# Patient Record
Sex: MALE | Race: WHITE | Employment: OTHER | URBAN - METROPOLITAN AREA
[De-identification: names, ages, dates, MRNs, and addresses within clinical notes are randomized per-mention and may not be internally consistent; named-entity substitution may affect disease eponyms.]

---

## 2017-01-10 ENCOUNTER — TELEPHONE (OUTPATIENT)
Dept: ENT CLINIC | Age: 82
End: 2017-01-10

## 2017-01-11 ENCOUNTER — TELEPHONE (OUTPATIENT)
Dept: ENT CLINIC | Age: 82
End: 2017-01-11

## 2017-01-19 ENCOUNTER — OFFICE VISIT (OUTPATIENT)
Dept: INTERNAL MEDICINE CLINIC | Age: 82
End: 2017-01-19

## 2017-01-19 VITALS
BODY MASS INDEX: 28.98 KG/M2 | HEIGHT: 70 IN | WEIGHT: 202.4 LBS | DIASTOLIC BLOOD PRESSURE: 72 MMHG | SYSTOLIC BLOOD PRESSURE: 138 MMHG | HEART RATE: 64 BPM

## 2017-01-19 DIAGNOSIS — Z01.818 PREOP EXAMINATION: Primary | ICD-10-CM

## 2017-01-19 DIAGNOSIS — N18.30 CRF (CHRONIC RENAL FAILURE), STAGE 3 (MODERATE) (HCC): ICD-10-CM

## 2017-01-19 DIAGNOSIS — K11.8 PAROTID MASS: ICD-10-CM

## 2017-01-19 PROCEDURE — 4040F PNEUMOC VAC/ADMIN/RCVD: CPT | Performed by: NURSE PRACTITIONER

## 2017-01-19 PROCEDURE — G8420 CALC BMI NORM PARAMETERS: HCPCS | Performed by: NURSE PRACTITIONER

## 2017-01-19 PROCEDURE — 99214 OFFICE O/P EST MOD 30 MIN: CPT | Performed by: NURSE PRACTITIONER

## 2017-01-19 PROCEDURE — 1036F TOBACCO NON-USER: CPT | Performed by: NURSE PRACTITIONER

## 2017-01-19 PROCEDURE — 93000 ELECTROCARDIOGRAM COMPLETE: CPT | Performed by: NURSE PRACTITIONER

## 2017-01-19 PROCEDURE — G8484 FLU IMMUNIZE NO ADMIN: HCPCS | Performed by: NURSE PRACTITIONER

## 2017-01-19 PROCEDURE — 1123F ACP DISCUSS/DSCN MKR DOCD: CPT | Performed by: NURSE PRACTITIONER

## 2017-01-19 PROCEDURE — G8427 DOCREV CUR MEDS BY ELIG CLIN: HCPCS | Performed by: NURSE PRACTITIONER

## 2017-01-20 LAB
ALBUMIN SERPL-MCNC: 4.1 G/DL (ref 3.4–5)
ANION GAP SERPL CALCULATED.3IONS-SCNC: 14 MMOL/L (ref 3–16)
BUN BLDV-MCNC: 30 MG/DL (ref 7–20)
CALCIUM SERPL-MCNC: 9.3 MG/DL (ref 8.3–10.6)
CHLORIDE BLD-SCNC: 102 MMOL/L (ref 99–110)
CO2: 26 MMOL/L (ref 21–32)
CREAT SERPL-MCNC: 1.6 MG/DL (ref 0.8–1.3)
GFR AFRICAN AMERICAN: 51
GFR NON-AFRICAN AMERICAN: 42
GLUCOSE BLD-MCNC: 263 MG/DL (ref 70–99)
PHOSPHORUS: 3.2 MG/DL (ref 2.5–4.9)
POTASSIUM SERPL-SCNC: 4.1 MMOL/L (ref 3.5–5.1)
SODIUM BLD-SCNC: 142 MMOL/L (ref 136–145)

## 2017-01-26 ENCOUNTER — HOSPITAL ENCOUNTER (OUTPATIENT)
Dept: SURGERY | Age: 82
Discharge: HOME | End: 2017-01-26
Attending: OTOLARYNGOLOGY | Admitting: OTOLARYNGOLOGY

## 2017-01-26 VITALS
DIASTOLIC BLOOD PRESSURE: 77 MMHG | SYSTOLIC BLOOD PRESSURE: 132 MMHG | WEIGHT: 199.3 LBS | TEMPERATURE: 97.1 F | BODY MASS INDEX: 28.6 KG/M2 | HEART RATE: 69 BPM | RESPIRATION RATE: 14 BRPM | OXYGEN SATURATION: 92 %

## 2017-01-26 LAB
ANION GAP SERPL CALCULATED.3IONS-SCNC: 13 MMOL/L (ref 3–16)
BUN BLDV-MCNC: 23 MG/DL (ref 7–20)
CALCIUM SERPL-MCNC: 8.9 MG/DL (ref 8.3–10.6)
CHLORIDE BLD-SCNC: 98 MMOL/L (ref 99–110)
CO2: 27 MMOL/L (ref 21–32)
CREAT SERPL-MCNC: 1.5 MG/DL (ref 0.8–1.3)
GFR AFRICAN AMERICAN: 55
GFR NON-AFRICAN AMERICAN: 45
GLUCOSE BLD-MCNC: 168 MG/DL (ref 70–99)
GLUCOSE BLD-MCNC: 176 MG/DL (ref 70–99)
GLUCOSE BLD-MCNC: 184 MG/DL (ref 70–99)
HCT VFR BLD CALC: 41.2 % (ref 40.5–52.5)
HEMOGLOBIN: 14.3 G/DL (ref 13.5–17.5)
MCH RBC QN AUTO: 30.1 PG (ref 26–34)
MCHC RBC AUTO-ENTMCNC: 34.6 G/DL (ref 31–36)
MCV RBC AUTO: 87.1 FL (ref 80–100)
PDW BLD-RTO: 15 % (ref 12.4–15.4)
PERFORMED ON: ABNORMAL
PERFORMED ON: ABNORMAL
PLATELET # BLD: 125 K/UL (ref 135–450)
PMV BLD AUTO: 9.7 FL (ref 5–10.5)
POTASSIUM SERPL-SCNC: 3.9 MMOL/L (ref 3.5–5.1)
RBC # BLD: 4.73 M/UL (ref 4.2–5.9)
SODIUM BLD-SCNC: 138 MMOL/L (ref 136–145)
WBC # BLD: 6.6 K/UL (ref 4–11)

## 2017-01-26 PROCEDURE — 42415 EXCISE PAROTID GLAND/LESION: CPT | Performed by: OTOLARYNGOLOGY

## 2017-01-26 RX ORDER — CEFAZOLIN SODIUM 2 G/100ML
INJECTION, SOLUTION INTRAVENOUS
Status: COMPLETED
Start: 2017-01-26 | End: 2017-01-26

## 2017-01-26 RX ORDER — ONDANSETRON 4 MG/1
TABLET, ORALLY DISINTEGRATING ORAL
Qty: 30 TABLET | Refills: 0 | Status: SHIPPED | OUTPATIENT
Start: 2017-01-26 | End: 2018-04-20 | Stop reason: ALTCHOICE

## 2017-01-26 RX ORDER — SODIUM CHLORIDE, SODIUM LACTATE, POTASSIUM CHLORIDE, CALCIUM CHLORIDE 600; 310; 30; 20 MG/100ML; MG/100ML; MG/100ML; MG/100ML
INJECTION, SOLUTION INTRAVENOUS CONTINUOUS
Status: DISCONTINUED | OUTPATIENT
Start: 2017-01-26 | End: 2017-01-27 | Stop reason: HOSPADM

## 2017-01-26 RX ORDER — LIDOCAINE HYDROCHLORIDE 10 MG/ML
0.5 INJECTION, SOLUTION EPIDURAL; INFILTRATION; INTRACAUDAL; PERINEURAL ONCE
Status: DISCONTINUED | OUTPATIENT
Start: 2017-01-26 | End: 2017-01-27 | Stop reason: HOSPADM

## 2017-01-26 RX ORDER — FENTANYL CITRATE 50 UG/ML
50 INJECTION, SOLUTION INTRAMUSCULAR; INTRAVENOUS EVERY 5 MIN PRN
Status: DISCONTINUED | OUTPATIENT
Start: 2017-01-26 | End: 2017-01-27 | Stop reason: HOSPADM

## 2017-01-26 RX ORDER — MEPERIDINE HYDROCHLORIDE 25 MG/ML
12.5 INJECTION INTRAMUSCULAR; INTRAVENOUS; SUBCUTANEOUS EVERY 5 MIN PRN
Status: DISCONTINUED | OUTPATIENT
Start: 2017-01-26 | End: 2017-01-27 | Stop reason: HOSPADM

## 2017-01-26 RX ORDER — FENTANYL CITRATE 50 UG/ML
25 INJECTION, SOLUTION INTRAMUSCULAR; INTRAVENOUS EVERY 5 MIN PRN
Status: DISCONTINUED | OUTPATIENT
Start: 2017-01-26 | End: 2017-01-27 | Stop reason: HOSPADM

## 2017-01-26 RX ORDER — LIDOCAINE HYDROCHLORIDE 10 MG/ML
1 INJECTION, SOLUTION EPIDURAL; INFILTRATION; INTRACAUDAL; PERINEURAL
Status: ACTIVE | OUTPATIENT
Start: 2017-01-26 | End: 2017-01-26

## 2017-01-26 RX ORDER — CEPHALEXIN 250 MG/1
250 CAPSULE ORAL 4 TIMES DAILY
Qty: 40 CAPSULE | Refills: 0 | Status: SHIPPED | OUTPATIENT
Start: 2017-01-26 | End: 2017-02-05

## 2017-01-26 RX ORDER — HYDROMORPHONE HCL 110MG/55ML
0.5 PATIENT CONTROLLED ANALGESIA SYRINGE INTRAVENOUS EVERY 5 MIN PRN
Status: DISCONTINUED | OUTPATIENT
Start: 2017-01-26 | End: 2017-01-27 | Stop reason: HOSPADM

## 2017-01-26 RX ORDER — ONDANSETRON 2 MG/ML
4 INJECTION INTRAMUSCULAR; INTRAVENOUS
Status: ACTIVE | OUTPATIENT
Start: 2017-01-26 | End: 2017-01-26

## 2017-01-26 RX ORDER — LABETALOL HYDROCHLORIDE 5 MG/ML
5 INJECTION, SOLUTION INTRAVENOUS EVERY 10 MIN PRN
Status: DISCONTINUED | OUTPATIENT
Start: 2017-01-26 | End: 2017-01-27 | Stop reason: HOSPADM

## 2017-01-26 RX ORDER — SODIUM CHLORIDE 0.9 % (FLUSH) 0.9 %
10 SYRINGE (ML) INJECTION PRN
Status: DISCONTINUED | OUTPATIENT
Start: 2017-01-26 | End: 2017-01-27 | Stop reason: HOSPADM

## 2017-01-26 RX ORDER — SODIUM CHLORIDE 0.9 % (FLUSH) 0.9 %
10 SYRINGE (ML) INJECTION EVERY 12 HOURS SCHEDULED
Status: DISCONTINUED | OUTPATIENT
Start: 2017-01-26 | End: 2017-01-27 | Stop reason: HOSPADM

## 2017-01-26 RX ORDER — SODIUM CHLORIDE 9 MG/ML
INJECTION, SOLUTION INTRAVENOUS
Status: COMPLETED
Start: 2017-01-26 | End: 2017-01-26

## 2017-01-26 RX ORDER — HYDROCODONE BITARTRATE AND ACETAMINOPHEN 5; 325 MG/1; MG/1
TABLET ORAL
Qty: 80 TABLET | Refills: 0 | Status: SHIPPED | OUTPATIENT
Start: 2017-01-26 | End: 2018-01-29 | Stop reason: ALTCHOICE

## 2017-01-26 RX ORDER — HYDROMORPHONE HCL 110MG/55ML
0.25 PATIENT CONTROLLED ANALGESIA SYRINGE INTRAVENOUS EVERY 5 MIN PRN
Status: DISCONTINUED | OUTPATIENT
Start: 2017-01-26 | End: 2017-01-27 | Stop reason: HOSPADM

## 2017-01-26 RX ORDER — SODIUM CHLORIDE 9 MG/ML
INJECTION, SOLUTION INTRAVENOUS CONTINUOUS
Status: DISCONTINUED | OUTPATIENT
Start: 2017-01-26 | End: 2017-01-27 | Stop reason: HOSPADM

## 2017-01-26 RX ORDER — CEFAZOLIN SODIUM 2 G/100ML
2 INJECTION, SOLUTION INTRAVENOUS
Status: COMPLETED | OUTPATIENT
Start: 2017-01-26 | End: 2017-01-26

## 2017-01-26 RX ADMIN — CEFAZOLIN SODIUM 2 G: 2 INJECTION, SOLUTION INTRAVENOUS at 07:27

## 2017-01-26 RX ADMIN — SODIUM CHLORIDE: 9 INJECTION, SOLUTION INTRAVENOUS at 06:45

## 2017-01-26 ASSESSMENT — PAIN SCALES - GENERAL: PAINLEVEL_OUTOF10: 0

## 2017-01-26 ASSESSMENT — PAIN - FUNCTIONAL ASSESSMENT: PAIN_FUNCTIONAL_ASSESSMENT: 0-10

## 2017-01-30 ENCOUNTER — TELEPHONE (OUTPATIENT)
Dept: ENT CLINIC | Age: 82
End: 2017-01-30

## 2017-02-03 ENCOUNTER — OFFICE VISIT (OUTPATIENT)
Dept: ENT CLINIC | Age: 82
End: 2017-02-03

## 2017-02-03 VITALS
HEIGHT: 70 IN | DIASTOLIC BLOOD PRESSURE: 72 MMHG | WEIGHT: 199.6 LBS | HEART RATE: 77 BPM | BODY MASS INDEX: 28.58 KG/M2 | SYSTOLIC BLOOD PRESSURE: 123 MMHG

## 2017-02-03 DIAGNOSIS — Z09 POSTOP CHECK: Primary | ICD-10-CM

## 2017-02-03 PROCEDURE — 99024 POSTOP FOLLOW-UP VISIT: CPT | Performed by: OTOLARYNGOLOGY

## 2017-02-23 ENCOUNTER — OFFICE VISIT (OUTPATIENT)
Dept: INTERNAL MEDICINE CLINIC | Age: 82
End: 2017-02-23

## 2017-02-23 VITALS
DIASTOLIC BLOOD PRESSURE: 86 MMHG | WEIGHT: 199 LBS | HEART RATE: 72 BPM | HEIGHT: 70 IN | BODY MASS INDEX: 28.49 KG/M2 | SYSTOLIC BLOOD PRESSURE: 132 MMHG

## 2017-02-23 DIAGNOSIS — E11.21 CONTROLLED TYPE 2 DIABETES MELLITUS WITH DIABETIC NEPHROPATHY, WITHOUT LONG-TERM CURRENT USE OF INSULIN (HCC): Primary | Chronic | ICD-10-CM

## 2017-02-23 DIAGNOSIS — R10.9 RIGHT SIDED ABDOMINAL PAIN: ICD-10-CM

## 2017-02-23 PROCEDURE — G8427 DOCREV CUR MEDS BY ELIG CLIN: HCPCS | Performed by: NURSE PRACTITIONER

## 2017-02-23 PROCEDURE — G8484 FLU IMMUNIZE NO ADMIN: HCPCS | Performed by: NURSE PRACTITIONER

## 2017-02-23 PROCEDURE — 4040F PNEUMOC VAC/ADMIN/RCVD: CPT | Performed by: NURSE PRACTITIONER

## 2017-02-23 PROCEDURE — 99214 OFFICE O/P EST MOD 30 MIN: CPT | Performed by: NURSE PRACTITIONER

## 2017-02-23 PROCEDURE — G8420 CALC BMI NORM PARAMETERS: HCPCS | Performed by: NURSE PRACTITIONER

## 2017-02-23 PROCEDURE — 1123F ACP DISCUSS/DSCN MKR DOCD: CPT | Performed by: NURSE PRACTITIONER

## 2017-02-23 PROCEDURE — 1036F TOBACCO NON-USER: CPT | Performed by: NURSE PRACTITIONER

## 2017-02-23 RX ORDER — VENLAFAXINE HYDROCHLORIDE 37.5 MG/1
37.5 CAPSULE, EXTENDED RELEASE ORAL DAILY
Qty: 30 CAPSULE | Refills: 3 | Status: SHIPPED | OUTPATIENT
Start: 2017-02-23 | End: 2017-03-21 | Stop reason: SDUPTHER

## 2017-02-23 ASSESSMENT — ENCOUNTER SYMPTOMS
NAUSEA: 0
BLOOD IN STOOL: 0
ABDOMINAL DISTENTION: 0
SHORTNESS OF BREATH: 0
VOMITING: 0
ABDOMINAL PAIN: 1
DIARRHEA: 0
CONSTIPATION: 0

## 2017-02-27 ENCOUNTER — HOSPITAL ENCOUNTER (OUTPATIENT)
Dept: ULTRASOUND IMAGING | Age: 82
Discharge: HOME | End: 2017-02-27
Attending: NURSE PRACTITIONER | Admitting: NURSE PRACTITIONER

## 2017-02-27 ENCOUNTER — CARE COORDINATION (OUTPATIENT)
Dept: INTERNAL MEDICINE CLINIC | Age: 82
End: 2017-02-27

## 2017-02-27 DIAGNOSIS — R10.9 ABDOMINAL PAIN: ICD-10-CM

## 2017-02-27 DIAGNOSIS — R10.11 RUQ PAIN: ICD-10-CM

## 2017-03-10 ENCOUNTER — OFFICE VISIT (OUTPATIENT)
Dept: ENT CLINIC | Age: 82
End: 2017-03-10

## 2017-03-10 VITALS
DIASTOLIC BLOOD PRESSURE: 64 MMHG | SYSTOLIC BLOOD PRESSURE: 146 MMHG | WEIGHT: 192 LBS | BODY MASS INDEX: 27.49 KG/M2 | HEART RATE: 68 BPM | HEIGHT: 70 IN

## 2017-03-10 DIAGNOSIS — Z09 POSTOP CHECK: Primary | ICD-10-CM

## 2017-03-10 PROCEDURE — 99024 POSTOP FOLLOW-UP VISIT: CPT | Performed by: OTOLARYNGOLOGY

## 2017-03-21 ENCOUNTER — OFFICE VISIT (OUTPATIENT)
Dept: INTERNAL MEDICINE CLINIC | Age: 82
End: 2017-03-21

## 2017-03-21 VITALS
SYSTOLIC BLOOD PRESSURE: 120 MMHG | DIASTOLIC BLOOD PRESSURE: 74 MMHG | BODY MASS INDEX: 28.2 KG/M2 | HEIGHT: 70 IN | HEART RATE: 80 BPM | WEIGHT: 197 LBS

## 2017-03-21 DIAGNOSIS — E11.21 CONTROLLED TYPE 2 DIABETES MELLITUS WITH DIABETIC NEPHROPATHY, WITHOUT LONG-TERM CURRENT USE OF INSULIN (HCC): Chronic | ICD-10-CM

## 2017-03-21 PROCEDURE — 1123F ACP DISCUSS/DSCN MKR DOCD: CPT | Performed by: NURSE PRACTITIONER

## 2017-03-21 PROCEDURE — 1036F TOBACCO NON-USER: CPT | Performed by: NURSE PRACTITIONER

## 2017-03-21 PROCEDURE — G8427 DOCREV CUR MEDS BY ELIG CLIN: HCPCS | Performed by: NURSE PRACTITIONER

## 2017-03-21 PROCEDURE — 99213 OFFICE O/P EST LOW 20 MIN: CPT | Performed by: NURSE PRACTITIONER

## 2017-03-21 PROCEDURE — G8420 CALC BMI NORM PARAMETERS: HCPCS | Performed by: NURSE PRACTITIONER

## 2017-03-21 PROCEDURE — G8484 FLU IMMUNIZE NO ADMIN: HCPCS | Performed by: NURSE PRACTITIONER

## 2017-03-21 PROCEDURE — 4040F PNEUMOC VAC/ADMIN/RCVD: CPT | Performed by: NURSE PRACTITIONER

## 2017-03-21 RX ORDER — VENLAFAXINE HYDROCHLORIDE 75 MG/1
75 CAPSULE, EXTENDED RELEASE ORAL DAILY
Qty: 30 CAPSULE | Refills: 5 | Status: SHIPPED | OUTPATIENT
Start: 2017-03-21 | End: 2017-06-22

## 2017-03-21 ASSESSMENT — PATIENT HEALTH QUESTIONNAIRE - PHQ9
SUM OF ALL RESPONSES TO PHQ QUESTIONS 1-9: 0
1. LITTLE INTEREST OR PLEASURE IN DOING THINGS: 0
2. FEELING DOWN, DEPRESSED OR HOPELESS: 0
SUM OF ALL RESPONSES TO PHQ9 QUESTIONS 1 & 2: 0

## 2017-03-22 DIAGNOSIS — E11.21 CONTROLLED TYPE 2 DIABETES MELLITUS WITH DIABETIC NEPHROPATHY, WITHOUT LONG-TERM CURRENT USE OF INSULIN (HCC): Chronic | ICD-10-CM

## 2017-03-22 LAB
ESTIMATED AVERAGE GLUCOSE: 185.8 MG/DL
HBA1C MFR BLD: 8.1 %

## 2017-05-16 ENCOUNTER — HOSPITAL ENCOUNTER (OUTPATIENT)
Dept: OTHER | Age: 82
Discharge: HOME | End: 2017-05-16

## 2017-05-16 LAB
ALBUMIN SERPL-MCNC: 4.2 G/DL (ref 3.4–5)
ANION GAP SERPL CALCULATED.3IONS-SCNC: 15 MMOL/L (ref 3–16)
BUN BLDV-MCNC: 25 MG/DL (ref 7–20)
CALCIUM SERPL-MCNC: 8.8 MG/DL (ref 8.3–10.6)
CHLORIDE BLD-SCNC: 97 MMOL/L (ref 99–110)
CO2: 28 MMOL/L (ref 21–32)
CREAT SERPL-MCNC: 1.5 MG/DL (ref 0.8–1.3)
GFR AFRICAN AMERICAN: 55
GFR NON-AFRICAN AMERICAN: 45
GLUCOSE BLD-MCNC: 303 MG/DL (ref 70–99)
HCT VFR BLD CALC: 42.3 % (ref 40.5–52.5)
HEMOGLOBIN: 14 G/DL (ref 13.5–17.5)
MAGNESIUM: 1.5 MG/DL (ref 1.8–2.4)
MCH RBC QN AUTO: 29.9 PG (ref 26–34)
MCHC RBC AUTO-ENTMCNC: 33 G/DL (ref 31–36)
MCV RBC AUTO: 90.4 FL (ref 80–100)
PDW BLD-RTO: 16.2 % (ref 12.4–15.4)
PHOSPHORUS: 2.4 MG/DL (ref 2.5–4.9)
PLATELET # BLD: 156 K/UL (ref 135–450)
PMV BLD AUTO: 9.8 FL (ref 5–10.5)
POTASSIUM SERPL-SCNC: 3.9 MMOL/L (ref 3.5–5.1)
RBC # BLD: 4.68 M/UL (ref 4.2–5.9)
SODIUM BLD-SCNC: 140 MMOL/L (ref 136–145)
WBC # BLD: 6.7 K/UL (ref 4–11)

## 2017-06-21 ENCOUNTER — TELEPHONE (OUTPATIENT)
Dept: INTERNAL MEDICINE CLINIC | Age: 82
End: 2017-06-21

## 2017-06-22 ENCOUNTER — OFFICE VISIT (OUTPATIENT)
Dept: INTERNAL MEDICINE CLINIC | Age: 82
End: 2017-06-22

## 2017-06-22 VITALS
HEART RATE: 52 BPM | BODY MASS INDEX: 28.49 KG/M2 | DIASTOLIC BLOOD PRESSURE: 72 MMHG | HEIGHT: 70 IN | WEIGHT: 199 LBS | SYSTOLIC BLOOD PRESSURE: 122 MMHG

## 2017-06-22 DIAGNOSIS — E11.21 CONTROLLED TYPE 2 DIABETES MELLITUS WITH DIABETIC NEPHROPATHY, WITHOUT LONG-TERM CURRENT USE OF INSULIN (HCC): Primary | Chronic | ICD-10-CM

## 2017-06-22 PROCEDURE — 99213 OFFICE O/P EST LOW 20 MIN: CPT | Performed by: NURSE PRACTITIONER

## 2017-06-22 PROCEDURE — G8427 DOCREV CUR MEDS BY ELIG CLIN: HCPCS | Performed by: NURSE PRACTITIONER

## 2017-06-22 PROCEDURE — G8419 CALC BMI OUT NRM PARAM NOF/U: HCPCS | Performed by: NURSE PRACTITIONER

## 2017-06-22 PROCEDURE — 1036F TOBACCO NON-USER: CPT | Performed by: NURSE PRACTITIONER

## 2017-06-22 PROCEDURE — 1123F ACP DISCUSS/DSCN MKR DOCD: CPT | Performed by: NURSE PRACTITIONER

## 2017-06-22 PROCEDURE — 4040F PNEUMOC VAC/ADMIN/RCVD: CPT | Performed by: NURSE PRACTITIONER

## 2017-06-23 ENCOUNTER — TELEPHONE (OUTPATIENT)
Dept: INTERNAL MEDICINE CLINIC | Age: 82
End: 2017-06-23

## 2017-06-23 DIAGNOSIS — E11.42 TYPE 2 DIABETES MELLITUS WITH DIABETIC POLYNEUROPATHY, WITHOUT LONG-TERM CURRENT USE OF INSULIN (HCC): Primary | ICD-10-CM

## 2017-06-23 RX ORDER — GLYBURIDE 2.5 MG/1
1.25 TABLET ORAL
Qty: 45 TABLET | Refills: 3 | Status: ON HOLD | OUTPATIENT
Start: 2017-06-23 | End: 2018-04-07 | Stop reason: HOSPADM

## 2017-06-26 ENCOUNTER — TELEPHONE (OUTPATIENT)
Dept: INTERNAL MEDICINE CLINIC | Age: 82
End: 2017-06-26

## 2017-07-28 ENCOUNTER — OFFICE VISIT (OUTPATIENT)
Dept: ENT CLINIC | Age: 82
End: 2017-07-28

## 2017-07-28 VITALS
TEMPERATURE: 98.2 F | SYSTOLIC BLOOD PRESSURE: 131 MMHG | BODY MASS INDEX: 27.92 KG/M2 | HEIGHT: 70 IN | WEIGHT: 195 LBS | DIASTOLIC BLOOD PRESSURE: 65 MMHG

## 2017-07-28 DIAGNOSIS — D11.0 BENIGN NEOPLASM OF PAROTID GLAND: ICD-10-CM

## 2017-07-28 DIAGNOSIS — H93.11 SUBJECTIVE TINNITUS, RIGHT: Primary | ICD-10-CM

## 2017-07-28 PROCEDURE — G8419 CALC BMI OUT NRM PARAM NOF/U: HCPCS | Performed by: OTOLARYNGOLOGY

## 2017-07-28 PROCEDURE — G8427 DOCREV CUR MEDS BY ELIG CLIN: HCPCS | Performed by: OTOLARYNGOLOGY

## 2017-07-28 PROCEDURE — 1123F ACP DISCUSS/DSCN MKR DOCD: CPT | Performed by: OTOLARYNGOLOGY

## 2017-07-28 PROCEDURE — 99213 OFFICE O/P EST LOW 20 MIN: CPT | Performed by: OTOLARYNGOLOGY

## 2017-07-28 PROCEDURE — 1036F TOBACCO NON-USER: CPT | Performed by: OTOLARYNGOLOGY

## 2017-07-28 PROCEDURE — 4040F PNEUMOC VAC/ADMIN/RCVD: CPT | Performed by: OTOLARYNGOLOGY

## 2017-08-03 ENCOUNTER — OFFICE VISIT (OUTPATIENT)
Dept: INTERNAL MEDICINE CLINIC | Age: 82
End: 2017-08-03

## 2017-08-03 VITALS
HEIGHT: 70 IN | WEIGHT: 195 LBS | DIASTOLIC BLOOD PRESSURE: 74 MMHG | SYSTOLIC BLOOD PRESSURE: 122 MMHG | HEART RATE: 64 BPM | BODY MASS INDEX: 27.92 KG/M2

## 2017-08-03 DIAGNOSIS — N40.0 BENIGN PROSTATIC HYPERTROPHY WITHOUT URINARY OBSTRUCTION: ICD-10-CM

## 2017-08-03 DIAGNOSIS — E11.40 CONTROLLED TYPE 2 DIABETES MELLITUS WITH NEUROPATHY (HCC): Primary | ICD-10-CM

## 2017-08-03 DIAGNOSIS — K30 INDIGESTION: ICD-10-CM

## 2017-08-03 DIAGNOSIS — M48.061 LUMBAR STENOSIS: ICD-10-CM

## 2017-08-03 DIAGNOSIS — I10 ESSENTIAL HYPERTENSION: ICD-10-CM

## 2017-08-03 PROCEDURE — 1123F ACP DISCUSS/DSCN MKR DOCD: CPT | Performed by: NURSE PRACTITIONER

## 2017-08-03 PROCEDURE — G8419 CALC BMI OUT NRM PARAM NOF/U: HCPCS | Performed by: NURSE PRACTITIONER

## 2017-08-03 PROCEDURE — 1036F TOBACCO NON-USER: CPT | Performed by: NURSE PRACTITIONER

## 2017-08-03 PROCEDURE — 99214 OFFICE O/P EST MOD 30 MIN: CPT | Performed by: NURSE PRACTITIONER

## 2017-08-03 PROCEDURE — 4040F PNEUMOC VAC/ADMIN/RCVD: CPT | Performed by: NURSE PRACTITIONER

## 2017-08-03 PROCEDURE — G8427 DOCREV CUR MEDS BY ELIG CLIN: HCPCS | Performed by: NURSE PRACTITIONER

## 2017-08-03 RX ORDER — TAMSULOSIN HYDROCHLORIDE 0.4 MG/1
0.4 CAPSULE ORAL DAILY
Qty: 90 CAPSULE | Refills: 1 | Status: SHIPPED | OUTPATIENT
Start: 2017-08-03

## 2017-08-03 RX ORDER — DULOXETIN HYDROCHLORIDE 30 MG/1
30 CAPSULE, DELAYED RELEASE ORAL DAILY
Qty: 30 CAPSULE | Refills: 3 | Status: SHIPPED | OUTPATIENT
Start: 2017-08-03 | End: 2018-03-12 | Stop reason: SDUPTHER

## 2017-08-03 RX ORDER — RANITIDINE 300 MG/1
CAPSULE ORAL
Qty: 90 CAPSULE | Refills: 1 | Status: ON HOLD | OUTPATIENT
Start: 2017-08-03 | End: 2018-03-19 | Stop reason: HOSPADM

## 2017-08-04 LAB
ESTIMATED AVERAGE GLUCOSE: 154.2 MG/DL
HBA1C MFR BLD: 7 %

## 2017-08-24 DIAGNOSIS — E11.21 CONTROLLED TYPE 2 DIABETES MELLITUS WITH DIABETIC NEPHROPATHY, WITHOUT LONG-TERM CURRENT USE OF INSULIN (HCC): Chronic | ICD-10-CM

## 2017-10-02 ENCOUNTER — TELEPHONE (OUTPATIENT)
Dept: INTERNAL MEDICINE CLINIC | Age: 82
End: 2017-10-02

## 2017-10-02 DIAGNOSIS — E11.40 CONTROLLED TYPE 2 DIABETES MELLITUS WITH NEUROPATHY (HCC): ICD-10-CM

## 2017-10-02 RX ORDER — GABAPENTIN 300 MG/1
600 CAPSULE ORAL DAILY
Qty: 180 CAPSULE | Refills: 3 | Status: ON HOLD | OUTPATIENT
Start: 2017-10-02 | End: 2018-04-07 | Stop reason: HOSPADM

## 2017-11-03 ENCOUNTER — OFFICE VISIT (OUTPATIENT)
Dept: INTERNAL MEDICINE CLINIC | Age: 82
End: 2017-11-03

## 2017-11-03 VITALS
SYSTOLIC BLOOD PRESSURE: 122 MMHG | BODY MASS INDEX: 28.35 KG/M2 | WEIGHT: 198 LBS | DIASTOLIC BLOOD PRESSURE: 74 MMHG | HEIGHT: 70 IN | HEART RATE: 76 BPM

## 2017-11-03 DIAGNOSIS — M15.9 PRIMARY OSTEOARTHRITIS INVOLVING MULTIPLE JOINTS: ICD-10-CM

## 2017-11-03 DIAGNOSIS — E11.21 CONTROLLED TYPE 2 DIABETES MELLITUS WITH DIABETIC NEPHROPATHY, WITHOUT LONG-TERM CURRENT USE OF INSULIN (HCC): Primary | Chronic | ICD-10-CM

## 2017-11-03 DIAGNOSIS — I10 ESSENTIAL HYPERTENSION: ICD-10-CM

## 2017-11-03 DIAGNOSIS — Z23 NEED FOR INFLUENZA VACCINATION: ICD-10-CM

## 2017-11-03 DIAGNOSIS — E11.22 TYPE 2 DIABETES MELLITUS WITH STAGE 3 CHRONIC KIDNEY DISEASE, WITHOUT LONG-TERM CURRENT USE OF INSULIN (HCC): ICD-10-CM

## 2017-11-03 DIAGNOSIS — N18.30 TYPE 2 DIABETES MELLITUS WITH STAGE 3 CHRONIC KIDNEY DISEASE, WITHOUT LONG-TERM CURRENT USE OF INSULIN (HCC): ICD-10-CM

## 2017-11-03 LAB
BASOPHILS ABSOLUTE: 0 K/UL (ref 0–0.2)
BASOPHILS RELATIVE PERCENT: 0.3 %
CREATININE URINE: 85.9 MG/DL (ref 39–259)
EOSINOPHILS ABSOLUTE: 1.6 K/UL (ref 0–0.6)
EOSINOPHILS RELATIVE PERCENT: 16.9 %
HCT VFR BLD CALC: 41.7 % (ref 40.5–52.5)
HEMOGLOBIN: 14.2 G/DL (ref 13.5–17.5)
LYMPHOCYTES ABSOLUTE: 2 K/UL (ref 1–5.1)
LYMPHOCYTES RELATIVE PERCENT: 20.5 %
MCH RBC QN AUTO: 31.8 PG (ref 26–34)
MCHC RBC AUTO-ENTMCNC: 34 G/DL (ref 31–36)
MCV RBC AUTO: 93.6 FL (ref 80–100)
MICROALBUMIN UR-MCNC: 3.3 MG/DL
MICROALBUMIN/CREAT UR-RTO: 38.4 MG/G (ref 0–30)
MONOCYTES ABSOLUTE: 0.9 K/UL (ref 0–1.3)
MONOCYTES RELATIVE PERCENT: 9.5 %
NEUTROPHILS ABSOLUTE: 5.1 K/UL (ref 1.7–7.7)
NEUTROPHILS RELATIVE PERCENT: 52.8 %
PDW BLD-RTO: 15.1 % (ref 12.4–15.4)
PLATELET # BLD: 183 K/UL (ref 135–450)
PMV BLD AUTO: 9.5 FL (ref 5–10.5)
RBC # BLD: 4.46 M/UL (ref 4.2–5.9)
WBC # BLD: 9.6 K/UL (ref 4–11)

## 2017-11-03 PROCEDURE — 1123F ACP DISCUSS/DSCN MKR DOCD: CPT | Performed by: NURSE PRACTITIONER

## 2017-11-03 PROCEDURE — G8427 DOCREV CUR MEDS BY ELIG CLIN: HCPCS | Performed by: NURSE PRACTITIONER

## 2017-11-03 PROCEDURE — 4040F PNEUMOC VAC/ADMIN/RCVD: CPT | Performed by: NURSE PRACTITIONER

## 2017-11-03 PROCEDURE — 1036F TOBACCO NON-USER: CPT | Performed by: NURSE PRACTITIONER

## 2017-11-03 PROCEDURE — G0008 ADMIN INFLUENZA VIRUS VAC: HCPCS | Performed by: NURSE PRACTITIONER

## 2017-11-03 PROCEDURE — 90662 IIV NO PRSV INCREASED AG IM: CPT | Performed by: NURSE PRACTITIONER

## 2017-11-03 PROCEDURE — 99214 OFFICE O/P EST MOD 30 MIN: CPT | Performed by: NURSE PRACTITIONER

## 2017-11-03 PROCEDURE — G8484 FLU IMMUNIZE NO ADMIN: HCPCS | Performed by: NURSE PRACTITIONER

## 2017-11-03 PROCEDURE — G8417 CALC BMI ABV UP PARAM F/U: HCPCS | Performed by: NURSE PRACTITIONER

## 2017-11-04 LAB
ALBUMIN SERPL-MCNC: 3.9 G/DL (ref 3.4–5)
ANION GAP SERPL CALCULATED.3IONS-SCNC: 16 MMOL/L (ref 3–16)
BUN BLDV-MCNC: 49 MG/DL (ref 7–20)
CALCIUM SERPL-MCNC: 8.6 MG/DL (ref 8.3–10.6)
CHLORIDE BLD-SCNC: 101 MMOL/L (ref 99–110)
CO2: 27 MMOL/L (ref 21–32)
CREAT SERPL-MCNC: 1.9 MG/DL (ref 0.8–1.3)
ESTIMATED AVERAGE GLUCOSE: 134.1 MG/DL
GFR AFRICAN AMERICAN: 41
GFR NON-AFRICAN AMERICAN: 34
GLUCOSE BLD-MCNC: 136 MG/DL (ref 70–99)
HBA1C MFR BLD: 6.3 %
MAGNESIUM: 1.6 MG/DL (ref 1.8–2.4)
PHOSPHORUS: 4.1 MG/DL (ref 2.5–4.9)
POTASSIUM SERPL-SCNC: 3.9 MMOL/L (ref 3.5–5.1)
SODIUM BLD-SCNC: 144 MMOL/L (ref 136–145)

## 2017-11-06 NOTE — PROGRESS NOTES
Subjective:      Patient ID: Errol Hashimoto is a [de-identified] y.o. male. CC: Diabetes, foot pain    HPI: The patient presents to the office today for follow-up of diabetes and continued bilateral foot pain. Diabetes: The patient's diabetes control has been improving. He reports compliance with his medication. Foot pain: The patient has known diabetic peripheral neuropathy. This has been worsening for the past few months. He has been taking gabapentin but was unable to tolerate higher doses due to drowsiness. We tried Effexor to see if this would provide some additional relief for his diabetic peripheral neuropathy but he stopped this after it didn't seem to help. Last appointment we started Cymbalta which he admits he has not taken but a couple of doses. Current Outpatient Prescriptions   Medication Sig Dispense Refill    gabapentin (NEURONTIN) 300 MG capsule Take 2 capsules by mouth daily 180 capsule 3    metFORMIN (GLUCOPHAGE) 1000 MG tablet Take 1 tablet by mouth 2 times daily (with meals) 180 tablet 1    ranitidine (ZANTAC) 300 MG capsule TAKE ONE CAPSULE BY MOUTH EVERY EVENING 90 capsule 1    tamsulosin (FLOMAX) 0.4 MG capsule Take 1 capsule by mouth daily 90 capsule 1    DULoxetine (CYMBALTA) 30 MG extended release capsule Take 1 capsule by mouth daily 30 capsule 3    glyBURIDE (DIABETA) 2.5 MG tablet Take 0.5 tablets by mouth daily (with breakfast) 45 tablet 3    HYDROcodone-acetaminophen (NORCO) 5-325 MG per tablet Take 1 or 2 tablets by mouth every 4 hours as needed for post op pain. . 80 tablet 0    ondansetron (ZOFRAN ODT) 4 MG disintegrating tablet Dissolve one tablet on tongue and swallow every 8 hours as needed for nausea.  30 tablet 0    Diphenhydramine-APAP, sleep, (TYLENOL PM EXTRA STRENGTH PO) Take 2 tablets by mouth nightly as needed      lisinopril (PRINIVIL;ZESTRIL) 10 MG tablet Take 1 tablet by mouth daily 90 tablet 3    hydrochlorothiazide (HYDRODIURIL) 25 MG tablet TAKE 1 TABLET DAILY 90 tablet 3    vardenafil (LEVITRA) 20 MG tablet Take 1 tablet by mouth as needed for Erectile Dysfunction 30 tablet 3    glucose blood VI test strips (EXACTECH TEST) strip 1 each by Does not apply route daily Test once daily and as needed. 100 each 3    glucose monitoring kit (FREESTYLE) monitoring kit 1 kit by Does not apply route daily as needed. 1 kit 0    latanoprost (XALATAN) 0.005 % ophthalmic solution Place 1 drop into both eyes nightly. No current facility-administered medications for this visit. Review of Systems   Constitutional: Negative for chills and fever. Respiratory: Negative for shortness of breath. Cardiovascular: Negative for chest pain. Gastrointestinal: Negative for abdominal distention, blood in stool, constipation, diarrhea, nausea and vomiting. Neurological: Positive for numbness. Severe diabetic neuropathy bilateral feet   All other systems reviewed and are negative. Objective:   Physical Exam   Constitutional: He is oriented to person, place, and time. He appears well-developed and well-nourished. No distress. HENT:   Head: Normocephalic and atraumatic. Eyes: Conjunctivae are normal. No scleral icterus. Neck: Neck supple. No JVD present. Cardiovascular: Normal rate and regular rhythm. Pulses:       Dorsalis pedis pulses are 1+ on the right side, and 1+ on the left side. Posterior tibial pulses are 1+ on the right side, and 1+ on the left side. Pulmonary/Chest: Effort normal and breath sounds normal.   Abdominal: Soft. There is no rigidity, no rebound and no guarding. Musculoskeletal: Normal range of motion. He exhibits edema (1+ BLE). Neurological: He is alert and oriented to person, place, and time. Skin: Skin is warm and dry. He is not diaphoretic. Psychiatric: He has a normal mood and affect.  His behavior is normal. Thought content normal.     /74   Pulse 76   Ht 5' 10\" (1.778 m)   Wt 198 lb (89.8 kg)

## 2017-12-08 ENCOUNTER — OFFICE VISIT (OUTPATIENT)
Dept: ORTHOPEDIC SURGERY | Age: 82
End: 2017-12-08

## 2017-12-08 ENCOUNTER — TELEPHONE (OUTPATIENT)
Dept: ORTHOPEDIC SURGERY | Age: 82
End: 2017-12-08

## 2017-12-08 VITALS
BODY MASS INDEX: 27.3 KG/M2 | SYSTOLIC BLOOD PRESSURE: 128 MMHG | WEIGHT: 195 LBS | HEIGHT: 71 IN | DIASTOLIC BLOOD PRESSURE: 79 MMHG

## 2017-12-08 DIAGNOSIS — M54.50 PAIN OF LUMBAR SPINE: ICD-10-CM

## 2017-12-08 DIAGNOSIS — M48.062 LUMBAR STENOSIS WITH NEUROGENIC CLAUDICATION: Primary | ICD-10-CM

## 2017-12-08 DIAGNOSIS — M16.12 PRIMARY OSTEOARTHRITIS OF LEFT HIP: ICD-10-CM

## 2017-12-08 DIAGNOSIS — M51.36 DDD (DEGENERATIVE DISC DISEASE), LUMBAR: ICD-10-CM

## 2017-12-08 DIAGNOSIS — M47.816 SPONDYLOSIS OF LUMBAR REGION WITHOUT MYELOPATHY OR RADICULOPATHY: ICD-10-CM

## 2017-12-08 PROCEDURE — G8417 CALC BMI ABV UP PARAM F/U: HCPCS | Performed by: PHYSICAL MEDICINE & REHABILITATION

## 2017-12-08 PROCEDURE — 4040F PNEUMOC VAC/ADMIN/RCVD: CPT | Performed by: PHYSICAL MEDICINE & REHABILITATION

## 2017-12-08 PROCEDURE — G8484 FLU IMMUNIZE NO ADMIN: HCPCS | Performed by: PHYSICAL MEDICINE & REHABILITATION

## 2017-12-08 PROCEDURE — 1036F TOBACCO NON-USER: CPT | Performed by: PHYSICAL MEDICINE & REHABILITATION

## 2017-12-08 PROCEDURE — 99204 OFFICE O/P NEW MOD 45 MIN: CPT | Performed by: PHYSICAL MEDICINE & REHABILITATION

## 2017-12-08 PROCEDURE — 72100 X-RAY EXAM L-S SPINE 2/3 VWS: CPT | Performed by: PHYSICAL MEDICINE & REHABILITATION

## 2017-12-08 PROCEDURE — 1123F ACP DISCUSS/DSCN MKR DOCD: CPT | Performed by: PHYSICAL MEDICINE & REHABILITATION

## 2017-12-08 PROCEDURE — G8427 DOCREV CUR MEDS BY ELIG CLIN: HCPCS | Performed by: PHYSICAL MEDICINE & REHABILITATION

## 2017-12-08 RX ORDER — ACETAMINOPHEN AND CODEINE PHOSPHATE 300; 30 MG/1; MG/1
1 TABLET ORAL 3 TIMES DAILY PRN
Qty: 21 TABLET | Refills: 0 | Status: SHIPPED | OUTPATIENT
Start: 2017-12-08 | End: 2017-12-15

## 2017-12-08 NOTE — PROGRESS NOTES
New Patient: SPINE    Referring Provider:  No ref. provider found    CHIEF COMPLAINT:    Chief Complaint   Patient presents with    Lower Back Pain     pain x3-4 weeks, states it has significantly worsened over the last week, increases with walking and almost any activity    Leg Pain     left, down to the knee       HISTORY OF PRESENT ILLNESS:      · The patient is being sent at the request of No ref. provider found in consultation as a new spine patient for low back pain and left leg pain. The patient is a [de-identified] y.o. male whom reports He has a history of low back and left leg pain which began in 2013. This improved with a lumbar epidural steroid injection in the left hip injection performed by Dr. Linden Lynch. He has done well up until about a month ago. He cannot identify any inciting event or any injury. He developed low back pain rating into his left leg. He did this is worse with walking and increased activity. He uses the cane at baseline but has been using this more often. He has not had any recent advanced imaging of the lumbar spine.     Pain Assessment  Location of Pain: Back  Location Modifiers: Posterior  Severity of Pain: 10  Quality of Pain: Sharp  Duration of Pain: Persistent  Frequency of Pain: Intermittent  Aggravating Factors: Walking (Activity)  Limiting Behavior: Yes  Relieving Factors: Rest, Heat  Result of Injury: No  Work-Related Injury: No  Are there other pain locations you wish to document?: No      Associated signs and symptoms:   Neurogenic bowel or bladder symptoms:  no   Perceived weakness:  yes   Difficulty walking:  yes    Recent Imaging (within past one year)   Xrays: no   MRI or CT of spine: no    Current/Past Treatment:   · Physical Therapy:  none  · Chiropractic:  none  · Injection:  none  · Medications:   NSAIDS:  none   Muscle relaxer:  none   Steriods:  none   Neuropathic medications:  none   Opioids:  yes  · Previous surgery:  no  · Previous surgical consult: no  · Other:  · Infection control  · Tested positive for MRSA in past 12 months:  no  · Tested positive for MSSA \"staph infection\" in past 12 months: no  · Tested positive for VRE (Vancomycin Resistant Enterococci) in past 12 months:   no  · Currently on any antibiotics for an infection: no  · Anticoagulants:  · On a blood thinner:  no   · Any history of bleeding disorder: no   · MRI Contraindication: no   · Previous Pain Management: no                   Past Medical History:   Past Medical History:   Diagnosis Date    Abdominal pain, epigastric     Arthritis     Benign prostatic hypertrophy without urinary obstruction     Cellulitis and abscess     Chronic rhinitis     Dysphagia     Erectile dysfunction     Hyperglycemia     Hypertension     Neuropathy (HCC)     Nocturia     Osteoarthritis     Pain, joint, knee     Palpitations     SOB (shortness of breath)     Tennis elbow     Tinea pedis     foot      Past Surgical History:     Past Surgical History:   Procedure Laterality Date    CATARACT REMOVAL Bilateral 09/2014    CHOLECYSTECTOMY      COLONOSCOPY      ENDOSCOPY, COLON, DIAGNOSTIC      JOINT REPLACEMENT      KNEE PROSTHESIS REMOVAL      PAROTIDECTOMY Right 01/26/2017    RIGHT SUPERFICIAL PAROTIDECTOMY WITH EXCISION OF PAROTID TAIL     Current Medications:     Current Outpatient Prescriptions:     acetaminophen-codeine (TYLENOL/CODEINE #3) 300-30 MG per tablet, Take 1 tablet by mouth 3 times daily as needed for Pain ., Disp: 21 tablet, Rfl: 0    gabapentin (NEURONTIN) 300 MG capsule, Take 2 capsules by mouth daily, Disp: 180 capsule, Rfl: 3    metFORMIN (GLUCOPHAGE) 1000 MG tablet, Take 1 tablet by mouth 2 times daily (with meals), Disp: 180 tablet, Rfl: 1    ranitidine (ZANTAC) 300 MG capsule, TAKE ONE CAPSULE BY MOUTH EVERY EVENING, Disp: 90 capsule, Rfl: 1    tamsulosin (FLOMAX) 0.4 MG capsule, Take 1 capsule by mouth daily, Disp: 90 capsule, Rfl: 1    DULoxetine (CYMBALTA) 30 or lesions. · Reflexes: Reflexes are symmetrically 1+ at the patellar and ankle tendons. Clonus absent bilaterally at the feet. · Gait & station: uses a single point cane to ambulate  · Additional Examinations:  · RIGHT LOWER EXTREMITY: Inspection/examination of the right lower extremity does not show any tenderness, deformity or injury. Range of motion is unremarkable. There is no gross instability. There are no rashes, ulcerations or lesions. Strength and tone are normal. No atrophy or abnormal movements are noted. · LEFT LOWER EXTREMITY:  Inspection/examination of the left lower extremity does not show any tenderness, deformity or injury. Range of motion is unremarkable. There is no gross instability. There are no rashes, ulcerations or lesions. Strength and tone are normal. No atrophy or abnormal movements are noted.       Diagnostic Testing:    Xrays:   AP and lateral lumbar spine taken today in the office show 5 type lumbar vertebrae with anterior spondylosis at L1 and L2 as well as all 3 L4, L4 5 5 S1 degenerative disc disease  MRI or CT:  None  EMG:  None  Results for orders placed or performed in visit on 11/03/17   RENAL FUNCTION PANEL   Result Value Ref Range    Sodium 144 136 - 145 mmol/L    Potassium 3.9 3.5 - 5.1 mmol/L    Chloride 101 99 - 110 mmol/L    CO2 27 21 - 32 mmol/L    Anion Gap 16 3 - 16    Glucose 136 (H) 70 - 99 mg/dL    BUN 49 (H) 7 - 20 mg/dL    CREATININE 1.9 (H) 0.8 - 1.3 mg/dL    GFR Non- 34 (A) >60    GFR  41 (A) >60    Calcium 8.6 8.3 - 10.6 mg/dL    Phosphorus 4.1 2.5 - 4.9 mg/dL    Alb 3.9 3.4 - 5.0 g/dL   MICROALBUMIN / CREATININE URINE RATIO   Result Value Ref Range    MICROALBUMIN, RANDOM URINE 3.30 (H) <2.0 mg/dL    Creatinine, Ur 85.9 39.0 - 259.0 mg/dL    Microalb Creat Ratio 38.4 (H) 0.0 - 30.0 mg/g   CBC WITH AUTO DIFFERENTIAL   Result Value Ref Range    WBC 9.6 4.0 - 11.0 K/uL    RBC 4.46 4.20 - 5.90 M/uL    Hemoglobin 14.2 13.5 - 17.5 g/dL    Hematocrit 41.7 40.5 - 52.5 %    MCV 93.6 80.0 - 100.0 fL    MCH 31.8 26.0 - 34.0 pg    MCHC 34.0 31.0 - 36.0 g/dL    RDW 15.1 12.4 - 15.4 %    Platelets 703 663 - 800 K/uL    MPV 9.5 5.0 - 10.5 fL    Neutrophils % 52.8 %    Lymphocytes % 20.5 %    Monocytes % 9.5 %    Eosinophils % 16.9 %    Basophils % 0.3 %    Neutrophils # 5.1 1.7 - 7.7 K/uL    Lymphocytes # 2.0 1.0 - 5.1 K/uL    Monocytes # 0.9 0.0 - 1.3 K/uL    Eosinophils # 1.6 (H) 0.0 - 0.6 K/uL    Basophils # 0.0 0.0 - 0.2 K/uL   MAGNESIUM   Result Value Ref Range    Magnesium 1.60 (L) 1.80 - 2.40 mg/dL   Hemoglobin A1C   Result Value Ref Range    Hemoglobin A1C 6.3 See comment %    eAG 134.1 mg/dL       Impression (Medical Decision Making):       1. Lumbar stenosis with neurogenic claudication    2. DDD (degenerative disc disease), lumbar    3. Spondylosis of lumbar region without myelopathy or radiculopathy    4. Pain of lumbar spine    5. Primary osteoarthritis of left hip        Plan (Medical Decision Making):    1. Medications:  Low risk on ORT. OARRS reviewed and appropriate. Tylenol with codeine 1 po TID #21  2. PT:  Encouraged to continue with HEP. 3. Further studies:  MR Lumbar spine  4. Interventional:  We discussed pursuing a Left L3 and L4 TF epidural steroid injection to address the pain. Radiologic imaging and symptoms confirm the pain etiology. Risks, benefits and alternatives of interventional options were discussed. These include and are not limited to bleeding, infection, spinal headache, nerve injury, increased pain and lack of pain relief. The patient verbalized understanding and would like to proceed. The patient will be scheduled accordingly. 5. Healthy Lifestyle Measures:  Patient education material - Anatomic drawings, healthy lifestyle education, osteoporosis prevention, back and neck pain educational information, and advanced imaging preparedness were distributed to the patient.  Posture education, proper lifting and

## 2017-12-08 NOTE — LETTER
Please schedule the following with:     Date:       Account: [de-identified]  Patient: Mica uGrrola    : 1935  Address:  [address] [place] [address] Road [facility]    Phone (H):  [phone] (home)      ----------------------------------------------------------------------------------------------  Diagnosis:     ICD-10-CM ICD-9-CM    1. Lumbar stenosis with neurogenic claudication M48.062 724.03 MRI lumbar spine without contrast      acetaminophen-codeine (TYLENOL/CODEINE #3) 300-30 MG per tablet   2. DDD (degenerative disc disease), lumbar M51.36 722. 52 MRI lumbar spine without contrast      acetaminophen-codeine (TYLENOL/CODEINE #3) 300-30 MG per tablet   3. Spondylosis of lumbar region without myelopathy or radiculopathy M47.816 721.3 MRI lumbar spine without contrast      acetaminophen-codeine (TYLENOL/CODEINE #3) 300-30 MG per tablet   4. Pain of lumbar spine M54.5 724.2 XR LUMBAR SPINE (2-3 VIEWS)      MRI lumbar spine without contrast      acetaminophen-codeine (TYLENOL/CODEINE #3) 300-30 MG per tablet   5. Primary osteoarthritis of left hip M16.12 715.15 MRI lumbar spine without contrast      acetaminophen-codeine (TYLENOL/CODEINE #3) 300-30 MG per tablet         Levels: L3, L4  on the left  Transforaminal ELEN    ----------------------------------------------------------------------------------------------  Injection #   [facility]    Attending Physician       Katharine Goddard.  Jane Szymanski MD.      ----------------------------------------------------------------------------------------------  Injection Scheduled For:    At:    1st Insurance:     Pre-Cert#    2nd Insurance:    Pre-Cert#    Comments:    · Infection control  · Tested positive for MRSA in past 12 months:  no  · Tested positive for MSSA \"staph infection\" in past 12 months: no  · Tested positive for VRE (Vancomycin Resistant Enterococci) in past 12 months:   no  · Currently on any antibiotics for an infection: no  · Anticoagulants:  · On a blood thinner:  no

## 2017-12-09 ENCOUNTER — HOSPITAL ENCOUNTER (OUTPATIENT)
Dept: MRI IMAGING | Age: 82
Discharge: HOME | End: 2017-12-09
Attending: PHYSICAL MEDICINE & REHABILITATION | Admitting: PHYSICAL MEDICINE & REHABILITATION

## 2017-12-09 DIAGNOSIS — M51.36 DDD (DEGENERATIVE DISC DISEASE), LUMBAR: ICD-10-CM

## 2017-12-09 DIAGNOSIS — M48.061 SPINAL STENOSIS OF LUMBAR REGION: ICD-10-CM

## 2017-12-09 DIAGNOSIS — M48.062 LUMBAR STENOSIS WITH NEUROGENIC CLAUDICATION: ICD-10-CM

## 2017-12-09 DIAGNOSIS — M47.816 SPONDYLOSIS OF LUMBAR REGION WITHOUT MYELOPATHY OR RADICULOPATHY: ICD-10-CM

## 2017-12-09 DIAGNOSIS — M16.12 PRIMARY OSTEOARTHRITIS OF LEFT HIP: ICD-10-CM

## 2017-12-09 DIAGNOSIS — M54.50 PAIN OF LUMBAR SPINE: ICD-10-CM

## 2017-12-20 ENCOUNTER — HOSPITAL ENCOUNTER (OUTPATIENT)
Dept: PAIN MANAGEMENT | Age: 82
Discharge: HOME | End: 2017-12-20
Attending: PHYSICAL MEDICINE & REHABILITATION | Admitting: PHYSICAL MEDICINE & REHABILITATION

## 2017-12-20 VITALS
OXYGEN SATURATION: 98 % | DIASTOLIC BLOOD PRESSURE: 74 MMHG | HEART RATE: 84 BPM | SYSTOLIC BLOOD PRESSURE: 121 MMHG | TEMPERATURE: 97.5 F | RESPIRATION RATE: 16 BRPM

## 2017-12-20 LAB
GLUCOSE BLD-MCNC: 175 MG/DL (ref 70–99)
PERFORMED ON: ABNORMAL

## 2017-12-20 ASSESSMENT — PAIN DESCRIPTION - DESCRIPTORS: DESCRIPTORS: SHARP

## 2017-12-20 ASSESSMENT — PAIN - FUNCTIONAL ASSESSMENT
PAIN_FUNCTIONAL_ASSESSMENT: 0-10
PAIN_FUNCTIONAL_ASSESSMENT: 0-10

## 2017-12-20 NOTE — PROGRESS NOTES
TRANSFORAMINAL INJECTION  Dr Estrada Postin Injection Note    Sight marked/ confirmed with x-ray: yes  Position: Prone  Prepped with: Chloraprep    Local 1 % Lidocaine    Depomedrol (mg):  Marcaine: .5%(ml)     Monitor by: Estela Steven RN  C - Arm operated by:CHULA SHEN RT  Circulator: Bradley Crowell RN  Prepped by: Tank Osullivan MD

## 2017-12-20 NOTE — OP NOTE
Patient:  Magdalena Villa  YOB: 1935  Medical Record #:  [id number]   Place: [address] [place] [address]  Date:  12/20/2017   Physician:  Mindy Batres MD    Procedure: 1. Transforaminal Lumbar Epidural Steroid Injection -  left L3           2. Transforaminal Lumbar Epidural Steroid Injection -  left L4     Pre-Procedure Diagnosis: Lumbar stenosis with neurogenic claudication, Lumbar DDD    Post-Procedure Diagnosis: Same    Sedation: Local with 1% Lidocaine 3 ml and 1 mg of IV Versed    EBL: None    Complications: None    Procedure Summary:    The patient was seen in the office for complaints of low back and left leg pain. Review of the imaging and physical exam of the patient confirmed the pre-procedure diagnosis. After a thorough discussion of risks, benefits and alternatives informed consent was obtained. The patient was brought to the procedure suite and placed in the prone position. The skin overlying the lumbar spine was prepped and draped in the usual sterile fashion. Using fluoroscopic guidance, the left L3 foramen was identified. Through anesthetized skin, a 22 gauge 3.5 inch curved tip spinal needle was advanced into the foramen. Isovue M 300 was instilled showing an epidurogram/nerve root outline pattern without evidence of vascular or intrathecal spread. Following which,5  mg of PF dexamethasone mixed with 1 ml of 0.5% Marcaine was instilled. The needle was removed. Using fluoroscopic guidance, the left L4 foramen was identified. Through anesthetized skin, a 22 gauge 3.5 inch curved tip spinal needle was advanced into the foramen. Isovue M 300 was instilled showing an epidurogram/nerve lisa,5 mg of PF dexamethasone mixed with 1 ml of 0.5% Marcaine was instilled. The needle was removed and band-aids were applied. The patient was transferred to the post-operative area in stable condition.

## 2018-01-03 ENCOUNTER — TELEPHONE (OUTPATIENT)
Dept: ENT CLINIC | Age: 83
End: 2018-01-03

## 2018-01-19 ENCOUNTER — TELEPHONE (OUTPATIENT)
Dept: ORTHOPEDIC SURGERY | Age: 83
End: 2018-01-19

## 2018-01-19 ENCOUNTER — OFFICE VISIT (OUTPATIENT)
Dept: ORTHOPEDIC SURGERY | Age: 83
End: 2018-01-19

## 2018-01-19 VITALS
HEIGHT: 71 IN | BODY MASS INDEX: 28 KG/M2 | DIASTOLIC BLOOD PRESSURE: 71 MMHG | SYSTOLIC BLOOD PRESSURE: 132 MMHG | WEIGHT: 200 LBS

## 2018-01-19 DIAGNOSIS — M48.062 SPINAL STENOSIS OF LUMBAR REGION WITH NEUROGENIC CLAUDICATION: ICD-10-CM

## 2018-01-19 DIAGNOSIS — M51.26 HNP (HERNIATED NUCLEUS PULPOSUS), LUMBAR: Primary | ICD-10-CM

## 2018-01-19 DIAGNOSIS — M47.816 SPONDYLOSIS OF LUMBAR REGION WITHOUT MYELOPATHY OR RADICULOPATHY: ICD-10-CM

## 2018-01-19 DIAGNOSIS — M51.26 LUMBAR DISC HERNIATION: Primary | ICD-10-CM

## 2018-01-19 DIAGNOSIS — M48.062 LUMBAR STENOSIS WITH NEUROGENIC CLAUDICATION: ICD-10-CM

## 2018-01-19 PROCEDURE — G8427 DOCREV CUR MEDS BY ELIG CLIN: HCPCS | Performed by: PHYSICAL MEDICINE & REHABILITATION

## 2018-01-19 PROCEDURE — 99213 OFFICE O/P EST LOW 20 MIN: CPT | Performed by: PHYSICAL MEDICINE & REHABILITATION

## 2018-01-19 PROCEDURE — 1123F ACP DISCUSS/DSCN MKR DOCD: CPT | Performed by: PHYSICAL MEDICINE & REHABILITATION

## 2018-01-19 PROCEDURE — G8417 CALC BMI ABV UP PARAM F/U: HCPCS | Performed by: PHYSICAL MEDICINE & REHABILITATION

## 2018-01-19 PROCEDURE — 4040F PNEUMOC VAC/ADMIN/RCVD: CPT | Performed by: PHYSICAL MEDICINE & REHABILITATION

## 2018-01-19 PROCEDURE — 1036F TOBACCO NON-USER: CPT | Performed by: PHYSICAL MEDICINE & REHABILITATION

## 2018-01-19 PROCEDURE — G8484 FLU IMMUNIZE NO ADMIN: HCPCS | Performed by: PHYSICAL MEDICINE & REHABILITATION

## 2018-01-19 NOTE — TELEPHONE ENCOUNTER
Patient needs scheduled for an injection. Pre-Procedure sheet was given to the patient.         x1 (Left L4 TF)

## 2018-01-19 NOTE — LETTER
Please schedule the following with:     Date:       Account: [de-identified]  Patient: Sheila Mujica    : 1935  Address:  [address]    Phone (Z):  [phone] (home)      ----------------------------------------------------------------------------------------------  Diagnosis:     ICD-10-CM ICD-9-CM    1. Lumbar disc herniation M51.26 722.10    2. Spondylosis of lumbar region without myelopathy or radiculopathy M47.816 721.3    3. Lumbar stenosis with neurogenic claudication M48.062 724.03          Levels: Left L4 TF  on the left  Transforaminal ELEN    ----------------------------------------------------------------------------------------------  Injection #   880 [facility]    Attending Physician       Janeth Garrett.  Bry Song MD.      ----------------------------------------------------------------------------------------------  Injection Scheduled For:    At:    1st Insurance:     Pre-Cert#    2nd Insurance:    Pre-Cert#    Comments:    · Infection control  · Tested positive for MRSA in past 12 months:  no  · Tested positive for MSSA \"staph infection\" in past 12 months: no  · Tested positive for VRE (Vancomycin Resistant Enterococci) in past 12 months:   no  · Currently on any antibiotics for an infection: no  · Anticoagulants:  · On a blood thinner:  no   · Any history of bleeding disorder: no   · Advanced Liver disease: no   · Advanced Renal disease: no   · Glaucoma: yes DR DURHAM WITH OZZIE JOHNSON  · Diabetes: yes     Sedation:  No  -----------------------------------------------------------------------------------------------  Allergies   Allergen Reactions    Celebrex [Celecoxib]     Elavil [Amitriptyline]      Severe fatigue      Naproxen      Kidney damage    Percocet [Oxycodone-Acetaminophen] Other (See Comments)     confusion    Lyrica [Pregabalin] Palpitations     Fatigue

## 2018-01-19 NOTE — PROGRESS NOTES
[amitriptyline]; Naproxen; Percocet [oxycodone-acetaminophen]; and Lyrica [pregabalin]  Social History:    reports that he quit smoking about 47 years ago. He quit after 40.00 years of use. He has never used smokeless tobacco. He reports that he does not drink alcohol or use drugs. Family History:   Family History   Problem Relation Age of Onset    Arthritis Father     Diabetes Father     Diabetes Brother        REVIEW OF SYSTEMS:   CONSTITUTIONAL: Denies unexplained weight loss, fevers, chills or fatigue  NEUROLOGICAL: Denies unsteady gait or progressive weakness  MUSCULOSKELETAL: Denies joint swelling or redness  GI: Denies nausea, vomiting, diarrhea   : Denies bowel or bladder issues       PHYSICAL EXAM:    Vitals: Blood pressure 132/71, height 5' 10.5\" (1.791 m), weight 200 lb (90.7 kg). GENERAL EXAM:  · General Apparence: Patient is adequately groomed with no evidence of malnutrition. · Psychiatric: Orientation: The patient is oriented to time, place and person. The patient's mood and affect are appropriate   · Vascular: Examination reveals no swelling and palpation reveals no tenderness in upper or lower extremities. Good capillary refill. · The lymphatic examination of the neck, axillae and groin reveals all areas to be without enlargement or induration  · Sensation is intact without deficit in the upper and lower extremities to light touch and pinprick except limited below the knees  · Coordination of the upper and lower extremities are normal.    LUMBAR/SACRAL EXAMINATION:  · Inspection: Local inspection shows no step-off or bruising. Lumbar alignment is normal. No instability is noted. · Palpation:   No evidence of tenderness at the midline. No tenderness bilaterally at the paraspinal or trochanters. There is no paraspinal spasm. · Range of Motion: limited by 50% in all planes due to pain  · Strength:   Strength testing is 5/5 in all muscle groups tested.   · Special Tests:   Straight leg discussed spinal cord stim ablation may be of benefit for the diabetic polyneuropathy as well as the chronic low back and leg pain. He was given a packet of information. He feels like to proceed and we will send him for a psychology referral  4. Interventional:  We discussed pursuing a Left L4 TF epidural steroid injection to address the pain. Radiologic imaging and symptoms confirm the pain etiology. Risks, benefits and alternatives of interventional options were discussed. These include and are not limited to bleeding, infection, spinal headache, nerve injury and lack of pain relief. The patient verbalized understanding and would like to proceed. The patient will be scheduled accordingly. 5. Follow up:  4-6 weeks          Aarti. Jacobo Boxer, MD, TANESHA, [facility]  Board Certified in [address] [place] [address] Certified and Fellowship Trained in [facility] ([facility])             This dictation was performed with a verbal recognition program [facility]) and it was checked for errors. It is possible that there are still dictated errors within this office note. If so, please bring any errors to my attention for an addendum. All efforts were made to ensure that this office note is accurate.

## 2018-01-19 NOTE — TELEPHONE ENCOUNTER
Patient states he asked for a refill of tylenol 3 at today's visit 1/19/2018. Please advise for refill. It has not been loaded to chart. Patient is aware it may not be filled until Monday. Plan:  Clinical Course: Worsening left hip pain  1. Medications:  Continue anti-inflammatories with appropriate GI Precautions including to stop if develop dark tarry stools or GI upset and to take with food. 2. PT:  Encouraged to continue with HEP. 3. Further studies: We discussed spinal cord stim ablation may be of benefit for the diabetic polyneuropathy as well as the chronic low back and leg pain. He was given a packet of information. He feels like to proceed and we will send him for a psychology referral  4. Interventional:  We discussed pursuing a Left L4 TF epidural steroid injection to address the pain. Radiologic imaging and symptoms confirm the pain etiology. Risks, benefits and alternatives of interventional options were discussed. These include and are not limited to bleeding, infection, spinal headache, nerve injury and lack of pain relief. The patient verbalized understanding and would like to proceed. The patient will be scheduled accordingly.   5. Follow up:  4-6 weeks

## 2018-01-22 RX ORDER — ACETAMINOPHEN AND CODEINE PHOSPHATE 300; 30 MG/1; MG/1
1 TABLET ORAL 3 TIMES DAILY PRN
Qty: 21 TABLET | Refills: 0 | OUTPATIENT
Start: 2018-01-22 | End: 2018-01-29

## 2018-01-24 ENCOUNTER — TELEPHONE (OUTPATIENT)
Dept: ORTHOPEDIC SURGERY | Age: 83
End: 2018-01-24

## 2018-01-29 ENCOUNTER — HOSPITAL ENCOUNTER (OUTPATIENT)
Dept: PAIN MANAGEMENT | Age: 83
Discharge: HOME | End: 2018-01-29
Attending: PHYSICAL MEDICINE & REHABILITATION | Admitting: PHYSICAL MEDICINE & REHABILITATION

## 2018-01-29 VITALS
SYSTOLIC BLOOD PRESSURE: 132 MMHG | TEMPERATURE: 97.1 F | DIASTOLIC BLOOD PRESSURE: 88 MMHG | BODY MASS INDEX: 27.49 KG/M2 | HEIGHT: 70 IN | WEIGHT: 192 LBS | HEART RATE: 70 BPM | RESPIRATION RATE: 25 BRPM | OXYGEN SATURATION: 96 %

## 2018-01-29 LAB
GLUCOSE BLD-MCNC: 186 MG/DL (ref 70–99)
PERFORMED ON: ABNORMAL

## 2018-01-29 ASSESSMENT — PAIN - FUNCTIONAL ASSESSMENT: PAIN_FUNCTIONAL_ASSESSMENT: 0-10

## 2018-01-29 ASSESSMENT — PAIN DESCRIPTION - DESCRIPTORS: DESCRIPTORS: SHARP

## 2018-01-29 NOTE — PROGRESS NOTES
TRANSFORAMINAL INJECTION  Dr Mendes Render Injection Note    Sight marked/ confirmed with x-ray: yes  Position: Prone  Prepped with: Chloraprep    Local 1 % Lidocaine    Depomedrol (mg):  Marcaine: .5%(ml)     Monitor by: Severo Paul RN  C - Arm operated by:YASMIN MARTINEZ  Circulator: Ralph Hendrickson RN  Prepped by: Casandra Fisher MD

## 2018-02-02 ENCOUNTER — OFFICE VISIT (OUTPATIENT)
Dept: INTERNAL MEDICINE CLINIC | Age: 83
End: 2018-02-02

## 2018-02-02 VITALS
DIASTOLIC BLOOD PRESSURE: 80 MMHG | HEIGHT: 70 IN | SYSTOLIC BLOOD PRESSURE: 138 MMHG | HEART RATE: 78 BPM | WEIGHT: 205 LBS | BODY MASS INDEX: 29.35 KG/M2

## 2018-02-02 DIAGNOSIS — E11.22 STAGE 3 CHRONIC RENAL IMPAIRMENT ASSOCIATED WITH TYPE 2 DIABETES MELLITUS (HCC): ICD-10-CM

## 2018-02-02 DIAGNOSIS — E78.5 DYSLIPIDEMIA: ICD-10-CM

## 2018-02-02 DIAGNOSIS — I10 ESSENTIAL HYPERTENSION: ICD-10-CM

## 2018-02-02 DIAGNOSIS — M48.062 LUMBAR STENOSIS WITH NEUROGENIC CLAUDICATION: ICD-10-CM

## 2018-02-02 DIAGNOSIS — E11.21 CONTROLLED TYPE 2 DIABETES MELLITUS WITH DIABETIC NEPHROPATHY, WITHOUT LONG-TERM CURRENT USE OF INSULIN (HCC): Primary | Chronic | ICD-10-CM

## 2018-02-02 DIAGNOSIS — N18.30 STAGE 3 CHRONIC RENAL IMPAIRMENT ASSOCIATED WITH TYPE 2 DIABETES MELLITUS (HCC): ICD-10-CM

## 2018-02-02 LAB
ALBUMIN SERPL-MCNC: 4.8 G/DL (ref 3.4–5)
ANION GAP SERPL CALCULATED.3IONS-SCNC: 16 MMOL/L (ref 3–16)
BUN BLDV-MCNC: 30 MG/DL (ref 7–20)
CALCIUM SERPL-MCNC: 9.4 MG/DL (ref 8.3–10.6)
CHLORIDE BLD-SCNC: 100 MMOL/L (ref 99–110)
CHOLESTEROL, TOTAL: 156 MG/DL (ref 0–199)
CO2: 29 MMOL/L (ref 21–32)
CREAT SERPL-MCNC: 1.5 MG/DL (ref 0.8–1.3)
GFR AFRICAN AMERICAN: 54
GFR NON-AFRICAN AMERICAN: 45
GLUCOSE BLD-MCNC: 149 MG/DL (ref 70–99)
HDLC SERPL-MCNC: 35 MG/DL (ref 40–60)
LDL CHOLESTEROL CALCULATED: 65 MG/DL
PHOSPHORUS: 3.5 MG/DL (ref 2.5–4.9)
POTASSIUM SERPL-SCNC: 4.2 MMOL/L (ref 3.5–5.1)
SODIUM BLD-SCNC: 145 MMOL/L (ref 136–145)
TRIGL SERPL-MCNC: 282 MG/DL (ref 0–150)
VLDLC SERPL CALC-MCNC: 56 MG/DL

## 2018-02-02 PROCEDURE — G8427 DOCREV CUR MEDS BY ELIG CLIN: HCPCS | Performed by: NURSE PRACTITIONER

## 2018-02-02 PROCEDURE — G8417 CALC BMI ABV UP PARAM F/U: HCPCS | Performed by: NURSE PRACTITIONER

## 2018-02-02 PROCEDURE — 1036F TOBACCO NON-USER: CPT | Performed by: NURSE PRACTITIONER

## 2018-02-02 PROCEDURE — G8484 FLU IMMUNIZE NO ADMIN: HCPCS | Performed by: NURSE PRACTITIONER

## 2018-02-02 PROCEDURE — 1123F ACP DISCUSS/DSCN MKR DOCD: CPT | Performed by: NURSE PRACTITIONER

## 2018-02-02 PROCEDURE — 99214 OFFICE O/P EST MOD 30 MIN: CPT | Performed by: NURSE PRACTITIONER

## 2018-02-02 PROCEDURE — 4040F PNEUMOC VAC/ADMIN/RCVD: CPT | Performed by: NURSE PRACTITIONER

## 2018-02-03 LAB
ESTIMATED AVERAGE GLUCOSE: 145.6 MG/DL
HBA1C MFR BLD: 6.7 %

## 2018-02-06 DIAGNOSIS — E11.40 CONTROLLED TYPE 2 DIABETES MELLITUS WITH NEUROPATHY (HCC): ICD-10-CM

## 2018-02-06 RX ORDER — HYDROCHLOROTHIAZIDE 25 MG/1
25 TABLET ORAL DAILY
Qty: 90 TABLET | Refills: 3 | Status: ON HOLD | OUTPATIENT
Start: 2018-02-06 | End: 2018-04-07 | Stop reason: HOSPADM

## 2018-02-09 ENCOUNTER — OFFICE VISIT (OUTPATIENT)
Dept: ORTHOPEDIC SURGERY | Age: 83
End: 2018-02-09

## 2018-02-09 VITALS
BODY MASS INDEX: 28.63 KG/M2 | HEIGHT: 70 IN | SYSTOLIC BLOOD PRESSURE: 136 MMHG | DIASTOLIC BLOOD PRESSURE: 75 MMHG | WEIGHT: 200 LBS

## 2018-02-09 DIAGNOSIS — M47.816 SPONDYLOSIS OF LUMBAR REGION WITHOUT MYELOPATHY OR RADICULOPATHY: ICD-10-CM

## 2018-02-09 DIAGNOSIS — M48.061 SPINAL STENOSIS OF LUMBAR REGION WITHOUT NEUROGENIC CLAUDICATION: Primary | ICD-10-CM

## 2018-02-09 DIAGNOSIS — E11.42 DIABETIC POLYNEUROPATHY ASSOCIATED WITH TYPE 2 DIABETES MELLITUS (HCC): ICD-10-CM

## 2018-02-09 PROCEDURE — G8427 DOCREV CUR MEDS BY ELIG CLIN: HCPCS | Performed by: PHYSICAL MEDICINE & REHABILITATION

## 2018-02-09 PROCEDURE — 99213 OFFICE O/P EST LOW 20 MIN: CPT | Performed by: PHYSICAL MEDICINE & REHABILITATION

## 2018-02-09 PROCEDURE — G8417 CALC BMI ABV UP PARAM F/U: HCPCS | Performed by: PHYSICAL MEDICINE & REHABILITATION

## 2018-02-09 PROCEDURE — 1123F ACP DISCUSS/DSCN MKR DOCD: CPT | Performed by: PHYSICAL MEDICINE & REHABILITATION

## 2018-02-09 PROCEDURE — 1036F TOBACCO NON-USER: CPT | Performed by: PHYSICAL MEDICINE & REHABILITATION

## 2018-02-09 PROCEDURE — 4040F PNEUMOC VAC/ADMIN/RCVD: CPT | Performed by: PHYSICAL MEDICINE & REHABILITATION

## 2018-02-09 PROCEDURE — G8484 FLU IMMUNIZE NO ADMIN: HCPCS | Performed by: PHYSICAL MEDICINE & REHABILITATION

## 2018-02-09 NOTE — PROGRESS NOTES
Follow up: SPINE      CHIEF COMPLAINT:    Chief Complaint   Patient presents with    Lower Back Pain     f/u Left L4 TF 01/29/18, states he has 100% relief following the injection, no longer experiencing any low back pain       HISTORY OF PRESENT ILLNESS:        The patient is a [de-identified] y.o. male whom reports overall is a significant improvement following a left L4 transforaminal epidural steroid injection. He no longer has any low back pain. He is struggling with neuropathy in his legs. He recently traveled to [facility] for his granddaughters wetting. He states it is  He finds it difficult for him to sit for long periods. He complains of pain mainly in the right leg below the knee and also on the left side. He reports that this keeps him up at night.       Pain Assessment  Location of Pain: Back  Location Modifiers: Posterior  Severity of Pain: 1  Quality of Pain: Dull  Duration of Pain: A few minutes  Frequency of Pain: Rarely  Aggravating Factors:  (N/A)  Limiting Behavior: No  Relieving Factors: Rest  Result of Injury: No  Work-Related Injury: No  Are there other pain locations you wish to document?: No    Associated signs and symptoms:   Neurogenic bowel or bladder symptoms:  no   Perceived weakness:  no   Difficulty walking:  no              Past Medical History:   Past Medical History:   Diagnosis Date    Abdominal pain, epigastric     Arthritis     Benign prostatic hypertrophy without urinary obstruction     Cellulitis and abscess     Chronic rhinitis     Diabetes mellitus (HCC)     Dysphagia     Erectile dysfunction     Hyperglycemia     Hypertension     Neuropathy (HCC)     Nocturia     Osteoarthritis     Pain, joint, knee     Palpitations     SOB (shortness of breath)     Tennis elbow     Tinea pedis     foot      Past Surgical History:     Past Surgical History:   Procedure Laterality Date    CATARACT REMOVAL Bilateral 09/2014    CHOLECYSTECTOMY      COLONOSCOPY      ENDOSCOPY,

## 2018-02-27 DIAGNOSIS — E11.40 CONTROLLED TYPE 2 DIABETES MELLITUS WITH NEUROPATHY (HCC): ICD-10-CM

## 2018-02-27 RX ORDER — LISINOPRIL 10 MG/1
10 TABLET ORAL DAILY
Qty: 90 TABLET | Refills: 1 | Status: ON HOLD | OUTPATIENT
Start: 2018-02-27 | End: 2018-04-07 | Stop reason: HOSPADM

## 2018-03-12 DIAGNOSIS — E11.40 CONTROLLED TYPE 2 DIABETES MELLITUS WITH NEUROPATHY (HCC): ICD-10-CM

## 2018-03-12 RX ORDER — DULOXETIN HYDROCHLORIDE 30 MG/1
CAPSULE, DELAYED RELEASE ORAL
Qty: 30 CAPSULE | Refills: 2 | Status: SHIPPED | OUTPATIENT
Start: 2018-03-12 | End: 2018-06-26 | Stop reason: SDUPTHER

## 2018-03-16 PROBLEM — K29.80 DUODENITIS: Status: ACTIVE | Noted: 2018-03-16

## 2018-03-16 PROBLEM — N18.30 CKD (CHRONIC KIDNEY DISEASE) STAGE 3, GFR 30-59 ML/MIN (HCC): Status: ACTIVE | Noted: 2018-03-16

## 2018-03-16 PROBLEM — R91.8 MASS OF LOWER LOBE OF LEFT LUNG: Status: ACTIVE | Noted: 2018-03-16

## 2018-03-16 PROBLEM — I26.99 BILATERAL PULMONARY EMBOLISM (HCC): Status: ACTIVE | Noted: 2018-03-16

## 2018-03-16 PROBLEM — E66.3 OVERWEIGHT (BMI 25.0-29.9): Status: ACTIVE | Noted: 2018-03-16

## 2018-03-16 PROBLEM — R09.02 HYPOXIA: Status: ACTIVE | Noted: 2018-03-16

## 2018-03-16 PROBLEM — R09.1 PLEURISY: Status: ACTIVE | Noted: 2018-03-16

## 2018-03-19 ENCOUNTER — TELEPHONE (OUTPATIENT)
Dept: RHEUMATOLOGY | Age: 83
End: 2018-03-19

## 2018-03-21 ENCOUNTER — TELEPHONE (OUTPATIENT)
Dept: PULMONOLOGY | Age: 83
End: 2018-03-21

## 2018-03-22 ENCOUNTER — OFFICE VISIT (OUTPATIENT)
Dept: INTERNAL MEDICINE CLINIC | Age: 83
End: 2018-03-22

## 2018-03-22 VITALS
HEART RATE: 76 BPM | BODY MASS INDEX: 28.77 KG/M2 | HEIGHT: 70 IN | DIASTOLIC BLOOD PRESSURE: 74 MMHG | SYSTOLIC BLOOD PRESSURE: 116 MMHG | WEIGHT: 201 LBS

## 2018-03-22 DIAGNOSIS — R91.8 OPACITY OF LUNG ON IMAGING STUDY: ICD-10-CM

## 2018-03-22 DIAGNOSIS — I26.99 BILATERAL PULMONARY EMBOLISM (HCC): ICD-10-CM

## 2018-03-22 DIAGNOSIS — Z09 HOSPITAL DISCHARGE FOLLOW-UP: Primary | ICD-10-CM

## 2018-03-22 DIAGNOSIS — Z79.01 ANTICOAGULATED ON COUMADIN: ICD-10-CM

## 2018-03-22 DIAGNOSIS — I10 ESSENTIAL HYPERTENSION: ICD-10-CM

## 2018-03-22 DIAGNOSIS — E11.21 CONTROLLED TYPE 2 DIABETES MELLITUS WITH DIABETIC NEPHROPATHY, WITHOUT LONG-TERM CURRENT USE OF INSULIN (HCC): Chronic | ICD-10-CM

## 2018-03-22 LAB
INTERNATIONAL NORMALIZATION RATIO, POC: 2.4
PROTHROMBIN TIME, POC: NORMAL

## 2018-03-22 PROCEDURE — 85610 PROTHROMBIN TIME: CPT | Performed by: NURSE PRACTITIONER

## 2018-03-22 PROCEDURE — 99496 TRANSJ CARE MGMT HIGH F2F 7D: CPT | Performed by: NURSE PRACTITIONER

## 2018-03-25 PROBLEM — R09.02 HYPOXIA: Status: RESOLVED | Noted: 2018-03-16 | Resolved: 2018-03-25

## 2018-03-25 NOTE — PROGRESS NOTES
0    latanoprost (XALATAN) 0.005 % ophthalmic solution Place 1 drop into both eyes nightly. Vitals:    03/22/18 1626   BP: 116/74   Pulse: 76   Weight: 201 lb (91.2 kg)   Height: 5' 10\" (1.778 m)     Body mass index is 28.84 kg/m². Wt Readings from Last 3 Encounters:   03/22/18 201 lb (91.2 kg)   03/19/18 199 lb (90.3 kg)   02/09/18 200 lb (90.7 kg)     BP Readings from Last 3 Encounters:   03/22/18 116/74   03/19/18 (!) 149/77   02/09/18 136/75        Patient was admitted to [facility] from 3/16/18 to 3/19/18 for bilateral pulmonary embolism,. Inpatient course: Discharge summary reviewed- see chart. Current status: Overall, the patient reports he is doing better. He denies any fever. Long discussion regarding pulmonary embolism and concerns about cause. We discussed the opacity on the lungs test and will plan for follow-up imaging. Review of Systems:  A comprehensive review of systems was negative except for what was noted in the HPI. Physical Exam:  Gen: WN/WD, no acute distress  Head: NC/AT  Eyes: no icterus, no conjunctival erythema  ENT: Moist mucous membranes  Neck: supple, no cervical lymphadenopathy  CV: RRR, no murmurs, rubs, gallops  Resp: Nl effort, clear to auscultation bilaterally  Abd: +BS, soft, non tender to palpation  MSK: distal extremities warm, no peripheral edema  Skin: warm, dry  Neuro:Alert, oriented, CN intact  Psych: normal mood, affect    Initial post-discharge communication occurred between nurse care coordinator and patient on 3/20/18- see documentation in chart: telephone encounter. Assessment/Plan:  Fuentes LUGO was seen today for follow-up from hospital.  Diagnoses and all orders for this visit:    Hospital discharge follow-up  - Records reviewed and discussed with patient and wife    Bilateral pulmonary embolism ([address]  - Bilateral PE on CTA  - Seen by hem/onc  - Discharged on Lovenox and warfarin.   INR is therapeutic today    Opacity of

## 2018-03-29 ENCOUNTER — OFFICE VISIT (OUTPATIENT)
Dept: INTERNAL MEDICINE CLINIC | Age: 83
End: 2018-03-29

## 2018-03-29 VITALS
HEART RATE: 76 BPM | SYSTOLIC BLOOD PRESSURE: 122 MMHG | HEIGHT: 70 IN | DIASTOLIC BLOOD PRESSURE: 82 MMHG | BODY MASS INDEX: 29.2 KG/M2 | WEIGHT: 204 LBS

## 2018-03-29 DIAGNOSIS — E11.21 CONTROLLED TYPE 2 DIABETES MELLITUS WITH DIABETIC NEPHROPATHY, WITHOUT LONG-TERM CURRENT USE OF INSULIN (HCC): Chronic | ICD-10-CM

## 2018-03-29 DIAGNOSIS — K30 INDIGESTION: ICD-10-CM

## 2018-03-29 DIAGNOSIS — I26.99 BILATERAL PULMONARY EMBOLISM (HCC): Primary | ICD-10-CM

## 2018-03-29 DIAGNOSIS — Z79.01 ANTICOAGULATED ON COUMADIN: ICD-10-CM

## 2018-03-29 LAB
INTERNATIONAL NORMALIZATION RATIO, POC: 6.7
PROTHROMBIN TIME, POC: NORMAL

## 2018-03-29 PROCEDURE — 85610 PROTHROMBIN TIME: CPT | Performed by: NURSE PRACTITIONER

## 2018-03-29 PROCEDURE — 1111F DSCHRG MED/CURRENT MED MERGE: CPT | Performed by: NURSE PRACTITIONER

## 2018-03-29 PROCEDURE — 1123F ACP DISCUSS/DSCN MKR DOCD: CPT | Performed by: NURSE PRACTITIONER

## 2018-03-29 PROCEDURE — G8417 CALC BMI ABV UP PARAM F/U: HCPCS | Performed by: NURSE PRACTITIONER

## 2018-03-29 PROCEDURE — G8482 FLU IMMUNIZE ORDER/ADMIN: HCPCS | Performed by: NURSE PRACTITIONER

## 2018-03-29 PROCEDURE — 4040F PNEUMOC VAC/ADMIN/RCVD: CPT | Performed by: NURSE PRACTITIONER

## 2018-03-29 PROCEDURE — G8427 DOCREV CUR MEDS BY ELIG CLIN: HCPCS | Performed by: NURSE PRACTITIONER

## 2018-03-29 PROCEDURE — 99214 OFFICE O/P EST MOD 30 MIN: CPT | Performed by: NURSE PRACTITIONER

## 2018-03-29 PROCEDURE — 1036F TOBACCO NON-USER: CPT | Performed by: NURSE PRACTITIONER

## 2018-03-29 RX ORDER — RANITIDINE 300 MG/1
300 CAPSULE ORAL EVERY EVENING
Qty: 90 CAPSULE | Refills: 1 | Status: SHIPPED | OUTPATIENT
Start: 2018-03-29 | End: 2018-10-01 | Stop reason: SDUPTHER

## 2018-03-29 ASSESSMENT — ENCOUNTER SYMPTOMS
SHORTNESS OF BREATH: 0
GASTROINTESTINAL NEGATIVE: 1
BLOOD IN STOOL: 0
RESPIRATORY NEGATIVE: 1

## 2018-03-29 NOTE — PROGRESS NOTES
(with breakfast)      DULoxetine (CYMBALTA) 30 MG extended release capsule TAKE ONE CAPSULE BY MOUTH DAILY 30 capsule 2    lisinopril (PRINIVIL;ZESTRIL) 10 MG tablet Take 1 tablet by mouth daily 90 tablet 1    hydrochlorothiazide (HYDRODIURIL) 25 MG tablet Take 1 tablet by mouth daily 90 tablet 3    gabapentin (NEURONTIN) 300 MG capsule Take 2 capsules by mouth daily 180 capsule 3    tamsulosin (FLOMAX) 0.4 MG capsule Take 1 capsule by mouth daily 90 capsule 1    glyBURIDE (DIABETA) 2.5 MG tablet Take 0.5 tablets by mouth daily (with breakfast) 45 tablet 3    ondansetron (ZOFRAN ODT) 4 MG disintegrating tablet Dissolve one tablet on tongue and swallow every 8 hours as needed for nausea. 30 tablet 0    Diphenhydramine-APAP, sleep, (TYLENOL PM EXTRA STRENGTH PO) Take 2 tablets by mouth nightly as needed      glucose blood VI test strips (EXACTECH TEST) strip 1 each by Does not apply route daily Test once daily and as needed. 100 each 3    glucose monitoring kit (FREESTYLE) monitoring kit 1 kit by Does not apply route daily as needed. 1 kit 0    latanoprost (XALATAN) 0.005 % ophthalmic solution Place 1 drop into both eyes nightly. No current facility-administered medications for this visit. Review of Systems   Constitutional: Negative for chills and fever. Respiratory: Negative. Negative for shortness of breath. Cardiovascular: Negative. Negative for chest pain. Gastrointestinal: Negative. Negative for blood in stool. Genitourinary: Negative for hematuria. Musculoskeletal: Positive for arthralgias. Neurological:        Peripheral neuropathy   All other systems reviewed and are negative. OBJECTIVE:  Physical Exam   Constitutional: He is oriented to person, place, and time. He appears well-developed and well-nourished. No distress. HENT:   Head: Normocephalic and atraumatic. Eyes: Conjunctivae are normal. No scleral icterus. Neck: Neck supple. No JVD present.

## 2018-04-03 ENCOUNTER — OFFICE VISIT (OUTPATIENT)
Dept: INTERNAL MEDICINE CLINIC | Age: 83
End: 2018-04-03

## 2018-04-03 VITALS — HEART RATE: 104 BPM | SYSTOLIC BLOOD PRESSURE: 172 MMHG | DIASTOLIC BLOOD PRESSURE: 72 MMHG | TEMPERATURE: 100.2 F

## 2018-04-03 DIAGNOSIS — R50.9 FEVER, UNSPECIFIED FEVER CAUSE: ICD-10-CM

## 2018-04-03 DIAGNOSIS — Z79.01 ANTICOAGULATED ON COUMADIN: ICD-10-CM

## 2018-04-03 DIAGNOSIS — R91.8 MASS OF LOWER LOBE OF LEFT LUNG: ICD-10-CM

## 2018-04-03 DIAGNOSIS — I26.99 BILATERAL PULMONARY EMBOLISM (HCC): ICD-10-CM

## 2018-04-03 DIAGNOSIS — R41.82 ALTERED MENTAL STATUS, UNSPECIFIED ALTERED MENTAL STATUS TYPE: Primary | ICD-10-CM

## 2018-04-03 DIAGNOSIS — R79.1 ELEVATED INR: ICD-10-CM

## 2018-04-03 DIAGNOSIS — R53.1 GENERALIZED WEAKNESS: ICD-10-CM

## 2018-04-03 PROBLEM — N12 PYELONEPHRITIS: Status: ACTIVE | Noted: 2018-04-03

## 2018-04-03 LAB
INTERNATIONAL NORMALIZATION RATIO, POC: 5.4
PROTHROMBIN TIME, POC: NORMAL

## 2018-04-03 PROCEDURE — 4040F PNEUMOC VAC/ADMIN/RCVD: CPT | Performed by: NURSE PRACTITIONER

## 2018-04-03 PROCEDURE — G8427 DOCREV CUR MEDS BY ELIG CLIN: HCPCS | Performed by: NURSE PRACTITIONER

## 2018-04-03 PROCEDURE — 85610 PROTHROMBIN TIME: CPT | Performed by: NURSE PRACTITIONER

## 2018-04-03 PROCEDURE — G8417 CALC BMI ABV UP PARAM F/U: HCPCS | Performed by: NURSE PRACTITIONER

## 2018-04-03 PROCEDURE — 1123F ACP DISCUSS/DSCN MKR DOCD: CPT | Performed by: NURSE PRACTITIONER

## 2018-04-03 PROCEDURE — 1111F DSCHRG MED/CURRENT MED MERGE: CPT | Performed by: NURSE PRACTITIONER

## 2018-04-03 PROCEDURE — 99214 OFFICE O/P EST MOD 30 MIN: CPT | Performed by: NURSE PRACTITIONER

## 2018-04-03 PROCEDURE — 1036F TOBACCO NON-USER: CPT | Performed by: NURSE PRACTITIONER

## 2018-04-03 ASSESSMENT — ENCOUNTER SYMPTOMS
SHORTNESS OF BREATH: 0
GASTROINTESTINAL NEGATIVE: 1

## 2018-04-08 ENCOUNTER — CARE COORDINATION (OUTPATIENT)
Dept: CASE MANAGEMENT | Age: 83
End: 2018-04-08

## 2018-04-09 ENCOUNTER — NURSE ONLY (OUTPATIENT)
Dept: INTERNAL MEDICINE CLINIC | Age: 83
End: 2018-04-09

## 2018-04-09 ENCOUNTER — TELEPHONE (OUTPATIENT)
Dept: RHEUMATOLOGY | Age: 83
End: 2018-04-09

## 2018-04-09 DIAGNOSIS — Z79.01 ANTICOAGULATED ON COUMADIN: Primary | ICD-10-CM

## 2018-04-09 LAB
INTERNATIONAL NORMALIZATION RATIO, POC: 4.4
PROTHROMBIN TIME, POC: NORMAL

## 2018-04-09 PROCEDURE — 85610 PROTHROMBIN TIME: CPT | Performed by: NURSE PRACTITIONER

## 2018-04-11 ENCOUNTER — TELEPHONE (OUTPATIENT)
Dept: INTERNAL MEDICINE CLINIC | Age: 83
End: 2018-04-11

## 2018-04-13 ENCOUNTER — OFFICE VISIT (OUTPATIENT)
Dept: INTERNAL MEDICINE CLINIC | Age: 83
End: 2018-04-13

## 2018-04-13 VITALS
HEART RATE: 72 BPM | BODY MASS INDEX: 29.7 KG/M2 | DIASTOLIC BLOOD PRESSURE: 88 MMHG | WEIGHT: 207 LBS | SYSTOLIC BLOOD PRESSURE: 158 MMHG

## 2018-04-13 DIAGNOSIS — N40.1 BENIGN PROSTATIC HYPERPLASIA WITH LOWER URINARY TRACT SYMPTOMS, SYMPTOM DETAILS UNSPECIFIED: ICD-10-CM

## 2018-04-13 DIAGNOSIS — N39.0 URINARY TRACT INFECTION WITH HEMATURIA, SITE UNSPECIFIED: ICD-10-CM

## 2018-04-13 DIAGNOSIS — I10 ESSENTIAL HYPERTENSION: ICD-10-CM

## 2018-04-13 DIAGNOSIS — R31.9 URINARY TRACT INFECTION WITH HEMATURIA, SITE UNSPECIFIED: ICD-10-CM

## 2018-04-13 DIAGNOSIS — Z09 HOSPITAL DISCHARGE FOLLOW-UP: Primary | ICD-10-CM

## 2018-04-13 PROCEDURE — 99495 TRANSJ CARE MGMT MOD F2F 14D: CPT | Performed by: INTERNAL MEDICINE

## 2018-04-13 PROCEDURE — 1111F DSCHRG MED/CURRENT MED MERGE: CPT | Performed by: INTERNAL MEDICINE

## 2018-04-13 RX ORDER — GABAPENTIN 300 MG/1
300 CAPSULE ORAL 2 TIMES DAILY
COMMUNITY
End: 2018-10-09 | Stop reason: SDUPTHER

## 2018-04-13 RX ORDER — WARFARIN SODIUM 2.5 MG/1
2.5 TABLET ORAL DAILY
Qty: 30 TABLET | Refills: 0 | Status: SHIPPED | OUTPATIENT
Start: 2018-04-13 | End: 2018-05-17 | Stop reason: SDUPTHER

## 2018-04-20 ENCOUNTER — OFFICE VISIT (OUTPATIENT)
Dept: INTERNAL MEDICINE CLINIC | Age: 83
End: 2018-04-20

## 2018-04-20 VITALS
BODY MASS INDEX: 29.99 KG/M2 | WEIGHT: 209 LBS | DIASTOLIC BLOOD PRESSURE: 70 MMHG | SYSTOLIC BLOOD PRESSURE: 130 MMHG | HEART RATE: 76 BPM

## 2018-04-20 DIAGNOSIS — I26.99 BILATERAL PULMONARY EMBOLISM (HCC): Primary | ICD-10-CM

## 2018-04-20 DIAGNOSIS — Z79.01 ANTICOAGULATED ON COUMADIN: ICD-10-CM

## 2018-04-20 DIAGNOSIS — J44.9 CHRONIC OBSTRUCTIVE PULMONARY DISEASE, UNSPECIFIED COPD TYPE (HCC): ICD-10-CM

## 2018-04-20 DIAGNOSIS — N39.0 SEPSIS DUE TO URINARY TRACT INFECTION (HCC): ICD-10-CM

## 2018-04-20 DIAGNOSIS — A41.9 SEPSIS DUE TO URINARY TRACT INFECTION (HCC): ICD-10-CM

## 2018-04-20 LAB
INTERNATIONAL NORMALIZATION RATIO, POC: 3.6
PROTHROMBIN TIME, POC: ABNORMAL

## 2018-04-20 PROCEDURE — 85610 PROTHROMBIN TIME: CPT | Performed by: NURSE PRACTITIONER

## 2018-04-20 PROCEDURE — 4040F PNEUMOC VAC/ADMIN/RCVD: CPT | Performed by: NURSE PRACTITIONER

## 2018-04-20 PROCEDURE — 99213 OFFICE O/P EST LOW 20 MIN: CPT | Performed by: NURSE PRACTITIONER

## 2018-04-20 PROCEDURE — G8926 SPIRO NO PERF OR DOC: HCPCS | Performed by: NURSE PRACTITIONER

## 2018-04-20 PROCEDURE — 1111F DSCHRG MED/CURRENT MED MERGE: CPT | Performed by: NURSE PRACTITIONER

## 2018-04-20 PROCEDURE — 1036F TOBACCO NON-USER: CPT | Performed by: NURSE PRACTITIONER

## 2018-04-20 PROCEDURE — 1123F ACP DISCUSS/DSCN MKR DOCD: CPT | Performed by: NURSE PRACTITIONER

## 2018-04-20 PROCEDURE — G8417 CALC BMI ABV UP PARAM F/U: HCPCS | Performed by: NURSE PRACTITIONER

## 2018-04-20 PROCEDURE — G8427 DOCREV CUR MEDS BY ELIG CLIN: HCPCS | Performed by: NURSE PRACTITIONER

## 2018-04-20 PROCEDURE — 3023F SPIROM DOC REV: CPT | Performed by: NURSE PRACTITIONER

## 2018-04-22 PROBLEM — R09.1 PLEURISY: Status: RESOLVED | Noted: 2018-03-16 | Resolved: 2018-04-22

## 2018-04-22 PROBLEM — N12 PYELONEPHRITIS: Status: RESOLVED | Noted: 2018-04-03 | Resolved: 2018-04-22

## 2018-04-22 PROBLEM — K29.80 DUODENITIS: Status: RESOLVED | Noted: 2018-03-16 | Resolved: 2018-04-22

## 2018-04-22 ASSESSMENT — ENCOUNTER SYMPTOMS: BLOOD IN STOOL: 0

## 2018-04-24 ENCOUNTER — OFFICE VISIT (OUTPATIENT)
Dept: PULMONOLOGY | Age: 83
End: 2018-04-24

## 2018-04-24 DIAGNOSIS — I26.99 BILATERAL PULMONARY EMBOLISM (HCC): Primary | ICD-10-CM

## 2018-04-24 DIAGNOSIS — J44.9 COPD, SEVERITY TO BE DETERMINED (HCC): ICD-10-CM

## 2018-04-24 DIAGNOSIS — R91.1 PULMONARY NODULE: ICD-10-CM

## 2018-04-24 PROCEDURE — 1036F TOBACCO NON-USER: CPT | Performed by: INTERNAL MEDICINE

## 2018-04-24 PROCEDURE — 3023F SPIROM DOC REV: CPT | Performed by: INTERNAL MEDICINE

## 2018-04-24 PROCEDURE — 1123F ACP DISCUSS/DSCN MKR DOCD: CPT | Performed by: INTERNAL MEDICINE

## 2018-04-24 PROCEDURE — 99214 OFFICE O/P EST MOD 30 MIN: CPT | Performed by: INTERNAL MEDICINE

## 2018-04-24 PROCEDURE — 4040F PNEUMOC VAC/ADMIN/RCVD: CPT | Performed by: INTERNAL MEDICINE

## 2018-04-24 PROCEDURE — 1111F DSCHRG MED/CURRENT MED MERGE: CPT | Performed by: INTERNAL MEDICINE

## 2018-04-24 PROCEDURE — G8926 SPIRO NO PERF OR DOC: HCPCS | Performed by: INTERNAL MEDICINE

## 2018-04-24 PROCEDURE — G8417 CALC BMI ABV UP PARAM F/U: HCPCS | Performed by: INTERNAL MEDICINE

## 2018-04-24 PROCEDURE — G8427 DOCREV CUR MEDS BY ELIG CLIN: HCPCS | Performed by: INTERNAL MEDICINE

## 2018-04-25 VITALS
WEIGHT: 209 LBS | SYSTOLIC BLOOD PRESSURE: 138 MMHG | BODY MASS INDEX: 29.99 KG/M2 | HEART RATE: 90 BPM | DIASTOLIC BLOOD PRESSURE: 78 MMHG | OXYGEN SATURATION: 95 %

## 2018-04-26 ENCOUNTER — TELEPHONE (OUTPATIENT)
Dept: CASE MANAGEMENT | Age: 83
End: 2018-04-26

## 2018-04-27 ENCOUNTER — OFFICE VISIT (OUTPATIENT)
Dept: INTERNAL MEDICINE CLINIC | Age: 83
End: 2018-04-27

## 2018-04-27 ENCOUNTER — TELEPHONE (OUTPATIENT)
Dept: PULMONOLOGY | Age: 83
End: 2018-04-27

## 2018-04-27 ENCOUNTER — TELEPHONE (OUTPATIENT)
Dept: INTERNAL MEDICINE CLINIC | Age: 83
End: 2018-04-27

## 2018-04-27 VITALS
DIASTOLIC BLOOD PRESSURE: 76 MMHG | SYSTOLIC BLOOD PRESSURE: 124 MMHG | HEIGHT: 70 IN | BODY MASS INDEX: 29.49 KG/M2 | HEART RATE: 64 BPM | WEIGHT: 206 LBS

## 2018-04-27 DIAGNOSIS — N39.0 SEPSIS DUE TO URINARY TRACT INFECTION (HCC): ICD-10-CM

## 2018-04-27 DIAGNOSIS — Z79.01 ANTICOAGULATED ON COUMADIN: ICD-10-CM

## 2018-04-27 DIAGNOSIS — I26.99 BILATERAL PULMONARY EMBOLISM (HCC): Primary | ICD-10-CM

## 2018-04-27 DIAGNOSIS — A41.9 SEPSIS DUE TO URINARY TRACT INFECTION (HCC): ICD-10-CM

## 2018-04-27 LAB
INTERNATIONAL NORMALIZATION RATIO, POC: 1.9
PROTHROMBIN TIME, POC: NORMAL

## 2018-04-27 PROCEDURE — 1111F DSCHRG MED/CURRENT MED MERGE: CPT | Performed by: NURSE PRACTITIONER

## 2018-04-27 PROCEDURE — 1036F TOBACCO NON-USER: CPT | Performed by: NURSE PRACTITIONER

## 2018-04-27 PROCEDURE — G8417 CALC BMI ABV UP PARAM F/U: HCPCS | Performed by: NURSE PRACTITIONER

## 2018-04-27 PROCEDURE — 1123F ACP DISCUSS/DSCN MKR DOCD: CPT | Performed by: NURSE PRACTITIONER

## 2018-04-27 PROCEDURE — 99213 OFFICE O/P EST LOW 20 MIN: CPT | Performed by: NURSE PRACTITIONER

## 2018-04-27 PROCEDURE — 4040F PNEUMOC VAC/ADMIN/RCVD: CPT | Performed by: NURSE PRACTITIONER

## 2018-04-27 PROCEDURE — 85610 PROTHROMBIN TIME: CPT | Performed by: NURSE PRACTITIONER

## 2018-04-27 PROCEDURE — G8427 DOCREV CUR MEDS BY ELIG CLIN: HCPCS | Performed by: NURSE PRACTITIONER

## 2018-04-29 ASSESSMENT — ENCOUNTER SYMPTOMS: BLOOD IN STOOL: 0

## 2018-04-30 ENCOUNTER — TELEPHONE (OUTPATIENT)
Dept: PULMONOLOGY | Age: 83
End: 2018-04-30

## 2018-05-01 ENCOUNTER — TELEPHONE (OUTPATIENT)
Dept: PULMONOLOGY | Age: 83
End: 2018-05-01

## 2018-05-09 ENCOUNTER — HOSPITAL ENCOUNTER (OUTPATIENT)
Dept: CT IMAGING | Age: 83
Discharge: HOME | End: 2018-05-09
Attending: INTERNAL MEDICINE | Admitting: INTERNAL MEDICINE

## 2018-05-09 ENCOUNTER — TELEPHONE (OUTPATIENT)
Dept: INTERNAL MEDICINE CLINIC | Age: 83
End: 2018-05-09

## 2018-05-09 VITALS
HEIGHT: 70 IN | DIASTOLIC BLOOD PRESSURE: 83 MMHG | RESPIRATION RATE: 16 BRPM | TEMPERATURE: 98.1 F | OXYGEN SATURATION: 99 % | BODY MASS INDEX: 28.63 KG/M2 | SYSTOLIC BLOOD PRESSURE: 158 MMHG | HEART RATE: 82 BPM | WEIGHT: 200 LBS

## 2018-05-09 DIAGNOSIS — R91.1 PULMONARY NODULE: ICD-10-CM

## 2018-05-09 LAB
ANION GAP SERPL CALCULATED.3IONS-SCNC: 13 MMOL/L (ref 3–16)
APTT: 29.1 SEC (ref 24.1–34.9)
BASOPHILS ABSOLUTE: 0.1 K/UL (ref 0–0.2)
BASOPHILS RELATIVE PERCENT: 0.8 %
BUN BLDV-MCNC: 29 MG/DL (ref 7–20)
CALCIUM SERPL-MCNC: 8.8 MG/DL (ref 8.3–10.6)
CHLORIDE BLD-SCNC: 101 MMOL/L (ref 99–110)
CO2: 26 MMOL/L (ref 21–32)
CREAT SERPL-MCNC: 1.6 MG/DL (ref 0.8–1.3)
EOSINOPHILS ABSOLUTE: 0.2 K/UL (ref 0–0.6)
EOSINOPHILS RELATIVE PERCENT: 2 %
GFR AFRICAN AMERICAN: 50
GFR NON-AFRICAN AMERICAN: 42
GLUCOSE BLD-MCNC: 182 MG/DL (ref 70–99)
HCT VFR BLD CALC: 35 % (ref 40.5–52.5)
HEMOGLOBIN: 12.1 G/DL (ref 13.5–17.5)
INR BLD: 1.2 (ref 0.85–1.15)
LYMPHOCYTES ABSOLUTE: 1.7 K/UL (ref 1–5.1)
LYMPHOCYTES RELATIVE PERCENT: 18.6 %
MCH RBC QN AUTO: 29.4 PG (ref 26–34)
MCHC RBC AUTO-ENTMCNC: 34.6 G/DL (ref 31–36)
MCV RBC AUTO: 84.7 FL (ref 80–100)
MONOCYTES ABSOLUTE: 0.9 K/UL (ref 0–1.3)
MONOCYTES RELATIVE PERCENT: 10.2 %
NEUTROPHILS ABSOLUTE: 6.1 K/UL (ref 1.7–7.7)
NEUTROPHILS RELATIVE PERCENT: 68.4 %
PDW BLD-RTO: 15.3 % (ref 12.4–15.4)
PLATELET # BLD: 149 K/UL (ref 135–450)
PMV BLD AUTO: 7.8 FL (ref 5–10.5)
POTASSIUM SERPL-SCNC: 3.7 MMOL/L (ref 3.5–5.1)
PROTHROMBIN TIME: 13.6 SEC (ref 9.6–13)
RBC # BLD: 4.13 M/UL (ref 4.2–5.9)
SODIUM BLD-SCNC: 140 MMOL/L (ref 136–145)
WBC # BLD: 8.9 K/UL (ref 4–11)

## 2018-05-09 RX ORDER — FENTANYL CITRATE 50 UG/ML
INJECTION, SOLUTION INTRAMUSCULAR; INTRAVENOUS
Status: COMPLETED | OUTPATIENT
Start: 2018-05-09 | End: 2018-05-09

## 2018-05-09 RX ORDER — ONDANSETRON 2 MG/ML
4 INJECTION INTRAMUSCULAR; INTRAVENOUS EVERY 8 HOURS PRN
Status: DISCONTINUED | OUTPATIENT
Start: 2018-05-09 | End: 2018-05-10 | Stop reason: HOSPADM

## 2018-05-09 RX ORDER — MIDAZOLAM HYDROCHLORIDE 1 MG/ML
INJECTION INTRAMUSCULAR; INTRAVENOUS
Status: COMPLETED | OUTPATIENT
Start: 2018-05-09 | End: 2018-05-09

## 2018-05-09 RX ADMIN — FENTANYL CITRATE 50 MCG: 50 INJECTION, SOLUTION INTRAMUSCULAR; INTRAVENOUS at 10:28

## 2018-05-09 RX ADMIN — MIDAZOLAM HYDROCHLORIDE 1 MG: 1 INJECTION INTRAMUSCULAR; INTRAVENOUS at 10:28

## 2018-05-09 ASSESSMENT — PAIN - FUNCTIONAL ASSESSMENT: PAIN_FUNCTIONAL_ASSESSMENT: 0-10

## 2018-05-10 ENCOUNTER — POST-OP TELEPHONE (OUTPATIENT)
Dept: INTERVENTIONAL RADIOLOGY/VASCULAR | Age: 83
End: 2018-05-10

## 2018-05-10 ENCOUNTER — TELEPHONE (OUTPATIENT)
Dept: PULMONOLOGY | Age: 83
End: 2018-05-10

## 2018-05-14 ENCOUNTER — OFFICE VISIT (OUTPATIENT)
Dept: PULMONOLOGY | Age: 83
End: 2018-05-14

## 2018-05-14 VITALS — SYSTOLIC BLOOD PRESSURE: 154 MMHG | HEART RATE: 93 BPM | OXYGEN SATURATION: 96 % | DIASTOLIC BLOOD PRESSURE: 81 MMHG

## 2018-05-14 DIAGNOSIS — K21.9 GASTROESOPHAGEAL REFLUX DISEASE WITHOUT ESOPHAGITIS: ICD-10-CM

## 2018-05-14 DIAGNOSIS — I26.99 BILATERAL PULMONARY EMBOLISM (HCC): ICD-10-CM

## 2018-05-14 DIAGNOSIS — J44.9 COPD, SEVERITY TO BE DETERMINED (HCC): Primary | ICD-10-CM

## 2018-05-14 DIAGNOSIS — C34.92 NON-SMALL CELL CANCER OF LEFT LUNG (HCC): ICD-10-CM

## 2018-05-14 PROCEDURE — 3023F SPIROM DOC REV: CPT | Performed by: INTERNAL MEDICINE

## 2018-05-14 PROCEDURE — 1036F TOBACCO NON-USER: CPT | Performed by: INTERNAL MEDICINE

## 2018-05-14 PROCEDURE — 99214 OFFICE O/P EST MOD 30 MIN: CPT | Performed by: INTERNAL MEDICINE

## 2018-05-14 PROCEDURE — G8427 DOCREV CUR MEDS BY ELIG CLIN: HCPCS | Performed by: INTERNAL MEDICINE

## 2018-05-14 PROCEDURE — 1123F ACP DISCUSS/DSCN MKR DOCD: CPT | Performed by: INTERNAL MEDICINE

## 2018-05-14 PROCEDURE — G8417 CALC BMI ABV UP PARAM F/U: HCPCS | Performed by: INTERNAL MEDICINE

## 2018-05-14 PROCEDURE — 4040F PNEUMOC VAC/ADMIN/RCVD: CPT | Performed by: INTERNAL MEDICINE

## 2018-05-14 PROCEDURE — G8926 SPIRO NO PERF OR DOC: HCPCS | Performed by: INTERNAL MEDICINE

## 2018-05-17 ENCOUNTER — OFFICE VISIT (OUTPATIENT)
Dept: INTERNAL MEDICINE CLINIC | Age: 83
End: 2018-05-17

## 2018-05-17 VITALS
HEART RATE: 80 BPM | DIASTOLIC BLOOD PRESSURE: 84 MMHG | SYSTOLIC BLOOD PRESSURE: 124 MMHG | WEIGHT: 207 LBS | BODY MASS INDEX: 29.7 KG/M2

## 2018-05-17 DIAGNOSIS — C34.92 NON-SMALL CELL CANCER OF LEFT LUNG (HCC): Primary | ICD-10-CM

## 2018-05-17 DIAGNOSIS — J44.9 COPD, SEVERITY TO BE DETERMINED (HCC): ICD-10-CM

## 2018-05-17 DIAGNOSIS — N18.30 TYPE 2 DIABETES MELLITUS WITH STAGE 3 CHRONIC KIDNEY DISEASE, WITHOUT LONG-TERM CURRENT USE OF INSULIN (HCC): ICD-10-CM

## 2018-05-17 DIAGNOSIS — I26.99 BILATERAL PULMONARY EMBOLISM (HCC): ICD-10-CM

## 2018-05-17 DIAGNOSIS — Z79.01 ANTICOAGULATED ON COUMADIN: ICD-10-CM

## 2018-05-17 DIAGNOSIS — E11.22 TYPE 2 DIABETES MELLITUS WITH STAGE 3 CHRONIC KIDNEY DISEASE, WITHOUT LONG-TERM CURRENT USE OF INSULIN (HCC): ICD-10-CM

## 2018-05-17 LAB
INTERNATIONAL NORMALIZATION RATIO, POC: 1.3
PROTHROMBIN TIME, POC: NORMAL

## 2018-05-17 PROCEDURE — 3023F SPIROM DOC REV: CPT | Performed by: NURSE PRACTITIONER

## 2018-05-17 PROCEDURE — 1036F TOBACCO NON-USER: CPT | Performed by: NURSE PRACTITIONER

## 2018-05-17 PROCEDURE — 85610 PROTHROMBIN TIME: CPT | Performed by: NURSE PRACTITIONER

## 2018-05-17 PROCEDURE — G8926 SPIRO NO PERF OR DOC: HCPCS | Performed by: NURSE PRACTITIONER

## 2018-05-17 PROCEDURE — 1123F ACP DISCUSS/DSCN MKR DOCD: CPT | Performed by: NURSE PRACTITIONER

## 2018-05-17 PROCEDURE — 99214 OFFICE O/P EST MOD 30 MIN: CPT | Performed by: NURSE PRACTITIONER

## 2018-05-17 PROCEDURE — 4040F PNEUMOC VAC/ADMIN/RCVD: CPT | Performed by: NURSE PRACTITIONER

## 2018-05-17 PROCEDURE — G8427 DOCREV CUR MEDS BY ELIG CLIN: HCPCS | Performed by: NURSE PRACTITIONER

## 2018-05-17 PROCEDURE — G8417 CALC BMI ABV UP PARAM F/U: HCPCS | Performed by: NURSE PRACTITIONER

## 2018-05-17 RX ORDER — WARFARIN SODIUM 2.5 MG/1
2.5 TABLET ORAL DAILY
Qty: 30 TABLET | Refills: 5 | Status: SHIPPED | OUTPATIENT
Start: 2018-05-17 | End: 2018-06-18 | Stop reason: ALTCHOICE

## 2018-05-18 PROBLEM — N18.30 TYPE 2 DIABETES MELLITUS WITH STAGE 3 CHRONIC KIDNEY DISEASE, WITHOUT LONG-TERM CURRENT USE OF INSULIN (HCC): Status: ACTIVE | Noted: 2018-05-18

## 2018-05-18 PROBLEM — E11.22 TYPE 2 DIABETES MELLITUS WITH STAGE 3 CHRONIC KIDNEY DISEASE, WITHOUT LONG-TERM CURRENT USE OF INSULIN (HCC): Status: ACTIVE | Noted: 2018-05-18

## 2018-05-18 ASSESSMENT — ENCOUNTER SYMPTOMS
GASTROINTESTINAL NEGATIVE: 1
SHORTNESS OF BREATH: 0

## 2018-05-23 ENCOUNTER — HOSPITAL ENCOUNTER (OUTPATIENT)
Dept: OTHER | Age: 83
Discharge: HOME | End: 2018-05-23
Attending: INTERNAL MEDICINE | Admitting: INTERNAL MEDICINE

## 2018-05-23 VITALS — WEIGHT: 207 LBS | HEIGHT: 70 IN | BODY MASS INDEX: 29.63 KG/M2

## 2018-05-23 DIAGNOSIS — C34.92 SQUAMOUS CARCINOMA OF LUNG, LEFT (HCC): ICD-10-CM

## 2018-05-23 PROBLEM — R91.1 PULMONARY NODULE: Status: ACTIVE | Noted: 2018-05-23

## 2018-05-23 RX ORDER — FLUDEOXYGLUCOSE F 18 200 MCI/ML
15.65 INJECTION, SOLUTION INTRAVENOUS
Status: COMPLETED | OUTPATIENT
Start: 2018-05-23 | End: 2018-05-23

## 2018-05-23 RX ADMIN — FLUDEOXYGLUCOSE F 18 15.65 MILLICURIE: 200 INJECTION, SOLUTION INTRAVENOUS at 08:53

## 2018-05-24 ENCOUNTER — TELEPHONE (OUTPATIENT)
Dept: PULMONOLOGY | Age: 83
End: 2018-05-24

## 2018-05-24 DIAGNOSIS — J42 CHRONIC BRONCHITIS, UNSPECIFIED CHRONIC BRONCHITIS TYPE (HCC): Primary | ICD-10-CM

## 2018-05-25 ENCOUNTER — OFFICE VISIT (OUTPATIENT)
Dept: INTERNAL MEDICINE CLINIC | Age: 83
End: 2018-05-25

## 2018-05-25 VITALS
HEIGHT: 70 IN | SYSTOLIC BLOOD PRESSURE: 134 MMHG | BODY MASS INDEX: 29.92 KG/M2 | HEART RATE: 84 BPM | WEIGHT: 209 LBS | DIASTOLIC BLOOD PRESSURE: 86 MMHG

## 2018-05-25 DIAGNOSIS — I26.99 BILATERAL PULMONARY EMBOLISM (HCC): Primary | ICD-10-CM

## 2018-05-25 DIAGNOSIS — Z79.01 ANTICOAGULATED ON COUMADIN: ICD-10-CM

## 2018-05-25 DIAGNOSIS — E11.22 TYPE 2 DIABETES MELLITUS WITH STAGE 3 CHRONIC KIDNEY DISEASE, WITHOUT LONG-TERM CURRENT USE OF INSULIN (HCC): ICD-10-CM

## 2018-05-25 DIAGNOSIS — N18.30 TYPE 2 DIABETES MELLITUS WITH STAGE 3 CHRONIC KIDNEY DISEASE, WITHOUT LONG-TERM CURRENT USE OF INSULIN (HCC): ICD-10-CM

## 2018-05-25 LAB
INTERNATIONAL NORMALIZATION RATIO, POC: 2.5
PROTHROMBIN TIME, POC: NORMAL

## 2018-05-25 PROCEDURE — 1123F ACP DISCUSS/DSCN MKR DOCD: CPT | Performed by: NURSE PRACTITIONER

## 2018-05-25 PROCEDURE — 85610 PROTHROMBIN TIME: CPT | Performed by: NURSE PRACTITIONER

## 2018-05-25 PROCEDURE — G8417 CALC BMI ABV UP PARAM F/U: HCPCS | Performed by: NURSE PRACTITIONER

## 2018-05-25 PROCEDURE — 1036F TOBACCO NON-USER: CPT | Performed by: NURSE PRACTITIONER

## 2018-05-25 PROCEDURE — 99213 OFFICE O/P EST LOW 20 MIN: CPT | Performed by: NURSE PRACTITIONER

## 2018-05-25 PROCEDURE — G8428 CUR MEDS NOT DOCUMENT: HCPCS | Performed by: NURSE PRACTITIONER

## 2018-05-25 PROCEDURE — 4040F PNEUMOC VAC/ADMIN/RCVD: CPT | Performed by: NURSE PRACTITIONER

## 2018-05-28 ASSESSMENT — ENCOUNTER SYMPTOMS: BLOOD IN STOOL: 0

## 2018-05-30 ENCOUNTER — TELEPHONE (OUTPATIENT)
Dept: PULMONOLOGY | Age: 83
End: 2018-05-30

## 2018-06-11 ENCOUNTER — TELEPHONE (OUTPATIENT)
Dept: INTERNAL MEDICINE CLINIC | Age: 83
End: 2018-06-11

## 2018-06-11 ENCOUNTER — OFFICE VISIT (OUTPATIENT)
Dept: CARDIOTHORACIC SURGERY | Age: 83
End: 2018-06-11

## 2018-06-11 VITALS
HEIGHT: 70 IN | TEMPERATURE: 97.7 F | OXYGEN SATURATION: 95 % | DIASTOLIC BLOOD PRESSURE: 80 MMHG | BODY MASS INDEX: 28.89 KG/M2 | HEART RATE: 76 BPM | WEIGHT: 201.8 LBS | SYSTOLIC BLOOD PRESSURE: 160 MMHG

## 2018-06-11 DIAGNOSIS — M15.9 PRIMARY OSTEOARTHRITIS INVOLVING MULTIPLE JOINTS: ICD-10-CM

## 2018-06-11 DIAGNOSIS — M51.36 DDD (DEGENERATIVE DISC DISEASE), LUMBAR: ICD-10-CM

## 2018-06-11 DIAGNOSIS — C34.92 NON-SMALL CELL CANCER OF LEFT LUNG (HCC): Primary | ICD-10-CM

## 2018-06-11 DIAGNOSIS — I26.99 BILATERAL PULMONARY EMBOLISM (HCC): ICD-10-CM

## 2018-06-11 DIAGNOSIS — N18.30 TYPE 2 DIABETES MELLITUS WITH STAGE 3 CHRONIC KIDNEY DISEASE, WITHOUT LONG-TERM CURRENT USE OF INSULIN (HCC): Primary | ICD-10-CM

## 2018-06-11 DIAGNOSIS — E66.3 OVERWEIGHT (BMI 25.0-29.9): ICD-10-CM

## 2018-06-11 DIAGNOSIS — N18.30 CKD (CHRONIC KIDNEY DISEASE) STAGE 3, GFR 30-59 ML/MIN (HCC): ICD-10-CM

## 2018-06-11 DIAGNOSIS — E11.22 TYPE 2 DIABETES MELLITUS WITH STAGE 3 CHRONIC KIDNEY DISEASE, WITHOUT LONG-TERM CURRENT USE OF INSULIN (HCC): Primary | ICD-10-CM

## 2018-06-11 PROCEDURE — 99202 OFFICE O/P NEW SF 15 MIN: CPT | Performed by: THORACIC SURGERY (CARDIOTHORACIC VASCULAR SURGERY)

## 2018-06-11 RX ORDER — GLIPIZIDE 5 MG/1
2.5 TABLET ORAL
Qty: 90 TABLET | Refills: 1 | Status: SHIPPED | OUTPATIENT
Start: 2018-06-11 | End: 2019-06-15 | Stop reason: SDUPTHER

## 2018-06-13 ENCOUNTER — HOSPITAL ENCOUNTER (OUTPATIENT)
Dept: PULMONOLOGY | Age: 83
Discharge: HOME | End: 2018-06-13
Attending: INTERNAL MEDICINE | Admitting: INTERNAL MEDICINE

## 2018-06-13 VITALS — OXYGEN SATURATION: 98 % | HEART RATE: 73 BPM | RESPIRATION RATE: 18 BRPM

## 2018-06-13 DIAGNOSIS — J42 CHRONIC BRONCHITIS (HCC): ICD-10-CM

## 2018-06-13 RX ORDER — ALBUTEROL SULFATE 90 UG/1
4 AEROSOL, METERED RESPIRATORY (INHALATION) ONCE
Status: COMPLETED | OUTPATIENT
Start: 2018-06-13 | End: 2018-06-13

## 2018-06-13 RX ADMIN — ALBUTEROL SULFATE 4 PUFF: 90 AEROSOL, METERED RESPIRATORY (INHALATION) at 14:31

## 2018-06-18 ENCOUNTER — OFFICE VISIT (OUTPATIENT)
Dept: CARDIOTHORACIC SURGERY | Age: 83
End: 2018-06-18

## 2018-06-18 ENCOUNTER — TELEPHONE (OUTPATIENT)
Dept: INTERNAL MEDICINE CLINIC | Age: 83
End: 2018-06-18

## 2018-06-18 VITALS
TEMPERATURE: 97.4 F | WEIGHT: 213 LBS | BODY MASS INDEX: 30.49 KG/M2 | OXYGEN SATURATION: 97 % | HEIGHT: 70 IN | HEART RATE: 74 BPM | SYSTOLIC BLOOD PRESSURE: 170 MMHG | DIASTOLIC BLOOD PRESSURE: 80 MMHG

## 2018-06-18 DIAGNOSIS — N18.30 TYPE 2 DIABETES MELLITUS WITH STAGE 3 CHRONIC KIDNEY DISEASE, WITHOUT LONG-TERM CURRENT USE OF INSULIN (HCC): ICD-10-CM

## 2018-06-18 DIAGNOSIS — N18.30 CKD (CHRONIC KIDNEY DISEASE) STAGE 3, GFR 30-59 ML/MIN (HCC): ICD-10-CM

## 2018-06-18 DIAGNOSIS — E11.22 TYPE 2 DIABETES MELLITUS WITH STAGE 3 CHRONIC KIDNEY DISEASE, WITHOUT LONG-TERM CURRENT USE OF INSULIN (HCC): ICD-10-CM

## 2018-06-18 DIAGNOSIS — J44.9 COPD, SEVERITY TO BE DETERMINED (HCC): ICD-10-CM

## 2018-06-18 DIAGNOSIS — R63.5 WEIGHT GAIN: ICD-10-CM

## 2018-06-18 DIAGNOSIS — I26.99 BILATERAL PULMONARY EMBOLISM (HCC): ICD-10-CM

## 2018-06-18 DIAGNOSIS — C34.92 NON-SMALL CELL CANCER OF LEFT LUNG (HCC): Primary | ICD-10-CM

## 2018-06-18 DIAGNOSIS — R60.0 LEG EDEMA: Primary | ICD-10-CM

## 2018-06-18 PROCEDURE — 99213 OFFICE O/P EST LOW 20 MIN: CPT | Performed by: THORACIC SURGERY (CARDIOTHORACIC VASCULAR SURGERY)

## 2018-06-18 RX ORDER — WARFARIN SODIUM 2.5 MG/1
2.5 TABLET ORAL
COMMUNITY
End: 2018-09-11 | Stop reason: SDUPTHER

## 2018-06-18 RX ORDER — FUROSEMIDE 40 MG/1
40 TABLET ORAL DAILY
Qty: 5 TABLET | Refills: 0 | Status: SHIPPED | OUTPATIENT
Start: 2018-06-18 | End: 2018-07-10 | Stop reason: SDUPTHER

## 2018-06-18 NOTE — PROGRESS NOTES
Progress Note    Mr. Preeti Lozano is being seen to discuss treatment options for lung cancer. Vital Signs:                                                 BP (!) 170/80 (Site: Left Arm, Position: Sitting, Cuff Size: Medium Adult)   Pulse 74   Temp 97.4 °F (36.3 °C) (Oral)   Ht 5' 10\" (1.778 m)   Wt 213 lb (96.6 kg)   SpO2 97%   BMI 30.56 kg/m²          CV: reg, wound c/d/i, sternum stable  Pulm: clear, decreased at bases  Abd: soft  Ext: warm. No edema. saph incision c/d/i. Medications:  Prior to Admission medications    Medication Sig Start Date End Date Taking? Authorizing Provider   warfarin (COUMADIN) 2.5 MG tablet Take 2.5 mg by mouth   Yes Historical Provider, MD   glipiZIDE (GLUCOTROL) 5 MG tablet Take 0.5 tablets by mouth daily (with breakfast) 6/11/18  Yes SHA Biggs - CNP   linagliptin (TRADJENTA) 5 MG tablet Take 1 tablet by mouth daily 5/25/18  Yes SHA Biggs - CNP   gabapentin (NEURONTIN) 300 MG capsule Take 300 mg by mouth 2 times daily. .   Yes Historical Provider, MD   amLODIPine (NORVASC) 5 MG tablet Take 1 tablet by mouth daily 4/8/18  Yes Aryan Coyle MD   naphazoline-glycerin (CLEAR EYES REDNESS RELIEF) 0.012-0.2 % SOLN ophthalmic solution Place 1 drop into both eyes 2 times daily 4/7/18  Yes Aryan Coyle MD   ranitidine (ZANTAC) 300 MG capsule Take 1 capsule by mouth every evening 3/29/18  Yes SHA Biggs - CNP   DULoxetine (CYMBALTA) 30 MG extended release capsule TAKE ONE CAPSULE BY MOUTH DAILY 3/12/18  Yes SHA Biggs CNP   tamsulosin (FLOMAX) 0.4 MG capsule Take 1 capsule by mouth daily 8/3/17  Yes SHA Biggs - CNP   Diphenhydramine-APAP, sleep, (TYLENOL PM EXTRA STRENGTH PO) Take 2 tablets by mouth nightly as needed   Yes Historical Provider, MD   glucose blood VI test strips (EXACTECH TEST) strip 1 each by Does not apply route daily Test once daily and as needed.  8/10/16  Yes Annita Stewart MD

## 2018-06-19 ENCOUNTER — TELEPHONE (OUTPATIENT)
Dept: CARDIOTHORACIC SURGERY | Age: 83
End: 2018-06-19

## 2018-06-19 ENCOUNTER — TELEPHONE (OUTPATIENT)
Dept: INTERNAL MEDICINE CLINIC | Age: 83
End: 2018-06-19

## 2018-06-19 DIAGNOSIS — C34.92 NON-SMALL CELL CANCER OF LEFT LUNG (HCC): Primary | ICD-10-CM

## 2018-06-26 ENCOUNTER — TELEPHONE (OUTPATIENT)
Dept: INTERNAL MEDICINE CLINIC | Age: 83
End: 2018-06-26

## 2018-06-26 DIAGNOSIS — E11.40 CONTROLLED TYPE 2 DIABETES MELLITUS WITH NEUROPATHY (HCC): ICD-10-CM

## 2018-06-26 RX ORDER — DULOXETIN HYDROCHLORIDE 30 MG/1
CAPSULE, DELAYED RELEASE ORAL
Qty: 30 CAPSULE | Refills: 1 | Status: SHIPPED | OUTPATIENT
Start: 2018-06-26 | End: 2018-09-03 | Stop reason: SDUPTHER

## 2018-06-29 ENCOUNTER — NURSE ONLY (OUTPATIENT)
Dept: INTERNAL MEDICINE CLINIC | Age: 83
End: 2018-06-29

## 2018-06-29 DIAGNOSIS — Z79.01 ANTICOAGULATED ON COUMADIN: Primary | ICD-10-CM

## 2018-06-29 LAB
INTERNATIONAL NORMALIZATION RATIO, POC: 1.6
PROTHROMBIN TIME, POC: NORMAL

## 2018-06-29 PROCEDURE — 85610 PROTHROMBIN TIME: CPT | Performed by: NURSE PRACTITIONER

## 2018-07-02 ENCOUNTER — HOSPITAL ENCOUNTER (OUTPATIENT)
Dept: MRI IMAGING | Age: 83
Discharge: HOME | End: 2018-07-02
Attending: INTERNAL MEDICINE | Admitting: INTERNAL MEDICINE

## 2018-07-02 DIAGNOSIS — C34.92 MALIGNANT NEOPLASM OF UNSPECIFIED PART OF LEFT BRONCHUS OR LUNG (HCC): ICD-10-CM

## 2018-07-02 DIAGNOSIS — C34.92: ICD-10-CM

## 2018-07-05 ENCOUNTER — CLINICAL DOCUMENTATION (OUTPATIENT)
Dept: RADIATION ONCOLOGY | Age: 83
End: 2018-07-05

## 2018-07-09 ENCOUNTER — TELEPHONE (OUTPATIENT)
Dept: INTERNAL MEDICINE CLINIC | Age: 83
End: 2018-07-09

## 2018-07-10 ENCOUNTER — OFFICE VISIT (OUTPATIENT)
Dept: INTERNAL MEDICINE CLINIC | Age: 83
End: 2018-07-10

## 2018-07-10 VITALS
BODY MASS INDEX: 30.78 KG/M2 | WEIGHT: 215 LBS | SYSTOLIC BLOOD PRESSURE: 124 MMHG | DIASTOLIC BLOOD PRESSURE: 84 MMHG | HEIGHT: 70 IN | HEART RATE: 76 BPM

## 2018-07-10 DIAGNOSIS — R06.09 DYSPNEA ON EXERTION: ICD-10-CM

## 2018-07-10 DIAGNOSIS — N18.30 CKD (CHRONIC KIDNEY DISEASE) STAGE 3, GFR 30-59 ML/MIN (HCC): ICD-10-CM

## 2018-07-10 DIAGNOSIS — R60.0 LEG EDEMA: Primary | ICD-10-CM

## 2018-07-10 DIAGNOSIS — Z79.01 ANTICOAGULATED ON COUMADIN: ICD-10-CM

## 2018-07-10 DIAGNOSIS — R63.5 WEIGHT GAIN: ICD-10-CM

## 2018-07-10 LAB
ALBUMIN SERPL-MCNC: 3.8 G/DL (ref 3.4–5)
ANION GAP SERPL CALCULATED.3IONS-SCNC: 12 MMOL/L (ref 3–16)
BUN BLDV-MCNC: 26 MG/DL (ref 7–20)
CALCIUM SERPL-MCNC: 9.1 MG/DL (ref 8.3–10.6)
CHLORIDE BLD-SCNC: 102 MMOL/L (ref 99–110)
CO2: 29 MMOL/L (ref 21–32)
CREAT SERPL-MCNC: 1.7 MG/DL (ref 0.8–1.3)
GFR AFRICAN AMERICAN: 47
GFR NON-AFRICAN AMERICAN: 39
GLUCOSE BLD-MCNC: 224 MG/DL (ref 70–99)
INTERNATIONAL NORMALIZATION RATIO, POC: 1.7
PHOSPHORUS: 3.5 MG/DL (ref 2.5–4.9)
POTASSIUM SERPL-SCNC: 4.1 MMOL/L (ref 3.5–5.1)
PRO-BNP: 898 PG/ML (ref 0–449)
PROTHROMBIN TIME, POC: NORMAL
SODIUM BLD-SCNC: 143 MMOL/L (ref 136–145)

## 2018-07-10 PROCEDURE — 1123F ACP DISCUSS/DSCN MKR DOCD: CPT | Performed by: NURSE PRACTITIONER

## 2018-07-10 PROCEDURE — 4040F PNEUMOC VAC/ADMIN/RCVD: CPT | Performed by: NURSE PRACTITIONER

## 2018-07-10 PROCEDURE — 1036F TOBACCO NON-USER: CPT | Performed by: NURSE PRACTITIONER

## 2018-07-10 PROCEDURE — 85610 PROTHROMBIN TIME: CPT | Performed by: NURSE PRACTITIONER

## 2018-07-10 PROCEDURE — 1101F PT FALLS ASSESS-DOCD LE1/YR: CPT | Performed by: NURSE PRACTITIONER

## 2018-07-10 PROCEDURE — 99213 OFFICE O/P EST LOW 20 MIN: CPT | Performed by: NURSE PRACTITIONER

## 2018-07-10 PROCEDURE — G8427 DOCREV CUR MEDS BY ELIG CLIN: HCPCS | Performed by: NURSE PRACTITIONER

## 2018-07-10 PROCEDURE — 1111F DSCHRG MED/CURRENT MED MERGE: CPT | Performed by: NURSE PRACTITIONER

## 2018-07-10 PROCEDURE — G8417 CALC BMI ABV UP PARAM F/U: HCPCS | Performed by: NURSE PRACTITIONER

## 2018-07-10 RX ORDER — FUROSEMIDE 20 MG/1
20 TABLET ORAL DAILY
Qty: 10 TABLET | Refills: 0 | Status: SHIPPED | OUTPATIENT
Start: 2018-07-10 | End: 2018-12-14

## 2018-07-10 ASSESSMENT — PATIENT HEALTH QUESTIONNAIRE - PHQ9
1. LITTLE INTEREST OR PLEASURE IN DOING THINGS: 0
SUM OF ALL RESPONSES TO PHQ QUESTIONS 1-9: 1
SUM OF ALL RESPONSES TO PHQ9 QUESTIONS 1 & 2: 1
2. FEELING DOWN, DEPRESSED OR HOPELESS: 1

## 2018-07-12 ASSESSMENT — ENCOUNTER SYMPTOMS
GASTROINTESTINAL NEGATIVE: 1
SHORTNESS OF BREATH: 1

## 2018-07-12 NOTE — PROGRESS NOTES
SUBJECTIVE:    Patient ID: Maddy Samuels is a [de-identified] y.o. male. CC: Leg swelling, INR management, chronic renal insufficiency    HPI: The patient presents to the office today for follow-up of chronic medical conditions and for INR management. Main complaint today is leg swelling. This is not a new problem. It is bilateral in nature. They have tried compression hose with mixed results. The patient was given a short course of Lasix which helped. He was not continued on this chronically due to concern about his chronic renal insufficiency. His weight is up. Chronic renal insufficiency: Seems to be running about his baseline. We discussed the risks of adding diuretics to his regimen. INR management: INR is slightly low today at 1.7. Hypertension: He denies any chest pain. Does acknowledge some increased shortness of breath with exertion. There is no chest pain component to this.       Past Medical History:   Diagnosis Date    Abdominal pain, epigastric     Arthritis     Benign prostatic hypertrophy without urinary obstruction     BPH     Cellulitis and abscess     Chronic rhinitis     COPD (chronic obstructive pulmonary disease) (HCC)     Diabetes mellitus (HCC)     Dysphagia     Erectile dysfunction     GERD (gastroesophageal reflux disease)     Hyperglycemia     Hypertension     lumbar radiculopathy     Neuropathy (HCC)     Nocturia     Osteoarthritis     Other disorders of kidney and ureter in diseases classified elsewhere     Pain, joint, knee     Palpitations     skin cancer     Rt ear, nose, neck, hands    SOB (shortness of breath)     Tennis elbow     Tinea pedis     foot        Current Outpatient Prescriptions   Medication Sig Dispense Refill    furosemide (LASIX) 20 MG tablet Take 1 tablet by mouth daily for 10 days 10 tablet 0    DULoxetine (CYMBALTA) 30 MG extended release capsule TAKE ONE CAPSULE BY MOUTH DAILY 30 capsule 1    warfarin (COUMADIN) 2.5 MG tablet Take 2.5 mg by mouth      glipiZIDE (GLUCOTROL) 5 MG tablet Take 0.5 tablets by mouth daily (with breakfast) 90 tablet 1    linagliptin (TRADJENTA) 5 MG tablet Take 1 tablet by mouth daily 30 tablet 5    gabapentin (NEURONTIN) 300 MG capsule Take 300 mg by mouth 2 times daily. Favio Carlos amLODIPine (NORVASC) 5 MG tablet Take 1 tablet by mouth daily 30 tablet 3    naphazoline-glycerin (CLEAR EYES REDNESS RELIEF) 0.012-0.2 % SOLN ophthalmic solution Place 1 drop into both eyes 2 times daily 1 Bottle 0    ranitidine (ZANTAC) 300 MG capsule Take 1 capsule by mouth every evening 90 capsule 1    tamsulosin (FLOMAX) 0.4 MG capsule Take 1 capsule by mouth daily 90 capsule 1    Diphenhydramine-APAP, sleep, (TYLENOL PM EXTRA STRENGTH PO) Take 2 tablets by mouth nightly as needed      glucose blood VI test strips (EXACTECH TEST) strip 1 each by Does not apply route daily Test once daily and as needed. 100 each 3    glucose monitoring kit (FREESTYLE) monitoring kit 1 kit by Does not apply route daily as needed. 1 kit 0    latanoprost (XALATAN) 0.005 % ophthalmic solution Place 1 drop into both eyes nightly. No current facility-administered medications for this visit. Review of Systems   Constitutional: Positive for unexpected weight change. Negative for fever. Respiratory: Positive for shortness of breath. Cardiovascular: Positive for leg swelling. Gastrointestinal: Negative. Genitourinary: Negative. Skin: Negative. All other systems reviewed and are negative. OBJECTIVE:  Physical Exam   Constitutional: He is oriented to person, place, and time. He appears well-developed and well-nourished. No distress. HENT:   Head: Normocephalic and atraumatic. Eyes: Conjunctivae are normal. No scleral icterus. Neck: Neck supple. No JVD present. Cardiovascular: Normal rate and regular rhythm. Pulmonary/Chest: Effort normal and breath sounds normal. No respiratory distress. Abdominal: Soft.

## 2018-07-17 ENCOUNTER — OFFICE VISIT (OUTPATIENT)
Dept: INTERNAL MEDICINE CLINIC | Age: 83
End: 2018-07-17

## 2018-07-17 VITALS
HEIGHT: 70 IN | HEART RATE: 60 BPM | WEIGHT: 216 LBS | BODY MASS INDEX: 30.92 KG/M2 | SYSTOLIC BLOOD PRESSURE: 152 MMHG | DIASTOLIC BLOOD PRESSURE: 88 MMHG

## 2018-07-17 DIAGNOSIS — N18.30 CKD (CHRONIC KIDNEY DISEASE) STAGE 3, GFR 30-59 ML/MIN (HCC): ICD-10-CM

## 2018-07-17 DIAGNOSIS — R63.5 WEIGHT GAIN: ICD-10-CM

## 2018-07-17 DIAGNOSIS — R06.09 DOE (DYSPNEA ON EXERTION): ICD-10-CM

## 2018-07-17 DIAGNOSIS — R60.0 LOWER EXTREMITY EDEMA: Primary | ICD-10-CM

## 2018-07-17 DIAGNOSIS — Z79.01 ANTICOAGULATED ON COUMADIN: ICD-10-CM

## 2018-07-17 DIAGNOSIS — I10 ESSENTIAL HYPERTENSION: ICD-10-CM

## 2018-07-17 LAB
ANION GAP SERPL CALCULATED.3IONS-SCNC: 12 MMOL/L (ref 3–16)
BUN BLDV-MCNC: 29 MG/DL (ref 7–20)
CALCIUM SERPL-MCNC: 8.7 MG/DL (ref 8.3–10.6)
CHLORIDE BLD-SCNC: 103 MMOL/L (ref 99–110)
CO2: 28 MMOL/L (ref 21–32)
CREAT SERPL-MCNC: 2.2 MG/DL (ref 0.8–1.3)
GFR AFRICAN AMERICAN: 35
GFR NON-AFRICAN AMERICAN: 29
GLUCOSE BLD-MCNC: 285 MG/DL (ref 70–99)
INTERNATIONAL NORMALIZATION RATIO, POC: 2.2
POTASSIUM SERPL-SCNC: 3.9 MMOL/L (ref 3.5–5.1)
PRO-BNP: 712 PG/ML (ref 0–449)
PROTHROMBIN TIME, POC: NORMAL
SODIUM BLD-SCNC: 143 MMOL/L (ref 136–145)

## 2018-07-17 PROCEDURE — 85610 PROTHROMBIN TIME: CPT | Performed by: NURSE PRACTITIONER

## 2018-07-17 PROCEDURE — 99213 OFFICE O/P EST LOW 20 MIN: CPT | Performed by: NURSE PRACTITIONER

## 2018-07-17 PROCEDURE — 1036F TOBACCO NON-USER: CPT | Performed by: NURSE PRACTITIONER

## 2018-07-17 PROCEDURE — G8417 CALC BMI ABV UP PARAM F/U: HCPCS | Performed by: NURSE PRACTITIONER

## 2018-07-17 PROCEDURE — 4040F PNEUMOC VAC/ADMIN/RCVD: CPT | Performed by: NURSE PRACTITIONER

## 2018-07-17 PROCEDURE — 1123F ACP DISCUSS/DSCN MKR DOCD: CPT | Performed by: NURSE PRACTITIONER

## 2018-07-17 PROCEDURE — G8427 DOCREV CUR MEDS BY ELIG CLIN: HCPCS | Performed by: NURSE PRACTITIONER

## 2018-07-17 PROCEDURE — 1111F DSCHRG MED/CURRENT MED MERGE: CPT | Performed by: NURSE PRACTITIONER

## 2018-07-17 PROCEDURE — 1101F PT FALLS ASSESS-DOCD LE1/YR: CPT | Performed by: NURSE PRACTITIONER

## 2018-07-17 NOTE — PATIENT INSTRUCTIONS
Patient Education        Low Sodium Diet (2,000 Milligram): Care Instructions  Your Care Instructions    Too much sodium causes your body to hold on to extra water. This can raise your blood pressure and force your heart and kidneys to work harder. In very serious cases, this could cause you to be put in the hospital. It might even be life-threatening. By limiting sodium, you will feel better and lower your risk of serious problems. The most common source of sodium is salt. People get most of the salt in their diet from canned, prepared, and packaged foods. Fast food and restaurant meals also are very high in sodium. Your doctor will probably limit your sodium to less than 2,000 milligrams (mg) a day. This limit counts all the sodium in prepared and packaged foods and any salt you add to your food. Follow-up care is a key part of your treatment and safety. Be sure to make and go to all appointments, and call your doctor if you are having problems. It's also a good idea to know your test results and keep a list of the medicines you take. How can you care for yourself at home? Read food labels  · Read labels on cans and food packages. The labels tell you how much sodium is in each serving. Make sure that you look at the serving size. If you eat more than the serving size, you have eaten more sodium. · Food labels also tell you the Percent Daily Value for sodium. Choose products with low Percent Daily Values for sodium. · Be aware that sodium can come in forms other than salt, including monosodium glutamate (MSG), sodium citrate, and sodium bicarbonate (baking soda). MSG is often added to Asian food. When you eat out, you can sometimes ask for food without MSG or added salt. Buy low-sodium foods  · Buy foods that are labeled \"unsalted\" (no salt added), \"sodium-free\" (less than 5 mg of sodium per serving), or \"low-sodium\" (less than 140 mg of sodium per serving).  Foods labeled \"reduced-sodium\" and \"light fast foods. ¨ Pickles, olives, ketchup, and other condiments, especially soy sauce, unless labeled sodium-free or low-sodium. Where can you learn more? Go to https://[employer]. org and sign in to your [employer] account. Enter G581 in the [facility] box to learn more about \"Low Sodium Diet (2,000 Milligram): Care Instructions. \"     If you do not have an account, please click on the \"Sign Up Now\" link. Current as of: May 12, 2017  Content Version: 11.6  © 7796-6263 [employer]. Care instructions adapted under license by [facility] ([facility]). If you have questions about a medical condition or this instruction, always ask your healthcare professional. [address] any warranty or liability for your use of this information.

## 2018-08-10 ENCOUNTER — TELEPHONE (OUTPATIENT)
Dept: INTERNAL MEDICINE CLINIC | Age: 83
End: 2018-08-10

## 2018-08-10 DIAGNOSIS — I10 ESSENTIAL HYPERTENSION: Primary | ICD-10-CM

## 2018-08-10 RX ORDER — AMLODIPINE BESYLATE 5 MG/1
5 TABLET ORAL DAILY
Qty: 30 TABLET | Refills: 11 | Status: SHIPPED | OUTPATIENT
Start: 2018-08-10 | End: 2018-09-11 | Stop reason: SDUPTHER

## 2018-08-10 NOTE — TELEPHONE ENCOUNTER
Patient is calling for a refill on Amlodipine.  Please send to [facility] on [address] [employer] [employer].

## 2018-08-13 ENCOUNTER — OFFICE VISIT (OUTPATIENT)
Dept: INTERNAL MEDICINE CLINIC | Age: 83
End: 2018-08-13

## 2018-08-13 VITALS
DIASTOLIC BLOOD PRESSURE: 82 MMHG | HEIGHT: 70 IN | HEART RATE: 72 BPM | BODY MASS INDEX: 30.49 KG/M2 | SYSTOLIC BLOOD PRESSURE: 134 MMHG | WEIGHT: 213 LBS

## 2018-08-13 DIAGNOSIS — Z79.01 ANTICOAGULATED ON COUMADIN: ICD-10-CM

## 2018-08-13 DIAGNOSIS — I26.99 BILATERAL PULMONARY EMBOLISM (HCC): Primary | ICD-10-CM

## 2018-08-13 DIAGNOSIS — N18.30 TYPE 2 DIABETES MELLITUS WITH STAGE 3 CHRONIC KIDNEY DISEASE, WITHOUT LONG-TERM CURRENT USE OF INSULIN (HCC): ICD-10-CM

## 2018-08-13 DIAGNOSIS — E11.22 TYPE 2 DIABETES MELLITUS WITH STAGE 3 CHRONIC KIDNEY DISEASE, WITHOUT LONG-TERM CURRENT USE OF INSULIN (HCC): ICD-10-CM

## 2018-08-13 LAB
INTERNATIONAL NORMALIZATION RATIO, POC: 2.2
PROTHROMBIN TIME, POC: NORMAL

## 2018-08-13 PROCEDURE — G8417 CALC BMI ABV UP PARAM F/U: HCPCS | Performed by: NURSE PRACTITIONER

## 2018-08-13 PROCEDURE — 85610 PROTHROMBIN TIME: CPT | Performed by: NURSE PRACTITIONER

## 2018-08-13 PROCEDURE — 4040F PNEUMOC VAC/ADMIN/RCVD: CPT | Performed by: NURSE PRACTITIONER

## 2018-08-13 PROCEDURE — 99212 OFFICE O/P EST SF 10 MIN: CPT | Performed by: NURSE PRACTITIONER

## 2018-08-13 PROCEDURE — 1101F PT FALLS ASSESS-DOCD LE1/YR: CPT | Performed by: NURSE PRACTITIONER

## 2018-08-13 PROCEDURE — G8427 DOCREV CUR MEDS BY ELIG CLIN: HCPCS | Performed by: NURSE PRACTITIONER

## 2018-08-13 PROCEDURE — 1036F TOBACCO NON-USER: CPT | Performed by: NURSE PRACTITIONER

## 2018-08-13 PROCEDURE — 1123F ACP DISCUSS/DSCN MKR DOCD: CPT | Performed by: NURSE PRACTITIONER

## 2018-08-13 RX ORDER — GLUCOSAMINE HCL/CHONDROITIN SU 500-400 MG
1 CAPSULE ORAL DAILY
Qty: 100 STRIP | Refills: 3 | Status: ON HOLD | OUTPATIENT
Start: 2018-08-13 | End: 2019-03-23 | Stop reason: HOSPADM

## 2018-08-15 ASSESSMENT — ENCOUNTER SYMPTOMS: BLOOD IN STOOL: 0

## 2018-08-15 NOTE — PROGRESS NOTES
tablet Take 1 tablet by mouth daily for 10 days 10 tablet 0     No current facility-administered medications for this visit. Review of Systems   Constitutional: Negative for fever. Gastrointestinal: Negative for blood in stool. Genitourinary: Negative. OBJECTIVE:  Physical Exam   Constitutional: He is oriented to person, place, and time. He appears well-developed and well-nourished. No distress. HENT:   Head: Normocephalic and atraumatic. Eyes: Conjunctivae are normal. No scleral icterus. Neurological: He is alert and oriented to person, place, and time. Skin: Skin is warm and dry. He is not diaphoretic. Psychiatric: He has a normal mood and affect. His behavior is normal. Thought content normal.      /82   Pulse 72   Ht 5' 10\" (1.778 m)   Wt 213 lb (96.6 kg)   BMI 30.56 kg/m²          Assessment/Plan:  [address] [place] [address] was seen today for office visit for anticoagulation management.   Diagnoses and all orders for this visit:    Bilateral pulmonary embolism (HCC)  - No evidence of recurrence  - Continue anticoagulation    Anticoagulated on Coumadin  - INR therapeutic  - Continue same dose warfarin, see flowsheets  -     POCT INR    Type 2 diabetes mellitus with stage 3 chronic kidney disease, without long-term current use of insulin (HCC)  -     blood glucose monitor strips; 1 strip by Other route daily      SHA Borrero - CNP

## 2018-09-03 DIAGNOSIS — E11.40 CONTROLLED TYPE 2 DIABETES MELLITUS WITH NEUROPATHY (HCC): ICD-10-CM

## 2018-09-04 RX ORDER — GABAPENTIN 300 MG/1
CAPSULE ORAL
Qty: 180 CAPSULE | Refills: 1 | Status: SHIPPED | OUTPATIENT
Start: 2018-09-04 | End: 2018-10-09 | Stop reason: SDUPTHER

## 2018-09-04 RX ORDER — DULOXETIN HYDROCHLORIDE 30 MG/1
CAPSULE, DELAYED RELEASE ORAL
Qty: 90 CAPSULE | Refills: 1 | Status: SHIPPED | OUTPATIENT
Start: 2018-09-04 | End: 2018-10-09 | Stop reason: SDUPTHER

## 2018-09-11 ENCOUNTER — OFFICE VISIT (OUTPATIENT)
Dept: INTERNAL MEDICINE CLINIC | Age: 83
End: 2018-09-11

## 2018-09-11 VITALS
SYSTOLIC BLOOD PRESSURE: 136 MMHG | DIASTOLIC BLOOD PRESSURE: 84 MMHG | WEIGHT: 215 LBS | BODY MASS INDEX: 30.78 KG/M2 | HEART RATE: 76 BPM | HEIGHT: 70 IN

## 2018-09-11 DIAGNOSIS — I26.99 BILATERAL PULMONARY EMBOLISM (HCC): Primary | ICD-10-CM

## 2018-09-11 DIAGNOSIS — Z79.01 ANTICOAGULATED ON COUMADIN: ICD-10-CM

## 2018-09-11 DIAGNOSIS — C34.92 NON-SMALL CELL CANCER OF LEFT LUNG (HCC): ICD-10-CM

## 2018-09-11 DIAGNOSIS — I10 ESSENTIAL HYPERTENSION: ICD-10-CM

## 2018-09-11 LAB
INTERNATIONAL NORMALIZATION RATIO, POC: 1.6
PROTHROMBIN TIME, POC: NORMAL

## 2018-09-11 PROCEDURE — 1036F TOBACCO NON-USER: CPT | Performed by: NURSE PRACTITIONER

## 2018-09-11 PROCEDURE — G8427 DOCREV CUR MEDS BY ELIG CLIN: HCPCS | Performed by: NURSE PRACTITIONER

## 2018-09-11 PROCEDURE — 1123F ACP DISCUSS/DSCN MKR DOCD: CPT | Performed by: NURSE PRACTITIONER

## 2018-09-11 PROCEDURE — 1101F PT FALLS ASSESS-DOCD LE1/YR: CPT | Performed by: NURSE PRACTITIONER

## 2018-09-11 PROCEDURE — 99213 OFFICE O/P EST LOW 20 MIN: CPT | Performed by: NURSE PRACTITIONER

## 2018-09-11 PROCEDURE — 4040F PNEUMOC VAC/ADMIN/RCVD: CPT | Performed by: NURSE PRACTITIONER

## 2018-09-11 PROCEDURE — 85610 PROTHROMBIN TIME: CPT | Performed by: NURSE PRACTITIONER

## 2018-09-11 PROCEDURE — G8417 CALC BMI ABV UP PARAM F/U: HCPCS | Performed by: NURSE PRACTITIONER

## 2018-09-11 RX ORDER — AMLODIPINE BESYLATE 5 MG/1
5 TABLET ORAL DAILY
Qty: 90 TABLET | Refills: 3 | Status: ON HOLD | OUTPATIENT
Start: 2018-09-11 | End: 2019-03-23 | Stop reason: HOSPADM

## 2018-09-11 RX ORDER — WARFARIN SODIUM 2.5 MG/1
TABLET ORAL
Qty: 90 TABLET | Refills: 3 | Status: SHIPPED | OUTPATIENT
Start: 2018-09-11 | End: 2019-10-30 | Stop reason: SDUPTHER

## 2018-09-11 ASSESSMENT — ENCOUNTER SYMPTOMS: BLOOD IN STOOL: 0

## 2018-09-11 NOTE — PROGRESS NOTES
SUBJECTIVE:    Patient ID: Catalino Cook is a 82 y.o. male. CC: Pulmonary embolism, INR mgmt     HPI: The patient presents to the office today for INR management. He has history of bilateral pulmonary embolism. He reports no new chest pain or shortness of breath. No evidence of recurrent pulmonary embolism. He is on warfarin for above. His INR is low today at 1.6. He admits that he forgot his dose last night which is atypical.    Hypertension: He denies any chest pain or shortness of breath. He is compliant with medication regimen    He was recently diagnosed with non-small cell lung cancer. He was seen by cardiothoracic surgeon who did not recommend surgery. He is recommending the patient treat with chemo and radiation. Current Outpatient Prescriptions   Medication Sig Dispense Refill    DULoxetine (CYMBALTA) 30 MG extended release capsule TAKE ONE CAPSULE BY MOUTH DAILY 90 capsule 1    gabapentin (NEURONTIN) 300 MG capsule TAKE TWO CAPSULES BY MOUTH DAILY 180 capsule 1    blood glucose monitor strips 1 strip by Other route daily 100 strip 3    amLODIPine (NORVASC) 5 MG tablet Take 1 tablet by mouth daily 30 tablet 11    warfarin (COUMADIN) 2.5 MG tablet Take 2.5 mg by mouth      glipiZIDE (GLUCOTROL) 5 MG tablet Take 0.5 tablets by mouth daily (with breakfast) 90 tablet 1    linagliptin (TRADJENTA) 5 MG tablet Take 1 tablet by mouth daily 30 tablet 5    gabapentin (NEURONTIN) 300 MG capsule Take 300 mg by mouth 2 times daily. Mariano Dean naphazoline-glycerin (CLEAR EYES REDNESS RELIEF) 0.012-0.2 % SOLN ophthalmic solution Place 1 drop into both eyes 2 times daily 1 Bottle 0    ranitidine (ZANTAC) 300 MG capsule Take 1 capsule by mouth every evening 90 capsule 1    tamsulosin (FLOMAX) 0.4 MG capsule Take 1 capsule by mouth daily 90 capsule 1    Diphenhydramine-APAP, sleep, (TYLENOL PM EXTRA STRENGTH PO) Take 2 tablets by mouth nightly as needed      glucose blood VI test strips (EXACTECH TEST) strip 1 each by Does not apply route daily Test once daily and as needed. 100 each 3    glucose monitoring kit (FREESTYLE) monitoring kit 1 kit by Does not apply route daily as needed. 1 kit 0    latanoprost (XALATAN) 0.005 % ophthalmic solution Place 1 drop into both eyes nightly.  furosemide (LASIX) 20 MG tablet Take 1 tablet by mouth daily for 10 days 10 tablet 0     No current facility-administered medications for this visit. Review of Systems   Constitutional: Negative for fever. Gastrointestinal: Negative for blood in stool. Genitourinary: Negative. OBJECTIVE:  Physical Exam   Constitutional: He is oriented to person, place, and time. He appears well-developed and well-nourished. No distress. HENT:   Head: Normocephalic and atraumatic. Eyes: Conjunctivae are normal. No scleral icterus. Neurological: He is alert and oriented to person, place, and time. Skin: Skin is warm and dry. He is not diaphoretic. Psychiatric: He has a normal mood and affect. His behavior is normal. Thought content normal.      /84   Pulse 76   Ht 5' 10\" (1.778 m)   Wt 215 lb (97.5 kg)   BMI 30.85 kg/m²          Assessment/Plan:  Earnestine Lobo was seen today for office visit for anticoagulation management. Diagnoses and all orders for this visit:    Bilateral pulmonary embolism (HCC)  - No evidence of recurrence  - Continue anticoagulation    Anticoagulated on Coumadin  - INR low today but he forgot last night dose  - Continue same dose warfarin, see flowsheets  -     POCT INR  -     warfarin (COUMADIN) 2.5 MG tablet; 1.25 mg on Sun, Thu; 2.5 mg all other days    Essential hypertension  - Normotensive  - he has met JNC standards.  - Continue current regimen. -     amLODIPine (NORVASC) 5 MG tablet; Take 1 tablet by mouth daily    Non-small cell cancer of left lung ([address]  - Seen by surgeon and surgery was not recommended.   Recommended treatment is chemo and radiation      Mavis Morgan Adrianne Gaston - MARTIN

## 2018-10-01 DIAGNOSIS — K30 INDIGESTION: ICD-10-CM

## 2018-10-01 RX ORDER — RANITIDINE 300 MG/1
CAPSULE ORAL
Qty: 90 CAPSULE | Refills: 1 | Status: SHIPPED | OUTPATIENT
Start: 2018-10-01 | End: 2019-04-01 | Stop reason: SDUPTHER

## 2018-10-03 ENCOUNTER — HOSPITAL ENCOUNTER (OUTPATIENT)
Dept: MRI IMAGING | Age: 83
Discharge: HOME | End: 2018-10-03
Payer: MEDICARE

## 2018-10-03 ENCOUNTER — HOSPITAL ENCOUNTER (OUTPATIENT)
Dept: CT IMAGING | Age: 83
Discharge: HOME | End: 2018-10-03
Payer: MEDICARE

## 2018-10-03 DIAGNOSIS — C34.90 NON-SMALL CELL LUNG CANCER, UNSPECIFIED LATERALITY (HCC): ICD-10-CM

## 2018-10-03 PROCEDURE — 70551 MRI BRAIN STEM W/O DYE: CPT

## 2018-10-03 PROCEDURE — 74176 CT ABD & PELVIS W/O CONTRAST: CPT

## 2018-10-09 ENCOUNTER — OFFICE VISIT (OUTPATIENT)
Dept: INTERNAL MEDICINE CLINIC | Age: 83
End: 2018-10-09
Payer: MEDICARE

## 2018-10-09 VITALS
HEART RATE: 80 BPM | DIASTOLIC BLOOD PRESSURE: 84 MMHG | HEIGHT: 70 IN | SYSTOLIC BLOOD PRESSURE: 146 MMHG | WEIGHT: 217 LBS | BODY MASS INDEX: 31.07 KG/M2

## 2018-10-09 DIAGNOSIS — E11.40 CONTROLLED TYPE 2 DIABETES MELLITUS WITH NEUROPATHY (HCC): ICD-10-CM

## 2018-10-09 DIAGNOSIS — E11.22 TYPE 2 DIABETES MELLITUS WITH STAGE 3 CHRONIC KIDNEY DISEASE, WITHOUT LONG-TERM CURRENT USE OF INSULIN (HCC): Primary | ICD-10-CM

## 2018-10-09 DIAGNOSIS — Z79.01 ANTICOAGULATED ON COUMADIN: ICD-10-CM

## 2018-10-09 DIAGNOSIS — I26.99 BILATERAL PULMONARY EMBOLISM (HCC): ICD-10-CM

## 2018-10-09 DIAGNOSIS — N18.30 TYPE 2 DIABETES MELLITUS WITH STAGE 3 CHRONIC KIDNEY DISEASE, WITHOUT LONG-TERM CURRENT USE OF INSULIN (HCC): Primary | ICD-10-CM

## 2018-10-09 DIAGNOSIS — M48.062 LUMBAR STENOSIS WITH NEUROGENIC CLAUDICATION: ICD-10-CM

## 2018-10-09 DIAGNOSIS — Z23 NEED FOR INFLUENZA VACCINATION: ICD-10-CM

## 2018-10-09 DIAGNOSIS — M51.36 DDD (DEGENERATIVE DISC DISEASE), LUMBAR: ICD-10-CM

## 2018-10-09 LAB
HBA1C MFR BLD: 7.2 %
INTERNATIONAL NORMALIZATION RATIO, POC: 2.7
PROTHROMBIN TIME, POC: NORMAL

## 2018-10-09 PROCEDURE — 4040F PNEUMOC VAC/ADMIN/RCVD: CPT | Performed by: NURSE PRACTITIONER

## 2018-10-09 PROCEDURE — 83036 HEMOGLOBIN GLYCOSYLATED A1C: CPT | Performed by: NURSE PRACTITIONER

## 2018-10-09 PROCEDURE — G8427 DOCREV CUR MEDS BY ELIG CLIN: HCPCS | Performed by: NURSE PRACTITIONER

## 2018-10-09 PROCEDURE — G8417 CALC BMI ABV UP PARAM F/U: HCPCS | Performed by: NURSE PRACTITIONER

## 2018-10-09 PROCEDURE — 1101F PT FALLS ASSESS-DOCD LE1/YR: CPT | Performed by: NURSE PRACTITIONER

## 2018-10-09 PROCEDURE — 1123F ACP DISCUSS/DSCN MKR DOCD: CPT | Performed by: NURSE PRACTITIONER

## 2018-10-09 PROCEDURE — G0008 ADMIN INFLUENZA VIRUS VAC: HCPCS | Performed by: NURSE PRACTITIONER

## 2018-10-09 PROCEDURE — 85610 PROTHROMBIN TIME: CPT | Performed by: NURSE PRACTITIONER

## 2018-10-09 PROCEDURE — 1036F TOBACCO NON-USER: CPT | Performed by: NURSE PRACTITIONER

## 2018-10-09 PROCEDURE — G8482 FLU IMMUNIZE ORDER/ADMIN: HCPCS | Performed by: NURSE PRACTITIONER

## 2018-10-09 PROCEDURE — 99214 OFFICE O/P EST MOD 30 MIN: CPT | Performed by: NURSE PRACTITIONER

## 2018-10-09 PROCEDURE — 90662 IIV NO PRSV INCREASED AG IM: CPT | Performed by: NURSE PRACTITIONER

## 2018-10-09 RX ORDER — GABAPENTIN 300 MG/1
300 CAPSULE ORAL 3 TIMES DAILY
Qty: 270 CAPSULE | Refills: 1 | Status: ON HOLD | OUTPATIENT
Start: 2018-10-09 | End: 2019-03-23 | Stop reason: HOSPADM

## 2018-10-09 RX ORDER — DULOXETIN HYDROCHLORIDE 30 MG/1
30 CAPSULE, DELAYED RELEASE ORAL 2 TIMES DAILY
Qty: 180 CAPSULE | Refills: 1 | Status: SHIPPED | OUTPATIENT
Start: 2018-10-09 | End: 2019-04-09 | Stop reason: DRUGHIGH

## 2018-10-11 ASSESSMENT — ENCOUNTER SYMPTOMS
SHORTNESS OF BREATH: 0
RESPIRATORY NEGATIVE: 1
GASTROINTESTINAL NEGATIVE: 1
BACK PAIN: 1

## 2018-10-11 NOTE — PROGRESS NOTES
SUBJECTIVE:    Patient ID: Monroe Martinez is a 82 y.o. male. CC: Diabetes, neuropathy, INR management    HPI: The patient presents to the office today for follow-up of chronic medical conditions and anticoagulation management. He has history of bilateral pulmonary embolism. He reports no new chest pain or shortness of breath. No evidence of recurrent pulmonary embolism.     He is on warfarin for above. His INR is therapeutic today at 2.7. Diabetes: His A1c today is 7.2. Neuropathy: Diabetic neuropathy remains problematic. He would like to increase his Cymbalta. He is also taking gabapentin. Lumbar stenosis with neurogenic claudication: We discussed this as another possible explanation for his lower extremity symptoms. He would like to increase Cymbalta. Past Medical History:   Diagnosis Date    Abdominal pain, epigastric     Arthritis     Benign prostatic hypertrophy without urinary obstruction     BPH     Cellulitis and abscess     Chronic rhinitis     COPD (chronic obstructive pulmonary disease) (HCC)     Diabetes mellitus (HCC)     Dysphagia     Erectile dysfunction     GERD (gastroesophageal reflux disease)     Hyperglycemia     Hypertension     lumbar radiculopathy     Neuropathy     Nocturia     Osteoarthritis     Other disorders of kidney and ureter in diseases classified elsewhere     Pain, joint, knee     Palpitations     skin cancer     Rt ear, nose, neck, hands    SOB (shortness of breath)     Tennis elbow     Tinea pedis     foot        Current Outpatient Prescriptions   Medication Sig Dispense Refill    MAGNESIUM PO Take by mouth 2 times daily      DULoxetine (CYMBALTA) 30 MG extended release capsule Take 1 capsule by mouth 2 times daily 180 capsule 1    gabapentin (NEURONTIN) 300 MG capsule Take 1 capsule by mouth 3 times daily for 90 days. . 270 capsule 1    ranitidine (ZANTAC) 300 MG capsule TAKE ONE CAPSULE BY MOUTH EVERY EVENING 90 capsule 1   

## 2018-10-17 ENCOUNTER — HOSPITAL ENCOUNTER (OUTPATIENT)
Dept: VASCULAR LAB | Age: 83
Discharge: HOME | End: 2018-10-17
Payer: MEDICARE

## 2018-10-17 DIAGNOSIS — I10 ESSENTIAL HYPERTENSION: Primary | ICD-10-CM

## 2018-10-17 PROCEDURE — 93880 EXTRACRANIAL BILAT STUDY: CPT

## 2018-11-09 ENCOUNTER — OFFICE VISIT (OUTPATIENT)
Dept: INTERNAL MEDICINE CLINIC | Age: 83
End: 2018-11-09
Payer: MEDICARE

## 2018-11-09 VITALS
WEIGHT: 220 LBS | SYSTOLIC BLOOD PRESSURE: 136 MMHG | BODY MASS INDEX: 31.5 KG/M2 | HEART RATE: 72 BPM | DIASTOLIC BLOOD PRESSURE: 82 MMHG | HEIGHT: 70 IN

## 2018-11-09 DIAGNOSIS — Z79.01 ANTICOAGULATED ON COUMADIN: ICD-10-CM

## 2018-11-09 DIAGNOSIS — I26.99 BILATERAL PULMONARY EMBOLISM (HCC): Primary | ICD-10-CM

## 2018-11-09 LAB
INTERNATIONAL NORMALIZATION RATIO, POC: 2.5
PROTHROMBIN TIME, POC: NORMAL

## 2018-11-09 PROCEDURE — G8427 DOCREV CUR MEDS BY ELIG CLIN: HCPCS | Performed by: NURSE PRACTITIONER

## 2018-11-09 PROCEDURE — 85610 PROTHROMBIN TIME: CPT | Performed by: NURSE PRACTITIONER

## 2018-11-09 PROCEDURE — 4040F PNEUMOC VAC/ADMIN/RCVD: CPT | Performed by: NURSE PRACTITIONER

## 2018-11-09 PROCEDURE — 1036F TOBACCO NON-USER: CPT | Performed by: NURSE PRACTITIONER

## 2018-11-09 PROCEDURE — 99212 OFFICE O/P EST SF 10 MIN: CPT | Performed by: NURSE PRACTITIONER

## 2018-11-09 PROCEDURE — 1101F PT FALLS ASSESS-DOCD LE1/YR: CPT | Performed by: NURSE PRACTITIONER

## 2018-11-09 PROCEDURE — 1123F ACP DISCUSS/DSCN MKR DOCD: CPT | Performed by: NURSE PRACTITIONER

## 2018-11-09 PROCEDURE — G8417 CALC BMI ABV UP PARAM F/U: HCPCS | Performed by: NURSE PRACTITIONER

## 2018-11-09 PROCEDURE — G8482 FLU IMMUNIZE ORDER/ADMIN: HCPCS | Performed by: NURSE PRACTITIONER

## 2018-11-11 ASSESSMENT — ENCOUNTER SYMPTOMS: BLOOD IN STOOL: 0

## 2018-11-15 ENCOUNTER — TELEPHONE (OUTPATIENT)
Dept: INTERNAL MEDICINE CLINIC | Age: 83
End: 2018-11-15

## 2018-11-15 ENCOUNTER — TELEPHONE (OUTPATIENT)
Dept: ORTHOPEDIC SURGERY | Age: 83
End: 2018-11-15

## 2018-11-15 ENCOUNTER — OFFICE VISIT (OUTPATIENT)
Dept: ORTHOPEDIC SURGERY | Age: 83
End: 2018-11-15
Payer: MEDICARE

## 2018-11-15 VITALS
DIASTOLIC BLOOD PRESSURE: 81 MMHG | WEIGHT: 215 LBS | HEIGHT: 70 IN | BODY MASS INDEX: 30.78 KG/M2 | SYSTOLIC BLOOD PRESSURE: 135 MMHG

## 2018-11-15 DIAGNOSIS — M51.26 HNP (HERNIATED NUCLEUS PULPOSUS), LUMBAR: ICD-10-CM

## 2018-11-15 DIAGNOSIS — M54.16 LUMBAR RADICULOPATHY: ICD-10-CM

## 2018-11-15 DIAGNOSIS — M48.062 LUMBAR STENOSIS WITH NEUROGENIC CLAUDICATION: Primary | ICD-10-CM

## 2018-11-15 DIAGNOSIS — C34.92 NON-SMALL CELL CANCER OF LEFT LUNG (HCC): ICD-10-CM

## 2018-11-15 PROCEDURE — G8417 CALC BMI ABV UP PARAM F/U: HCPCS | Performed by: PHYSICAL MEDICINE & REHABILITATION

## 2018-11-15 PROCEDURE — 4040F PNEUMOC VAC/ADMIN/RCVD: CPT | Performed by: PHYSICAL MEDICINE & REHABILITATION

## 2018-11-15 PROCEDURE — G8482 FLU IMMUNIZE ORDER/ADMIN: HCPCS | Performed by: PHYSICAL MEDICINE & REHABILITATION

## 2018-11-15 PROCEDURE — 1123F ACP DISCUSS/DSCN MKR DOCD: CPT | Performed by: PHYSICAL MEDICINE & REHABILITATION

## 2018-11-15 PROCEDURE — G8427 DOCREV CUR MEDS BY ELIG CLIN: HCPCS | Performed by: PHYSICAL MEDICINE & REHABILITATION

## 2018-11-15 PROCEDURE — 1036F TOBACCO NON-USER: CPT | Performed by: PHYSICAL MEDICINE & REHABILITATION

## 2018-11-15 PROCEDURE — 1101F PT FALLS ASSESS-DOCD LE1/YR: CPT | Performed by: PHYSICAL MEDICINE & REHABILITATION

## 2018-11-15 PROCEDURE — 99214 OFFICE O/P EST MOD 30 MIN: CPT | Performed by: PHYSICAL MEDICINE & REHABILITATION

## 2018-11-15 RX ORDER — HYDROCODONE BITARTRATE AND ACETAMINOPHEN 5; 325 MG/1; MG/1
1 TABLET ORAL 3 TIMES DAILY PRN
Qty: 21 TABLET | Refills: 0 | Status: SHIPPED | OUTPATIENT
Start: 2018-11-15 | End: 2018-11-22

## 2018-11-15 NOTE — TELEPHONE ENCOUNTER
Ruthie called from Dr Stacia Kraus office, who sees patient for back pain. She would like to a epidural steroid injection. Patient is insistent on doing this next Wednesday 11/21 . Needs to be off coumadin for 5 days prior to the procedure. Needs a verbal ok for this. Ruthie said patients wife said she has already stopped giving him his coumadin.

## 2018-11-15 NOTE — PROGRESS NOTES
no   Difficulty walking:  no              Past Medical History:   Past Medical History:   Diagnosis Date    Abdominal pain, epigastric     Arthritis     Benign prostatic hypertrophy without urinary obstruction     BPH     Cellulitis and abscess     Chronic rhinitis     COPD (chronic obstructive pulmonary disease) (HCC)     Diabetes mellitus (HCC)     Dysphagia     Erectile dysfunction     GERD (gastroesophageal reflux disease)     Hyperglycemia     Hypertension     lumbar radiculopathy     Neuropathy     Nocturia     Osteoarthritis     Other disorders of kidney and ureter in diseases classified elsewhere     Pain, joint, knee     Palpitations     skin cancer     Rt ear, nose, neck, hands    SOB (shortness of breath)     Tennis elbow     Tinea pedis     foot      Past Surgical History:     Past Surgical History:   Procedure Laterality Date    CATARACT REMOVAL Bilateral 09/2014    CHOLECYSTECTOMY      COLONOSCOPY  11/28/2011    Dr. Kenyon Avila - mild sigmoid diverticulosis    HIP SURGERY Right 09/09/2013    Dr. Germán Diaz - block for pain relief    KNEE PROSTHESIS REMOVAL      LUNG BIOPSY Left 05/09/2018    Dr. Vyas - CT guided    OTHER SURGICAL HISTORY      multiple lumbar ESIs    PAROTIDECTOMY Right 01/26/2017    Dr. Miley Duque - superficial, w/excision of parotid tail & dissection/preservation of facial nerve    SKIN BIOPSY      TOTAL KNEE ARTHROPLASTY      right x1 and left x2    TUNNELED VENOUS PORT PLACEMENT  06/22/2018    Left SCV infusaport placement    UPPER GASTROINTESTINAL ENDOSCOPY  03/19/2018    Dr. Amy Michelle - mild non-obstructive ring distal esophagus     Current Medications:     Current Outpatient Prescriptions:     HYDROcodone-acetaminophen (NORCO) 5-325 MG per tablet, Take 1 tablet by mouth 3 times daily as needed for Pain for up to 7 days. Margie Said Date: 11/15/18, Disp: 21 tablet, Rfl: 0    linagliptin (TRADJENTA) 5 MG tablet, Take 1 tablet by mouth daily, Disp: 90 tablet, Rfl: 1    MAGNESIUM PO, Take by mouth 2 times daily, Disp: , Rfl:     DULoxetine (CYMBALTA) 30 MG extended release capsule, Take 1 capsule by mouth 2 times daily, Disp: 180 capsule, Rfl: 1    gabapentin (NEURONTIN) 300 MG capsule, Take 1 capsule by mouth 3 times daily for 90 days. ., Disp: 270 capsule, Rfl: 1    ranitidine (ZANTAC) 300 MG capsule, TAKE ONE CAPSULE BY MOUTH EVERY EVENING, Disp: 90 capsule, Rfl: 1    amLODIPine (NORVASC) 5 MG tablet, Take 1 tablet by mouth daily, Disp: 90 tablet, Rfl: 3    warfarin (COUMADIN) 2.5 MG tablet, 1.25 mg on Sun, Thu; 2.5 mg all other days, Disp: 90 tablet, Rfl: 3    blood glucose monitor strips, 1 strip by Other route daily, Disp: 100 strip, Rfl: 3    furosemide (LASIX) 20 MG tablet, Take 1 tablet by mouth daily for 10 days, Disp: 10 tablet, Rfl: 0    glipiZIDE (GLUCOTROL) 5 MG tablet, Take 0.5 tablets by mouth daily (with breakfast), Disp: 90 tablet, Rfl: 1    naphazoline-glycerin (CLEAR EYES REDNESS RELIEF) 0.012-0.2 % SOLN ophthalmic solution, Place 1 drop into both eyes 2 times daily, Disp: 1 Bottle, Rfl: 0    tamsulosin (FLOMAX) 0.4 MG capsule, Take 1 capsule by mouth daily, Disp: 90 capsule, Rfl: 1    Diphenhydramine-APAP, sleep, (TYLENOL PM EXTRA STRENGTH PO), Take 2 tablets by mouth nightly as needed, Disp: , Rfl:     glucose blood VI test strips (EXACTECH TEST) strip, 1 each by Does not apply route daily Test once daily and as needed. , Disp: 100 each, Rfl: 3    glucose monitoring kit (FREESTYLE) monitoring kit, 1 kit by Does not apply route daily as needed. , Disp: 1 kit, Rfl: 0    latanoprost (XALATAN) 0.005 % ophthalmic solution, Place 1 drop into both eyes nightly., Disp: , Rfl:   Allergies:  Celebrex [celecoxib]; Elavil [amitriptyline]; Naproxen; Percocet [oxycodone-acetaminophen]; and Lyrica [pregabalin]  Social History:    reports that he quit smoking about 48 years ago. He has a 40.00 pack-year smoking history.  He has never used smokeless tobacco. He reports that he does not drink alcohol or use drugs. Family History:   Family History   Problem Relation Age of Onset    Arthritis Father     Diabetes Father     Diabetes Brother        REVIEW OF SYSTEMS:   CONSTITUTIONAL: Denies unexplained weight loss, fevers, chills or fatigue  NEUROLOGICAL: Denies unsteady gait or progressive weakness  MUSCULOSKELETAL: Denies joint swelling or redness  GI: Denies nausea, vomiting, diarrhea   : Denies bowel or bladder issues       PHYSICAL EXAM:    Vitals: Blood pressure 135/81, height 5' 10\" (1.778 m), weight 215 lb (97.5 kg). GENERAL EXAM:  · General Apparence: Patient is adequately groomed with no evidence of malnutrition. · Psychiatric: Orientation: The patient is oriented to time, place and person. The patient's mood and affect are appropriate   · Vascular: Examination reveals no swelling and palpation reveals no tenderness in upper or lower extremities. Good capillary refill. · The lymphatic examination of the neck, axillae and groin reveals all areas to be without enlargement or induration  · Sensation is intact without deficit in the upper and lower extremities to light touch and pinprick  · Coordination of the upper and lower extremities are normal.    LUMBAR/SACRAL EXAMINATION:  · Inspection: Local inspection shows no step-off or bruising. Lumbar alignment is normal. No instability is noted. · Palpation:   No evidence of tenderness at the midline. Lumbar paraspinal tenderness: Mild L4/5 and L5/S1 tenderness  Bursal tenderness No tenderness bilaterally  There is no paraspinal spasm. · Range of Motion: limited by 50% in all planes due to pain  · Strength:   Strength testing is 4/5 in all muscle groups tested. · Special Tests:   Straight leg raise and crossed SLR negative. Samuel's testing is negative bilaterally. FADIR's testing is negative bilaterally. · Skin: There are no rashes, ulcerations or lesions.   · Reflexes: Reflexes are

## 2018-11-15 NOTE — TELEPHONE ENCOUNTER
DOS   11/21/2018   CPT   26480  88228  56111.26  [id number]  DX   M48.02   M51.26   M54.16     OP SX [facility]    RIGHT   LEVELS   L4 - L5   PROCEDURE   TRANSFORAMINAL EPIDURAL INJECT ION    [address] [place] [address],[address]:   MEDICARE

## 2018-11-20 ENCOUNTER — TELEPHONE (OUTPATIENT)
Dept: INTERNAL MEDICINE CLINIC | Age: 83
End: 2018-11-20

## 2018-11-20 NOTE — TELEPHONE ENCOUNTER
Breana Sarabia spoke with the pharmacist they stated there was nothing comparable to the [address] and were unable to advise her of a different medication that would be more affordable for them. Also, pt's BS before he ate was 180. She states they will not do the procedure if it is 200 or above. Wanted to know if there is anything to bring it lower.

## 2018-11-20 NOTE — TELEPHONE ENCOUNTER
Pt is currently in the donut hole the Vessie Begin will cost them $157. She is wondering if there would be another medication that they can try in place of this.

## 2018-11-21 ENCOUNTER — HOSPITAL ENCOUNTER (OUTPATIENT)
Age: 83
Setting detail: OUTPATIENT SURGERY
Discharge: HOME | End: 2018-11-21
Attending: PHYSICAL MEDICINE & REHABILITATION | Admitting: PHYSICAL MEDICINE & REHABILITATION
Payer: MEDICARE

## 2018-11-21 ENCOUNTER — APPOINTMENT (OUTPATIENT)
Dept: GENERAL RADIOLOGY | Age: 83
End: 2018-11-21
Attending: PHYSICAL MEDICINE & REHABILITATION
Payer: MEDICARE

## 2018-11-21 VITALS
SYSTOLIC BLOOD PRESSURE: 148 MMHG | WEIGHT: 215 LBS | HEART RATE: 79 BPM | OXYGEN SATURATION: 95 % | RESPIRATION RATE: 16 BRPM | BODY MASS INDEX: 30.1 KG/M2 | DIASTOLIC BLOOD PRESSURE: 62 MMHG | HEIGHT: 71 IN

## 2018-11-21 LAB
GLUCOSE BLD-MCNC: 168 MG/DL (ref 70–99)
INR BLD: 1.21 (ref 0.86–1.14)
PERFORMED ON: ABNORMAL
PROTHROMBIN TIME: 13.8 SEC (ref 9.8–13)

## 2018-11-21 PROCEDURE — 2500000003 HC RX 250 WO HCPCS: Performed by: PHYSICAL MEDICINE & REHABILITATION

## 2018-11-21 PROCEDURE — 85610 PROTHROMBIN TIME: CPT

## 2018-11-21 PROCEDURE — 3610000056 HC PAIN LEVEL 4 BASE (NON-OR): Performed by: PHYSICAL MEDICINE & REHABILITATION

## 2018-11-21 PROCEDURE — 36415 COLL VENOUS BLD VENIPUNCTURE: CPT

## 2018-11-21 PROCEDURE — 3209999900 FLUORO FOR SURGICAL PROCEDURES

## 2018-11-21 PROCEDURE — 6360000002 HC RX W HCPCS: Performed by: PHYSICAL MEDICINE & REHABILITATION

## 2018-11-21 PROCEDURE — 99152 MOD SED SAME PHYS/QHP 5/>YRS: CPT | Performed by: PHYSICAL MEDICINE & REHABILITATION

## 2018-11-21 PROCEDURE — 2709999900 HC NON-CHARGEABLE SUPPLY: Performed by: PHYSICAL MEDICINE & REHABILITATION

## 2018-11-21 RX ORDER — MIDAZOLAM HYDROCHLORIDE 1 MG/ML
INJECTION INTRAMUSCULAR; INTRAVENOUS
Status: COMPLETED | OUTPATIENT
Start: 2018-11-21 | End: 2018-11-21

## 2018-11-21 RX ORDER — METHYLPREDNISOLONE ACETATE 80 MG/ML
INJECTION, SUSPENSION INTRA-ARTICULAR; INTRALESIONAL; INTRAMUSCULAR; SOFT TISSUE
Status: COMPLETED | OUTPATIENT
Start: 2018-11-21 | End: 2018-11-21

## 2018-11-21 RX ORDER — BUPIVACAINE HYDROCHLORIDE 5 MG/ML
INJECTION, SOLUTION PERINEURAL
Status: COMPLETED | OUTPATIENT
Start: 2018-11-21 | End: 2018-11-21

## 2018-11-21 ASSESSMENT — PAIN - FUNCTIONAL ASSESSMENT: PAIN_FUNCTIONAL_ASSESSMENT: 0-10

## 2018-11-21 ASSESSMENT — PAIN SCALES - GENERAL
PAINLEVEL_OUTOF10: 0
PAINLEVEL_OUTOF10: 0

## 2018-11-21 ASSESSMENT — PAIN DESCRIPTION - DESCRIPTORS: DESCRIPTORS: DULL

## 2018-11-21 ASSESSMENT — PAIN DESCRIPTION - LOCATION: LOCATION: HIP

## 2018-11-21 ASSESSMENT — PAIN DESCRIPTION - PAIN TYPE: TYPE: CHRONIC PAIN

## 2018-11-21 ASSESSMENT — PAIN DESCRIPTION - ORIENTATION: ORIENTATION: RIGHT

## 2018-11-21 NOTE — OP NOTE
Patient:  Jenna Rubio  YOB: 1935  Medical Record #:  [id number]   Place: [address] [place] [address]  Date:  11/21/2018   Physician:  Malu Vail MD, TANESHA    Procedure: 1. Transforaminal Lumbar Epidural Steroid Injection -  right L4           2. Transforaminal Lumbar Epidural Steroid Injection -  right L5     Pre-Procedure Diagnosis: Lumbar stenosis with neurogenic claudication, Lumbar radiculopathy      Post-Procedure Diagnosis: Same    Sedation: Local with 1% Lidocaine 3 ml and 2 mg of IV Versed    EBL: None    Complications: None    Procedure Summary:    The patient was seen in the office for complaints of low back and right leg pain. Review of the imaging and physical exam of the patient confirmed the pre-procedure diagnosis. After a thorough discussion of risks, benefits and alternatives informed consent was obtained. The patient was brought to the procedure suite and placed in the prone position. The skin overlying the lumbar spine was prepped and draped in the usual sterile fashion. Using fluoroscopic guidance, the right L4 foramen was identified. Through anesthetized skin, a 22 gauge 3.5 inch curved tip spinal needle was advanced into the foramen. Isovue M 300 was instilled showing an epidurogram/nerve root outline pattern without evidence of vascular or intrathecal spread. Following which, 40 mg of depomedrol mixed with 1 ml of 0.5% Marcaine was instilled. The needle was removed. Using fluoroscopic guidance, the right L5 foramen was identified. Through anesthetized skin, a 22 gauge 3.5 inch curved tip spinal needle was advanced into the foramen. Isovue M 300 was instilled showing an epidurogram/nerve root outline pattern without evidence of vascular or intrathecal spread. Following which, 40 mg of depomedrol mixed with 1 ml of 0.5% Marcaine was instilled. The needle was removed and band-aids were applied.     The patient was transferred to the post-operative area in stable condition.

## 2018-11-21 NOTE — PROGRESS NOTES
IV discontinued, catheter intact, and dressing applied. Procedural dressing dry and intact. Bilateral  extremities equal in strength. Discharge instructions reviewed with patient or responsible adult, signed and copy given. All home medications have been reviewed. All questions answered and patient or responsible adult verbalized understanding. Wife understands dr Dominick Zavala wants coumadin resumed today.   PAIN LEVEL AT DISCHARGE __0___

## 2018-11-21 NOTE — H&P
(EXACTECH TEST) strip 1 each by Does not apply route daily Test once daily and as needed. 8/10/16  Yes Shruti Charlton MD   glucose monitoring kit (FREESTYLE) monitoring kit 1 kit by Does not apply route daily as needed. 4/7/14  Yes Shruti Charlton MD   latanoprost (XALATAN) 0.005 % ophthalmic solution Place 1 drop into both eyes nightly. 10/27/13  Yes Historical Provider, MD   warfarin (COUMADIN) 2.5 MG tablet 1.25 mg on Sun, Thu; 2.5 mg all other days 9/11/18 Deward July, APRN - CNP   furosemide (LASIX) 20 MG tablet Take 1 tablet by mouth daily for 10 days 7/10/18 7/20/18  Deward July, APRN - CNP   naphazoline-glycerin (CLEAR EYES REDNESS RELIEF) 0.012-0.2 % SOLN ophthalmic solution Place 1 drop into both eyes 2 times daily 4/7/18   Joanna Caballero MD       Vitals: Stable       PHYSICAL EXAM:  HENT: Airway patent and reviewed  Cardiovascular: Normal rate, regular rhythm, normal heart sounds. Pulmonary/Chest: No wheezes. No rhonchi. No rales. Abdominal: Soft. Bowel sounds are normal. No distension. ASA CLASS:         []   I. Normal, healthy adult           []   II.  Mild systemic disease            [x]   III. Severe systemic disease      Mallampati: Mallampati Class II - (soft palate, fauces & uvula are visible)      Sedation plan:   [x]  Local              [x]  Minimal                  []  General anesthesia    Patient's condition acceptable for planned procedure/sedation. Post Procedure Plan   Return to same level of care   ______________________     The risks and benefits as well as alternatives to the procedure have been discussed with the patient and or family. The patient and or next of kin understands and agrees to proceed.     Ericka Manley M.D.

## 2018-12-03 ENCOUNTER — TELEPHONE (OUTPATIENT)
Dept: RHEUMATOLOGY | Age: 83
End: 2018-12-03

## 2018-12-04 NOTE — TELEPHONE ENCOUNTER
Unclear what they are asking. He is taking gabapentin safely. I would consider both of those medications to be risky in their own ways as well.

## 2018-12-07 ENCOUNTER — OFFICE VISIT (OUTPATIENT)
Dept: INTERNAL MEDICINE CLINIC | Age: 83
End: 2018-12-07
Payer: MEDICARE

## 2018-12-07 VITALS
HEART RATE: 76 BPM | WEIGHT: 209 LBS | SYSTOLIC BLOOD PRESSURE: 136 MMHG | HEIGHT: 71 IN | BODY MASS INDEX: 29.26 KG/M2 | DIASTOLIC BLOOD PRESSURE: 82 MMHG

## 2018-12-07 DIAGNOSIS — N18.30 CKD (CHRONIC KIDNEY DISEASE) STAGE 3, GFR 30-59 ML/MIN (HCC): ICD-10-CM

## 2018-12-07 DIAGNOSIS — M48.062 LUMBAR STENOSIS WITH NEUROGENIC CLAUDICATION: ICD-10-CM

## 2018-12-07 DIAGNOSIS — E11.22 TYPE 2 DIABETES MELLITUS WITH STAGE 3 CHRONIC KIDNEY DISEASE, WITHOUT LONG-TERM CURRENT USE OF INSULIN (HCC): ICD-10-CM

## 2018-12-07 DIAGNOSIS — I26.99 BILATERAL PULMONARY EMBOLISM (HCC): Primary | ICD-10-CM

## 2018-12-07 DIAGNOSIS — R60.0 LOWER EXTREMITY EDEMA: ICD-10-CM

## 2018-12-07 DIAGNOSIS — Z79.01 ANTICOAGULATED ON COUMADIN: ICD-10-CM

## 2018-12-07 DIAGNOSIS — G62.9 PERIPHERAL POLYNEUROPATHY: ICD-10-CM

## 2018-12-07 DIAGNOSIS — N18.30 TYPE 2 DIABETES MELLITUS WITH STAGE 3 CHRONIC KIDNEY DISEASE, WITHOUT LONG-TERM CURRENT USE OF INSULIN (HCC): ICD-10-CM

## 2018-12-07 LAB
A/G RATIO: 1.7 (ref 1.1–2.2)
ALBUMIN SERPL-MCNC: 3.8 G/DL (ref 3.4–5)
ALP BLD-CCNC: 66 U/L (ref 40–129)
ALT SERPL-CCNC: 11 U/L (ref 10–40)
ANION GAP SERPL CALCULATED.3IONS-SCNC: 12 MMOL/L (ref 3–16)
AST SERPL-CCNC: 12 U/L (ref 15–37)
BILIRUB SERPL-MCNC: 0.4 MG/DL (ref 0–1)
BUN BLDV-MCNC: 25 MG/DL (ref 7–20)
CALCIUM SERPL-MCNC: 8.7 MG/DL (ref 8.3–10.6)
CHLORIDE BLD-SCNC: 105 MMOL/L (ref 99–110)
CO2: 29 MMOL/L (ref 21–32)
CREAT SERPL-MCNC: 1.8 MG/DL (ref 0.8–1.3)
GFR AFRICAN AMERICAN: 44
GFR NON-AFRICAN AMERICAN: 36
GLOBULIN: 2.3 G/DL
GLUCOSE BLD-MCNC: 128 MG/DL (ref 70–99)
INTERNATIONAL NORMALIZATION RATIO, POC: 2.7
POTASSIUM SERPL-SCNC: 3.7 MMOL/L (ref 3.5–5.1)
PROTHROMBIN TIME, POC: NORMAL
SODIUM BLD-SCNC: 146 MMOL/L (ref 136–145)
TOTAL PROTEIN: 6.1 G/DL (ref 6.4–8.2)

## 2018-12-07 PROCEDURE — G8482 FLU IMMUNIZE ORDER/ADMIN: HCPCS | Performed by: NURSE PRACTITIONER

## 2018-12-07 PROCEDURE — 1101F PT FALLS ASSESS-DOCD LE1/YR: CPT | Performed by: NURSE PRACTITIONER

## 2018-12-07 PROCEDURE — G8427 DOCREV CUR MEDS BY ELIG CLIN: HCPCS | Performed by: NURSE PRACTITIONER

## 2018-12-07 PROCEDURE — 99214 OFFICE O/P EST MOD 30 MIN: CPT | Performed by: NURSE PRACTITIONER

## 2018-12-07 PROCEDURE — 4040F PNEUMOC VAC/ADMIN/RCVD: CPT | Performed by: NURSE PRACTITIONER

## 2018-12-07 PROCEDURE — 1123F ACP DISCUSS/DSCN MKR DOCD: CPT | Performed by: NURSE PRACTITIONER

## 2018-12-07 PROCEDURE — 85610 PROTHROMBIN TIME: CPT | Performed by: NURSE PRACTITIONER

## 2018-12-07 PROCEDURE — 1036F TOBACCO NON-USER: CPT | Performed by: NURSE PRACTITIONER

## 2018-12-07 PROCEDURE — G8417 CALC BMI ABV UP PARAM F/U: HCPCS | Performed by: NURSE PRACTITIONER

## 2018-12-07 NOTE — PROGRESS NOTES
exhibits no distension. There is no tenderness. There is no rebound and no guarding. Musculoskeletal: Normal range of motion. He exhibits edema. Neurological: He is alert and oriented to person, place, and time. Skin: Skin is warm and dry. He is not diaphoretic. Psychiatric: He has a normal mood and affect. His behavior is normal. Thought content normal.      /82   Pulse 76 Comment: irreg  Ht 5' 10.5\" (1.791 m)   Wt 209 lb (94.8 kg)   BMI 29.56 kg/m²      PHQ Scores 7/10/2018 3/21/2017   PHQ2 Score 1 0   PHQ9 Score 1 0     Interpretation of Total Score Depression Severity: 1-4 = Minimal depression, 5-9 = Mild depression, 10-14 = Moderate depression, 15-19 = Moderately severe depression, 20-27 =Severe depression        ASSESSMENT/PLAN:  Juanjo was seen today for office visit for anticoagulation management.   Diagnoses and all orders for this visit:    Bilateral pulmonary embolism (HCC)  - Continue anticoagulation    Anticoagulated on Coumadin  - INR 2.7  - Continue current regimen  -     POCT INR    Lower extremity edema  - Elevation, compression  - Discussed risks of additional diuretics in relationship to renal function  -     Comprehensive Metabolic Panel    Peripheral polyneuropathy  - Remains problematic  - Continue gabapentin & Cymbalta    Lumbar stenosis with neurogenic claudication  - Epidural with mixed results  - Continue f/u pain mgmt    Type 2 diabetes mellitus with stage 3 chronic kidney disease, without long-term current use of insulin ([facility])  - A1c 7.2  -     Comprehensive Metabolic Panel    CKD (chronic kidney disease) stage 3, GFR 30-59 ml/min ([facility])  -     Comprehensive Metabolic Panel      SHA Najera - CNP

## 2018-12-10 ASSESSMENT — ENCOUNTER SYMPTOMS
GASTROINTESTINAL NEGATIVE: 1
RESPIRATORY NEGATIVE: 1
BACK PAIN: 1
SHORTNESS OF BREATH: 0

## 2018-12-13 ENCOUNTER — HOSPITAL ENCOUNTER (OUTPATIENT)
Age: 83
Discharge: HOME | End: 2018-12-13
Payer: MEDICARE

## 2018-12-13 ENCOUNTER — HOSPITAL ENCOUNTER (OUTPATIENT)
Dept: CT IMAGING | Age: 83
Discharge: HOME | End: 2018-12-13
Payer: MEDICARE

## 2018-12-13 DIAGNOSIS — C34.32 PRIMARY MALIGNANT NEOPLASM OF BRONCHUS OF LEFT LOWER LOBE (HCC): ICD-10-CM

## 2018-12-13 PROCEDURE — 71250 CT THORAX DX C-: CPT

## 2018-12-14 ENCOUNTER — OFFICE VISIT (OUTPATIENT)
Dept: ORTHOPEDIC SURGERY | Age: 83
End: 2018-12-14
Payer: MEDICARE

## 2018-12-14 VITALS
HEIGHT: 71 IN | SYSTOLIC BLOOD PRESSURE: 135 MMHG | WEIGHT: 209 LBS | DIASTOLIC BLOOD PRESSURE: 81 MMHG | BODY MASS INDEX: 29.26 KG/M2

## 2018-12-14 DIAGNOSIS — M48.061 LUMBAR FORAMINAL STENOSIS: ICD-10-CM

## 2018-12-14 DIAGNOSIS — M48.062 LUMBAR STENOSIS WITH NEUROGENIC CLAUDICATION: Primary | ICD-10-CM

## 2018-12-14 DIAGNOSIS — M54.16 LUMBAR RADICULOPATHY: ICD-10-CM

## 2018-12-14 PROCEDURE — 1101F PT FALLS ASSESS-DOCD LE1/YR: CPT | Performed by: PHYSICAL MEDICINE & REHABILITATION

## 2018-12-14 PROCEDURE — G8417 CALC BMI ABV UP PARAM F/U: HCPCS | Performed by: PHYSICAL MEDICINE & REHABILITATION

## 2018-12-14 PROCEDURE — G8427 DOCREV CUR MEDS BY ELIG CLIN: HCPCS | Performed by: PHYSICAL MEDICINE & REHABILITATION

## 2018-12-14 PROCEDURE — 1036F TOBACCO NON-USER: CPT | Performed by: PHYSICAL MEDICINE & REHABILITATION

## 2018-12-14 PROCEDURE — G8482 FLU IMMUNIZE ORDER/ADMIN: HCPCS | Performed by: PHYSICAL MEDICINE & REHABILITATION

## 2018-12-14 PROCEDURE — 1123F ACP DISCUSS/DSCN MKR DOCD: CPT | Performed by: PHYSICAL MEDICINE & REHABILITATION

## 2018-12-14 PROCEDURE — 4040F PNEUMOC VAC/ADMIN/RCVD: CPT | Performed by: PHYSICAL MEDICINE & REHABILITATION

## 2018-12-14 PROCEDURE — 99213 OFFICE O/P EST LOW 20 MIN: CPT | Performed by: PHYSICAL MEDICINE & REHABILITATION

## 2018-12-14 NOTE — PROGRESS NOTES
 Erectile dysfunction     GERD (gastroesophageal reflux disease)     Hyperglycemia     Hypertension     lumbar radiculopathy     Neuropathy     Nocturia     Osteoarthritis     Other disorders of kidney and ureter in diseases classified elsewhere     Pain, joint, knee     Palpitations     skin cancer     Rt ear, nose, neck, hands/ 2018 lung    SOB (shortness of breath)     Tennis elbow     Tinea pedis     foot      Past Surgical History:     Past Surgical History:   Procedure Laterality Date    CATARACT REMOVAL Bilateral 09/2014    CHOLECYSTECTOMY      COLONOSCOPY  11/28/2011    Dr. Joni Reynaga - mild sigmoid diverticulosis    HIP SURGERY Right 09/09/2013    Dr. Regine Limon - block for pain relief    KNEE PROSTHESIS REMOVAL      LUNG BIOPSY Left 05/09/2018    Dr. Vyas - CT guided    OTHER SURGICAL HISTORY      multiple lumbar ESIs    PAROTIDECTOMY Right 01/26/2017    Dr. Yousif Has - superficial, w/excision of parotid tail & dissection/preservation of facial nerve    MN INJECT ANES/STEROID FORAMEN LUMBAR/SACRAL W IMG GUIDE ,1 LEVEL Right 11/21/2018    RIGHT L4, L5 LUMBAR TRANSFORAMINAL EPIDURAL STEROID INJECTION WITH FLUOROSCOPY performed by Casey Watkins MD at [address] [place] [address]      right x1 and left x2    TUNNELED VENOUS PORT PLACEMENT  06/22/2018    Left SCV infusaport placement    UPPER GASTROINTESTINAL ENDOSCOPY  03/19/2018    Dr. Callum Hernandez - mild non-obstructive ring distal esophagus     Current Medications:     Current Outpatient Prescriptions:     linagliptin (TRADJENTA) 5 MG tablet, Take 1 tablet by mouth daily, Disp: 90 tablet, Rfl: 1    MAGNESIUM PO, Take by mouth 2 times daily, Disp: , Rfl:     DULoxetine (CYMBALTA) 30 MG extended release capsule, Take 1 capsule by mouth 2 times daily, Disp: 180 capsule, Rfl: 1    gabapentin (NEURONTIN) 300 MG capsule, Take 1 capsule by mouth 3 times daily for 90 days. ., Disp: 270 capsule, Rfl: 1   ranitidine (ZANTAC) 300 MG capsule, TAKE ONE CAPSULE BY MOUTH EVERY EVENING, Disp: 90 capsule, Rfl: 1    amLODIPine (NORVASC) 5 MG tablet, Take 1 tablet by mouth daily, Disp: 90 tablet, Rfl: 3    warfarin (COUMADIN) 2.5 MG tablet, 1.25 mg on Sun, Thu; 2.5 mg all other days, Disp: 90 tablet, Rfl: 3    blood glucose monitor strips, 1 strip by Other route daily, Disp: 100 strip, Rfl: 3    glipiZIDE (GLUCOTROL) 5 MG tablet, Take 0.5 tablets by mouth daily (with breakfast), Disp: 90 tablet, Rfl: 1    naphazoline-glycerin (CLEAR EYES REDNESS RELIEF) 0.012-0.2 % SOLN ophthalmic solution, Place 1 drop into both eyes 2 times daily, Disp: 1 Bottle, Rfl: 0    tamsulosin (FLOMAX) 0.4 MG capsule, Take 1 capsule by mouth daily, Disp: 90 capsule, Rfl: 1    Diphenhydramine-APAP, sleep, (TYLENOL PM EXTRA STRENGTH PO), Take 2 tablets by mouth nightly as needed, Disp: , Rfl:     glucose blood VI test strips (EXACTECH TEST) strip, 1 each by Does not apply route daily Test once daily and as needed. , Disp: 100 each, Rfl: 3    glucose monitoring kit (FREESTYLE) monitoring kit, 1 kit by Does not apply route daily as needed. , Disp: 1 kit, Rfl: 0    latanoprost (XALATAN) 0.005 % ophthalmic solution, Place 1 drop into both eyes nightly., Disp: , Rfl:   Allergies:  Celebrex [celecoxib]; Elavil [amitriptyline]; Naproxen; Percocet [oxycodone-acetaminophen]; and Lyrica [pregabalin]  Social History:    reports that he quit smoking about 48 years ago. He has a 40.00 pack-year smoking history. He has never used smokeless tobacco. He reports that he does not drink alcohol or use drugs.   Family History:   Family History   Problem Relation Age of Onset    Arthritis Father     Diabetes Father     Diabetes Brother        REVIEW OF SYSTEMS:   CONSTITUTIONAL: Denies unexplained weight loss, fevers, chills or fatigue  NEUROLOGICAL: Denies unsteady gait or progressive weakness  MUSCULOSKELETAL: Denies joint swelling or redness  GI: Denies

## 2019-01-02 ENCOUNTER — HOSPITAL ENCOUNTER (OUTPATIENT)
Dept: MRI IMAGING | Age: 84
Discharge: HOME | End: 2019-01-02
Payer: MEDICARE

## 2019-01-02 DIAGNOSIS — C34.32 CANCER OF BRONCHUS OF LEFT LOWER LOBE (HCC): ICD-10-CM

## 2019-01-02 PROCEDURE — 70551 MRI BRAIN STEM W/O DYE: CPT

## 2019-01-07 ENCOUNTER — OFFICE VISIT (OUTPATIENT)
Dept: INTERNAL MEDICINE CLINIC | Age: 84
End: 2019-01-07
Payer: MEDICARE

## 2019-01-07 VITALS
HEIGHT: 71 IN | SYSTOLIC BLOOD PRESSURE: 138 MMHG | HEART RATE: 80 BPM | DIASTOLIC BLOOD PRESSURE: 70 MMHG | WEIGHT: 215 LBS | BODY MASS INDEX: 30.1 KG/M2

## 2019-01-07 DIAGNOSIS — I26.99 BILATERAL PULMONARY EMBOLISM (HCC): Primary | ICD-10-CM

## 2019-01-07 DIAGNOSIS — Z79.01 ANTICOAGULATED ON COUMADIN: ICD-10-CM

## 2019-01-07 LAB
INTERNATIONAL NORMALIZATION RATIO, POC: 2.2
PROTHROMBIN TIME, POC: NORMAL

## 2019-01-07 PROCEDURE — 85610 PROTHROMBIN TIME: CPT | Performed by: NURSE PRACTITIONER

## 2019-01-07 PROCEDURE — 1101F PT FALLS ASSESS-DOCD LE1/YR: CPT | Performed by: NURSE PRACTITIONER

## 2019-01-07 PROCEDURE — 1036F TOBACCO NON-USER: CPT | Performed by: NURSE PRACTITIONER

## 2019-01-07 PROCEDURE — G8417 CALC BMI ABV UP PARAM F/U: HCPCS | Performed by: NURSE PRACTITIONER

## 2019-01-07 PROCEDURE — G8482 FLU IMMUNIZE ORDER/ADMIN: HCPCS | Performed by: NURSE PRACTITIONER

## 2019-01-07 PROCEDURE — 1123F ACP DISCUSS/DSCN MKR DOCD: CPT | Performed by: NURSE PRACTITIONER

## 2019-01-07 PROCEDURE — G8427 DOCREV CUR MEDS BY ELIG CLIN: HCPCS | Performed by: NURSE PRACTITIONER

## 2019-01-07 PROCEDURE — 99212 OFFICE O/P EST SF 10 MIN: CPT | Performed by: NURSE PRACTITIONER

## 2019-01-07 PROCEDURE — 4040F PNEUMOC VAC/ADMIN/RCVD: CPT | Performed by: NURSE PRACTITIONER

## 2019-01-07 ASSESSMENT — ENCOUNTER SYMPTOMS: BLOOD IN STOOL: 0

## 2019-01-14 LAB
CATARACTS: NEGATIVE
DIABETIC RETINOPATHY: NORMAL
GLAUCOMA: NEGATIVE
INTRAOCULAR PRESSURE EYE: NORMAL
VISUAL ACUITY DISTANCE LEFT EYE: NORMAL
VISUAL ACUITY DISTANCE RIGHT EYE: NORMAL

## 2019-01-31 ENCOUNTER — TELEPHONE (OUTPATIENT)
Dept: INTERNAL MEDICINE CLINIC | Age: 84
End: 2019-01-31

## 2019-02-05 ENCOUNTER — OFFICE VISIT (OUTPATIENT)
Dept: INTERNAL MEDICINE CLINIC | Age: 84
End: 2019-02-05
Payer: MEDICARE

## 2019-02-05 VITALS
BODY MASS INDEX: 29.82 KG/M2 | HEART RATE: 72 BPM | SYSTOLIC BLOOD PRESSURE: 162 MMHG | WEIGHT: 213 LBS | DIASTOLIC BLOOD PRESSURE: 78 MMHG | HEIGHT: 71 IN

## 2019-02-05 DIAGNOSIS — T38.0X5A STEROID-INDUCED HYPERGLYCEMIA: ICD-10-CM

## 2019-02-05 DIAGNOSIS — E85.4 CEREBRAL AMYLOID ANGIOPATHY (HCC): Primary | ICD-10-CM

## 2019-02-05 DIAGNOSIS — I26.99 BILATERAL PULMONARY EMBOLISM (HCC): ICD-10-CM

## 2019-02-05 DIAGNOSIS — N18.30 TYPE 2 DIABETES MELLITUS WITH STAGE 3 CHRONIC KIDNEY DISEASE, WITHOUT LONG-TERM CURRENT USE OF INSULIN (HCC): ICD-10-CM

## 2019-02-05 DIAGNOSIS — I68.0 CEREBRAL AMYLOID ANGIOPATHY (HCC): Primary | ICD-10-CM

## 2019-02-05 DIAGNOSIS — E11.22 TYPE 2 DIABETES MELLITUS WITH STAGE 3 CHRONIC KIDNEY DISEASE, WITHOUT LONG-TERM CURRENT USE OF INSULIN (HCC): ICD-10-CM

## 2019-02-05 DIAGNOSIS — R73.9 STEROID-INDUCED HYPERGLYCEMIA: ICD-10-CM

## 2019-02-05 DIAGNOSIS — N18.30 CKD (CHRONIC KIDNEY DISEASE) STAGE 3, GFR 30-59 ML/MIN (HCC): ICD-10-CM

## 2019-02-05 DIAGNOSIS — J44.9 COPD, SEVERITY TO BE DETERMINED (HCC): ICD-10-CM

## 2019-02-05 DIAGNOSIS — Z79.01 ANTICOAGULATED ON COUMADIN: ICD-10-CM

## 2019-02-05 LAB
INTERNATIONAL NORMALIZATION RATIO, POC: 2.1
PROTHROMBIN TIME, POC: NORMAL

## 2019-02-05 PROCEDURE — 99214 OFFICE O/P EST MOD 30 MIN: CPT | Performed by: NURSE PRACTITIONER

## 2019-02-05 PROCEDURE — 4040F PNEUMOC VAC/ADMIN/RCVD: CPT | Performed by: NURSE PRACTITIONER

## 2019-02-05 PROCEDURE — G8417 CALC BMI ABV UP PARAM F/U: HCPCS | Performed by: NURSE PRACTITIONER

## 2019-02-05 PROCEDURE — 1123F ACP DISCUSS/DSCN MKR DOCD: CPT | Performed by: NURSE PRACTITIONER

## 2019-02-05 PROCEDURE — 85610 PROTHROMBIN TIME: CPT | Performed by: NURSE PRACTITIONER

## 2019-02-05 PROCEDURE — 1101F PT FALLS ASSESS-DOCD LE1/YR: CPT | Performed by: NURSE PRACTITIONER

## 2019-02-05 PROCEDURE — 1036F TOBACCO NON-USER: CPT | Performed by: NURSE PRACTITIONER

## 2019-02-05 PROCEDURE — 3023F SPIROM DOC REV: CPT | Performed by: NURSE PRACTITIONER

## 2019-02-05 PROCEDURE — G8926 SPIRO NO PERF OR DOC: HCPCS | Performed by: NURSE PRACTITIONER

## 2019-02-05 PROCEDURE — G8427 DOCREV CUR MEDS BY ELIG CLIN: HCPCS | Performed by: NURSE PRACTITIONER

## 2019-02-05 PROCEDURE — G8482 FLU IMMUNIZE ORDER/ADMIN: HCPCS | Performed by: NURSE PRACTITIONER

## 2019-02-05 RX ORDER — DEXAMETHASONE 4 MG/1
4 TABLET ORAL
COMMUNITY
Start: 2019-02-01 | End: 2019-04-09 | Stop reason: ALTCHOICE

## 2019-02-06 ASSESSMENT — ENCOUNTER SYMPTOMS
GASTROINTESTINAL NEGATIVE: 1
BACK PAIN: 1
SHORTNESS OF BREATH: 0
RESPIRATORY NEGATIVE: 1

## 2019-02-11 ENCOUNTER — OFFICE VISIT (OUTPATIENT)
Dept: INTERNAL MEDICINE CLINIC | Age: 84
End: 2019-02-11
Payer: MEDICARE

## 2019-02-11 VITALS
WEIGHT: 214 LBS | SYSTOLIC BLOOD PRESSURE: 136 MMHG | DIASTOLIC BLOOD PRESSURE: 82 MMHG | HEART RATE: 60 BPM | HEIGHT: 71 IN | BODY MASS INDEX: 29.96 KG/M2

## 2019-02-11 DIAGNOSIS — E11.22 TYPE 2 DIABETES MELLITUS WITH STAGE 3 CHRONIC KIDNEY DISEASE, WITHOUT LONG-TERM CURRENT USE OF INSULIN (HCC): ICD-10-CM

## 2019-02-11 DIAGNOSIS — R73.9 STEROID-INDUCED HYPERGLYCEMIA: ICD-10-CM

## 2019-02-11 DIAGNOSIS — N18.30 TYPE 2 DIABETES MELLITUS WITH STAGE 3 CHRONIC KIDNEY DISEASE, WITHOUT LONG-TERM CURRENT USE OF INSULIN (HCC): ICD-10-CM

## 2019-02-11 DIAGNOSIS — T38.0X5A STEROID-INDUCED HYPERGLYCEMIA: ICD-10-CM

## 2019-02-11 PROCEDURE — 4040F PNEUMOC VAC/ADMIN/RCVD: CPT | Performed by: NURSE PRACTITIONER

## 2019-02-11 PROCEDURE — 1123F ACP DISCUSS/DSCN MKR DOCD: CPT | Performed by: NURSE PRACTITIONER

## 2019-02-11 PROCEDURE — G8510 SCR DEP NEG, NO PLAN REQD: HCPCS | Performed by: NURSE PRACTITIONER

## 2019-02-11 PROCEDURE — G8427 DOCREV CUR MEDS BY ELIG CLIN: HCPCS | Performed by: NURSE PRACTITIONER

## 2019-02-11 PROCEDURE — G8417 CALC BMI ABV UP PARAM F/U: HCPCS | Performed by: NURSE PRACTITIONER

## 2019-02-11 PROCEDURE — 1101F PT FALLS ASSESS-DOCD LE1/YR: CPT | Performed by: NURSE PRACTITIONER

## 2019-02-11 PROCEDURE — 99213 OFFICE O/P EST LOW 20 MIN: CPT | Performed by: NURSE PRACTITIONER

## 2019-02-11 PROCEDURE — G8482 FLU IMMUNIZE ORDER/ADMIN: HCPCS | Performed by: NURSE PRACTITIONER

## 2019-02-11 PROCEDURE — 1036F TOBACCO NON-USER: CPT | Performed by: NURSE PRACTITIONER

## 2019-02-11 ASSESSMENT — PATIENT HEALTH QUESTIONNAIRE - PHQ9
2. FEELING DOWN, DEPRESSED OR HOPELESS: 0
SUM OF ALL RESPONSES TO PHQ9 QUESTIONS 1 & 2: 0
SUM OF ALL RESPONSES TO PHQ QUESTIONS 1-9: 0
SUM OF ALL RESPONSES TO PHQ QUESTIONS 1-9: 0
1. LITTLE INTEREST OR PLEASURE IN DOING THINGS: 0

## 2019-02-11 ASSESSMENT — ENCOUNTER SYMPTOMS
COUGH: 1
GASTROINTESTINAL NEGATIVE: 1

## 2019-03-04 ENCOUNTER — OFFICE VISIT (OUTPATIENT)
Dept: INTERNAL MEDICINE CLINIC | Age: 84
End: 2019-03-04
Payer: MEDICARE

## 2019-03-04 VITALS
WEIGHT: 220 LBS | BODY MASS INDEX: 30.8 KG/M2 | HEIGHT: 71 IN | DIASTOLIC BLOOD PRESSURE: 74 MMHG | HEART RATE: 64 BPM | SYSTOLIC BLOOD PRESSURE: 128 MMHG

## 2019-03-04 DIAGNOSIS — I68.0 CEREBRAL AMYLOID ANGIOPATHY (CODE): Primary | ICD-10-CM

## 2019-03-04 DIAGNOSIS — N18.30 TYPE 2 DIABETES MELLITUS WITH STAGE 3 CHRONIC KIDNEY DISEASE, WITHOUT LONG-TERM CURRENT USE OF INSULIN (HCC): ICD-10-CM

## 2019-03-04 DIAGNOSIS — G62.9 PERIPHERAL POLYNEUROPATHY: ICD-10-CM

## 2019-03-04 DIAGNOSIS — I26.99 OTHER ACUTE PULMONARY EMBOLISM WITHOUT ACUTE COR PULMONALE (HCC): ICD-10-CM

## 2019-03-04 DIAGNOSIS — Z79.01 ANTICOAGULATED ON COUMADIN: ICD-10-CM

## 2019-03-04 DIAGNOSIS — M48.062 LUMBAR STENOSIS WITH NEUROGENIC CLAUDICATION: ICD-10-CM

## 2019-03-04 DIAGNOSIS — M51.36 DDD (DEGENERATIVE DISC DISEASE), LUMBAR: ICD-10-CM

## 2019-03-04 DIAGNOSIS — R73.9 STEROID-INDUCED HYPERGLYCEMIA: ICD-10-CM

## 2019-03-04 DIAGNOSIS — T38.0X5A STEROID-INDUCED HYPERGLYCEMIA: ICD-10-CM

## 2019-03-04 DIAGNOSIS — E11.22 TYPE 2 DIABETES MELLITUS WITH STAGE 3 CHRONIC KIDNEY DISEASE, WITHOUT LONG-TERM CURRENT USE OF INSULIN (HCC): ICD-10-CM

## 2019-03-04 LAB
INTERNATIONAL NORMALIZATION RATIO, POC: 5.3
PROTHROMBIN TIME, POC: NORMAL

## 2019-03-04 PROCEDURE — 99214 OFFICE O/P EST MOD 30 MIN: CPT | Performed by: NURSE PRACTITIONER

## 2019-03-04 PROCEDURE — G8427 DOCREV CUR MEDS BY ELIG CLIN: HCPCS | Performed by: NURSE PRACTITIONER

## 2019-03-04 PROCEDURE — 1101F PT FALLS ASSESS-DOCD LE1/YR: CPT | Performed by: NURSE PRACTITIONER

## 2019-03-04 PROCEDURE — 4040F PNEUMOC VAC/ADMIN/RCVD: CPT | Performed by: NURSE PRACTITIONER

## 2019-03-04 PROCEDURE — 1036F TOBACCO NON-USER: CPT | Performed by: NURSE PRACTITIONER

## 2019-03-04 PROCEDURE — 85610 PROTHROMBIN TIME: CPT | Performed by: NURSE PRACTITIONER

## 2019-03-04 PROCEDURE — 1123F ACP DISCUSS/DSCN MKR DOCD: CPT | Performed by: NURSE PRACTITIONER

## 2019-03-04 PROCEDURE — G8417 CALC BMI ABV UP PARAM F/U: HCPCS | Performed by: NURSE PRACTITIONER

## 2019-03-04 PROCEDURE — G8482 FLU IMMUNIZE ORDER/ADMIN: HCPCS | Performed by: NURSE PRACTITIONER

## 2019-03-04 RX ORDER — TRAMADOL HYDROCHLORIDE 50 MG/1
50 TABLET ORAL NIGHTLY PRN
Qty: 30 TABLET | Refills: 0 | Status: SHIPPED | OUTPATIENT
Start: 2019-03-04 | End: 2019-09-18 | Stop reason: SDUPTHER

## 2019-03-05 ASSESSMENT — ENCOUNTER SYMPTOMS
GASTROINTESTINAL NEGATIVE: 1
RESPIRATORY NEGATIVE: 1
BACK PAIN: 1
BLOOD IN STOOL: 0

## 2019-03-06 ENCOUNTER — TELEPHONE (OUTPATIENT)
Dept: INTERNAL MEDICINE CLINIC | Age: 84
End: 2019-03-06

## 2019-03-06 DIAGNOSIS — N18.30 TYPE 2 DIABETES MELLITUS WITH STAGE 3 CHRONIC KIDNEY DISEASE, WITHOUT LONG-TERM CURRENT USE OF INSULIN (HCC): Primary | ICD-10-CM

## 2019-03-06 DIAGNOSIS — E11.22 TYPE 2 DIABETES MELLITUS WITH STAGE 3 CHRONIC KIDNEY DISEASE, WITHOUT LONG-TERM CURRENT USE OF INSULIN (HCC): Primary | ICD-10-CM

## 2019-03-07 DIAGNOSIS — E11.22 TYPE 2 DIABETES MELLITUS WITH STAGE 3 CHRONIC KIDNEY DISEASE, WITHOUT LONG-TERM CURRENT USE OF INSULIN (HCC): ICD-10-CM

## 2019-03-07 DIAGNOSIS — N18.30 TYPE 2 DIABETES MELLITUS WITH STAGE 3 CHRONIC KIDNEY DISEASE, WITHOUT LONG-TERM CURRENT USE OF INSULIN (HCC): ICD-10-CM

## 2019-03-07 LAB
ALBUMIN SERPL-MCNC: 3 G/DL (ref 3.4–5)
ANION GAP SERPL CALCULATED.3IONS-SCNC: 14 MMOL/L (ref 3–16)
BUN BLDV-MCNC: 38 MG/DL (ref 7–20)
CALCIUM SERPL-MCNC: 7.6 MG/DL (ref 8.3–10.6)
CHLORIDE BLD-SCNC: 99 MMOL/L (ref 99–110)
CO2: 28 MMOL/L (ref 21–32)
CREAT SERPL-MCNC: 2.1 MG/DL (ref 0.8–1.3)
GFR AFRICAN AMERICAN: 37
GFR NON-AFRICAN AMERICAN: 30
GLUCOSE BLD-MCNC: 178 MG/DL (ref 70–99)
PHOSPHORUS: 3.1 MG/DL (ref 2.5–4.9)
POTASSIUM SERPL-SCNC: 4 MMOL/L (ref 3.5–5.1)
SODIUM BLD-SCNC: 141 MMOL/L (ref 136–145)

## 2019-03-12 ENCOUNTER — TELEPHONE (OUTPATIENT)
Dept: INTERNAL MEDICINE CLINIC | Age: 84
End: 2019-03-12

## 2019-03-13 ENCOUNTER — TELEPHONE (OUTPATIENT)
Dept: INTERNAL MEDICINE CLINIC | Age: 84
End: 2019-03-13

## 2019-03-14 ENCOUNTER — OFFICE VISIT (OUTPATIENT)
Dept: INTERNAL MEDICINE CLINIC | Age: 84
End: 2019-03-14
Payer: MEDICARE

## 2019-03-14 VITALS
BODY MASS INDEX: 30.94 KG/M2 | HEART RATE: 72 BPM | HEIGHT: 71 IN | SYSTOLIC BLOOD PRESSURE: 170 MMHG | DIASTOLIC BLOOD PRESSURE: 82 MMHG | WEIGHT: 221 LBS

## 2019-03-14 DIAGNOSIS — Z79.01 ANTICOAGULATED ON COUMADIN: ICD-10-CM

## 2019-03-14 DIAGNOSIS — R73.9 STEROID-INDUCED HYPERGLYCEMIA: ICD-10-CM

## 2019-03-14 DIAGNOSIS — T38.0X5A STEROID-INDUCED HYPERGLYCEMIA: ICD-10-CM

## 2019-03-14 DIAGNOSIS — I68.0 CEREBRAL AMYLOID ANGIOPATHY (CODE): Primary | ICD-10-CM

## 2019-03-14 DIAGNOSIS — E86.0 DEHYDRATION: ICD-10-CM

## 2019-03-14 DIAGNOSIS — E11.22 TYPE 2 DIABETES MELLITUS WITH STAGE 3 CHRONIC KIDNEY DISEASE, WITHOUT LONG-TERM CURRENT USE OF INSULIN (HCC): ICD-10-CM

## 2019-03-14 DIAGNOSIS — N18.30 TYPE 2 DIABETES MELLITUS WITH STAGE 3 CHRONIC KIDNEY DISEASE, WITHOUT LONG-TERM CURRENT USE OF INSULIN (HCC): ICD-10-CM

## 2019-03-14 DIAGNOSIS — I26.99 OTHER ACUTE PULMONARY EMBOLISM WITHOUT ACUTE COR PULMONALE (HCC): ICD-10-CM

## 2019-03-14 DIAGNOSIS — N18.30 CKD (CHRONIC KIDNEY DISEASE) STAGE 3, GFR 30-59 ML/MIN (HCC): ICD-10-CM

## 2019-03-14 LAB
ALBUMIN SERPL-MCNC: 3 G/DL (ref 3.4–5)
ANION GAP SERPL CALCULATED.3IONS-SCNC: 14 MMOL/L (ref 3–16)
BUN BLDV-MCNC: 33 MG/DL (ref 7–20)
CALCIUM SERPL-MCNC: 7.8 MG/DL (ref 8.3–10.6)
CHLORIDE BLD-SCNC: 102 MMOL/L (ref 99–110)
CO2: 26 MMOL/L (ref 21–32)
CREAT SERPL-MCNC: 1.9 MG/DL (ref 0.8–1.3)
GFR AFRICAN AMERICAN: 41
GFR NON-AFRICAN AMERICAN: 34
GLUCOSE BLD-MCNC: 133 MG/DL (ref 70–99)
INTERNATIONAL NORMALIZATION RATIO, POC: 5.3
PHOSPHORUS: 2.2 MG/DL (ref 2.5–4.9)
POTASSIUM SERPL-SCNC: 3 MMOL/L (ref 3.5–5.1)
PROTHROMBIN TIME, POC: NORMAL
SODIUM BLD-SCNC: 142 MMOL/L (ref 136–145)

## 2019-03-14 PROCEDURE — G8417 CALC BMI ABV UP PARAM F/U: HCPCS | Performed by: NURSE PRACTITIONER

## 2019-03-14 PROCEDURE — G8428 CUR MEDS NOT DOCUMENT: HCPCS | Performed by: NURSE PRACTITIONER

## 2019-03-14 PROCEDURE — 1101F PT FALLS ASSESS-DOCD LE1/YR: CPT | Performed by: NURSE PRACTITIONER

## 2019-03-14 PROCEDURE — 1036F TOBACCO NON-USER: CPT | Performed by: NURSE PRACTITIONER

## 2019-03-14 PROCEDURE — G8482 FLU IMMUNIZE ORDER/ADMIN: HCPCS | Performed by: NURSE PRACTITIONER

## 2019-03-14 PROCEDURE — 1123F ACP DISCUSS/DSCN MKR DOCD: CPT | Performed by: NURSE PRACTITIONER

## 2019-03-14 PROCEDURE — 85610 PROTHROMBIN TIME: CPT | Performed by: NURSE PRACTITIONER

## 2019-03-14 PROCEDURE — 99213 OFFICE O/P EST LOW 20 MIN: CPT | Performed by: NURSE PRACTITIONER

## 2019-03-14 PROCEDURE — 4040F PNEUMOC VAC/ADMIN/RCVD: CPT | Performed by: NURSE PRACTITIONER

## 2019-03-15 DIAGNOSIS — E87.6 HYPOKALEMIA: Primary | ICD-10-CM

## 2019-03-15 RX ORDER — POTASSIUM CHLORIDE 20 MEQ/1
20 TABLET, EXTENDED RELEASE ORAL 2 TIMES DAILY
Qty: 10 TABLET | Refills: 0 | Status: SHIPPED | OUTPATIENT
Start: 2019-03-15 | End: 2019-04-09 | Stop reason: ALTCHOICE

## 2019-03-17 ASSESSMENT — ENCOUNTER SYMPTOMS
BLOOD IN STOOL: 0
RESPIRATORY NEGATIVE: 1
GASTROINTESTINAL NEGATIVE: 1

## 2019-03-18 ENCOUNTER — OFFICE VISIT (OUTPATIENT)
Dept: INTERNAL MEDICINE CLINIC | Age: 84
End: 2019-03-18
Payer: MEDICARE

## 2019-03-18 VITALS
BODY MASS INDEX: 31.22 KG/M2 | HEIGHT: 71 IN | SYSTOLIC BLOOD PRESSURE: 162 MMHG | HEART RATE: 103 BPM | WEIGHT: 223 LBS | OXYGEN SATURATION: 97 % | DIASTOLIC BLOOD PRESSURE: 78 MMHG

## 2019-03-18 DIAGNOSIS — R60.1 ANASARCA: Primary | ICD-10-CM

## 2019-03-18 DIAGNOSIS — T38.0X5A STEROID-INDUCED HYPERGLYCEMIA: ICD-10-CM

## 2019-03-18 DIAGNOSIS — N18.30 CKD (CHRONIC KIDNEY DISEASE) STAGE 3, GFR 30-59 ML/MIN (HCC): ICD-10-CM

## 2019-03-18 DIAGNOSIS — E87.6 HYPOKALEMIA: ICD-10-CM

## 2019-03-18 DIAGNOSIS — R06.02 SHORTNESS OF BREATH: ICD-10-CM

## 2019-03-18 DIAGNOSIS — Z79.01 ANTICOAGULATED ON COUMADIN: ICD-10-CM

## 2019-03-18 DIAGNOSIS — R73.9 STEROID-INDUCED HYPERGLYCEMIA: ICD-10-CM

## 2019-03-18 DIAGNOSIS — I68.0 CEREBRAL AMYLOID ANGIOPATHY (CODE): ICD-10-CM

## 2019-03-18 LAB
A/G RATIO: 1.6 (ref 1.1–2.2)
ALBUMIN SERPL-MCNC: 3 G/DL (ref 3.4–5)
ALP BLD-CCNC: 44 U/L (ref 40–129)
ALT SERPL-CCNC: 16 U/L (ref 10–40)
ANION GAP SERPL CALCULATED.3IONS-SCNC: 14 MMOL/L (ref 3–16)
AST SERPL-CCNC: 15 U/L (ref 15–37)
BILIRUB SERPL-MCNC: 0.5 MG/DL (ref 0–1)
BUN BLDV-MCNC: 28 MG/DL (ref 7–20)
CALCIUM SERPL-MCNC: 7.3 MG/DL (ref 8.3–10.6)
CHLORIDE BLD-SCNC: 101 MMOL/L (ref 99–110)
CO2: 26 MMOL/L (ref 21–32)
CREAT SERPL-MCNC: 2 MG/DL (ref 0.8–1.3)
CREATININE URINE: 160.1 MG/DL (ref 39–259)
GFR AFRICAN AMERICAN: 39
GFR NON-AFRICAN AMERICAN: 32
GLOBULIN: 1.9 G/DL
GLUCOSE BLD-MCNC: 209 MG/DL (ref 70–99)
INTERNATIONAL NORMALIZATION RATIO, POC: 2.9
POTASSIUM SERPL-SCNC: 4.2 MMOL/L (ref 3.5–5.1)
PRO-BNP: 5865 PG/ML (ref 0–449)
PROTEIN PROTEIN: 0.62 G/DL
PROTEIN PROTEIN: 616 MG/DL
PROTHROMBIN TIME, POC: NORMAL
SODIUM BLD-SCNC: 141 MMOL/L (ref 136–145)
TOTAL PROTEIN: 4.9 G/DL (ref 6.4–8.2)

## 2019-03-18 PROCEDURE — 85610 PROTHROMBIN TIME: CPT | Performed by: NURSE PRACTITIONER

## 2019-03-18 PROCEDURE — G8417 CALC BMI ABV UP PARAM F/U: HCPCS | Performed by: NURSE PRACTITIONER

## 2019-03-18 PROCEDURE — 1101F PT FALLS ASSESS-DOCD LE1/YR: CPT | Performed by: NURSE PRACTITIONER

## 2019-03-18 PROCEDURE — 4040F PNEUMOC VAC/ADMIN/RCVD: CPT | Performed by: NURSE PRACTITIONER

## 2019-03-18 PROCEDURE — 1123F ACP DISCUSS/DSCN MKR DOCD: CPT | Performed by: NURSE PRACTITIONER

## 2019-03-18 PROCEDURE — 99214 OFFICE O/P EST MOD 30 MIN: CPT | Performed by: NURSE PRACTITIONER

## 2019-03-18 PROCEDURE — G8427 DOCREV CUR MEDS BY ELIG CLIN: HCPCS | Performed by: NURSE PRACTITIONER

## 2019-03-18 PROCEDURE — 1036F TOBACCO NON-USER: CPT | Performed by: NURSE PRACTITIONER

## 2019-03-18 PROCEDURE — G8482 FLU IMMUNIZE ORDER/ADMIN: HCPCS | Performed by: NURSE PRACTITIONER

## 2019-03-18 RX ORDER — FUROSEMIDE 40 MG/1
40 TABLET ORAL DAILY
Qty: 5 TABLET | Refills: 0 | Status: SHIPPED | OUTPATIENT
Start: 2019-03-18 | End: 2019-03-19 | Stop reason: SDUPTHER

## 2019-03-18 ASSESSMENT — ENCOUNTER SYMPTOMS
ABDOMINAL DISTENTION: 1
SHORTNESS OF BREATH: 1
COLOR CHANGE: 1

## 2019-03-19 ENCOUNTER — TELEPHONE (OUTPATIENT)
Dept: INTERNAL MEDICINE CLINIC | Age: 84
End: 2019-03-19

## 2019-03-19 DIAGNOSIS — R80.9 DIABETES MELLITUS WITH PROTEINURIA (HCC): ICD-10-CM

## 2019-03-19 DIAGNOSIS — E11.29 DIABETES MELLITUS WITH PROTEINURIA (HCC): ICD-10-CM

## 2019-03-19 DIAGNOSIS — R60.1 ANASARCA: Primary | ICD-10-CM

## 2019-03-19 RX ORDER — METOLAZONE 2.5 MG/1
TABLET ORAL
Qty: 10 TABLET | Refills: 0 | Status: SHIPPED | OUTPATIENT
Start: 2019-03-19 | End: 2019-03-29 | Stop reason: SDUPTHER

## 2019-03-19 RX ORDER — FUROSEMIDE 20 MG/1
20 TABLET ORAL 2 TIMES DAILY
Qty: 60 TABLET | Refills: 0 | Status: ON HOLD | OUTPATIENT
Start: 2019-03-19 | End: 2019-03-23 | Stop reason: HOSPADM

## 2019-03-20 ENCOUNTER — TELEPHONE (OUTPATIENT)
Dept: INTERNAL MEDICINE CLINIC | Age: 84
End: 2019-03-20

## 2019-03-20 LAB — URINE ELECTROPHORESIS INTERP: NORMAL

## 2019-03-21 ENCOUNTER — APPOINTMENT (OUTPATIENT)
Dept: CT IMAGING | Age: 84
DRG: 291 | End: 2019-03-21
Payer: MEDICARE

## 2019-03-21 ENCOUNTER — TELEPHONE (OUTPATIENT)
Dept: INTERNAL MEDICINE CLINIC | Age: 84
End: 2019-03-21

## 2019-03-21 ENCOUNTER — APPOINTMENT (OUTPATIENT)
Dept: GENERAL RADIOLOGY | Age: 84
DRG: 291 | End: 2019-03-21
Payer: MEDICARE

## 2019-03-21 ENCOUNTER — HOSPITAL ENCOUNTER (INPATIENT)
Age: 84
LOS: 2 days | Discharge: HOME | DRG: 291 | End: 2019-03-23
Attending: EMERGENCY MEDICINE | Admitting: INTERNAL MEDICINE
Payer: MEDICARE

## 2019-03-21 DIAGNOSIS — R60.0 ARM EDEMA: ICD-10-CM

## 2019-03-21 DIAGNOSIS — R60.0 LOWER EXTREMITY EDEMA: ICD-10-CM

## 2019-03-21 DIAGNOSIS — R91.8 LUNG MASS: ICD-10-CM

## 2019-03-21 DIAGNOSIS — J81.0 ACUTE PULMONARY EDEMA (HCC): Primary | ICD-10-CM

## 2019-03-21 LAB
A/G RATIO: 0.8 (ref 1.1–2.2)
ALBUMIN SERPL-MCNC: 2.5 G/DL (ref 3.4–5)
ALP BLD-CCNC: 42 U/L (ref 40–129)
ALT SERPL-CCNC: 13 U/L (ref 10–40)
ANION GAP SERPL CALCULATED.3IONS-SCNC: 9 MMOL/L (ref 3–16)
APTT: 37.6 SEC (ref 26–36)
AST SERPL-CCNC: 12 U/L (ref 15–37)
BASOPHILS ABSOLUTE: 0.1 K/UL (ref 0–0.2)
BASOPHILS RELATIVE PERCENT: 0.7 %
BILIRUB SERPL-MCNC: 0.4 MG/DL (ref 0–1)
BUN BLDV-MCNC: 25 MG/DL (ref 7–20)
CALCIUM SERPL-MCNC: 7.2 MG/DL (ref 8.3–10.6)
CHLORIDE BLD-SCNC: 96 MMOL/L (ref 99–110)
CO2: 29 MMOL/L (ref 21–32)
CREAT SERPL-MCNC: 2.1 MG/DL (ref 0.8–1.3)
EKG ATRIAL RATE: 96 BPM
EKG DIAGNOSIS: NORMAL
EKG P-R INTERVAL: 104 MS
EKG Q-T INTERVAL: 330 MS
EKG QRS DURATION: 76 MS
EKG QTC CALCULATION (BAZETT): 416 MS
EKG R AXIS: 55 DEGREES
EKG T AXIS: 8 DEGREES
EKG VENTRICULAR RATE: 96 BPM
EOSINOPHILS ABSOLUTE: 0.1 K/UL (ref 0–0.6)
EOSINOPHILS RELATIVE PERCENT: 1.6 %
GFR AFRICAN AMERICAN: 37
GFR NON-AFRICAN AMERICAN: 30
GLOBULIN: 3.1 G/DL
GLUCOSE BLD-MCNC: 290 MG/DL (ref 70–99)
HCT VFR BLD CALC: 34.9 % (ref 40.5–52.5)
HEMOGLOBIN: 11.7 G/DL (ref 13.5–17.5)
INR BLD: 2.85 (ref 0.86–1.14)
LYMPHOCYTES ABSOLUTE: 0.4 K/UL (ref 1–5.1)
LYMPHOCYTES RELATIVE PERCENT: 5.1 %
MCH RBC QN AUTO: 29.4 PG (ref 26–34)
MCHC RBC AUTO-ENTMCNC: 33.4 G/DL (ref 31–36)
MCV RBC AUTO: 88.2 FL (ref 80–100)
MONOCYTES ABSOLUTE: 0.8 K/UL (ref 0–1.3)
MONOCYTES RELATIVE PERCENT: 10.5 %
NEUTROPHILS ABSOLUTE: 6.3 K/UL (ref 1.7–7.7)
NEUTROPHILS RELATIVE PERCENT: 82.1 %
PDW BLD-RTO: 16.9 % (ref 12.4–15.4)
PLATELET # BLD: 139 K/UL (ref 135–450)
PMV BLD AUTO: 8.4 FL (ref 5–10.5)
POTASSIUM SERPL-SCNC: 4.8 MMOL/L (ref 3.5–5.1)
PRO-BNP: 3286 PG/ML (ref 0–449)
PROTHROMBIN TIME: 32.5 SEC (ref 9.8–13)
RBC # BLD: 3.96 M/UL (ref 4.2–5.9)
SODIUM BLD-SCNC: 134 MMOL/L (ref 136–145)
TOTAL PROTEIN: 5.6 G/DL (ref 6.4–8.2)
TROPONIN: 0.06 NG/ML
TROPONIN: 0.08 NG/ML
WBC # BLD: 7.7 K/UL (ref 4–11)

## 2019-03-21 PROCEDURE — 36415 COLL VENOUS BLD VENIPUNCTURE: CPT

## 2019-03-21 PROCEDURE — 6360000002 HC RX W HCPCS: Performed by: INTERNAL MEDICINE

## 2019-03-21 PROCEDURE — 99285 EMERGENCY DEPT VISIT HI MDM: CPT

## 2019-03-21 PROCEDURE — 93970 EXTREMITY STUDY: CPT

## 2019-03-21 PROCEDURE — 93010 ELECTROCARDIOGRAM REPORT: CPT | Performed by: INTERNAL MEDICINE

## 2019-03-21 PROCEDURE — 94760 N-INVAS EAR/PLS OXIMETRY 1: CPT

## 2019-03-21 PROCEDURE — 1200000000 HC SEMI PRIVATE

## 2019-03-21 PROCEDURE — 83880 ASSAY OF NATRIURETIC PEPTIDE: CPT

## 2019-03-21 PROCEDURE — 85610 PROTHROMBIN TIME: CPT

## 2019-03-21 PROCEDURE — 96374 THER/PROPH/DIAG INJ IV PUSH: CPT

## 2019-03-21 PROCEDURE — 93005 ELECTROCARDIOGRAM TRACING: CPT | Performed by: PHYSICIAN ASSISTANT

## 2019-03-21 PROCEDURE — 71250 CT THORAX DX C-: CPT

## 2019-03-21 PROCEDURE — 6360000002 HC RX W HCPCS: Performed by: PHYSICIAN ASSISTANT

## 2019-03-21 PROCEDURE — 85730 THROMBOPLASTIN TIME PARTIAL: CPT

## 2019-03-21 PROCEDURE — 6370000000 HC RX 637 (ALT 250 FOR IP): Performed by: INTERNAL MEDICINE

## 2019-03-21 PROCEDURE — 84443 ASSAY THYROID STIM HORMONE: CPT

## 2019-03-21 PROCEDURE — 84439 ASSAY OF FREE THYROXINE: CPT

## 2019-03-21 PROCEDURE — 71046 X-RAY EXAM CHEST 2 VIEWS: CPT

## 2019-03-21 PROCEDURE — 84484 ASSAY OF TROPONIN QUANT: CPT

## 2019-03-21 PROCEDURE — 2580000003 HC RX 258: Performed by: INTERNAL MEDICINE

## 2019-03-21 PROCEDURE — 6370000000 HC RX 637 (ALT 250 FOR IP)

## 2019-03-21 PROCEDURE — 80053 COMPREHEN METABOLIC PANEL: CPT

## 2019-03-21 PROCEDURE — 85025 COMPLETE CBC W/AUTO DIFF WBC: CPT

## 2019-03-21 RX ORDER — TAMSULOSIN HYDROCHLORIDE 0.4 MG/1
0.4 CAPSULE ORAL DAILY
Status: DISCONTINUED | OUTPATIENT
Start: 2019-03-21 | End: 2019-03-23 | Stop reason: HOSPADM

## 2019-03-21 RX ORDER — TRAMADOL HYDROCHLORIDE 50 MG/1
50 TABLET ORAL NIGHTLY PRN
Status: DISCONTINUED | OUTPATIENT
Start: 2019-03-21 | End: 2019-03-23 | Stop reason: HOSPADM

## 2019-03-21 RX ORDER — SODIUM CHLORIDE 0.9 % (FLUSH) 0.9 %
10 SYRINGE (ML) INJECTION PRN
Status: DISCONTINUED | OUTPATIENT
Start: 2019-03-21 | End: 2019-03-23 | Stop reason: HOSPADM

## 2019-03-21 RX ORDER — WARFARIN SODIUM 2.5 MG/1
2.5 TABLET ORAL DAILY
Status: DISCONTINUED | OUTPATIENT
Start: 2019-03-21 | End: 2019-03-21 | Stop reason: SDUPTHER

## 2019-03-21 RX ORDER — WARFARIN SODIUM 2.5 MG/1
1.25 TABLET ORAL
Status: DISCONTINUED | OUTPATIENT
Start: 2019-03-24 | End: 2019-03-22

## 2019-03-21 RX ORDER — WARFARIN SODIUM 2.5 MG/1
2.5 TABLET ORAL
Status: DISCONTINUED | OUTPATIENT
Start: 2019-03-21 | End: 2019-03-21

## 2019-03-21 RX ORDER — ACETAMINOPHEN 325 MG/1
650 TABLET ORAL EVERY 4 HOURS PRN
Status: DISCONTINUED | OUTPATIENT
Start: 2019-03-21 | End: 2019-03-23 | Stop reason: HOSPADM

## 2019-03-21 RX ORDER — ONDANSETRON 2 MG/ML
4 INJECTION INTRAMUSCULAR; INTRAVENOUS EVERY 6 HOURS PRN
Status: DISCONTINUED | OUTPATIENT
Start: 2019-03-21 | End: 2019-03-23 | Stop reason: HOSPADM

## 2019-03-21 RX ORDER — WARFARIN SODIUM 2.5 MG/1
1.25 TABLET ORAL
Status: DISCONTINUED | OUTPATIENT
Start: 2019-03-24 | End: 2019-03-21

## 2019-03-21 RX ORDER — FUROSEMIDE 10 MG/ML
40 INJECTION INTRAMUSCULAR; INTRAVENOUS 2 TIMES DAILY
Status: DISCONTINUED | OUTPATIENT
Start: 2019-03-21 | End: 2019-03-23

## 2019-03-21 RX ORDER — GLIPIZIDE 5 MG/1
2.5 TABLET ORAL
Status: DISCONTINUED | OUTPATIENT
Start: 2019-03-22 | End: 2019-03-23 | Stop reason: HOSPADM

## 2019-03-21 RX ORDER — FAMOTIDINE 20 MG/1
40 TABLET, FILM COATED ORAL EVERY EVENING
Status: DISCONTINUED | OUTPATIENT
Start: 2019-03-21 | End: 2019-03-21 | Stop reason: DRUGHIGH

## 2019-03-21 RX ORDER — SODIUM CHLORIDE 0.9 % (FLUSH) 0.9 %
10 SYRINGE (ML) INJECTION EVERY 12 HOURS SCHEDULED
Status: DISCONTINUED | OUTPATIENT
Start: 2019-03-21 | End: 2019-03-23 | Stop reason: HOSPADM

## 2019-03-21 RX ORDER — METOPROLOL SUCCINATE 25 MG/1
25 TABLET, EXTENDED RELEASE ORAL DAILY
Status: DISCONTINUED | OUTPATIENT
Start: 2019-03-21 | End: 2019-03-23 | Stop reason: HOSPADM

## 2019-03-21 RX ORDER — ACETAMINOPHEN 80 MG
TABLET,CHEWABLE ORAL
Status: DISPENSED
Start: 2019-03-21 | End: 2019-03-22

## 2019-03-21 RX ORDER — FUROSEMIDE 10 MG/ML
40 INJECTION INTRAMUSCULAR; INTRAVENOUS ONCE
Status: COMPLETED | OUTPATIENT
Start: 2019-03-21 | End: 2019-03-21

## 2019-03-21 RX ORDER — LISINOPRIL 5 MG/1
5 TABLET ORAL DAILY
Status: DISCONTINUED | OUTPATIENT
Start: 2019-03-21 | End: 2019-03-21

## 2019-03-21 RX ORDER — ACETAMINOPHEN 500 MG
TABLET ORAL
Status: COMPLETED
Start: 2019-03-21 | End: 2019-03-21

## 2019-03-21 RX ORDER — GABAPENTIN 300 MG/1
300 CAPSULE ORAL DAILY
Status: DISCONTINUED | OUTPATIENT
Start: 2019-03-21 | End: 2019-03-23 | Stop reason: HOSPADM

## 2019-03-21 RX ORDER — FAMOTIDINE 20 MG/1
20 TABLET, FILM COATED ORAL EVERY EVENING
Status: DISCONTINUED | OUTPATIENT
Start: 2019-03-21 | End: 2019-03-23 | Stop reason: HOSPADM

## 2019-03-21 RX ORDER — WARFARIN SODIUM 2.5 MG/1
1.25 TABLET ORAL
Status: COMPLETED | OUTPATIENT
Start: 2019-03-21 | End: 2019-03-21

## 2019-03-21 RX ORDER — LATANOPROST 50 UG/ML
1 SOLUTION/ DROPS OPHTHALMIC NIGHTLY
Status: DISCONTINUED | OUTPATIENT
Start: 2019-03-21 | End: 2019-03-23 | Stop reason: HOSPADM

## 2019-03-21 RX ORDER — DULOXETIN HYDROCHLORIDE 30 MG/1
30 CAPSULE, DELAYED RELEASE ORAL 2 TIMES DAILY
Status: DISCONTINUED | OUTPATIENT
Start: 2019-03-21 | End: 2019-03-23 | Stop reason: HOSPADM

## 2019-03-21 RX ADMIN — NITROGLYCERIN 1 INCH: 20 OINTMENT TOPICAL at 21:59

## 2019-03-21 RX ADMIN — ACETAMINOPHEN 650 MG: 325 TABLET ORAL at 18:39

## 2019-03-21 RX ADMIN — DULOXETINE HYDROCHLORIDE 30 MG: 30 CAPSULE, DELAYED RELEASE ORAL at 21:56

## 2019-03-21 RX ADMIN — FUROSEMIDE 40 MG: 10 INJECTION, SOLUTION INTRAMUSCULAR; INTRAVENOUS at 16:27

## 2019-03-21 RX ADMIN — METOPROLOL SUCCINATE 25 MG: 25 TABLET, FILM COATED, EXTENDED RELEASE ORAL at 17:58

## 2019-03-21 RX ADMIN — FUROSEMIDE 40 MG: 10 INJECTION, SOLUTION INTRAMUSCULAR; INTRAVENOUS at 22:02

## 2019-03-21 RX ADMIN — Medication 10 ML: at 22:04

## 2019-03-21 RX ADMIN — ACETAMINOPHEN 650 MG: 500 TABLET, FILM COATED ORAL at 18:39

## 2019-03-21 RX ADMIN — GABAPENTIN 300 MG: 300 CAPSULE ORAL at 21:56

## 2019-03-21 RX ADMIN — WARFARIN SODIUM 1.25 MG: 2.5 TABLET ORAL at 23:37

## 2019-03-21 RX ADMIN — LATANOPROST 1 DROP: 50 SOLUTION/ DROPS OPHTHALMIC at 22:03

## 2019-03-21 RX ADMIN — TAMSULOSIN HYDROCHLORIDE 0.4 MG: 0.4 CAPSULE ORAL at 21:55

## 2019-03-21 RX ADMIN — FAMOTIDINE 20 MG: 20 TABLET ORAL at 21:57

## 2019-03-21 ASSESSMENT — ENCOUNTER SYMPTOMS
COUGH: 0
SHORTNESS OF BREATH: 1
ABDOMINAL PAIN: 0
RHINORRHEA: 0
NAUSEA: 0
DIARRHEA: 0
VOMITING: 0

## 2019-03-21 ASSESSMENT — PAIN SCALES - GENERAL
PAINLEVEL_OUTOF10: 0

## 2019-03-22 LAB
ANION GAP SERPL CALCULATED.3IONS-SCNC: 11 MMOL/L (ref 3–16)
BACTERIA: ABNORMAL /HPF
BILIRUBIN URINE: NEGATIVE
BLOOD, URINE: ABNORMAL
BUN BLDV-MCNC: 24 MG/DL (ref 7–20)
C3 COMPLEMENT: 118.6 MG/DL (ref 90–180)
C4 COMPLEMENT: 10.4 MG/DL (ref 10–40)
CALCIUM SERPL-MCNC: 7.3 MG/DL (ref 8.3–10.6)
CHLORIDE BLD-SCNC: 96 MMOL/L (ref 99–110)
CHOLESTEROL, TOTAL: 119 MG/DL (ref 0–199)
CLARITY: CLEAR
CO2: 29 MMOL/L (ref 21–32)
COLOR: YELLOW
CREAT SERPL-MCNC: 2.1 MG/DL (ref 0.8–1.3)
EKG ATRIAL RATE: 108 BPM
EKG ATRIAL RATE: 111 BPM
EKG ATRIAL RATE: 80 BPM
EKG DIAGNOSIS: NORMAL
EKG P AXIS: 10 DEGREES
EKG P AXIS: 28 DEGREES
EKG P-R INTERVAL: 152 MS
EKG P-R INTERVAL: 192 MS
EKG P-R INTERVAL: 194 MS
EKG Q-T INTERVAL: 304 MS
EKG Q-T INTERVAL: 344 MS
EKG Q-T INTERVAL: 404 MS
EKG QRS DURATION: 76 MS
EKG QRS DURATION: 84 MS
EKG QRS DURATION: 86 MS
EKG QTC CALCULATION (BAZETT): 413 MS
EKG QTC CALCULATION (BAZETT): 460 MS
EKG QTC CALCULATION (BAZETT): 465 MS
EKG R AXIS: -10 DEGREES
EKG R AXIS: 36 DEGREES
EKG R AXIS: 66 DEGREES
EKG T AXIS: 2 DEGREES
EKG T AXIS: 24 DEGREES
EKG T AXIS: 5 DEGREES
EKG VENTRICULAR RATE: 108 BPM
EKG VENTRICULAR RATE: 111 BPM
EKG VENTRICULAR RATE: 80 BPM
EPITHELIAL CELLS, UA: 1 /HPF (ref 0–5)
GFR AFRICAN AMERICAN: 37
GFR NON-AFRICAN AMERICAN: 30
GLUCOSE BLD-MCNC: 134 MG/DL (ref 70–99)
GLUCOSE BLD-MCNC: 202 MG/DL (ref 70–99)
GLUCOSE BLD-MCNC: 206 MG/DL (ref 70–99)
GLUCOSE BLD-MCNC: 249 MG/DL (ref 70–99)
GLUCOSE BLD-MCNC: 327 MG/DL (ref 70–99)
GLUCOSE URINE: 100 MG/DL
HDLC SERPL-MCNC: 34 MG/DL (ref 40–60)
HYALINE CASTS: 1 /LPF (ref 0–8)
INR BLD: 2.96 (ref 0.86–1.14)
KETONES, URINE: NEGATIVE MG/DL
LDL CHOLESTEROL CALCULATED: 64 MG/DL
LEUKOCYTE ESTERASE, URINE: ABNORMAL
LV EF: 33 %
LVEF MODALITY: NORMAL
MAGNESIUM: 1.4 MG/DL (ref 1.8–2.4)
MICROSCOPIC EXAMINATION: YES
NITRITE, URINE: NEGATIVE
PERFORMED ON: ABNORMAL
PH UA: 6 (ref 5–8)
POTASSIUM SERPL-SCNC: 4.2 MMOL/L (ref 3.5–5.1)
PROTEIN UA: 100 MG/DL
PROTHROMBIN TIME: 33.8 SEC (ref 9.8–13)
RBC UA: 7 /HPF (ref 0–4)
SODIUM BLD-SCNC: 136 MMOL/L (ref 136–145)
SPECIFIC GRAVITY UA: 1.01 (ref 1–1.03)
T4 FREE: 1 NG/DL (ref 0.9–1.8)
TRIGL SERPL-MCNC: 105 MG/DL (ref 0–150)
TROPONIN: 0.07 NG/ML
TSH SERPL DL<=0.05 MIU/L-ACNC: 1.59 UIU/ML (ref 0.27–4.2)
UROBILINOGEN, URINE: 0.2 E.U./DL
VLDLC SERPL CALC-MCNC: 21 MG/DL
WBC UA: 9 /HPF (ref 0–5)

## 2019-03-22 PROCEDURE — 84165 PROTEIN E-PHORESIS SERUM: CPT

## 2019-03-22 PROCEDURE — 93010 ELECTROCARDIOGRAM REPORT: CPT | Performed by: INTERNAL MEDICINE

## 2019-03-22 PROCEDURE — 2580000003 HC RX 258: Performed by: INTERNAL MEDICINE

## 2019-03-22 PROCEDURE — 83883 ASSAY NEPHELOMETRY NOT SPEC: CPT

## 2019-03-22 PROCEDURE — 6370000000 HC RX 637 (ALT 250 FOR IP): Performed by: INTERNAL MEDICINE

## 2019-03-22 PROCEDURE — 80061 LIPID PANEL: CPT

## 2019-03-22 PROCEDURE — 86335 IMMUNFIX E-PHORSIS/URINE/CSF: CPT

## 2019-03-22 PROCEDURE — 2580000003 HC RX 258

## 2019-03-22 PROCEDURE — 84484 ASSAY OF TROPONIN QUANT: CPT

## 2019-03-22 PROCEDURE — 93005 ELECTROCARDIOGRAM TRACING: CPT | Performed by: INTERNAL MEDICINE

## 2019-03-22 PROCEDURE — 81001 URINALYSIS AUTO W/SCOPE: CPT

## 2019-03-22 PROCEDURE — 6360000002 HC RX W HCPCS: Performed by: INTERNAL MEDICINE

## 2019-03-22 PROCEDURE — 85610 PROTHROMBIN TIME: CPT

## 2019-03-22 PROCEDURE — 80048 BASIC METABOLIC PNL TOTAL CA: CPT

## 2019-03-22 PROCEDURE — 86334 IMMUNOFIX E-PHORESIS SERUM: CPT

## 2019-03-22 PROCEDURE — 93306 TTE W/DOPPLER COMPLETE: CPT

## 2019-03-22 PROCEDURE — 84156 ASSAY OF PROTEIN URINE: CPT

## 2019-03-22 PROCEDURE — 1200000000 HC SEMI PRIVATE

## 2019-03-22 PROCEDURE — 36415 COLL VENOUS BLD VENIPUNCTURE: CPT

## 2019-03-22 PROCEDURE — 86160 COMPLEMENT ANTIGEN: CPT

## 2019-03-22 PROCEDURE — 84155 ASSAY OF PROTEIN SERUM: CPT

## 2019-03-22 PROCEDURE — 83735 ASSAY OF MAGNESIUM: CPT

## 2019-03-22 RX ORDER — WARFARIN SODIUM 2.5 MG/1
2.5 TABLET ORAL
Status: DISCONTINUED | OUTPATIENT
Start: 2019-03-22 | End: 2019-03-23 | Stop reason: HOSPADM

## 2019-03-22 RX ORDER — WARFARIN SODIUM 2.5 MG/1
1.25 TABLET ORAL
Status: DISCONTINUED | OUTPATIENT
Start: 2019-03-24 | End: 2019-03-23 | Stop reason: HOSPADM

## 2019-03-22 RX ORDER — MAGNESIUM SULFATE 1 G/100ML
1 INJECTION INTRAVENOUS ONCE
Status: COMPLETED | OUTPATIENT
Start: 2019-03-22 | End: 2019-03-22

## 2019-03-22 RX ORDER — AMLODIPINE BESYLATE 5 MG/1
5 TABLET ORAL DAILY
Status: DISCONTINUED | OUTPATIENT
Start: 2019-03-22 | End: 2019-03-23 | Stop reason: HOSPADM

## 2019-03-22 RX ORDER — SODIUM CHLORIDE 9 MG/ML
INJECTION, SOLUTION INTRAVENOUS
Status: COMPLETED
Start: 2019-03-22 | End: 2019-03-22

## 2019-03-22 RX ORDER — DEXTROSE MONOHYDRATE 25 G/50ML
12.5 INJECTION, SOLUTION INTRAVENOUS PRN
Status: DISCONTINUED | OUTPATIENT
Start: 2019-03-22 | End: 2019-03-23 | Stop reason: HOSPADM

## 2019-03-22 RX ORDER — NICOTINE POLACRILEX 4 MG
15 LOZENGE BUCCAL PRN
Status: DISCONTINUED | OUTPATIENT
Start: 2019-03-22 | End: 2019-03-23 | Stop reason: HOSPADM

## 2019-03-22 RX ORDER — DEXTROSE MONOHYDRATE 50 MG/ML
100 INJECTION, SOLUTION INTRAVENOUS PRN
Status: DISCONTINUED | OUTPATIENT
Start: 2019-03-22 | End: 2019-03-23 | Stop reason: HOSPADM

## 2019-03-22 RX ADMIN — FAMOTIDINE 20 MG: 20 TABLET ORAL at 17:49

## 2019-03-22 RX ADMIN — FUROSEMIDE 40 MG: 10 INJECTION, SOLUTION INTRAMUSCULAR; INTRAVENOUS at 09:00

## 2019-03-22 RX ADMIN — SODIUM CHLORIDE: 9 INJECTION, SOLUTION INTRAVENOUS at 13:19

## 2019-03-22 RX ADMIN — NITROGLYCERIN 1 INCH: 20 OINTMENT TOPICAL at 01:06

## 2019-03-22 RX ADMIN — GLIPIZIDE 2.5 MG: 5 TABLET ORAL at 08:59

## 2019-03-22 RX ADMIN — LATANOPROST 1 DROP: 50 SOLUTION/ DROPS OPHTHALMIC at 21:08

## 2019-03-22 RX ADMIN — INSULIN LISPRO 3 UNITS: 100 INJECTION, SOLUTION INTRAVENOUS; SUBCUTANEOUS at 20:21

## 2019-03-22 RX ADMIN — DULOXETINE HYDROCHLORIDE 30 MG: 30 CAPSULE, DELAYED RELEASE ORAL at 20:21

## 2019-03-22 RX ADMIN — AMLODIPINE BESYLATE 5 MG: 5 TABLET ORAL at 08:59

## 2019-03-22 RX ADMIN — INSULIN LISPRO 12 UNITS: 100 INJECTION, SOLUTION INTRAVENOUS; SUBCUTANEOUS at 12:15

## 2019-03-22 RX ADMIN — Medication 10 ML: at 20:21

## 2019-03-22 RX ADMIN — METOPROLOL SUCCINATE 25 MG: 25 TABLET, FILM COATED, EXTENDED RELEASE ORAL at 08:59

## 2019-03-22 RX ADMIN — Medication 10 ML: at 09:00

## 2019-03-22 RX ADMIN — WARFARIN SODIUM 2.5 MG: 2.5 TABLET ORAL at 17:49

## 2019-03-22 RX ADMIN — TAMSULOSIN HYDROCHLORIDE 0.4 MG: 0.4 CAPSULE ORAL at 09:00

## 2019-03-22 RX ADMIN — MAGNESIUM SULFATE HEPTAHYDRATE 1 G: 1 INJECTION, SOLUTION INTRAVENOUS at 13:19

## 2019-03-22 RX ADMIN — Medication 1 DROP: at 21:08

## 2019-03-22 RX ADMIN — NITROGLYCERIN 1 INCH: 20 OINTMENT TOPICAL at 05:49

## 2019-03-22 RX ADMIN — FUROSEMIDE 40 MG: 10 INJECTION, SOLUTION INTRAMUSCULAR; INTRAVENOUS at 20:21

## 2019-03-22 RX ADMIN — LINAGLIPTIN 5 MG: 5 TABLET, FILM COATED ORAL at 08:59

## 2019-03-22 RX ADMIN — GABAPENTIN 300 MG: 300 CAPSULE ORAL at 08:59

## 2019-03-22 ASSESSMENT — PAIN SCALES - GENERAL
PAINLEVEL_OUTOF10: 0

## 2019-03-23 VITALS
RESPIRATION RATE: 16 BRPM | DIASTOLIC BLOOD PRESSURE: 88 MMHG | SYSTOLIC BLOOD PRESSURE: 145 MMHG | WEIGHT: 210 LBS | HEIGHT: 70 IN | HEART RATE: 102 BPM | OXYGEN SATURATION: 93 % | BODY MASS INDEX: 30.06 KG/M2 | TEMPERATURE: 96.5 F

## 2019-03-23 LAB
24HR URINE VOLUME (ML): 3000 ML
ALBUMIN SERPL-MCNC: 2.6 G/DL (ref 3.4–5)
ANION GAP SERPL CALCULATED.3IONS-SCNC: 14 MMOL/L (ref 3–16)
BUN BLDV-MCNC: 29 MG/DL (ref 7–20)
CALCIUM SERPL-MCNC: 7.9 MG/DL (ref 8.3–10.6)
CHLORIDE BLD-SCNC: 93 MMOL/L (ref 99–110)
CO2: 28 MMOL/L (ref 21–32)
CREAT SERPL-MCNC: 2.1 MG/DL (ref 0.8–1.3)
CREATININE 24 HOUR URINE: 1 G/24HR (ref 0.6–2.5)
GFR AFRICAN AMERICAN: 37
GFR NON-AFRICAN AMERICAN: 30
GLUCOSE BLD-MCNC: 200 MG/DL (ref 70–99)
GLUCOSE BLD-MCNC: 222 MG/DL (ref 70–99)
GLUCOSE BLD-MCNC: 223 MG/DL (ref 70–99)
INR BLD: 2.77 (ref 0.86–1.14)
KAPPA, FREE LIGHT CHAINS, SERUM: 29.67 MG/L (ref 3.3–19.4)
KAPPA/LAMBDA RATIO: 1.06 (ref 0.26–1.65)
KAPPA/LAMBDA TEST COMMENT: ABNORMAL
LAMBDA, FREE LIGHT CHAINS, SERUM: 28.02 MG/L (ref 5.71–26.3)
MAGNESIUM: 1.5 MG/DL (ref 1.8–2.4)
MICROALBUMIN 24H UR-MCNC: 2325 MG/DAY (ref 0–30)
MICROALBUMIN UR-MCNC: 1614.6 MCG/MIN (ref 0–20)
PERFORMED ON: ABNORMAL
PERFORMED ON: ABNORMAL
PHOSPHORUS: 3.7 MG/DL (ref 2.5–4.9)
POTASSIUM SERPL-SCNC: 3.8 MMOL/L (ref 3.5–5.1)
PRO-BNP: 4939 PG/ML (ref 0–449)
PROTEIN 24 HOUR URINE: 4.23 G/24HR
PROTHROMBIN TIME: 31.6 SEC (ref 9.8–13)
SODIUM BLD-SCNC: 135 MMOL/L (ref 136–145)

## 2019-03-23 PROCEDURE — 80069 RENAL FUNCTION PANEL: CPT

## 2019-03-23 PROCEDURE — 85610 PROTHROMBIN TIME: CPT

## 2019-03-23 PROCEDURE — 82043 UR ALBUMIN QUANTITATIVE: CPT

## 2019-03-23 PROCEDURE — 99221 1ST HOSP IP/OBS SF/LOW 40: CPT | Performed by: INTERNAL MEDICINE

## 2019-03-23 PROCEDURE — 97161 PT EVAL LOW COMPLEX 20 MIN: CPT

## 2019-03-23 PROCEDURE — 83735 ASSAY OF MAGNESIUM: CPT

## 2019-03-23 PROCEDURE — 6370000000 HC RX 637 (ALT 250 FOR IP): Performed by: INTERNAL MEDICINE

## 2019-03-23 PROCEDURE — 84156 ASSAY OF PROTEIN URINE: CPT

## 2019-03-23 PROCEDURE — 36415 COLL VENOUS BLD VENIPUNCTURE: CPT

## 2019-03-23 PROCEDURE — 97530 THERAPEUTIC ACTIVITIES: CPT

## 2019-03-23 PROCEDURE — 83880 ASSAY OF NATRIURETIC PEPTIDE: CPT

## 2019-03-23 PROCEDURE — 6360000002 HC RX W HCPCS: Performed by: INTERNAL MEDICINE

## 2019-03-23 PROCEDURE — 84166 PROTEIN E-PHORESIS/URINE/CSF: CPT

## 2019-03-23 PROCEDURE — 2580000003 HC RX 258: Performed by: INTERNAL MEDICINE

## 2019-03-23 RX ORDER — FUROSEMIDE 40 MG/1
40 TABLET ORAL 2 TIMES DAILY
Status: DISCONTINUED | OUTPATIENT
Start: 2019-03-23 | End: 2019-03-23 | Stop reason: HOSPADM

## 2019-03-23 RX ORDER — LISINOPRIL 10 MG/1
10 TABLET ORAL DAILY
Qty: 90 TABLET | Refills: 1 | Status: SHIPPED | OUTPATIENT
Start: 2019-03-23 | End: 2019-09-18 | Stop reason: SDUPTHER

## 2019-03-23 RX ORDER — GABAPENTIN 300 MG/1
300 CAPSULE ORAL DAILY
Qty: 90 CAPSULE | Refills: 3 | Status: SHIPPED | OUTPATIENT
Start: 2019-03-24 | End: 2019-04-09 | Stop reason: DRUGHIGH

## 2019-03-23 RX ORDER — FUROSEMIDE 40 MG/1
40 TABLET ORAL 2 TIMES DAILY
Qty: 180 TABLET | Refills: 1 | Status: SHIPPED | OUTPATIENT
Start: 2019-03-23 | End: 2019-04-16 | Stop reason: DRUGHIGH

## 2019-03-23 RX ORDER — LANOLIN ALCOHOL/MO/W.PET/CERES
400 CREAM (GRAM) TOPICAL 2 TIMES DAILY
Qty: 5 TABLET | Refills: 0 | Status: SHIPPED | OUTPATIENT
Start: 2019-03-23 | End: 2019-04-09 | Stop reason: ALTCHOICE

## 2019-03-23 RX ADMIN — INSULIN LISPRO 6 UNITS: 100 INJECTION, SOLUTION INTRAVENOUS; SUBCUTANEOUS at 11:45

## 2019-03-23 RX ADMIN — DULOXETINE HYDROCHLORIDE 30 MG: 30 CAPSULE, DELAYED RELEASE ORAL at 08:31

## 2019-03-23 RX ADMIN — GLIPIZIDE 2.5 MG: 5 TABLET ORAL at 08:31

## 2019-03-23 RX ADMIN — GABAPENTIN 300 MG: 300 CAPSULE ORAL at 08:31

## 2019-03-23 RX ADMIN — Medication 400 MG: at 13:37

## 2019-03-23 RX ADMIN — FUROSEMIDE 40 MG: 10 INJECTION, SOLUTION INTRAMUSCULAR; INTRAVENOUS at 08:31

## 2019-03-23 RX ADMIN — INSULIN LISPRO 6 UNITS: 100 INJECTION, SOLUTION INTRAVENOUS; SUBCUTANEOUS at 08:31

## 2019-03-23 RX ADMIN — METOPROLOL SUCCINATE 25 MG: 25 TABLET, FILM COATED, EXTENDED RELEASE ORAL at 08:31

## 2019-03-23 RX ADMIN — Medication 10 ML: at 08:32

## 2019-03-23 RX ADMIN — AMLODIPINE BESYLATE 5 MG: 5 TABLET ORAL at 08:31

## 2019-03-23 RX ADMIN — TAMSULOSIN HYDROCHLORIDE 0.4 MG: 0.4 CAPSULE ORAL at 08:31

## 2019-03-23 RX ADMIN — LINAGLIPTIN 5 MG: 5 TABLET, FILM COATED ORAL at 08:32

## 2019-03-24 ENCOUNTER — CARE COORDINATION (OUTPATIENT)
Dept: CASE MANAGEMENT | Age: 84
End: 2019-03-24

## 2019-03-24 DIAGNOSIS — J81.0 PULMONARY EDEMA, ACUTE (HCC): Primary | ICD-10-CM

## 2019-03-24 LAB
24HR URINE VOLUME (ML): 3000 ML
PROTEIN 24 HOUR URINE: 4.23 G/24HR

## 2019-03-24 PROCEDURE — 1111F DSCHRG MED/CURRENT MED MERGE: CPT | Performed by: NURSE PRACTITIONER

## 2019-03-25 ENCOUNTER — TELEPHONE (OUTPATIENT)
Dept: OTHER | Age: 84
End: 2019-03-25

## 2019-03-25 LAB
ALBUMIN SERPL-MCNC: 2.2 G/DL (ref 3.1–4.9)
ALPHA-1-GLOBULIN: 0.4 G/DL (ref 0.2–0.4)
ALPHA-2-GLOBULIN: 1.1 G/DL (ref 0.4–1.1)
BETA GLOBULIN: 0.6 G/DL (ref 0.9–1.6)
GAMMA GLOBULIN: 0.4 G/DL (ref 0.6–1.8)
SPE/IFE INTERPRETATION: NORMAL
TOTAL PROTEIN: 4.6 G/DL (ref 6.4–8.2)
URINE ELECTROPHORESIS INTERP: NORMAL

## 2019-03-26 ENCOUNTER — FOLLOWUP TELEPHONE ENCOUNTER (OUTPATIENT)
Dept: OTHER | Age: 84
End: 2019-03-26

## 2019-03-27 ENCOUNTER — OFFICE VISIT (OUTPATIENT)
Dept: INTERNAL MEDICINE CLINIC | Age: 84
End: 2019-03-27
Payer: MEDICARE

## 2019-03-27 ENCOUNTER — TELEPHONE (OUTPATIENT)
Dept: OTHER | Age: 84
End: 2019-03-27

## 2019-03-27 VITALS
WEIGHT: 205 LBS | SYSTOLIC BLOOD PRESSURE: 116 MMHG | BODY MASS INDEX: 29.35 KG/M2 | HEIGHT: 70 IN | HEART RATE: 80 BPM | DIASTOLIC BLOOD PRESSURE: 62 MMHG

## 2019-03-27 DIAGNOSIS — N17.9 ACUTE KIDNEY INJURY SUPERIMPOSED ON CKD (HCC): ICD-10-CM

## 2019-03-27 DIAGNOSIS — N18.9 ACUTE KIDNEY INJURY SUPERIMPOSED ON CKD (HCC): ICD-10-CM

## 2019-03-27 DIAGNOSIS — Z09 HOSPITAL DISCHARGE FOLLOW-UP: Primary | ICD-10-CM

## 2019-03-27 DIAGNOSIS — I50.21 ACUTE SYSTOLIC CHF (CONGESTIVE HEART FAILURE) (HCC): ICD-10-CM

## 2019-03-27 DIAGNOSIS — N04.9 NEPHROTIC SYNDROME: ICD-10-CM

## 2019-03-27 PROCEDURE — 99496 TRANSJ CARE MGMT HIGH F2F 7D: CPT | Performed by: NURSE PRACTITIONER

## 2019-03-29 ENCOUNTER — TELEPHONE (OUTPATIENT)
Dept: CARDIOLOGY CLINIC | Age: 84
End: 2019-03-29

## 2019-04-01 DIAGNOSIS — K30 INDIGESTION: ICD-10-CM

## 2019-04-01 RX ORDER — RANITIDINE 300 MG/1
CAPSULE ORAL
Qty: 90 CAPSULE | Refills: 3 | Status: SHIPPED | OUTPATIENT
Start: 2019-04-01 | End: 2019-10-01 | Stop reason: ALTCHOICE

## 2019-04-05 ENCOUNTER — OFFICE VISIT (OUTPATIENT)
Dept: INTERNAL MEDICINE CLINIC | Age: 84
End: 2019-04-05
Payer: MEDICARE

## 2019-04-05 ENCOUNTER — CARE COORDINATION (OUTPATIENT)
Dept: CASE MANAGEMENT | Age: 84
End: 2019-04-05

## 2019-04-05 VITALS
HEIGHT: 70 IN | SYSTOLIC BLOOD PRESSURE: 114 MMHG | WEIGHT: 193 LBS | TEMPERATURE: 97.8 F | HEART RATE: 88 BPM | BODY MASS INDEX: 27.63 KG/M2 | DIASTOLIC BLOOD PRESSURE: 66 MMHG

## 2019-04-05 DIAGNOSIS — M54.2 NECK PAIN ON LEFT SIDE: Primary | ICD-10-CM

## 2019-04-05 DIAGNOSIS — I26.99 BILATERAL PULMONARY EMBOLISM (HCC): ICD-10-CM

## 2019-04-05 DIAGNOSIS — Z79.01 ANTICOAGULATED ON COUMADIN: ICD-10-CM

## 2019-04-05 LAB
INTERNATIONAL NORMALIZATION RATIO, POC: 2.6
PROTHROMBIN TIME, POC: NORMAL

## 2019-04-05 PROCEDURE — 99213 OFFICE O/P EST LOW 20 MIN: CPT | Performed by: NURSE PRACTITIONER

## 2019-04-05 PROCEDURE — G8427 DOCREV CUR MEDS BY ELIG CLIN: HCPCS | Performed by: NURSE PRACTITIONER

## 2019-04-05 PROCEDURE — 4040F PNEUMOC VAC/ADMIN/RCVD: CPT | Performed by: NURSE PRACTITIONER

## 2019-04-05 PROCEDURE — 85610 PROTHROMBIN TIME: CPT | Performed by: NURSE PRACTITIONER

## 2019-04-05 PROCEDURE — 1123F ACP DISCUSS/DSCN MKR DOCD: CPT | Performed by: NURSE PRACTITIONER

## 2019-04-05 PROCEDURE — G8417 CALC BMI ABV UP PARAM F/U: HCPCS | Performed by: NURSE PRACTITIONER

## 2019-04-05 PROCEDURE — 1036F TOBACCO NON-USER: CPT | Performed by: NURSE PRACTITIONER

## 2019-04-05 PROCEDURE — 1111F DSCHRG MED/CURRENT MED MERGE: CPT | Performed by: NURSE PRACTITIONER

## 2019-04-05 RX ORDER — CEFDINIR 300 MG/1
300 CAPSULE ORAL 2 TIMES DAILY
COMMUNITY
Start: 2019-04-02 | End: 2019-04-09 | Stop reason: ALTCHOICE

## 2019-04-05 NOTE — CARE COORDINATION
[facility] Transitions Follow Up Call    2019    Patient: Homero Weeks  Patient : 1935   MRN: [id number]  Reason for Admission:   Discharge Date: 3/23/19 RARS: Readmission Risk Score: 32         Spoke with: Julia [facility] Transitions Subsequent and Final Call    Subsequent and Final Calls  Do you have any ongoing symptoms?:  No  Have your medications changed?:  No  Do you have any questions related to your medications?:  No  Do you currently have any active services?:  No  Do you have any needs or concerns that I can assist you with?:  No  Identified Barriers:  None  Care Transitions Interventions  No Identified Needs  Other Interventions:          Pt states doing well, no issues or concerns. Denies CP, SOB, states weight has been \"hanging at 195\" . Has been to see his PCP and Nephrologist . Future appts listed below.  No need for further f/u CTC calls      Follow Up  Future Appointments   Date Time Provider [facility]   2019 12:30 PM Jose Maddox  [address] [facility]   2019 10:40 AM SHA Ralph - CNP [facility]   5/15/2019 10:15 AM Bertha Dennison MD [facility]       Melquiades Conway RN  \

## 2019-04-08 ASSESSMENT — ENCOUNTER SYMPTOMS: SORE THROAT: 1

## 2019-04-08 NOTE — PROGRESS NOTES
SUBJECTIVE:    Patient ID: Douglas Gamino is a 82 y.o. male. CC: jaw pain    HPI: The patient presents to the office for an acute visit. Presents for left jaw/neck pain. No known injury or trauma  Present for 3-4 days  Worse with chewing. No pain with gritting teeth. No fever. Currently on antibiotic for UTI per urology. INR 2.6      Current Outpatient Medications   Medication Sig Dispense Refill    cefdinir (OMNICEF) 300 MG capsule       ranitidine (ZANTAC) 300 MG capsule TAKE ONE CAPSULE BY MOUTH EVERY EVENING 90 capsule 3    metolazone (ZAROXOLYN) 2.5 MG tablet Take one tablet twice a week. 8 tablet 1    gabapentin (NEURONTIN) 300 MG capsule Take 1 capsule by mouth daily for 30 days. 90 capsule 3    lisinopril (PRINIVIL;ZESTRIL) 10 MG tablet Take 1 tablet by mouth daily 90 tablet 1    furosemide (LASIX) 40 MG tablet Take 1 tablet by mouth 2 times daily 180 tablet 1    magnesium oxide (MAG-OX) 400 (240 Mg) MG tablet Take 1 tablet by mouth 2 times daily 5 tablet 0    Insulin Pen Needle 31G X 6 MM MISC 1 each by Does not apply route daily 100 each 3    dexamethasone (DECADRON) 4 MG tablet Take 4 mg by mouth      linagliptin (TRADJENTA) 5 MG tablet Take 1 tablet by mouth daily 90 tablet 1    DULoxetine (CYMBALTA) 30 MG extended release capsule Take 1 capsule by mouth 2 times daily 180 capsule 1    warfarin (COUMADIN) 2.5 MG tablet 1.25 mg on Sun, Thu; 2.5 mg all other days 90 tablet 3    glipiZIDE (GLUCOTROL) 5 MG tablet Take 0.5 tablets by mouth daily (with breakfast) 90 tablet 1    naphazoline-glycerin (CLEAR EYES REDNESS RELIEF) 0.012-0.2 % SOLN ophthalmic solution Place 1 drop into both eyes 2 times daily 1 Bottle 0    tamsulosin (FLOMAX) 0.4 MG capsule Take 1 capsule by mouth daily 90 capsule 1    glucose monitoring kit (FREESTYLE) monitoring kit 1 kit by Does not apply route daily as needed.  1 kit 0    latanoprost (XALATAN) 0.005 % ophthalmic solution Place 1 drop into both eyes nightly.  potassium chloride (KLOR-CON M) 20 MEQ extended release tablet Take 1 tablet by mouth 2 times daily for 5 days 10 tablet 0     No current facility-administered medications for this visit. Review of Systems   Constitutional: Negative for fever. HENT: Positive for sore throat. Left jaw pain   Musculoskeletal: Positive for neck pain. OBJECTIVE:  Physical Exam   Constitutional: He appears well-developed and well-nourished. No distress. HENT:   Head: Normocephalic and atraumatic. Left Ear: Tympanic membrane normal.   Mouth/Throat: No oropharyngeal exudate, posterior oropharyngeal edema or posterior oropharyngeal erythema. Skin: Skin is warm and dry. He is not diaphoretic. /66   Pulse 88   Temp 97.8 °F (36.6 °C) (Oral)   Ht 5' 10\" (1.778 m)   Wt 193 lb (87.5 kg)   BMI 27.69 kg/m²      PHQ Scores 2/11/2019 7/10/2018 3/21/2017   PHQ2 Score 0 1 0   PHQ9 Score 0 1 0     Interpretation of Total Score Depression Severity: 1-4 = Minimal depression, 5-9 = Mild depression, 10-14 = Moderate depression, 15-19 = Moderately severe depression, 20-27 =Severe depression        ASSESSMENT/PLAN:  Ray was seen today for jaw pain. Diagnoses and all orders for this visit:    Neck pain on left side  - Left neck/jaw pain  - Tylenol, heat.   Call if worsening, failing to improve    Bilateral pulmonary embolism (HCC)  - Continue anticoagulation    Anticoagulated on Coumadin  - INR 2.6  - Continue current regimen  -     POCT INR      SHA Colbert - CNP

## 2019-04-09 ENCOUNTER — TELEPHONE (OUTPATIENT)
Dept: OTHER | Facility: CLINIC | Age: 84
End: 2019-04-09

## 2019-04-09 ENCOUNTER — APPOINTMENT (OUTPATIENT)
Dept: GENERAL RADIOLOGY | Age: 84
DRG: 683 | End: 2019-04-09
Payer: MEDICARE

## 2019-04-09 ENCOUNTER — HOSPITAL ENCOUNTER (INPATIENT)
Age: 84
LOS: 2 days | Discharge: HOME | DRG: 683 | End: 2019-04-11
Attending: EMERGENCY MEDICINE | Admitting: INTERNAL MEDICINE
Payer: MEDICARE

## 2019-04-09 DIAGNOSIS — R33.8 ACUTE URINARY RETENTION: ICD-10-CM

## 2019-04-09 DIAGNOSIS — N17.9 AKI (ACUTE KIDNEY INJURY) (HCC): ICD-10-CM

## 2019-04-09 DIAGNOSIS — N30.01 ACUTE CYSTITIS WITH HEMATURIA: Primary | ICD-10-CM

## 2019-04-09 PROBLEM — R31.9 HEMATURIA: Status: ACTIVE | Noted: 2019-04-09

## 2019-04-09 LAB
ALBUMIN SERPL-MCNC: 3.5 G/DL (ref 3.4–5)
ALP BLD-CCNC: 66 U/L (ref 40–129)
ALT SERPL-CCNC: 9 U/L (ref 10–40)
ANION GAP SERPL CALCULATED.3IONS-SCNC: 13 MMOL/L (ref 3–16)
APTT: 41.9 SEC (ref 26–36)
APTT: >248 SEC (ref 26–36)
AST SERPL-CCNC: 11 U/L (ref 15–37)
BACTERIA: ABNORMAL /HPF
BASOPHILS ABSOLUTE: 0.1 K/UL (ref 0–0.2)
BASOPHILS RELATIVE PERCENT: 1.2 %
BILIRUB SERPL-MCNC: 0.3 MG/DL (ref 0–1)
BILIRUBIN DIRECT: <0.2 MG/DL (ref 0–0.3)
BILIRUBIN URINE: NEGATIVE
BILIRUBIN, INDIRECT: ABNORMAL MG/DL (ref 0–1)
BLOOD, URINE: ABNORMAL
BUN BLDV-MCNC: 59 MG/DL (ref 7–20)
CALCIUM SERPL-MCNC: 8.4 MG/DL (ref 8.3–10.6)
CHLORIDE BLD-SCNC: 89 MMOL/L (ref 99–110)
CLARITY: ABNORMAL
CO2: 33 MMOL/L (ref 21–32)
COLOR: YELLOW
CREAT SERPL-MCNC: 2.8 MG/DL (ref 0.8–1.3)
EOSINOPHILS ABSOLUTE: 0.1 K/UL (ref 0–0.6)
EOSINOPHILS RELATIVE PERCENT: 1.2 %
EPITHELIAL CELLS, UA: 0 /HPF (ref 0–5)
GFR AFRICAN AMERICAN: 26
GFR NON-AFRICAN AMERICAN: 22
GLUCOSE BLD-MCNC: 252 MG/DL (ref 70–99)
GLUCOSE BLD-MCNC: 271 MG/DL (ref 70–99)
GLUCOSE URINE: 100 MG/DL
HCT VFR BLD CALC: 31.5 % (ref 40.5–52.5)
HEMOGLOBIN: 10.7 G/DL (ref 13.5–17.5)
HYALINE CASTS: 1 /LPF (ref 0–8)
INR BLD: 2.11 (ref 0.86–1.14)
KETONES, URINE: NEGATIVE MG/DL
LACTIC ACID, SEPSIS: 1.3 MMOL/L (ref 0.4–1.9)
LEUKOCYTE ESTERASE, URINE: ABNORMAL
LYMPHOCYTES ABSOLUTE: 1 K/UL (ref 1–5.1)
LYMPHOCYTES RELATIVE PERCENT: 12.1 %
MAGNESIUM: 1.8 MG/DL (ref 1.8–2.4)
MCH RBC QN AUTO: 29.4 PG (ref 26–34)
MCHC RBC AUTO-ENTMCNC: 34 G/DL (ref 31–36)
MCV RBC AUTO: 86.4 FL (ref 80–100)
MICROSCOPIC EXAMINATION: YES
MONOCYTES ABSOLUTE: 0.9 K/UL (ref 0–1.3)
MONOCYTES RELATIVE PERCENT: 11 %
NEUTROPHILS ABSOLUTE: 6.4 K/UL (ref 1.7–7.7)
NEUTROPHILS RELATIVE PERCENT: 74.5 %
NITRITE, URINE: POSITIVE
PDW BLD-RTO: 17.4 % (ref 12.4–15.4)
PERFORMED ON: ABNORMAL
PH UA: 6 (ref 5–8)
PLATELET # BLD: 203 K/UL (ref 135–450)
PMV BLD AUTO: 8.3 FL (ref 5–10.5)
POTASSIUM REFLEX MAGNESIUM: 3.3 MMOL/L (ref 3.5–5.1)
PROTEIN UA: 100 MG/DL
PROTHROMBIN TIME: 24.1 SEC (ref 9.8–13)
RBC # BLD: 3.65 M/UL (ref 4.2–5.9)
RBC UA: 169 /HPF (ref 0–4)
SODIUM BLD-SCNC: 135 MMOL/L (ref 136–145)
SPECIFIC GRAVITY UA: 1.01 (ref 1–1.03)
TOTAL PROTEIN: 6.6 G/DL (ref 6.4–8.2)
URINE TYPE: ABNORMAL
UROBILINOGEN, URINE: 0.2 E.U./DL
WBC # BLD: 8.6 K/UL (ref 4–11)
WBC UA: 429 /HPF (ref 0–5)

## 2019-04-09 PROCEDURE — 87086 URINE CULTURE/COLONY COUNT: CPT

## 2019-04-09 PROCEDURE — 81001 URINALYSIS AUTO W/SCOPE: CPT

## 2019-04-09 PROCEDURE — 1200000000 HC SEMI PRIVATE

## 2019-04-09 PROCEDURE — 85610 PROTHROMBIN TIME: CPT

## 2019-04-09 PROCEDURE — 6370000000 HC RX 637 (ALT 250 FOR IP): Performed by: INTERNAL MEDICINE

## 2019-04-09 PROCEDURE — 85025 COMPLETE CBC W/AUTO DIFF WBC: CPT

## 2019-04-09 PROCEDURE — 87040 BLOOD CULTURE FOR BACTERIA: CPT

## 2019-04-09 PROCEDURE — 83605 ASSAY OF LACTIC ACID: CPT

## 2019-04-09 PROCEDURE — 2580000003 HC RX 258: Performed by: NURSE PRACTITIONER

## 2019-04-09 PROCEDURE — 87186 SC STD MICRODIL/AGAR DIL: CPT

## 2019-04-09 PROCEDURE — 51702 INSERT TEMP BLADDER CATH: CPT

## 2019-04-09 PROCEDURE — 96361 HYDRATE IV INFUSION ADD-ON: CPT

## 2019-04-09 PROCEDURE — 80048 BASIC METABOLIC PNL TOTAL CA: CPT

## 2019-04-09 PROCEDURE — 87077 CULTURE AEROBIC IDENTIFY: CPT

## 2019-04-09 PROCEDURE — 2580000003 HC RX 258: Performed by: INTERNAL MEDICINE

## 2019-04-09 PROCEDURE — 99284 EMERGENCY DEPT VISIT MOD MDM: CPT

## 2019-04-09 PROCEDURE — 71045 X-RAY EXAM CHEST 1 VIEW: CPT

## 2019-04-09 PROCEDURE — 80076 HEPATIC FUNCTION PANEL: CPT

## 2019-04-09 PROCEDURE — 85730 THROMBOPLASTIN TIME PARTIAL: CPT

## 2019-04-09 PROCEDURE — 96374 THER/PROPH/DIAG INJ IV PUSH: CPT

## 2019-04-09 PROCEDURE — 83735 ASSAY OF MAGNESIUM: CPT

## 2019-04-09 PROCEDURE — 6360000002 HC RX W HCPCS: Performed by: EMERGENCY MEDICINE

## 2019-04-09 PROCEDURE — 6370000000 HC RX 637 (ALT 250 FOR IP): Performed by: NURSE PRACTITIONER

## 2019-04-09 RX ORDER — 0.9 % SODIUM CHLORIDE 0.9 %
1000 INTRAVENOUS SOLUTION INTRAVENOUS ONCE
Status: COMPLETED | OUTPATIENT
Start: 2019-04-09 | End: 2019-04-09

## 2019-04-09 RX ORDER — SODIUM CHLORIDE 0.9 % (FLUSH) 0.9 %
10 SYRINGE (ML) INJECTION PRN
Status: DISCONTINUED | OUTPATIENT
Start: 2019-04-09 | End: 2019-04-11 | Stop reason: HOSPADM

## 2019-04-09 RX ORDER — LANOLIN ALCOHOL/MO/W.PET/CERES
3 CREAM (GRAM) TOPICAL ONCE
Status: COMPLETED | OUTPATIENT
Start: 2019-04-09 | End: 2019-04-09

## 2019-04-09 RX ORDER — ACETAMINOPHEN 325 MG/1
650 TABLET ORAL EVERY 4 HOURS PRN
Status: DISCONTINUED | OUTPATIENT
Start: 2019-04-09 | End: 2019-04-11 | Stop reason: HOSPADM

## 2019-04-09 RX ORDER — DULOXETIN HYDROCHLORIDE 30 MG/1
30 CAPSULE, DELAYED RELEASE ORAL DAILY
Status: DISCONTINUED | OUTPATIENT
Start: 2019-04-09 | End: 2019-04-11 | Stop reason: HOSPADM

## 2019-04-09 RX ORDER — TAMSULOSIN HYDROCHLORIDE 0.4 MG/1
0.4 CAPSULE ORAL DAILY
Status: DISCONTINUED | OUTPATIENT
Start: 2019-04-09 | End: 2019-04-11 | Stop reason: HOSPADM

## 2019-04-09 RX ORDER — ONDANSETRON 2 MG/ML
4 INJECTION INTRAMUSCULAR; INTRAVENOUS EVERY 6 HOURS PRN
Status: DISCONTINUED | OUTPATIENT
Start: 2019-04-09 | End: 2019-04-11 | Stop reason: HOSPADM

## 2019-04-09 RX ORDER — GABAPENTIN 300 MG/1
300 CAPSULE ORAL 2 TIMES DAILY
COMMUNITY
End: 2019-08-01 | Stop reason: SDUPTHER

## 2019-04-09 RX ORDER — GABAPENTIN 300 MG/1
300 CAPSULE ORAL 2 TIMES DAILY
Status: DISCONTINUED | OUTPATIENT
Start: 2019-04-09 | End: 2019-04-11 | Stop reason: HOSPADM

## 2019-04-09 RX ORDER — FAMOTIDINE 20 MG/1
40 TABLET, FILM COATED ORAL EVERY EVENING
Status: DISCONTINUED | OUTPATIENT
Start: 2019-04-09 | End: 2019-04-11 | Stop reason: HOSPADM

## 2019-04-09 RX ORDER — DULOXETIN HYDROCHLORIDE 30 MG/1
30 CAPSULE, DELAYED RELEASE ORAL NIGHTLY
COMMUNITY
End: 2020-11-09 | Stop reason: SDUPTHER

## 2019-04-09 RX ORDER — SODIUM CHLORIDE 0.9 % (FLUSH) 0.9 %
10 SYRINGE (ML) INJECTION EVERY 12 HOURS SCHEDULED
Status: DISCONTINUED | OUTPATIENT
Start: 2019-04-09 | End: 2019-04-11 | Stop reason: HOSPADM

## 2019-04-09 RX ADMIN — DULOXETINE HYDROCHLORIDE 30 MG: 30 CAPSULE, DELAYED RELEASE ORAL at 23:04

## 2019-04-09 RX ADMIN — ACETAMINOPHEN 650 MG: 325 TABLET, FILM COATED ORAL at 23:35

## 2019-04-09 RX ADMIN — Medication 1 G: at 17:23

## 2019-04-09 RX ADMIN — INSULIN LISPRO 2 UNITS: 100 INJECTION, SOLUTION INTRAVENOUS; SUBCUTANEOUS at 23:40

## 2019-04-09 RX ADMIN — SODIUM CHLORIDE 1000 ML: 9 INJECTION, SOLUTION INTRAVENOUS at 15:35

## 2019-04-09 RX ADMIN — GABAPENTIN 300 MG: 300 CAPSULE ORAL at 23:03

## 2019-04-09 RX ADMIN — FAMOTIDINE 40 MG: 20 TABLET ORAL at 23:04

## 2019-04-09 RX ADMIN — TAMSULOSIN HYDROCHLORIDE 0.4 MG: 0.4 CAPSULE ORAL at 23:02

## 2019-04-09 RX ADMIN — Medication 10 ML: at 23:10

## 2019-04-09 RX ADMIN — MELATONIN TAB 3 MG 3 MG: 3 TAB at 23:35

## 2019-04-09 ASSESSMENT — PAIN SCALES - GENERAL
PAINLEVEL_OUTOF10: 1
PAINLEVEL_OUTOF10: 0

## 2019-04-09 ASSESSMENT — ENCOUNTER SYMPTOMS
NAUSEA: 0
ABDOMINAL PAIN: 0
DIARRHEA: 0
VOMITING: 0
RHINORRHEA: 0
CONSTIPATION: 0
SHORTNESS OF BREATH: 0
BLOOD IN STOOL: 0
SORE THROAT: 0

## 2019-04-09 NOTE — TELEPHONE ENCOUNTER
Writer contacted [facility], ED provider to inform of 30 day readmission risk. ED provider informed writer admit or discharge has not been determined. Continue to follow-up.

## 2019-04-09 NOTE — ED NOTES
Pharmacy Medication History Note      List of current medications patient is taking is complete. Source of information: patient, family2    Changes made to medication list:  Medications flagged for removal (include reason, ex. noncompliance):  N/A    Medications removed (include reason, ex. therapy complete or physician discontinued):  Omnicef- therapy complete  Gabapentin- dose adjustment    Medications added/doses adjusted:  Gabapentin 300mg- TID    Other notes (ex. Recent course of antibiotics, Coumadin dosing):  INR 2.6 on 4/5/19 taking 1.25mg Sunday Tuesday Thursday 2.5mg all other days, monitored by PCP  Denies use of other OTC or herbal medications. Last dose times updated.    Liz

## 2019-04-09 NOTE — ED NOTES
Pt is in bed, respirations easy, even, and unlabored. No s/s of distress. Alert and orientated. Family at the bedside. Pt straight stick for 2nd blood culture, tolerated well.       Tavon Flores RN  04/09/19 8220

## 2019-04-09 NOTE — H&P
Hospital Medicine History & Physical      PCP: SHA Terry - CNP    Date of Admission: 4/9/2019    Date of Service: Pt seen/examined on 4/9/19  and Admitted to Inpatient with expected LOS greater than two midnights due to medical therapy. Chief Complaint:  Hematuria and dysuria       History Of Present Illness:     82 y.o. male  With h/o COPD, DM, BPH, h/o PE, CHF- systolic, BPH started having dysuria 2 weeks ago. He saw the urologist  And was given abx which did not help. This morning he started having hematuria too. Went to see urologist this morning and was sent here for eval. No abd pain or fever. No chest pain or SOB. Has been taking all meds compliantly. Leg swelling is at baseline.      Past Medical History:          Diagnosis Date    Abdominal pain, epigastric     Arthritis     Benign prostatic hypertrophy without urinary obstruction     BPH     Cellulitis and abscess     Chronic rhinitis     COPD (chronic obstructive pulmonary disease) (HCC)     Diabetes mellitus (HCC)     Dysphagia     Erectile dysfunction     GERD (gastroesophageal reflux disease)     Hx of blood clots     Hyperglycemia     Hypertension     lumbar radiculopathy     Neuropathy     Nocturia     Osteoarthritis     Other disorders of kidney and ureter in diseases classified elsewhere     Pain, joint, knee     Palpitations     skin cancer     Rt ear, nose, neck, hands/ 2018 lung    SOB (shortness of breath)     Tennis elbow     Tinea pedis     foot       Past Surgical History:          Procedure Laterality Date    CATARACT REMOVAL Bilateral 09/2014    CHOLECYSTECTOMY      COLONOSCOPY  11/28/2011    Dr. Ilda Hutchinson - mild sigmoid diverticulosis    EYE SURGERY      HIP SURGERY Right 09/09/2013    Dr. Genoveva Cisneros - block for pain relief    JOINT REPLACEMENT      KNEE PROSTHESIS REMOVAL      LUNG BIOPSY Left 05/09/2018    Dr. Vyas - CT guided    OTHER SURGICAL HISTORY      multiple lumbar ESIs    PAROTIDECTOMY Right 01/26/2017    Dr. Harjinder Palma - superficial, w/excision of parotid tail & dissection/preservation of facial nerve    [place] INJECT ANES/STEROID FORAMEN LUMBAR/SACRAL W IMG GUIDE ,1 LEVEL Right 11/21/2018    RIGHT L4, L5 LUMBAR TRANSFORAMINAL EPIDURAL STEROID INJECTION WITH FLUOROSCOPY performed by Emily Moctezuma MD at [address] [place] [address]      right x1 and left x2    TUNNELED VENOUS PORT PLACEMENT  06/22/2018    Left SCV infusaport placement    UPPER GASTROINTESTINAL ENDOSCOPY  03/19/2018    Dr. Olu Lombardi - mild non-obstructive ring distal esophagus       Medications Prior to Admission:      Prior to Admission medications    Medication Sig Start Date End Date Taking? Authorizing Provider   DULoxetine (CYMBALTA) 30 MG extended release capsule Take 30 mg by mouth daily   Yes Historical Provider, MD   gabapentin (NEURONTIN) 300 MG capsule Take 300 mg by mouth 3 times daily. Yes Historical Provider, MD   ranitidine (ZANTAC) 300 MG capsule TAKE ONE CAPSULE BY MOUTH EVERY EVENING 4/1/19  Yes SHA Duran CNP   metolazone (ZAROXOLYN) 2.5 MG tablet Take one tablet twice a week.  3/29/19  Yes Owen Garcia MD   lisinopril (PRINIVIL;ZESTRIL) 10 MG tablet Take 1 tablet by mouth daily 3/23/19  Yes Deshaun Garvin MD   furosemide (LASIX) 40 MG tablet Take 1 tablet by mouth 2 times daily 3/23/19  Yes Deshaun Garvin MD   Insulin Pen Needle 31G X 6 MM MISC 1 each by Does not apply route daily 2/6/19  Yes SHA Duran CNP   linagliptin (TRADJENTA) 5 MG tablet Take 1 tablet by mouth daily 11/9/18  Yes SHA Duran CNP   warfarin (COUMADIN) 2.5 MG tablet 1.25 mg on Sun, Thu; 2.5 mg all other days 9/11/18  Yes SHA Duran CNP   glipiZIDE (GLUCOTROL) 5 MG tablet Take 0.5 tablets by mouth daily (with breakfast) 6/11/18  Yes SHA Duran CNP   naphazoline-glycerin (CLEAR EYES REDNESS RELIEF) 0.012-0.2 % SOLN sounds. Musculoskeletal:  No clubbing, cyanosis or edema bilaterally. Full range of motion without deformity. Skin: Skin color, texture, turgor normal.  No rashes or lesions. Neurologic:  Neurovascularly intact without any focal sensory/motor deficits. Cranial nerves: II-XII intact, grossly non-focal.  Psychiatric:  Alert and oriented, thought content appropriate, normal insight  Capillary Refill: Brisk,< 3 seconds   Peripheral Pulses: +2 palpable, equal bilaterally       Labs:     Recent Labs     04/09/19  1454   WBC 8.6   HGB 10.7*   HCT 31.5*        Recent Labs     04/09/19  1454   *   K 3.3*   CL 89*   CO2 33*   BUN 59*   CREATININE 2.8*   CALCIUM 8.4     No results for input(s): AST, ALT, BILIDIR, BILITOT, ALKPHOS in the last 72 hours. No results for input(s): INR in the last 72 hours. No results for input(s): Trino Nichelle in the last 72 hours. Urinalysis:      Lab Results   Component Value Date    NITRU POSITIVE 04/09/2019    WBCUA 429 04/09/2019    BACTERIA 1+ 04/09/2019    RBCUA 169 04/09/2019    BLOODU LARGE 04/09/2019    SPECGRAV 1.012 04/09/2019    GLUCOSEU 100 04/09/2019         ASSESSMENT/PLAN:    1. Hematuria/dysuria: u/a - looks infected. ivf 1 liter given. Hold diuretics. Iv rocephin. Urine cx and bloodcx sent. Check INR? not done yet. Hold coumadin for now. 2. Urinary retention: 470 ml on bladder scan. H/o BPH. Cont flomax. Urology consulted. Gold gabbi placed    3. LUISANA/ckd  Stage3: baseline cr around 2. Now worse at 2.8. Holding diuretics and ACEI. Fluids given. Will follow up    4. H.o  systolic CHF: compensated. Hold diuretics . Monitor closely. Avelino CXR    5. DM type 2: hold glucotrol. Cont trajendta. SSI    6. H/o lung cancer  7. H.o PE in 2018: hold coumadin for now. checK inr                   DVT Prophylaxis: Was on coumadin - INR pending.    Diet: cardiac diet  Code Status: full    PT/OT Eval Status: not needed    Dispo - med-surg floor        [employer]

## 2019-04-09 NOTE — ED PROVIDER NOTES
I independently performed a history and physical on [address] [place] [address]. All diagnostic, treatment, and disposition decisions were made by myself in conjunction with the advanced practice provider. I have participated in the medical decision making and directed the treatment plan and disposition of the patient. For further details of Juanjo Campbell's emergency department encounter, please see the advanced practice provider's documentation. CHIEF COMPLAINT  Chief Complaint   Patient presents with    Urinary Tract Infection     pt states was at Dr. Mario Alberto Ramirez office for f/u from UTI- pt was on abx - per family pt \"no better\" pt with blood in urine      Briefly, [facility] is a 82 y.o. male  who presents to the ED complaining of hematuria and dysuria. Worsening this morning in particular. Sent here by urology office (Dr. Gustavo Liu) - was already on an antibiotic but family isn't sure which one. Has history of lung cancer and is done with chemotherapy since September. Denies abdominal pains, fevers, or n/v/d. Denies testicular pain. FOCUSED PHYSICAL EXAMINATION  /66   Pulse 84   Temp 97.9 °F (36.6 °C)   Resp 19   Ht 5' 10\" (1.778 m)   Wt 199 lb (90.3 kg)   SpO2 96%   BMI 28.55 kg/m²    Focused physical examination notable for no acute distress, well-appearing, well-nourished, normal speech and mentation without obvious facial droop, no obvious rash. No obvious cranial nerve deficits on my initial exam. RRR CTAB. Dry MM. Abd soft NTND without CVAT bilaterally. MDM:  Diagnostic considerations included kidney stone, pyelonephritis, UTI, appendicitis, bowel obstruction, diverticulitis, hernia, gastritis/gastroenteritis, pancreatitis, cholecystitis, hepatitis, constipation, IBS, IBD    ED course was notable for UTI with acute on chronic kidney injury. No abdominal pain and pt denies retention of urine.     I consulted and spoke with Dr. Ericka Dias from urology about the patient's ED history, physical, workup, and course so far. Recommendations from this consultant included bedside bladder scan showing retention, will place coudé catheter. LUISANA may be also related to post renal obstruction. Rocephin, hospitalist admit. SEP-1 CORE MEASURE DATA    Classification: exclude from core measure    Exclusion criteria: the patient is NOT to be included for sepsis due to: Alternative explanation for abnormal labs, specifically LUISANA appears to be related to post-renal retention of urine and not sepsis    During the patient's ED course, the patient was given:  Medications   0.9 % sodium chloride bolus (1,000 mLs Intravenous New Bag 4/9/19 1535)   cefTRIAXone (ROCEPHIN) 1 g in sterile water 10 mL IV syringe (1 g Intravenous Given 4/9/19 1723)        CLINICAL IMPRESSION  1. Acute cystitis with hematuria    2. LUISANA (acute kidney injury) ([facility] [address]    3. Acute urinary retention        [facility] was admitted in fair condition. I have discussed the findings of today's workup with the patient and addressed the patient's questions and concerns. The plan is to admit to the hospital at this time under the hospitalist service. I spoke with Dr. Jina Snider who accepted the patient and will take over the patient's care. The patient is agreeable with this plan. This chart was created using Dragon dictation software. Efforts were made by me to ensure accuracy, however some errors may be present due to limitations of this technology.             Ladan Espinosa MD  04/09/19 3950

## 2019-04-09 NOTE — ED PROVIDER NOTES
[facility]        Pt Name: Nell Ojeda  MRN: [id number]  [place] 1935  Date of evaluation: 4/9/2019  Provider: SHA Smalls CNP  PCP: SHA Moyer CNP      CHIEF COMPLAINT       Chief Complaint   Patient presents with    Urinary Tract Infection     pt states was at Dr. Malick Pham office for f/u from UTI- pt was on abx - per family pt \"no better\" pt with blood in urine        HISTORY OF PRESENT ILLNESS   (Location/Symptom, Timing/Onset,Context/Setting, Quality, Duration, Modifying Factors, Severity)  Note limiting factors. Nell Ojeda is a 82 y.o. male who presents here with concern for urinary tract infection. He was treated with anabiotic's with past week by his urologist.  No improvement. His urologist sent him to the ER for admission for IV antibiotics. He denies fever, rash, headaches, dizziness, chest pain, shortness of breath, cough, congestion, abdominal pain, nausea, vomiting, diarrhea, constipation, or blood in the stool. One friend/family member at bedside. Nursing Notes triage note reviewed and agreed with or any disagreements were addressed  in the HPI. REVIEW OF SYSTEMS    (2-9 systems for level 4, 10 or more for level 5)     Review of Systems   Constitutional: Negative for chills and fever. HENT: Negative for postnasal drip, rhinorrhea and sore throat. Eyes: Negative for visual disturbance. Respiratory: Negative for shortness of breath. Cardiovascular: Negative for chest pain. Gastrointestinal: Negative for abdominal pain, blood in stool, constipation, diarrhea, nausea and vomiting. Genitourinary: Positive for dysuria. Negative for flank pain and hematuria. Skin: Negative for rash. Neurological: Negative for weakness and headaches. All other systems reviewed and are negative. Positives and Pertinent negatives as per HPI.   Except as noted above in the ROS, all other systems were reviewed and negative.        PAST MEDICAL HISTORY     Past Medical History:   Diagnosis Date    Abdominal pain, epigastric     Arthritis     Benign prostatic hypertrophy without urinary obstruction     BPH     Cellulitis and abscess     Chronic rhinitis     COPD (chronic obstructive pulmonary disease) (HCC)     Diabetes mellitus (HCC)     Dysphagia     Erectile dysfunction     GERD (gastroesophageal reflux disease)     Hx of blood clots     Hyperglycemia     Hypertension     lumbar radiculopathy     Neuropathy     Nocturia     Osteoarthritis     Other disorders of kidney and ureter in diseases classified elsewhere     Pain, joint, knee     Palpitations     skin cancer     Rt ear, nose, neck, hands/ 2018 lung    SOB (shortness of breath)     Tennis elbow     Tinea pedis     foot         SURGICAL HISTORY       Past Surgical History:   Procedure Laterality Date    CATARACT REMOVAL Bilateral 09/2014    CHOLECYSTECTOMY      COLONOSCOPY  11/28/2011    Dr. Nyasia Felix - mild sigmoid diverticulosis    EYE SURGERY      HIP SURGERY Right 09/09/2013    Dr. Sharon Gardner - block for pain relief    JOINT REPLACEMENT      KNEE PROSTHESIS REMOVAL      LUNG BIOPSY Left 05/09/2018    Dr. Vyas - CT guided    OTHER SURGICAL HISTORY      multiple lumbar ESIs    PAROTIDECTOMY Right 01/26/2017    Dr. Yaron Campos - superficial, w/excision of parotid tail & dissection/preservation of facial nerve    [place] INJECT ANES/STEROID FORAMEN LUMBAR/SACRAL W IMG GUIDE ,1 LEVEL Right 11/21/2018    RIGHT L4, L5 LUMBAR TRANSFORAMINAL EPIDURAL STEROID INJECTION WITH FLUOROSCOPY performed by Gal Hickey MD at [address] [place] [address]      right x1 and left x2    TUNNELED VENOUS PORT PLACEMENT  06/22/2018    Left SCV infusaport placement    UPPER GASTROINTESTINAL ENDOSCOPY  03/19/2018    Dr. Kirsten Franklin - mild non-obstructive ring distal esophagus         CURRENT MEDICATIONS Previous Medications    CEFDINIR (OMNICEF) 300 MG CAPSULE        DEXAMETHASONE (DECADRON) 4 MG TABLET    Take 4 mg by mouth    DULOXETINE (CYMBALTA) 30 MG EXTENDED RELEASE CAPSULE    Take 1 capsule by mouth 2 times daily    FUROSEMIDE (LASIX) 40 MG TABLET    Take 1 tablet by mouth 2 times daily    GABAPENTIN (NEURONTIN) 300 MG CAPSULE    Take 1 capsule by mouth daily for 30 days. GLIPIZIDE (GLUCOTROL) 5 MG TABLET    Take 0.5 tablets by mouth daily (with breakfast)    GLUCOSE MONITORING KIT (FREESTYLE) MONITORING KIT    1 kit by Does not apply route daily as needed. INSULIN PEN NEEDLE 31G X 6 MM MISC    1 each by Does not apply route daily    LATANOPROST (XALATAN) 0.005 % OPHTHALMIC SOLUTION    Place 1 drop into both eyes nightly. LINAGLIPTIN (TRADJENTA) 5 MG TABLET    Take 1 tablet by mouth daily    LISINOPRIL (PRINIVIL;ZESTRIL) 10 MG TABLET    Take 1 tablet by mouth daily    MAGNESIUM OXIDE (MAG-OX) 400 (240 MG) MG TABLET    Take 1 tablet by mouth 2 times daily    METOLAZONE (ZAROXOLYN) 2.5 MG TABLET    Take one tablet twice a week. NAPHAZOLINE-GLYCERIN (CLEAR EYES REDNESS RELIEF) 0.012-0.2 % SOLN OPHTHALMIC SOLUTION    Place 1 drop into both eyes 2 times daily    POTASSIUM CHLORIDE (KLOR-CON M) 20 MEQ EXTENDED RELEASE TABLET    Take 1 tablet by mouth 2 times daily for 5 days    RANITIDINE (ZANTAC) 300 MG CAPSULE    TAKE ONE CAPSULE BY MOUTH EVERY EVENING    TAMSULOSIN (FLOMAX) 0.4 MG CAPSULE    Take 1 capsule by mouth daily    WARFARIN (COUMADIN) 2.5 MG TABLET    1.25 mg on Sun, Thu; 2.5 mg all other days         ALLERGIES     Celebrex [celecoxib]; Elavil [amitriptyline];  Naproxen; Percocet [oxycodone-acetaminophen]; and Lyrica [pregabalin]    FAMILY HISTORY       Family History   Problem Relation Age of Onset    Arthritis Father     Diabetes Father     Diabetes Brother           SOCIAL HISTORY       Social History     Socioeconomic History    Marital status:      Spouse name: Al Burson Number of children: None    Years of education: None    Highest education level: None   Occupational History    None   Social Needs    Financial resource strain: None    Food insecurity:     Worry: None     Inability: None    Transportation needs:     Medical: None     Non-medical: None   Tobacco Use    Smoking status: Former Smoker     Packs/day: 1.00     Years: 40.00     Pack years: 40.00     Last attempt to quit: 8/15/1970     Years since quittin.6    Smokeless tobacco: Never Used   Substance and Sexual Activity    Alcohol use: No    Drug use: No    Sexual activity: Yes     Partners: Female   Lifestyle    Physical activity:     Days per week: None     Minutes per session: None    Stress: None   Relationships    Social connections:     Talks on phone: None     Gets together: None     Attends Mu-ism service: None     Active member of club or organization: None     Attends meetings of clubs or organizations: None     Relationship status: None    Intimate partner violence:     Fear of current or ex partner: None     Emotionally abused: None     Physically abused: None     Forced sexual activity: None   Other Topics Concern    None   Social History Narrative    None       SCREENINGS             PHYSICAL EXAM  (up to 7 for level 4, 8 or more for level 5)     ED Triage Vitals [19 1248]   BP Temp Temp src Pulse Resp SpO2 Height Weight   108/70 97.9 °F (36.6 °C) -- 95 12 91 % 5' 10\" (1.778 m) 199 lb (90.3 kg)       Physical Exam   Constitutional: He is oriented to person, place, and time. He appears well-developed and well-nourished. HENT:   Head: Normocephalic and atraumatic. Eyes: Right eye exhibits no discharge. Left eye exhibits no discharge. No scleral icterus. Neck: Normal range of motion. Neck supple. Cardiovascular: Normal rate, regular rhythm, normal heart sounds and intact distal pulses. Exam reveals no gallop and no friction rub. No murmur heard.   Pulmonary/Chest: Effort normal and breath sounds normal. No stridor. No respiratory distress. He has no wheezes. He has no rales. He exhibits no tenderness. Abdominal: Soft. Bowel sounds are normal. He exhibits no distension and no mass. There is no tenderness. There is no rebound and no guarding. Musculoskeletal: Normal range of motion. He exhibits no edema or tenderness. Neurological: He is alert and oriented to person, place, and time. Coordination normal.   Skin: Skin is warm and dry. He is not diaphoretic. No pallor. Psychiatric: He has a normal mood and affect. His behavior is normal.   Nursing note and vitals reviewed.       DIAGNOSTIC RESULTS   LABS:    Labs Reviewed   URINALYSIS - Abnormal; Notable for the following components:       Result Value    Clarity, UA CLOUDY (*)     Glucose, Ur 100 (*)     Blood, Urine LARGE (*)     Protein,  (*)     Nitrite, Urine POSITIVE (*)     Leukocyte Esterase, Urine LARGE (*)     All other components within normal limits    Narrative:     Performed at:  [facility] [address],  [employer]   Phone [phone]   CBC WITH AUTO DIFFERENTIAL - Abnormal; Notable for the following components:    RBC 3.65 (*)     Hemoglobin 10.7 (*)     Hematocrit 31.5 (*)     RDW 17.4 (*)     All other components within normal limits    Narrative:     Performed at:  [facility] [address],  [employer]   Phone [phone]   [address] MG FOR LOW K - Abnormal; Notable for the following components:    Sodium 135 (*)     Potassium reflex Magnesium 3.3 (*)     Chloride 89 (*)     CO2 33 (*)     Glucose 252 (*)     BUN 59 (*)     CREATININE 2.8 (*)     GFR Non- 22 (*)     GFR  26 (*)     All other components within normal limits    Narrative:     Performed at:  [facility] [address],  [employer]   Phone [phone]   MICROSCOPIC URINALYSIS - Abnormal; Notable for the following components:    Bacteria, UA 1+ (*)     WBC,  (*)     RBC,  (*)     All other components within normal limits    Narrative:     Performed at:  [facility]  [address] [facility],  [place], [address]   Phone [phone]   URINE CULTURE   CULTURE BLOOD #1   CULTURE BLOOD #2   LACTATE, SEPSIS    Narrative:     Performed at:  [facility]  [address] [facility], [address] [employer]   Phone [phone]   MAGNESIUM    Narrative:     Performed at:  [facility]  [address] [facility],  [place], [address] [employer]   Phone [phone]       All other labs werewithin normal range or not returned as of this dictation. EKG: All EKG's are interpreted by the Emergency Department Physician who either signs or Co-signs this chart in the absence of acardiologist.  Please see their note for interpretation of EKG. RADIOLOGY:   Interpretation per the Radiologist below, if available at the time of this note:    No orders to display     No results found. PROCEDURES   Unless otherwise noted below, none     Procedures    CRITICAL CARE TIME     n/a    CONSULTS:  IP CONSULT TO UROLOGY      EMERGENCY DEPARTMENT COURSE and DIFFERENTIAL DIAGNOSIS/MDM:   Vitals:    Vitals:    04/09/19 1430 04/09/19 1445 04/09/19 1500 04/09/19 1515   BP: 132/77 128/70 (!) 113/51 124/66   Pulse: 83 84 89 84   Resp: 18 14 27 19   Temp:       SpO2: 94% 94% 97% 96%   Weight:       Height:           Ray B Adrian was given the following medications:  Medications   0.9 % sodium chloride bolus (1,000 mLs Intravenous New Bag 4/9/19 1535)       Patricia Garcia was evaluated in the emergency department with concern for urinary tract infection. No improvement on outpatient antibiotics. He was sent in by urology. Urinalysis does show concern for infection but we do not have a recent culture.   Additionally, there is concern for acute kidney injury. He was treated with IV fluids for this. We are waiting urology to call us back regarding this patient. As it is the end of my shift, my attending will follow up on these results and disposition the patient. Please see attending note for further MDM, consults, and disposition. The patient tolerated their visit well. They were seen and evaluated by the attending physician, Candy Argueta MD , who agreed with the assessment and plan. The patient and / or the family were informed of the results of any tests, a time was given to answer questions, a plan was proposed and they agreed with plan. FINAL IMPRESSION      1. Acute cystitis with hematuria    2.  LUISANA (acute kidney injury) ([facility] [address]          DISPOSITION/PLAN   DISPOSITION          (Please note that portions of this note were completed with a voice recognition program.  Efforts were made to edit the dictations but occasionally words are mis-transcribed.)    SHA Alvarado CNP (electronically signed)        SHA Alvarado CNP  04/09/19 5117

## 2019-04-09 NOTE — CARE COORDINATION
Discharge Planning Assessment  SW discharge planner met with patient and spouse to discuss reason for admission, current living situation, and potential needs at the time of discharge. Pt in ED d/t Acute Cystitis    Demographics/Insurance verified Yes    Current type of dwelling:  House    Living arrangements:  w/spouse    Level of function/Support:  Pt still uses cane for balance. Spouse is supportive and reports has 2 sons who come by daily after work to help with needs. PCP:  Dr. Germaine Galaviz    Last Visit to PCP:  03/2019    DME:  [place], wheelchair    Active with any community resources/agencies/skilled home care:  No.  Pt and spouse reported no non-skilled needs at home. Medication compliance issues:  No.  Wife assists    Financial issues that could impact healthcare:  No    Transportation at the time of discharge:  Pt does not drive. Spouse can assist.    Tentative discharge plan:  Discuss HHC as possibility. Pt unsure if wants/needs it at this time. SW encouraged to pursue if recommended at discharge since this is the 2nd admission for pt in the last 3 weeks.     Electronically signed by SARAH Morris, MARLA on 4/9/2019 at 7:25 PM

## 2019-04-10 LAB
ANION GAP SERPL CALCULATED.3IONS-SCNC: 14 MMOL/L (ref 3–16)
BUN BLDV-MCNC: 52 MG/DL (ref 7–20)
CALCIUM SERPL-MCNC: 8.4 MG/DL (ref 8.3–10.6)
CHLORIDE BLD-SCNC: 95 MMOL/L (ref 99–110)
CO2: 31 MMOL/L (ref 21–32)
CREAT SERPL-MCNC: 2.4 MG/DL (ref 0.8–1.3)
GFR AFRICAN AMERICAN: 32
GFR NON-AFRICAN AMERICAN: 26
GLUCOSE BLD-MCNC: 196 MG/DL (ref 70–99)
GLUCOSE BLD-MCNC: 228 MG/DL (ref 70–99)
GLUCOSE BLD-MCNC: 262 MG/DL (ref 70–99)
GLUCOSE BLD-MCNC: 295 MG/DL (ref 70–99)
GLUCOSE BLD-MCNC: 302 MG/DL (ref 70–99)
HCT VFR BLD CALC: 32.3 % (ref 40.5–52.5)
HEMOGLOBIN: 10.9 G/DL (ref 13.5–17.5)
INR BLD: 1.87 (ref 0.86–1.14)
MAGNESIUM: 1.7 MG/DL (ref 1.8–2.4)
MCH RBC QN AUTO: 28.5 PG (ref 26–34)
MCHC RBC AUTO-ENTMCNC: 33.7 G/DL (ref 31–36)
MCV RBC AUTO: 84.5 FL (ref 80–100)
PDW BLD-RTO: 17.1 % (ref 12.4–15.4)
PERFORMED ON: ABNORMAL
PLATELET # BLD: 192 K/UL (ref 135–450)
PMV BLD AUTO: 8.3 FL (ref 5–10.5)
POTASSIUM REFLEX MAGNESIUM: 3.4 MMOL/L (ref 3.5–5.1)
PROTHROMBIN TIME: 21.3 SEC (ref 9.8–13)
RBC # BLD: 3.82 M/UL (ref 4.2–5.9)
SODIUM BLD-SCNC: 140 MMOL/L (ref 136–145)
WBC # BLD: 8 K/UL (ref 4–11)

## 2019-04-10 PROCEDURE — 85027 COMPLETE CBC AUTOMATED: CPT

## 2019-04-10 PROCEDURE — 36415 COLL VENOUS BLD VENIPUNCTURE: CPT

## 2019-04-10 PROCEDURE — 6360000002 HC RX W HCPCS: Performed by: INTERNAL MEDICINE

## 2019-04-10 PROCEDURE — 83735 ASSAY OF MAGNESIUM: CPT

## 2019-04-10 PROCEDURE — 85610 PROTHROMBIN TIME: CPT

## 2019-04-10 PROCEDURE — 6370000000 HC RX 637 (ALT 250 FOR IP): Performed by: INTERNAL MEDICINE

## 2019-04-10 PROCEDURE — 80048 BASIC METABOLIC PNL TOTAL CA: CPT

## 2019-04-10 PROCEDURE — 1200000000 HC SEMI PRIVATE

## 2019-04-10 PROCEDURE — 2580000003 HC RX 258: Performed by: INTERNAL MEDICINE

## 2019-04-10 RX ORDER — POTASSIUM CHLORIDE 20 MEQ/1
20 TABLET, EXTENDED RELEASE ORAL ONCE
Status: COMPLETED | OUTPATIENT
Start: 2019-04-10 | End: 2019-04-10

## 2019-04-10 RX ORDER — NICOTINE POLACRILEX 4 MG
15 LOZENGE BUCCAL PRN
Status: DISCONTINUED | OUTPATIENT
Start: 2019-04-10 | End: 2019-04-11 | Stop reason: HOSPADM

## 2019-04-10 RX ORDER — SODIUM CHLORIDE 9 MG/ML
INJECTION, SOLUTION INTRAVENOUS CONTINUOUS
Status: DISPENSED | OUTPATIENT
Start: 2019-04-10 | End: 2019-04-10

## 2019-04-10 RX ORDER — SODIUM CHLORIDE 9 MG/ML
INJECTION, SOLUTION INTRAVENOUS CONTINUOUS
Status: DISCONTINUED | OUTPATIENT
Start: 2019-04-10 | End: 2019-04-10

## 2019-04-10 RX ORDER — WARFARIN SODIUM 2.5 MG/1
1.25 TABLET ORAL
Status: DISCONTINUED | OUTPATIENT
Start: 2019-04-11 | End: 2019-04-11

## 2019-04-10 RX ORDER — DEXTROSE MONOHYDRATE 25 G/50ML
12.5 INJECTION, SOLUTION INTRAVENOUS PRN
Status: DISCONTINUED | OUTPATIENT
Start: 2019-04-10 | End: 2019-04-11 | Stop reason: HOSPADM

## 2019-04-10 RX ORDER — WARFARIN SODIUM 2.5 MG/1
2.5 TABLET ORAL
Status: DISCONTINUED | OUTPATIENT
Start: 2019-04-10 | End: 2019-04-11 | Stop reason: HOSPADM

## 2019-04-10 RX ORDER — DEXTROSE MONOHYDRATE 50 MG/ML
100 INJECTION, SOLUTION INTRAVENOUS PRN
Status: DISCONTINUED | OUTPATIENT
Start: 2019-04-10 | End: 2019-04-11 | Stop reason: HOSPADM

## 2019-04-10 RX ADMIN — Medication 1 G: at 09:34

## 2019-04-10 RX ADMIN — FAMOTIDINE 40 MG: 20 TABLET ORAL at 18:06

## 2019-04-10 RX ADMIN — GABAPENTIN 300 MG: 300 CAPSULE ORAL at 09:34

## 2019-04-10 RX ADMIN — LINAGLIPTIN 5 MG: 5 TABLET, FILM COATED ORAL at 09:34

## 2019-04-10 RX ADMIN — GABAPENTIN 300 MG: 300 CAPSULE ORAL at 21:27

## 2019-04-10 RX ADMIN — WARFARIN SODIUM 2.5 MG: 2.5 TABLET ORAL at 18:06

## 2019-04-10 RX ADMIN — SODIUM CHLORIDE: 9 INJECTION, SOLUTION INTRAVENOUS at 11:32

## 2019-04-10 RX ADMIN — Medication 400 MG: at 11:29

## 2019-04-10 RX ADMIN — INSULIN LISPRO 2 UNITS: 100 INJECTION, SOLUTION INTRAVENOUS; SUBCUTANEOUS at 21:28

## 2019-04-10 RX ADMIN — INSULIN LISPRO 4 UNITS: 100 INJECTION, SOLUTION INTRAVENOUS; SUBCUTANEOUS at 13:26

## 2019-04-10 RX ADMIN — POTASSIUM CHLORIDE 20 MEQ: 1500 TABLET, EXTENDED RELEASE ORAL at 11:29

## 2019-04-10 RX ADMIN — DULOXETINE HYDROCHLORIDE 30 MG: 30 CAPSULE, DELAYED RELEASE ORAL at 09:34

## 2019-04-10 RX ADMIN — TAMSULOSIN HYDROCHLORIDE 0.4 MG: 0.4 CAPSULE ORAL at 09:34

## 2019-04-10 RX ADMIN — INSULIN LISPRO 3 UNITS: 100 INJECTION, SOLUTION INTRAVENOUS; SUBCUTANEOUS at 18:06

## 2019-04-10 RX ADMIN — INSULIN LISPRO 2 UNITS: 100 INJECTION, SOLUTION INTRAVENOUS; SUBCUTANEOUS at 09:36

## 2019-04-10 ASSESSMENT — PAIN SCALES - GENERAL
PAINLEVEL_OUTOF10: 0

## 2019-04-10 NOTE — ED NOTES
Patient ambulated to bathroom using cane and nurse assistance, patient needed to have a bowel movement.      Negar Torres RN  04/09/19 7090

## 2019-04-10 NOTE — CONSULTS
kidney and ureter in diseases classified elsewhere, Pain, joint, knee, Palpitations, skin cancer, SOB (shortness of breath), Tennis elbow, and Tinea pedis. Past Surgical History:  He has a past surgical history that includes Knee Prosthesis Removal; Cholecystectomy; Colonoscopy (11/28/2011); Cataract removal (Bilateral, 09/2014); Parotidectomy (Right, 01/26/2017); skin biopsy; Upper gastrointestinal endoscopy (03/19/2018); Total knee arthroplasty; hip surgery (Right, 09/09/2013); other surgical history; Lung biopsy (Left, 05/09/2018); Tunneled venous port placement (06/22/2018); pr inject anes/steroid foramen lumbar/sacral w img guide ,1 level (Right, 11/21/2018); eye surgery; and joint replacement. Allergies: Allergies   Allergen Reactions    Celebrex [Celecoxib] Other (See Comments)     hallucinations    Elavil [Amitriptyline]      Severe fatigue      Naproxen      Kidney damage    Percocet [Oxycodone-Acetaminophen] Other (See Comments)     confusion    Lyrica [Pregabalin] Palpitations     Fatigue       Social History:  He reports that he quit smoking about 48 years ago. He has a 40.00 pack-year smoking history. He has never used smokeless tobacco. He reports that he does not drink alcohol or use drugs. Family History:  family history includes Arthritis in his father; Diabetes in his brother and father.     Medications:   Scheduled Meds:   DULoxetine  30 mg Oral Daily    gabapentin  300 mg Oral BID    linagliptin  5 mg Oral Daily    tamsulosin  0.4 mg Oral Daily    famotidine  40 mg Oral QPM    insulin lispro  0-6 Units Subcutaneous TID WC    insulin lispro  0-3 Units Subcutaneous Nightly    sodium chloride flush  10 mL Intravenous 2 times per day    [START ON 4/10/2019] cefTRIAXone (ROCEPHIN) IV  1 g Intravenous Q12H     Continuous Infusions:  PRN Meds:sodium chloride flush, magnesium hydroxide, ondansetron    Review of Systems:  Constitutional: Negative for fever    Genitourinary: see HPI  Eyes: negative for sudden change in vision  EENT: no complaints  Cardiovascular: Negative for chest pain  Respiratory: Negative for shortness of breath  Gastrointestinal: Negative for nausea  Musculoskeletal: Negative for back pain   Neurological: Negative for weakness  Psychiatric: Negative for anxiety  Integumentary: Negative for rashes or adenopathy     Physical Exam:  Vitals:    04/09/19 2100   BP:    Pulse:    Resp:    Temp: 98.8 °F (37.1 °C)   SpO2:      Constitutional: NAD, well-developed, well-nourished. HEENT: MMM. Hearing intact. PERRL  Neck: no thyroid masses appreciated. Trachea is midline. Neck appears unremarkable   Lymph: no palpable adenopathy in supraclavicular, or axillary lymph nodes  Cardiovascular: Regular rate. No peripheral edema  Respiratory: Respirations are even and non-labored. No audible breath sounds. Abdomen: Soft. No distension, tenderness, hernias, masses or guarding. No CVA tenderness. No hernias appreciated. Liver and spleen appear normal  Psychiatric: A + O x 3, normal affect. Insight appears intact. Muskuloskeletal: EMLIA x 4   Skin: Pink, warm and dry. No rashes on face and arms. Labs:  Lab Results   Component Value Date    WBC 8.6 04/09/2019    HGB 10.7 (L) 04/09/2019    HCT 31.5 (L) 04/09/2019    MCV 86.4 04/09/2019     04/09/2019     Lab Results   Component Value Date    CREATININE 2.8 (H) 04/09/2019    BUN 59 (H) 04/09/2019     (L) 04/09/2019    K 3.3 (L) 04/09/2019    CL 89 (L) 04/09/2019    CO2 33 (H) 04/09/2019     Lab Results   Component Value Date    PSA 3.69 09/17/2014    PSA 3.6 08/15/2012    PSA 3.92 11/03/2010        Imaging:   Within the chest there is increased tangential paramediastinal opacity on the   left, obscuring the previously described left lower lobe nodule-mass.       Increased ground-glass opacities in the apices compared to prior.    Ground-glass opacities are nonspecific and can be due to atelectasis, early   pneumonia, or early edema.       Stable CT abdomen and pelvis.  Marked prostate enlargement is again noted as   is a mildly enlarged left iliac chain lymph node     Maria Luisa Johnson MD  4/9/2019

## 2019-04-10 NOTE — ED NOTES
Patient transported out of ED via wheelchair by ED tech April to be transported to [facility] room 485.      Brian Thomas RN  04/09/19 4888

## 2019-04-10 NOTE — PROGRESS NOTES
sensory/motor deficits. Cranial nerves: II-XII intact, grossly non-focal.  Psychiatric: Alert and oriented, thought content appropriate, normal insight  Capillary Refill: Brisk,< 3 seconds   Peripheral Pulses: +2 palpable, equal bilaterally       Labs:   Recent Labs     04/09/19  1454 04/10/19  0618   WBC 8.6 8.0   HGB 10.7* 10.9*   HCT 31.5* 32.3*    192     Recent Labs     04/09/19  1454 04/10/19  0618   * 140   K 3.3* 3.4*   CL 89* 95*   CO2 33* 31   BUN 59* 52*   CREATININE 2.8* 2.4*   CALCIUM 8.4 8.4     Recent Labs     04/09/19  1929   AST 11*   ALT 9*   BILIDIR <0.2   BILITOT 0.3   ALKPHOS 66     Recent Labs     04/09/19  1453   INR 2.11*     No results for input(s): Nedra Diaz in the last 72 hours. Urinalysis:      Lab Results   Component Value Date    NITRU POSITIVE 04/09/2019    WBCUA 429 04/09/2019    BACTERIA 1+ 04/09/2019    RBCUA 169 04/09/2019    BLOODU LARGE 04/09/2019    SPECGRAV 1.012 04/09/2019    GLUCOSEU 100 04/09/2019       Radiology:  XR CHEST PORTABLE   Final Result   Ill-defined soft tissue fullness within the left perihilar region not   significantly changed over 3 weeks but new from June 2018. Underlying   malignancy and/or treatment related changes possibly from radiation should be   considered. Pneumonia felt to be less likely. Please refer to recent chest CT report of March 21, 2019. Assessment/Plan:    Active Hospital Problems    Diagnosis Date Noted    Hematuria [R31.9] 04/09/2019     1.  acute cystitis: presented with Hematuria/dysuria: u/a - looks infected. Cont  Iv rocephin. Urine cx and bloodcx pending. Holding coumadin. Restart coumadin and monitor          2. Urinary retention: 470 ml on bladder scan. H/o BPH. Cont flomax. Urology consulted. Gold  placed     3. LUISANA/ckd  Stage3: baseline cr around 2. Presented with  2.8. Holding diuretics and ACEI. Fluids given. Cr improved to 2.4 today. Given ivf for 12 more hrs.  BMP in am    4. H.o  systolic CHF: compensated. Holding  diuretics .      5. DM type 2: holding glucotrol. Cont trajendta. SSI     6. H/o lung cancer    7. H.o PE in 2018: restart coumadin today        8. Hypokalemia/magnesemia: replace today.         DVT Prophylaxis:   coumadin  .    Diet: cardiac diet  Code Status: full     PT/OT Eval Status: not needed     Dispo -  inpt 1 more day    John Rivera MD

## 2019-04-10 NOTE — ED NOTES
Patient ambulated out of bathroom and sat in a wheelchair for transport to [address] [facility] room 485.      Frank Carvajal RN  04/09/19 4996

## 2019-04-11 VITALS
WEIGHT: 199.2 LBS | HEIGHT: 70 IN | RESPIRATION RATE: 16 BRPM | TEMPERATURE: 98.5 F | DIASTOLIC BLOOD PRESSURE: 88 MMHG | SYSTOLIC BLOOD PRESSURE: 164 MMHG | HEART RATE: 85 BPM | BODY MASS INDEX: 28.52 KG/M2 | OXYGEN SATURATION: 93 %

## 2019-04-11 LAB
ANION GAP SERPL CALCULATED.3IONS-SCNC: 10 MMOL/L (ref 3–16)
BUN BLDV-MCNC: 37 MG/DL (ref 7–20)
CALCIUM SERPL-MCNC: 8.2 MG/DL (ref 8.3–10.6)
CHLORIDE BLD-SCNC: 97 MMOL/L (ref 99–110)
CO2: 32 MMOL/L (ref 21–32)
CREAT SERPL-MCNC: 2 MG/DL (ref 0.8–1.3)
GFR AFRICAN AMERICAN: 39
GFR NON-AFRICAN AMERICAN: 32
GLUCOSE BLD-MCNC: 199 MG/DL (ref 70–99)
GLUCOSE BLD-MCNC: 221 MG/DL (ref 70–99)
GLUCOSE BLD-MCNC: 234 MG/DL (ref 70–99)
INR BLD: 1.77 (ref 0.86–1.14)
ORGANISM: ABNORMAL
PERFORMED ON: ABNORMAL
PERFORMED ON: ABNORMAL
POTASSIUM SERPL-SCNC: 3.9 MMOL/L (ref 3.5–5.1)
PROTHROMBIN TIME: 20.2 SEC (ref 9.8–13)
SODIUM BLD-SCNC: 139 MMOL/L (ref 136–145)
URINE CULTURE, ROUTINE: ABNORMAL
URINE CULTURE, ROUTINE: ABNORMAL

## 2019-04-11 PROCEDURE — 6370000000 HC RX 637 (ALT 250 FOR IP): Performed by: INTERNAL MEDICINE

## 2019-04-11 PROCEDURE — 6360000002 HC RX W HCPCS: Performed by: INTERNAL MEDICINE

## 2019-04-11 PROCEDURE — 85610 PROTHROMBIN TIME: CPT

## 2019-04-11 PROCEDURE — 80048 BASIC METABOLIC PNL TOTAL CA: CPT

## 2019-04-11 RX ORDER — LACTOBACILLUS RHAMNOSUS GG 10B CELL
1 CAPSULE ORAL DAILY
Qty: 30 CAPSULE | Refills: 0 | Status: SHIPPED | OUTPATIENT
Start: 2019-04-11 | End: 2019-07-25

## 2019-04-11 RX ORDER — WARFARIN SODIUM 2.5 MG/1
2.5 TABLET ORAL
Status: DISCONTINUED | OUTPATIENT
Start: 2019-04-11 | End: 2019-04-11 | Stop reason: HOSPADM

## 2019-04-11 RX ORDER — LISINOPRIL 10 MG/1
10 TABLET ORAL DAILY
Status: DISCONTINUED | OUTPATIENT
Start: 2019-04-11 | End: 2019-04-11 | Stop reason: HOSPADM

## 2019-04-11 RX ORDER — CIPROFLOXACIN 500 MG/1
500 TABLET, FILM COATED ORAL 2 TIMES DAILY
Qty: 28 TABLET | Refills: 0 | Status: SHIPPED | OUTPATIENT
Start: 2019-04-11 | End: 2019-04-25

## 2019-04-11 RX ORDER — WARFARIN SODIUM 2.5 MG/1
1.25 TABLET ORAL
Status: DISCONTINUED | OUTPATIENT
Start: 2019-04-13 | End: 2019-04-11 | Stop reason: HOSPADM

## 2019-04-11 RX ADMIN — TAMSULOSIN HYDROCHLORIDE 0.4 MG: 0.4 CAPSULE ORAL at 09:06

## 2019-04-11 RX ADMIN — INSULIN LISPRO 2 UNITS: 100 INJECTION, SOLUTION INTRAVENOUS; SUBCUTANEOUS at 13:10

## 2019-04-11 RX ADMIN — Medication 1 G: at 09:06

## 2019-04-11 RX ADMIN — Medication 400 MG: at 09:07

## 2019-04-11 RX ADMIN — LINAGLIPTIN 5 MG: 5 TABLET, FILM COATED ORAL at 09:07

## 2019-04-11 RX ADMIN — INSULIN LISPRO 2 UNITS: 100 INJECTION, SOLUTION INTRAVENOUS; SUBCUTANEOUS at 09:07

## 2019-04-11 RX ADMIN — GABAPENTIN 300 MG: 300 CAPSULE ORAL at 09:06

## 2019-04-11 RX ADMIN — LISINOPRIL 10 MG: 10 TABLET ORAL at 14:52

## 2019-04-11 RX ADMIN — DULOXETINE HYDROCHLORIDE 30 MG: 30 CAPSULE, DELAYED RELEASE ORAL at 09:06

## 2019-04-11 ASSESSMENT — PAIN SCALES - GENERAL: PAINLEVEL_OUTOF10: 0

## 2019-04-11 NOTE — PROGRESS NOTES
Patient provided with discharge instructions, discussed in detail, new medications reviewed including use and side effects. Patient verbalized understanding. Prescriptions sent to pharmacy, RN educated pt and wife on leg bag  All questions answered, family at the bedside to transport home.   Patient discharged home with all belongings

## 2019-04-11 NOTE — PROGRESS NOTES
Shift assessment complete. Vital signs stable. Alert and oriented, but wife states confused at times. Denies pain or discomfort. Vanessa care, bryant care provided, and 800ml urine emptied. Repositioned in bed for comfort. Night meds administered (see MAR). Wife remains at bedside. The care plan and education has been reviewed and mutually agreed upon with the patient. Fall precautions in place. All needs met at this time. Will continue to monitor.

## 2019-04-11 NOTE — PROGRESS NOTES
Urology Progress Note  [facility]    Provider: Kimberley Gomes MD Patient ID:  Admission Date: 2019 Name: Darryl Guevara Date: 2019 MRN: [id number]   Patient Location: [id number] : 1935  Attending: Manuelito Courtney MD Date of Service: 2019  PCP: SHA Shelby CNP     Diagnoses:  1. Acute cystitis with hematuria    2. LUISANA (acute kidney injury) ([address]    3. Acute urinary retention       Assessment/Plan:  81 yo M with UTI, LUISANA, AUR, and GH. Bryant in place now with CYU. UCx with 100K CFU GNR and on abx    - follow up culture, cx specific abx per medicine  - ok for dc from  standpoint when abx plan finalized. Will go home with braynt, will need leg bag with leg bag teaching, and will call for follow up early next week for bryant removal          Subjective:   Jem Mendez is a 82 y.o. male. He was seen and examined this morning. Today we discussed home with bryant, waiting for Cx results, and VT next week with Dr. Adela Pham     Objective:   Vitals:  Vitals:    19 0845   BP: (!) 147/94   Pulse: 92   Resp: 16   Temp: 97.9 °F (36.6 °C)   SpO2: 92%       Intake/Output Summary (Last 24 hours) at 2019 1114  Last data filed at 2019 0616  Gross per 24 hour   Intake 1502 ml   Output 2400 ml   Net -898 ml     Physical Exam:  Gen: Alert and oriented x3, no acute distress  CV: Regular rate   Resp: unlabored respirations  Abd: Soft, non-distended, non-tender, no masses  Ext: no peripheral edema noted, moves upper and lower extremities spontaneously  Skin: warm and well perfused, no rashes noted on the face, or arms.    Bryatn CYU    Labs:  Lab Results   Component Value Date    WBC 8.0 04/10/2019    HGB 10.9 (L) 04/10/2019    HCT 32.3 (L) 04/10/2019    MCV 84.5 04/10/2019     04/10/2019     Lab Results   Component Value Date    CREATININE 2.0 (H) 2019    BUN 37 (H) 2019     2019    K 3.9 2019    CL 97 (L) 2019    CO2 32 2019 Mely Webb MD  4/11/2019

## 2019-04-11 NOTE — DISCHARGE SUMMARY
Hospital Medicine Discharge Summary    Patient ID: Saira Bloom      Patient's PCP: Nasir Barber, APRN - CNP    Admit Date: 4/9/2019     Discharge Date:   4/11/19     Admitting Physician: Dori Salvador MD     Discharge Physician: Dori Salvador MD     Discharge Diagnoses: Active Hospital Problems    Diagnosis Date Noted    Hematuria [R31.9] 04/09/2019       The patient was seen and examined on day of discharge and this discharge summary is in conjunction with any daily progress note from day of discharge. History Of Present Illness:      82 y.o. male  With h/o COPD, DM, BPH, h/o PE, CHF- systolic, BPH started having dysuria 2 weeks ago. He saw the urologist  And was given abx which did not help. This morning he started having hematuria too. Went to see urologist this morning and was sent here for eval. No abd pain or fever.      No chest pain or SOB. Has been taking all meds compliantly. Leg swelling is at baseline. Hospital Course:     1.  acute cystitis: presented with Hematuria/dysuria- coumadin held and started on abx. urine cx- pseudomonas. Discharged with cipro. 2. Urinary retention: 470 ml on bladder scan. H/o BPH. flomax continued. Urology consulted. Gold placed.      3. LUISANA/ckd  Stage3: baseline cr around 2. Presented with  2.8. Held  diuretics and ACEI. Fluids given. Cr improved       4. H.o  systolic CHF: compensated. restarted diuretics at time of discharge.      5. DM type 2: sugars controlled. managed with orals along with SSI     6. H/o lung cancer     7. H.o PE in 2018: restarted coumadin after resolution of hematuria.        8. Hypokalemia/magnesemia: replaced.          Physical Exam Performed:     BP (!) 164/88   Pulse 85   Temp 98.5 °F (36.9 °C)   Resp 16   Ht 5' 10\" (1.778 m)   Wt 199 lb 3.2 oz (90.4 kg)   SpO2 93%   BMI 28.58 kg/m²       General appearance:  No apparent distress, appears stated age and cooperative.   HEENT:  Normal cephalic, atraumatic without obvious deformity. Pupils equal, round, and reactive to light. Extra ocular muscles intact. Conjunctivae/corneas clear. Neck: Supple, with full range of motion. No jugular venous distention. Trachea midline. Respiratory:  Normal respiratory effort. Clear to auscultation, bilaterally without Rales/Wheezes/Rhonchi. Cardiovascular:  Regular rate and rhythm with normal S1/S2 without murmurs, rubs or gallops. Abdomen: Soft, non-tender, non-distended with normal bowel sounds. Musculoskeletal:  No clubbing, cyanosis or edema bilaterally. Full range of motion without deformity. Skin: Skin color, texture, turgor normal.  No rashes or lesions. Neurologic:  Neurovascularly intact without any focal sensory/motor deficits. Cranial nerves: II-XII intact, grossly non-focal.  Psychiatric:  Alert and oriented, thought content appropriate, normal insight  Capillary Refill: Brisk,< 3 seconds   Peripheral Pulses: +2 palpable, equal bilaterally       Labs: For convenience and continuity at follow-up the following most recent labs are provided:      CBC:    Lab Results   Component Value Date    WBC 8.0 04/10/2019    HGB 10.9 04/10/2019    HCT 32.3 04/10/2019     04/10/2019       Renal:    Lab Results   Component Value Date     04/11/2019    K 3.9 04/11/2019    K 3.4 04/10/2019    CL 97 04/11/2019    CO2 32 04/11/2019    BUN 37 04/11/2019    CREATININE 2.0 04/11/2019    CALCIUM 8.2 04/11/2019    PHOS 3.7 03/23/2019         Significant Diagnostic Studies    Radiology:   XR CHEST PORTABLE   Final Result   Ill-defined soft tissue fullness within the left perihilar region not   significantly changed over 3 weeks but new from June 2018. Underlying   malignancy and/or treatment related changes possibly from radiation should be   considered. Pneumonia felt to be less likely. Please refer to recent chest CT report of March 21, 2019.                 Consults:     IP CONSULT TO UROLOGY  IP CONSULT TO HOSPITALIST  IP CONSULT TO PHARMACY    Disposition:  home     Condition at Discharge: Stable    Discharge Instructions/Follow-up:  F/u with  UROLOGY in 1 week     Code Status:  Full Code     Activity: activity as tolerated    Diet: diabetic diet      Discharge Medications:     Current Discharge Medication List           Details   ciprofloxacin (CIPRO) 500 MG tablet Take 1 tablet by mouth 2 times daily for 14 days  Qty: 28 tablet, Refills: 0      lactobacillus (CULTURELLE) capsule Take 1 capsule by mouth daily  Qty: 30 capsule, Refills: 0              Details   DULoxetine (CYMBALTA) 30 MG extended release capsule Take 30 mg by mouth daily      gabapentin (NEURONTIN) 300 MG capsule Take 300 mg by mouth 3 times daily. ranitidine (ZANTAC) 300 MG capsule TAKE ONE CAPSULE BY MOUTH EVERY EVENING  Qty: 90 capsule, Refills: 3    Associated Diagnoses: Indigestion      metolazone (ZAROXOLYN) 2.5 MG tablet Take one tablet twice a week. Qty: 8 tablet, Refills: 1    Associated Diagnoses: Anasarca; Diabetes mellitus with proteinuria (HCC)      lisinopril (PRINIVIL;ZESTRIL) 10 MG tablet Take 1 tablet by mouth daily  Qty: 90 tablet, Refills: 1      furosemide (LASIX) 40 MG tablet Take 1 tablet by mouth 2 times daily  Qty: 180 tablet, Refills: 1      Insulin Pen Needle 31G X 6 MM MISC 1 each by Does not apply route daily  Qty: 100 each, Refills: 3      linagliptin (TRADJENTA) 5 MG tablet Take 1 tablet by mouth daily  Qty: 90 tablet, Refills: 1      warfarin (COUMADIN) 2.5 MG tablet 1.25 mg on Sun, Thu; 2.5 mg all other days  Qty: 90 tablet, Refills: 3    Associated Diagnoses: Bilateral pulmonary embolism ([facility] [address]  Anticoagulated on Coumadin      glipiZIDE (GLUCOTROL) 5 MG tablet Take 0.5 tablets by mouth daily (with breakfast)  Qty: 90 tablet, Refills: 1    Associated Diagnoses: Type 2 diabetes mellitus with stage 3 chronic kidney disease, without long-term current use of insulin (HCC)      naphazoline-glycerin (CLEAR EYES REDNESS RELIEF) 0.012-0.2 % SOLN ophthalmic solution Place 1 drop into both eyes 2 times daily  Qty: 1 Bottle, Refills: 0      tamsulosin (FLOMAX) 0.4 MG capsule Take 1 capsule by mouth daily  Qty: 90 capsule, Refills: 1    Associated Diagnoses: Benign prostatic hypertrophy without urinary obstruction      glucose monitoring kit (FREESTYLE) monitoring kit 1 kit by Does not apply route daily as needed. Qty: 1 kit, Refills: 0    Comments: Whichever his insurance will cover      latanoprost (XALATAN) 0.005 % ophthalmic solution Place 1 drop into both eyes nightly. Time Spent on discharge is more than 30 minutes in the examination, evaluation, counseling and review of medications and discharge plan. Signed:    Kurtis Dominguez MD   4/11/2019      Thank you SHA Bartholomew CNP for the opportunity to be involved in this patient's care. If you have any questions or concerns please feel free to contact me at [phone].

## 2019-04-12 ENCOUNTER — CARE COORDINATION (OUTPATIENT)
Dept: CASE MANAGEMENT | Age: 84
End: 2019-04-12

## 2019-04-12 DIAGNOSIS — R31.9 HEMATURIA, UNSPECIFIED TYPE: Primary | ICD-10-CM

## 2019-04-12 PROCEDURE — 1111F DSCHRG MED/CURRENT MED MERGE: CPT | Performed by: NURSE PRACTITIONER

## 2019-04-13 NOTE — CARE COORDINATION
[facility] Transitions Initial Follow Up Call    Call within 2 business days of discharge: Yes    Patient: Ashleigh Shabazz Patient : 1935   MRN: [id number]  Reason for Admission:   Discharge Date: 19 RARS: Readmission Risk Score: 28      Last Discharge [address] [address]       Complaint Diagnosis Description Type Department Provider    19 Urinary Tract Infection Acute cystitis with hematuria . .. ED to Hosp-Admission (Discharged) (ADMITTED) [facility] Gustavo Gresham MD; Fabian Anguiano... Spoke with: pt's wife    Facility: [facility]  Non-face-to-face services provided:  Obtained and reviewed discharge summary and/or continuity of care documents  Ass[facility] and [facility] adherence and [facility]    Care Transitions 24 Hour Call    Do you have any ongoing symptoms?:  No  Do you have a copy of your discharge instructions?:  Yes  Do you have all of your prescriptions and are they filled?:  Yes  Have you been contacted by a [facility] Pharmacist?:  No  Have you scheduled your follow up appointment?:  Yes  How are you going to get to your appointment?:  Car - family or friend to transport  Were you discharged with any Home Care or Post Acute Services:  No  Patient DME:  [employer] cane  Do you have support at home?:  Partner/Spouse/SO  Do you feel like you have everything you need to keep you well at home?:  Yes  Are you an active caregiver in your home?:  No  Care Transitions Interventions  No Identified Needs       Pt's wife states pt is doing well, no issues or concerns. Denies pain, fever, issues with bryant catheter, draining clear yellow urine. Has all new meds, reviewed all others.  F/U appts listed below  Agreed to more CTC f/u calls      Follow Up  Future Appointments   Date Time Provider Hanna Mccoy   2019  2:30 PM Nena Perdue  [address] [facility]   2019 10:40 AM SHA Walker - CNP [facility]   5/15/2019 10:15 AM Razia Welch MD [facility]       Billie García RN

## 2019-04-14 LAB
BLOOD CULTURE, ROUTINE: NORMAL
CULTURE, BLOOD 2: NORMAL

## 2019-04-15 NOTE — PROGRESS NOTES
[address]   Cardiac f/up    Referring Provider:  Carlyn Sever, APRN - MARTIN     Chief Complaint   Patient presents with    Follow-Up from [facility]     [facility] 3/21-3/23/19 DX: SOB, Edema; 4/9-4/11/19 DX: UTI.  Congestive Heart Failure    Shortness of Breath     C/o SOB with exertion. History of Present Illness:   Mr. Jacey Chung is here today for a hospital follow up. History of chronic obstructive pulmonary disease,  although he is a remote cigarette smoker, history of diabetes, history of the lung cancer,it could not be  treated surgically, was treated with radiation and chemotherapy. History of pulmonary embolus in the past, he takes Coumadin. In 3/2019 he was admitted to the hospital with generalized swelling of his lower extremities and shortness of breath with exertion. During this admission he had an abnormal Echo.   3/23/19 Consult Note :   LABORATORY DATA:  Show creatinine as up to 2. 1. Hemoglobin 11.7. Liver  tests are normal.  INR 2.85. Troponin 0.06, which is not significant. Chest x-ray shows new infiltrative process. CT scan is concerning for  recurrent lung cancer, although he was inoperable at time of his  diagnosis. ProBNP is elevated at 4939. I reviewed his echocardiogram.   To me, his ejection fraction appears better than reported 30-35%. I  feel it is more like 45%, but the main finding of the echocardiogram is  right atrial enlargement and engorgement of vena cava, which is more  consistent with right heart strain with cor pulmonale. Recommend- This is a  challenging situation. Diuresis is likely going to be difficult. The  best way to deal with his peripheral edema will be with some form of  compression hose, can certainly continue Lasix orally b.i.d. We will  consider addition of ACE inhibitor instead of the use of Norvasc for his  blood pressure.   Overall, this will be very challenging for this patient  due to findings noted above, not sure what role lung cancer will play. I looked at his echocardiogram for the possibility of amyloid, infiltration  of his myocardium. It does not appear that he has this problem. Today he reports he has been feeling better and that the swelling in his legs has improved. He is wearing support stockings. He was discharged from the hospital last week, he was in for 3 days with a UTI, was on IV ATB. He has a  urinary catheter until his follow up visit on 4/29. Denies exertional chest pain, DRUMMOND/PND, palpitations, light-headedness, edema. Past Medical History:   has a past medical history of Abdominal pain, epigastric, Arthritis, Benign prostatic hypertrophy without urinary obstruction, BPH, Cellulitis and abscess, Chronic rhinitis, COPD (chronic obstructive pulmonary disease) ([address] Diabetes mellitus ([address] Dysphagia, Erectile dysfunction, GERD (gastroesophageal reflux disease), Hx of blood clots, Hyperglycemia, Hypertension, lumbar radiculopathy, Neuropathy, Nocturia, Osteoarthritis, Other disorders of kidney and ureter in diseases classified elsewhere, Pain, joint, knee, Palpitations, skin cancer, SOB (shortness of breath), Tennis elbow, and Tinea pedis. Surgical History:   has a past surgical history that includes Knee Prosthesis Removal; Cholecystectomy; Colonoscopy (11/28/2011); Cataract removal (Bilateral, 09/2014); Parotidectomy (Right, 01/26/2017); skin biopsy; Upper gastrointestinal endoscopy (03/19/2018); Total knee arthroplasty; hip surgery (Right, 09/09/2013); other surgical history; Lung biopsy (Left, 05/09/2018); Tunneled venous port placement (06/22/2018); pr inject anes/steroid foramen lumbar/sacral w img guide ,1 level (Right, 11/21/2018); eye surgery; and joint replacement. Social History:   reports that he quit smoking about 48 years ago. He has a 40.00 pack-year smoking history. He has never used smokeless tobacco. He reports that he does not drink alcohol or use drugs.      Family History:  family history includes Arthritis in his father; Diabetes in his brother and father. Home Medications:  Prior to Admission medications    Medication Sig Start Date End Date Taking? Authorizing Provider   diphenhydrAMINE-APAP, sleep, (TYLENOL PM EXTRA STRENGTH PO) Take by mouth as needed   Yes Historical Provider, MD   furosemide (LASIX) 40 MG tablet Take 1 tablet by mouth daily 4/16/19  Yes Ivana Belcher MD   metolazone (ZAROXOLYN) 2.5 MG tablet Take one tablet ONCE a week. 4/16/19  Yes Ivana Belcher MD   ciprofloxacin (CIPRO) 500 MG tablet Take 1 tablet by mouth 2 times daily for 14 days 4/11/19 4/25/19 Yes Mary De La Fuente MD   lactobacillus (CULTURELLE) capsule Take 1 capsule by mouth daily 4/11/19  Yes Mary De La Fuente MD   DULoxetine (CYMBALTA) 30 MG extended release capsule Take 30 mg by mouth daily   Yes Historical Provider, MD   gabapentin (NEURONTIN) 300 MG capsule Take 300 mg by mouth 2 times daily.     Yes Historical Provider, MD   ranitidine (ZANTAC) 300 MG capsule TAKE ONE CAPSULE BY MOUTH EVERY EVENING 4/1/19  Yes Meryle Churches, APRN - CNP   lisinopril (PRINIVIL;ZESTRIL) 10 MG tablet Take 1 tablet by mouth daily 3/23/19  Yes Wade Boudreaux MD   Insulin Pen Needle 31G X 6 MM MISC 1 each by Does not apply route daily 2/6/19  Yes Meryle Churches, APRN - CNP   linagliptin (TRADJENTA) 5 MG tablet Take 1 tablet by mouth daily 11/9/18  Yes Meryle Churches, APRN - CNP   warfarin (COUMADIN) 2.5 MG tablet 1.25 mg on Sun, Thu; 2.5 mg all other days 9/11/18  Yes Meryle Churches, APRN - CNP   glipiZIDE (GLUCOTROL) 5 MG tablet Take 0.5 tablets by mouth daily (with breakfast) 6/11/18  Yes Meryle Churches, APRN - CNP   naphazoline-glycerin (CLEAR EYES REDNESS RELIEF) 0.012-0.2 % SOLN ophthalmic solution Place 1 drop into both eyes 2 times daily  Patient taking differently: Place 1 drop into both eyes 2 times daily as needed  4/7/18  Yes Estelle Magana MD   tamsulosin (FLOMAX) 0.4 MG capsule Take 1 capsule by mouth daily  Patient taking differently: Take 0.4 mg by mouth 2 times daily  8/3/17  Yes SHA Grady - CNP   glucose monitoring kit (FREESTYLE) monitoring kit 1 kit by Does not apply route daily as needed. 4/7/14  Yes Sukhi Campbell MD   latanoprost (XALATAN) 0.005 % ophthalmic solution Place 1 drop into both eyes nightly. 10/27/13  Yes Historical Provider, MD        Allergies:  Celebrex [celecoxib]; Elavil [amitriptyline]; Naproxen; Percocet [oxycodone-acetaminophen]; and Lyrica [pregabalin]     Review of Systems:   · Constitutional: there has been no unanticipated weight loss. There's been no change in energy level, sleep pattern, or activity level. · Eyes: No visual changes or diplopia. No scleral icterus. · ENT: No Headaches, hearing loss or vertigo. No mouth sores or sore throat. · Cardiovascular: Reviewed in HPI  · Respiratory: No cough or wheezing, no sputum production. No hematemesis. · Gastrointestinal: No abdominal pain, appetite loss, blood in stools. No change in bowel or bladder habits. · Genitourinary: No dysuria, trouble voiding, or hematuria. · Musculoskeletal:  No gait disturbance, weakness or joint complaints. · Integumentary: No rash or pruritis. · Neurological: No headache, diplopia, change in muscle strength, numbness or tingling. No change in gait, balance, coordination, mood, affect, memory, mentation, behavior. · Psychiatric: No anxiety, no depression. · Endocrine: No malaise, fatigue or temperature intolerance. No excessive thirst, fluid intake, or urination. No tremor. · Hematologic/Lymphatic: No abnormal bruising or bleeding, blood clots or swollen lymph nodes. · Allergic/Immunologic: No nasal congestion or hives.     Physical Examination:    Vitals:    04/16/19 1429   BP: 124/70   Pulse: (!) 46   SpO2: 94%        Wt Readings from Last 1 Encounters:   04/16/19 201 lb (91.2 kg)       Constitutional and General Appearance: NAD  Skin:good turgor,intact without lesions  HEENT: EOMI ,normal  Neck:no JVD    Respiratory:  · Normal excursion and expansion without use of accessory muscles  · Resp Auscultation: Normal breath sounds without dullness  Cardiovascular:  · The apical impulses not displaced  · Heart tones are crisp and normal  · Cervical veins are not engorged  · The carotid upstroke is normal in amplitude and contour without delay or bruit  · Peripheral pulses are symmetrical and full  · There is no clubbing, cyanosis of the extremities. · No edema  · Femoral Arteries: 2+ and equal  · Pedal Pulses: 2+ and equal   Abdomen:  · No masses or tenderness  · Liver/Spleen: No Abnormalities Noted  Neurological/Psychiatric:  · Alert and oriented in all spheres  · Moves all extremities well  · Exhibits normal gait balance and coordination  · No abnormalities of mood, affect, memory, mentation, or behavior are noted    3/22/19 HARSH  Summary   -Left ventricular cavity size is normal. There is moderate concentric left   ventricular hypertrophy. Overall left ventricular systolic function appears   severely reduced with a estimated ejection fraction of 30-35%. Global   hypokinesis.   -Grade I diastolic dysfunction. E/e\"=18.1   -Mild aortic and tricuspid regurgitation.   -Trivial mitral regurgitation.   -Estimated pulmonary artery systolic pressure is mildly elevated at 44 mmHg   assuming a right atrial pressure of 3 mmHg. 3/17/18 ECHO    Normal left ventricle size, wall thickness and systolic function with an   estimated ejection fraction of 55%.   No regional wall motion abnormalities. Trivial mitral regurgitation.   Mild aortic regurgitation.   Mild to moderate tricuspid regurgitation with an RVSP of 59 mmHg.     Assessment:      CHF   Lisinopril 10mg  Lasix 40mg 2x daily> Changed to daily   Metolazone 2.5 mg 2Xweek > Changed to ONCE per week   Support stockings       Hypertension   Blood pressure 124/70, pulse (!) 46, height 5' 10\" (1.778 m), weight 201 lb (91.2 kg), SpO2 94 %. Aortic/Tricuspid Regurgitation   Mild to moderate aortic and tricuspid regurgitation per echo    Plan:  Jackie Schrader has a stable cardiac status. 1. Decrease Lasix to 40mg daily   2. Decrease Metolazone 2.5 mg to ONCE per week   3. Return for regular follow up in 3 months with echo and ekg same day . I appreciate the opportunity of cooperating in the care of this individual.    Kimani Briscoe. Oc Recinos M.D., [facility] attestation: This note was scribed in the presence of Dr. Oc Recinos MD, by Bhavya Rinaldi RN. The scribe's documentation has been prepared under my direction and personally reviewed by me in its entirety. I confirm that the note above accurately reflects all work, treatment, procedures, and medical decision making performed by me.

## 2019-04-16 ENCOUNTER — OFFICE VISIT (OUTPATIENT)
Dept: CARDIOLOGY CLINIC | Age: 84
End: 2019-04-16
Payer: MEDICARE

## 2019-04-16 VITALS
BODY MASS INDEX: 28.77 KG/M2 | HEIGHT: 70 IN | OXYGEN SATURATION: 94 % | HEART RATE: 46 BPM | WEIGHT: 201 LBS | DIASTOLIC BLOOD PRESSURE: 70 MMHG | SYSTOLIC BLOOD PRESSURE: 124 MMHG

## 2019-04-16 DIAGNOSIS — R80.9 DIABETES MELLITUS WITH PROTEINURIA (HCC): ICD-10-CM

## 2019-04-16 DIAGNOSIS — I50.22 CHRONIC SYSTOLIC CONGESTIVE HEART FAILURE (HCC): ICD-10-CM

## 2019-04-16 DIAGNOSIS — R60.1 ANASARCA: ICD-10-CM

## 2019-04-16 DIAGNOSIS — I36.1 NON-RHEUMATIC TRICUSPID VALVE INSUFFICIENCY: ICD-10-CM

## 2019-04-16 DIAGNOSIS — E11.29 DIABETES MELLITUS WITH PROTEINURIA (HCC): ICD-10-CM

## 2019-04-16 DIAGNOSIS — I10 ESSENTIAL HYPERTENSION: Primary | ICD-10-CM

## 2019-04-16 PROCEDURE — 4040F PNEUMOC VAC/ADMIN/RCVD: CPT | Performed by: INTERNAL MEDICINE

## 2019-04-16 PROCEDURE — 1036F TOBACCO NON-USER: CPT | Performed by: INTERNAL MEDICINE

## 2019-04-16 PROCEDURE — 1111F DSCHRG MED/CURRENT MED MERGE: CPT | Performed by: INTERNAL MEDICINE

## 2019-04-16 PROCEDURE — G8427 DOCREV CUR MEDS BY ELIG CLIN: HCPCS | Performed by: INTERNAL MEDICINE

## 2019-04-16 PROCEDURE — 1123F ACP DISCUSS/DSCN MKR DOCD: CPT | Performed by: INTERNAL MEDICINE

## 2019-04-16 PROCEDURE — 99213 OFFICE O/P EST LOW 20 MIN: CPT | Performed by: INTERNAL MEDICINE

## 2019-04-16 PROCEDURE — G8417 CALC BMI ABV UP PARAM F/U: HCPCS | Performed by: INTERNAL MEDICINE

## 2019-04-16 RX ORDER — FUROSEMIDE 40 MG/1
40 TABLET ORAL DAILY
Qty: 180 TABLET | Refills: 1 | Status: SHIPPED | OUTPATIENT
Start: 2019-04-16 | End: 2020-06-04 | Stop reason: SDUPTHER

## 2019-04-16 RX ORDER — METOLAZONE 2.5 MG/1
TABLET ORAL
Qty: 8 TABLET | Refills: 1 | Status: SHIPPED | OUTPATIENT
Start: 2019-04-16 | End: 2019-07-25

## 2019-04-18 PROBLEM — I50.22 CHRONIC SYSTOLIC CONGESTIVE HEART FAILURE (HCC): Chronic | Status: ACTIVE | Noted: 2019-04-18

## 2019-04-19 ENCOUNTER — OFFICE VISIT (OUTPATIENT)
Dept: INTERNAL MEDICINE CLINIC | Age: 84
End: 2019-04-19
Payer: MEDICARE

## 2019-04-19 VITALS
HEIGHT: 70 IN | WEIGHT: 204 LBS | BODY MASS INDEX: 29.2 KG/M2 | HEART RATE: 64 BPM | SYSTOLIC BLOOD PRESSURE: 130 MMHG | DIASTOLIC BLOOD PRESSURE: 58 MMHG

## 2019-04-19 DIAGNOSIS — I26.99 BILATERAL PULMONARY EMBOLISM (HCC): ICD-10-CM

## 2019-04-19 DIAGNOSIS — R06.02 SHORTNESS OF BREATH: Primary | ICD-10-CM

## 2019-04-19 DIAGNOSIS — Z79.01 ANTICOAGULATED ON COUMADIN: ICD-10-CM

## 2019-04-19 DIAGNOSIS — N18.9 ACUTE KIDNEY INJURY SUPERIMPOSED ON CKD (HCC): ICD-10-CM

## 2019-04-19 DIAGNOSIS — N17.9 ACUTE KIDNEY INJURY SUPERIMPOSED ON CKD (HCC): ICD-10-CM

## 2019-04-19 LAB
ALBUMIN SERPL-MCNC: 3.2 G/DL (ref 3.4–5)
ANION GAP SERPL CALCULATED.3IONS-SCNC: 14 MMOL/L (ref 3–16)
BUN BLDV-MCNC: 47 MG/DL (ref 7–20)
CALCIUM SERPL-MCNC: 8.6 MG/DL (ref 8.3–10.6)
CHLORIDE BLD-SCNC: 94 MMOL/L (ref 99–110)
CO2: 31 MMOL/L (ref 21–32)
CREAT SERPL-MCNC: 3 MG/DL (ref 0.8–1.3)
GFR AFRICAN AMERICAN: 24
GFR NON-AFRICAN AMERICAN: 20
GLUCOSE BLD-MCNC: 327 MG/DL (ref 70–99)
INTERNATIONAL NORMALIZATION RATIO, POC: 1.9
PHOSPHORUS: 3.5 MG/DL (ref 2.5–4.9)
POTASSIUM SERPL-SCNC: 3.6 MMOL/L (ref 3.5–5.1)
PRO-BNP: 656 PG/ML (ref 0–449)
PROTHROMBIN TIME, POC: NORMAL
SODIUM BLD-SCNC: 139 MMOL/L (ref 136–145)

## 2019-04-19 PROCEDURE — 85610 PROTHROMBIN TIME: CPT | Performed by: NURSE PRACTITIONER

## 2019-04-19 PROCEDURE — 4040F PNEUMOC VAC/ADMIN/RCVD: CPT | Performed by: NURSE PRACTITIONER

## 2019-04-19 PROCEDURE — G8417 CALC BMI ABV UP PARAM F/U: HCPCS | Performed by: NURSE PRACTITIONER

## 2019-04-19 PROCEDURE — 1123F ACP DISCUSS/DSCN MKR DOCD: CPT | Performed by: NURSE PRACTITIONER

## 2019-04-19 PROCEDURE — G8427 DOCREV CUR MEDS BY ELIG CLIN: HCPCS | Performed by: NURSE PRACTITIONER

## 2019-04-19 PROCEDURE — 99214 OFFICE O/P EST MOD 30 MIN: CPT | Performed by: NURSE PRACTITIONER

## 2019-04-19 PROCEDURE — 1036F TOBACCO NON-USER: CPT | Performed by: NURSE PRACTITIONER

## 2019-04-19 PROCEDURE — 1111F DSCHRG MED/CURRENT MED MERGE: CPT | Performed by: NURSE PRACTITIONER

## 2019-04-19 NOTE — PROGRESS NOTES
SUBJECTIVE:    Patient ID: Colin Farooq is a 82 y.o. male. CC: Shortness of breath, UTI, urinary retention, LUISANA/CKD, warfarin mgmt    HPI: Follow-up chronic medical problems, follow-up of recent medical issues and hospitalization, an acute concerns. For the past few months, the patient has been doing poorly compared to his normal state of health. He has been hospitalized on a couple of occasions. He has been treated for acute on chronic renal insufficiency, nephrotic syndrome, acute congestive heart failure, cerebral amyloid angiopathy. His most recent hospitalization was 4/9 until 4/11 for acute cystitis, acute on chronic renal insufficiency. He remains on antibiotics. He has been seen by urology for urinary retention. He is currently complaining of fatigue and shortness of breath. He also has a history of pulmonary embolism with chronic anticoagulation. He is taking his warfarin as directed. He denies any side effects. INR today is slightly low at 1.9.       Past Medical History:   Diagnosis Date    Abdominal pain, epigastric     Arthritis     Benign prostatic hypertrophy without urinary obstruction     BPH     Cellulitis and abscess     Chronic rhinitis     Chronic systolic congestive heart failure (HCC) 4/18/2019    COPD (chronic obstructive pulmonary disease) (HCC)     Diabetes mellitus (HCC)     Dysphagia     Erectile dysfunction     GERD (gastroesophageal reflux disease)     Hx of blood clots     Hyperglycemia     Hypertension     lumbar radiculopathy     Neuropathy     Nocturia     Osteoarthritis     Other disorders of kidney and ureter in diseases classified elsewhere     Pain, joint, knee     Palpitations     skin cancer     Rt ear, nose, neck, hands/ 2018 lung    SOB (shortness of breath)     Tennis elbow     Tinea pedis     foot        Current Outpatient Medications   Medication Sig Dispense Refill    diphenhydrAMINE-APAP, sleep, (TYLENOL PM EXTRA STRENGTH PO) Take by mouth as needed      furosemide (LASIX) 40 MG tablet Take 1 tablet by mouth daily 180 tablet 1    metolazone (ZAROXOLYN) 2.5 MG tablet Take one tablet ONCE a week. 8 tablet 1    ciprofloxacin (CIPRO) 500 MG tablet Take 1 tablet by mouth 2 times daily for 14 days 28 tablet 0    lactobacillus (CULTURELLE) capsule Take 1 capsule by mouth daily 30 capsule 0    DULoxetine (CYMBALTA) 30 MG extended release capsule Take 30 mg by mouth daily      gabapentin (NEURONTIN) 300 MG capsule Take 300 mg by mouth 2 times daily.  ranitidine (ZANTAC) 300 MG capsule TAKE ONE CAPSULE BY MOUTH EVERY EVENING 90 capsule 3    lisinopril (PRINIVIL;ZESTRIL) 10 MG tablet Take 1 tablet by mouth daily 90 tablet 1    Insulin Pen Needle 31G X 6 MM MISC 1 each by Does not apply route daily 100 each 3    linagliptin (TRADJENTA) 5 MG tablet Take 1 tablet by mouth daily 90 tablet 1    warfarin (COUMADIN) 2.5 MG tablet 1.25 mg on Sun, Thu; 2.5 mg all other days 90 tablet 3    glipiZIDE (GLUCOTROL) 5 MG tablet Take 0.5 tablets by mouth daily (with breakfast) 90 tablet 1    naphazoline-glycerin (CLEAR EYES REDNESS RELIEF) 0.012-0.2 % SOLN ophthalmic solution Place 1 drop into both eyes 2 times daily (Patient taking differently: Place 1 drop into both eyes 2 times daily as needed ) 1 Bottle 0    tamsulosin (FLOMAX) 0.4 MG capsule Take 1 capsule by mouth daily (Patient taking differently: Take 0.4 mg by mouth 2 times daily ) 90 capsule 1    glucose monitoring kit (FREESTYLE) monitoring kit 1 kit by Does not apply route daily as needed. 1 kit 0    latanoprost (XALATAN) 0.005 % ophthalmic solution Place 1 drop into both eyes nightly. No current facility-administered medications for this visit. Review of Systems   Constitutional: Positive for fatigue. Negative for fever. Respiratory: Positive for shortness of breath. Cardiovascular: Negative. Gastrointestinal: Negative. Genitourinary: Negative. Skin: Negative. All other systems reviewed and are negative. OBJECTIVE:  Physical Exam   Constitutional: He appears well-developed and well-nourished. No distress. HENT:   Head: Normocephalic and atraumatic. Facial edema   Eyes: Pupils are equal, round, and reactive to light. No scleral icterus. Neck: Neck supple. No JVD present. Cardiovascular: Normal rate and regular rhythm. Pulmonary/Chest: No respiratory distress. He has decreased breath sounds. He has rales. Abdominal: Soft. There is no tenderness. There is no guarding. Musculoskeletal: He exhibits edema (2+ pitting BLE). Skin: Skin is warm and dry. He is not diaphoretic. BP (!) 130/58   Pulse 64   Ht 5' 10\" (1.778 m)   Wt 204 lb (92.5 kg)   BMI 29.27 kg/m²      PHQ Scores 2/11/2019 7/10/2018 3/21/2017   PHQ2 Score 0 1 0   PHQ9 Score 0 1 0     Interpretation of Total Score Depression Severity: 1-4 = Minimal depression, 5-9 = Mild depression, 10-14 = Moderate depression, 15-19 = Moderately severe depression, 20-27 =Severe depression        ASSESSMENT/PLAN:  Ray was seen today for 2 week follow-up.   Diagnoses and all orders for this visit:    Shortness of breath  -     Brain Natriuretic Peptide    Acute kidney injury superimposed on CKD ([facility] [address]  -     RENAL FUNCTION PANEL  -     Brain Natriuretic Peptide    Bilateral pulmonary embolism (HCC)  - Continue anticoagulation    Anticoagulated on Coumadin  - INR 1.9  - See flow sheets  -     POCT INR        SHA Stewart - CNP

## 2019-04-22 ENCOUNTER — CARE COORDINATION (OUTPATIENT)
Dept: CASE MANAGEMENT | Age: 84
End: 2019-04-22

## 2019-04-22 NOTE — CARE COORDINATION
[facility] Transitions Follow Up Call    2019    Patient: Chiquis Robison  Patient : 1935   MRN: [id number]  Reason for Admission:   Discharge Date: 19 RARS: Readmission Risk Score: 28    Final transition call made, contact info left on vm as well as Reminded patient that if they have any questions/concerns at anytime, they can always call PCP/specialist as they have an MD on call . Pt has had PCP/cardiology f/u appts.       Follow Up  Future Appointments   Date Time Provider Hanna Mccoy   5/15/2019 10:15 AM Sophy Poon MD [facility]   2019 10:40 AM SHA Mohr - CNP [facility]   2019 10:00 AM [facility]   2019 11:00 AM Vazquez Perea MD [facility]       Irene Calderon RN

## 2019-04-23 ASSESSMENT — ENCOUNTER SYMPTOMS
GASTROINTESTINAL NEGATIVE: 1
SHORTNESS OF BREATH: 1

## 2019-05-08 ENCOUNTER — HOSPITAL ENCOUNTER (OUTPATIENT)
Age: 84
Discharge: HOME | End: 2019-05-08
Payer: MEDICARE

## 2019-05-08 DIAGNOSIS — E11.22 CKD STAGE 3 SECONDARY TO DIABETES (HCC): ICD-10-CM

## 2019-05-08 DIAGNOSIS — N18.30 CKD STAGE 3 SECONDARY TO DIABETES (HCC): ICD-10-CM

## 2019-05-08 LAB
ALBUMIN SERPL-MCNC: 3.7 G/DL (ref 3.4–5)
ANION GAP SERPL CALCULATED.3IONS-SCNC: 14 MMOL/L (ref 3–16)
BUN BLDV-MCNC: 34 MG/DL (ref 7–20)
C3 COMPLEMENT: 145.6 MG/DL (ref 90–180)
C4 COMPLEMENT: 14.7 MG/DL (ref 10–40)
CALCIUM SERPL-MCNC: 9 MG/DL (ref 8.3–10.6)
CHLORIDE BLD-SCNC: 96 MMOL/L (ref 99–110)
CO2: 30 MMOL/L (ref 21–32)
CREAT SERPL-MCNC: 1.8 MG/DL (ref 0.8–1.3)
GFR AFRICAN AMERICAN: 44
GFR NON-AFRICAN AMERICAN: 36
GLUCOSE BLD-MCNC: 278 MG/DL (ref 70–99)
HCT VFR BLD CALC: 36.6 % (ref 40.5–52.5)
HEMOGLOBIN: 12.6 G/DL (ref 13.5–17.5)
MCH RBC QN AUTO: 30.5 PG (ref 26–34)
MCHC RBC AUTO-ENTMCNC: 34.3 G/DL (ref 31–36)
MCV RBC AUTO: 88.9 FL (ref 80–100)
PDW BLD-RTO: 18.6 % (ref 12.4–15.4)
PHOSPHORUS: 3.7 MG/DL (ref 2.5–4.9)
PLATELET # BLD: 158 K/UL (ref 135–450)
PMV BLD AUTO: 8.8 FL (ref 5–10.5)
POTASSIUM SERPL-SCNC: 3.9 MMOL/L (ref 3.5–5.1)
RBC # BLD: 4.11 M/UL (ref 4.2–5.9)
SODIUM BLD-SCNC: 140 MMOL/L (ref 136–145)
WBC # BLD: 7.3 K/UL (ref 4–11)

## 2019-05-08 PROCEDURE — 85027 COMPLETE CBC AUTOMATED: CPT

## 2019-05-08 PROCEDURE — 86160 COMPLEMENT ANTIGEN: CPT

## 2019-05-08 PROCEDURE — 36415 COLL VENOUS BLD VENIPUNCTURE: CPT

## 2019-05-08 PROCEDURE — 80069 RENAL FUNCTION PANEL: CPT

## 2019-05-08 RX ORDER — LINAGLIPTIN 5 MG/1
TABLET, FILM COATED ORAL
Qty: 90 TABLET | Refills: 3 | Status: SHIPPED | OUTPATIENT
Start: 2019-05-08 | End: 2019-07-02

## 2019-05-17 ENCOUNTER — OFFICE VISIT (OUTPATIENT)
Dept: INTERNAL MEDICINE CLINIC | Age: 84
End: 2019-05-17
Payer: MEDICARE

## 2019-05-17 VITALS
BODY MASS INDEX: 28 KG/M2 | HEART RATE: 92 BPM | WEIGHT: 200 LBS | DIASTOLIC BLOOD PRESSURE: 68 MMHG | SYSTOLIC BLOOD PRESSURE: 138 MMHG | HEIGHT: 71 IN

## 2019-05-17 DIAGNOSIS — E11.29 DIABETES MELLITUS WITH PROTEINURIA (HCC): ICD-10-CM

## 2019-05-17 DIAGNOSIS — Z79.01 ANTICOAGULATED ON COUMADIN: ICD-10-CM

## 2019-05-17 DIAGNOSIS — N04.9 NEPHROTIC SYNDROME: ICD-10-CM

## 2019-05-17 DIAGNOSIS — I26.99 BILATERAL PULMONARY EMBOLISM (HCC): Primary | ICD-10-CM

## 2019-05-17 DIAGNOSIS — R80.9 DIABETES MELLITUS WITH PROTEINURIA (HCC): ICD-10-CM

## 2019-05-17 DIAGNOSIS — N17.9 ACUTE KIDNEY INJURY SUPERIMPOSED ON CKD (HCC): ICD-10-CM

## 2019-05-17 DIAGNOSIS — N18.9 ACUTE KIDNEY INJURY SUPERIMPOSED ON CKD (HCC): ICD-10-CM

## 2019-05-17 LAB
HBA1C MFR BLD: 8.2 %
INTERNATIONAL NORMALIZATION RATIO, POC: 2.2
PROTHROMBIN TIME, POC: NORMAL

## 2019-05-17 PROCEDURE — 99213 OFFICE O/P EST LOW 20 MIN: CPT | Performed by: NURSE PRACTITIONER

## 2019-05-17 PROCEDURE — 85610 PROTHROMBIN TIME: CPT | Performed by: NURSE PRACTITIONER

## 2019-05-17 PROCEDURE — 1123F ACP DISCUSS/DSCN MKR DOCD: CPT | Performed by: NURSE PRACTITIONER

## 2019-05-17 PROCEDURE — G8417 CALC BMI ABV UP PARAM F/U: HCPCS | Performed by: NURSE PRACTITIONER

## 2019-05-17 PROCEDURE — G8427 DOCREV CUR MEDS BY ELIG CLIN: HCPCS | Performed by: NURSE PRACTITIONER

## 2019-05-17 PROCEDURE — 4040F PNEUMOC VAC/ADMIN/RCVD: CPT | Performed by: NURSE PRACTITIONER

## 2019-05-17 PROCEDURE — 1036F TOBACCO NON-USER: CPT | Performed by: NURSE PRACTITIONER

## 2019-05-17 PROCEDURE — 83036 HEMOGLOBIN GLYCOSYLATED A1C: CPT | Performed by: NURSE PRACTITIONER

## 2019-05-20 ENCOUNTER — HOSPITAL ENCOUNTER (OUTPATIENT)
Dept: MRI IMAGING | Age: 84
Discharge: HOME | End: 2019-05-20
Payer: MEDICARE

## 2019-05-20 DIAGNOSIS — C34.92 NON-SMALL CELL CANCER OF LEFT LUNG (HCC): ICD-10-CM

## 2019-05-20 PROCEDURE — 70551 MRI BRAIN STEM W/O DYE: CPT

## 2019-05-21 ASSESSMENT — ENCOUNTER SYMPTOMS
SHORTNESS OF BREATH: 1
GASTROINTESTINAL NEGATIVE: 1

## 2019-05-21 NOTE — PROGRESS NOTES
SUBJECTIVE:    Patient ID: Ashleigh Shabazz is a 82 y.o. male. CC: Shortness of breath, UTI, urinary retention, LUISANA/CKD, warfarin mgmt    HPI: Follow-up chronic medical problems, follow-up of recent medical issues and hospitalization, and INR mgmt    For the past few months, the patient has been doing poorly compared to his normal state of health. He has been hospitalized on a couple of occasions. He has been treated for acute on chronic renal insufficiency, nephrotic syndrome, acute congestive heart failure, cerebral amyloid angiopathy. He is currently complaining of fatigue, slow improvement. Diabetes: Long discussion about medication regimen in context of renal insufficiency. He also has a history of pulmonary embolism with chronic anticoagulation. He is taking his warfarin as directed. He denies any side effects. INR today is normal at 2.2..       Past Medical History:   Diagnosis Date    Abdominal pain, epigastric     Arthritis     Benign prostatic hypertrophy without urinary obstruction     BPH     Cellulitis and abscess     Chronic rhinitis     Chronic systolic congestive heart failure ([address] 4/18/2019    COPD (chronic obstructive pulmonary disease) (HCC)     Diabetes mellitus (HCC)     Dysphagia     Erectile dysfunction     GERD (gastroesophageal reflux disease)     Hx of blood clots     Hyperglycemia     Hypertension     lumbar radiculopathy     Neuropathy     Nocturia     Osteoarthritis     Other disorders of kidney and ureter in diseases classified elsewhere     Pain, joint, knee     Palpitations     skin cancer     Rt ear, nose, neck, hands/ 2018 lung    SOB (shortness of breath)     Tennis elbow     Tinea pedis     foot        Current Outpatient Medications   Medication Sig Dispense Refill    TRADJENTA 5 MG tablet TAKE ONE TABLET BY MOUTH DAILY 90 tablet 3    diphenhydrAMINE-APAP, sleep, (TYLENOL PM EXTRA STRENGTH PO) Take by mouth as needed      furosemide (LASIX) 40 MG tablet Take 1 tablet by mouth daily 180 tablet 1    metolazone (ZAROXOLYN) 2.5 MG tablet Take one tablet ONCE a week. 8 tablet 1    lactobacillus (CULTURELLE) capsule Take 1 capsule by mouth daily 30 capsule 0    DULoxetine (CYMBALTA) 30 MG extended release capsule Take 30 mg by mouth daily      gabapentin (NEURONTIN) 300 MG capsule Take 300 mg by mouth 2 times daily.  ranitidine (ZANTAC) 300 MG capsule TAKE ONE CAPSULE BY MOUTH EVERY EVENING 90 capsule 3    lisinopril (PRINIVIL;ZESTRIL) 10 MG tablet Take 1 tablet by mouth daily 90 tablet 1    Insulin Pen Needle 31G X 6 MM MISC 1 each by Does not apply route daily 100 each 3    warfarin (COUMADIN) 2.5 MG tablet 1.25 mg on Sun, Thu; 2.5 mg all other days 90 tablet 3    glipiZIDE (GLUCOTROL) 5 MG tablet Take 0.5 tablets by mouth daily (with breakfast) 90 tablet 1    naphazoline-glycerin (CLEAR EYES REDNESS RELIEF) 0.012-0.2 % SOLN ophthalmic solution Place 1 drop into both eyes 2 times daily (Patient taking differently: Place 1 drop into both eyes 2 times daily as needed ) 1 Bottle 0    tamsulosin (FLOMAX) 0.4 MG capsule Take 1 capsule by mouth daily (Patient taking differently: Take 0.4 mg by mouth 2 times daily ) 90 capsule 1    glucose monitoring kit (FREESTYLE) monitoring kit 1 kit by Does not apply route daily as needed. 1 kit 0    latanoprost (XALATAN) 0.005 % ophthalmic solution Place 1 drop into both eyes nightly. No current facility-administered medications for this visit. Review of Systems   Constitutional: Positive for fatigue. Negative for fever. Respiratory: Positive for shortness of breath. Cardiovascular: Negative. Gastrointestinal: Negative. Genitourinary: Negative. Skin: Negative. All other systems reviewed and are negative. OBJECTIVE:  Physical Exam   Constitutional: He appears well-developed and well-nourished. No distress. HENT:   Head: Normocephalic and atraumatic.    Eyes: Pupils are equal, round, and reactive to light. No scleral icterus. Neck: Neck supple. No JVD present. Cardiovascular: Normal rate and regular rhythm. Pulmonary/Chest: No respiratory distress. He has decreased breath sounds. Abdominal: Soft. There is no tenderness. There is no guarding. Musculoskeletal: He exhibits edema (2+ pitting BLE). Skin: Skin is warm and dry. He is not diaphoretic. /68   Pulse 92   Ht 5' 10.5\" (1.791 m)   Wt 200 lb (90.7 kg)   BMI 28.29 kg/m²      PHQ Scores 2/11/2019 7/10/2018 3/21/2017   PHQ2 Score 0 1 0   PHQ9 Score 0 1 0     Interpretation of Total Score Depression Severity: 1-4 = Minimal depression, 5-9 = Mild depression, 10-14 = Moderate depression, 15-19 = Moderately severe depression, 20-27 =Severe depression        Assessment/Plan:  Sheila Schumacher was seen today for office visit for anticoagulation management and fall.   Diagnoses and all orders for this visit:    Bilateral pulmonary embolism (HCC)  - No evidence of recurrence  - Continue anticoagulation    Anticoagulated on Coumadin  - INR 2.2  - Continue current regimen, see flow sheets  -     POCT INR    Diabetes mellitus with proteinuria (HCC)  -     POCT glycosylated hemoglobin (Hb A1C)    Acute kidney injury superimposed on CKD ([facility] [address]  Nephrotic syndrome  - Established care with nephrology  - Will need to monitor DM care given renal issues      SHA King - CNP

## 2019-06-14 ENCOUNTER — OFFICE VISIT (OUTPATIENT)
Dept: INTERNAL MEDICINE CLINIC | Age: 84
End: 2019-06-14
Payer: MEDICARE

## 2019-06-14 VITALS
HEIGHT: 71 IN | HEART RATE: 68 BPM | DIASTOLIC BLOOD PRESSURE: 64 MMHG | SYSTOLIC BLOOD PRESSURE: 132 MMHG | WEIGHT: 208 LBS | BODY MASS INDEX: 29.12 KG/M2

## 2019-06-14 DIAGNOSIS — I68.0 CEREBRAL AMYLOID ANGIOPATHY (CODE): ICD-10-CM

## 2019-06-14 DIAGNOSIS — Z79.01 ANTICOAGULATED ON COUMADIN: ICD-10-CM

## 2019-06-14 DIAGNOSIS — I26.99 BILATERAL PULMONARY EMBOLISM (HCC): Primary | ICD-10-CM

## 2019-06-14 DIAGNOSIS — M54.6 ACUTE RIGHT-SIDED THORACIC BACK PAIN: ICD-10-CM

## 2019-06-14 LAB
INTERNATIONAL NORMALIZATION RATIO, POC: 1.7
PROTHROMBIN TIME, POC: NORMAL

## 2019-06-14 PROCEDURE — 1036F TOBACCO NON-USER: CPT | Performed by: NURSE PRACTITIONER

## 2019-06-14 PROCEDURE — 1123F ACP DISCUSS/DSCN MKR DOCD: CPT | Performed by: NURSE PRACTITIONER

## 2019-06-14 PROCEDURE — G8427 DOCREV CUR MEDS BY ELIG CLIN: HCPCS | Performed by: NURSE PRACTITIONER

## 2019-06-14 PROCEDURE — G8417 CALC BMI ABV UP PARAM F/U: HCPCS | Performed by: NURSE PRACTITIONER

## 2019-06-14 PROCEDURE — 85610 PROTHROMBIN TIME: CPT | Performed by: NURSE PRACTITIONER

## 2019-06-14 PROCEDURE — 4040F PNEUMOC VAC/ADMIN/RCVD: CPT | Performed by: NURSE PRACTITIONER

## 2019-06-14 PROCEDURE — 99213 OFFICE O/P EST LOW 20 MIN: CPT | Performed by: NURSE PRACTITIONER

## 2019-06-14 RX ORDER — AMOXICILLIN 500 MG/1
CAPSULE ORAL
COMMUNITY
Start: 2019-06-12 | End: 2019-06-21

## 2019-06-14 RX ORDER — FLUCONAZOLE 100 MG/1
TABLET ORAL
COMMUNITY
Start: 2019-06-08 | End: 2019-06-14

## 2019-06-14 ASSESSMENT — ENCOUNTER SYMPTOMS
BLOOD IN STOOL: 0
BACK PAIN: 1
RESPIRATORY NEGATIVE: 1
GASTROINTESTINAL NEGATIVE: 1

## 2019-06-14 NOTE — PROGRESS NOTES
Pulmonary/Chest: Effort normal and breath sounds normal.   Abdominal: He exhibits no distension. There is no guarding. Musculoskeletal: Normal range of motion. He exhibits no edema. Cervical back: He exhibits no tenderness. Thoracic back: He exhibits no tenderness. Neurological: He is alert and oriented to person, place, and time. Skin: Skin is warm and dry. He is not diaphoretic. Psychiatric: He has a normal mood and affect. His behavior is normal.      /64   Pulse 68   Ht 5' 10.5\" (1.791 m)   Wt 208 lb (94.3 kg)   BMI 29.42 kg/m²      PHQ Scores 2/11/2019 7/10/2018 3/21/2017   PHQ2 Score 0 1 0   PHQ9 Score 0 1 0     Interpretation of Total Score Depression Severity: 1-4 = Minimal depression, 5-9 = Mild depression, 10-14 = Moderate depression, 15-19 = Moderately severe depression, 20-27 =Severe depression      Assessment/Plan:  Waqar Whittington was seen today for office visit for anticoagulation management and fall. Diagnoses and all orders for this visit:    Bilateral pulmonary embolism (HCC)  -No recurrence  -Continue anticoagulation    Anticoagulated on Coumadin  - INR 1.7  - He has been on antibiotics for UTI. Previous INR was therapeutic  - Small one-time increase in warfarin. Otherwise continue same dose, see flowsheet  -     POCT INR    Acute right-sided thoracic back pain  -Pain after hitting his back on doorframe.   There is no palpable tenderness or skin changes   -Agree with therapy ordered by neurology    Cerebral amyloid angiopathy (CODE)  -Followed by neurology with some concern about recurrence  -Plan is MRI and physical therapy      Carlyn Sever, APRN - CNP

## 2019-06-18 ENCOUNTER — HOSPITAL ENCOUNTER (OUTPATIENT)
Dept: PHYSICAL THERAPY | Age: 84
Setting detail: THERAPIES SERIES
Discharge: HOME | End: 2019-06-18
Payer: MEDICARE

## 2019-06-18 PROCEDURE — 97162 PT EVAL MOD COMPLEX 30 MIN: CPT

## 2019-06-20 ENCOUNTER — HOSPITAL ENCOUNTER (OUTPATIENT)
Dept: PHYSICAL THERAPY | Age: 84
Setting detail: THERAPIES SERIES
Discharge: HOME | End: 2019-06-20
Payer: MEDICARE

## 2019-06-20 PROCEDURE — 97110 THERAPEUTIC EXERCISES: CPT

## 2019-06-20 PROCEDURE — 97112 NEUROMUSCULAR REEDUCATION: CPT

## 2019-06-20 NOTE — FLOWSHEET NOTE
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  · Treatment Diagnosis: Sensory Ataxia M38.9  Insurance/Certification information:     Physician Information:  Referring Practitioner: Dr Kathy Gresham of care signed (Y/N):     Date of Patient follow up with Physician:     G-Code (if applicable):      Date G-Code Applied:         Progress Note: [x]  Yes  []  No  Next due by: Visit #10       Latex Allergy:  [x]NO      []YES  Preferred Language for Healthcare:   [x]English       []other:    Visit # Insurance Allowable   2 medicare     Pain level:  0/10     SUBJECTIVE:  Patient reports that he has been having difficulty with getting up/down from chairs and commode. Most difficulty with management of turning to sit on commode. Most of balance difficulties with stepping backward.       OBJECTIVE: See eval  Observation:   Test measurements:      RESTRICTIONS/PRECAUTIONS: balance, TUG 19, Reddy 33, difficulty with backwards movements and turning    Exercises/Interventions:     Therapeutic Ex (60433): 15 min Sets/sec Reps Notes/CUES   Retro Stepper/BIKE      Alter G      BFR      Sportcord March      3 way SLR      LAQ 1 15 bilat   Clam ABD      Hip Ext Dana Snipe      BOSU fwd/side lunge      BOSU squat      Leg Press Iso/Con/Ecc 0-      Cybex HS curl      TKE      Glute side walks      RDL      Slide Lunge      Slide HS eccentrics      Step ups/ecc step down      Swissball wall rolls- in SLS- hip drive      Quad hip ext/wall-ball rolls      STS from table 1 10          Manual Intervention (62739)      Knee mobs/PROM      Tib/Fem Mobs      Patella Mobs      Ankle mobs                  NMR re-education (80591): 25 min   CUES NEEDED   Norwegian/Biofeedback 10/10      BFR      G. Med activation      Hip Ext full ROM/ G. Activation      Bosu Bal and Prop- G Med      Single leg stance/Balance/Prop      Bosu Retro G. Med act      Prone Hip froggers- sliders/elevated      Narrow LULU with sh flex + rotation 1 5 Bilat; level ground   Normal LULU on ariex 20 3 airex   Partial tandem stand 20 3 Bilat, level ground   Modified SLS with 4\" box 20 3 Bilat, level ground               Therapeutic Activity (62361)      Ladders      Plyos      Dynamic Balance                            Therapeutic Exercise and NMR EXR  [x] (05974) Provided verbal/tactile cueing for activities related to strengthening, flexibility, endurance, ROM for improvements in LE, proximal hip, and core control with self care, mobility, lifting, ambulation. [x] (31820) Provided verbal/tactile cueing for activities related to improving balance, coordination, kinesthetic sense, posture, motor skill, proprioception  to assist with LE, proximal hip, and core control in self care, mobility, lifting, ambulation and eccentric single leg control.      NMR and Therapeutic Activities:    [x] (28355 or 73307) Provided verbal/tactile cueing for activities related to improving balance, coordination, kinesthetic sense, posture, motor skill, proprioception and motor activation to allow for proper function of core, proximal hip and LE with self care and ADLs and functional mobility.   [] (91803) Gait Re-education- Provided training and instruction to the patient for proper LE, core and proximal hip recruitment and positioning and eccentric body weight control with ambulation re-education including up and down stairs     Home Exercise Program:    [x] (99460) Reviewed/Progressed HEP activities related to strengthening, flexibility, endurance, ROM of core, proximal hip and LE for functional self-care, mobility, lifting and ambulation/stair navigation   [] (60942)Reviewed/Progressed HEP activities related to improving balance, coordination, kinesthetic sense, posture, motor skill, proprioception of core, proximal hip and LE for self care, mobility, lifting, and ambulation/stair navigation      Manual Treatments:  PROM / STM / Oscillations-Mobs:  G-I, II, III, IV (PA's, Inf., Post.)  [x] (86541) Provided manual therapy to mobilize LE, proximal hip and/or LS spine soft tissue/joints for the purpose of modulating pain, promoting relaxation,  increasing ROM, reducing/eliminating soft tissue swelling/inflammation/restriction, improving soft tissue extensibility and allowing for proper ROM for normal function with self care, mobility, lifting and ambulation. Modalities:     [] GAME READY (VASO)- for significant edema, swelling, pain control. Charges:  Timed Code Treatment Minutes: 40   Total Treatment Minutes: 45      [] EVAL (LOW) 54380 (typically 20 minutes face-to-face)  [] EVAL (MOD) 88360 (typically 30 minutes face-to-face)  [] EVAL (HIGH) 35198 (typically 45 minutes face-to-face)  [] RE-EVAL     [x] ED(31708) x  1   [] IONTO  [x] NMR (95695) x  2   [] VASO  [] Manual (92624) x       [] Other:  [] TA x       [] Mech Traction (84541)  [] ES(attended) (43499)      [] ES (un) (83841):     GOALS  Short Term Goals: To be achieved in: 2 weeks  1. Independent in HEP and progression per patient tolerance, in order to prevent re-injury. 2. Patient will have a decrease in pain to facilitate improvement in movement, function, and ADLs as indicated by Functional Deficits. Long Term Goals: To be achieved in: 6 weeks  1. Disability index score of 25/80% or less for the LEFS to assist with reaching prior level of function. 2. Patient will demonstrate increased sit to stand with SBA with no use of UE  3. Patient will demonstrate an increase in Strength to good proximal hip strength and control, within 5lb HHD in LE to allow for proper functional mobility as indicated by patients Functional Deficits. 4. Patient will reduce falls to 0 per week   5. TUG less than 16 sec(patient specific functional goal)    Progression Towards Functional goals:  [x] Patient is progressing as expected towards functional goals listed. [] Progression is slowed due to complexities listed. [] Progression has been slowed due to co-morbidities. [] Plan just implemented, too soon to assess goals progression  [] Other:     ASSESSMENT:  Patient requiring Tila for STS transfers with cues for form and muscle activation. Cues throughout for all balance activities for increased gluteal and quad activation. Needing cga for all balance. Decreased gluteal activation on R side greater than L.      Return to Play: (if applicable)   []  Stage 1: Intro to Strength   []  Stage 2: Return to Run and Strength   []  Stage 3: Return to Jump and Strength   []  Stage 4: Dynamic Strength and Agility   []  Stage 5: Sport Specific Training     []  Ready to Return to Play, Meets All Above Stages   []  Not Ready for Return to Sports   Comments:                         Treatment/Activity Tolerance:  [x] Patient tolerated treatment well [] Patient limited by fatique  [] Patient limited by pain  [] Patient limited by other medical complications  [] Other:     Prognosis: [x] Good [] Fair  [] Poor    Patient Requires Follow-up: [x] Yes  [] No      PLAN: Continue to improve strengthen and balance  [x] Continue per plan of care [] Alter current plan (see comments)  [] Plan of care initiated [] Hold pending MD visit [] Discharge    Electronically signed by: Abby Mead

## 2019-06-20 NOTE — PLAN OF CARE
[facility]  Phone: [phone]   Fax:     [phone]                                                       Physical Therapy Certification    Dear Referring Practitioner: Dr Johanna Perales,    We had the pleasure of evaluating the following patient for physical therapy services at [address] [place] [address]. A summary of our findings can be found in the initial assessment below. This includes our plan of care. If you have any questions or concerns regarding these findings, please do not hesitate to contact me at the office phone number checked above. Thank you for the referral.       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 Reports falls very frequently, 4 times last 2 week. He complaints of LOB especially when negotiating or moving backwards. He reports he has a history of neuropathy and with chemo/radiation for lung cancer his neuropathy had worsened. He denies any dizziness, He reports increased balance difficulty over the past 1-2 years. Has rolling walker at home which he utilizes occ, prefers cane but feels more stable on walker.   He denies any B/B changes, recent weight loss, fever but that was associated with UTI. Wants to increase balance to reduce falls. Relevant Medical History:neuropathy,   Functional Disability Index:      TUG 19 sec  Reddy 33    Pain Scale: 0-2/10  Easing factors: NA  Provocative factors: moving backwards, turning     Type: []Constant   []Intermittent  []Radiating []Localized []other:     Numbness/Tingling: distal to ankle bilaterally    Occupation/School:retired    Living Status/Prior Level of Function: Independent with ADLs and IADLs,      OBJECTIVE:     ROM LEFT RIGHT   HIP Flex 100 105   HIP Abd NT NT   HIP Ext NT NT   HIP IR 20 18   HIP ER 35 33   Knee ext WNL WNL   Knee Flex     Ankle PF WNL WNL   Ankle DF Limited limited   Ankle In     Ankle Ev     Strength  LEFT RIGHT   HIP Flexors 4 4   HIP Abductors NT NT   HIP Ext NT NT   Hip ER NT NT   Knee EXT (quad) 4+ 4+   Knee Flex (HS) 4+ 4+   Ankle DF 4+ 4+   Ankle PF NT NT   Ankle Inv 5 5   Ankle EV 5 5        Circumference  Mid apex  7 cm prox             Reflexes/Sensation:    [x]Dermatomes/Myotomes intact    [x]Reflexes equal and normal bilaterally   []Other:    Joint mobility:     []Normal    []Hypo   []Hyper    Palpation: NA    Functional Mobility/Transfers: Min A with sit to stand, supine to sit ind    Posture: forward flexed at hips    Bandages/Dressings/Incisions: NA    Gait: (include devices/WB status) slightly increased LULU with gate, amb with SPC R UE. Orthopedic Special Tests: sensation decreased to light touch distal to ankle, no dermatomal deficits                       [x] Patient history, allergies, meds reviewed. Medical chart reviewed. See intake form. Review Of Systems (ROS):  [x]Performed Review of systems (Integumentary, CardioPulmonary, Neurological) by intake and observation. Intake form has been scanned into medical record.  Patient has been instructed to contact their primary care physician regarding ROS issues if not already being addressed at this time. Co-morbidities/Complexities (which will affect course of rehabilitation):    []None           Arthritic conditions   []Rheumatoid arthritis (M05.9)  [x]Osteoarthritis (M19.91)   Cardiovascular conditions   [x]Hypertension (I10)  []Hyperlipidemia (E78.5)  []Angina pectoris (I20)  []Atherosclerosis (I70)  []CVA Musculoskeletal conditions   []Disc pathology   []Congenital spine pathologies   [x]Prior surgical intervention  []Osteoporosis (M81.8)  []Osteopenia (M85.8)   Endocrine conditions   []Hypothyroid (E03.9)  []Hyperthyroid Gastrointestinal conditions   []Constipation (I38.38)   Metabolic conditions   []Morbid obesity (E66.01)  [x]Diabetes type 1(E10.65) or 2 (E11.65)   [x]Neuropathy (G60.9)     Pulmonary conditions   []Asthma (J45)  []Coughing   []COPD (J44.9)   Psychological Disorders  []Anxiety (F41.9)  []Depression (F32.9)   []Other:   [x]Other:     Lung CA     Barriers to/and or personal factors that will affect rehab potential:              []Age  []Sex    []Smoker              []Motivation/Lack of Motivation                        [x]Co-Morbidities              []Cognitive Function, education/learning barriers              []Environmental, home barriers              []profession/work barriers  []past PT/medical experience  []other:  Justification:      Falls Risk Assessment (30 days):    [] Falls Risk assessed and no intervention required. [x] Falls Risk assessed and Patient requires intervention due to being higher risk   TUG score (>12s at risk):     [x] Falls education provided, including       G-Codes:       ASSESSMENT: Patient presents today with decreased TUG, balance difficulty specifically with narrow LULU,  Difficulty with sit to stand. He has decreased sensation to light touch distal to ankle.   He would benefit from PT to focus on improving balance to decrease falls and also increase strength to normalize transfers specifically sit to stand  Functional Impairments:     []Noted []Excellent   []Good    [x]Fair   []Poor    Tolerance of evaluation/treatment:    []Excellent   []Good    [x]Fair   []Poor    Physical Therapy Evaluation Complexity Justification  [x] A history of present problem with:  [] no personal factors and/or comorbidities that impact the plan of care;  [x]1-2 personal factors and/or comorbidities that impact the plan of care  []3 personal factors and/or comorbidities that impact the plan of care  [x] An examination of body systems using standardized tests and measures addressing any of the following: body structures and functions (impairments), activity limitations, and/or participation restrictions;:  [] a total of 1-2 or more elements   [x] a total of 3 or more elements   [] a total of 4 or more elements   [x] A clinical presentation with:  [] stable and/or uncomplicated characteristics   [x] evolving clinical presentation with changing characteristics  [] unstable and unpredictable characteristics;   [x] Clinical decision making of [] low, [x] moderate, [] high complexity using standardized patient assessment instrument and/or measurable assessment of functional outcome. [] EVAL (LOW) 55815 (typically 20 minutes face-to-face)  [x] EVAL (MOD) 52240 (typically 30 minutes face-to-face)  [] EVAL (HIGH) 93540 (typically 45 minutes face-to-face)  [] RE-EVAL     PLAN:  Frequency/Duration:  1-2 days per week for 6 Weeks:  Interventions:  [x]  Therapeutic exercise including: strength training, ROM, for Lower extremity and core   [x]  NMR activation and proprioception for LE, Glutes and Core   [x]  Manual therapy as indicated for LE, Hip and spine to include: Dry Needling/IASTM, STM, PROM, Gr I-IV mobilizations, manipulation. [x] Modalities as needed that may include: thermal agents, E-stim, Biofeedback, US, iontophoresis as indicated  [x] Patient education on joint protection, postural re-education, activity modification, progression of HEP.     HEP instruction:  (see scanned forms)    GOALS:  Patient stated goal: improve balance    Therapist goals for Patient:   Short Term Goals: To be achieved in: 2 weeks  1. Independent in HEP and progression per patient tolerance, in order to prevent re-injury. 2. Patient will have a decrease in pain to facilitate improvement in movement, function, and ADLs as indicated by Functional Deficits. Long Term Goals: To be achieved in: 6 weeks  1. Disability index score of 25/80% or less for the LEFS to assist with reaching prior level of function. 2. Patient will demonstrate increased sit to stand with SBA with no use of UE  3. Patient will demonstrate an increase in Strength to good proximal hip strength and control, within 5lb HHD in LE to allow for proper functional mobility as indicated by patients Functional Deficits. 4. Patient will reduce falls to 0 per week   5.  TUG less than 16 sec(patient specific functional goal)       Electronically signed by:  Jinny Velez PT

## 2019-06-20 NOTE — FLOWSHEET NOTE
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  · Treatment Diagnosis: Sensory Ataxia N86.4  Insurance/Certification information:     Physician Information:  Referring Practitioner: Dr Anais Xie of care signed (Y/N):     Date of Patient follow up with Physician:     G-Code (if applicable):      Date G-Code Applied:         Progress Note: [x]  Yes  []  No  Next due by: Visit #10       Latex Allergy:  [x]NO      []YES  Preferred Language for Healthcare:   [x]English       []other:    Visit # Insurance Allowable   1 medicare     Pain level:  0/10     SUBJECTIVE:  See eval    OBJECTIVE: See eval  Observation:   Test measurements:      RESTRICTIONS/PRECAUTIONS: balance, TUG 19, Reddy 33, difficulty with backwards movements and turning    Exercises/Interventions:     Therapeutic Ex (51336) Sets/sec Reps Notes/CUES   Retro Stepper/BIKE      Alter G      BFR      Sportcord March      3 way SLR      SAQ      Clam ABD      Hip Ext Willy Diego      BOSU fwd/side lunge      BOSU squat      Leg Press Iso/Con/Ecc 0-      Cybex HS curl      TKE      Glute side walks      RDL      Slide Lunge      Slide HS eccentrics      Step ups/ecc step down      Swissball wall rolls- in SLS- hip drive      Quad hip ext/wall-ball rolls                  Manual Intervention (89105)      Knee mobs/PROM      Tib/Fem Mobs      Patella Mobs      Ankle mobs                  NMR re-education (39128)   CUES NEEDED   Micronesian/Biofeedback 10/10      BFR      G. Med activation      Hip Ext full ROM/ G.  Activation      Bosu Bal and Prop- G Med      Single leg stance/Balance/Prop      Bosu Retro G. Med act      Prone Hip froggers- sliders/elevated            Therapeutic Activity (66608)      Ladders      Undaos      Dynamic Balance                            Therapeutic Exercise and NMR EXR  [x] (98357) Provided verbal/tactile cueing for activities related to strengthening, flexibility, endurance, ROM for improvements in LE, proximal hip, and core control with self care, mobility, lifting, ambulation. [x] (40290) Provided verbal/tactile cueing for activities related to improving balance, coordination, kinesthetic sense, posture, motor skill, proprioception  to assist with LE, proximal hip, and core control in self care, mobility, lifting, ambulation and eccentric single leg control.      NMR and Therapeutic Activities:    [x] (29179 or 45863) Provided verbal/tactile cueing for activities related to improving balance, coordination, kinesthetic sense, posture, motor skill, proprioception and motor activation to allow for proper function of core, proximal hip and LE with self care and ADLs and functional mobility.   [] (83421) Gait Re-education- Provided training and instruction to the patient for proper LE, core and proximal hip recruitment and positioning and eccentric body weight control with ambulation re-education including up and down stairs     Home Exercise Program:    [x] (03033) Reviewed/Progressed HEP activities related to strengthening, flexibility, endurance, ROM of core, proximal hip and LE for functional self-care, mobility, lifting and ambulation/stair navigation   [] (86583)Reviewed/Progressed HEP activities related to improving balance, coordination, kinesthetic sense, posture, motor skill, proprioception of core, proximal hip and LE for self care, mobility, lifting, and ambulation/stair navigation      Manual Treatments:  PROM / STM / Oscillations-Mobs:  G-I, II, III, IV (PA's, Inf., Post.)  [x] (52662) Provided manual therapy to mobilize LE, proximal hip and/or LS spine soft tissue/joints for the purpose of modulating pain, promoting relaxation,  increasing ROM, reducing/eliminating soft tissue swelling/inflammation/restriction, improving soft tissue extensibility and allowing for proper ROM for normal function with self care, mobility, lifting and ambulation. Modalities:     [] GAME READY (VASO)- for significant edema, swelling, pain control. Charges:  Timed Code Treatment Minutes: 5   Total Treatment Minutes: 55      [] EVAL (LOW) 67033 (typically 20 minutes face-to-face)  [x] EVAL (MOD) 42056 (typically 30 minutes face-to-face)  [] EVAL (HIGH) 32098 (typically 45 minutes face-to-face)  [] RE-EVAL     [] UR(15498) x      [] IONTO  [] NMR (56243) x      [] VASO  [] Manual (83315) x       [] Other:  [] TA x       [] Mech Traction (39347)  [] ES(attended) (33698)      [] ES (un) (37520):     GOALS  Short Term Goals: To be achieved in: 2 weeks  1. Independent in HEP and progression per patient tolerance, in order to prevent re-injury. 2. Patient will have a decrease in pain to facilitate improvement in movement, function, and ADLs as indicated by Functional Deficits. Long Term Goals: To be achieved in: 6 weeks  1. Disability index score of 25/80% or less for the LEFS to assist with reaching prior level of function. 2. Patient will demonstrate increased sit to stand with SBA with no use of UE  3. Patient will demonstrate an increase in Strength to good proximal hip strength and control, within 5lb HHD in LE to allow for proper functional mobility as indicated by patients Functional Deficits. 4. Patient will reduce falls to 0 per week   5. TUG less than 16 sec(patient specific functional goal)    Progression Towards Functional goals:  [] Patient is progressing as expected towards functional goals listed. [] Progression is slowed due to complexities listed. [] Progression has been slowed due to co-morbidities.   [x] Plan just implemented, too soon to assess goals progression  [] Other:     ASSESSMENT:  See eval    Return to Play: (if applicable)   []  Stage 1: Intro to Strength   []  Stage 2: Return to Run and Strength   []  Stage 3: Return to Jump and Strength   []  Stage 4: Dynamic Strength and Agility   []  Stage 5: Sport Specific Training     []  Ready to Return to Play, Meets All Above Stages   []  Not Ready for Return to Sports   Comments:                         Treatment/Activity Tolerance:  [x] Patient tolerated treatment well [] Patient limited by fatique  [] Patient limited by pain  [] Patient limited by other medical complications  [] Other:     Prognosis: [x] Good [] Fair  [] Poor    Patient Requires Follow-up: [x] Yes  [] No      PLAN: See eval  [] Continue per plan of care [] Alter current plan (see comments)  [x] Plan of care initiated [] Hold pending MD visit [] Discharge    Electronically signed by: Kali Lara PT

## 2019-06-24 ENCOUNTER — HOSPITAL ENCOUNTER (OUTPATIENT)
Dept: PHYSICAL THERAPY | Age: 84
Setting detail: THERAPIES SERIES
Discharge: HOME | End: 2019-06-24
Payer: MEDICARE

## 2019-06-24 PROCEDURE — 97110 THERAPEUTIC EXERCISES: CPT

## 2019-06-24 PROCEDURE — 97112 NEUROMUSCULAR REEDUCATION: CPT

## 2019-06-24 NOTE — FLOWSHEET NOTE
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 States that he had a fall on Saturday while he was out in the yard. Notes that he is a little stiff in his upper back; but no injuries.         OBJECTIVE: See eval  Observation:   Test measurements:      RESTRICTIONS/PRECAUTIONS: balance, TUG 19, Reddy 33, difficulty with backwards movements and turning    Exercises/Interventions:     Therapeutic Ex (20806): 22 min Sets/sec Reps Notes/CUES   Retro Stepper/BIKE      Alter G      BFR      Sportcord March      3 way SLR      LAQ 1 15 Bilat, 3#   Clam ABD      Hip Ext Maria Del Rosario Coral      BOSU fwd/side lunge      BOSU squat      Leg Press Iso/Con/Ecc 0-      Cybex HS curl      TKE      Glute side walks 1 10 Cues form   RDL      Slide Lunge      Slide HS eccentrics      Step ups/ecc step down      Swissball wall rolls- in SLS- hip drive      Quad hip ext/wall-ball rolls      STS from table 1 10    Standing wall squat 1 10    Manual Intervention (22998)      Knee mobs/PROM      Tib/Fem Mobs      Patella Mobs      Ankle mobs NMR re-education (97688): 25 min   CUES NEEDED   Kazakh/Biofeedback 10/10      BFR      G. Med activation      Hip Ext full ROM/ G. Activation      Bosu Bal and Prop- G Med      Single leg stance/Balance/Prop      Bosu Retro G. Med act      Prone Hip froggers- sliders/elevated      Narrow LULU with sh flex + rotation 1 5 Bilat; level ground   Normal LULU on ariex 20 3 airex   Partial tandem stand 20 3 Bilat, level ground   Modified SLS with 4\" box 20 3 Bilat, level ground               Therapeutic Activity (86638)      Ladders      [employer]      Dynamic Balance                            Therapeutic Exercise and NMR EXR  [x] (38375) Provided verbal/tactile cueing for activities related to strengthening, flexibility, endurance, ROM for improvements in LE, proximal hip, and core control with self care, mobility, lifting, ambulation. [x] (89344) Provided verbal/tactile cueing for activities related to improving balance, coordination, kinesthetic sense, posture, motor skill, proprioception  to assist with LE, proximal hip, and core control in self care, mobility, lifting, ambulation and eccentric single leg control.      NMR and Therapeutic Activities:    [x] (72154 or 31357) Provided verbal/tactile cueing for activities related to improving balance, coordination, kinesthetic sense, posture, motor skill, proprioception and motor activation to allow for proper function of core, proximal hip and LE with self care and ADLs and functional mobility.   [] (13053) Gait Re-education- Provided training and instruction to the patient for proper LE, core and proximal hip recruitment and positioning and eccentric body weight control with ambulation re-education including up and down stairs     Home Exercise Program:    [x] (60593) Reviewed/Progressed HEP activities related to strengthening, flexibility, endurance, ROM of core, proximal hip and LE for functional self-care, mobility, lifting and ambulation/stair navigation   [] (12852)Reviewed/Progressed HEP activities related to improving balance, coordination, kinesthetic sense, posture, motor skill, proprioception of core, proximal hip and LE for self care, mobility, lifting, and ambulation/stair navigation      Manual Treatments:  PROM / STM / Oscillations-Mobs:  G-I, II, III, IV (PA's, Inf., Post.)  [x] (18556) Provided manual therapy to mobilize LE, proximal hip and/or LS spine soft tissue/joints for the purpose of modulating pain, promoting relaxation,  increasing ROM, reducing/eliminating soft tissue swelling/inflammation/restriction, improving soft tissue extensibility and allowing for proper ROM for normal function with self care, mobility, lifting and ambulation. Modalities:     [] GAME READY (VASO)- for significant edema, swelling, pain control. Charges:  Timed Code Treatment Minutes: 47   Total Treatment Minutes: 50      [] EVAL (LOW) 47984 (typically 20 minutes face-to-face)  [] EVAL (MOD) 78565 (typically 30 minutes face-to-face)  [] EVAL (HIGH) 99602 (typically 45 minutes face-to-face)  [] RE-EVAL     [x] BR(12062) x  1   [] IONTO  [x] NMR (90654) x  2   [] VASO  [] Manual (51840) x       [] Other:  [] TA x       [] Mech Traction (99013)  [] ES(attended) (91894)      [] ES (un) (68023):     GOALS  Short Term Goals: To be achieved in: 2 weeks  1. Independent in HEP and progression per patient tolerance, in order to prevent re-injury. 2. Patient will have a decrease in pain to facilitate improvement in movement, function, and ADLs as indicated by Functional Deficits. Long Term Goals: To be achieved in: 6 weeks  1. Disability index score of 25/80% or less for the LEFS to assist with reaching prior level of function. 2. Patient will demonstrate increased sit to stand with SBA with no use of UE  3.  Patient will demonstrate an increase in Strength to good proximal hip strength and control, within 5lb HHD in LE to allow for proper functional mobility as indicated by patients Functional Deficits. 4. Patient will reduce falls to 0 per week   5. TUG less than 16 sec(patient specific functional goal)    Progression Towards Functional goals:  [x] Patient is progressing as expected towards functional goals listed. [] Progression is slowed due to complexities listed. [] Progression has been slowed due to co-morbidities. [] Plan just implemented, too soon to assess goals progression  [] Other:     ASSESSMENT:  Patient requiring Tila for STS transfers with cues for form and muscle activation. Cues throughout for all balance activities for increased gluteal and quad activation. Needing cga for all balance. Decreased gluteal activation on R side greater than L. Added standing squats and HR today. Patient unable to clear heels more than half inch. Educated to continue with at home as well as standing squats at counter. Patient fatigued at conclusion.      Return to Play: (if applicable)   []  Stage 1: Intro to Strength   []  Stage 2: Return to Run and Strength   []  Stage 3: Return to Jump and Strength   []  Stage 4: Dynamic Strength and Agility   []  Stage 5: Sport Specific Training     []  Ready to Return to Play, Meets All Above Stages   []  Not Ready for Return to Sports   Comments:                         Treatment/Activity Tolerance:  [x] Patient tolerated treatment well [] Patient limited by fatique  [] Patient limited by pain  [] Patient limited by other medical complications  [] Other:     Prognosis: [x] Good [] Fair  [] Poor    Patient Requires Follow-up: [x] Yes  [] No      PLAN: Continue to improve strengthen and balance  [x] Continue per plan of care [] Alter current plan (see comments)  [] Plan of care initiated [] Hold pending MD visit [] Discharge    Electronically signed by: Nick Cam

## 2019-06-26 ENCOUNTER — HOSPITAL ENCOUNTER (OUTPATIENT)
Dept: CT IMAGING | Age: 84
Discharge: HOME | End: 2019-06-26
Payer: MEDICARE

## 2019-06-26 DIAGNOSIS — C34.92 NON-SMALL CELL CANCER OF LEFT LUNG (HCC): ICD-10-CM

## 2019-06-26 PROCEDURE — 71250 CT THORAX DX C-: CPT

## 2019-06-27 ENCOUNTER — HOSPITAL ENCOUNTER (OUTPATIENT)
Dept: PHYSICAL THERAPY | Age: 84
Setting detail: THERAPIES SERIES
Discharge: HOME | End: 2019-06-27
Payer: MEDICARE

## 2019-06-27 PROCEDURE — 97112 NEUROMUSCULAR REEDUCATION: CPT

## 2019-06-27 PROCEDURE — 97110 THERAPEUTIC EXERCISES: CPT

## 2019-06-27 NOTE — FLOWSHEET NOTE
1 15    STS from table 1 15 Cues form; less assist   Standing wall squat 1 15    Manual Intervention (50791)      Knee mobs/PROM      Tib/Fem Mobs      Patella Mobs      Ankle mobs                  NMR re-education (02314): 25 min   CUES NEEDED   Chinese/Biofeedback 10/10      BFR      G. Med activation      Hip Ext full ROM/ G. Activation      Bosu Bal and Prop- G Med      Single leg stance/Balance/Prop      Bosu Retro G. Med act      Prone Hip froggers- sliders/elevated      Normal LULU with sh flex + rotation 1 5 Bilat; airex pad   Narrow LULU on ariex 20 3 airex   Tandem stand 20 3 Bilat, level ground- cga   Modified SLS with 6\" box 20 3 Bilat, level ground               Therapeutic Activity (18593)      Ladders      Plyos      Dynamic Balance                            Therapeutic Exercise and NMR EXR  [x] (75149) Provided verbal/tactile cueing for activities related to strengthening, flexibility, endurance, ROM for improvements in LE, proximal hip, and core control with self care, mobility, lifting, ambulation. [x] (05074) Provided verbal/tactile cueing for activities related to improving balance, coordination, kinesthetic sense, posture, motor skill, proprioception  to assist with LE, proximal hip, and core control in self care, mobility, lifting, ambulation and eccentric single leg control.      NMR and Therapeutic Activities:    [x] (49156 or 95518) Provided verbal/tactile cueing for activities related to improving balance, coordination, kinesthetic sense, posture, motor skill, proprioception and motor activation to allow for proper function of core, proximal hip and LE with self care and ADLs and functional mobility.   [] (65413) Gait Re-education- Provided training and instruction to the patient for proper LE, core and proximal hip recruitment and positioning and eccentric body weight control with ambulation re-education including up and down stairs     Home Exercise Program:    [x] (94629) Reviewed/Progressed HEP activities related to strengthening, flexibility, endurance, ROM of core, proximal hip and LE for functional self-care, mobility, lifting and ambulation/stair navigation   [] (42145)Reviewed/Progressed HEP activities related to improving balance, coordination, kinesthetic sense, posture, motor skill, proprioception of core, proximal hip and LE for self care, mobility, lifting, and ambulation/stair navigation      Manual Treatments:  PROM / STM / Oscillations-Mobs:  G-I, II, III, IV (PA's, Inf., Post.)  [x] (55816) Provided manual therapy to mobilize LE, proximal hip and/or LS spine soft tissue/joints for the purpose of modulating pain, promoting relaxation,  increasing ROM, reducing/eliminating soft tissue swelling/inflammation/restriction, improving soft tissue extensibility and allowing for proper ROM for normal function with self care, mobility, lifting and ambulation. Modalities:     [] GAME READY (VASO)- for significant edema, swelling, pain control. Charges:  Timed Code Treatment Minutes: 47   Total Treatment Minutes: 50      [] EVAL (LOW) 76902 (typically 20 minutes face-to-face)  [] EVAL (MOD) 30895 (typically 30 minutes face-to-face)  [] EVAL (HIGH) 22137 (typically 45 minutes face-to-face)  [] RE-EVAL     [x] FT(30932) x  1   [] IONTO  [x] NMR (71079) x  2   [] VASO  [] Manual (29032) x       [] Other:  [] TA x       [] Mech Traction (43095)  [] ES(attended) (55949)      [] ES (un) (90973):     GOALS  Short Term Goals: To be achieved in: 2 weeks  1. Independent in HEP and progression per patient tolerance, in order to prevent re-injury. 2. Patient will have a decrease in pain to facilitate improvement in movement, function, and ADLs as indicated by Functional Deficits. Long Term Goals: To be achieved in: 6 weeks  1. Disability index score of 25/80% or less for the LEFS to assist with reaching prior level of function.    2. Patient will demonstrate increased sit to stand

## 2019-07-02 ENCOUNTER — TELEPHONE (OUTPATIENT)
Dept: INTERNAL MEDICINE CLINIC | Age: 84
End: 2019-07-02

## 2019-07-02 ENCOUNTER — HOSPITAL ENCOUNTER (OUTPATIENT)
Dept: PHYSICAL THERAPY | Age: 84
Setting detail: THERAPIES SERIES
Discharge: HOME | End: 2019-07-02
Payer: MEDICARE

## 2019-07-02 DIAGNOSIS — N18.30 TYPE 2 DIABETES MELLITUS WITH STAGE 3 CHRONIC KIDNEY DISEASE, WITHOUT LONG-TERM CURRENT USE OF INSULIN (HCC): Primary | ICD-10-CM

## 2019-07-02 DIAGNOSIS — E11.22 TYPE 2 DIABETES MELLITUS WITH STAGE 3 CHRONIC KIDNEY DISEASE, WITHOUT LONG-TERM CURRENT USE OF INSULIN (HCC): Primary | ICD-10-CM

## 2019-07-02 PROCEDURE — 97110 THERAPEUTIC EXERCISES: CPT

## 2019-07-02 PROCEDURE — 97112 NEUROMUSCULAR REEDUCATION: CPT

## 2019-07-02 RX ORDER — ALOGLIPTIN 6.25 MG/1
6.25 TABLET, FILM COATED ORAL DAILY
Qty: 30 TABLET | Refills: 5 | Status: SHIPPED | OUTPATIENT
Start: 2019-07-02 | End: 2019-07-07

## 2019-07-05 ENCOUNTER — HOSPITAL ENCOUNTER (OUTPATIENT)
Dept: PHYSICAL THERAPY | Age: 84
Setting detail: THERAPIES SERIES
Discharge: HOME | End: 2019-07-05
Payer: MEDICARE

## 2019-07-05 PROCEDURE — 97112 NEUROMUSCULAR REEDUCATION: CPT

## 2019-07-05 PROCEDURE — 97140 MANUAL THERAPY 1/> REGIONS: CPT

## 2019-07-05 PROCEDURE — 97110 THERAPEUTIC EXERCISES: CPT

## 2019-07-05 NOTE — FLOWSHEET NOTE
Return to Play, Meets All Above Stages   []  Not Ready for Return to Sports   Comments:                         Treatment/Activity Tolerance:  [x] Patient tolerated treatment well [] Patient limited by fatique  [] Patient limited by pain  [] Patient limited by other medical complications  [] Other:     Prognosis: [x] Good [] Fair  [] Poor    Patient Requires Follow-up: [x] Yes  [] No      PLAN: Continue to improve strengthen and balance  [x] Continue per plan of care [] Alter current plan (see comments)  [] Plan of care initiated [] Hold pending MD visit [] Discharge    Electronically signed by: Shirin Cronin

## 2019-07-07 DIAGNOSIS — N18.30 TYPE 2 DIABETES MELLITUS WITH STAGE 3 CHRONIC KIDNEY DISEASE, WITHOUT LONG-TERM CURRENT USE OF INSULIN (HCC): ICD-10-CM

## 2019-07-07 DIAGNOSIS — E11.22 TYPE 2 DIABETES MELLITUS WITH STAGE 3 CHRONIC KIDNEY DISEASE, WITHOUT LONG-TERM CURRENT USE OF INSULIN (HCC): ICD-10-CM

## 2019-07-07 RX ORDER — GLIPIZIDE 5 MG/1
2.5 TABLET ORAL
Qty: 90 TABLET | Refills: 3 | Status: SHIPPED | OUTPATIENT
Start: 2019-07-07 | End: 2019-07-29

## 2019-07-08 ENCOUNTER — TELEPHONE (OUTPATIENT)
Dept: INTERNAL MEDICINE CLINIC | Age: 84
End: 2019-07-08

## 2019-07-08 NOTE — TELEPHONE ENCOUNTER
Pt's wife david is expensive so the ins gave her 3 other options:  Pioglitazone  Glimepiride  nateglinde      Nimisha on file

## 2019-07-09 ENCOUNTER — APPOINTMENT (OUTPATIENT)
Dept: PHYSICAL THERAPY | Age: 84
End: 2019-07-09
Payer: MEDICARE

## 2019-07-12 ENCOUNTER — TELEPHONE (OUTPATIENT)
Dept: ORTHOPEDIC SURGERY | Age: 84
End: 2019-07-12

## 2019-07-12 ENCOUNTER — APPOINTMENT (OUTPATIENT)
Dept: PHYSICAL THERAPY | Age: 84
End: 2019-07-12
Payer: MEDICARE

## 2019-07-12 PROBLEM — Z51.11 ENCOUNTER FOR ANTINEOPLASTIC CHEMOTHERAPY: Status: ACTIVE | Noted: 2019-07-12

## 2019-07-12 PROBLEM — H18.451 NODULAR CORNEAL DEGENERATION, RIGHT EYE: Status: ACTIVE | Noted: 2019-07-12

## 2019-07-12 PROBLEM — H47.093 PERIPAPILLARY ATROPHY OF BOTH EYES: Status: ACTIVE | Noted: 2019-07-12

## 2019-07-12 PROBLEM — H26.493 BILATERAL POSTERIOR CAPSULAR OPACIFICATION: Status: ACTIVE | Noted: 2019-07-12

## 2019-07-12 PROBLEM — H26.40 SECONDARY CATARACT: Status: ACTIVE | Noted: 2019-07-12

## 2019-07-12 PROBLEM — H43.813 POSTERIOR VITREOUS DETACHMENT OF BOTH EYES: Status: ACTIVE | Noted: 2019-07-12

## 2019-07-12 PROBLEM — R11.2 NAUSEA WITH VOMITING: Status: ACTIVE | Noted: 2019-07-12

## 2019-07-12 PROBLEM — H35.3131 EARLY STAGE NONEXUDATIVE AGE-RELATED MACULAR DEGENERATION OF BOTH EYES: Status: ACTIVE | Noted: 2019-07-12

## 2019-07-12 PROBLEM — H43.813 VITREOUS DEGENERATION, BILATERAL: Status: ACTIVE | Noted: 2019-07-12

## 2019-07-12 PROBLEM — H02.005 ENTROPION OF LEFT LOWER EYELID: Status: ACTIVE | Noted: 2019-07-12

## 2019-07-12 PROBLEM — E83.42 HYPOMAGNESEMIA: Status: ACTIVE | Noted: 2019-07-12

## 2019-07-12 PROBLEM — E11.42 DIABETIC PERIPHERAL NEUROPATHY (HCC): Status: ACTIVE | Noted: 2019-07-12

## 2019-07-12 PROBLEM — H40.1132 PRIMARY OPEN-ANGLE GLAUCOMA, BILATERAL, MODERATE STAGE: Status: ACTIVE | Noted: 2019-07-12

## 2019-07-12 PROBLEM — H02.831 DERMATOCHALASIS OF RIGHT UPPER EYELID: Status: ACTIVE | Noted: 2019-07-12

## 2019-07-12 PROBLEM — R53.0 NEOPLASTIC MALIGNANT RELATED FATIGUE: Status: ACTIVE | Noted: 2019-07-12

## 2019-07-15 ENCOUNTER — OFFICE VISIT (OUTPATIENT)
Dept: INTERNAL MEDICINE CLINIC | Age: 84
End: 2019-07-15
Payer: MEDICARE

## 2019-07-15 VITALS
BODY MASS INDEX: 28.98 KG/M2 | HEART RATE: 76 BPM | WEIGHT: 207 LBS | SYSTOLIC BLOOD PRESSURE: 132 MMHG | DIASTOLIC BLOOD PRESSURE: 74 MMHG | HEIGHT: 71 IN

## 2019-07-15 DIAGNOSIS — M48.062 LUMBAR STENOSIS WITH NEUROGENIC CLAUDICATION: ICD-10-CM

## 2019-07-15 DIAGNOSIS — I26.99 BILATERAL PULMONARY EMBOLISM (HCC): ICD-10-CM

## 2019-07-15 DIAGNOSIS — N18.30 TYPE 2 DIABETES MELLITUS WITH STAGE 3 CHRONIC KIDNEY DISEASE, WITHOUT LONG-TERM CURRENT USE OF INSULIN (HCC): Primary | ICD-10-CM

## 2019-07-15 DIAGNOSIS — J44.9 COPD, SEVERITY TO BE DETERMINED (HCC): ICD-10-CM

## 2019-07-15 DIAGNOSIS — E11.22 TYPE 2 DIABETES MELLITUS WITH STAGE 3 CHRONIC KIDNEY DISEASE, WITHOUT LONG-TERM CURRENT USE OF INSULIN (HCC): Primary | ICD-10-CM

## 2019-07-15 DIAGNOSIS — Z79.01 ANTICOAGULATED ON COUMADIN: ICD-10-CM

## 2019-07-15 DIAGNOSIS — I68.0 CEREBRAL AMYLOID ANGIOPATHY (CODE): ICD-10-CM

## 2019-07-15 LAB
INTERNATIONAL NORMALIZATION RATIO, POC: 1.6
PROTHROMBIN TIME, POC: NORMAL

## 2019-07-15 PROCEDURE — 3023F SPIROM DOC REV: CPT | Performed by: NURSE PRACTITIONER

## 2019-07-15 PROCEDURE — 1036F TOBACCO NON-USER: CPT | Performed by: NURSE PRACTITIONER

## 2019-07-15 PROCEDURE — 4040F PNEUMOC VAC/ADMIN/RCVD: CPT | Performed by: NURSE PRACTITIONER

## 2019-07-15 PROCEDURE — G8417 CALC BMI ABV UP PARAM F/U: HCPCS | Performed by: NURSE PRACTITIONER

## 2019-07-15 PROCEDURE — G8926 SPIRO NO PERF OR DOC: HCPCS | Performed by: NURSE PRACTITIONER

## 2019-07-15 PROCEDURE — 1123F ACP DISCUSS/DSCN MKR DOCD: CPT | Performed by: NURSE PRACTITIONER

## 2019-07-15 PROCEDURE — 99214 OFFICE O/P EST MOD 30 MIN: CPT | Performed by: NURSE PRACTITIONER

## 2019-07-15 PROCEDURE — G8427 DOCREV CUR MEDS BY ELIG CLIN: HCPCS | Performed by: NURSE PRACTITIONER

## 2019-07-15 PROCEDURE — 85610 PROTHROMBIN TIME: CPT | Performed by: NURSE PRACTITIONER

## 2019-07-16 ENCOUNTER — HOSPITAL ENCOUNTER (OUTPATIENT)
Dept: PHYSICAL THERAPY | Age: 84
Setting detail: THERAPIES SERIES
Discharge: HOME | End: 2019-07-16
Payer: MEDICARE

## 2019-07-16 PROCEDURE — 97112 NEUROMUSCULAR REEDUCATION: CPT

## 2019-07-16 PROCEDURE — 97110 THERAPEUTIC EXERCISES: CPT

## 2019-07-16 NOTE — FLOWSHEET NOTE
Deficits. Long Term Goals: To be achieved in: 6 weeks  1. Disability index score of 25/80% or less for the LEFS to assist with reaching prior level of function. 2. Patient will demonstrate increased sit to stand with SBA with no use of UE  3. Patient will demonstrate an increase in Strength to good proximal hip strength and control, within 5lb HHD in LE to allow for proper functional mobility as indicated by patients Functional Deficits. 4. Patient will reduce falls to 0 per week   5. TUG less than 16 sec(patient specific functional goal)    Progression Towards Functional goals:  [x] Patient is progressing as expected towards functional goals listed. [] Progression is slowed due to complexities listed. [] Progression has been slowed due to co-morbidities. [] Plan just implemented, too soon to assess goals progression  [] Other:     ASSESSMENT:  Patient able to take further increase in balance challenge today. Able to progress tandem stand to airex pad and Lowered seated height for STS to chair, as well as modified SLS with 8\" step. Patient still requiring cues for improved form and cga-Tila for all balance- however better overall tolerance. L leg not as weak today with balancing. Patient needing further balance and strengthening work. Patient fatigued at conclusion.      Return to Play: (if applicable)   []  Stage 1: Intro to Strength   []  Stage 2: Return to Run and Strength   []  Stage 3: Return to Jump and Strength   []  Stage 4: Dynamic Strength and Agility   []  Stage 5: Sport Specific Training     []  Ready to Return to Play, Meets All Above Stages   []  Not Ready for Return to Sports   Comments:                         Treatment/Activity Tolerance:  [x] Patient tolerated treatment well [] Patient limited by fatique  [] Patient limited by pain  [] Patient limited by other medical complications  [] Other:     Prognosis: [x] Good [] Fair  [] Poor    Patient Requires Follow-up: [x] Yes  [] No PLAN: Continue to improve strengthen and balance  [x] Continue per plan of care [] Alter current plan (see comments)  [] Plan of care initiated [] Hold pending MD visit [] Discharge    Electronically signed by: Nicolás Art

## 2019-07-17 ASSESSMENT — ENCOUNTER SYMPTOMS
BACK PAIN: 1
BLOOD IN STOOL: 0
RESPIRATORY NEGATIVE: 1
GASTROINTESTINAL NEGATIVE: 1

## 2019-07-18 ENCOUNTER — HOSPITAL ENCOUNTER (OUTPATIENT)
Dept: PHYSICAL THERAPY | Age: 84
Setting detail: THERAPIES SERIES
Discharge: HOME | End: 2019-07-18
Payer: MEDICARE

## 2019-07-18 PROCEDURE — 97112 NEUROMUSCULAR REEDUCATION: CPT

## 2019-07-18 PROCEDURE — 97110 THERAPEUTIC EXERCISES: CPT

## 2019-07-18 NOTE — FLOWSHEET NOTE
Fair  [] Poor    Patient Requires Follow-up: [x] Yes  [] No      PLAN: Continue to improve strengthen and balance  [x] Continue per plan of care [] Alter current plan (see comments)  [] Plan of care initiated [] Hold pending MD visit [] Discharge    Electronically signed by: Ignacio Wise

## 2019-07-22 ENCOUNTER — HOSPITAL ENCOUNTER (OUTPATIENT)
Dept: PHYSICAL THERAPY | Age: 84
Setting detail: THERAPIES SERIES
Discharge: HOME | End: 2019-07-22
Payer: MEDICARE

## 2019-07-22 PROCEDURE — 97110 THERAPEUTIC EXERCISES: CPT

## 2019-07-22 PROCEDURE — 97112 NEUROMUSCULAR REEDUCATION: CPT

## 2019-07-22 NOTE — FLOWSHEET NOTE
Sports   Comments:                         Treatment/Activity Tolerance:  [x] Patient tolerated treatment well [] Patient limited by fatique  [] Patient limited by pain  [] Patient limited by other medical complications  [] Other:     Prognosis: [x] Good [] Fair  [] Poor    Patient Requires Follow-up: [x] Yes  [] No      PLAN: Continue to improve strengthen and balance  [x] Continue per plan of care [] Alter current plan (see comments)  [] Plan of care initiated [] Hold pending MD visit [] Discharge    Electronically signed by: Lucille Ordaz

## 2019-07-24 ENCOUNTER — HOSPITAL ENCOUNTER (OUTPATIENT)
Dept: PHYSICAL THERAPY | Age: 84
Setting detail: THERAPIES SERIES
Discharge: HOME | End: 2019-07-24
Payer: MEDICARE

## 2019-07-24 PROCEDURE — 97110 THERAPEUTIC EXERCISES: CPT

## 2019-07-24 PROCEDURE — 97112 NEUROMUSCULAR REEDUCATION: CPT

## 2019-07-24 NOTE — PLAN OF CARE
[x]NO      []YES  Preferred Language for Healthcare:   [x]English       []other:    Visit # Insurance Allowable   10 medicare     Pain level:  0/10     SUBJECTIVE:  Patient reports that he wasn't too sore after last session. Feeling pretty good today. Overall feels like he is making some small improvements in his balance- notes it is easier to go up/down curb compared to before. States that he is doing better with his steps at house. OBJECTIVE:   Observation: Discussed patient not using lift chair for STS at home- encouraged to use leg muscles to ensure keeping strength up. karin verbalized understanding. Provided patient and spouse with images of rollator walker so they can obtain one for home.    Test measurements:      RESTRICTIONS/PRECAUTIONS: AT Initial Eval: balance, TUG 19, Rodriguez 33, difficulty with backwards movements and turning    7/24/2019: TUG 15.8 (avg of 3 trials), RODRIGUEZ 41    ROM LEFT RIGHT   HIP Flex 105 120   HIP Abd NT NT   HIP Ext NT NT   HIP IR 15 20   HIP ER 32 40   Knee ext WNL WNL   Knee Flex       Ankle PF WNL WNL   Ankle DF Limited limited   Ankle In       Ankle Ev       Strength  LEFT RIGHT   HIP Flexors 4 4+   HIP Abductors 4 (supine) 4 (supine)   HIP Ext NT NT   Hip ER NT NT   Knee EXT (quad) 5 5   Knee Flex (HS) 5 5   Ankle DF 4 4+   Ankle PF NT NT   Ankle Inv 5 5   Ankle EV 5 5       Exercises/Interventions:     Therapeutic Ex (38480): 30 min Sets/sec Reps Notes/CUES   Retro Stepper/BIKE      Alter G      BFR      Sportcord March      3 way SLR 2 15 Bilat, 2#   LAQ 2 15 Bilat, 5#   Standing hip abd 2 15 5#, Bilat, cues form   Bridging 2 15    BOSU fwd/side lunge      BOSU squat      Leg Press Iso/Con/Ecc 0-      Cybex HS curl      TKE      Glute side walks 2 10 Cues form, @ wall   RDL      Slide Lunge      Slide HS eccentrics      Step ups/ecc step down      Swissball wall rolls- in SLS- hip drive      Quad hip ext/wall-ball rolls            Retro stepping at table np     Standing LE, core and proximal hip recruitment and positioning and eccentric body weight control with ambulation re-education including up and down stairs     Home Exercise Program:    [x] (37477) Reviewed/Progressed HEP activities related to strengthening, flexibility, endurance, ROM of core, proximal hip and LE for functional self-care, mobility, lifting and ambulation/stair navigation   [] (24128)Reviewed/Progressed HEP activities related to improving balance, coordination, kinesthetic sense, posture, motor skill, proprioception of core, proximal hip and LE for self care, mobility, lifting, and ambulation/stair navigation      Manual Treatments:  PROM / STM / Oscillations-Mobs:  G-I, II, III, IV (PA's, Inf., Post.)  [x] (34202) Provided manual therapy to mobilize LE, proximal hip and/or LS spine soft tissue/joints for the purpose of modulating pain, promoting relaxation,  increasing ROM, reducing/eliminating soft tissue swelling/inflammation/restriction, improving soft tissue extensibility and allowing for proper ROM for normal function with self care, mobility, lifting and ambulation. Modalities:     [] GAME READY (VASO)- for significant edema, swelling, pain control. Charges:  Timed Code Treatment Minutes: 43   Total Treatment Minutes: 45      [] EVAL (LOW) 68565 (typically 20 minutes face-to-face)  [] EVAL (MOD) 91666 (typically 30 minutes face-to-face)  [] EVAL (HIGH) 62310 (typically 45 minutes face-to-face)  [] RE-EVAL     [x] DH(84161) x  2   [] IONTO  [x] NMR (01223) x  1   [] VASO  [] Manual (56471) x       [] Other:  [] TA x       [] Mech Traction (45986)  [] ES(attended) (95292)      [] ES (un) (00998):     GOALS  Short Term Goals: To be achieved in: 2 weeks  1. Independent in HEP and progression per patient tolerance, in order to prevent re-injury. -75% MET  2.  Patient will have a decrease in pain to facilitate improvement in movement, function, and ADLs as indicated by Functional Deficits.-100% to Play, Meets All Above Stages   []  Not Ready for Return to Sports   Comments:                         Treatment/Activity Tolerance:  [x] Patient tolerated treatment well [] Patient limited by fatique  [] Patient limited by pain  [] Patient limited by other medical complications  [] Other:     Prognosis: [x] Good [] Fair  [] Poor    Patient Requires Follow-up: [x] Yes  [] No      PLAN: Continue to improve strengthen and balance, 1x week for 4 weeks.    [x] Continue per plan of care [] Alter current plan (see comments)  [] Plan of care initiated [] Hold pending MD visit [] Discharge    Electronically signed by: Kimberli Milner

## 2019-07-24 NOTE — PROGRESS NOTES
cava, which is more  consistent with right heart strain with cor pulmonale. Recommend- This is a  challenging situation. Diuresis is likely going to be difficult. The  best way to deal with his peripheral edema will be with some form of  compression hose, can certainly continue Lasix orally b.i.d. We will  consider addition of ACE inhibitor instead of the use of Norvasc for his  blood pressure. Overall, this will be very challenging for this patient  due to findings noted above, not sure what role lung cancer will play. I looked at his echocardiogram for the possibility of amyloid, infiltration  of his myocardium. It does not appear that he has this problem. Past Medical History:   has a past medical history of Abdominal pain, epigastric, Arthritis, Benign prostatic hypertrophy without urinary obstruction, BPH, Cellulitis and abscess, Chronic rhinitis, Chronic systolic congestive heart failure ([address] COPD (chronic obstructive pulmonary disease) ([address] Diabetes mellitus ([address] Dysphagia, Erectile dysfunction, GERD (gastroesophageal reflux disease), Hx of blood clots, Hyperglycemia, Hypertension, lumbar radiculopathy, Neuropathy, Nocturia, Osteoarthritis, Other disorders of kidney and ureter in diseases classified elsewhere, Pain, joint, knee, Palpitations, skin cancer, SOB (shortness of breath), Tennis elbow, and Tinea pedis. Surgical History:   has a past surgical history that includes Knee Prosthesis Removal; Cholecystectomy; Colonoscopy (11/28/2011); Cataract removal (Bilateral, 09/2014); Parotidectomy (Right, 01/26/2017); skin biopsy; Upper gastrointestinal endoscopy (03/19/2018); Total knee arthroplasty; hip surgery (Right, 09/09/2013); other surgical history; Lung biopsy (Left, 05/09/2018); Tunneled venous port placement (06/22/2018); pr inject anes/steroid foramen lumbar/sacral w img guide ,1 level (Right, 11/21/2018); eye surgery; and joint replacement.      Social History:   reports that he NAD  Skin:good turgor,intact without lesions  HEENT: EOMI ,normal  Neck:no JVD    Respiratory:  · Normal excursion and expansion without use of accessory muscles  · Resp Auscultation: Normal breath sounds without dullness  Cardiovascular:  · The apical impulses not displaced  · Heart tones are crisp and normal  · Cervical veins are not engorged  · The carotid upstroke is normal in amplitude and contour without delay or bruit  · Peripheral pulses are symmetrical and full  · There is no clubbing, cyanosis of the extremities. · No edema  · Femoral Arteries: 2+ and equal  · Pedal Pulses: 2+ and equal   Abdomen:  · No masses or tenderness  · Liver/Spleen: No Abnormalities Noted  Neurological/Psychiatric:  · Alert and oriented in all spheres  · Moves all extremities well  · Exhibits normal gait balance and coordination  · No abnormalities of mood, affect, memory, mentation, or behavior are noted    3/22/19 [facility]  Summary   -Left ventricular cavity size is normal. There is moderate concentric left   ventricular hypertrophy. Overall left ventricular systolic function appears   severely reduced with a estimated ejection fraction of 30-35%. Global   hypokinesis.   -Grade I diastolic dysfunction. E/e\"=18.1   -Mild aortic and tricuspid regurgitation.   -Trivial mitral regurgitation.   -Estimated pulmonary artery systolic pressure is mildly elevated at 44 mmHg   assuming a right atrial pressure of 3 mmHg. 3/17/18 ECHO    Normal left ventricle size, wall thickness and systolic function with an   estimated ejection fraction of 55%.   No regional wall motion abnormalities. Trivial mitral regurgitation.   Mild aortic regurgitation.   Mild to moderate tricuspid regurgitation with an RVSP of 59 mmHg.   ECG 7/19 normal sinus with PAC  Echo 7/19 reviewed  Assessment:      CHF   Well compensated by exam today  ECHO today 7/25/19> Stable  EKG today 7/25/19 (read & interpreted by me)> NSR 80 freq PAC's  Lisinopril 10mg  Lasix 40mg

## 2019-07-25 ENCOUNTER — OFFICE VISIT (OUTPATIENT)
Dept: CARDIOLOGY CLINIC | Age: 84
End: 2019-07-25
Payer: MEDICARE

## 2019-07-25 ENCOUNTER — PROCEDURE VISIT (OUTPATIENT)
Dept: CARDIOLOGY CLINIC | Age: 84
End: 2019-07-25
Payer: MEDICARE

## 2019-07-25 VITALS
HEIGHT: 70 IN | BODY MASS INDEX: 29.35 KG/M2 | DIASTOLIC BLOOD PRESSURE: 80 MMHG | WEIGHT: 205 LBS | SYSTOLIC BLOOD PRESSURE: 152 MMHG | HEART RATE: 80 BPM

## 2019-07-25 DIAGNOSIS — I36.1 NON-RHEUMATIC TRICUSPID VALVE INSUFFICIENCY: ICD-10-CM

## 2019-07-25 DIAGNOSIS — I35.9 AORTIC VALVULAR DISEASE: ICD-10-CM

## 2019-07-25 DIAGNOSIS — I10 ESSENTIAL HYPERTENSION: Primary | ICD-10-CM

## 2019-07-25 DIAGNOSIS — I50.22 CHRONIC SYSTOLIC CONGESTIVE HEART FAILURE (HCC): Chronic | ICD-10-CM

## 2019-07-25 DIAGNOSIS — I50.22 CHRONIC SYSTOLIC CONGESTIVE HEART FAILURE (HCC): ICD-10-CM

## 2019-07-25 DIAGNOSIS — I10 ESSENTIAL HYPERTENSION: ICD-10-CM

## 2019-07-25 LAB
LV EF: 48 %
LVEF MODALITY: NORMAL

## 2019-07-25 PROCEDURE — 1036F TOBACCO NON-USER: CPT | Performed by: INTERNAL MEDICINE

## 2019-07-25 PROCEDURE — G8417 CALC BMI ABV UP PARAM F/U: HCPCS | Performed by: INTERNAL MEDICINE

## 2019-07-25 PROCEDURE — 1123F ACP DISCUSS/DSCN MKR DOCD: CPT | Performed by: INTERNAL MEDICINE

## 2019-07-25 PROCEDURE — 4040F PNEUMOC VAC/ADMIN/RCVD: CPT | Performed by: INTERNAL MEDICINE

## 2019-07-25 PROCEDURE — 99214 OFFICE O/P EST MOD 30 MIN: CPT | Performed by: INTERNAL MEDICINE

## 2019-07-25 PROCEDURE — 93306 TTE W/DOPPLER COMPLETE: CPT | Performed by: INTERNAL MEDICINE

## 2019-07-25 PROCEDURE — 93000 ELECTROCARDIOGRAM COMPLETE: CPT | Performed by: INTERNAL MEDICINE

## 2019-07-25 PROCEDURE — G8427 DOCREV CUR MEDS BY ELIG CLIN: HCPCS | Performed by: INTERNAL MEDICINE

## 2019-07-29 ENCOUNTER — OFFICE VISIT (OUTPATIENT)
Dept: INTERNAL MEDICINE CLINIC | Age: 84
End: 2019-07-29
Payer: MEDICARE

## 2019-07-29 ENCOUNTER — HOSPITAL ENCOUNTER (OUTPATIENT)
Dept: PHYSICAL THERAPY | Age: 84
Setting detail: THERAPIES SERIES
Discharge: HOME | End: 2019-07-29
Payer: MEDICARE

## 2019-07-29 VITALS
BODY MASS INDEX: 29.78 KG/M2 | SYSTOLIC BLOOD PRESSURE: 148 MMHG | WEIGHT: 208 LBS | DIASTOLIC BLOOD PRESSURE: 82 MMHG | HEART RATE: 68 BPM | HEIGHT: 70 IN

## 2019-07-29 DIAGNOSIS — I26.99 BILATERAL PULMONARY EMBOLISM (HCC): Primary | ICD-10-CM

## 2019-07-29 DIAGNOSIS — E11.22 TYPE 2 DIABETES MELLITUS WITH STAGE 3 CHRONIC KIDNEY DISEASE, WITHOUT LONG-TERM CURRENT USE OF INSULIN (HCC): ICD-10-CM

## 2019-07-29 DIAGNOSIS — N18.30 TYPE 2 DIABETES MELLITUS WITH STAGE 3 CHRONIC KIDNEY DISEASE, WITHOUT LONG-TERM CURRENT USE OF INSULIN (HCC): ICD-10-CM

## 2019-07-29 DIAGNOSIS — Z79.01 ANTICOAGULATED ON COUMADIN: ICD-10-CM

## 2019-07-29 LAB
INTERNATIONAL NORMALIZATION RATIO, POC: 2.2
PROTHROMBIN TIME, POC: NORMAL

## 2019-07-29 PROCEDURE — G8417 CALC BMI ABV UP PARAM F/U: HCPCS | Performed by: NURSE PRACTITIONER

## 2019-07-29 PROCEDURE — G8427 DOCREV CUR MEDS BY ELIG CLIN: HCPCS | Performed by: NURSE PRACTITIONER

## 2019-07-29 PROCEDURE — 1036F TOBACCO NON-USER: CPT | Performed by: NURSE PRACTITIONER

## 2019-07-29 PROCEDURE — 4040F PNEUMOC VAC/ADMIN/RCVD: CPT | Performed by: NURSE PRACTITIONER

## 2019-07-29 PROCEDURE — 1123F ACP DISCUSS/DSCN MKR DOCD: CPT | Performed by: NURSE PRACTITIONER

## 2019-07-29 PROCEDURE — 97140 MANUAL THERAPY 1/> REGIONS: CPT

## 2019-07-29 PROCEDURE — 85610 PROTHROMBIN TIME: CPT | Performed by: NURSE PRACTITIONER

## 2019-07-29 PROCEDURE — 99213 OFFICE O/P EST LOW 20 MIN: CPT | Performed by: NURSE PRACTITIONER

## 2019-07-29 PROCEDURE — 97110 THERAPEUTIC EXERCISES: CPT

## 2019-07-29 RX ORDER — GLIPIZIDE 5 MG/1
5 TABLET ORAL
Qty: 180 TABLET | Refills: 3 | Status: SHIPPED | OUTPATIENT
Start: 2019-07-29 | End: 2019-09-18 | Stop reason: ALTCHOICE

## 2019-07-29 ASSESSMENT — ENCOUNTER SYMPTOMS
GASTROINTESTINAL NEGATIVE: 1
RESPIRATORY NEGATIVE: 1
BLOOD IN STOOL: 0

## 2019-07-29 NOTE — FLOWSHEET NOTE
cueing for activities related to improving balance, coordination, kinesthetic sense, posture, motor skill, proprioception and motor activation to allow for proper function of core, proximal hip and LE with self care and ADLs and functional mobility.   [] (34139) Gait Re-education- Provided training and instruction to the patient for proper LE, core and proximal hip recruitment and positioning and eccentric body weight control with ambulation re-education including up and down stairs     Home Exercise Program:    [x] (67129) Reviewed/Progressed HEP activities related to strengthening, flexibility, endurance, ROM of core, proximal hip and LE for functional self-care, mobility, lifting and ambulation/stair navigation   [] (00040)Reviewed/Progressed HEP activities related to improving balance, coordination, kinesthetic sense, posture, motor skill, proprioception of core, proximal hip and LE for self care, mobility, lifting, and ambulation/stair navigation      Manual Treatments:  PROM / STM / Oscillations-Mobs:  G-I, II, III, IV (PA's, Inf., Post.)  [x] (35845) Provided manual therapy to mobilize LE, proximal hip and/or LS spine soft tissue/joints for the purpose of modulating pain, promoting relaxation,  increasing ROM, reducing/eliminating soft tissue swelling/inflammation/restriction, improving soft tissue extensibility and allowing for proper ROM for normal function with self care, mobility, lifting and ambulation. Modalities:     [] GAME READY (VASO)- for significant edema, swelling, pain control.      Charges:  Timed Code Treatment Minutes: 50   Total Treatment Minutes: 51      [] EVAL (LOW) 33457 (typically 20 minutes face-to-face)  [] EVAL (MOD) 95531 (typically 30 minutes face-to-face)  [] EVAL (HIGH) 54127 (typically 45 minutes face-to-face)  [] RE-EVAL     [x] TL(85235) x  2   [] IONTO  [x] NMR (14163) x  1   [] VASO  [] Manual (16267) x       [] Other:  [] TA x       [] Mech Traction (27352)  []

## 2019-08-02 RX ORDER — GABAPENTIN 300 MG/1
300 CAPSULE ORAL 2 TIMES DAILY
Qty: 60 CAPSULE | Refills: 5 | Status: SHIPPED | OUTPATIENT
Start: 2019-08-02 | End: 2019-10-18

## 2019-08-06 ENCOUNTER — OFFICE VISIT (OUTPATIENT)
Dept: ENDOCRINOLOGY | Age: 84
End: 2019-08-06
Payer: MEDICARE

## 2019-08-06 VITALS
HEIGHT: 71 IN | OXYGEN SATURATION: 96 % | WEIGHT: 207 LBS | DIASTOLIC BLOOD PRESSURE: 72 MMHG | HEART RATE: 83 BPM | SYSTOLIC BLOOD PRESSURE: 128 MMHG | BODY MASS INDEX: 28.98 KG/M2

## 2019-08-06 DIAGNOSIS — N18.30 CKD (CHRONIC KIDNEY DISEASE) STAGE 3, GFR 30-59 ML/MIN (HCC): ICD-10-CM

## 2019-08-06 DIAGNOSIS — N40.1 BENIGN PROSTATIC HYPERPLASIA WITH URINARY HESITANCY: ICD-10-CM

## 2019-08-06 DIAGNOSIS — N18.30 TYPE 2 DIABETES MELLITUS WITH STAGE 3 CHRONIC KIDNEY DISEASE, WITHOUT LONG-TERM CURRENT USE OF INSULIN (HCC): Primary | ICD-10-CM

## 2019-08-06 DIAGNOSIS — I10 ESSENTIAL HYPERTENSION: ICD-10-CM

## 2019-08-06 DIAGNOSIS — E11.22 TYPE 2 DIABETES MELLITUS WITH STAGE 3 CHRONIC KIDNEY DISEASE, WITHOUT LONG-TERM CURRENT USE OF INSULIN (HCC): Primary | ICD-10-CM

## 2019-08-06 DIAGNOSIS — C34.92 NON-SMALL CELL CANCER OF LEFT LUNG (HCC): ICD-10-CM

## 2019-08-06 DIAGNOSIS — I50.22 CHRONIC SYSTOLIC CONGESTIVE HEART FAILURE (HCC): Chronic | ICD-10-CM

## 2019-08-06 DIAGNOSIS — E11.42 DIABETIC PERIPHERAL NEUROPATHY (HCC): ICD-10-CM

## 2019-08-06 DIAGNOSIS — R39.11 BENIGN PROSTATIC HYPERPLASIA WITH URINARY HESITANCY: ICD-10-CM

## 2019-08-06 PROCEDURE — 1036F TOBACCO NON-USER: CPT | Performed by: INTERNAL MEDICINE

## 2019-08-06 PROCEDURE — G8417 CALC BMI ABV UP PARAM F/U: HCPCS | Performed by: INTERNAL MEDICINE

## 2019-08-06 PROCEDURE — 1123F ACP DISCUSS/DSCN MKR DOCD: CPT | Performed by: INTERNAL MEDICINE

## 2019-08-06 PROCEDURE — 4040F PNEUMOC VAC/ADMIN/RCVD: CPT | Performed by: INTERNAL MEDICINE

## 2019-08-06 PROCEDURE — G8427 DOCREV CUR MEDS BY ELIG CLIN: HCPCS | Performed by: INTERNAL MEDICINE

## 2019-08-06 PROCEDURE — 99204 OFFICE O/P NEW MOD 45 MIN: CPT | Performed by: INTERNAL MEDICINE

## 2019-08-06 NOTE — PROGRESS NOTES
Donna Thompson is a 82 y.o. male is seen  for evaluation and management of Type 2  diabetes. Donna Thompson was diagnosed with Diabetes mellitus aprox in 2009   Diabetes was diagnosed at routine screening . Donna Thompson got diabetic education in the past.  Comorbid conditions: Neuropathy, Nephropathy, Retinopathy and Chronic Kidney Disease    Current diabetic medications include: glipizide BID he was on metformin in the past he denies any hypoglycemia with glipizide in fact his control has worsened in the last 1 year      INTERIM:    Diabetes   He presents for his initial diabetic visit. He has type 2 diabetes mellitus. No MedicAlert identification noted. The initial diagnosis of diabetes was made 10 years ago. His disease course has been worsening. There are no hypoglycemic associated symptoms. Pertinent negatives for diabetes include no polyphagia, no polyuria and no weight loss. There are no hypoglycemic complications. Symptoms are worsening. Diabetic complications include peripheral neuropathy and retinopathy. Risk factors for coronary artery disease include diabetes mellitus, dyslipidemia, family history and male sex. He is compliant with treatment most of the time. His weight is increasing steadily. He is following a diabetic diet. When asked about meal planning, he reported none. He has not had a previous visit with a dietitian. He rarely participates in exercise. There is no change in his home blood glucose trend. His breakfast blood glucose is taken between 7-8 am. His breakfast blood glucose range is generally 180-200 mg/dl. An ACE inhibitor/angiotensin II receptor blocker is being taken. He does not see a podiatrist.Eye exam is current.        According to the wife there diet needs a lot of adjustment as they are not following a diabetic diet  Weight trend: stable  Prior visit with dietician: no  Current diet: on average, 3 meals per day  Current exercise: rare    Has there been any hospitalization, surgery or major non-obstructive ring distal esophagus     Social History     Socioeconomic History    Marital status:      Spouse name: Elli Heard Number of children: Not on file    Years of education: Not on file    Highest education level: Not on file   Occupational History    Not on file   Social Needs    Financial resource strain: Not on file    Food insecurity:     Worry: Not on file     Inability: Not on file    Transportation needs:     Medical: Not on file     Non-medical: Not on file   Tobacco Use    Smoking status: Former Smoker     Packs/day: 1.00     Years: 40.00     Pack years: 40.00     Last attempt to quit: 8/15/1970     Years since quittin.0    Smokeless tobacco: Never Used   Substance and Sexual Activity    Alcohol use: No    Drug use: No    Sexual activity: Yes     Partners: Female   Lifestyle    Physical activity:     Days per week: Not on file     Minutes per session: Not on file    Stress: Not on file   Relationships    Social connections:     Talks on phone: Not on file     Gets together: Not on file     Attends Caodaism service: Not on file     Active member of club or organization: Not on file     Attends meetings of clubs or organizations: Not on file     Relationship status: Not on file    Intimate partner violence:     Fear of current or ex partner: Not on file     Emotionally abused: Not on file     Physically abused: Not on file     Forced sexual activity: Not on file   Other Topics Concern    Not on file   Social History Narrative    Not on file     Family History   Problem Relation Age of Onset    Arthritis Father     Diabetes Father     Diabetes Brother      Current Outpatient Medications   Medication Sig Dispense Refill    gabapentin (NEURONTIN) 300 MG capsule Take 1 capsule by mouth 2 times daily for 30 days.  60 capsule 5    glipiZIDE (GLUCOTROL) 5 MG tablet Take 1 tablet by mouth 2 times daily (before meals) 180 tablet 3    diphenhydrAMINE-APAP, sleep, (TYLENOL 08/06/19 207 lb (93.9 kg)   07/29/19 208 lb (94.3 kg)   07/25/19 205 lb (93 kg)       Constitutional: no acute distress, well appearing and well nourished  Psychiatric: oriented to person, place and time, judgement and insight and normal, recent and remote memory and intact and mood and affect are normal  Skin: skin and subcutaneous tissue is normal without mass, normal turgor  Head and Face: examination of head and face revealed no abnormalities  Eyes: no lid or conjunctival swelling, erythema or discharge, pupils are normal  Ears/Nose: external inspection of ears and nose revealed no abnormalities, hearing is grossly normal  Oropharynx/Mouth/Face: lips, tongue and gums are normal with no lesions, the voice quality was normal  Neck: neck is supple and symmetric, with midline trachea and no masses, thyroid is normal  Lymphatics: normal cervical lymph nodes, normal supraclavicular nodes  Pulmonary: no increased work of breathing or signs of respiratory distress, lungs are clear to auscultation  Cardiovascular: normal heart rate and rhythm, normal S1 and S2, no murmurs and pedal pulses and 2+ bilaterally, No edema  Abdomen: abdomen is soft, non-tender with no masses  Musculoskeletal: normal gait and station and exam of the digits and nails are normal  Neurological: normal coordination and normal general cortical function        Lab Results   Component Value Date    LABA1C 8.2 05/17/2019    LABA1C 7.2 10/09/2018    LABA1C 6.5 03/16/2018         ASSESSMENT/PLAN:  1. Type 2 diabetes mellitus with stage 3 chronic kidney disease, without long-term current use of insulin 6.5>>7.2>>8.2     I had a lengthy discussion with the patient about  his  uncontrolled diabetes. We discussed progression of diabetes in detail and also the incidence of complications associated with uncontrolled diabetes. Denisse Pelaezger was advised that lifestyle modification is the key to better control of his Diabetes.   We discussed carbohydrate

## 2019-08-07 ENCOUNTER — TELEPHONE (OUTPATIENT)
Dept: INTERNAL MEDICINE CLINIC | Age: 84
End: 2019-08-07

## 2019-08-08 ENCOUNTER — OFFICE VISIT (OUTPATIENT)
Dept: ENDOCRINOLOGY | Age: 84
End: 2019-08-08
Payer: MEDICARE

## 2019-08-08 DIAGNOSIS — E11.22 TYPE 2 DIABETES MELLITUS WITH STAGE 3 CHRONIC KIDNEY DISEASE, WITHOUT LONG-TERM CURRENT USE OF INSULIN (HCC): ICD-10-CM

## 2019-08-08 DIAGNOSIS — N18.30 TYPE 2 DIABETES MELLITUS WITH STAGE 3 CHRONIC KIDNEY DISEASE, WITHOUT LONG-TERM CURRENT USE OF INSULIN (HCC): ICD-10-CM

## 2019-08-08 PROCEDURE — 97802 MEDICAL NUTRITION INDIV IN: CPT | Performed by: DIETITIAN, REGISTERED

## 2019-08-09 ENCOUNTER — HOSPITAL ENCOUNTER (OUTPATIENT)
Dept: PHYSICAL THERAPY | Age: 84
Setting detail: THERAPIES SERIES
Discharge: HOME | End: 2019-08-09
Payer: MEDICARE

## 2019-08-09 PROCEDURE — 97112 NEUROMUSCULAR REEDUCATION: CPT

## 2019-08-09 PROCEDURE — 97110 THERAPEUTIC EXERCISES: CPT

## 2019-08-11 PROBLEM — Z51.11 ENCOUNTER FOR ANTINEOPLASTIC CHEMOTHERAPY: Status: RESOLVED | Noted: 2019-07-12 | Resolved: 2019-08-11

## 2019-08-13 ENCOUNTER — HOSPITAL ENCOUNTER (OUTPATIENT)
Age: 84
Discharge: HOME | End: 2019-08-13
Payer: MEDICARE

## 2019-08-13 DIAGNOSIS — E11.22 TYPE 2 DIABETES MELLITUS WITH STAGE 4 CHRONIC KIDNEY DISEASE, WITHOUT LONG-TERM CURRENT USE OF INSULIN (HCC): ICD-10-CM

## 2019-08-13 DIAGNOSIS — N18.4 TYPE 2 DIABETES MELLITUS WITH STAGE 4 CHRONIC KIDNEY DISEASE, WITHOUT LONG-TERM CURRENT USE OF INSULIN (HCC): ICD-10-CM

## 2019-08-13 LAB
ALBUMIN SERPL-MCNC: 3.9 G/DL (ref 3.4–5)
ANION GAP SERPL CALCULATED.3IONS-SCNC: 15 MMOL/L (ref 3–16)
BUN BLDV-MCNC: 32 MG/DL (ref 7–20)
CALCIUM SERPL-MCNC: 9 MG/DL (ref 8.3–10.6)
CHLORIDE BLD-SCNC: 99 MMOL/L (ref 99–110)
CO2: 28 MMOL/L (ref 21–32)
CREAT SERPL-MCNC: 2.2 MG/DL (ref 0.8–1.3)
CREATININE URINE: 61.8 MG/DL (ref 39–259)
GFR AFRICAN AMERICAN: 35
GFR NON-AFRICAN AMERICAN: 29
GLUCOSE BLD-MCNC: 248 MG/DL (ref 70–99)
HCT VFR BLD CALC: 36.8 % (ref 40.5–52.5)
HEMOGLOBIN: 12.6 G/DL (ref 13.5–17.5)
MCH RBC QN AUTO: 30.4 PG (ref 26–34)
MCHC RBC AUTO-ENTMCNC: 34.3 G/DL (ref 31–36)
MCV RBC AUTO: 88.8 FL (ref 80–100)
PDW BLD-RTO: 17.1 % (ref 12.4–15.4)
PHOSPHORUS: 3.8 MG/DL (ref 2.5–4.9)
PLATELET # BLD: 144 K/UL (ref 135–450)
PMV BLD AUTO: 9.2 FL (ref 5–10.5)
POTASSIUM SERPL-SCNC: 3.5 MMOL/L (ref 3.5–5.1)
PROTEIN PROTEIN: 97 MG/DL
RBC # BLD: 4.14 M/UL (ref 4.2–5.9)
SODIUM BLD-SCNC: 142 MMOL/L (ref 136–145)
WBC # BLD: 6.4 K/UL (ref 4–11)

## 2019-08-13 PROCEDURE — 36415 COLL VENOUS BLD VENIPUNCTURE: CPT

## 2019-08-13 PROCEDURE — 85027 COMPLETE CBC AUTOMATED: CPT

## 2019-08-13 PROCEDURE — 80069 RENAL FUNCTION PANEL: CPT

## 2019-08-13 PROCEDURE — 82570 ASSAY OF URINE CREATININE: CPT

## 2019-08-13 PROCEDURE — 84156 ASSAY OF PROTEIN URINE: CPT

## 2019-08-14 ENCOUNTER — HOSPITAL ENCOUNTER (OUTPATIENT)
Dept: PHYSICAL THERAPY | Age: 84
Setting detail: THERAPIES SERIES
Discharge: HOME | End: 2019-08-14
Payer: MEDICARE

## 2019-08-14 PROCEDURE — 97110 THERAPEUTIC EXERCISES: CPT

## 2019-08-14 PROCEDURE — 97112 NEUROMUSCULAR REEDUCATION: CPT

## 2019-08-16 ENCOUNTER — TELEPHONE (OUTPATIENT)
Dept: ORTHOPEDIC SURGERY | Age: 84
End: 2019-08-16

## 2019-08-16 ENCOUNTER — OFFICE VISIT (OUTPATIENT)
Dept: ORTHOPEDIC SURGERY | Age: 84
End: 2019-08-16
Payer: MEDICARE

## 2019-08-16 VITALS
HEART RATE: 76 BPM | SYSTOLIC BLOOD PRESSURE: 132 MMHG | HEIGHT: 71 IN | BODY MASS INDEX: 28.98 KG/M2 | WEIGHT: 207.01 LBS | DIASTOLIC BLOOD PRESSURE: 74 MMHG

## 2019-08-16 DIAGNOSIS — M48.062 LUMBAR STENOSIS WITH NEUROGENIC CLAUDICATION: Primary | ICD-10-CM

## 2019-08-16 DIAGNOSIS — M47.816 SPONDYLOSIS OF LUMBAR REGION WITHOUT MYELOPATHY OR RADICULOPATHY: ICD-10-CM

## 2019-08-16 DIAGNOSIS — M51.36 DDD (DEGENERATIVE DISC DISEASE), LUMBAR: ICD-10-CM

## 2019-08-16 PROCEDURE — 1123F ACP DISCUSS/DSCN MKR DOCD: CPT | Performed by: PHYSICAL MEDICINE & REHABILITATION

## 2019-08-16 PROCEDURE — 1036F TOBACCO NON-USER: CPT | Performed by: PHYSICAL MEDICINE & REHABILITATION

## 2019-08-16 PROCEDURE — G8427 DOCREV CUR MEDS BY ELIG CLIN: HCPCS | Performed by: PHYSICAL MEDICINE & REHABILITATION

## 2019-08-16 PROCEDURE — 99214 OFFICE O/P EST MOD 30 MIN: CPT | Performed by: PHYSICAL MEDICINE & REHABILITATION

## 2019-08-16 PROCEDURE — 4040F PNEUMOC VAC/ADMIN/RCVD: CPT | Performed by: PHYSICAL MEDICINE & REHABILITATION

## 2019-08-16 PROCEDURE — G8417 CALC BMI ABV UP PARAM F/U: HCPCS | Performed by: PHYSICAL MEDICINE & REHABILITATION

## 2019-08-16 RX ORDER — HYDROCODONE BITARTRATE AND ACETAMINOPHEN 5; 325 MG/1; MG/1
1 TABLET ORAL 2 TIMES DAILY PRN
Qty: 10 TABLET | Refills: 0 | Status: SHIPPED | OUTPATIENT
Start: 2019-08-16 | End: 2020-02-25 | Stop reason: SDUPTHER

## 2019-08-16 NOTE — PROGRESS NOTES
Follow up: Barbara  1935  [id number]         Chief Complaint   Patient presents with    Lower Back Pain     CK LSP. lov 12/14/2018. HISTORY OF PRESENT ILLNESS:  Mr. Villa Estevez is a 82 y.o. male returns for a follow up visit for multiple medical problems. His current presenting problems are   1. Lumbar stenosis with neurogenic claudication    2. DDD (degenerative disc disease), lumbar    3. Spondylosis of lumbar region without myelopathy or radiculopathy    . As per information/history obtained from the PADT(patient assessment and documentation tool) - He complains of pain in the lower back with radiation to the lower back He rates the pain 0/10 and describes it as sharp, dull. Pain is made worse by: movement, standing, activity. He denies side effects from the current pain regimen. Patient reports that since the last follow up visit the physical functioning is worse, family/social relationships are worse, mood is worse and sleep patterns are worse, and that the overall functioning is worse. Patient denies neurological bowel or bladder. Patient returns today for follow up evaluation of his low back pain. He has not been seen since 12/14/2018. His pain levels can reach a 6-7/10 when at its worst. The pain has not been waking him up at night. His pain does make it hard for him to get comfortable to fall asleep. He expresses interest in discussing injections again at this time. The patient denies any new injuries or triggers at this time. They are currently ambulating with a cane in the office today.          Associated signs and symptoms:   Neurogenic bowel or bladder symptoms:  no   Perceived weakness:  no   Difficulty walking:  yes              Past Medical History:   Past Medical History:   Diagnosis Date    Abdominal pain, epigastric     Arthritis     Benign prostatic hypertrophy without urinary obstruction     BPH     Cellulitis and abscess     Chronic rhinitis     Chronic Inspection/examination of the right upper extremity does not show any tenderness, deformity or injury. Range of motion is unremarkable and pain-free. There is no gross instability. There are no rashes, ulcerations or lesions. Strength and tone are normal. No atrophy or abnormal movements are noted. · LEFT UPPER EXTREMITY: Inspection/examination of the left upper extremity does not show any tenderness, deformity or injury. Range of motion is unremarkable and pain-free. There is no gross instability. There are no rashes, ulcerations or lesions. Strength and tone are normal. No atrophy or abnormal movements are noted. LUMBAR/SACRAL EXAMINATION:  · Inspection: Local inspection shows no step-off or bruising. Lumbar alignment is normal. No instability is noted. · Palpation:   No evidence of tenderness at the midline. Lumbar paraspinal tenderness: Mild L4/5 and L5/S1 tenderness  Bursal tenderness No tenderness bilaterally  There is no paraspinal spasm. · Range of Motion: limited by 25% in all planes due to pain  · Strength:   Strength testing is 5/5 in all muscle groups tested. · Special Tests:   Straight leg raise and crossed SLR negative. Samuel's testing is negative bilaterally. FADIR's testing is negative bilaterally. · Skin: There are no rashes, ulcerations or lesions. · Reflexes: Reflexes are symmetrically 2+ at the patellar and ankle tendons. Clonus absent bilaterally at the feet. · Gait & station: normal, patient ambulates without assistance and no ataxia  · Additional Examinations:  · RIGHT LOWER EXTREMITY: Inspection/examination of the right lower extremity does not show any tenderness, deformity or injury. Range of motion is normal and pain-free. There is no gross instability. There are no rashes, ulcerations or lesions. Strength and tone are normal. No atrophy or abnormal movements are noted.   · LEFT LOWER EXTREMITY:  Inspection/examination of the left lower extremity does not show any tenderness, deformity or injury. Range of motion is normal and pain-free. There is no gross instability. There are no rashes, ulcerations or lesions. Strength and tone are normal. No atrophy or abnormal movements are noted. Diagnostic Testing:    MR Lumbar spine shows 12/9/17  1. Severe spinal canal stenosis and moderate bilateral neural foraminal   narrowing at L4-5, as described above significantly worsened compared with   the previous exam.   2. Persistent but smaller left paracentral disc extrusion at L3-4 with   improved but persistent effacement of the left lateral recess. Results for orders placed or performed during the hospital encounter of 08/13/19   CBC   Result Value Ref Range    WBC 6.4 4.0 - 11.0 K/uL    RBC 4.14 (L) 4.20 - 5.90 M/uL    Hemoglobin 12.6 (L) 13.5 - 17.5 g/dL    Hematocrit 36.8 (L) 40.5 - 52.5 %    MCV 88.8 80.0 - 100.0 fL    MCH 30.4 26.0 - 34.0 pg    MCHC 34.3 31.0 - 36.0 g/dL    RDW 17.1 (H) 12.4 - 15.4 %    Platelets 735 553 - 919 K/uL    MPV 9.2 5.0 - 10.5 fL   Renal Function Panel   Result Value Ref Range    Sodium 142 136 - 145 mmol/L    Potassium 3.5 3.5 - 5.1 mmol/L    Chloride 99 99 - 110 mmol/L    CO2 28 21 - 32 mmol/L    Anion Gap 15 3 - 16    Glucose 248 (H) 70 - 99 mg/dL    BUN 32 (H) 7 - 20 mg/dL    CREATININE 2.2 (H) 0.8 - 1.3 mg/dL    GFR Non-African American 29 (A) >60    GFR  35 (A) >60    Calcium 9.0 8.3 - 10.6 mg/dL    Phosphorus 3.8 2.5 - 4.9 mg/dL    Alb 3.9 3.4 - 5.0 g/dL   Protein, urine, random   Result Value Ref Range    Protein, Ur 97.00 (H) <12 mg/dL   Creatinine, Random Urine   Result Value Ref Range    Creatinine, Ur 61.8 39.0 - 259.0 mg/dL     Impression:       1. Lumbar stenosis with neurogenic claudication    2. DDD (degenerative disc disease), lumbar    3.  Spondylosis of lumbar region without myelopathy or radiculopathy        Plan:  Clinical Course: Above diagnoses are worsening    I discussed the diagnosis and the treatment physical examination was performed between the patient and Dr. Philomena Layne. All counseling during the appointment was performed between the patient and provider. I, Dr. Joe Alvarez, personally performed the services described in this documentation as scribed by JOCY Cespedes in my presence and it is both accurate and complete. Elyssa Reich MD, TANESHA, [facility]  Board Certified in [address] [place] [address] Certified and Fellowship Trained in [facility] ([facility])             This dictation was performed with a verbal recognition program [facility]) and it was checked for errors. It is possible that there are still dictated errors within this office note. If so, please bring any errors to my attention for an addendum. All efforts were made to ensure that this office note is accurate.

## 2019-08-16 NOTE — TELEPHONE ENCOUNTER
Patient needs scheduled for an injection. Pre-Procedure sheet was given to the patient. x1 (L4/L5 IL)    Diabetes: Yes  Glaucoma: Yes  Blood Thinner: Coumadin CARMEN Garcia  Bleeding D/O: No  Current infx/antibiotic: No  MRSA/MSSA/VRE: No    Patient has recently been off coumadin for another procedure and just resumed taking medication. Per Dr. Phuc Romero will reach out to PCP office for hold approval and/or clarification on how to proceed due to recently being off medication. Once information is received from PCP, will forward okay onto PSS for scheduling purposes.

## 2019-08-19 ENCOUNTER — TELEPHONE (OUTPATIENT)
Dept: ORTHOPEDIC SURGERY | Age: 84
End: 2019-08-19

## 2019-08-19 ENCOUNTER — TELEPHONE (OUTPATIENT)
Dept: INTERNAL MEDICINE CLINIC | Age: 84
End: 2019-08-19

## 2019-08-20 NOTE — PROGRESS NOTES
PATIENT REACHED Spoke to Pt's Wife  YES_X___NO____    PREOP INSTUCTIONS    DATE_8/26/19________ TIME__0920_______ARRIVAL__0820______PLACE__masc__________  NOTHING TO EAT OR DRINK  6 HOURS PRIOR TO PROCEDURE START TIME  YOU NEED A RESPONSIBLE ADULT AGE 18 OR OLDER TO DRIVE YOU HOME  PLEASE BRING INSURANCE CARD. PICTURE ID AND COMPLETE LIST OF MEDS  WEAR LOOSE COMFORTABLE CLOTHING  FOLLOW ANY INSTRUCTIONS YOUR DRS OFFICE HAS GIVEN YOU,INCLUDING WHAT MEDICATIONS TO TAKE THE AM OF PROCEDURE AND WHEN AND IF YOU NEED TO STOP ANY BLOOD THINNERS. IF YOU HAVE QUESTIONS REGARDING THIS CALL THE OFFICE  THE GOAL BLOOD SUGAR THE AM OF PROCEDURE  OR LESS ABOVE THAT THE PROCEDURE MAY BE CANCELLED  ANY QUESTIONS CALL YOUR DOCTOR. ALSO,PLEASE READ THE INSTRUCTION PACKET FROM YOUR DR IF YOU RECEIVED ONE.   SPINE INTERVENTION NUMBER [phone]

## 2019-08-21 ENCOUNTER — TELEPHONE (OUTPATIENT)
Dept: ORTHOPEDIC SURGERY | Age: 84
End: 2019-08-21

## 2019-08-21 DIAGNOSIS — E11.22 TYPE 2 DIABETES MELLITUS WITH STAGE 3 CHRONIC KIDNEY DISEASE, WITHOUT LONG-TERM CURRENT USE OF INSULIN (HCC): Primary | ICD-10-CM

## 2019-08-21 DIAGNOSIS — N18.30 TYPE 2 DIABETES MELLITUS WITH STAGE 3 CHRONIC KIDNEY DISEASE, WITHOUT LONG-TERM CURRENT USE OF INSULIN (HCC): Primary | ICD-10-CM

## 2019-08-22 ENCOUNTER — HOSPITAL ENCOUNTER (OUTPATIENT)
Dept: PHYSICAL THERAPY | Age: 84
Setting detail: THERAPIES SERIES
Discharge: HOME | End: 2019-08-22
Payer: MEDICARE

## 2019-08-22 PROCEDURE — 97110 THERAPEUTIC EXERCISES: CPT

## 2019-08-22 PROCEDURE — 97112 NEUROMUSCULAR REEDUCATION: CPT

## 2019-08-22 NOTE — PLAN OF CARE
[place] [address] [address] [place]  Phone: [phone] Fax: [phone]        Physical Therapy Re-Certification Plan of Care/MD UPDATE      Dear Referring Practitioner: Dr Jose Alfaro,    We had the pleasure of treating the following patient for physical therapy services at [address] [place] [address]. A summary of our findings can be found in the updated assessment below. This includes our plan of care. If you have any questions or concerns regarding these findings, please do not hesitate to contact me at the office phone number checked above.   Thank you for the referral.     Physician Signature:________________________________Date:__________________  By signing above (or electronic signature), therapists plan is approved by physician    Date Range Of Visits: 2019 thru 2019  Total Visits to Date: 15  Overall Response to Treatment:   [x]Patient is responding well to treatment and improvement is noted with regards  to goals   []Patient should continue to improve in reasonable time if they continue HEP   []Patient has plateaued and is no longer responding to skilled PT intervention    []Patient is getting worse and would benefit from return to referring MD   []Patient unable to adhere to initial POC   []Other:       Physical Therapy Daily Treatment Note  Date:  19    Patient Name:  Marelyn Lennox    :  1935  MRN: [id number]  Restrictions/Precautions:    Medical/Treatment Diagnosis Information:  · 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  · Treatment Diagnosis: Sensory Ataxia I68.9  Insurance/Certification information:     Physician Information:  Referring Practitioner: Dr Condon Drafts of care signed (Y/N):     Date of Patient follow up with Physician:     G-Code (if applicable):      Date G-Code Applied:  2019   MICHAEL 45/56    Progress Note: [x]  Yes  []  No  Next due by: 2019 (1x week for 4 weeks)      Latex Narrow LULU with sh flex + rotation + small yellow ball 10sec 10 Bilat; airex pad- improved rotation to R today   Narrow LULU on airex + OH reach + small yellow ball 10sec 10 Airex+perturbations   Tandem stand 10sec 10 Bilat, airex- cga   Modified SLS with 8\" box + rotation today np  Bilat, level ground- cga   Standing marching at wall 10sec 10 airex   Retro step with turning to R/L 1 10 Each ; airex pad   Tandem standing- level ground + rotation NP     Therapeutic Activity (98621)      L-3 GCS      Dynamic Balance                            Therapeutic Exercise and NMR EXR  [x] (55638) Provided verbal/tactile cueing for activities related to strengthening, flexibility, endurance, ROM for improvements in LE, proximal hip, and core control with self care, mobility, lifting, ambulation. [x] (74424) Provided verbal/tactile cueing for activities related to improving balance, coordination, kinesthetic sense, posture, motor skill, proprioception  to assist with LE, proximal hip, and core control in self care, mobility, lifting, ambulation and eccentric single leg control.      NMR and Therapeutic Activities:    [x] (02709 or 37877) Provided verbal/tactile cueing for activities related to improving balance, coordination, kinesthetic sense, posture, motor skill, proprioception and motor activation to allow for proper function of core, proximal hip and LE with self care and ADLs and functional mobility.   [] (41861) Gait Re-education- Provided training and instruction to the patient for proper LE, core and proximal hip recruitment and positioning and eccentric body weight control with ambulation re-education including up and down stairs     Home Exercise Program:    [x] (78886) Reviewed/Progressed HEP activities related to strengthening, flexibility, endurance, ROM of core, proximal hip and LE for functional self-care, mobility, lifting and ambulation/stair navigation   [] (38895)Reviewed/Progressed HEP

## 2019-08-26 ENCOUNTER — HOSPITAL ENCOUNTER (OUTPATIENT)
Age: 84
Setting detail: OUTPATIENT SURGERY
Discharge: HOME | End: 2019-08-26
Attending: PHYSICAL MEDICINE & REHABILITATION | Admitting: PHYSICAL MEDICINE & REHABILITATION
Payer: MEDICARE

## 2019-08-26 ENCOUNTER — TELEPHONE (OUTPATIENT)
Dept: INTERNAL MEDICINE CLINIC | Age: 84
End: 2019-08-26

## 2019-08-26 ENCOUNTER — APPOINTMENT (OUTPATIENT)
Dept: GENERAL RADIOLOGY | Age: 84
End: 2019-08-26
Attending: PHYSICAL MEDICINE & REHABILITATION
Payer: MEDICARE

## 2019-08-26 VITALS
SYSTOLIC BLOOD PRESSURE: 158 MMHG | TEMPERATURE: 98 F | OXYGEN SATURATION: 97 % | HEART RATE: 77 BPM | DIASTOLIC BLOOD PRESSURE: 81 MMHG | RESPIRATION RATE: 18 BRPM

## 2019-08-26 LAB
GLUCOSE BLD-MCNC: 199 MG/DL (ref 70–99)
GLUCOSE BLD-MCNC: 214 MG/DL (ref 70–99)
INR BLD: 1.23 (ref 0.86–1.14)
PERFORMED ON: ABNORMAL
PERFORMED ON: ABNORMAL
PROTHROMBIN TIME: 14 SEC (ref 9.8–13)

## 2019-08-26 PROCEDURE — 6360000002 HC RX W HCPCS: Performed by: PHYSICAL MEDICINE & REHABILITATION

## 2019-08-26 PROCEDURE — 36415 COLL VENOUS BLD VENIPUNCTURE: CPT

## 2019-08-26 PROCEDURE — 99152 MOD SED SAME PHYS/QHP 5/>YRS: CPT | Performed by: PHYSICAL MEDICINE & REHABILITATION

## 2019-08-26 PROCEDURE — 77003 FLUOROGUIDE FOR SPINE INJECT: CPT

## 2019-08-26 PROCEDURE — 2709999900 HC NON-CHARGEABLE SUPPLY: Performed by: PHYSICAL MEDICINE & REHABILITATION

## 2019-08-26 PROCEDURE — 2500000003 HC RX 250 WO HCPCS: Performed by: PHYSICAL MEDICINE & REHABILITATION

## 2019-08-26 PROCEDURE — 3610000054 HC PAIN LEVEL 3 BASE (NON-OR): Performed by: PHYSICAL MEDICINE & REHABILITATION

## 2019-08-26 PROCEDURE — 2580000003 HC RX 258: Performed by: PHYSICAL MEDICINE & REHABILITATION

## 2019-08-26 PROCEDURE — 85610 PROTHROMBIN TIME: CPT

## 2019-08-26 RX ORDER — MIDAZOLAM HYDROCHLORIDE 1 MG/ML
INJECTION INTRAMUSCULAR; INTRAVENOUS
Status: COMPLETED | OUTPATIENT
Start: 2019-08-26 | End: 2019-08-26

## 2019-08-26 RX ORDER — LIDOCAINE HYDROCHLORIDE 10 MG/ML
INJECTION, SOLUTION INFILTRATION; PERINEURAL
Status: COMPLETED | OUTPATIENT
Start: 2019-08-26 | End: 2019-08-26

## 2019-08-26 RX ORDER — 0.9 % SODIUM CHLORIDE 0.9 %
VIAL (ML) INJECTION
Status: COMPLETED | OUTPATIENT
Start: 2019-08-26 | End: 2019-08-26

## 2019-08-26 RX ORDER — METHYLPREDNISOLONE ACETATE 80 MG/ML
INJECTION, SUSPENSION INTRA-ARTICULAR; INTRALESIONAL; INTRAMUSCULAR; SOFT TISSUE
Status: COMPLETED | OUTPATIENT
Start: 2019-08-26 | End: 2019-08-26

## 2019-08-26 ASSESSMENT — PAIN SCALES - GENERAL
PAINLEVEL_OUTOF10: 0
PAINLEVEL_OUTOF10: 0

## 2019-08-26 ASSESSMENT — PAIN - FUNCTIONAL ASSESSMENT: PAIN_FUNCTIONAL_ASSESSMENT: 0-10

## 2019-08-26 NOTE — OP NOTE
Patient:  Amelia Rosas  YOB: 1935  Medical Record #:  [id number]   Place:   [address] [place] [address]  Date:  8/26/2019   Physician:  Gely Childers MD, TANESHA    Procedure:  Lumbar Epidural Steroid Injection - Interlaminar approach  L4 - L5 with Fluoroscopy    CPT 20275    Pre-Procedure Diagnosis: Lumbar radiculopathy    Post-Procedure Diagnosis: Same    Sedation: Local with 1% Lidocaine 3 ml and 2 mg of IV Versed    EBL: None    Complications: None    Procedure Summary:    The patient was brought to the procedure suite and placed in the prone position. The skin overlying the lumbar spine was prepped and draped in the usual sterile fashion. Using fluoroscopic guidance, the L4 - L5 interlaminar space was identified. Through anesthetized skin a 20 gauge Touhy needle was advanced into the epidural space using continuous loss of resistance to saline technique. Isovue M300 was instilled and an epidurogram was noted without evidence of intrathecal or vascular spread. 10 ml of a solution containing preservative free normal saline and 80 mg of Depomedrol was instilled. The needle was removed and a band-aid applied. The patient was transferred to the post-operative area in stable condition.

## 2019-08-26 NOTE — H&P
HISTORY AND PHYSICAL/PRE-SEDATION ASSESSMENT    Patient:  Nica Wright   :  1935  Medical Record No.:  [id number]   Date:  2019  Physician:  Jerrod Thayer M.D. Facility: [address] [place] [address]    HISTORY OF PRESENT ILLNESS:                 The patient is a 82 y.o. male whom presents with lower back pain. Review of the imaging and physical exam of the patient confirmed the pre-procedure diagnosis. After a thorough discussion of risks, benefits and alternatives informed consent was obtained.                 Past Medical History:   Past Medical History:   Diagnosis Date    Abdominal pain, epigastric     Arthritis     Benign prostatic hypertrophy without urinary obstruction     BPH     Cellulitis and abscess     Chronic rhinitis     Chronic systolic congestive heart failure ([facility] [address] 2019    COPD (chronic obstructive pulmonary disease) (HCC)     Diabetes mellitus (HCC)     Dysphagia     Erectile dysfunction     GERD (gastroesophageal reflux disease)     Hx of blood clots     Hyperglycemia     Hypertension     lumbar radiculopathy     Neuropathy     Nocturia     Osteoarthritis     Other disorders of kidney and ureter in diseases classified elsewhere     Pain, joint, knee     Palpitations     skin cancer     Rt ear, nose, neck, hands/ 2018 lung    SOB (shortness of breath)     Tennis elbow     Tinea pedis     foot      Past Surgical History:     Past Surgical History:   Procedure Laterality Date    CATARACT REMOVAL Bilateral 2014    CHOLECYSTECTOMY      COLONOSCOPY  2011    Dr. Kendra Blank - mild sigmoid diverticulosis    EYE SURGERY      HIP SURGERY Right 2013    Dr. Miriam Perez - [facility] for pain relief    JOINT REPLACEMENT      KNEE PROSTHESIS REMOVAL      LUNG BIOPSY Left 2018    Dr. Vyas - CT guided    MOHS SURGERY Bilateral     OTHER SURGICAL HISTORY      multiple lumbar ESIs    PAROTIDECTOMY Right 2017    Dr. Bella Lee - superficial, []  General anesthesia    Patient's condition acceptable for planned procedure/sedation. Post Procedure Plan   Return to same level of care   ______________________     The risks and benefits as well as alternatives to the procedure have been discussed with the patient and or family. The patient and or next of kin understands and agrees to proceed.     Cyril Spatz, M.D.

## 2019-08-27 RX ORDER — INSULIN GLARGINE 100 [IU]/ML
INJECTION, SOLUTION SUBCUTANEOUS
Qty: 5 PEN | Refills: 3 | Status: SHIPPED | OUTPATIENT
Start: 2019-08-27 | End: 2019-11-05 | Stop reason: SDUPTHER

## 2019-08-30 ENCOUNTER — HOSPITAL ENCOUNTER (OUTPATIENT)
Dept: PHYSICAL THERAPY | Age: 84
Setting detail: THERAPIES SERIES
Discharge: HOME | End: 2019-08-30
Payer: MEDICARE

## 2019-08-30 PROCEDURE — 97112 NEUROMUSCULAR REEDUCATION: CPT

## 2019-08-30 PROCEDURE — 97110 THERAPEUTIC EXERCISES: CPT

## 2019-08-30 NOTE — FLOWSHEET NOTE
Other:  [] TA x       [] [facility] Traction (37004)  [] ES(attended) (23390)      [] ES (un) (22404):     GOALS  Short Term Goals: To be achieved in: 2 weeks  1. Independent in HEP and progression per patient tolerance, in order to prevent re-injury. -75% MET  2. Patient will have a decrease in pain to facilitate improvement in movement, function, and ADLs as indicated by Functional Deficits.-100% MET    Long Term Goals: To be achieved in: 6 weeks  1. Disability index score of 25/80 or less for the LEFS to assist with reaching prior level of function. -not met  2. Patient will demonstrate increased sit to stand with SBA with no use of UE- 90% MET  3. Patient will demonstrate an increase in Strength to good proximal hip strength and control, within 5lb HHD in LE to allow for proper functional mobility as indicated by patients Functional Deficits. -85% MET  4. Patient will reduce falls to 0 per week -90% MET ( no falls in 4 weeks)  5. TUG less than 16 sec(patient specific functional goal)- 100% MET    Progression Towards Functional goals:  [x] Patient is progressing as expected towards functional goals listed. [] Progression is slowed due to complexities listed. [] Progression has been slowed due to co-morbidities. [] Plan just implemented, too soon to assess goals progression  [] Other:     ASSESSMENT:   Patient continues to make progress in strength, balance, and proprioception. Patient has been working toward retro stepping on uneven surface and increased toleratnce to WS to R LE during balance activities. Added turning in full Knik with cga/HHA to R- patient needing frequent cues and typically with wide turn - working on shortening turning radius with improved footing. Patient currently has not had a fall since 7/25/2019. Will benefit from continued skilled PT services 1x week for 4 weeks to further progress strength and balance further.       Return to Play: (if applicable)   []  Stage 1: Intro to Strength   []

## 2019-09-03 ENCOUNTER — OFFICE VISIT (OUTPATIENT)
Dept: INTERNAL MEDICINE CLINIC | Age: 84
End: 2019-09-03
Payer: MEDICARE

## 2019-09-03 VITALS
BODY MASS INDEX: 28.28 KG/M2 | DIASTOLIC BLOOD PRESSURE: 70 MMHG | SYSTOLIC BLOOD PRESSURE: 122 MMHG | HEART RATE: 72 BPM | WEIGHT: 200 LBS

## 2019-09-03 DIAGNOSIS — Z79.01 ANTICOAGULATED ON COUMADIN: ICD-10-CM

## 2019-09-03 DIAGNOSIS — I26.99 BILATERAL PULMONARY EMBOLISM (HCC): Primary | ICD-10-CM

## 2019-09-03 LAB
INTERNATIONAL NORMALIZATION RATIO, POC: 1.5
PROTHROMBIN TIME, POC: NORMAL

## 2019-09-03 PROCEDURE — G8417 CALC BMI ABV UP PARAM F/U: HCPCS | Performed by: NURSE PRACTITIONER

## 2019-09-03 PROCEDURE — 99212 OFFICE O/P EST SF 10 MIN: CPT | Performed by: NURSE PRACTITIONER

## 2019-09-03 PROCEDURE — 1123F ACP DISCUSS/DSCN MKR DOCD: CPT | Performed by: NURSE PRACTITIONER

## 2019-09-03 PROCEDURE — 85610 PROTHROMBIN TIME: CPT | Performed by: NURSE PRACTITIONER

## 2019-09-03 PROCEDURE — G8427 DOCREV CUR MEDS BY ELIG CLIN: HCPCS | Performed by: NURSE PRACTITIONER

## 2019-09-03 PROCEDURE — 1036F TOBACCO NON-USER: CPT | Performed by: NURSE PRACTITIONER

## 2019-09-03 PROCEDURE — 4040F PNEUMOC VAC/ADMIN/RCVD: CPT | Performed by: NURSE PRACTITIONER

## 2019-09-04 ENCOUNTER — HOSPITAL ENCOUNTER (OUTPATIENT)
Dept: PHYSICAL THERAPY | Age: 84
Setting detail: THERAPIES SERIES
Discharge: HOME | End: 2019-09-04
Payer: MEDICARE

## 2019-09-04 PROCEDURE — 97112 NEUROMUSCULAR REEDUCATION: CPT

## 2019-09-04 PROCEDURE — 97110 THERAPEUTIC EXERCISES: CPT

## 2019-09-04 ASSESSMENT — ENCOUNTER SYMPTOMS
GASTROINTESTINAL NEGATIVE: 1
BLOOD IN STOOL: 0
RESPIRATORY NEGATIVE: 1

## 2019-09-11 ENCOUNTER — HOSPITAL ENCOUNTER (OUTPATIENT)
Dept: PHYSICAL THERAPY | Age: 84
Setting detail: THERAPIES SERIES
Discharge: HOME | End: 2019-09-11
Payer: MEDICARE

## 2019-09-11 PROCEDURE — 97110 THERAPEUTIC EXERCISES: CPT

## 2019-09-11 PROCEDURE — 97112 NEUROMUSCULAR REEDUCATION: CPT

## 2019-09-11 NOTE — FLOWSHEET NOTE
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  · Treatment Diagnosis: Sensory Ataxia O72.8  Insurance/Certification information:     Physician Information:  Referring Practitioner: Dr Marietta Mckinnon of care signed (Y/N):     Date of Patient follow up with Physician:     G-Code (if applicable):      Date G-Code Applied:  2019   MICHAEL 45/56    Progress Note: [x]  Yes  []  No  Next due by: 2019 (1x week for 4 weeks)      Latex Allergy:  [x]NO      []YES  Preferred Language for Healthcare:   [x]English       []other:    Visit # Insurance Allowable   17 medicare     Pain level:  0/10     SUBJECTIVE:  Patient reports that he was reaching to hang tag on wall and turned to right and fell. States that he had a lot of bruising and soreness with this. Notes that he had trouble turning over in bed- but better now. States that he thinks he may have broken a rib- had difficulty with breathing/coughing and sneezing- is better now.       OBJECTIVE:   Observation: Ambulating with SPC   Test measurements:      RESTRICTIONS/PRECAUTIONS: AT Initial Eval: balance, IE: TUG 19, Rodriguez 33, difficulty with backwards movements and turning    2019: TUG 15.8 (avg of 3 trials), RODRIGUEZ 41  2019: RODRIGUEZ 45    Exercises/Interventions:     Therapeutic Ex (47118): 30 min Sets/sec Reps Notes/CUES   Retro Stepper/BIKE      Alter G      BFR      Sportcord March      3 way SLR 2 15 Bilat, 5#   LAQ 2 15 Bilat, 5#   Standing hip abd 2 15 5#, Bilat, cues form   Bridging 2 15    POT hip extension 2 15 5#   BOSU fwd/side lunge      BOSU squat      Leg Press Iso/Con/Ecc 0-      Cybex HS curl      TKE      Glute side

## 2019-09-12 NOTE — ED NOTES
Cardiac Angiogram Discharge Instructions - Femoral    After you go home:      Have an adult stay with you until tomorrow.    Drink extra fluids for 2 days.    You may resume your normal diet.    No smoking       For 24 hours - due to the sedation you received:    Relax and take it easy.    Do NOT make any important or legal decisions.    Do NOT drive or operate machines at home or at work.    Do NOT drink alcohol.    Care of Groin Puncture Site:      For the first 24 hrs - check the puncture site every 1-2 hours while awake.    For 2 days, when you cough, sneeze, laugh or move your bowels, hold your hand over the puncture site and press firmly.    Remove the bandaid after 24 hours. If there is minor oozing, apply another bandaid and remove it after 12 hours.    It is normal to have a small bruise or pea size lump at the site.    You may shower tomorrow. Do NOT take a bath, or use a hot tub or pool for at least 3 days. Do NOT scrub the site. Do not use lotion or powder near the puncture site.    Activity:            For 2 days:    No stooping or squatting    Do NOT do any heavy activity such as exercise, lifting, or straining.     No housework, yard work or any activity that make you sweat    Do NOT lift more than 10 pounds    Bleeding:      If you start bleeding from the site in your groin, lie down flat and press firmly on/above the site for 10 minutes.     Once bleeding stops, lay flat for 2 hours.     Call [facility] as soon as you can.       Call 911 right away if you have heavy bleeding or bleeding that does not stop.      Medicines:       you are on Metformin (Glucophage), do not restart it until you have blood tests (within 2 to 3 days after discharge).  After you have your blood drawn, you may restart the Metformin.     If you have stopped any medicines, check with your provider about when to restart them.    Follow Up Appointments:      Follow up with [facility] Nurse Practitioner at [facility]  Patient is awake in bed, she is alert and oriented, his respirations are easy and unlabored. IV fluid infusing as ordered. Patient's wife and son are at the bedside. Dr. Nieves Davison in to see patient.      [id number] VALENTIN RN  04/09/19 7600 patient preference in 7-10 days.    Call the clinic if:      You have increased pain or a large or growing hard lump around the site.    The site is red, swollen, hot or tender.    Blood or fluid is draining from the site.    You have chills or a fever greater than 101 F (38 C).    Your leg feels numb, cool or changes color.    You have hives, a rash or unusual itching.    New pain in the back or belly that you cannot control with Tylenol.    Any questions or concerns.          [facility]:    [phone] UM (7 days a week)

## 2019-09-13 ENCOUNTER — OFFICE VISIT (OUTPATIENT)
Dept: ORTHOPEDIC SURGERY | Age: 84
End: 2019-09-13
Payer: MEDICARE

## 2019-09-13 VITALS
DIASTOLIC BLOOD PRESSURE: 86 MMHG | BODY MASS INDEX: 27.99 KG/M2 | SYSTOLIC BLOOD PRESSURE: 132 MMHG | HEART RATE: 88 BPM | WEIGHT: 199.96 LBS | HEIGHT: 71 IN

## 2019-09-13 DIAGNOSIS — M48.062 LUMBAR STENOSIS WITH NEUROGENIC CLAUDICATION: Primary | ICD-10-CM

## 2019-09-13 DIAGNOSIS — M51.36 DDD (DEGENERATIVE DISC DISEASE), LUMBAR: ICD-10-CM

## 2019-09-13 DIAGNOSIS — M47.816 SPONDYLOSIS OF LUMBAR REGION WITHOUT MYELOPATHY OR RADICULOPATHY: ICD-10-CM

## 2019-09-13 PROCEDURE — 99213 OFFICE O/P EST LOW 20 MIN: CPT | Performed by: PHYSICAL MEDICINE & REHABILITATION

## 2019-09-13 PROCEDURE — 4040F PNEUMOC VAC/ADMIN/RCVD: CPT | Performed by: PHYSICAL MEDICINE & REHABILITATION

## 2019-09-13 PROCEDURE — G8417 CALC BMI ABV UP PARAM F/U: HCPCS | Performed by: PHYSICAL MEDICINE & REHABILITATION

## 2019-09-13 PROCEDURE — 1123F ACP DISCUSS/DSCN MKR DOCD: CPT | Performed by: PHYSICAL MEDICINE & REHABILITATION

## 2019-09-13 PROCEDURE — 1036F TOBACCO NON-USER: CPT | Performed by: PHYSICAL MEDICINE & REHABILITATION

## 2019-09-13 PROCEDURE — G8427 DOCREV CUR MEDS BY ELIG CLIN: HCPCS | Performed by: PHYSICAL MEDICINE & REHABILITATION

## 2019-09-13 NOTE — PROGRESS NOTES
BASAGLAR KWIKPEN 100 UNIT/ML injection pen, 10 units nightly, Disp: 5 pen, Rfl: 3    insulin glargine (LANTUS SOLOSTAR) 100 UNIT/ML injection pen, 10 units nightly, Disp: 5 pen, Rfl: 3    Insulin Pen Needle (B-D UF III MINI PEN NEEDLES) 31G X 5 MM MISC, 1 each by Does not apply route daily, Disp: 100 each, Rfl: 6    glipiZIDE (GLUCOTROL) 5 MG tablet, Take 1 tablet by mouth 2 times daily (before meals), Disp: 180 tablet, Rfl: 3    diphenhydrAMINE-APAP, sleep, (TYLENOL PM EXTRA STRENGTH PO), Take by mouth as needed, Disp: , Rfl:     furosemide (LASIX) 40 MG tablet, Take 1 tablet by mouth daily, Disp: 180 tablet, Rfl: 1    DULoxetine (CYMBALTA) 30 MG extended release capsule, Take 30 mg by mouth daily, Disp: , Rfl:     ranitidine (ZANTAC) 300 MG capsule, TAKE ONE CAPSULE BY MOUTH EVERY EVENING, Disp: 90 capsule, Rfl: 3    lisinopril (PRINIVIL;ZESTRIL) 10 MG tablet, Take 1 tablet by mouth daily, Disp: 90 tablet, Rfl: 1    warfarin (COUMADIN) 2.5 MG tablet, 1.25 mg on Sun, Thu; 2.5 mg all other days, Disp: 90 tablet, Rfl: 3    naphazoline-glycerin (CLEAR EYES REDNESS RELIEF) 0.012-0.2 % SOLN ophthalmic solution, Place 1 drop into both eyes 2 times daily (Patient taking differently: Place 1 drop into both eyes 2 times daily as needed ), Disp: 1 Bottle, Rfl: 0    tamsulosin (FLOMAX) 0.4 MG capsule, Take 1 capsule by mouth daily (Patient taking differently: Take 0.4 mg by mouth 2 times daily ), Disp: 90 capsule, Rfl: 1    glucose monitoring kit (FREESTYLE) monitoring kit, 1 kit by Does not apply route daily as needed. , Disp: 1 kit, Rfl: 0    latanoprost (XALATAN) 0.005 % ophthalmic solution, Place 1 drop into both eyes nightly., Disp: , Rfl:     gabapentin (NEURONTIN) 300 MG capsule, Take 1 capsule by mouth 2 times daily for 30 days. , Disp: 60 capsule, Rfl: 5  Allergies:  Celebrex [celecoxib]; Elavil [amitriptyline];  Naproxen; Percocet [oxycodone-acetaminophen]; and Lyrica [pregabalin]  Social History: reports that he quit smoking about 49 years ago. He has a 40.00 pack-year smoking history. He has never used smokeless tobacco. He reports that he does not drink alcohol or use drugs. Family History:   Family History   Problem Relation Age of Onset    Arthritis Father     Diabetes Father     Diabetes Brother        REVIEW OF SYSTEMS:   CONSTITUTIONAL: Denies unexplained weight loss, fevers, chills or fatigue  NEUROLOGICAL: Denies unsteady gait or progressive weakness  MUSCULOSKELETAL: Denies joint swelling or redness  GI: Denies nausea, vomiting, diarrhea   : Denies bowel or bladder issues       PHYSICAL EXAM:    Vitals: Blood pressure 132/86, pulse 88, height 5' 10.51\" (1.791 m), weight 199 lb 15.3 oz (90.7 kg). GENERAL EXAM:  · General Apparence: Patient is adequately groomed with no evidence of malnutrition. · Psychiatric: Orientation: The patient is oriented to time, place and person. The patient's mood and affect are appropriate   · Vascular: Examination reveals no swelling and palpation reveals no tenderness in upper or lower extremities. Good capillary refill. · The lymphatic examination of the neck, axillae and groin reveals all areas to be without enlargement or induration  · Sensation is intact without deficit in the upper and lower extremities to light touch and pinprick  · Coordination of the upper and lower extremities are normal.  · Additional Examinations:  · RIGHT UPPER EXTREMITY:  Inspection/examination of the right upper extremity does not show any tenderness, deformity or injury. Range of motion is unremarkable and pain-free. There is no gross instability. There are no rashes, ulcerations or lesions. Strength and tone are normal. No atrophy or abnormal movements are noted. · LEFT UPPER EXTREMITY: Inspection/examination of the left upper extremity does not show any tenderness, deformity or injury. Range of motion is unremarkable and pain-free. There is no gross instability.   There are no rashes, ulcerations or lesions. Strength and tone are normal. No atrophy or abnormal movements are noted. LUMBAR/SACRAL EXAMINATION:  · Inspection: Local inspection shows no step-off or bruising. Lumbar alignment is normal. No instability is noted. · Palpation:   No evidence of tenderness at the midline. Lumbar paraspinal tenderness: Mild L4/5 and L5/S1 tenderness  Bursal tenderness No tenderness bilaterally  There is no paraspinal spasm. · Range of Motion: limited by 25% in all planes due to pain  · Strength:   Strength testing is 5/5 in all muscle groups tested. · Special Tests:   Straight leg raise and crossed SLR negative. Samuel's testing is negative bilaterally. FADIR's testing is negative bilaterally. · Skin: There are no rashes, ulcerations or lesions. Positive ecchymosis noted over the left hip  · Reflexes: Reflexes are symmetrically 2+ at the patellar and ankle tendons. Clonus absent bilaterally at the feet. · Gait & station: uses a single point cane to ambulate and no ataxia  · Additional Examinations:  · RIGHT LOWER EXTREMITY: Inspection/examination of the right lower extremity does not show any tenderness, deformity or injury. Range of motion is normal and pain-free. There is no gross instability. There are no rashes, ulcerations or lesions. Strength and tone are normal. No atrophy or abnormal movements are noted. · LEFT LOWER EXTREMITY:  Inspection/examination of the left lower extremity does not show any tenderness, deformity or injury. Range of motion is normal and pain-free. There is no gross instability. There are no rashes, ulcerations or lesions. Strength and tone are normal. No atrophy or abnormal movements are noted. Diagnostic Testing:    No new diagnostics  Results for orders placed or performed in visit on 09/03/19   POCT INR   Result Value Ref Range    INR 1.5     Protime       Impression:       1. Lumbar stenosis with neurogenic claudication    2.  DDD (degenerative accurate and complete. I, Dr. Nena Churchill. Kim, personally performed the services described in this documentation as scribed by JOCY Arias in my presence and it is both accurate and complete. Trinh Andersen. Hakeem Haider MD, TANESHA, [facility]  Board Certified in [address] [place] [address]  Certified and Fellowship Trained in [facility] ([facility])             This dictation was performed with a verbal recognition program [facility]) and it was checked for errors. It is possible that there are still dictated errors within this office note. If so, please bring any errors to my attention for an addendum. All efforts were made to ensure that this office note is accurate.

## 2019-09-16 DIAGNOSIS — E11.22 TYPE 2 DIABETES MELLITUS WITH STAGE 3 CHRONIC KIDNEY DISEASE, WITHOUT LONG-TERM CURRENT USE OF INSULIN (HCC): ICD-10-CM

## 2019-09-16 DIAGNOSIS — N18.30 TYPE 2 DIABETES MELLITUS WITH STAGE 3 CHRONIC KIDNEY DISEASE, WITHOUT LONG-TERM CURRENT USE OF INSULIN (HCC): ICD-10-CM

## 2019-09-16 RX ORDER — BLOOD-GLUCOSE METER
KIT MISCELLANEOUS
Qty: 50 STRIP | Refills: 2 | Status: SHIPPED | OUTPATIENT
Start: 2019-09-16 | End: 2020-01-30

## 2019-09-18 ENCOUNTER — HOSPITAL ENCOUNTER (OUTPATIENT)
Age: 84
Discharge: HOME | End: 2019-09-18
Payer: MEDICARE

## 2019-09-18 ENCOUNTER — OFFICE VISIT (OUTPATIENT)
Dept: INTERNAL MEDICINE CLINIC | Age: 84
End: 2019-09-18
Payer: MEDICARE

## 2019-09-18 ENCOUNTER — APPOINTMENT (OUTPATIENT)
Dept: PHYSICAL THERAPY | Age: 84
End: 2019-09-18
Payer: MEDICARE

## 2019-09-18 ENCOUNTER — HOSPITAL ENCOUNTER (OUTPATIENT)
Dept: GENERAL RADIOLOGY | Age: 84
Discharge: HOME | End: 2019-09-18
Payer: MEDICARE

## 2019-09-18 VITALS
HEART RATE: 64 BPM | BODY MASS INDEX: 28.42 KG/M2 | WEIGHT: 203 LBS | DIASTOLIC BLOOD PRESSURE: 66 MMHG | HEIGHT: 71 IN | SYSTOLIC BLOOD PRESSURE: 132 MMHG
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
## 2019-09-18 DIAGNOSIS — M48.062 LUMBAR STENOSIS WITH NEUROGENIC CLAUDICATION: ICD-10-CM
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
## 2019-09-18 DIAGNOSIS — M51.36 DDD (DEGENERATIVE DISC DISEASE), LUMBAR: ICD-10-CM

## 2019-09-18 DIAGNOSIS — R07.89 CHEST WALL PAIN: ICD-10-CM

## 2019-09-18 DIAGNOSIS — Z79.01 ANTICOAGULATED ON COUMADIN: ICD-10-CM

## 2019-09-18 DIAGNOSIS — Z00.00 ROUTINE GENERAL MEDICAL EXAMINATION AT A HEALTH CARE FACILITY: Primary | ICD-10-CM

## 2019-09-18 DIAGNOSIS — I26.99 OTHER ACUTE PULMONARY EMBOLISM WITHOUT ACUTE COR PULMONALE (HCC): ICD-10-CM

## 2019-09-18 LAB
INTERNATIONAL NORMALIZATION RATIO, POC: 2
PROTHROMBIN TIME, POC: NORMAL

## 2019-09-18 PROCEDURE — G0438 PPPS, INITIAL VISIT: HCPCS | Performed by: NURSE PRACTITIONER

## 2019-09-18 PROCEDURE — 71101 X-RAY EXAM UNILAT RIBS/CHEST: CPT

## 2019-09-18 PROCEDURE — 85610 PROTHROMBIN TIME: CPT | Performed by: NURSE PRACTITIONER

## 2019-09-18 PROCEDURE — 1123F ACP DISCUSS/DSCN MKR DOCD: CPT | Performed by: NURSE PRACTITIONER

## 2019-09-18 PROCEDURE — 4040F PNEUMOC VAC/ADMIN/RCVD: CPT | Performed by: NURSE PRACTITIONER

## 2019-09-18 RX ORDER — TRAMADOL HYDROCHLORIDE 50 MG/1
50 TABLET ORAL NIGHTLY PRN
Qty: 30 TABLET | Refills: 0 | Status: SHIPPED | OUTPATIENT
Start: 2019-09-18 | End: 2019-10-18 | Stop reason: SDUPTHER

## 2019-09-18 RX ORDER — LISINOPRIL 10 MG/1
10 TABLET ORAL DAILY
Qty: 90 TABLET | Refills: 3 | Status: SHIPPED | OUTPATIENT
Start: 2019-09-18 | End: 2020-09-16

## 2019-09-18 ASSESSMENT — PATIENT HEALTH QUESTIONNAIRE - PHQ9
SUM OF ALL RESPONSES TO PHQ QUESTIONS 1-9: 0
SUM OF ALL RESPONSES TO PHQ QUESTIONS 1-9: 0

## 2019-09-18 ASSESSMENT — LIFESTYLE VARIABLES: HOW OFTEN DO YOU HAVE A DRINK CONTAINING ALCOHOL: 0

## 2019-09-18 NOTE — PROGRESS NOTES
(COUMADIN) 2.5 MG tablet 1.25 mg on Sun, Thu; 2.5 mg all other days Yes Sol Severs, APRN - CNP   naphazoline-glycerin (CLEAR EYES REDNESS RELIEF) 0.012-0.2 % SOLN ophthalmic solution Place 1 drop into both eyes 2 times daily  Patient taking differently: Place 1 drop into both eyes 2 times daily as needed  Yes Lucy Sousa MD   tamsulosin (FLOMAX) 0.4 MG capsule Take 1 capsule by mouth daily  Patient taking differently: Take 0.4 mg by mouth 2 times daily  Yes Sol Severs, APRN - CNP   glucose monitoring kit (FREESTYLE) monitoring kit 1 kit by Does not apply route daily as needed. Yes Jimbo Goodson MD   latanoprost (XALATAN) 0.005 % ophthalmic solution Place 1 drop into both eyes nightly. Yes Historical Provider, MD   gabapentin (NEURONTIN) 300 MG capsule Take 1 capsule by mouth 2 times daily for 30 days.   Sol Severs, APRN - CNP     Past Medical History:   Diagnosis Date    Abdominal pain, epigastric     Arthritis     Benign prostatic hypertrophy without urinary obstruction     BPH     Cellulitis and abscess     Chronic rhinitis     Chronic systolic congestive heart failure ([facility] [address] 4/18/2019    COPD (chronic obstructive pulmonary disease) (HCC)     Diabetes mellitus (HCC)     Dysphagia     Erectile dysfunction     GERD (gastroesophageal reflux disease)     Hx of blood clots     Hyperglycemia     Hypertension     lumbar radiculopathy     Neuropathy     Nocturia     Osteoarthritis     Other disorders of kidney and ureter in diseases classified elsewhere     Pain, joint, knee     Palpitations     skin cancer     Rt ear, nose, neck, hands/ 2018 lung    SOB (shortness of breath)     Tennis elbow     Tinea pedis     foot     Past Surgical History:   Procedure Laterality Date    CATARACT REMOVAL Bilateral 09/2014    CHOLECYSTECTOMY      COLONOSCOPY  11/28/2011    Dr. Cammie Jean - mild sigmoid diverticulosis    EYE SURGERY      HIP SURGERY Right 09/09/2013    Dr. Enid Kaminski - have been reviewed and are found in [facility]. The following problems were reviewed today and where indicated follow up appointments were made and/or referrals ordered. Positive Risk Factor Screenings with Interventions:     Fall Risk:  Timed Up and Go Test > 12 seconds? (Complete if either Fall Risk answers are Yes): no  2 or more falls in past year?: (!) yes  Fall with injury in past year?: (!) yes  Fall Risk Interventions:    · Home safety tips provided  · Home exercises provided to promote strength and balance  · Physical therapy referral ordered for strength and balance training    Cognitive: Words recalled: 2 Words Recalled  Clock Drawing Test (CDT) Score: (!) Abnormal  Total Score Interpretation: Positive Mini-Cog  Did the patient refuse to take the cognition test?: No  Cognitive Impairment Interventions:  · Patient declines any further evaluation/treatment for cognitive impairment    General Health:  General  In general, how would you say your health is?: (!) Poor  In the past 7 days, have you experienced any of the following? New or Increased Pain, New or Increased Fatigue, Loneliness, Social Isolation, Stress or Anger?: (!) New or Increased Pain  Do you get the social and emotional support that you need?: Yes  Do you have a Living Will?: (!) No  General Health Risk Interventions:  · Pain issues: medication prescribed- Tramadol    Health Habits/Nutrition:  Health Habits/Nutrition  Do you exercise for at least 20 minutes 2-3 times per week?: Yes  Have you lost any weight without trying in the past 3 months?: No  Do you eat fewer than 2 meals per day?: No  Have you seen a dentist within the past year?: (!) No  Body mass index is 28.72 kg/m².   Health Habits/Nutrition Interventions:  · Dental exam overdue:  patient declines dental evaluation    Hearing/Vision:  No exam data present  Hearing/Vision  Do you or your family notice any trouble with your hearing?: (!) Yes  Do you have difficulty driving,

## 2019-09-20 ENCOUNTER — HOSPITAL ENCOUNTER (OUTPATIENT)
Dept: WOMENS IMAGING | Age: 84
Discharge: HOME | End: 2019-09-20
Payer: MEDICARE

## 2019-09-20 DIAGNOSIS — N64.4 BREAST TENDERNESS IN MALE: ICD-10-CM

## 2019-09-20 DIAGNOSIS — N64.4 MASTODYNIA: ICD-10-CM

## 2019-09-20 PROCEDURE — G0279 TOMOSYNTHESIS, MAMMO: HCPCS

## 2019-10-01 ENCOUNTER — TELEPHONE (OUTPATIENT)
Dept: INTERNAL MEDICINE CLINIC | Age: 84
End: 2019-10-01

## 2019-10-01 RX ORDER — FAMOTIDINE 20 MG/1
20 TABLET, FILM COATED ORAL 2 TIMES DAILY
Qty: 60 TABLET | Refills: 5 | Status: SHIPPED | OUTPATIENT
Start: 2019-10-01 | End: 2020-03-24 | Stop reason: SDUPTHER

## 2019-10-03 ENCOUNTER — HOSPITAL ENCOUNTER (OUTPATIENT)
Dept: PHYSICAL THERAPY | Age: 84
Setting detail: THERAPIES SERIES
Discharge: HOME | End: 2019-10-03
Payer: MEDICARE

## 2019-10-03 PROCEDURE — 97112 NEUROMUSCULAR REEDUCATION: CPT

## 2019-10-03 PROCEDURE — 97110 THERAPEUTIC EXERCISES: CPT

## 2019-10-10 ENCOUNTER — HOSPITAL ENCOUNTER (OUTPATIENT)
Dept: PHYSICAL THERAPY | Age: 84
Setting detail: THERAPIES SERIES
Discharge: HOME | End: 2019-10-10
Payer: MEDICARE

## 2019-10-10 PROCEDURE — 97112 NEUROMUSCULAR REEDUCATION: CPT

## 2019-10-10 PROCEDURE — 97110 THERAPEUTIC EXERCISES: CPT

## 2019-10-17 ENCOUNTER — HOSPITAL ENCOUNTER (OUTPATIENT)
Dept: PHYSICAL THERAPY | Age: 84
Setting detail: THERAPIES SERIES
Discharge: HOME | End: 2019-10-17
Payer: MEDICARE

## 2019-10-17 PROCEDURE — 97112 NEUROMUSCULAR REEDUCATION: CPT

## 2019-10-17 PROCEDURE — 97110 THERAPEUTIC EXERCISES: CPT

## 2019-10-18 ENCOUNTER — OFFICE VISIT (OUTPATIENT)
Dept: INTERNAL MEDICINE CLINIC | Age: 84
End: 2019-10-18
Payer: MEDICARE

## 2019-10-18 VITALS
DIASTOLIC BLOOD PRESSURE: 82 MMHG | HEIGHT: 71 IN | SYSTOLIC BLOOD PRESSURE: 136 MMHG | BODY MASS INDEX: 28.14 KG/M2 | WEIGHT: 201 LBS | HEART RATE: 72 BPM

## 2019-10-18 DIAGNOSIS — G62.9 PERIPHERAL POLYNEUROPATHY: ICD-10-CM

## 2019-10-18 DIAGNOSIS — M48.062 LUMBAR STENOSIS WITH NEUROGENIC CLAUDICATION: ICD-10-CM

## 2019-10-18 DIAGNOSIS — Z79.01 ANTICOAGULATED ON COUMADIN: ICD-10-CM

## 2019-10-18 DIAGNOSIS — I26.99 OTHER ACUTE PULMONARY EMBOLISM WITHOUT ACUTE COR PULMONALE (HCC): Primary | ICD-10-CM

## 2019-10-18 DIAGNOSIS — M51.36 DDD (DEGENERATIVE DISC DISEASE), LUMBAR: ICD-10-CM

## 2019-10-18 LAB
INTERNATIONAL NORMALIZATION RATIO, POC: 2
PROTHROMBIN TIME, POC: NORMAL

## 2019-10-18 PROCEDURE — 1036F TOBACCO NON-USER: CPT | Performed by: NURSE PRACTITIONER

## 2019-10-18 PROCEDURE — G8417 CALC BMI ABV UP PARAM F/U: HCPCS | Performed by: NURSE PRACTITIONER

## 2019-10-18 PROCEDURE — 85610 PROTHROMBIN TIME: CPT | Performed by: NURSE PRACTITIONER

## 2019-10-18 PROCEDURE — G8427 DOCREV CUR MEDS BY ELIG CLIN: HCPCS | Performed by: NURSE PRACTITIONER

## 2019-10-18 PROCEDURE — 4040F PNEUMOC VAC/ADMIN/RCVD: CPT | Performed by: NURSE PRACTITIONER

## 2019-10-18 PROCEDURE — 1123F ACP DISCUSS/DSCN MKR DOCD: CPT | Performed by: NURSE PRACTITIONER

## 2019-10-18 PROCEDURE — G8482 FLU IMMUNIZE ORDER/ADMIN: HCPCS | Performed by: NURSE PRACTITIONER

## 2019-10-18 PROCEDURE — 99213 OFFICE O/P EST LOW 20 MIN: CPT | Performed by: NURSE PRACTITIONER

## 2019-10-18 RX ORDER — TRAMADOL HYDROCHLORIDE 50 MG/1
50 TABLET ORAL NIGHTLY PRN
Qty: 30 TABLET | Refills: 0 | Status: SHIPPED | OUTPATIENT
Start: 2019-10-18 | End: 2019-11-17

## 2019-10-18 RX ORDER — GABAPENTIN 300 MG/1
300 CAPSULE ORAL 3 TIMES DAILY
Qty: 90 CAPSULE | Refills: 5 | Status: SHIPPED | OUTPATIENT
Start: 2019-10-18 | End: 2019-11-20

## 2019-10-18 ASSESSMENT — ENCOUNTER SYMPTOMS
GASTROINTESTINAL NEGATIVE: 1
BACK PAIN: 1
RESPIRATORY NEGATIVE: 1
BLOOD IN STOOL: 0

## 2019-10-22 ENCOUNTER — HOSPITAL ENCOUNTER (OUTPATIENT)
Dept: PHYSICAL THERAPY | Age: 84
Setting detail: THERAPIES SERIES
Discharge: HOME | End: 2019-10-22
Payer: MEDICARE

## 2019-10-22 PROCEDURE — 97112 NEUROMUSCULAR REEDUCATION: CPT

## 2019-10-22 PROCEDURE — 97110 THERAPEUTIC EXERCISES: CPT

## 2019-10-30 ENCOUNTER — HOSPITAL ENCOUNTER (OUTPATIENT)
Dept: PHYSICAL THERAPY | Age: 84
Setting detail: THERAPIES SERIES
Discharge: HOME | End: 2019-10-30
Payer: MEDICARE

## 2019-10-30 PROCEDURE — 97110 THERAPEUTIC EXERCISES: CPT

## 2019-10-31 ENCOUNTER — TELEPHONE (OUTPATIENT)
Dept: CARDIOLOGY CLINIC | Age: 84
End: 2019-10-31

## 2019-10-31 DIAGNOSIS — I50.22 CHRONIC SYSTOLIC CONGESTIVE HEART FAILURE (HCC): ICD-10-CM

## 2019-10-31 DIAGNOSIS — I10 ESSENTIAL HYPERTENSION: Primary | ICD-10-CM

## 2019-11-01 ENCOUNTER — HOSPITAL ENCOUNTER (OUTPATIENT)
Age: 84
Discharge: HOME | End: 2019-11-01
Payer: MEDICARE

## 2019-11-01 DIAGNOSIS — N18.30 CKD (CHRONIC KIDNEY DISEASE) STAGE 3, GFR 30-59 ML/MIN (HCC): ICD-10-CM

## 2019-11-01 DIAGNOSIS — E11.22 TYPE 2 DIABETES MELLITUS WITH STAGE 3 CHRONIC KIDNEY DISEASE, WITHOUT LONG-TERM CURRENT USE OF INSULIN (HCC): ICD-10-CM

## 2019-11-01 DIAGNOSIS — I10 ESSENTIAL HYPERTENSION: ICD-10-CM

## 2019-11-01 DIAGNOSIS — R39.11 BENIGN PROSTATIC HYPERPLASIA WITH URINARY HESITANCY: ICD-10-CM

## 2019-11-01 DIAGNOSIS — N40.1 BENIGN PROSTATIC HYPERPLASIA WITH URINARY HESITANCY: ICD-10-CM

## 2019-11-01 DIAGNOSIS — I50.22 CHRONIC SYSTOLIC CONGESTIVE HEART FAILURE (HCC): ICD-10-CM

## 2019-11-01 DIAGNOSIS — N18.30 TYPE 2 DIABETES MELLITUS WITH STAGE 3 CHRONIC KIDNEY DISEASE, WITHOUT LONG-TERM CURRENT USE OF INSULIN (HCC): ICD-10-CM

## 2019-11-01 LAB
A/G RATIO: 1.6 (ref 1.1–2.2)
ALBUMIN SERPL-MCNC: 4.1 G/DL (ref 3.4–5)
ALP BLD-CCNC: 68 U/L (ref 40–129)
ALT SERPL-CCNC: 10 U/L (ref 10–40)
ANION GAP SERPL CALCULATED.3IONS-SCNC: 14 MMOL/L (ref 3–16)
AST SERPL-CCNC: 11 U/L (ref 15–37)
BILIRUB SERPL-MCNC: 0.6 MG/DL (ref 0–1)
BUN BLDV-MCNC: 31 MG/DL (ref 7–20)
CALCIUM SERPL-MCNC: 8.8 MG/DL (ref 8.3–10.6)
CHLORIDE BLD-SCNC: 100 MMOL/L (ref 99–110)
CHOLESTEROL, TOTAL: 159 MG/DL (ref 0–199)
CO2: 26 MMOL/L (ref 21–32)
CREAT SERPL-MCNC: 1.9 MG/DL (ref 0.8–1.3)
ESTIMATED AVERAGE GLUCOSE: 226 MG/DL
GFR AFRICAN AMERICAN: 41
GFR NON-AFRICAN AMERICAN: 34
GLOBULIN: 2.6 G/DL
GLUCOSE BLD-MCNC: 251 MG/DL (ref 70–99)
HBA1C MFR BLD: 9.5 %
HCT VFR BLD CALC: 37.6 % (ref 40.5–52.5)
HDLC SERPL-MCNC: 34 MG/DL (ref 40–60)
HEMOGLOBIN: 13 G/DL (ref 13.5–17.5)
LDL CHOLESTEROL CALCULATED: 88 MG/DL
MCH RBC QN AUTO: 30.7 PG (ref 26–34)
MCHC RBC AUTO-ENTMCNC: 34.5 G/DL (ref 31–36)
MCV RBC AUTO: 89 FL (ref 80–100)
PDW BLD-RTO: 16 % (ref 12.4–15.4)
PHOSPHORUS: 3.6 MG/DL (ref 2.5–4.9)
PLATELET # BLD: 141 K/UL (ref 135–450)
PMV BLD AUTO: 9.1 FL (ref 5–10.5)
POTASSIUM SERPL-SCNC: 3.9 MMOL/L (ref 3.5–5.1)
RBC # BLD: 4.22 M/UL (ref 4.2–5.9)
SODIUM BLD-SCNC: 140 MMOL/L (ref 136–145)
TOTAL PROTEIN: 6.7 G/DL (ref 6.4–8.2)
TRIGL SERPL-MCNC: 183 MG/DL (ref 0–150)
TSH SERPL DL<=0.05 MIU/L-ACNC: 1.56 UIU/ML (ref 0.27–4.2)
VLDLC SERPL CALC-MCNC: 37 MG/DL
WBC # BLD: 6.3 K/UL (ref 4–11)

## 2019-11-01 PROCEDURE — 85027 COMPLETE CBC AUTOMATED: CPT

## 2019-11-01 PROCEDURE — 83036 HEMOGLOBIN GLYCOSYLATED A1C: CPT

## 2019-11-01 PROCEDURE — 84443 ASSAY THYROID STIM HORMONE: CPT

## 2019-11-01 PROCEDURE — 80061 LIPID PANEL: CPT

## 2019-11-01 PROCEDURE — 36415 COLL VENOUS BLD VENIPUNCTURE: CPT

## 2019-11-01 PROCEDURE — 80053 COMPREHEN METABOLIC PANEL: CPT

## 2019-11-01 PROCEDURE — 84100 ASSAY OF PHOSPHORUS: CPT

## 2019-11-05 ENCOUNTER — OFFICE VISIT (OUTPATIENT)
Dept: ENDOCRINOLOGY | Age: 84
End: 2019-11-05
Payer: MEDICARE

## 2019-11-05 VITALS
HEIGHT: 71 IN | BODY MASS INDEX: 28.56 KG/M2 | WEIGHT: 204 LBS | DIASTOLIC BLOOD PRESSURE: 80 MMHG | SYSTOLIC BLOOD PRESSURE: 110 MMHG | HEART RATE: 77 BPM | OXYGEN SATURATION: 98 %

## 2019-11-05 DIAGNOSIS — N18.30 TYPE 2 DIABETES MELLITUS WITH STAGE 3 CHRONIC KIDNEY DISEASE, WITHOUT LONG-TERM CURRENT USE OF INSULIN (HCC): ICD-10-CM

## 2019-11-05 DIAGNOSIS — I10 ESSENTIAL HYPERTENSION: ICD-10-CM

## 2019-11-05 DIAGNOSIS — N18.30 CKD (CHRONIC KIDNEY DISEASE) STAGE 3, GFR 30-59 ML/MIN (HCC): Primary | ICD-10-CM

## 2019-11-05 DIAGNOSIS — I50.22 CHRONIC SYSTOLIC CONGESTIVE HEART FAILURE (HCC): Chronic | ICD-10-CM

## 2019-11-05 DIAGNOSIS — E11.22 TYPE 2 DIABETES MELLITUS WITH STAGE 3 CHRONIC KIDNEY DISEASE, WITHOUT LONG-TERM CURRENT USE OF INSULIN (HCC): ICD-10-CM

## 2019-11-05 PROCEDURE — G8482 FLU IMMUNIZE ORDER/ADMIN: HCPCS | Performed by: INTERNAL MEDICINE

## 2019-11-05 PROCEDURE — 99214 OFFICE O/P EST MOD 30 MIN: CPT | Performed by: INTERNAL MEDICINE

## 2019-11-05 PROCEDURE — G8417 CALC BMI ABV UP PARAM F/U: HCPCS | Performed by: INTERNAL MEDICINE

## 2019-11-05 PROCEDURE — 4040F PNEUMOC VAC/ADMIN/RCVD: CPT | Performed by: INTERNAL MEDICINE

## 2019-11-05 PROCEDURE — 1036F TOBACCO NON-USER: CPT | Performed by: INTERNAL MEDICINE

## 2019-11-05 PROCEDURE — G8427 DOCREV CUR MEDS BY ELIG CLIN: HCPCS | Performed by: INTERNAL MEDICINE

## 2019-11-05 PROCEDURE — 1123F ACP DISCUSS/DSCN MKR DOCD: CPT | Performed by: INTERNAL MEDICINE

## 2019-11-05 RX ORDER — INSULIN GLARGINE 100 [IU]/ML
INJECTION, SOLUTION SUBCUTANEOUS
Qty: 5 PEN | Refills: 3 | Status: SHIPPED | OUTPATIENT
Start: 2019-11-05 | End: 2020-09-08

## 2019-11-06 ENCOUNTER — TELEPHONE (OUTPATIENT)
Dept: CARDIOLOGY CLINIC | Age: 84
End: 2019-11-06

## 2019-11-07 ENCOUNTER — OFFICE VISIT (OUTPATIENT)
Dept: CARDIOLOGY CLINIC | Age: 84
End: 2019-11-07
Payer: MEDICARE

## 2019-11-07 VITALS
SYSTOLIC BLOOD PRESSURE: 138 MMHG | HEIGHT: 70 IN | DIASTOLIC BLOOD PRESSURE: 68 MMHG | HEART RATE: 64 BPM | WEIGHT: 201.7 LBS | BODY MASS INDEX: 28.88 KG/M2

## 2019-11-07 DIAGNOSIS — I50.22 CHRONIC SYSTOLIC CONGESTIVE HEART FAILURE (HCC): Primary | Chronic | ICD-10-CM

## 2019-11-07 DIAGNOSIS — I35.9 AORTIC VALVULAR DISEASE: ICD-10-CM

## 2019-11-07 DIAGNOSIS — N18.30 CKD (CHRONIC KIDNEY DISEASE) STAGE 3, GFR 30-59 ML/MIN (HCC): ICD-10-CM

## 2019-11-07 DIAGNOSIS — I10 ESSENTIAL HYPERTENSION: ICD-10-CM

## 2019-11-07 PROCEDURE — 1036F TOBACCO NON-USER: CPT | Performed by: INTERNAL MEDICINE

## 2019-11-07 PROCEDURE — G8482 FLU IMMUNIZE ORDER/ADMIN: HCPCS | Performed by: INTERNAL MEDICINE

## 2019-11-07 PROCEDURE — 99214 OFFICE O/P EST MOD 30 MIN: CPT | Performed by: INTERNAL MEDICINE

## 2019-11-07 PROCEDURE — 1123F ACP DISCUSS/DSCN MKR DOCD: CPT | Performed by: INTERNAL MEDICINE

## 2019-11-07 PROCEDURE — 4040F PNEUMOC VAC/ADMIN/RCVD: CPT | Performed by: INTERNAL MEDICINE

## 2019-11-07 PROCEDURE — 93000 ELECTROCARDIOGRAM COMPLETE: CPT | Performed by: INTERNAL MEDICINE

## 2019-11-07 PROCEDURE — G8417 CALC BMI ABV UP PARAM F/U: HCPCS | Performed by: INTERNAL MEDICINE

## 2019-11-07 PROCEDURE — G8427 DOCREV CUR MEDS BY ELIG CLIN: HCPCS | Performed by: INTERNAL MEDICINE

## 2019-11-13 ENCOUNTER — HOSPITAL ENCOUNTER (OUTPATIENT)
Age: 84
Discharge: HOME | End: 2019-11-13
Payer: MEDICARE

## 2019-11-13 DIAGNOSIS — E11.22 TYPE 2 DIABETES MELLITUS WITH STAGE 4 CHRONIC KIDNEY DISEASE, WITHOUT LONG-TERM CURRENT USE OF INSULIN (HCC): ICD-10-CM

## 2019-11-13 DIAGNOSIS — N18.4 TYPE 2 DIABETES MELLITUS WITH STAGE 4 CHRONIC KIDNEY DISEASE, WITHOUT LONG-TERM CURRENT USE OF INSULIN (HCC): ICD-10-CM

## 2019-11-13 LAB
ALBUMIN SERPL-MCNC: 3.8 G/DL (ref 3.4–5)
ANION GAP SERPL CALCULATED.3IONS-SCNC: 13 MMOL/L (ref 3–16)
BUN BLDV-MCNC: 35 MG/DL (ref 7–20)
CALCIUM SERPL-MCNC: 8.6 MG/DL (ref 8.3–10.6)
CHLORIDE BLD-SCNC: 94 MMOL/L (ref 99–110)
CO2: 28 MMOL/L (ref 21–32)
CREAT SERPL-MCNC: 2 MG/DL (ref 0.8–1.3)
CREATININE URINE: 87.8 MG/DL (ref 39–259)
GFR AFRICAN AMERICAN: 39
GFR NON-AFRICAN AMERICAN: 32
GLUCOSE BLD-MCNC: 407 MG/DL (ref 70–99)
HCT VFR BLD CALC: 38.2 % (ref 40.5–52.5)
HEMOGLOBIN: 12.7 G/DL (ref 13.5–17.5)
MCH RBC QN AUTO: 29.8 PG (ref 26–34)
MCHC RBC AUTO-ENTMCNC: 33.4 G/DL (ref 31–36)
MCV RBC AUTO: 89.1 FL (ref 80–100)
PDW BLD-RTO: 15.8 % (ref 12.4–15.4)
PHOSPHORUS: 4.1 MG/DL (ref 2.5–4.9)
PLATELET # BLD: 143 K/UL (ref 135–450)
PMV BLD AUTO: 9.6 FL (ref 5–10.5)
POTASSIUM SERPL-SCNC: 3.9 MMOL/L (ref 3.5–5.1)
PROTEIN PROTEIN: 167 MG/DL
RBC # BLD: 4.28 M/UL (ref 4.2–5.9)
SODIUM BLD-SCNC: 135 MMOL/L (ref 136–145)
WBC # BLD: 5.4 K/UL (ref 4–11)

## 2019-11-13 PROCEDURE — 82570 ASSAY OF URINE CREATININE: CPT

## 2019-11-13 PROCEDURE — 36415 COLL VENOUS BLD VENIPUNCTURE: CPT

## 2019-11-13 PROCEDURE — 84156 ASSAY OF PROTEIN URINE: CPT

## 2019-11-13 PROCEDURE — 85027 COMPLETE CBC AUTOMATED: CPT

## 2019-11-13 PROCEDURE — 80069 RENAL FUNCTION PANEL: CPT

## 2019-11-15 ENCOUNTER — ANESTHESIA EVENT (OUTPATIENT)
Dept: OPERATING ROOM | Age: 84
End: 2019-11-15
Payer: MEDICARE

## 2019-11-15 ENCOUNTER — ANESTHESIA (OUTPATIENT)
Dept: OPERATING ROOM | Age: 84
End: 2019-11-15
Payer: MEDICARE

## 2019-11-15 ENCOUNTER — HOSPITAL ENCOUNTER (OUTPATIENT)
Age: 84
Setting detail: OUTPATIENT SURGERY
Discharge: HOME | End: 2019-11-15
Attending: UROLOGY | Admitting: UROLOGY
Payer: MEDICARE

## 2019-11-15 VITALS
TEMPERATURE: 97.2 F | SYSTOLIC BLOOD PRESSURE: 114 MMHG | RESPIRATION RATE: 9 BRPM | OXYGEN SATURATION: 99 % | DIASTOLIC BLOOD PRESSURE: 57 MMHG

## 2019-11-15 VITALS
HEIGHT: 70 IN | HEART RATE: 70 BPM | DIASTOLIC BLOOD PRESSURE: 90 MMHG | TEMPERATURE: 97.1 F | OXYGEN SATURATION: 95 % | SYSTOLIC BLOOD PRESSURE: 140 MMHG | BODY MASS INDEX: 28.35 KG/M2 | WEIGHT: 198 LBS | RESPIRATION RATE: 12 BRPM

## 2019-11-15 DIAGNOSIS — N47.1 PHIMOSIS OF PENIS: Primary | Chronic | ICD-10-CM

## 2019-11-15 LAB
APTT: 28.9 SEC (ref 26–36)
GLUCOSE BLD-MCNC: 198 MG/DL (ref 70–99)
GLUCOSE BLD-MCNC: 206 MG/DL (ref 70–99)
INR BLD: 1.18 (ref 0.86–1.14)
PERFORMED ON: ABNORMAL
PERFORMED ON: ABNORMAL
PROTHROMBIN TIME: 13.5 SEC (ref 9.8–13)

## 2019-11-15 PROCEDURE — 3600000012 HC SURGERY LEVEL 2 ADDTL 15MIN: Performed by: UROLOGY

## 2019-11-15 PROCEDURE — 7100000010 HC PHASE II RECOVERY - FIRST 15 MIN: Performed by: UROLOGY

## 2019-11-15 PROCEDURE — 3600000002 HC SURGERY LEVEL 2 BASE: Performed by: UROLOGY

## 2019-11-15 PROCEDURE — 6360000002 HC RX W HCPCS: Performed by: NURSE ANESTHETIST, CERTIFIED REGISTERED

## 2019-11-15 PROCEDURE — 2580000003 HC RX 258: Performed by: UROLOGY

## 2019-11-15 PROCEDURE — 6370000000 HC RX 637 (ALT 250 FOR IP): Performed by: UROLOGY

## 2019-11-15 PROCEDURE — 7100000000 HC PACU RECOVERY - FIRST 15 MIN: Performed by: UROLOGY

## 2019-11-15 PROCEDURE — 2500000003 HC RX 250 WO HCPCS: Performed by: UROLOGY

## 2019-11-15 PROCEDURE — 85730 THROMBOPLASTIN TIME PARTIAL: CPT

## 2019-11-15 PROCEDURE — 36415 COLL VENOUS BLD VENIPUNCTURE: CPT

## 2019-11-15 PROCEDURE — 3700000000 HC ANESTHESIA ATTENDED CARE: Performed by: UROLOGY

## 2019-11-15 PROCEDURE — 2709999900 HC NON-CHARGEABLE SUPPLY: Performed by: UROLOGY

## 2019-11-15 PROCEDURE — 3600000014 HC SURGERY LEVEL 4 ADDTL 15MIN: Performed by: UROLOGY

## 2019-11-15 PROCEDURE — 3600000004 HC SURGERY LEVEL 4 BASE: Performed by: UROLOGY

## 2019-11-15 PROCEDURE — 7100000001 HC PACU RECOVERY - ADDTL 15 MIN: Performed by: UROLOGY

## 2019-11-15 PROCEDURE — 3700000001 HC ADD 15 MINUTES (ANESTHESIA): Performed by: UROLOGY

## 2019-11-15 PROCEDURE — 2500000003 HC RX 250 WO HCPCS: Performed by: NURSE ANESTHETIST, CERTIFIED REGISTERED

## 2019-11-15 PROCEDURE — 7100000011 HC PHASE II RECOVERY - ADDTL 15 MIN: Performed by: UROLOGY

## 2019-11-15 PROCEDURE — 85610 PROTHROMBIN TIME: CPT

## 2019-11-15 RX ORDER — AMOXICILLIN 250 MG
1 CAPSULE ORAL 2 TIMES DAILY
Qty: 50 TABLET | Refills: 0 | Status: SHIPPED | OUTPATIENT
Start: 2019-11-15 | End: 2019-11-20

## 2019-11-15 RX ORDER — CEPHALEXIN 250 MG/1
500 CAPSULE ORAL ONCE
Status: COMPLETED | OUTPATIENT
Start: 2019-11-15 | End: 2019-11-15

## 2019-11-15 RX ORDER — FENTANYL CITRATE 50 UG/ML
INJECTION, SOLUTION INTRAMUSCULAR; INTRAVENOUS PRN
Status: DISCONTINUED | OUTPATIENT
Start: 2019-11-15 | End: 2019-11-15 | Stop reason: SDUPTHER

## 2019-11-15 RX ORDER — LIDOCAINE HYDROCHLORIDE 20 MG/ML
INJECTION, SOLUTION EPIDURAL; INFILTRATION; INTRACAUDAL; PERINEURAL PRN
Status: DISCONTINUED | OUTPATIENT
Start: 2019-11-15 | End: 2019-11-15 | Stop reason: SDUPTHER

## 2019-11-15 RX ORDER — TRAMADOL HYDROCHLORIDE 50 MG/1
50 TABLET ORAL EVERY 6 HOURS PRN
Qty: 12 TABLET | Refills: 0 | Status: SHIPPED | OUTPATIENT
Start: 2019-11-15 | End: 2019-11-18

## 2019-11-15 RX ORDER — PROPOFOL 10 MG/ML
INJECTION, EMULSION INTRAVENOUS CONTINUOUS PRN
Status: DISCONTINUED | OUTPATIENT
Start: 2019-11-15 | End: 2019-11-15 | Stop reason: SDUPTHER

## 2019-11-15 RX ORDER — SODIUM CHLORIDE 9 MG/ML
INJECTION, SOLUTION INTRAVENOUS CONTINUOUS
Status: DISCONTINUED | OUTPATIENT
Start: 2019-11-15 | End: 2019-11-15 | Stop reason: HOSPADM

## 2019-11-15 RX ORDER — MAGNESIUM HYDROXIDE 1200 MG/15ML
LIQUID ORAL CONTINUOUS PRN
Status: COMPLETED | OUTPATIENT
Start: 2019-11-15 | End: 2019-11-15

## 2019-11-15 RX ORDER — PROPOFOL 10 MG/ML
INJECTION, EMULSION INTRAVENOUS PRN
Status: DISCONTINUED | OUTPATIENT
Start: 2019-11-15 | End: 2019-11-15 | Stop reason: SDUPTHER

## 2019-11-15 RX ORDER — BACITRACIN ZINC AND POLYMYXIN B SULFATE 500; 1000 [USP'U]/G; [USP'U]/G
OINTMENT TOPICAL
Status: COMPLETED | OUTPATIENT
Start: 2019-11-15 | End: 2019-11-15

## 2019-11-15 RX ORDER — MAGNESIUM HYDROXIDE 1200 MG/15ML
LIQUID ORAL
Status: DISCONTINUED | OUTPATIENT
Start: 2019-11-15 | End: 2019-11-15 | Stop reason: ALTCHOICE

## 2019-11-15 RX ORDER — LIDOCAINE HYDROCHLORIDE 10 MG/ML
0.5 INJECTION, SOLUTION EPIDURAL; INFILTRATION; INTRACAUDAL; PERINEURAL ONCE
Status: DISCONTINUED | OUTPATIENT
Start: 2019-11-15 | End: 2019-11-15 | Stop reason: HOSPADM

## 2019-11-15 RX ORDER — LIDOCAINE HYDROCHLORIDE 10 MG/ML
INJECTION, SOLUTION EPIDURAL; INFILTRATION; INTRACAUDAL; PERINEURAL
Status: COMPLETED | OUTPATIENT
Start: 2019-11-15 | End: 2019-11-15

## 2019-11-15 RX ADMIN — LIDOCAINE HYDROCHLORIDE 100 MG: 20 INJECTION, SOLUTION EPIDURAL; INFILTRATION; INTRACAUDAL; PERINEURAL at 08:32

## 2019-11-15 RX ADMIN — FENTANYL CITRATE 25 MCG: 50 INJECTION, SOLUTION INTRAMUSCULAR; INTRAVENOUS at 08:28

## 2019-11-15 RX ADMIN — FENTANYL CITRATE 25 MCG: 50 INJECTION, SOLUTION INTRAMUSCULAR; INTRAVENOUS at 09:00

## 2019-11-15 RX ADMIN — CEPHALEXIN 500 MG: 250 CAPSULE ORAL at 07:33

## 2019-11-15 RX ADMIN — FENTANYL CITRATE 25 MCG: 50 INJECTION, SOLUTION INTRAMUSCULAR; INTRAVENOUS at 08:48

## 2019-11-15 RX ADMIN — PROPOFOL 75 MG: 10 INJECTION, EMULSION INTRAVENOUS at 08:42

## 2019-11-15 RX ADMIN — SODIUM CHLORIDE: 9 INJECTION, SOLUTION INTRAVENOUS at 07:33

## 2019-11-15 RX ADMIN — PROPOFOL 80 MCG/KG/MIN: 10 INJECTION, EMULSION INTRAVENOUS at 08:32

## 2019-11-15 ASSESSMENT — PAIN - FUNCTIONAL ASSESSMENT: PAIN_FUNCTIONAL_ASSESSMENT: 0-10

## 2019-12-03 DIAGNOSIS — M51.36 DDD (DEGENERATIVE DISC DISEASE), LUMBAR: ICD-10-CM

## 2019-12-03 DIAGNOSIS — E11.22 TYPE 2 DIABETES MELLITUS WITH STAGE 3 CHRONIC KIDNEY DISEASE, WITHOUT LONG-TERM CURRENT USE OF INSULIN (HCC): ICD-10-CM

## 2019-12-03 DIAGNOSIS — M48.062 LUMBAR STENOSIS WITH NEUROGENIC CLAUDICATION: ICD-10-CM

## 2019-12-03 DIAGNOSIS — E11.40 CONTROLLED TYPE 2 DIABETES MELLITUS WITH NEUROPATHY (HCC): ICD-10-CM

## 2019-12-03 DIAGNOSIS — N18.30 TYPE 2 DIABETES MELLITUS WITH STAGE 3 CHRONIC KIDNEY DISEASE, WITHOUT LONG-TERM CURRENT USE OF INSULIN (HCC): ICD-10-CM

## 2019-12-03 RX ORDER — DULOXETIN HYDROCHLORIDE 30 MG/1
CAPSULE, DELAYED RELEASE ORAL
Qty: 180 CAPSULE | Refills: 0 | Status: ON HOLD | OUTPATIENT
Start: 2019-12-03 | End: 2020-01-05 | Stop reason: ALTCHOICE

## 2019-12-06 ENCOUNTER — OFFICE VISIT (OUTPATIENT)
Dept: INTERNAL MEDICINE CLINIC | Age: 84
End: 2019-12-06
Payer: MEDICARE

## 2019-12-06 VITALS
SYSTOLIC BLOOD PRESSURE: 140 MMHG | BODY MASS INDEX: 28.63 KG/M2 | HEIGHT: 70 IN | WEIGHT: 200 LBS | DIASTOLIC BLOOD PRESSURE: 74 MMHG | HEART RATE: 60 BPM

## 2019-12-06 DIAGNOSIS — Z79.01 ANTICOAGULATED ON COUMADIN: ICD-10-CM

## 2019-12-06 DIAGNOSIS — I26.99 BILATERAL PULMONARY EMBOLISM (HCC): Primary | ICD-10-CM

## 2019-12-06 LAB
INTERNATIONAL NORMALIZATION RATIO, POC: 1.6
PROTHROMBIN TIME, POC: NORMAL

## 2019-12-06 PROCEDURE — 99212 OFFICE O/P EST SF 10 MIN: CPT | Performed by: NURSE PRACTITIONER

## 2019-12-06 PROCEDURE — G8417 CALC BMI ABV UP PARAM F/U: HCPCS | Performed by: NURSE PRACTITIONER

## 2019-12-06 PROCEDURE — 1036F TOBACCO NON-USER: CPT | Performed by: NURSE PRACTITIONER

## 2019-12-06 PROCEDURE — G8427 DOCREV CUR MEDS BY ELIG CLIN: HCPCS | Performed by: NURSE PRACTITIONER

## 2019-12-06 PROCEDURE — 85610 PROTHROMBIN TIME: CPT | Performed by: NURSE PRACTITIONER

## 2019-12-06 PROCEDURE — 1123F ACP DISCUSS/DSCN MKR DOCD: CPT | Performed by: NURSE PRACTITIONER

## 2019-12-06 PROCEDURE — 4040F PNEUMOC VAC/ADMIN/RCVD: CPT | Performed by: NURSE PRACTITIONER

## 2019-12-06 PROCEDURE — G8482 FLU IMMUNIZE ORDER/ADMIN: HCPCS | Performed by: NURSE PRACTITIONER

## 2019-12-09 ENCOUNTER — TELEPHONE (OUTPATIENT)
Dept: CARDIOLOGY CLINIC | Age: 84
End: 2019-12-09

## 2019-12-09 ENCOUNTER — OFFICE VISIT (OUTPATIENT)
Dept: ORTHOPEDIC SURGERY | Age: 84
End: 2019-12-09
Payer: MEDICARE

## 2019-12-09 ENCOUNTER — TELEPHONE (OUTPATIENT)
Dept: ORTHOPEDIC SURGERY | Age: 84
End: 2019-12-09

## 2019-12-09 ENCOUNTER — TELEPHONE (OUTPATIENT)
Dept: INTERNAL MEDICINE CLINIC | Age: 84
End: 2019-12-09

## 2019-12-09 VITALS
HEART RATE: 61 BPM | WEIGHT: 199.96 LBS | SYSTOLIC BLOOD PRESSURE: 171 MMHG | HEIGHT: 70 IN | DIASTOLIC BLOOD PRESSURE: 81 MMHG | BODY MASS INDEX: 28.63 KG/M2

## 2019-12-09 DIAGNOSIS — M47.816 SPONDYLOSIS OF LUMBAR REGION WITHOUT MYELOPATHY OR RADICULOPATHY: ICD-10-CM

## 2019-12-09 DIAGNOSIS — M48.062 LUMBAR STENOSIS WITH NEUROGENIC CLAUDICATION: Primary | ICD-10-CM

## 2019-12-09 DIAGNOSIS — M54.16 LUMBAR RADICULOPATHY: ICD-10-CM

## 2019-12-09 DIAGNOSIS — M51.36 DDD (DEGENERATIVE DISC DISEASE), LUMBAR: ICD-10-CM

## 2019-12-09 PROCEDURE — G8482 FLU IMMUNIZE ORDER/ADMIN: HCPCS | Performed by: PHYSICIAN ASSISTANT

## 2019-12-09 PROCEDURE — 4040F PNEUMOC VAC/ADMIN/RCVD: CPT | Performed by: PHYSICIAN ASSISTANT

## 2019-12-09 PROCEDURE — 1036F TOBACCO NON-USER: CPT | Performed by: PHYSICIAN ASSISTANT

## 2019-12-09 PROCEDURE — 99214 OFFICE O/P EST MOD 30 MIN: CPT | Performed by: PHYSICIAN ASSISTANT

## 2019-12-09 PROCEDURE — G8427 DOCREV CUR MEDS BY ELIG CLIN: HCPCS | Performed by: PHYSICIAN ASSISTANT

## 2019-12-09 PROCEDURE — G8417 CALC BMI ABV UP PARAM F/U: HCPCS | Performed by: PHYSICIAN ASSISTANT

## 2019-12-09 PROCEDURE — 1123F ACP DISCUSS/DSCN MKR DOCD: CPT | Performed by: PHYSICIAN ASSISTANT

## 2019-12-09 ASSESSMENT — ENCOUNTER SYMPTOMS
GASTROINTESTINAL NEGATIVE: 1
BLOOD IN STOOL: 0
RESPIRATORY NEGATIVE: 1

## 2019-12-17 ENCOUNTER — TELEPHONE (OUTPATIENT)
Dept: ORTHOPEDIC SURGERY | Age: 84
End: 2019-12-17

## 2019-12-20 ENCOUNTER — HOSPITAL ENCOUNTER (OUTPATIENT)
Age: 84
Setting detail: OUTPATIENT SURGERY
Discharge: HOME | End: 2019-12-20
Attending: PHYSICAL MEDICINE & REHABILITATION | Admitting: PHYSICAL MEDICINE & REHABILITATION
Payer: MEDICARE

## 2019-12-20 ENCOUNTER — APPOINTMENT (OUTPATIENT)
Dept: GENERAL RADIOLOGY | Age: 84
End: 2019-12-20
Attending: PHYSICAL MEDICINE & REHABILITATION
Payer: MEDICARE

## 2019-12-20 VITALS
TEMPERATURE: 98 F | HEIGHT: 70 IN | SYSTOLIC BLOOD PRESSURE: 165 MMHG | BODY MASS INDEX: 28.63 KG/M2 | HEART RATE: 71 BPM | DIASTOLIC BLOOD PRESSURE: 82 MMHG | WEIGHT: 200 LBS | RESPIRATION RATE: 16 BRPM | OXYGEN SATURATION: 99 %

## 2019-12-20 LAB
APTT: 31.3 SEC (ref 24.2–36.2)
GLUCOSE BLD-MCNC: 179 MG/DL (ref 70–99)
INR BLD: 1.15 (ref 0.86–1.14)
PERFORMED ON: ABNORMAL
PROTHROMBIN TIME: 13.4 SEC (ref 10–13.2)

## 2019-12-20 PROCEDURE — 99152 MOD SED SAME PHYS/QHP 5/>YRS: CPT | Performed by: PHYSICAL MEDICINE & REHABILITATION

## 2019-12-20 PROCEDURE — 85730 THROMBOPLASTIN TIME PARTIAL: CPT

## 2019-12-20 PROCEDURE — 36415 COLL VENOUS BLD VENIPUNCTURE: CPT

## 2019-12-20 PROCEDURE — 77003 FLUOROGUIDE FOR SPINE INJECT: CPT

## 2019-12-20 PROCEDURE — 3610000054 HC PAIN LEVEL 3 BASE (NON-OR): Performed by: PHYSICAL MEDICINE & REHABILITATION

## 2019-12-20 PROCEDURE — 2500000003 HC RX 250 WO HCPCS: Performed by: PHYSICAL MEDICINE & REHABILITATION

## 2019-12-20 PROCEDURE — 85610 PROTHROMBIN TIME: CPT

## 2019-12-20 PROCEDURE — 6360000002 HC RX W HCPCS: Performed by: PHYSICAL MEDICINE & REHABILITATION

## 2019-12-20 PROCEDURE — 2709999900 HC NON-CHARGEABLE SUPPLY: Performed by: PHYSICAL MEDICINE & REHABILITATION

## 2019-12-20 RX ORDER — BUPIVACAINE HYDROCHLORIDE 5 MG/ML
INJECTION, SOLUTION EPIDURAL; INTRACAUDAL
Status: COMPLETED | OUTPATIENT
Start: 2019-12-20 | End: 2019-12-20

## 2019-12-20 RX ORDER — LIDOCAINE HYDROCHLORIDE 10 MG/ML
INJECTION, SOLUTION INFILTRATION; PERINEURAL
Status: COMPLETED | OUTPATIENT
Start: 2019-12-20 | End: 2019-12-20

## 2019-12-20 RX ORDER — BETAMETHASONE SODIUM PHOSPHATE AND BETAMETHASONE ACETATE 3; 3 MG/ML; MG/ML
INJECTION, SUSPENSION INTRA-ARTICULAR; INTRALESIONAL; INTRAMUSCULAR; SOFT TISSUE
Status: COMPLETED | OUTPATIENT
Start: 2019-12-20 | End: 2019-12-20

## 2019-12-20 RX ORDER — MIDAZOLAM HYDROCHLORIDE 1 MG/ML
INJECTION INTRAMUSCULAR; INTRAVENOUS
Status: COMPLETED | OUTPATIENT
Start: 2019-12-20 | End: 2019-12-20

## 2019-12-20 ASSESSMENT — PAIN - FUNCTIONAL ASSESSMENT: PAIN_FUNCTIONAL_ASSESSMENT: 0-10

## 2019-12-20 ASSESSMENT — PAIN SCALES - GENERAL: PAINLEVEL_OUTOF10: 0

## 2019-12-20 ASSESSMENT — PAIN DESCRIPTION - DESCRIPTORS: DESCRIPTORS: ACHING

## 2020-01-02 ENCOUNTER — OFFICE VISIT (OUTPATIENT)
Dept: INTERNAL MEDICINE CLINIC | Age: 85
End: 2020-01-02
Payer: MEDICARE

## 2020-01-02 VITALS
SYSTOLIC BLOOD PRESSURE: 136 MMHG | DIASTOLIC BLOOD PRESSURE: 88 MMHG | OXYGEN SATURATION: 96 % | HEART RATE: 86 BPM | BODY MASS INDEX: 28.2 KG/M2 | WEIGHT: 197 LBS | TEMPERATURE: 98.1 F | HEIGHT: 70 IN

## 2020-01-02 LAB
INTERNATIONAL NORMALIZATION RATIO, POC: 1.9
PROTHROMBIN TIME, POC: NORMAL

## 2020-01-02 PROCEDURE — G8417 CALC BMI ABV UP PARAM F/U: HCPCS | Performed by: NURSE PRACTITIONER

## 2020-01-02 PROCEDURE — 85610 PROTHROMBIN TIME: CPT | Performed by: NURSE PRACTITIONER

## 2020-01-02 PROCEDURE — 1123F ACP DISCUSS/DSCN MKR DOCD: CPT | Performed by: NURSE PRACTITIONER

## 2020-01-02 PROCEDURE — G8482 FLU IMMUNIZE ORDER/ADMIN: HCPCS | Performed by: NURSE PRACTITIONER

## 2020-01-02 PROCEDURE — 99213 OFFICE O/P EST LOW 20 MIN: CPT | Performed by: NURSE PRACTITIONER

## 2020-01-02 PROCEDURE — G8427 DOCREV CUR MEDS BY ELIG CLIN: HCPCS | Performed by: NURSE PRACTITIONER

## 2020-01-02 PROCEDURE — 4040F PNEUMOC VAC/ADMIN/RCVD: CPT | Performed by: NURSE PRACTITIONER

## 2020-01-02 PROCEDURE — 1036F TOBACCO NON-USER: CPT | Performed by: NURSE PRACTITIONER

## 2020-01-04 ENCOUNTER — HOSPITAL ENCOUNTER (INPATIENT)
Age: 85
LOS: 2 days | Discharge: HOME | DRG: 662 | End: 2020-01-07
Attending: EMERGENCY MEDICINE | Admitting: UROLOGY
Payer: MEDICARE

## 2020-01-04 ENCOUNTER — APPOINTMENT (OUTPATIENT)
Dept: GENERAL RADIOLOGY | Age: 85
DRG: 662 | End: 2020-01-04
Payer: MEDICARE

## 2020-01-04 ENCOUNTER — HOSPITAL ENCOUNTER (OUTPATIENT)
Age: 85
Discharge: HOME | DRG: 662 | End: 2020-01-04
Payer: MEDICARE

## 2020-01-04 LAB
ANION GAP SERPL CALCULATED.3IONS-SCNC: 13 MMOL/L (ref 3–16)
BASOPHILS ABSOLUTE: 0.1 K/UL (ref 0–0.2)
BASOPHILS RELATIVE PERCENT: 0.7 %
BUN BLDV-MCNC: 29 MG/DL (ref 7–20)
CALCIUM SERPL-MCNC: 8.5 MG/DL (ref 8.3–10.6)
CHLORIDE BLD-SCNC: 96 MMOL/L (ref 99–110)
CHOLESTEROL, TOTAL: 151 MG/DL (ref 0–199)
CO2: 24 MMOL/L (ref 21–32)
CREAT SERPL-MCNC: 1.9 MG/DL (ref 0.8–1.3)
EOSINOPHILS ABSOLUTE: 0.1 K/UL (ref 0–0.6)
EOSINOPHILS RELATIVE PERCENT: 1.5 %
GFR AFRICAN AMERICAN: 41
GFR NON-AFRICAN AMERICAN: 34
GLUCOSE BLD-MCNC: 203 MG/DL (ref 70–99)
HCT VFR BLD CALC: 38.6 % (ref 40.5–52.5)
HDLC SERPL-MCNC: 37 MG/DL (ref 40–60)
HEMOGLOBIN: 13.2 G/DL (ref 13.5–17.5)
INR BLD: 2.16 (ref 0.86–1.14)
LACTIC ACID, SEPSIS: 1.5 MMOL/L (ref 0.4–1.9)
LDL CHOLESTEROL CALCULATED: 89 MG/DL
LYMPHOCYTES ABSOLUTE: 1.1 K/UL (ref 1–5.1)
LYMPHOCYTES RELATIVE PERCENT: 14.3 %
MCH RBC QN AUTO: 30.1 PG (ref 26–34)
MCHC RBC AUTO-ENTMCNC: 34.2 G/DL (ref 31–36)
MCV RBC AUTO: 88.1 FL (ref 80–100)
MONOCYTES ABSOLUTE: 1.1 K/UL (ref 0–1.3)
MONOCYTES RELATIVE PERCENT: 13.5 %
NEUTROPHILS ABSOLUTE: 5.5 K/UL (ref 1.7–7.7)
NEUTROPHILS RELATIVE PERCENT: 70 %
PDW BLD-RTO: 15.9 % (ref 12.4–15.4)
PLATELET # BLD: 148 K/UL (ref 135–450)
PMV BLD AUTO: 8.7 FL (ref 5–10.5)
POTASSIUM SERPL-SCNC: 4 MMOL/L (ref 3.5–5.1)
PROTHROMBIN TIME: 25.3 SEC (ref 10–13.2)
RBC # BLD: 4.38 M/UL (ref 4.2–5.9)
SODIUM BLD-SCNC: 133 MMOL/L (ref 136–145)
TRIGL SERPL-MCNC: 125 MG/DL (ref 0–150)
TSH SERPL DL<=0.05 MIU/L-ACNC: 1.66 UIU/ML (ref 0.27–4.2)
VITAMIN D 25-HYDROXY: 20.7 NG/ML
VLDLC SERPL CALC-MCNC: 25 MG/DL
WBC # BLD: 7.8 K/UL (ref 4–11)

## 2020-01-04 PROCEDURE — 84681 ASSAY OF C-PEPTIDE: CPT

## 2020-01-04 PROCEDURE — 82306 VITAMIN D 25 HYDROXY: CPT

## 2020-01-04 PROCEDURE — 83036 HEMOGLOBIN GLYCOSYLATED A1C: CPT

## 2020-01-04 PROCEDURE — 71046 X-RAY EXAM CHEST 2 VIEWS: CPT

## 2020-01-04 PROCEDURE — 36415 COLL VENOUS BLD VENIPUNCTURE: CPT

## 2020-01-04 PROCEDURE — 85025 COMPLETE CBC W/AUTO DIFF WBC: CPT

## 2020-01-04 PROCEDURE — 83605 ASSAY OF LACTIC ACID: CPT

## 2020-01-04 PROCEDURE — 85610 PROTHROMBIN TIME: CPT

## 2020-01-04 PROCEDURE — 99285 EMERGENCY DEPT VISIT HI MDM: CPT

## 2020-01-04 PROCEDURE — 84443 ASSAY THYROID STIM HORMONE: CPT

## 2020-01-04 PROCEDURE — 87040 BLOOD CULTURE FOR BACTERIA: CPT

## 2020-01-04 PROCEDURE — 80061 LIPID PANEL: CPT

## 2020-01-04 PROCEDURE — 80048 BASIC METABOLIC PNL TOTAL CA: CPT

## 2020-01-04 PROCEDURE — 93005 ELECTROCARDIOGRAM TRACING: CPT | Performed by: EMERGENCY MEDICINE

## 2020-01-04 RX ORDER — TRAMADOL HYDROCHLORIDE 50 MG/1
TABLET ORAL
COMMUNITY
Start: 2019-11-18 | End: 2020-04-13 | Stop reason: SDUPTHER

## 2020-01-05 ENCOUNTER — ANESTHESIA (OUTPATIENT)
Dept: OPERATING ROOM | Age: 85
DRG: 662 | End: 2020-01-05
Payer: MEDICARE

## 2020-01-05 ENCOUNTER — APPOINTMENT (OUTPATIENT)
Dept: CT IMAGING | Age: 85
DRG: 662 | End: 2020-01-05
Payer: MEDICARE

## 2020-01-05 ENCOUNTER — ANESTHESIA EVENT (OUTPATIENT)
Dept: OPERATING ROOM | Age: 85
DRG: 662 | End: 2020-01-05
Payer: MEDICARE

## 2020-01-05 VITALS
RESPIRATION RATE: 16 BRPM | DIASTOLIC BLOOD PRESSURE: 55 MMHG | OXYGEN SATURATION: 100 % | SYSTOLIC BLOOD PRESSURE: 100 MMHG

## 2020-01-05 PROBLEM — N28.9 RENAL INSUFFICIENCY: Status: ACTIVE | Noted: 2020-01-05

## 2020-01-05 PROBLEM — R31.0 GROSS HEMATURIA: Status: ACTIVE | Noted: 2020-01-05

## 2020-01-05 PROBLEM — N30.01 ACUTE CYSTITIS WITH HEMATURIA: Status: ACTIVE | Noted: 2020-01-05

## 2020-01-05 LAB
BACTERIA: ABNORMAL /HPF
BILIRUBIN URINE: ABNORMAL
BLOOD, URINE: ABNORMAL
CLARITY: ABNORMAL
COLOR: ABNORMAL
EKG ATRIAL RATE: 96 BPM
EKG DIAGNOSIS: NORMAL
EKG P AXIS: -3 DEGREES
EKG P-R INTERVAL: 156 MS
EKG Q-T INTERVAL: 332 MS
EKG QRS DURATION: 82 MS
EKG QTC CALCULATION (BAZETT): 419 MS
EKG R AXIS: 6 DEGREES
EKG T AXIS: 3 DEGREES
EKG VENTRICULAR RATE: 96 BPM
EPITHELIAL CELLS, UA: ABNORMAL /HPF
ESTIMATED AVERAGE GLUCOSE: 211.6 MG/DL
GLUCOSE BLD-MCNC: 123 MG/DL (ref 70–99)
GLUCOSE BLD-MCNC: 169 MG/DL (ref 70–99)
GLUCOSE BLD-MCNC: 179 MG/DL (ref 70–99)
GLUCOSE URINE: NEGATIVE MG/DL
HBA1C MFR BLD: 9 %
KETONES, URINE: NEGATIVE MG/DL
LEUKOCYTE ESTERASE, URINE: ABNORMAL
MICROSCOPIC EXAMINATION: YES
NITRITE, URINE: NEGATIVE
PERFORMED ON: ABNORMAL
PH UA: 6 (ref 5–8)
PROTEIN UA: 100 MG/DL
RBC UA: >100 /HPF (ref 0–2)
SPECIFIC GRAVITY UA: 1.02 (ref 1–1.03)
URINE REFLEX TO CULTURE: YES
URINE TYPE: ABNORMAL
UROBILINOGEN, URINE: 0.2 E.U./DL
WBC UA: ABNORMAL /HPF (ref 0–5)
YEAST: PRESENT /HPF

## 2020-01-05 PROCEDURE — 2580000003 HC RX 258: Performed by: ANESTHESIOLOGY

## 2020-01-05 PROCEDURE — 6370000000 HC RX 637 (ALT 250 FOR IP): Performed by: INTERNAL MEDICINE

## 2020-01-05 PROCEDURE — 96374 THER/PROPH/DIAG INJ IV PUSH: CPT

## 2020-01-05 PROCEDURE — 6360000002 HC RX W HCPCS: Performed by: ANESTHESIOLOGY

## 2020-01-05 PROCEDURE — 6370000000 HC RX 637 (ALT 250 FOR IP): Performed by: UROLOGY

## 2020-01-05 PROCEDURE — 74176 CT ABD & PELVIS W/O CONTRAST: CPT

## 2020-01-05 PROCEDURE — 0W3R8ZZ CONTROL BLEEDING IN GENITOURINARY TRACT, VIA NATURAL OR ARTIFICIAL OPENING ENDOSCOPIC: ICD-10-PCS | Performed by: UROLOGY

## 2020-01-05 PROCEDURE — 83036 HEMOGLOBIN GLYCOSYLATED A1C: CPT

## 2020-01-05 PROCEDURE — 6360000002 HC RX W HCPCS: Performed by: EMERGENCY MEDICINE

## 2020-01-05 PROCEDURE — 7100000000 HC PACU RECOVERY - FIRST 15 MIN: Performed by: UROLOGY

## 2020-01-05 PROCEDURE — 87086 URINE CULTURE/COLONY COUNT: CPT

## 2020-01-05 PROCEDURE — 1200000000 HC SEMI PRIVATE

## 2020-01-05 PROCEDURE — 3700000001 HC ADD 15 MINUTES (ANESTHESIA): Performed by: UROLOGY

## 2020-01-05 PROCEDURE — 3600000004 HC SURGERY LEVEL 4 BASE: Performed by: UROLOGY

## 2020-01-05 PROCEDURE — 0TCB8ZZ EXTIRPATION OF MATTER FROM BLADDER, VIA NATURAL OR ARTIFICIAL OPENING ENDOSCOPIC: ICD-10-PCS | Performed by: UROLOGY

## 2020-01-05 PROCEDURE — 36415 COLL VENOUS BLD VENIPUNCTURE: CPT

## 2020-01-05 PROCEDURE — 2709999900 HC NON-CHARGEABLE SUPPLY: Performed by: UROLOGY

## 2020-01-05 PROCEDURE — 2580000003 HC RX 258: Performed by: INTERNAL MEDICINE

## 2020-01-05 PROCEDURE — 3700000000 HC ANESTHESIA ATTENDED CARE: Performed by: UROLOGY

## 2020-01-05 PROCEDURE — 2500000003 HC RX 250 WO HCPCS: Performed by: ANESTHESIOLOGY

## 2020-01-05 PROCEDURE — 81001 URINALYSIS AUTO W/SCOPE: CPT

## 2020-01-05 PROCEDURE — 7100000001 HC PACU RECOVERY - ADDTL 15 MIN: Performed by: UROLOGY

## 2020-01-05 PROCEDURE — 2580000003 HC RX 258: Performed by: UROLOGY

## 2020-01-05 PROCEDURE — 93010 ELECTROCARDIOGRAM REPORT: CPT | Performed by: INTERNAL MEDICINE

## 2020-01-05 PROCEDURE — 3600000014 HC SURGERY LEVEL 4 ADDTL 15MIN: Performed by: UROLOGY

## 2020-01-05 RX ORDER — INSULIN LISPRO 100 [IU]/ML
0-6 INJECTION, SOLUTION INTRAVENOUS; SUBCUTANEOUS NIGHTLY
Status: DISCONTINUED | OUTPATIENT
Start: 2020-01-05 | End: 2020-01-07 | Stop reason: HOSPADM

## 2020-01-05 RX ORDER — ONDANSETRON 2 MG/ML
INJECTION INTRAMUSCULAR; INTRAVENOUS PRN
Status: DISCONTINUED | OUTPATIENT
Start: 2020-01-05 | End: 2020-01-05 | Stop reason: SDUPTHER

## 2020-01-05 RX ORDER — DEXTROSE MONOHYDRATE 25 G/50ML
12.5 INJECTION, SOLUTION INTRAVENOUS PRN
Status: DISCONTINUED | OUTPATIENT
Start: 2020-01-05 | End: 2020-01-07 | Stop reason: HOSPADM

## 2020-01-05 RX ORDER — TAMSULOSIN HYDROCHLORIDE 0.4 MG/1
0.4 CAPSULE ORAL 2 TIMES DAILY
Status: DISCONTINUED | OUTPATIENT
Start: 2020-01-05 | End: 2020-01-07 | Stop reason: HOSPADM

## 2020-01-05 RX ORDER — DEXTROSE MONOHYDRATE 50 MG/ML
100 INJECTION, SOLUTION INTRAVENOUS PRN
Status: DISCONTINUED | OUTPATIENT
Start: 2020-01-05 | End: 2020-01-05 | Stop reason: SDUPTHER

## 2020-01-05 RX ORDER — SODIUM CHLORIDE 0.9 % (FLUSH) 0.9 %
10 SYRINGE (ML) INJECTION PRN
Status: DISCONTINUED | OUTPATIENT
Start: 2020-01-05 | End: 2020-01-07 | Stop reason: HOSPADM

## 2020-01-05 RX ORDER — INSULIN LISPRO 100 [IU]/ML
0-12 INJECTION, SOLUTION INTRAVENOUS; SUBCUTANEOUS
Status: DISCONTINUED | OUTPATIENT
Start: 2020-01-05 | End: 2020-01-07 | Stop reason: HOSPADM

## 2020-01-05 RX ORDER — SODIUM CHLORIDE 9 MG/ML
INJECTION, SOLUTION INTRAVENOUS CONTINUOUS
Status: DISCONTINUED | OUTPATIENT
Start: 2020-01-05 | End: 2020-01-06

## 2020-01-05 RX ORDER — PROMETHAZINE HYDROCHLORIDE 25 MG/ML
6.25 INJECTION, SOLUTION INTRAMUSCULAR; INTRAVENOUS EVERY 30 MIN PRN
Status: DISCONTINUED | OUTPATIENT
Start: 2020-01-05 | End: 2020-01-05 | Stop reason: HOSPADM

## 2020-01-05 RX ORDER — HYDROMORPHONE HCL 110MG/55ML
0.25 PATIENT CONTROLLED ANALGESIA SYRINGE INTRAVENOUS EVERY 5 MIN PRN
Status: DISCONTINUED | OUTPATIENT
Start: 2020-01-05 | End: 2020-01-05 | Stop reason: HOSPADM

## 2020-01-05 RX ORDER — DIPHENHYDRAMINE HYDROCHLORIDE 50 MG/ML
12.5 INJECTION INTRAMUSCULAR; INTRAVENOUS
Status: DISCONTINUED | OUTPATIENT
Start: 2020-01-05 | End: 2020-01-05 | Stop reason: HOSPADM

## 2020-01-05 RX ORDER — LIDOCAINE HYDROCHLORIDE 20 MG/ML
JELLY TOPICAL
Status: COMPLETED | OUTPATIENT
Start: 2020-01-05 | End: 2020-01-05

## 2020-01-05 RX ORDER — INSULIN LISPRO 100 [IU]/ML
0.08 INJECTION, SOLUTION INTRAVENOUS; SUBCUTANEOUS
Status: DISCONTINUED | OUTPATIENT
Start: 2020-01-05 | End: 2020-01-07 | Stop reason: HOSPADM

## 2020-01-05 RX ORDER — DEXTROSE MONOHYDRATE 50 MG/ML
100 INJECTION, SOLUTION INTRAVENOUS PRN
Status: DISCONTINUED | OUTPATIENT
Start: 2020-01-05 | End: 2020-01-07 | Stop reason: HOSPADM

## 2020-01-05 RX ORDER — MEPERIDINE HYDROCHLORIDE 25 MG/ML
12.5 INJECTION INTRAMUSCULAR; INTRAVENOUS; SUBCUTANEOUS EVERY 5 MIN PRN
Status: DISCONTINUED | OUTPATIENT
Start: 2020-01-05 | End: 2020-01-05 | Stop reason: HOSPADM

## 2020-01-05 RX ORDER — ONDANSETRON 2 MG/ML
4 INJECTION INTRAMUSCULAR; INTRAVENOUS EVERY 6 HOURS PRN
Status: DISCONTINUED | OUTPATIENT
Start: 2020-01-05 | End: 2020-01-07 | Stop reason: HOSPADM

## 2020-01-05 RX ORDER — INSULIN GLARGINE 100 [IU]/ML
0.25 INJECTION, SOLUTION SUBCUTANEOUS NIGHTLY
Status: DISCONTINUED | OUTPATIENT
Start: 2020-01-05 | End: 2020-01-05 | Stop reason: ALTCHOICE

## 2020-01-05 RX ORDER — SENNA PLUS 8.6 MG/1
1 TABLET ORAL DAILY PRN
Status: DISCONTINUED | OUTPATIENT
Start: 2020-01-05 | End: 2020-01-07 | Stop reason: HOSPADM

## 2020-01-05 RX ORDER — HYDROMORPHONE HCL 110MG/55ML
0.5 PATIENT CONTROLLED ANALGESIA SYRINGE INTRAVENOUS EVERY 5 MIN PRN
Status: DISCONTINUED | OUTPATIENT
Start: 2020-01-05 | End: 2020-01-05 | Stop reason: HOSPADM

## 2020-01-05 RX ORDER — TRAMADOL HYDROCHLORIDE 50 MG/1
50 TABLET ORAL EVERY 6 HOURS PRN
Status: DISCONTINUED | OUTPATIENT
Start: 2020-01-05 | End: 2020-01-07 | Stop reason: HOSPADM

## 2020-01-05 RX ORDER — DULOXETIN HYDROCHLORIDE 30 MG/1
30 CAPSULE, DELAYED RELEASE ORAL NIGHTLY
Status: DISCONTINUED | OUTPATIENT
Start: 2020-01-05 | End: 2020-01-07 | Stop reason: HOSPADM

## 2020-01-05 RX ORDER — GABAPENTIN 300 MG/1
300 CAPSULE ORAL 2 TIMES DAILY
Status: DISCONTINUED | OUTPATIENT
Start: 2020-01-05 | End: 2020-01-07 | Stop reason: HOSPADM

## 2020-01-05 RX ORDER — ACETAMINOPHEN 325 MG/1
650 TABLET ORAL EVERY 4 HOURS PRN
Status: DISCONTINUED | OUTPATIENT
Start: 2020-01-05 | End: 2020-01-07 | Stop reason: HOSPADM

## 2020-01-05 RX ORDER — SODIUM CHLORIDE, SODIUM LACTATE, POTASSIUM CHLORIDE, CALCIUM CHLORIDE 600; 310; 30; 20 MG/100ML; MG/100ML; MG/100ML; MG/100ML
INJECTION, SOLUTION INTRAVENOUS CONTINUOUS PRN
Status: DISCONTINUED | OUTPATIENT
Start: 2020-01-05 | End: 2020-01-05 | Stop reason: SDUPTHER

## 2020-01-05 RX ORDER — NICOTINE POLACRILEX 4 MG
15 LOZENGE BUCCAL PRN
Status: DISCONTINUED | OUTPATIENT
Start: 2020-01-05 | End: 2020-01-05 | Stop reason: SDUPTHER

## 2020-01-05 RX ORDER — FENTANYL CITRATE 50 UG/ML
25 INJECTION, SOLUTION INTRAMUSCULAR; INTRAVENOUS EVERY 5 MIN PRN
Status: DISCONTINUED | OUTPATIENT
Start: 2020-01-05 | End: 2020-01-05 | Stop reason: HOSPADM

## 2020-01-05 RX ORDER — MAGNESIUM HYDROXIDE 1200 MG/15ML
LIQUID ORAL
Status: COMPLETED | OUTPATIENT
Start: 2020-01-05 | End: 2020-01-05

## 2020-01-05 RX ORDER — DEXTROSE MONOHYDRATE 25 G/50ML
12.5 INJECTION, SOLUTION INTRAVENOUS PRN
Status: DISCONTINUED | OUTPATIENT
Start: 2020-01-05 | End: 2020-01-05 | Stop reason: SDUPTHER

## 2020-01-05 RX ORDER — MORPHINE SULFATE 2 MG/ML
2 INJECTION, SOLUTION INTRAMUSCULAR; INTRAVENOUS
Status: DISCONTINUED | OUTPATIENT
Start: 2020-01-05 | End: 2020-01-07 | Stop reason: HOSPADM

## 2020-01-05 RX ORDER — LIDOCAINE HYDROCHLORIDE 20 MG/ML
JELLY TOPICAL
Status: DISCONTINUED
Start: 2020-01-05 | End: 2020-01-05

## 2020-01-05 RX ORDER — FENTANYL CITRATE 50 UG/ML
50 INJECTION, SOLUTION INTRAMUSCULAR; INTRAVENOUS EVERY 5 MIN PRN
Status: DISCONTINUED | OUTPATIENT
Start: 2020-01-05 | End: 2020-01-05 | Stop reason: HOSPADM

## 2020-01-05 RX ORDER — LISINOPRIL 10 MG/1
10 TABLET ORAL DAILY
Status: DISCONTINUED | OUTPATIENT
Start: 2020-01-05 | End: 2020-01-06

## 2020-01-05 RX ORDER — PROPOFOL 10 MG/ML
INJECTION, EMULSION INTRAVENOUS PRN
Status: DISCONTINUED | OUTPATIENT
Start: 2020-01-05 | End: 2020-01-05 | Stop reason: SDUPTHER

## 2020-01-05 RX ORDER — LIDOCAINE HYDROCHLORIDE 20 MG/ML
INJECTION, SOLUTION INFILTRATION; PERINEURAL PRN
Status: DISCONTINUED | OUTPATIENT
Start: 2020-01-05 | End: 2020-01-05 | Stop reason: SDUPTHER

## 2020-01-05 RX ORDER — LIDOCAINE HYDROCHLORIDE 20 MG/ML
JELLY TOPICAL
Status: DISPENSED
Start: 2020-01-05 | End: 2020-01-05

## 2020-01-05 RX ORDER — SODIUM CHLORIDE 0.9 % (FLUSH) 0.9 %
10 SYRINGE (ML) INJECTION EVERY 12 HOURS SCHEDULED
Status: DISCONTINUED | OUTPATIENT
Start: 2020-01-05 | End: 2020-01-07 | Stop reason: HOSPADM

## 2020-01-05 RX ORDER — FINASTERIDE 5 MG/1
5 TABLET, FILM COATED ORAL DAILY
Status: DISCONTINUED | OUTPATIENT
Start: 2020-01-05 | End: 2020-01-07 | Stop reason: HOSPADM

## 2020-01-05 RX ORDER — LORAZEPAM 2 MG/ML
0.5 INJECTION INTRAMUSCULAR ONCE
Status: COMPLETED | OUTPATIENT
Start: 2020-01-05 | End: 2020-01-05

## 2020-01-05 RX ORDER — NICOTINE POLACRILEX 4 MG
15 LOZENGE BUCCAL PRN
Status: DISCONTINUED | OUTPATIENT
Start: 2020-01-05 | End: 2020-01-07 | Stop reason: HOSPADM

## 2020-01-05 RX ADMIN — PROPOFOL 150 MG: 10 INJECTION, EMULSION INTRAVENOUS at 07:17

## 2020-01-05 RX ADMIN — ACETAMINOPHEN 650 MG: 325 TABLET, FILM COATED ORAL at 20:13

## 2020-01-05 RX ADMIN — FINASTERIDE 5 MG: 5 TABLET, FILM COATED ORAL at 17:04

## 2020-01-05 RX ADMIN — GABAPENTIN 300 MG: 300 CAPSULE ORAL at 21:03

## 2020-01-05 RX ADMIN — Medication 1 G: at 07:14

## 2020-01-05 RX ADMIN — LORAZEPAM 0.5 MG: 2 INJECTION INTRAMUSCULAR; INTRAVENOUS at 02:19

## 2020-01-05 RX ADMIN — INSULIN LISPRO 2 UNITS: 100 INJECTION, SOLUTION INTRAVENOUS; SUBCUTANEOUS at 17:05

## 2020-01-05 RX ADMIN — LIDOCAINE HYDROCHLORIDE 100 MG: 20 INJECTION, SOLUTION INFILTRATION; PERINEURAL at 07:17

## 2020-01-05 RX ADMIN — ONDANSETRON 4 MG: 2 INJECTION INTRAMUSCULAR; INTRAVENOUS at 07:17

## 2020-01-05 RX ADMIN — SODIUM CHLORIDE: 9 INJECTION, SOLUTION INTRAVENOUS at 14:30

## 2020-01-05 RX ADMIN — TRAMADOL HYDROCHLORIDE 50 MG: 50 TABLET, FILM COATED ORAL at 17:12

## 2020-01-05 RX ADMIN — INSULIN LISPRO 7 UNITS: 100 INJECTION, SOLUTION INTRAVENOUS; SUBCUTANEOUS at 17:06

## 2020-01-05 RX ADMIN — LISINOPRIL 10 MG: 10 TABLET ORAL at 17:04

## 2020-01-05 RX ADMIN — DULOXETINE HYDROCHLORIDE 30 MG: 30 CAPSULE, DELAYED RELEASE ORAL at 21:03

## 2020-01-05 RX ADMIN — SODIUM CHLORIDE, POTASSIUM CHLORIDE, SODIUM LACTATE AND CALCIUM CHLORIDE: 600; 310; 30; 20 INJECTION, SOLUTION INTRAVENOUS at 07:07

## 2020-01-05 RX ADMIN — INSULIN GLARGINE 23 UNITS: 100 INJECTION, SOLUTION SUBCUTANEOUS at 21:03

## 2020-01-05 RX ADMIN — TAMSULOSIN HYDROCHLORIDE 0.4 MG: 0.4 CAPSULE ORAL at 17:04

## 2020-01-05 RX ADMIN — TAMSULOSIN HYDROCHLORIDE 0.4 MG: 0.4 CAPSULE ORAL at 21:03

## 2020-01-05 ASSESSMENT — ENCOUNTER SYMPTOMS
SHORTNESS OF BREATH: 1
RESPIRATORY NEGATIVE: 1
GASTROINTESTINAL NEGATIVE: 1

## 2020-01-05 ASSESSMENT — PAIN DESCRIPTION - ORIENTATION: ORIENTATION: RIGHT

## 2020-01-05 ASSESSMENT — PAIN SCALES - GENERAL
PAINLEVEL_OUTOF10: 8
PAINLEVEL_OUTOF10: 7
PAINLEVEL_OUTOF10: 9
PAINLEVEL_OUTOF10: 0
PAINLEVEL_OUTOF10: 0

## 2020-01-05 ASSESSMENT — COPD QUESTIONNAIRES: CAT_SEVERITY: MODERATE

## 2020-01-05 ASSESSMENT — PAIN DESCRIPTION - PAIN TYPE: TYPE: CHRONIC PAIN

## 2020-01-05 ASSESSMENT — PAIN DESCRIPTION - DESCRIPTORS: DESCRIPTORS: PINS AND NEEDLES

## 2020-01-05 ASSESSMENT — PAIN DESCRIPTION - LOCATION: LOCATION: FOOT

## 2020-01-05 NOTE — ED PROVIDER NOTES
classified elsewhere     Pain, joint, knee     Palpitations     skin cancer     Rt ear, nose, neck, hands/ 2018 lung    SOB (shortness of breath)     Tennis elbow     Tinea pedis     foot         SURGICALHISTORY       Past Surgical History:   Procedure Laterality Date    CATARACT REMOVAL Bilateral 09/2014    CHOLECYSTECTOMY      CIRCUMCISION N/A 11/15/2019    DORSAL SLIT performed by Mindy Moseley MD at [facility] COLONOSCOPY  11/28/2011    Dr. Kat Acevedo - mild sigmoid diverticulosis    EYE SURGERY      HIP SURGERY Right 09/09/2013    Dr. Jenni Yousif - block for pain relief    JOINT REPLACEMENT Bilateral     knees    KNEE PROSTHESIS REMOVAL      LUMBAR NERVE BLOCK N/A 8/26/2019    L4/5 INTERLAMINAR EPIDURAL STEROID INJECTION WITH FLUOROSCOPY performed by Danelle Alvarado MD at [address] [place] [address] Left 05/09/2018    Dr. Stephan Jansen - CT guided    MOHS SURGERY Bilateral     NERVE SURGERY N/A 12/20/2019    L4L5 INTERLAMINAR EPIDURAL STEROID INJECTION WITH FLUOROSCOPY performed by Danelle Alvarado MD at [address] [place] [address]      multiple lumbar ESIs    PAROTIDECTOMY Right 01/26/2017    Dr. Jamarcus Hazel - superficial, w/excision of parotid tail & dissection/preservation of facial nerve    LA INJECT ANES/STEROID FORAMEN LUMBAR/SACRAL W IMG GUIDE ,1 LEVEL Right 11/21/2018    RIGHT L4, L5 LUMBAR TRANSFORAMINAL EPIDURAL STEROID INJECTION WITH FLUOROSCOPY performed by Danelle Alvarado MD at [place] [address] Left 08/14/2019    TOTAL KNEE ARTHROPLASTY      right x1 and left x2    TUNNELED VENOUS PORT PLACEMENT  06/22/2018    Left SCV infusaport placement    UPPER GASTROINTESTINAL ENDOSCOPY  03/19/2018    Dr. Enio Villegas - mild non-obstructive ring distal esophagus         CURRENT MEDICATIONS       Current Discharge Medication List      CONTINUE these medications which have NOT CHANGED    Details   gabapentin (NEURONTIN) 300 MG capsule Take 300 mg by mouth 3 times not apply route daily as needed. Qty: 1 kit, Refills: 0    Comments: Whichever his insurance will cover      latanoprost (XALATAN) 0.005 % ophthalmic solution Place 1 drop into both eyes nightly. traMADol (ULTRAM) 50 MG tablet       !! 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 - Potential duplicate medications found. Please discuss with provider. ALLERGIES     Celebrex [celecoxib]; Elavil [amitriptyline];  Naproxen; Percocet [oxycodone-acetaminophen]; and Lyrica [pregabalin]    FAMILY HISTORY       Family History   Problem Relation Age of Onset    Arthritis Father     Diabetes Father     Diabetes Brother           SOCIAL HISTORY       Social History     Socioeconomic History    Marital status:      Spouse name: Eller Sandhoff Number of children: None    Years of education: None    Highest education level: None   Occupational History    None   Social Needs    Financial resource strain: None    Food insecurity:     Worry: None     Inability: None    Transportation needs:     Medical: None     Non-medical: None   Tobacco Use    Smoking status: Former Smoker     Packs/day: 1.00     Years: 40.00     Pack years: 40.00     Last attempt to quit: 8/15/1970     Years since quittin.4    Smokeless tobacco: Never Used   Substance and Sexual Activity    Alcohol use: No    Drug use: No    Sexual activity: Yes     Partners: Female   Lifestyle    Physical activity:     Days per week: None     Minutes per session: None    Stress: None   Relationships    Social connections:     Talks on phone: None     Gets together: None     Attends here.  Rest of his diagnostic work-up was unremarkable. I gave verbal orders to the nurse for straight cath however once the nurse went to try to straight cath the patient there was some difficulty and she noticed that there was blood. She plan ahead and placed a Gold catheter in. There is leslie blood from the Gold. I recommended bladder irrigation. This was attempted manually. The patient is not tolerating the procedure well and there are large clots. Decision is made to put in a three-way Gold catheter. The patient was bladder scan and there was at least 400 cc in his bladder. Uncertain of the hematuria is from trauma versus underlying pathology which may have been causing her symptoms. The patient does not have recent urinalysis in our system that would indicate if he did have microscopic hematuria. The patient did not tolerate the placement of the first Gold catheter and therefore he is given a touch of Ativan 0.5 mg to help calm him down to that the Gold catheter may be changed. Three-way catheter was placed. They are still having difficulty irrigating. CT scan of the abdomen pelvis is ordered. There is concern for clot versus mass in the bladder. The patient is unable to urinate. Patient becomes very uncomfortable. He is requesting that the Gold catheter is removed. This is done. Consult was placed to urologist and also to the hospitalist on duty. Urology did come in to see this patient is taken to the operating room. The patient was given ceftriaxone for possible urinary tract infection. CRITICAL CARE TIME   None       CONSULTS:  IP CONSULT TO UROLOGY  IP CONSULT TO HOSPITALIST  IP CONSULT TO UROLOGY    PROCEDURES:  Unless otherwise noted above, none     Procedures    FINAL IMPRESSION      1.  Hematuria, unspecified type          DISPOSITION/PLAN   DISPOSITION Admitted 01/05/2020 05:26:06 AM      PATIENT REFERREDTO:  SHA Dugan - CNP  [facility] [address]            DISCHARGEMEDICATIONS:  Current Discharge Medication List             (Please note that portions of this note were completed with a voice recognition program.  Efforts were made to edit the dictations but occasionally words are mis-transcribed.)    Angelica Duron MD (electronically signed)  Attending Emergency Physician        Angelica Duron MD  01/05/20 1979       Angelica Duron MD  01/05/20 2820

## 2020-01-05 NOTE — BRIEF OP NOTE
Brief Postoperative Note  ______________________________________________________________    Patient: Shanta Benavidez  YOB: 1935  MRN: [id number]  Date of Procedure: 1/5/2020    Pre-Op Diagnosis: Clots    Post-Op Diagnosis: Same       Procedure(s):  CYSTOSCOPY, EVACUATION OF CLOTS    Anesthesia: General    Surgeon(s):  Evelia Daniel MD    Assistant: none    Estimated Blood Loss (mL): less than 50     Complications: None    Specimens:   * No specimens in log *    Implants:  * No implants in log *      Drains: * No LDAs found *    Findings: urethral false passage   bph  Clot in bladder    Evelia Daniel MD  Date: 1/5/2020  Time: 7:37 AM

## 2020-01-05 NOTE — ANESTHESIA PRE PROCEDURE
Department of Anesthesiology  Preprocedure Note       Name:  Vicki Hernadez   Age:  82 y.o.  :  1935                                          MRN:  [id number]         Date:  2020      Surgeon: * Surgery not found *    Procedure: CT ABDOMEN PELVIS WO IV CONTRAST    Medications prior to admission:   Prior to Admission medications    Medication Sig Start Date End Date Taking? Authorizing Provider   traMADol Berenda Cleveland) 50 MG tablet  19   Historical Provider, MD   DULoxetine (CYMBALTA) 30 MG extended release capsule TAKE ONE CAPSULE BY MOUTH TWICE A DAY 12/3/19   [place] Nasuti, APRN - CNP   gabapentin (NEURONTIN) 300 MG capsule Take 300 mg by mouth 3 times daily.     Historical Provider, MD Olman Shepard 100 UNIT/ML injection pen 20 units nightly  Patient taking differently: Inject 22 Units into the skin nightly 20 units nightly 19   Jill Arrington MD   blood glucose test strips (FREESTYLE LITE) strip Test blood sugar BID 19   Jill Arrington MD   warfarin (COUMADIN) 2.5 MG tablet 1.25 mg (2.5 mg x 0.5) every Sun, Tue, Thu; 2.5 mg (2.5 mg x 1) all other days 10/31/19   [place] Nasuti, APRN - CNP   famotidine (PEPCID) 20 MG tablet Take 1 tablet by mouth 2 times daily 10/1/19   [place] Nasuti, APRN - CNP   lisinopril (PRINIVIL;ZESTRIL) 10 MG tablet Take 1 tablet by mouth daily 19   [place] Nasuti, APRN - CNP   FREESTYLE LITE strip TEST ONCE DAILY AS DIRECTED 19   [place] Nasuti, APRN - CNP   Insulin Pen Needle (B-D UF III MINI PEN NEEDLES) 31G X 5 MM MISC 1 each by Does not apply route daily 19   Jill Arrington MD   diphenhydrAMINE-APAP, sleep, (TYLENOL PM EXTRA STRENGTH PO) Take by mouth as needed    Historical Provider, MD   furosemide (LASIX) 40 MG tablet Take 1 tablet by mouth daily 19   Eulalio Art MD   DULoxetine (CYMBALTA) 30 MG extended release capsule Take 30 mg by mouth nightly     Historical Provider, MD   naphazoline-glycerin (CLEAR EYES REDNESS RELIEF) 0.012-0.2 % SOLN ophthalmic solution Place 1 drop into both eyes 2 times daily  Patient taking differently: Place 1 drop into both eyes 2 times daily as needed  4/7/18   Kiana Cruz MD   tamsulosin (FLOMAX) 0.4 MG capsule Take 1 capsule by mouth daily  Patient taking differently: Take 0.4 mg by mouth 2 times daily  8/3/17   SHA Hernandez - CNP   glucose monitoring kit (FREESTYLE) monitoring kit 1 kit by Does not apply route daily as needed. 4/7/14   Estevan Valentine MD   latanoprost (XALATAN) 0.005 % ophthalmic solution Place 1 drop into both eyes nightly. 10/27/13   Historical Provider, MD       Current medications:    No current facility-administered medications for this visit. Current Outpatient Medications   Medication Sig Dispense Refill    traMADol (ULTRAM) 50 MG tablet       DULoxetine (CYMBALTA) 30 MG extended release capsule TAKE ONE CAPSULE BY MOUTH TWICE A  capsule 0    gabapentin (NEURONTIN) 300 MG capsule Take 300 mg by mouth 3 times daily.       BASAGLAR KWIKPEN 100 UNIT/ML injection pen 20 units nightly (Patient taking differently: Inject 22 Units into the skin nightly 20 units nightly) 5 pen 3    blood glucose test strips (FREESTYLE LITE) strip Test blood sugar  strip 3    warfarin (COUMADIN) 2.5 MG tablet 1.25 mg (2.5 mg x 0.5) every Sun, Tue, Thu; 2.5 mg (2.5 mg x 1) all other days 90 tablet 3    famotidine (PEPCID) 20 MG tablet Take 1 tablet by mouth 2 times daily 60 tablet 5    lisinopril (PRINIVIL;ZESTRIL) 10 MG tablet Take 1 tablet by mouth daily 90 tablet 3    FREESTYLE LITE strip TEST ONCE DAILY AS DIRECTED 50 strip 2    Insulin Pen Needle (B-D UF III MINI PEN NEEDLES) 31G X 5 MM MISC 1 each by Does not apply route daily 100 each 6    diphenhydrAMINE-APAP, sleep, (TYLENOL PM EXTRA STRENGTH PO) Take by mouth as needed      furosemide (LASIX) 40 MG tablet Take 1 tablet by mouth daily 180 tablet 1    DULoxetine (CYMBALTA) 30 MG extended release capsule Take 30 mg by mouth nightly       naphazoline-glycerin (CLEAR EYES REDNESS RELIEF) 0.012-0.2 % SOLN ophthalmic solution Place 1 drop into both eyes 2 times daily (Patient taking differently: Place 1 drop into both eyes 2 times daily as needed ) 1 Bottle 0    tamsulosin (FLOMAX) 0.4 MG capsule Take 1 capsule by mouth daily (Patient taking differently: Take 0.4 mg by mouth 2 times daily ) 90 capsule 1    glucose monitoring kit (FREESTYLE) monitoring kit 1 kit by Does not apply route daily as needed. 1 kit 0    latanoprost (XALATAN) 0.005 % ophthalmic solution Place 1 drop into both eyes nightly. Facility-Administered Medications Ordered in Other Visits   Medication Dose Route Frequency Provider Last Rate Last Dose    lidocaine (XYLOCAINE) 2 % uro-jet             cefTRIAXone (ROCEPHIN) 1 g in sterile water 10 mL IV syringe  1 g Intravenous Q24H Susana Conroy MD        lidocaine (XYLOCAINE) 2 % uro-jet                Allergies: Allergies   Allergen Reactions    Celebrex [Celecoxib] Other (See Comments)     hallucinations    Elavil [Amitriptyline]      Severe fatigue      Naproxen      Kidney damage    Percocet [Oxycodone-Acetaminophen] Other (See Comments)     confusion    Lyrica [Pregabalin] Palpitations     Fatigue       Problem List:    Patient Active Problem List   Diagnosis Code    Chronic rhinitis J31.0    Primary osteoarthritis involving multiple joints M15.0    Erectile dysfunction N52.9    Glaucoma H40.9    Essential hypertension I10    DDD (degenerative disc disease), lumbar M51.36    Lumbar stenosis with neurogenic claudication M48.062    Hearing loss of both ears H91.93    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 9) E66.3    Diverticulosis of large intestine without diverticulitis K57.30    Chronic obstructive pulmonary disease (HCC) J44.9    Benign prostatic hyperplasia with urinary hesitancy N40.1, R39.11    Gastroesophageal reflux disease without esophagitis K21.9    Non-small cell cancer of left lung ([facility]) C34.92    Type 2 diabetes mellitus with stage 3 chronic kidney disease, without long-term current use of insulin ([facility]) E11.22, N18.3    Pulmonary nodule R91.1    Pulmonary edema, acute ([facility]) J81.0    Hematuria R31.9    Chronic systolic congestive heart failure ([facility]) I50.22    Vitreous degeneration, bilateral H43.813    Uncontrolled type 2 diabetes mellitus ([facility]) E11.65    Secondary cataract H26.40    Primary open-angle glaucoma, bilateral, moderate stage H40.1132    Posterior vitreous detachment of both eyes H43.813    Peripapillary atrophy of both eyes H47.093    Nodular corneal degeneration, 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    Neoplastic malignant related fatigue R53.0    Nausea with vomiting R11.2    Hypomagnesemia E83.42    Entropion of left lower eyelid H02.005    Early stage nonexudative age-related macular degeneration of both eyes H35.3131    Diabetic peripheral neuropathy ([facility]) E11.42    Dermatochalasis of right upper eyelid H02.831    Bilateral posterior capsular opacification H26.493    Aortic valvular disease I35.9    Phimosis of penis N47.1    Acute cystitis with hematuria N30.01    Gross hematuria R31.0    Renal insufficiency N28.9       Past Medical History:        Diagnosis Date    Abdominal pain, epigastric     Arthritis     Benign prostatic hypertrophy without urinary obstruction     BPH     Cellulitis and abscess     Chronic rhinitis     Chronic systolic congestive heart failure ([facility]) 4/18/2019    COPD (chronic obstructive pulmonary disease) ([facility])     Diabetes mellitus (HCC)     Dysphagia     Erectile dysfunction     GERD (gastroesophageal reflux disease)     Hx of blood clots     Hyperglycemia     Hypertension     lumbar radiculopathy     Neuropathy     Nocturia     Osteoarthritis     Other disorders of kidney and ureter in diseases classified elsewhere     Pain, joint, knee     Palpitations     skin cancer     Rt ear, nose, neck, hands/ 2018 lung    SOB (shortness of breath)     Tennis elbow     Tinea pedis     foot       Past Surgical History:        Procedure Laterality Date    CATARACT REMOVAL Bilateral 09/2014    CHOLECYSTECTOMY      CIRCUMCISION N/A 11/15/2019    DORSAL SLIT performed by Darrell Keller MD at [address] [place] [address]  11/28/2011    Dr. Laurence Munoz - mild sigmoid diverticulosis    EYE SURGERY      HIP SURGERY Right 09/09/2013    Dr. Jamie Muñoz - block for pain relief    JOINT REPLACEMENT Bilateral     knees    KNEE PROSTHESIS REMOVAL      LUMBAR NERVE BLOCK N/A 8/26/2019    L4/5 INTERLAMINAR EPIDURAL STEROID INJECTION WITH FLUOROSCOPY performed by Josph Goltz, MD at [address] [place] [address] 05/09/2018    Dr. Alfonso Nino - CT guided    MOHS SURGERY Bilateral     NERVE SURGERY N/A 12/20/2019    L4L5 INTERLAMINAR EPIDURAL STEROID INJECTION WITH FLUOROSCOPY performed by Josph Goltz, MD at [address] [place] [address]      multiple lumbar ESIs    PAROTIDECTOMY Right 01/26/2017    Dr. Pete Christopher - superficial, w/excision of parotid tail & dissection/preservation of facial nerve    AK INJECT ANES/STEROID FORAMEN LUMBAR/SACRAL W IMG GUIDE ,1 LEVEL Right 11/21/2018    RIGHT L4, L5 LUMBAR TRANSFORAMINAL EPIDURAL STEROID INJECTION WITH FLUOROSCOPY performed by Josph Goltz, MD at [address] 08/14/2019    TOTAL KNEE ARTHROPLASTY      right x1 and left x2    TUNNELED VENOUS PORT PLACEMENT  06/22/2018    Left SCV infusaport placement    UPPER GASTROINTESTINAL ENDOSCOPY  03/19/2018    Dr. Itzel Davis - mild non-obstructive ring distal esophagus       Social History:    Social History     Tobacco Use    Smoking status: Former Smoker     Packs/day: 1.00     Years: 40.00     Pack

## 2020-01-05 NOTE — ED NOTES
Three way catheter placed using sterile technique, pt continues to ooze blood. Pt's wife very upset that staff is hurting pt RN, charge nurse, and MD reassured pt's wife that bleeding with large clots was unlikely related to initial straight catheterization and that the only reason we were doing these procedures were to help pt. Pt resting with eyes closed, respirations easy and unlabored. Pt denies needs, wife states to pt \"Don't go to sleep\" RN reassured pt's wife that the ativan we gave him may make him tired and that it is perfectly fine to rest as long as he is comfortable.       Tiffanie Ornelas RN  01/05/20 [id number]

## 2020-01-05 NOTE — PROGRESS NOTES
Patient resting comfortably. Bladder irrigating with no problem, urine light pink clear. VSS. Family to bedside, updated on POC. Phase 1 criteria met, we are holding for a bed.

## 2020-01-05 NOTE — ED NOTES
Pt cleaned up marjan area sore, tender to the touch. Pt became combative as RNs attempted to clean him, full linen change completed. Pt's wife requesting update, MD notified.       Christine Kate RN  01/05/20 [id number]

## 2020-01-05 NOTE — ANESTHESIA PRE PROCEDURE
0.012-0.2 % SOLN ophthalmic solution Place 1 drop into both eyes 2 times daily  Patient taking differently: Place 1 drop into both eyes 2 times daily as needed  4/7/18   Kiana Cruz MD   tamsulosin (FLOMAX) 0.4 MG capsule Take 1 capsule by mouth daily  Patient taking differently: Take 0.4 mg by mouth 2 times daily  8/3/17   SHA Hernandez - CNP   glucose monitoring kit (FREESTYLE) monitoring kit 1 kit by Does not apply route daily as needed. 4/7/14   Estevan Valentine MD   latanoprost (XALATAN) 0.005 % ophthalmic solution Place 1 drop into both eyes nightly. 10/27/13   Historical Provider, MD       Current medications:    No current facility-administered medications for this visit. No current outpatient medications on file. Facility-Administered Medications Ordered in Other Visits   Medication Dose Route Frequency Provider Last Rate Last Dose    lidocaine (XYLOCAINE) 2 % uro-jet             cefTRIAXone (ROCEPHIN) 1 g in sterile water 10 mL IV syringe  1 g Intravenous Q24H Robin Peter MD        lidocaine (XYLOCAINE) 2 % uro-jet                Allergies:     Allergies   Allergen Reactions    Celebrex [Celecoxib] Other (See Comments)     hallucinations    Elavil [Amitriptyline]      Severe fatigue      Naproxen      Kidney damage    Percocet [Oxycodone-Acetaminophen] Other (See Comments)     confusion    Lyrica [Pregabalin] Palpitations     Fatigue       Problem List:    Patient Active Problem List   Diagnosis Code    Chronic rhinitis J31.0    Primary osteoarthritis involving multiple joints M15.0    Erectile dysfunction N52.9    Glaucoma H40.9    Essential hypertension I10    DDD (degenerative disc disease), lumbar M51.36    Lumbar stenosis with neurogenic claudication M48.062    Hearing loss of both ears H91.93    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 9) E66.3    Diverticulosis of large intestine without diverticulitis K57.30    Chronic obstructive pulmonary disease (HCC) J44.9    Benign prostatic hyperplasia with urinary hesitancy N40.1, R39.11    Gastroesophageal reflux disease without esophagitis K21.9    Non-small cell cancer of left lung ([facility]) C34.92    Type 2 diabetes mellitus with stage 3 chronic kidney disease, without long-term current use of insulin ([facility]) E11.22, N18.3    Pulmonary nodule R91.1    Pulmonary edema, acute ([facility]) J81.0    Hematuria R31.9    Chronic systolic congestive heart failure ([facility]) I50.22    Vitreous degeneration, bilateral H43.813    Uncontrolled type 2 diabetes mellitus ([facility]) E11.65    Secondary cataract H26.40    Primary open-angle glaucoma, bilateral, moderate stage H40.1132    Posterior vitreous detachment of both eyes H43.813    Peripapillary atrophy of both eyes H47.093    Nodular corneal degeneration, 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    Neoplastic malignant related fatigue R53.0    Nausea with vomiting R11.2    Hypomagnesemia E83.42    Entropion of left lower eyelid H02.005    Early stage nonexudative age-related macular degeneration of both eyes H35.3131    Diabetic peripheral neuropathy ([facility]) E11.42    Dermatochalasis of right upper eyelid H02.831    Bilateral posterior capsular opacification H26.493    Aortic valvular disease I35.9    Phimosis of penis N47.1    Acute cystitis with hematuria N30.01    Gross hematuria R31.0    Renal insufficiency N28.9       Past Medical History:        Diagnosis Date    Abdominal pain, epigastric     Arthritis     Benign prostatic hypertrophy without urinary obstruction     BPH     Cellulitis and abscess     Chronic rhinitis     Chronic systolic congestive heart failure (HCC) 4/18/2019    COPD (chronic obstructive pulmonary disease) ([facility])     Diabetes mellitus (HCC)     Dysphagia     Erectile dysfunction     GERD esophagus       Social History:    Social History     Tobacco Use    Smoking status: Former Smoker     Packs/day: 1.00     Years: 40.00     Pack years: 40.00     Last attempt to quit: 8/15/1970     Years since quittin.4    Smokeless tobacco: Never Used   Substance Use Topics    Alcohol use: No                                Counseling given: Not Answered      Vital Signs (Current): There were no vitals filed for this visit. BP Readings from Last 3 Encounters:   20 124/69   20 133/67   20 136/88       NPO Status:                                                                                 BMI:   Wt Readings from Last 3 Encounters:   20 200 lb (90.7 kg)   20 197 lb (89.4 kg)   19 200 lb (90.7 kg)     There is no height or weight on file to calculate BMI.    CBC:   Lab Results   Component Value Date    WBC 7.8 2020    RBC 4.38 2020    HGB 13.2 2020    HCT 38.6 2020    MCV 88.1 2020    RDW 15.9 2020     2020       CMP:   Lab Results   Component Value Date     2020    K 4.0 2020    K 3.4 04/10/2019    CL 96 2020    CO2 24 2020    BUN 29 2020    CREATININE 1.9 2020    GFRAA 41 2020    GFRAA 51 2013    AGRATIO 1.6 2019    LABGLOM 34 2020    GLUCOSE 203 2020    PROT 6.7 2019    PROT 6.3 05/15/2012    CALCIUM 8.5 2020    BILITOT 0.6 2019    ALKPHOS 68 2019    AST 11 2019    ALT 10 2019       POC Tests: No results for input(s): POCGLU, POCNA, POCK, POCCL, POCBUN, POCHEMO, POCHCT in the last 72 hours.     Coags:   Lab Results   Component Value Date    PROTIME 25.3 2020    INR 2.16 2020    APTT 31.3 2019       HCG (If Applicable): No results found for: PREGTESTUR, PREGSERUM, HCG, HCGQUANT     ABGs: No results found for: PHART, PO2ART, RGV2CLU, IUE3VCL, BEART, S5GVZEOL Type & Screen (If Applicable):  No results found for: LABABO, [address]    Anesthesia Evaluation  Patient summary reviewed and Nursing notes reviewed  Airway: Mallampati: II     Neck ROM: full  Mouth opening: > = 3 FB Dental:          Pulmonary:   (+) COPD (lung mass): moderate,  shortness of breath:                             Cardiovascular:  Exercise tolerance: poor (<4 METS),   (+) hypertension: moderate, valvular problems/murmurs: AI and MR, CHF: systolic and diastolic, pulmonary hypertension: mild, hyperlipidemia      ECG reviewed  Rhythm: regular    Echocardiogram reviewed               ROS comment: EF 45%  RVSP 35     Neuro/Psych:   (+) neuromuscular disease:,              ROS comment: Neuropathy;baseline confusion at present GI/Hepatic/Renal:   (+) GERD: well controlled, renal disease: CRI,           Endo/Other:    (+) DiabetesType II DM, , blood dyscrasia: anemia and anticoagulation therapy, arthritis: OA., electrolyte abnormalities, . Abdominal:   (+) obese,         Vascular:   + PVD, aortic or cerebral, DVT, . Anesthesia Plan      general     ASA 3 - emergent       Induction: intravenous. MIPS: Postoperative opioids intended, Prophylactic antiemetics administered and Postoperative trial extubation. Anesthetic plan and risks discussed with patient and spouse. MEDICATIONS:  No current facility-administered medications for this visit. No current outpatient medications on file.      Facility-Administered Medications Ordered in Other Visits   Medication Dose Route Frequency Provider Last Rate Last Dose    lidocaine (XYLOCAINE) 2 % uro-jet             cefTRIAXone (ROCEPHIN) 1 g in sterile water 10 mL IV syringe  1 g Intravenous Q24H Kenyatta Reis MD        lidocaine (XYLOCAINE) 2 % uro-jet                 LABS:  Lab Results   Component Value Date    WBC 7.8 01/04/2020    HGB 13.2 (L) 01/04/2020    HCT 38.6 (L) 01/04/2020    MCV 88.1 01/04/2020     01/04/2020    GLUCOSE 203 (H) 01/04/2020    PROTIME 25.3 (H) 01/04/2020    INR 2.16 (H) 01/04/2020    APTT 31.3 12/20/2019       Lab Results   Component Value Date     (L) 01/04/2020    K 4.0 01/04/2020    CL 96 (L) 01/04/2020    CO2 24 01/04/2020    PHOS 4.1 11/13/2019    BUN 29 (H) 01/04/2020    CREATININE 1.9 (H) 01/04/2020       Problem List:  Patient Active Problem List   Diagnosis    Chronic rhinitis    Primary osteoarthritis involving multiple joints    Erectile dysfunction    Glaucoma    Essential hypertension    DDD (degenerative disc disease), lumbar    Lumbar stenosis with neurogenic claudication    Hearing loss of both ears    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 9)    Diverticulosis of large intestine without diverticulitis    Chronic obstructive pulmonary disease (HCC)    Benign prostatic hyperplasia with urinary hesitancy    Gastroesophageal reflux disease without esophagitis    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)    Type 2 diabetes mellitus with stage 3 chronic kidney disease, without long-term current use of insulin (HCC)    Pulmonary nodule    Pulmonary edema, acute (HCC)    Hematuria    Chronic systolic congestive heart failure (HCC)    Vitreous degeneration, bilateral    Uncontrolled type 2 diabetes mellitus ([address]    Secondary cataract    Primary open-angle glaucoma, bilateral, moderate stage    Posterior vitreous detachment of both eyes    Peripapillary atrophy of both eyes    Nodular corneal degeneration, right eye    Neoplastic malignant related fatigue    Nausea with vomiting    Hypomagnesemia    Entropion of left lower eyelid    Early stage nonexudative age-related macular degeneration of both eyes    Diabetic peripheral neuropathy ([address]    Dermatochalasis of right upper

## 2020-01-05 NOTE — PROGRESS NOTES
Patient transferred to [facility] [id number], upon transfer to bed, noticed blood oozing from penis around catheter site. Receiving nurse aware, stat lock placed, dressing applied. Gold draining appropriately, urine now clear yellow.

## 2020-01-05 NOTE — ANESTHESIA PRE PROCEDURE
Department of Anesthesiology  Preprocedure Note       Name:  Capri Levine   Age:  82 y.o.  :  1935                                          MRN:  [id number]         Date:  2020      Surgeon: Giovanna Smyth):  Nini Officer, MD    Procedure: CYSTOSCOPY (N/A )    Medications prior to admission:   Prior to Admission medications    Medication Sig Start Date End Date Taking? Authorizing Provider   traMADol Evalene Prosper) 50 MG tablet  19   Historical Provider, MD   DULoxetine (CYMBALTA) 30 MG extended release capsule TAKE ONE CAPSULE BY MOUTH TWICE A DAY 12/3/19   Phillip JamaicaSHA germain - CNP   gabapentin (NEURONTIN) 300 MG capsule Take 300 mg by mouth 3 times daily.     Historical Provider, MD Germaine Martinez 100 UNIT/ML injection pen 20 units nightly  Patient taking differently: Inject 22 Units into the skin nightly 20 units nightly 19   Kati Tillman MD   blood glucose test strips (FREESTYLE LITE) strip Test blood sugar BID 19   Kati Tillman MD   warfarin (COUMADIN) 2.5 MG tablet 1.25 mg (2.5 mg x 0.5) every Sun, Tue, Thu; 2.5 mg (2.5 mg x 1) all other days 10/31/19   SHA Virk CNP   famotidine (PEPCID) 20 MG tablet Take 1 tablet by mouth 2 times daily 10/1/19   SHA Virk - CNP   lisinopril (PRINIVIL;ZESTRIL) 10 MG tablet Take 1 tablet by mouth daily 19   SHA Virk - CNP   FREESTYLE LITE strip TEST ONCE DAILY AS DIRECTED 19   SHA Virk - CNP   Insulin Pen Needle (B-D UF III MINI PEN NEEDLES) 31G X 5 MM MISC 1 each by Does not apply route daily 19   Kati Tillman MD   diphenhydrAMINE-APAP, sleep, (TYLENOL PM EXTRA STRENGTH PO) Take by mouth as needed    Historical Provider, MD   furosemide (LASIX) 40 MG tablet Take 1 tablet by mouth daily 19   Lotus Saint, MD   DULoxetine (CYMBALTA) 30 MG extended release capsule Take 30 mg by mouth nightly     Historical Provider, MD   naphazoline-glycerin (CLEAR EYES REDNESS RELIEF) 0.012-0.2 % SOLN ophthalmic solution Place 1 drop into both eyes 2 times daily  Patient taking differently: Place 1 drop into both eyes 2 times daily as needed  4/7/18   [facility] Kimberly Angie Arcos MD   tamsulosin (FLOMAX) 0.4 MG capsule Take 1 capsule by mouth daily  Patient taking differently: Take 0.4 mg by mouth 2 times daily  8/3/17   SHA Jiang - CNP   glucose monitoring kit (FREESTYLE) monitoring kit 1 kit by Does not apply route daily as needed. 4/7/14   Marsha Abernathy MD   latanoprost (XALATAN) 0.005 % ophthalmic solution Place 1 drop into both eyes nightly. 10/27/13   Historical Provider, MD       Current medications:    No current facility-administered medications for this visit. Current Outpatient Medications   Medication Sig Dispense Refill    traMADol (ULTRAM) 50 MG tablet       DULoxetine (CYMBALTA) 30 MG extended release capsule TAKE ONE CAPSULE BY MOUTH TWICE A  capsule 0    gabapentin (NEURONTIN) 300 MG capsule Take 300 mg by mouth 3 times daily.       BASAGLAR KWIKPEN 100 UNIT/ML injection pen 20 units nightly (Patient taking differently: Inject 22 Units into the skin nightly 20 units nightly) 5 pen 3    blood glucose test strips (FREESTYLE LITE) strip Test blood sugar  strip 3    warfarin (COUMADIN) 2.5 MG tablet 1.25 mg (2.5 mg x 0.5) every Sun, Tue, Thu; 2.5 mg (2.5 mg x 1) all other days 90 tablet 3    famotidine (PEPCID) 20 MG tablet Take 1 tablet by mouth 2 times daily 60 tablet 5    lisinopril (PRINIVIL;ZESTRIL) 10 MG tablet Take 1 tablet by mouth daily 90 tablet 3    FREESTYLE LITE strip TEST ONCE DAILY AS DIRECTED 50 strip 2    Insulin Pen Needle (B-D UF III MINI PEN NEEDLES) 31G X 5 MM MISC 1 each by Does not apply route daily 100 each 6    diphenhydrAMINE-APAP, sleep, (TYLENOL PM EXTRA STRENGTH PO) Take by mouth as needed      furosemide (LASIX) 40 MG tablet Take 1 tablet by mouth daily 180 tablet 1    DULoxetine (CYMBALTA) 30 MG extended release obstructive pulmonary disease (HCC) J44.9    Benign prostatic hyperplasia with urinary hesitancy N40.1, R39.11    Gastroesophageal reflux disease without esophagitis K21.9    Non-small cell cancer of left lung ([facility]) C34.92    Type 2 diabetes mellitus with stage 3 chronic kidney disease, without long-term current use of insulin ([facility]) E11.22, N18.3    Pulmonary nodule R91.1    Pulmonary edema, acute ([facility]) J81.0    Hematuria R31.9    Chronic systolic congestive heart failure ([facility]) I50.22    Vitreous degeneration, bilateral H43.813    Uncontrolled type 2 diabetes mellitus ([facility]) E11.65    Secondary cataract H26.40    Primary open-angle glaucoma, bilateral, moderate stage H40.1132    Posterior vitreous detachment of both eyes H43.813    Peripapillary atrophy of both eyes H47.093    Nodular corneal degeneration, 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    Neoplastic malignant related fatigue R53.0    Nausea with vomiting R11.2    Hypomagnesemia E83.42    Entropion of left lower eyelid H02.005    Early stage nonexudative age-related macular degeneration of both eyes H35.3131    Diabetic peripheral neuropathy ([facility]) E11.42    Dermatochalasis of right upper eyelid H02.831    Bilateral posterior capsular opacification H26.493    Aortic valvular disease I35.9    Phimosis of penis N47.1    Acute cystitis with hematuria N30.01    Gross hematuria R31.0    Renal insufficiency N28.9       Past Medical History:        Diagnosis Date    Abdominal pain, epigastric     Arthritis     Benign prostatic hypertrophy without urinary obstruction     BPH     Cellulitis and abscess     Chronic rhinitis     Chronic systolic congestive heart failure ([facility]) 4/18/2019    COPD (chronic obstructive pulmonary disease) ([facility])     Diabetes mellitus (HCC)     Dysphagia     Erectile dysfunction     GERD (gastroesophageal reflux disease)     Hx of blood clots     Hyperglycemia     Hypertension     lumbar radiculopathy     Neuropathy     reviewed  Airway: Mallampati: II        Dental:          Pulmonary:   (+) COPD:                             Cardiovascular:  Exercise tolerance: poor (<4 METS),   (+) hypertension:, valvular problems/murmurs: AI and MR, CHF: systolic and diastolic, pulmonary hypertension: mild, hyperlipidemia      ECG reviewed  Rhythm: regular    Echocardiogram reviewed               ROS comment: EF 45%  RVSP 35     Neuro/Psych:   (+) neuromuscular disease:,             GI/Hepatic/Renal:   (+) GERD:, renal disease: CRI,           Endo/Other:    (+) DiabetesType II DM, , blood dyscrasia: anemia and anticoagulation therapy, arthritis: OA., electrolyte abnormalities, . Abdominal:   (+) obese,         Vascular:   + PVD, aortic or cerebral, DVT, . Anesthesia Plan      general     ASA 4 - emergent       Induction: intravenous. MIPS: Postoperative opioids intended and Prophylactic antiemetics administered. Anesthetic plan and risks discussed with patient and spouse. MEDICATIONS:  No current facility-administered medications for this visit. Current Outpatient Medications   Medication Sig Dispense Refill    traMADol (ULTRAM) 50 MG tablet       DULoxetine (CYMBALTA) 30 MG extended release capsule TAKE ONE CAPSULE BY MOUTH TWICE A  capsule 0    gabapentin (NEURONTIN) 300 MG capsule Take 300 mg by mouth 3 times daily.       BASAGLAR KWIKPEN 100 UNIT/ML injection pen 20 units nightly (Patient taking differently: Inject 22 Units into the skin nightly 20 units nightly) 5 pen 3    blood glucose test strips (FREESTYLE LITE) strip Test blood sugar  strip 3    warfarin (COUMADIN) 2.5 MG tablet 1.25 mg (2.5 mg x 0.5) every Sun, Tue, Thu; 2.5 mg (2.5 mg x 1) all other days 90 tablet 3    famotidine (PEPCID) 20 MG tablet Take 1 tablet by mouth 2 times daily 60 tablet 5    lisinopril (PRINIVIL;ZESTRIL) 10 MG tablet Take 1 tablet by mouth daily 90 tablet 3    FREESTYLE LITE strip TEST ONCE DAILY AS DIRECTED 50 strip 2    Insulin Pen Needle (B-D UF III MINI PEN NEEDLES) 31G X 5 MM MISC 1 each by Does not apply route daily 100 each 6    diphenhydrAMINE-APAP, sleep, (TYLENOL PM EXTRA STRENGTH PO) Take by mouth as needed      furosemide (LASIX) 40 MG tablet Take 1 tablet by mouth daily 180 tablet 1    DULoxetine (CYMBALTA) 30 MG extended release capsule Take 30 mg by mouth nightly       naphazoline-glycerin (CLEAR EYES REDNESS RELIEF) 0.012-0.2 % SOLN ophthalmic solution Place 1 drop into both eyes 2 times daily (Patient taking differently: Place 1 drop into both eyes 2 times daily as needed ) 1 Bottle 0    tamsulosin (FLOMAX) 0.4 MG capsule Take 1 capsule by mouth daily (Patient taking differently: Take 0.4 mg by mouth 2 times daily ) 90 capsule 1    glucose monitoring kit (FREESTYLE) monitoring kit 1 kit by Does not apply route daily as needed. 1 kit 0    latanoprost (XALATAN) 0.005 % ophthalmic solution Place 1 drop into both eyes nightly.        Facility-Administered Medications Ordered in Other Visits   Medication Dose Route Frequency Provider Last Rate Last Dose    lidocaine (XYLOCAINE) 2 % uro-jet             cefTRIAXone (ROCEPHIN) 1 g in sterile water 10 mL IV syringe  1 g Intravenous Q24H Jaylon Powell MD        lidocaine (XYLOCAINE) 2 % uro-jet                 LABS:  Lab Results   Component Value Date    WBC 7.8 01/04/2020    HGB 13.2 (L) 01/04/2020    HCT 38.6 (L) 01/04/2020    MCV 88.1 01/04/2020     01/04/2020    GLUCOSE 203 (H) 01/04/2020    PROTIME 25.3 (H) 01/04/2020    INR 2.16 (H) 01/04/2020    APTT 31.3 12/20/2019       Lab Results   Component Value Date     (L) 01/04/2020    K 4.0 01/04/2020    CL 96 (L) 01/04/2020    CO2 24 01/04/2020    PHOS 4.1 11/13/2019    BUN 29 (H) 01/04/2020    CREATININE 1.9 (H) 01/04/2020       Problem List:  Patient Active Problem List   Diagnosis    Chronic rhinitis    Primary osteoarthritis involving multiple joints    Erectile dysfunction    Glaucoma    Essential hypertension    DDD (degenerative disc disease), lumbar    Lumbar stenosis with neurogenic claudication    Hearing loss of both ears    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 9)    Diverticulosis of large intestine without diverticulitis    Chronic obstructive pulmonary disease (HCC)    Benign prostatic hyperplasia with urinary hesitancy    Gastroesophageal reflux disease without esophagitis    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)    Type 2 diabetes mellitus with stage 3 chronic kidney disease, without long-term current use of insulin (HCC)    Pulmonary nodule    Pulmonary edema, acute (HCC)    Hematuria    Chronic systolic congestive heart failure (HCC)    Vitreous degeneration, bilateral    Uncontrolled type 2 diabetes mellitus ([address]    Secondary cataract    Primary open-angle glaucoma, bilateral, moderate stage    Posterior vitreous detachment of both eyes    Peripapillary atrophy of both eyes    Nodular corneal degeneration, right eye    Neoplastic malignant related fatigue    Nausea with vomiting    Hypomagnesemia    Entropion of left lower eyelid    Early stage nonexudative age-related macular degeneration of both eyes    Diabetic peripheral neuropathy ([address]    Dermatochalasis of right upper eyelid    Bilateral posterior capsular opacification    Aortic valvular disease    Phimosis of penis    Acute cystitis with hematuria    Gross hematuria    Renal insufficiency           Adan Nascimento MD   1/5/2020

## 2020-01-05 NOTE — ED NOTES
Three way catheter removed per Dr. Renee Truong after pt began pulling at catheter, stating \"I have to pee\", pt continues to ooze blood from penis. Having more confusion, reoriented to surrounding. Awaiting a call back from urology.       Zaid Ewing RN  01/05/20 0990

## 2020-01-05 NOTE — PROGRESS NOTES
Patient to room [phone] from PACU. Wife and son at bedside. Patient alert and oriented x4. IV fluids hooked. Gold draining to gravity - urine: clean, yellow. Blood oozing from penis around the catheter site. Per PACU nurse, it due to trauma while placing the catheter. 4x4 gauze placed, will monitor. Bed alarm on. Bilateral leg pumps on. Call light in reach. Will continue to monitor.

## 2020-01-05 NOTE — ED NOTES
20g IV placed in right wrist, blood return noted. Pt medicated per MAR. Urojet placed. Pt and wife at bedside updated on plan of care.       Radha Orozco RN  01/05/20 [id number]

## 2020-01-05 NOTE — CONSULTS
[facility]                     [facility], [address]                                  CONSULTATION    PATIENT NAME: Rosalind Gold                         :        1935  MED REC NO:   [id number]                          ROOM:       0021  ACCOUNT NO:   [de-identified]                           ADMIT DATE: 2020  PROVIDER:     Enrique Tang MD    CONSULT DATE:  2020    CONSULTING PHYSICIAN:  [facility] Emergency Room medical staff. REASON FOR CONSULTATION:  Gross hematuria, urethral bleeding, renal  insufficiency, inability to place Gold catheter. HISTORY OF PRESENT ILLNESS:  The patient is a 28-year-old man who was  brought to the emergency room by his wife with confusion. His  urological history is remarkable for prostatic hypertrophy and has seen  Dr. Radha Gambino in the past and then had either a dorsal slit or circumcision  by Dr. Xiomy Villafana as well. Attempts in the emergency room were made for  straight cath because the patient was unable to void to get a UA for  possible UTI and basically following this, he started having some  bleeding. According to the wife, he had no bleeding prior to coming to  the hospital.  He does take warfarin and had an INR of 2.1. Several  other attempts have been made for placement of Gold catheter including  placement of a three-way Gold catheter, which caused more discomfort to  the patient and further bleeding with clots coming out, nothing going  in. The blood work showed a hemoglobin and hematocrit, which was 13.2  and 38.6. CT of the abdomen and pelvis was performed without contrast  as his creatinine was 1.9 and there were some high attenuation material  in the bladder consistent with either clot or tumor and because of these  issues, urologic consultation was made. Again the patient, according to  the wife, was brought in to the hospital with confusion.   There was no  history of any hematuria prior to coming to the hospital and no  significant obstructive or irritative voiding symptoms. PAST MEDICAL HISTORY:  The patient has a long history of medical  problems including chronic back pain, esophageal reflux, diabetes, COPD,  prostatic hypertrophy. PAST SURGICAL HISTORY:  He has had multiple surgeries as well including  orthopedic procedures with prosthetic knee replacement and then eventual  removal, joint surgery, circumcision, cholecystectomy and cataracts. FAMILY HISTORY:  Noncontributory. MEDICATIONS:  Please see history of present illness for medication list.  The patient is on warfarin as his INR is 2.1. ALLERGIES:  CELEBREX, ELAVIL, NAPROSYN, PERCOCET and LYRICA. PHYSICAL EXAMINATION:  GENERAL:  The patient was seen and examined in the emergency room in the  morning of 01/05/2020. He was resting comfortably. He had received  some pain medication. HEENT:  Head was normocephalic, atraumatic. ABDOMEN:  Soft. Bladder was slightly distended. GENITALIA:  He had some blood at the meatus. Scrotum is normal in  appearance. I attempted to place a Gold catheter but met resistance. The patient had quite a bit of agitation by this point and had some  bleeding from the meatus after the Gold was removed. RECTAL:  Exam was not performed. EXTREMITIES:  No cyanosis or edema. IMPRESSION:  Gross hematuria probably from Gold catheter trauma in  combination with prostatic hypertrophy and anticoagulation. We will  need to discuss the options and take the best course of action _____  take him to surgery under anesthesia. He probably has a false passage  and just get a scope in and get the clot out, and put a three-way Gold  catheter. This was all discussed with the patient's wife who  understands and agrees.   The patient's underlying condition to come to  the hospital again was confusion and he will be admitted to the _____  hospitalist service and have that evaluated as

## 2020-01-05 NOTE — ANESTHESIA POSTPROCEDURE EVALUATION
Department of Anesthesiology  Postprocedure Note    Patient: Ahmet Blank  MRN: [id number]  YOB: 1935  Date of evaluation: 1/5/2020  Time:  7:52 AM     Procedure Summary     Date:  01/05/20 Room / Location:  [address] [place] [address]    Anesthesia Start:  [facility] Anesthesia Stop:  0934    Procedure:  CYSTOSCOPY, EVACUATION OF CLOTS (N/A ) Diagnosis:  (Clots)    Surgeon:  Suzanna Melo MD Responsible Provider:  Johnny Lucero MD    Anesthesia Type:  general ASA Status:  4 - Emergent          Anesthesia Type: general    Ronal Phase I:      Ronal Phase II:      Last vitals: Reviewed and per EMR flowsheets.        Anesthesia Post Evaluation    Patient location during evaluation: PACU  Patient participation: complete - patient participated  Level of consciousness: awake and alert  Pain score: 2  Airway patency: patent  Nausea & Vomiting: no vomiting  Complications: no  Cardiovascular status: blood pressure returned to baseline  Respiratory status: acceptable  Hydration status: euvolemic

## 2020-01-05 NOTE — H&P
HOSPITALISTS HISTORY AND PHYSICAL    1/5/2020 1:23 PM    Patient Information:  Zahraa Nogueira is a 82 y.o. male [id number]  PCP:  SHA Luque CNP (Tel: [phone] )    Chief complaint:    Chief Complaint   Patient presents with    Urinary Tract Infection     Pt has been through two rounds of antibiotics for a UTI. Last treated with Bactrim. Still having issues, now with confusion and talking about things that were not there. Weak, having issues walking     History of Present Illness:  Víctor Jones is a 82 y.o. male who presented with a history of disorientation per family. Per family patient gets like this when he is developing a UTI does not have any symptoms with fevers chills or sweats. Patient recently had a UTI 2 weeks ago and was treated with a 10-day course of Bactrim. In the ED the patient was found to have urinary retention and Gold was attempted to be placed and had leslie hematuria. Patient had a CT abdomen pelvis performed which showed some debris in the bladder. Morning patient underwent cystoscopy and multiple blood clots were removed Gold was stated in place and has been irrigated per urology      Taken from patient and wife      REVIEW OF SYSTEMS:   Constitutional: Negative for fever,chills or night sweats  ENT: Negative for rhinorrhea, epistaxis, hoarseness, sore throat. Respiratory: Negative for shortness of breath,wheezing  Cardiovascular: Negative for chest pain, palpitations   Gastrointestinal: Negative for nausea, vomiting, diarrhea  Genitourinary: Negative for polyuria, dysuria   Hematologic/Lymphatic: Negative for bleeding tendency, easy bruising  Musculoskeletal: Negative for myalgias and arthralgias  Neurologic: Confusion  Skin: Negative for itching,rash  Psychiatric: Negative for depression,anxiety, agitation.   Endocrine: Negative for polydipsia,polyuria,heat  warfarin (COUMADIN) 2.5 MG tablet 1.25 mg (2.5 mg x 0.5) every Sun, Tue, Thu; 2.5 mg (2.5 mg x 1) all other days 90 tablet 3    famotidine (PEPCID) 20 MG tablet Take 1 tablet by mouth 2 times daily 60 tablet 5    lisinopril (PRINIVIL;ZESTRIL) 10 MG tablet Take 1 tablet by mouth daily 90 tablet 3    FREESTYLE LITE strip TEST ONCE DAILY AS DIRECTED 50 strip 2    Insulin Pen Needle (B-D UF III MINI PEN NEEDLES) 31G X 5 MM MISC 1 each by Does not apply route daily 100 each 6    diphenhydrAMINE-APAP, sleep, (TYLENOL PM EXTRA STRENGTH PO) Take by mouth as needed      furosemide (LASIX) 40 MG tablet Take 1 tablet by mouth daily 180 tablet 1    DULoxetine (CYMBALTA) 30 MG extended release capsule Take 30 mg by mouth nightly       naphazoline-glycerin (CLEAR EYES REDNESS RELIEF) 0.012-0.2 % SOLN ophthalmic solution Place 1 drop into both eyes 2 times daily (Patient taking differently: Place 1 drop into both eyes 2 times daily as needed ) 1 Bottle 0    tamsulosin (FLOMAX) 0.4 MG capsule Take 1 capsule by mouth daily (Patient taking differently: Take 0.4 mg by mouth 2 times daily ) 90 capsule 1    glucose monitoring kit (FREESTYLE) monitoring kit 1 kit by Does not apply route daily as needed. 1 kit 0    latanoprost (XALATAN) 0.005 % ophthalmic solution Place 1 drop into both eyes nightly.  traMADol (ULTRAM) 50 MG tablet       DULoxetine (CYMBALTA) 30 MG extended release capsule TAKE ONE CAPSULE BY MOUTH TWICE A  capsule 0       Allergies: Allergies   Allergen Reactions    Celebrex [Celecoxib] Other (See Comments)     hallucinations    Elavil [Amitriptyline]      Severe fatigue      Naproxen      Kidney damage    Percocet [Oxycodone-Acetaminophen] Other (See Comments)     confusion    Lyrica [Pregabalin] Palpitations     Fatigue        Social History:  Patient Lives at home with his wife   reports that he quit smoking about 49 years ago. He has a 40.00 pack-year smoking history.  He has never

## 2020-01-06 ENCOUNTER — APPOINTMENT (OUTPATIENT)
Dept: GENERAL RADIOLOGY | Age: 85
DRG: 662 | End: 2020-01-06
Payer: MEDICARE

## 2020-01-06 PROBLEM — E11.65 POORLY CONTROLLED TYPE 2 DIABETES MELLITUS (HCC): Status: ACTIVE | Noted: 2018-05-18

## 2020-01-06 LAB
ANION GAP SERPL CALCULATED.3IONS-SCNC: 12 MMOL/L (ref 3–16)
BASOPHILS ABSOLUTE: 0 K/UL (ref 0–0.2)
BASOPHILS RELATIVE PERCENT: 0.4 %
BUN BLDV-MCNC: 33 MG/DL (ref 7–20)
CALCIUM SERPL-MCNC: 7.6 MG/DL (ref 8.3–10.6)
CHLORIDE BLD-SCNC: 102 MMOL/L (ref 99–110)
CO2: 24 MMOL/L (ref 21–32)
CREAT SERPL-MCNC: 2.3 MG/DL (ref 0.8–1.3)
EOSINOPHILS ABSOLUTE: 0 K/UL (ref 0–0.6)
EOSINOPHILS RELATIVE PERCENT: 0.3 %
ESTIMATED AVERAGE GLUCOSE: 214.5 MG/DL
GFR AFRICAN AMERICAN: 33
GFR NON-AFRICAN AMERICAN: 27
GLUCOSE BLD-MCNC: 119 MG/DL (ref 70–99)
GLUCOSE BLD-MCNC: 124 MG/DL (ref 70–99)
GLUCOSE BLD-MCNC: 135 MG/DL (ref 70–99)
GLUCOSE BLD-MCNC: 171 MG/DL (ref 70–99)
GLUCOSE BLD-MCNC: 175 MG/DL (ref 70–99)
GLUCOSE BLD-MCNC: 90 MG/DL (ref 70–99)
HBA1C MFR BLD: 9.1 %
HCT VFR BLD CALC: 30.9 % (ref 40.5–52.5)
HEMOGLOBIN: 10.6 G/DL (ref 13.5–17.5)
INR BLD: 2.16 (ref 0.86–1.14)
LYMPHOCYTES ABSOLUTE: 1.4 K/UL (ref 1–5.1)
LYMPHOCYTES RELATIVE PERCENT: 15.7 %
MAGNESIUM: 1.6 MG/DL (ref 1.8–2.4)
MCH RBC QN AUTO: 30.6 PG (ref 26–34)
MCHC RBC AUTO-ENTMCNC: 34.2 G/DL (ref 31–36)
MCV RBC AUTO: 89.3 FL (ref 80–100)
MONOCYTES ABSOLUTE: 1.2 K/UL (ref 0–1.3)
MONOCYTES RELATIVE PERCENT: 13 %
NEUTROPHILS ABSOLUTE: 6.3 K/UL (ref 1.7–7.7)
NEUTROPHILS RELATIVE PERCENT: 70.6 %
ORGANISM: ABNORMAL
ORGANISM: ABNORMAL
PDW BLD-RTO: 15.9 % (ref 12.4–15.4)
PERFORMED ON: ABNORMAL
PERFORMED ON: NORMAL
PLATELET # BLD: 126 K/UL (ref 135–450)
PMV BLD AUTO: 8.9 FL (ref 5–10.5)
POTASSIUM SERPL-SCNC: 3.7 MMOL/L (ref 3.5–5.1)
PROCALCITONIN: 0.59 NG/ML (ref 0–0.15)
PROTHROMBIN TIME: 25.2 SEC (ref 10–13.2)
RBC # BLD: 3.45 M/UL (ref 4.2–5.9)
REPORT: NORMAL
RESPIRATORY PANEL PCR: ABNORMAL
SODIUM BLD-SCNC: 138 MMOL/L (ref 136–145)
URINE CULTURE, ROUTINE: ABNORMAL
URINE CULTURE, ROUTINE: ABNORMAL
WBC # BLD: 8.9 K/UL (ref 4–11)

## 2020-01-06 PROCEDURE — 6360000002 HC RX W HCPCS: Performed by: INTERNAL MEDICINE

## 2020-01-06 PROCEDURE — 2700000000 HC OXYGEN THERAPY PER DAY

## 2020-01-06 PROCEDURE — 84145 PROCALCITONIN (PCT): CPT

## 2020-01-06 PROCEDURE — 80048 BASIC METABOLIC PNL TOTAL CA: CPT

## 2020-01-06 PROCEDURE — 6370000000 HC RX 637 (ALT 250 FOR IP): Performed by: UROLOGY

## 2020-01-06 PROCEDURE — 1200000000 HC SEMI PRIVATE

## 2020-01-06 PROCEDURE — 83735 ASSAY OF MAGNESIUM: CPT

## 2020-01-06 PROCEDURE — 99223 1ST HOSP IP/OBS HIGH 75: CPT | Performed by: INTERNAL MEDICINE

## 2020-01-06 PROCEDURE — 6370000000 HC RX 637 (ALT 250 FOR IP): Performed by: INTERNAL MEDICINE

## 2020-01-06 PROCEDURE — 85025 COMPLETE CBC W/AUTO DIFF WBC: CPT

## 2020-01-06 PROCEDURE — 87040 BLOOD CULTURE FOR BACTERIA: CPT

## 2020-01-06 PROCEDURE — 71046 X-RAY EXAM CHEST 2 VIEWS: CPT

## 2020-01-06 PROCEDURE — 94760 N-INVAS EAR/PLS OXIMETRY 1: CPT

## 2020-01-06 PROCEDURE — 85610 PROTHROMBIN TIME: CPT

## 2020-01-06 PROCEDURE — 2580000003 HC RX 258: Performed by: INTERNAL MEDICINE

## 2020-01-06 PROCEDURE — 0100U HC RESPIRPTHGN MULT REV TRANS & AMP PRB TECH 21 TRGT: CPT

## 2020-01-06 PROCEDURE — 36415 COLL VENOUS BLD VENIPUNCTURE: CPT

## 2020-01-06 RX ORDER — WARFARIN SODIUM 2.5 MG/1
1.25 TABLET ORAL DAILY
Status: DISCONTINUED | OUTPATIENT
Start: 2020-01-06 | End: 2020-01-07 | Stop reason: DRUGHIGH

## 2020-01-06 RX ORDER — MAGNESIUM SULFATE IN WATER 40 MG/ML
2 INJECTION, SOLUTION INTRAVENOUS ONCE
Status: COMPLETED | OUTPATIENT
Start: 2020-01-06 | End: 2020-01-06

## 2020-01-06 RX ORDER — FLUCONAZOLE 100 MG/1
200 TABLET ORAL DAILY
Status: DISCONTINUED | OUTPATIENT
Start: 2020-01-06 | End: 2020-01-06

## 2020-01-06 RX ORDER — FUROSEMIDE 40 MG/1
40 TABLET ORAL DAILY
Status: DISCONTINUED | OUTPATIENT
Start: 2020-01-06 | End: 2020-01-07 | Stop reason: HOSPADM

## 2020-01-06 RX ADMIN — FLUCONAZOLE 200 MG: 100 TABLET ORAL at 11:13

## 2020-01-06 RX ADMIN — INSULIN LISPRO 1 UNITS: 100 INJECTION, SOLUTION INTRAVENOUS; SUBCUTANEOUS at 20:04

## 2020-01-06 RX ADMIN — MAGNESIUM SULFATE HEPTAHYDRATE 2 G: 40 INJECTION, SOLUTION INTRAVENOUS at 11:13

## 2020-01-06 RX ADMIN — GABAPENTIN 300 MG: 300 CAPSULE ORAL at 07:56

## 2020-01-06 RX ADMIN — TAMSULOSIN HYDROCHLORIDE 0.4 MG: 0.4 CAPSULE ORAL at 20:03

## 2020-01-06 RX ADMIN — ACETAMINOPHEN 650 MG: 325 TABLET, FILM COATED ORAL at 20:03

## 2020-01-06 RX ADMIN — INSULIN LISPRO 7 UNITS: 100 INJECTION, SOLUTION INTRAVENOUS; SUBCUTANEOUS at 07:57

## 2020-01-06 RX ADMIN — WARFARIN SODIUM 1.25 MG: 2.5 TABLET ORAL at 18:49

## 2020-01-06 RX ADMIN — TRAMADOL HYDROCHLORIDE 50 MG: 50 TABLET, FILM COATED ORAL at 18:48

## 2020-01-06 RX ADMIN — DULOXETINE HYDROCHLORIDE 30 MG: 30 CAPSULE, DELAYED RELEASE ORAL at 20:03

## 2020-01-06 RX ADMIN — INSULIN GLARGINE 23 UNITS: 100 INJECTION, SOLUTION SUBCUTANEOUS at 20:05

## 2020-01-06 RX ADMIN — GABAPENTIN 300 MG: 300 CAPSULE ORAL at 20:03

## 2020-01-06 RX ADMIN — Medication 1 G: at 07:05

## 2020-01-06 RX ADMIN — TAMSULOSIN HYDROCHLORIDE 0.4 MG: 0.4 CAPSULE ORAL at 07:56

## 2020-01-06 RX ADMIN — FUROSEMIDE 40 MG: 40 TABLET ORAL at 11:13

## 2020-01-06 RX ADMIN — SODIUM CHLORIDE: 9 INJECTION, SOLUTION INTRAVENOUS at 07:10

## 2020-01-06 RX ADMIN — Medication 10 ML: at 20:19

## 2020-01-06 RX ADMIN — FINASTERIDE 5 MG: 5 TABLET, FILM COATED ORAL at 07:56

## 2020-01-06 RX ADMIN — LISINOPRIL 10 MG: 10 TABLET ORAL at 07:56

## 2020-01-06 ASSESSMENT — ENCOUNTER SYMPTOMS
RHINORRHEA: 1
EYE DISCHARGE: 0
SORE THROAT: 0
BACK PAIN: 0
EYE REDNESS: 0
COUGH: 1
TROUBLE SWALLOWING: 0
SHORTNESS OF BREATH: 0
CONSTIPATION: 0
DIARRHEA: 0
WHEEZING: 0
ABDOMINAL PAIN: 0
NAUSEA: 0

## 2020-01-06 ASSESSMENT — PAIN SCALES - GENERAL
PAINLEVEL_OUTOF10: 6
PAINLEVEL_OUTOF10: 9

## 2020-01-06 ASSESSMENT — PAIN DESCRIPTION - ORIENTATION: ORIENTATION: RIGHT;LEFT

## 2020-01-06 ASSESSMENT — PAIN DESCRIPTION - LOCATION: LOCATION: FOOT

## 2020-01-06 ASSESSMENT — PAIN DESCRIPTION - PAIN TYPE
TYPE: ACUTE PAIN;NEUROPATHIC PAIN
TYPE: ACUTE PAIN;NEUROPATHIC PAIN

## 2020-01-06 NOTE — CONSULTS
social andepidemiologic history was reviewed and updated by me today as needed. · Tobacco use:   reports that he quit smoking about 49 years ago. He has a 40.00 pack-year smoking history. He has never used smokeless tobacco.  · Alcohol use:   reports no history of alcohol use. · Currently lives in: [facility]  ·  reports no history of drug use. Assessment:     The patient is a 82 y.o. old male who  has a past medical history of Abdominal pain, epigastric, Arthritis, Benign prostatic hypertrophy without urinary obstruction, BPH, Cellulitis and abscess, Chronic rhinitis, Chronic systolic congestive heart failure ([address] (4/18/2019), COPD (chronic obstructive pulmonary disease) ([facility] [address] Diabetes mellitus ([facility] [address] Dysphagia, Erectile dysfunction, GERD (gastroesophageal reflux disease), blood clots, Hyperglycemia, Hypertension, lumbar radiculopathy, Neuropathy, Nocturia, Osteoarthritis, Other disorders of kidney and ureter in diseases classified elsewhere, Pain, joint, knee, Palpitations, skin cancer, SOB (shortness of breath), Tennis elbow, and Tinea pedis. with following problems:    · High fever on admission, unknown source  · Altered mental status  · Acute kidney injury on chronic kidney disease  · History of recurrent UTI  · Diverticulosis without diverticulitis  · Poorly controlled type 2 diabetes mellitus  · Benign prostatic hypertrophy  · COPD  · GERD  · Essential hypertension  · Overweight due to excess calorie intake : Body mass index is 29.07 kg/m². Discussion:      The patient did have a high fever up to 102.9 on admission. CT scan of abdomen and pelvis reviewed. No evidence of urinary obstruction or pyelonephritis.   Chest x-ray for admission did not show any new infiltrates      Plan:     Diagnostic Workup:    · Will order 1 more set of blood culture today  · We will order respiratory film array viral PCR panel  · Will order procalcitonin level  · Continue to follow fever curve, WBC neck, hands/ 2018 lung    SOB (shortness of breath)     Tennis elbow     Tinea pedis     foot         Past Surgical History: All pastsurgical history was reviewed today. Past Surgical History:   Procedure Laterality Date    CATARACT REMOVAL Bilateral 09/2014    CHOLECYSTECTOMY      CIRCUMCISION N/A 11/15/2019    DORSAL SLIT performed by Jack Moreno MD at [facility] COLONOSCOPY  11/28/2011    Dr. Kelsie Osei - mild sigmoid diverticulosis    CYSTOSCOPY N/A 1/5/2020    CYSTOSCOPY, EVACUATION OF CLOTS performed by Suzanna Melo MD at [address] [place] [address] Right 09/09/2013    Dr. Rowdy Flynn - block for pain relief    JOINT REPLACEMENT Bilateral     knees    KNEE PROSTHESIS REMOVAL      LUMBAR NERVE BLOCK N/A 8/26/2019    L4/5 INTERLAMINAR EPIDURAL STEROID INJECTION WITH FLUOROSCOPY performed by Augusta Crowder MD at [address] [place] [address] Left 05/09/2018    Dr. Vyas - CT guided    MOHS SURGERY Bilateral     NERVE SURGERY N/A 12/20/2019    L4L5 INTERLAMINAR EPIDURAL STEROID INJECTION WITH FLUOROSCOPY performed by Augusta Crowder MD at [address] [place] [address]      multiple lumbar ESIs    PAROTIDECTOMY Right 01/26/2017    Dr. Kimberley Gonzalez - superficial, w/excision of parotid tail & dissection/preservation of facial nerve    DE INJECT ANES/STEROID FORAMEN LUMBAR/SACRAL W IMG GUIDE ,1 LEVEL Right 11/21/2018    RIGHT L4, L5 LUMBAR TRANSFORAMINAL EPIDURAL STEROID INJECTION WITH FLUOROSCOPY performed by Augusta Crowder MD at [place] [address] Left 08/14/2019    TOTAL KNEE ARTHROPLASTY      right x1 and left x2    TUNNELED VENOUS PORT PLACEMENT  06/22/2018    Left [facility]CV infusaport placement    UPPER GASTROINTESTINAL ENDOSCOPY  03/19/2018    Dr. Gustavo Brunson - mild non-obstructive ring distal esophagus         Family History: All family history was reviewed today.         Problem Relation Age of Onset    Arthritis Father     Diabetes Father Oral Daily    insulin glargine  0.25 Units/kg Subcutaneous Nightly    insulin lispro  0-12 Units Subcutaneous TID     insulin lispro  0-6 Units Subcutaneous Nightly    insulin lispro  0.08 Units/kg Subcutaneous TID     gabapentin  300 mg Oral BID          REVIEW OF SYSTEMS:       Review of Systems   Constitutional: Positive for fatigue and fever. Negative for chills and diaphoresis. HENT: Positive for congestion and rhinorrhea. Negative for ear discharge, ear pain, sore throat and trouble swallowing. Eyes: Negative for discharge and redness. Respiratory: Positive for cough. Negative for shortness of breath and wheezing. Cardiovascular: Negative for chest pain and leg swelling. Gastrointestinal: Negative for abdominal pain, constipation, diarrhea and nausea. Endocrine: Negative for polyuria. Genitourinary: Negative for dysuria, flank pain, frequency, hematuria and urgency. Musculoskeletal: Negative for back pain and myalgias. Skin: Negative for rash. Neurological: Negative for dizziness, seizures and headaches. Hematological: Does not bruise/bleed easily. Psychiatric/Behavioral: Negative for hallucinations and suicidal ideas. All other systems reviewed and are negative. Objective:       PHYSICAL EXAM:      Vitals:   Vitals:    01/06/20 0056 01/06/20 0400 01/06/20 0700 01/06/20 0754   BP: (!) 139/55  (!) 108/52 119/65   Pulse: 88  85 84   Resp: 18  17 16   Temp: 100 °F (37.8 °C)  99.3 °F (37.4 °C) 98.5 °F (36.9 °C)   TempSrc: Oral  Oral Oral   SpO2: 96%  92% 97%   Weight:  202 lb 9.6 oz (91.9 kg)     Height:           Physical Exam  Vitals signs and nursing note reviewed. Constitutional:       Appearance: Normal appearance. He is well-developed. Comments: Appears tired. HENT:      Head: Normocephalic and atraumatic. Right Ear: External ear normal.      Left Ear: External ear normal.      Nose: Nose normal. No congestion or rhinorrhea.       Mouth/Throat: Mouth: Mucous membranes are moist.      Pharynx: No oropharyngeal exudate or posterior oropharyngeal erythema. Eyes:      General: No scleral icterus. Right eye: No discharge. Left eye: No discharge. Conjunctiva/sclera: Conjunctivae normal.      Pupils: Pupils are equal, round, and reactive to light. Neck:      Musculoskeletal: Normal range of motion and neck supple. No neck rigidity or muscular tenderness. Cardiovascular:      Rate and Rhythm: Normal rate and regular rhythm. Pulses: Normal pulses. Heart sounds: No murmur. No friction rub. Pulmonary:      Effort: Pulmonary effort is normal. No respiratory distress. Breath sounds: Normal breath sounds. No stridor. No wheezing, rhonchi or rales. Abdominal:      General: Bowel sounds are normal.      Palpations: Abdomen is soft. Tenderness: There is no tenderness. There is no right CVA tenderness, left CVA tenderness, guarding or rebound. Musculoskeletal: Normal range of motion. General: No swelling or tenderness. Lymphadenopathy:      Cervical: No cervical adenopathy. Skin:     General: Skin is warm and dry. Coloration: Skin is not jaundiced. Findings: No erythema or rash. Neurological:      General: No focal deficit present. Mental Status: He is alert and oriented to person, place, and time. Mental status is at baseline. Motor: No abnormal muscle tone. Psychiatric:         Mood and Affect: Mood normal.         Behavior: Behavior normal.         Thought Content: Thought content normal.         Lines: All vascular access sites are healthy with no local erythema, discharge or tenderness. Intake and output:     I/O last 3 completed shifts: In: 400 [I.V.:400]  Out: 1700 [Urine:1700]    Lab Data:   All available labs were reviewed by me today.      CBC:   Recent Labs     01/04/20  2243 01/06/20  0628   WBC 7.8 8.9   RBC 4.38 3.45*   HGB 13.2* 10.6*   HCT 38.6* 30.9*    126* MCV 88.1 89.3   MCH 30.1 30.6   MCHC 34.2 34.2   RDW 15.9* 15.9*        BMP:  Recent Labs     01/04/20  2243 01/06/20  0628   * 138   K 4.0 3.7   CL 96* 102   CO2 24 24   BUN 29* 33*   CREATININE 1.9* 2.3*   CALCIUM 8.5 7.6*   GLUCOSE 203* 124*        Hepatic FunctionPanel:   Lab Results   Component Value Date    ALKPHOS 68 11/01/2019    ALT 10 11/01/2019    AST 11 11/01/2019    PROT 6.7 11/01/2019    PROT 6.3 05/15/2012    BILITOT 0.6 11/01/2019    BILIDIR <0.2 04/09/2019    IBILI see below 04/09/2019    LABALBU 3.8 11/13/2019       CPK:   Lab Results   Component Value Date    CKTOTAL 274 04/03/2018     ESR: No results found for: SEDRATE  CRP: No results found for: CRP      Imaging: All pertinent images and reports for the current visit were reviewed by meduring this visit. CT ABDOMEN PELVIS WO CONTRAST Additional Contrast? None   Final Result   1. No evidence for stone disease or obstructive uropathy. 2. High attenuation material in the bladder could represent debris,   hemorrhage or tumor. XR CHEST STANDARD (2 VW)   Final Result   Persistent density adjacent to the left hilum is improved and probably   represents scarring. No definite acute infiltrate. XR CHEST STANDARD (2 VW)    (Results Pending)       Outside records:    Labs, Microbiology, Radiology and pertinent results from Care everywhere, if available, were reviewed as a part ofthe consultation.       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    CKD (chronic kidney disease) stage 3, GFR 30-59 ml/min ([facility]) N18.3    Bilateral pulmonary embolism (HCC) I26.99    Overweight E66.3    Acute kidney injury superimposed on chronic kidney disease (HCC) N17.9, N18.9    Diverticulosis of large intestine without diverticulitis K57.30    Chronic obstructive pulmonary disease (HCC) J44.9    Benign prostatic hyperplasia with urinary hesitancy N40.1, R39.11    Gastroesophageal reflux disease without esophagitis K21.9    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) C34.92    Poorly controlled type 2 diabetes mellitus (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    Neoplastic malignant related fatigue R53.0    Nausea with vomiting R11.2    Hypomagnesemia E83.42    Entropion of left lower eyelid H02.005    Early stage nonexudative age-related macular degeneration of both eyes H35.3131    Diabetic peripheral neuropathy (HCC) E11.42    Dermatochalasis of right upper eyelid H02.831    Bilateral posterior capsular opacification H26.493    Aortic valvular disease I35.9    Phimosis of penis N47.1    Acute cystitis with hematuria N30.01    Gross hematuria R31.0    Renal insufficiency N28.9    High fever R50.9    Altered mental status R41.82    Recurrent UTI N39.0    Diverticulosis of colon without diverticulitis K57.30         Please note that this chart was generated using Dragon dictation software. Although every effort was made to ensure the accuracy of this automated transcription, some errors in transcription may have occurred inadvertently.  If you may need any clarification, please do not hesitate to contact me through [facility] or at the phone number provided below with my electronic signature. Any pictures or media included in this note were obtained after taking informed verbal consent from the patient and with their approval to include those in the patient's medical record.       Sukhi Caba MD, MPH  1/6/20, 11:16 AM   [facility] Infectious Disease   Office: [phone]  Fax: [phone]  Tuesday AM clinic:   [address] [place] [address] 120  Thursday AM clinic: [address] [facility]

## 2020-01-06 NOTE — PROGRESS NOTES
01/06/20 202 lb 9.6 oz (91.9 kg)   01/02/20 197 lb (89.4 kg)   12/20/19 200 lb (90.7 kg)       General appearance:  Appears comfortable. AAOx3  HEENT: atraumatic, Pupils equal, muscous membranes moist, no masses appreciated  Cardiovascular: Regular rate and rhythm no murmurs appreciated  Respiratory: CTAB no wheezing  Gastrointestinal: Abdomen soft, non-tender, BS+  EXT: no edema  Neurology: no gross focal deficts  Psychiatry: Appropriate affect. Not agitated  Skin: Warm, dry, no rashes appreciated    Labs and Tests:  CBC:   Recent Labs     01/04/20 2243 01/06/20  0628   WBC 7.8 8.9   HGB 13.2* 10.6*    126*     BMP:    Recent Labs     01/04/20 2243 01/06/20  0628   * 138   K 4.0 3.7   CL 96* 102   CO2 24 24   BUN 29* 33*   CREATININE 1.9* 2.3*   GLUCOSE 203* 124*     Hepatic: No results for input(s): AST, ALT, ALB, BILITOT, ALKPHOS in the last 72 hours. CT ABDOMEN PELVIS WO CONTRAST Additional Contrast? None   Final Result   1. No evidence for stone disease or obstructive uropathy. 2. High attenuation material in the bladder could represent debris,   hemorrhage or tumor. XR CHEST STANDARD (2 VW)   Final Result   Persistent density adjacent to the left hilum is improved and probably   represents scarring. No definite acute infiltrate.          XR CHEST STANDARD (2 VW)    (Results Pending)       Recent imaging reviewed    aCute metabolic encephalopathy likely secondary to UTI  - rocephin day 2, add fluconazole daily given candida       Acute blood loss anemia secondary to Gross Hematuria: management per urology  - I will stop coumadin until ok with urology  - recheck in am     Hypomagnesemia: replace and recheck in am    Hyponatremia: resolved     DM2: with hyperglycemia  -lantus and SSI     Chronic PE in march 2019: had active lung cancer then now in remission  - patient wants to continue ac per family as does not want to risk further clots  - will hold ac here until ok with urology     CKD3: avoid nephrotoxic meds     Chronic systolic heart failure: EF 45%  - home meds         DVT prophylaxis scds for now no ac given hematuria  Code status full code wife is surrogate decision maker  Dispo: likely in am if ok with urology    Crys Donaldson MD   1/6/2020 10:41 AM

## 2020-01-06 NOTE — PROGRESS NOTES
1712           Review of Systems   Constitutional: Positive for fatigue. Respiratory: Negative. Cardiovascular: Negative. Gastrointestinal: Negative. Genitourinary: Negative. Psychiatric/Behavioral: Negative. OBJECTIVE:  Physical Exam  Constitutional:       General: He is not in acute distress. Appearance: He is well-developed. HENT:      Head: Normocephalic and atraumatic. Eyes:      General: No scleral icterus. Conjunctiva/sclera: Conjunctivae normal.   Cardiovascular:      Rate and Rhythm: Normal rate and regular rhythm. Pulmonary:      Effort: Pulmonary effort is normal.      Breath sounds: Normal breath sounds. Abdominal:      General: There is no distension. Tenderness: There is no guarding. Skin:     General: Skin is warm and dry. Neurological:      Mental Status: He is alert. /88   Pulse 86   Temp 98.1 °F (36.7 °C) (Temporal)   Ht 5' 10\" (1.778 m)   Wt 197 lb (89.4 kg)   SpO2 96%   BMI 28.27 kg/m²      PHQ Scores 9/18/2019 2/11/2019 7/10/2018 3/21/2017   PHQ2 Score 0 0 1 0   PHQ9 Score 0 0 1 0     Interpretation of Total Score Depression Severity: 1-4 = Minimal depression, 5-9 = Mild depression, 10-14 = Moderate depression, 15-19 = Moderately severe depression, 20-27 =Severe depression        ASSESSMENT/PLAN:  Ray was seen today for head congestion. Diagnoses and all orders for this visit:    Bilateral pulmonary embolism (HCC)  - Continue anticoagulation    Anticoagulated on Coumadin  - INR 1.9  - Continue current regimen  -     POCT INR      SHA Jiang - CNP  On the basis of positive falls risk screening, assessment and plan is as follows: Matt Dull

## 2020-01-06 NOTE — OP NOTE
[facility]                     [facility], [address]                                OPERATIVE REPORT    PATIENT NAME: Matti Jameson                         :        1935  MED REC NO:   [id number]                          ROOM:       8265  ACCOUNT NO:   [de-identified]                           ADMIT DATE: 2020  PROVIDER:     Edd Oro MD    DATE OF PROCEDURE:  2020    PREOPERATIVE DIAGNOSES:  Gross hematuria, inability to place Gold  catheter, renal insufficiency. POSTOPERATIVE DIAGNOSES:  Gross hematuria, inability to place Gold  catheter, renal insufficiency with prostatic hypertrophy, Gold catheter  trauma, urethral false passage. OPERATIVE PROCEDURE:  1. Cystoscopy with evacuation of clot. 2.  Cystoscopy with fulguration of prostatic bleeding sites. 3.  Complicated Gold catheter placement. SURGEON:  Edd Oro MD    ANESTHESIA:  General.    The patient will be admitted to the hospital with a 3-way Gold  catheter. PROCEDURE NOTE:  The patient was brought to the operating room from the  emergency room. He had Rocephin intravenously. He was given a general  anesthetic, placed in lithotomy position. He was on warfarin with an  INR around 2.1. He was prepped with Betadine, draped in a sterile  fashion. The cystoscope was placed within the urethral meatus and  slowly advanced into the bladder. At the prostatic urethra posteriorly,  there was an obvious false passage and some bleeding from that area from  the previous attempts of Gold catheter and probably Gold trauma from  the Gold balloon. He had fairly significant prostatic hypertrophy. We  were able to go anterior in the urethra beyond the false passage and  into the bladder. There was quite a bit of clot in the bladder and we  then spent some time using Ellik evacuator, but eventually we were able  to get the clot out of the bladder.   Following this, cystoscopy showed  thickened bladder wall with some trabeculations, but no tumors, no  active bleeding sites within the bladder itself. A Bugbee electrode was  then used to fulgurate the prostatic tissue that appeared to be bleeding  from the trauma and after doing so, we then advanced the scope once  again into the bladder. A sensor wire was advanced through the  cystoscope. The scope was removed. A 3-way Gold catheter was used  with a small opening in the tip and we were able to advance the catheter  over the wire and into the bladder and then removed the wire. The  irrigant was slightly blood tinged, but irrigated fine and we did not  place this on traction, but just placed it with bladder irrigation. This completed the procedure. Blood loss appeared to be minimal, but it  was little difficult to assess. The patient will be kept overnight for  observation with the 3-way Gold catheter.         Lindsay Salcido MD    D: 01/05/2020 7:54:56       T: 01/05/2020 12:13:51     [email or web address]  Job#: [id number]     Doc#: [id number]    CC:

## 2020-01-06 NOTE — PROGRESS NOTES
CBI clamped per Dr Gabby Hernandez note. Pt getting Magnesium sulfate per MD order, then will saline lock IVF. Will continue to assess.

## 2020-01-07 VITALS
BODY MASS INDEX: 28.75 KG/M2 | WEIGHT: 200.84 LBS | RESPIRATION RATE: 18 BRPM | HEIGHT: 70 IN | SYSTOLIC BLOOD PRESSURE: 147 MMHG | DIASTOLIC BLOOD PRESSURE: 75 MMHG | HEART RATE: 80 BPM | OXYGEN SATURATION: 92 % | TEMPERATURE: 99.1 F

## 2020-01-07 LAB
ANION GAP SERPL CALCULATED.3IONS-SCNC: 11 MMOL/L (ref 3–16)
BASOPHILS ABSOLUTE: 0 K/UL (ref 0–0.2)
BASOPHILS RELATIVE PERCENT: 0.5 %
BUN BLDV-MCNC: 34 MG/DL (ref 7–20)
C-PEPTIDE: 2.8 NG/ML (ref 1.1–4.4)
CALCIUM SERPL-MCNC: 7.7 MG/DL (ref 8.3–10.6)
CHLORIDE BLD-SCNC: 100 MMOL/L (ref 99–110)
CO2: 25 MMOL/L (ref 21–32)
CREAT SERPL-MCNC: 2.4 MG/DL (ref 0.8–1.3)
EOSINOPHILS ABSOLUTE: 0.1 K/UL (ref 0–0.6)
EOSINOPHILS RELATIVE PERCENT: 1.1 %
GFR AFRICAN AMERICAN: 31
GFR NON-AFRICAN AMERICAN: 26
GLUCOSE BLD-MCNC: 119 MG/DL (ref 70–99)
GLUCOSE BLD-MCNC: 120 MG/DL (ref 70–99)
GLUCOSE BLD-MCNC: 125 MG/DL (ref 70–99)
GLUCOSE BLD-MCNC: 152 MG/DL (ref 70–99)
HCT VFR BLD CALC: 30.5 % (ref 40.5–52.5)
HEMOGLOBIN: 10.3 G/DL (ref 13.5–17.5)
INR BLD: 1.54 (ref 0.86–1.14)
LYMPHOCYTES ABSOLUTE: 1 K/UL (ref 1–5.1)
LYMPHOCYTES RELATIVE PERCENT: 16.4 %
MAGNESIUM: 1.9 MG/DL (ref 1.8–2.4)
MCH RBC QN AUTO: 30.3 PG (ref 26–34)
MCHC RBC AUTO-ENTMCNC: 33.8 G/DL (ref 31–36)
MCV RBC AUTO: 89.6 FL (ref 80–100)
MONOCYTES ABSOLUTE: 0.8 K/UL (ref 0–1.3)
MONOCYTES RELATIVE PERCENT: 13.1 %
NEUTROPHILS ABSOLUTE: 4.3 K/UL (ref 1.7–7.7)
NEUTROPHILS RELATIVE PERCENT: 68.9 %
PDW BLD-RTO: 15.8 % (ref 12.4–15.4)
PERFORMED ON: ABNORMAL
PLATELET # BLD: 114 K/UL (ref 135–450)
PMV BLD AUTO: 8.7 FL (ref 5–10.5)
POTASSIUM REFLEX MAGNESIUM: 3.7 MMOL/L (ref 3.5–5.1)
PROTHROMBIN TIME: 17.9 SEC (ref 10–13.2)
RBC # BLD: 3.4 M/UL (ref 4.2–5.9)
SODIUM BLD-SCNC: 136 MMOL/L (ref 136–145)
WBC # BLD: 6.3 K/UL (ref 4–11)

## 2020-01-07 PROCEDURE — 83735 ASSAY OF MAGNESIUM: CPT

## 2020-01-07 PROCEDURE — 85610 PROTHROMBIN TIME: CPT

## 2020-01-07 PROCEDURE — 6370000000 HC RX 637 (ALT 250 FOR IP): Performed by: UROLOGY

## 2020-01-07 PROCEDURE — 6370000000 HC RX 637 (ALT 250 FOR IP): Performed by: NURSE PRACTITIONER

## 2020-01-07 PROCEDURE — 6370000000 HC RX 637 (ALT 250 FOR IP): Performed by: INTERNAL MEDICINE

## 2020-01-07 PROCEDURE — 2700000000 HC OXYGEN THERAPY PER DAY

## 2020-01-07 PROCEDURE — 94760 N-INVAS EAR/PLS OXIMETRY 1: CPT

## 2020-01-07 PROCEDURE — 36415 COLL VENOUS BLD VENIPUNCTURE: CPT

## 2020-01-07 PROCEDURE — 99233 SBSQ HOSP IP/OBS HIGH 50: CPT | Performed by: INTERNAL MEDICINE

## 2020-01-07 PROCEDURE — 80048 BASIC METABOLIC PNL TOTAL CA: CPT

## 2020-01-07 PROCEDURE — 6360000002 HC RX W HCPCS: Performed by: INTERNAL MEDICINE

## 2020-01-07 PROCEDURE — 85025 COMPLETE CBC W/AUTO DIFF WBC: CPT

## 2020-01-07 RX ORDER — WARFARIN SODIUM 2.5 MG/1
2.5 TABLET ORAL
Status: DISCONTINUED | OUTPATIENT
Start: 2020-01-08 | End: 2020-01-07 | Stop reason: HOSPADM

## 2020-01-07 RX ORDER — WARFARIN SODIUM 2.5 MG/1
2.5 TABLET ORAL
Status: DISCONTINUED | OUTPATIENT
Start: 2020-01-07 | End: 2020-01-07 | Stop reason: HOSPADM

## 2020-01-07 RX ORDER — CALCIUM CARBONATE 200(500)MG
500 TABLET,CHEWABLE ORAL 3 TIMES DAILY PRN
Status: DISCONTINUED | OUTPATIENT
Start: 2020-01-07 | End: 2020-01-07 | Stop reason: HOSPADM

## 2020-01-07 RX ORDER — FAMOTIDINE 20 MG/1
20 TABLET, FILM COATED ORAL 2 TIMES DAILY
Status: DISCONTINUED | OUTPATIENT
Start: 2020-01-07 | End: 2020-01-07 | Stop reason: HOSPADM

## 2020-01-07 RX ORDER — FINASTERIDE 5 MG/1
5 TABLET, FILM COATED ORAL DAILY
Qty: 30 TABLET | Refills: 3 | Status: SHIPPED | OUTPATIENT
Start: 2020-01-07 | End: 2020-06-03

## 2020-01-07 RX ORDER — WARFARIN SODIUM 2.5 MG/1
1.25 TABLET ORAL
Status: DISCONTINUED | OUTPATIENT
Start: 2020-01-09 | End: 2020-01-07 | Stop reason: HOSPADM

## 2020-01-07 RX ADMIN — FINASTERIDE 5 MG: 5 TABLET, FILM COATED ORAL at 09:22

## 2020-01-07 RX ADMIN — INSULIN LISPRO 7 UNITS: 100 INJECTION, SOLUTION INTRAVENOUS; SUBCUTANEOUS at 12:40

## 2020-01-07 RX ADMIN — TAMSULOSIN HYDROCHLORIDE 0.4 MG: 0.4 CAPSULE ORAL at 09:23

## 2020-01-07 RX ADMIN — INSULIN LISPRO 7 UNITS: 100 INJECTION, SOLUTION INTRAVENOUS; SUBCUTANEOUS at 09:24

## 2020-01-07 RX ADMIN — FAMOTIDINE 20 MG: 20 TABLET, FILM COATED ORAL at 01:20

## 2020-01-07 RX ADMIN — FUROSEMIDE 40 MG: 40 TABLET ORAL at 09:22

## 2020-01-07 RX ADMIN — FAMOTIDINE 20 MG: 20 TABLET, FILM COATED ORAL at 09:22

## 2020-01-07 RX ADMIN — SENNOSIDES 8.6 MG: 8.6 TABLET, FILM COATED ORAL at 09:36

## 2020-01-07 RX ADMIN — Medication 1 G: at 05:54

## 2020-01-07 RX ADMIN — GABAPENTIN 300 MG: 300 CAPSULE ORAL at 09:23

## 2020-01-07 ASSESSMENT — ENCOUNTER SYMPTOMS
WHEEZING: 0
RHINORRHEA: 0
CONSTIPATION: 0
SORE THROAT: 0
SHORTNESS OF BREATH: 0
EYE REDNESS: 0
TROUBLE SWALLOWING: 0
NAUSEA: 0
EYE DISCHARGE: 0
ABDOMINAL PAIN: 0
COUGH: 0
BACK PAIN: 0
DIARRHEA: 0

## 2020-01-07 ASSESSMENT — PAIN SCALES - GENERAL: PAINLEVEL_OUTOF10: 0

## 2020-01-07 NOTE — CARE COORDINATION
Discharge Planning Assessment  SW discharge planner met with patient and spouse to discuss reason for admission, current living situation, and potential needs at the time of discharge.     Demographics/Insurance verified Yes: Medicare/[employer]     Current type of dwelling:  House     Living arrangements:  w/spouse     Level of function/Support:  Pt still uses cane for balance. Spouse is supportive and reports has 2 sons who come by daily after work to help with needs.     PCP:  Sarah CASAS     Last Visit to PCP:  January 2, 2020. Patient is seen monthly.     DME:  [employer], wheelchair and walker.       Active with any community resources/agencies/skilled home care:  No.  Pt and spouse reported no non-skilled needs at home.     Medication compliance issues:  No.  Wife assists     Financial issues that could impact healthcare: No     Transportation at the time of discharge:  Pt does not drive. Spouse can assist.     Tentative discharge plan:  Home with no needs.     Electronically signed by SARAH Nickerson on 1/7/2020 at 9:46 AM

## 2020-01-07 NOTE — PLAN OF CARE
Problem: Pain:  Goal: Pain level will decrease  Description  Pain level will decrease  1/7/2020 0941 by Darby Frank RN  Outcome: Ongoing  1/7/2020 0232 by Huy Perkins RN  Outcome: Ongoing     Problem: Falls - Risk of:  Goal: Will remain free from falls  Description  Will remain free from falls  1/7/2020 0941 by Darby Frank RN  Outcome: Ongoing  1/7/2020 0232 by Huy Perkins RN  Outcome: Ongoing     Problem: Falls - Risk of:  Goal: Absence of physical injury  Description  Absence of physical injury  1/7/2020 0941 by Darby Frank RN  Outcome: Ongoing  1/7/2020 0232 by Huy Perkins RN  Outcome: Ongoing

## 2020-01-07 NOTE — DISCHARGE SUMMARY
Temp 99 °F (37.2 °C) (Oral)   Resp 18   Ht 5' 10\" (1.778 m)   Wt 200 lb 13.4 oz (91.1 kg)   SpO2 92%   BMI 28.82 kg/m²   General appearance:  Appears comfortable. AAOx3  HEENT: atraumatic, Pupils equal, muscous membranes moist, no masses appreciated  Cardiovascular: Regular rate and rhythm no murmurs appreciated  Respiratory: CTAB no wheezing  Gastrointestinal: Abdomen soft, non-tender, BS+  EXT: no edema  Neurology: no gross focal deficts  Psychiatry: Appropriate affect. Not agitated  Skin: Warm, dry, no rashes appreciated       Discharge Medications:   Current Discharge Medication List      START taking these medications    Details   finasteride (PROSCAR) 5 MG tablet Take 1 tablet by mouth daily  Qty: 30 tablet, Refills: 3           Current Discharge Medication List        Current Discharge Medication List      CONTINUE these medications which have NOT CHANGED    Details   gabapentin (NEURONTIN) 300 MG capsule Take 300 mg by mouth 3 times daily. BASAGLAR KWIKPEN 100 UNIT/ML injection pen 20 units nightly  Qty: 5 pen, Refills: 3    Associated Diagnoses: Type 2 diabetes mellitus with stage 3 chronic kidney disease, without long-term current use of insulin ([facility] [address]      ! ! blood glucose test strips (FREESTYLE LITE) strip Test blood sugar BID  Qty: 100 strip, Refills: 3    Associated Diagnoses: Type 2 diabetes mellitus with stage 3 chronic kidney disease, without long-term current use of insulin ([facility])      warfarin (COUMADIN) 2.5 MG tablet 1.25 mg (2.5 mg x 0.5) every Sun, Tue, Thu; 2.5 mg (2.5 mg x 1) all other days  Qty: 90 tablet, Refills: 3    Associated Diagnoses: Bilateral pulmonary embolism ([address]  Anticoagulated on Coumadin      famotidine (PEPCID) 20 MG tablet Take 1 tablet by mouth 2 times daily  Qty: 60 tablet, Refills: 5      lisinopril (PRINIVIL;ZESTRIL) 10 MG tablet Take 1 tablet by mouth daily  Qty: 90 tablet, Refills: 3      !! FREESTYLE LITE strip TEST ONCE DAILY AS DIRECTED  Qty: 50 strip,

## 2020-01-07 NOTE — PROGRESS NOTES
Infectious Diseases   Progress Note      Admission Date: 1/4/2020  Hospital Day: Hospital Day: 4   Attending: Court Kaminski MD  Date of service: 1/7/2020     Chief complaint/ Reason for consult: The patient was seen today for the following:    · High fever on admission -likely from respiratory syncytial virus infection  · Altered mental status  · Acute kidney injury on chronic kidney disease  · History of recurrent UTI  · Diverticulosis without diverticulitis  · Poorly controlled type 2 diabetes mellitus    Microbiology:        I have reviewed allavailable micro lab data and cultures    · Blood culture (2/2) - collected on 1/4/2020: Negative    · Urine culture  - collected on 1/4/2020: 25,000 CFU per mL of Candida albicans      Antibiotics and immunizations:       Current antibiotics: All antibiotics and their doses were reviewed by me    Recent Abx Admin                   cefTRIAXone (ROCEPHIN) 1 g in sterile water 10 mL IV syringe (g) 1 g Given 01/07/20 0554                  Immunization History: All immunization history was reviewed by me today. Immunization History   Administered Date(s) Administered    Influenza 11/01/2011, 09/30/2013    Influenza Vaccine, unspecified formulation 10/15/2018    Influenza Virus Vaccine 11/01/2011, 09/30/2013, 09/17/2014, 09/29/2015    Influenza, High Dose (Fluzone 65 yrs and older) 09/17/2014, 09/29/2015, 11/03/2017, 10/09/2018, 09/18/2019    Influenza, Quadv, IM, PF (6 mo and older Fluzone, Flulaval, Fluarix, and 3 yrs and older Afluria) 11/01/2016    Pneumococcal Conjugate 13-valent ([id number]) 09/29/2015    Pneumococcal Polysaccharide ([id number]) 09/17/2014       Known drug allergies:      All allergies were reviewed and updated    Allergies   Allergen Reactions    Celebrex [Celecoxib] Other (See Comments)     hallucinations    Elavil [Amitriptyline]      Severe fatigue      Naproxen      Kidney damage    Percocet [Oxycodone-Acetaminophen] Other (See Comments)     confusion    Lyrica [Pregabalin] Palpitations     Fatigue       Social history:     Social History:  All social andepidemiologic history was reviewed and updated by me today as needed. · Tobacco use:   reports that he quit smoking about 49 years ago. He has a 40.00 pack-year smoking history. He has never used smokeless tobacco.  · Alcohol use:   reports no history of alcohol use. · Currently lives in: [address] [place] [address]   ·  reports no history of drug use. Assessment:     The patient is a 82 y.o. old male who  has a past medical history of Abdominal pain, epigastric, Arthritis, Benign prostatic hypertrophy without urinary obstruction, BPH, Cellulitis and abscess, Chronic rhinitis, Chronic systolic congestive heart failure ([facility] [address] (4/18/2019), COPD (chronic obstructive pulmonary disease) ([facility] [address] Diabetes mellitus ([address] Dysphagia, Erectile dysfunction, GERD (gastroesophageal reflux disease), blood clots, Hyperglycemia, Hypertension, lumbar radiculopathy, Neuropathy, Nocturia, Osteoarthritis, Other disorders of kidney and ureter in diseases classified elsewhere, Pain, joint, knee, Palpitations, skin cancer, SOB (shortness of breath), Tennis elbow, and Tinea pedis. with following problems:    · High fever on admission -likely from respiratory syncytial virus infection - improving   · Altered mental status-improved  · Acute kidney injury on chronic kidney disease - S Cr is 2.4  · History of recurrent UTI  · Diverticulosis without diverticulitis  · Poorly controlled type 2 diabetes mellitus  · Benign prostatic hypertrophy  · COPD  · GERD  · Essential hypertension  · Overweight due to excess calorie intake : Body mass index is 29.07 kg/m². Discussion:      As suspected yesterday, respiratory film array viral PCR panel is come back positive. It is positive for respiratory syncytial virus. The likely cause of his fever on presentation. Blood cultures are negative. No signs of UTI.   He had Review of Systems   Constitutional: Positive for fatigue. Negative for chills, diaphoresis and fever. HENT: Negative for ear discharge, ear pain, rhinorrhea, sore throat and trouble swallowing. Eyes: Negative for discharge and redness. Respiratory: Negative for cough, shortness of breath and wheezing. Cardiovascular: Negative for chest pain and leg swelling. Gastrointestinal: Negative for abdominal pain, constipation, diarrhea and nausea. Endocrine: Negative for polyuria. Genitourinary: Negative for dysuria, flank pain, frequency, hematuria and urgency. Musculoskeletal: Negative for back pain and myalgias. Skin: Negative for rash. Neurological: Negative for dizziness, seizures and headaches. Hematological: Does not bruise/bleed easily. Psychiatric/Behavioral: Negative for hallucinations and suicidal ideas. All other systems reviewed and are negative. Past Medical History: All past medical history reviewed today. Past Medical History:   Diagnosis Date    Abdominal pain, epigastric     Arthritis     Benign prostatic hypertrophy without urinary obstruction     BPH     Cellulitis and abscess     Chronic rhinitis     Chronic systolic congestive heart failure (HCC) 4/18/2019    COPD (chronic obstructive pulmonary disease) (HCC)     Diabetes mellitus (HCC)     Dysphagia     Erectile dysfunction     GERD (gastroesophageal reflux disease)     Hx of blood clots     Hyperglycemia     Hypertension     lumbar radiculopathy     Neuropathy     Nocturia     Osteoarthritis     Other disorders of kidney and ureter in diseases classified elsewhere     Pain, joint, knee     Palpitations     skin cancer     Rt ear, nose, neck, hands/ 2018 lung    SOB (shortness of breath)     Tennis elbow     Tinea pedis     foot       Past Surgical History: All past surgical history was reviewed today.     Past Surgical History:   Procedure Laterality Date    CATARACT REMOVAL normal.         Behavior: Behavior normal.         Thought Content: Thought content normal.            Lines: All vascular access sites are healthy with no local erythema, discharge or tenderness. Intake and output:    I/O last 3 completed shifts: In: 2310 [I.V.:2310]  Out: 1950 [Urine:1950]    Lab Data:   All available labs and old records have been reviewed by me. CBC:  Recent Labs     01/04/20 2243 01/06/20  0628 01/07/20  0559   WBC 7.8 8.9 6.3   RBC 4.38 3.45* 3.40*   HGB 13.2* 10.6* 10.3*   HCT 38.6* 30.9* 30.5*    126* 114*   MCV 88.1 89.3 89.6   MCH 30.1 30.6 30.3   MCHC 34.2 34.2 33.8   RDW 15.9* 15.9* 15.8*        BMP:  Recent Labs     01/04/20 2243 01/06/20  0628 01/07/20  0559   * 138 136   K 4.0 3.7 3.7   CL 96* 102 100   CO2 24 24 25   BUN 29* 33* 34*   CREATININE 1.9* 2.3* 2.4*   CALCIUM 8.5 7.6* 7.7*   GLUCOSE 203* 124* 120*        Hepatic Function Panel:   Lab Results   Component Value Date    ALKPHOS 68 11/01/2019    ALT 10 11/01/2019    AST 11 11/01/2019    PROT 6.7 11/01/2019    PROT 6.3 05/15/2012    BILITOT 0.6 11/01/2019    BILIDIR <0.2 04/09/2019    IBILI see below 04/09/2019    LABALBU 3.8 11/13/2019       CPK:   Lab Results   Component Value Date    CKTOTAL 274 04/03/2018     ESR: No results found for: SEDRATE  CRP: No results found for: CRP        Imaging: All pertinent images and reports for the current visit were reviewed by me during this visit. XR CHEST STANDARD (2 VW)   Final Result   Increased advanced right basilar atelectasis/decreased aerated lung over the   past 2 days. Mild left-sided atelectasis, stable. CT ABDOMEN PELVIS WO CONTRAST Additional Contrast? None   Final Result   1. No evidence for stone disease or obstructive uropathy. 2. High attenuation material in the bladder could represent debris,   hemorrhage or tumor.          XR CHEST STANDARD (2 VW)   Final Result   Persistent density adjacent to the left hilum is improved and probably   represents scarring. No definite acute infiltrate. Medications: All current and past medications were reviewed.      famotidine  20 mg Oral BID    warfarin  2.5 mg Oral Once    [START ON 1/9/2020] warfarin  1.25 mg Oral Once per day on Sun Tue Thu    And    [START ON 1/8/2020] warfarin  2.5 mg Oral Once per day on Mon Wed Fri Sat    furosemide  40 mg Oral Daily    cefTRIAXone (ROCEPHIN) IV  1 g Intravenous Q24H    DULoxetine  30 mg Oral Nightly    tamsulosin  0.4 mg Oral BID    sodium chloride flush  10 mL Intravenous 2 times per day    finasteride  5 mg Oral Daily    insulin glargine  0.25 Units/kg Subcutaneous Nightly    insulin lispro  0-12 Units Subcutaneous TID WC    insulin lispro  0-6 Units Subcutaneous Nightly    insulin lispro  0.08 Units/kg Subcutaneous TID WC    gabapentin  300 mg Oral BID        dextrose         calcium carbonate, naphazoline-glycerin, sodium chloride flush, senna, ondansetron, acetaminophen, morphine, glucose, dextrose, glucagon (rDNA), dextrose, traMADol      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    CKD (chronic kidney disease) stage 3, GFR 30-59 ml/min (HCC) N18.3    Bilateral pulmonary embolism (HCC) I26.99    Overweight E66.3    Acute kidney injury superimposed on chronic kidney disease (HCC) N17.9, N18.9    Diverticulosis of large intestine without diverticulitis K57.30    Chronic obstructive pulmonary disease (HCC) J44.9    Benign prostatic hyperplasia with urinary hesitancy N40.1, R39.11    Gastroesophageal reflux disease without Infectious Disease   Office: [phone]  Fax: [phone]  Tuesday AM clinic:   [address] [place] [address], [facility] [address]  Thursday AM [id number] [facility]

## 2020-01-08 ENCOUNTER — CARE COORDINATION (OUTPATIENT)
Dept: CASE MANAGEMENT | Age: 85
End: 2020-01-08

## 2020-01-08 LAB — BLOOD CULTURE, ROUTINE: NORMAL

## 2020-01-08 NOTE — CARE COORDINATION
[facility] Transitions Initial Follow Up Call    Call within 2 business days of discharge: Yes    Patient: Cristin Brown Patient : 1935   MRN: [id number]  Reason for Admission: Hematuria, UTI, metabolic encephalopathy  Discharge Date: 20 RARS: Readmission Risk Score: 30      Last Discharge [facility]       Complaint Diagnosis Description Type Department Provider    20 Urinary Tract Infection Hematuria, unspecified type ED to Hosp-Admission (Discharged) (ADMITTED) JEFFERY 5C Gabriele Peng MD; Jaron Eugene. .. Spoke with: Wife [address]: MARRY    Non-face-to-face services provided:  Obtained and reviewed discharge summary and/or continuity of care documents    Care Transitions 24 Hour Call    Do you have any ongoing symptoms?:  No  Do you have a copy of your discharge instructions?:  Yes  Do you have all of your prescriptions and are they filled?:  Yes  Have you been contacted by a [address]?:  No  Have you scheduled your follow up appointment?:  No  Were you discharged with any Home Care or Post Acute Services:  No  Patient DME:  Straight cane  Do you have support at home?:  Partner/Spouse/SO  Do you feel like you have everything you need to keep you well at home?:  Yes  Are you an active caregiver in your home?:  No  Care Transitions Interventions                                 Follow Up: Wife reports that patient is doing well, denies any symptoms and reports that bryant is draining clear yellow urine. Discussed discharge instructions and reviewed medications. Patient is taking all medications as prescribed. Patient has follow up scheduled with endocrinologist tomorrow and wife will call PCP to schedule hospital follow up appointment. CTN will continue with outreach follow up calls.     Future Appointments   Date Time Provider Hanna Mccoy   1/10/2020  9:45 AM [place], [place] Judge Mane CHEST MMA   2020  2:40 PM Pio Snell MD [facility] ENDO [facility]   2020 10:00 AM SHA Lawrence - CNP [facility]  MMA   2/26/2020 11:30 AM Jackie Moser MD AFL NASO AFL NASO   5/29/2020  9:15 AM Yamile Cagle MD [facility]       Shaylee Leary RN

## 2020-01-10 LAB — CULTURE, BLOOD 2: NORMAL

## 2020-01-14 ENCOUNTER — CARE COORDINATION (OUTPATIENT)
Dept: CASE MANAGEMENT | Age: 85
End: 2020-01-14

## 2020-01-14 NOTE — CARE COORDINATION
[facility] Transitions Follow Up Call    2020    Patient: Lala Pratt  Patient : 1935   MRN: [id number]  Reason for Admission: UTI, met encephalopathy, bronchitis  Discharge Date: 20 RARS: Readmission Risk Score: 27         Spoke with: Kaylin 68 Transitions Subsequent and Final Call    Subsequent and Final Calls  Do you have any ongoing symptoms?:  No  Have your medications changed?:  No  Do you have any questions related to your medications?:  No  Do you currently have any active services?:  No  Do you have any needs or concerns that I can assist you with?:  No  Identified Barriers:  None  Care Transitions Interventions  No Identified Needs                          Other Interventions:          Pt states doing well, no issues or concerns. Denies CP, SOB, fever or urinary issues, had bryant catheter removed earlier today. F/U appts listed below.  Agreed to more CTC f/u calls      Follow Up  Future Appointments   Date Time Provider Hanna Mccoy   2020  2:40 PM Prince Seaman MD [facility] ENDO [facility]   2020 10:00 AM SHA Jeffers - CNP [facility] IM MMA   2020 11:30 AM Lucía Mayfield MD [facility]   2020  9:15 AM Liz Cool MD [facility]       Anibal Gabriel RN

## 2020-01-21 ENCOUNTER — CARE COORDINATION (OUTPATIENT)
Dept: CASE MANAGEMENT | Age: 85
End: 2020-01-21

## 2020-01-21 NOTE — CARE COORDINATION
[facility] Transitions Follow Up Call    2020    Patient: Hilton Cortez  Patient : 1935   MRN: [id number]  Reason for Admission:   Discharge Date: 20 RARS: Readmission Risk Score: 27         Spoke with: pt's wife    Care Transitions Subsequent and Final Call    Subsequent and Final Calls  Do you have any ongoing symptoms?:  No  Have your medications changed?:  No  Do you have any questions related to your medications?:  No  Do you currently have any active services?:  No  Do you have any needs or concerns that I can assist you with?:  No  Identified Barriers:  None  Care Transitions Interventions  No Identified Needs                          Other Interventions:          Pt's wife states pt is doing well, no issues or concerns. F/U appts listed below.  No need for further f/u CTC calls        Follow Up  Future Appointments   Date Time Provider Hanna Mccoy   2020  2:40 PM Myrna Kyle MD [facility] ENDO [facility]   2020 10:00 AM SHA Srinivasan - MART[facility]   2020 11:30 AM Graham Dye MD [facility] AFL [facility]   2020  9:15 AM Marlene Rosas MD [employer] PHOEBE Salinas RN

## 2020-01-24 ENCOUNTER — HOSPITAL ENCOUNTER (OUTPATIENT)
Dept: CT IMAGING | Age: 85
Discharge: HOME | End: 2020-01-24
Payer: MEDICARE

## 2020-01-24 PROCEDURE — 71250 CT THORAX DX C-: CPT

## 2020-01-27 NOTE — TELEPHONE ENCOUNTER
Pt's wife requesting refill for Freestyle Lite Test strips for 100. He test 2 x a day.  Send to [facility] on [address]  11-5-19  [facility]  1-30-20

## 2020-01-30 ENCOUNTER — OFFICE VISIT (OUTPATIENT)
Dept: ENDOCRINOLOGY | Age: 85
End: 2020-01-30
Payer: MEDICARE

## 2020-01-30 VITALS
BODY MASS INDEX: 29.06 KG/M2 | HEART RATE: 83 BPM | DIASTOLIC BLOOD PRESSURE: 76 MMHG | OXYGEN SATURATION: 97 % | HEIGHT: 70 IN | WEIGHT: 203 LBS | SYSTOLIC BLOOD PRESSURE: 134 MMHG

## 2020-01-30 PROCEDURE — G8482 FLU IMMUNIZE ORDER/ADMIN: HCPCS | Performed by: INTERNAL MEDICINE

## 2020-01-30 PROCEDURE — 1036F TOBACCO NON-USER: CPT | Performed by: INTERNAL MEDICINE

## 2020-01-30 PROCEDURE — G8427 DOCREV CUR MEDS BY ELIG CLIN: HCPCS | Performed by: INTERNAL MEDICINE

## 2020-01-30 PROCEDURE — G8417 CALC BMI ABV UP PARAM F/U: HCPCS | Performed by: INTERNAL MEDICINE

## 2020-01-30 PROCEDURE — 99214 OFFICE O/P EST MOD 30 MIN: CPT | Performed by: INTERNAL MEDICINE

## 2020-01-30 PROCEDURE — 1123F ACP DISCUSS/DSCN MKR DOCD: CPT | Performed by: INTERNAL MEDICINE

## 2020-01-30 PROCEDURE — 1111F DSCHRG MED/CURRENT MED MERGE: CPT | Performed by: INTERNAL MEDICINE

## 2020-01-30 PROCEDURE — 4040F PNEUMOC VAC/ADMIN/RCVD: CPT | Performed by: INTERNAL MEDICINE

## 2020-01-30 NOTE — PROGRESS NOTES
Betsy Olea is a 82 y.o. male is seen  for evaluation and management of Type 2  diabetes. Betsy Olea was diagnosed with Diabetes mellitus aprox in 2009   Diabetes was diagnosed at routine screening . Betsy Olea got diabetic education in the past.  Comorbid conditions: Neuropathy, Nephropathy, Retinopathy and Chronic Kidney Disease    Current diabetic medications include: glipizide BID he was on metformin     INTERIM:    Diabetes   He presents for his follow-up diabetic visit. He has type 2 diabetes mellitus. No MedicAlert identification noted. The initial diagnosis of diabetes was made 10 years ago. His disease course has been worsening. There are no hypoglycemic associated symptoms. Pertinent negatives for diabetes include no polyphagia, no polyuria and no weight loss. There are no hypoglycemic complications. Symptoms are worsening. Diabetic complications include peripheral neuropathy and retinopathy. Risk factors for coronary artery disease include diabetes mellitus, dyslipidemia, family history and male sex. He is compliant with treatment most of the time. His weight is increasing steadily. He is following a diabetic diet. When asked about meal planning, he reported none. He has not had a previous visit with a dietitian. He rarely participates in exercise. There is no change in his home blood glucose trend. His breakfast blood glucose is taken between 7-8 am. His breakfast blood glucose range is generally 180-200 mg/dl. An ACE inhibitor/angiotensin II receptor blocker is being taken. He does not see a podiatrist.Eye exam is current. Pt was in the ER for confusion in jan 1674 and he had complication from catheterization     He has been     Weight trend: stable  Prior visit with dietician: no  Current diet: on average, 3 meals per day  Current exercise: rare    Has there been any hospitalization, surgery or major illness since the last visit?  No   Has there been any new school, family or social problems since controlled type 2 diabetes mellitus ([address]    Pulmonary nodule    Pulmonary edema, acute (HCC)    Hematuria    Chronic systolic congestive heart failure (HCC)    Vitreous degeneration, bilateral    Uncontrolled type 2 diabetes mellitus ([address]    Secondary cataract    Primary open-angle glaucoma, bilateral, moderate stage    Posterior vitreous detachment of both eyes    Peripapillary atrophy of both eyes    Nodular corneal degeneration, right eye    Neoplastic malignant related fatigue    Nausea with vomiting    Hypomagnesemia    Entropion of left lower eyelid    Early stage nonexudative age-related macular degeneration of both eyes    Diabetic peripheral neuropathy ([address]    Dermatochalasis of right upper eyelid    Bilateral posterior capsular opacification    Aortic valvular disease    Phimosis of penis    Acute cystitis with hematuria    Gross hematuria    Renal insufficiency    High fever    Altered mental status    Recurrent UTI    Diverticulosis of colon without diverticulitis    RSV infection     Past Surgical History:   Procedure Laterality Date    CATARACT REMOVAL Bilateral 09/2014    CHOLECYSTECTOMY      CIRCUMCISION N/A 11/15/2019    DORSAL SLIT performed by Jacobo Powell MD at [address] [place] [address]  11/28/2011    Dr. Anna Weston - mild sigmoid diverticulosis    CYSTOSCOPY N/A 1/5/2020    CYSTOSCOPY, EVACUATION OF CLOTS performed by Adalgisa Redd MD at [address] [place] [address] Right 09/09/2013    Dr. Gurjit Lutz - block for pain relief    JOINT REPLACEMENT Bilateral     knees    KNEE PROSTHESIS REMOVAL      LUMBAR NERVE BLOCK N/A 8/26/2019    L4/5 INTERLAMINAR EPIDURAL STEROID INJECTION WITH FLUOROSCOPY performed by Kalia Contreras MD at [address] [place] [address] Left 05/09/2018    Dr. Vyas - CT guided    MOHS SURGERY Bilateral     NERVE SURGERY N/A 12/20/2019    L4L5 INTERLAMINAR EPIDURAL STEROID INJECTION WITH FLUOROSCOPY performed by Dontae AKBAR Wendi Aguilar MD at [address] [place] [address]      multiple lumbar ESIs    PAROTIDECTOMY Right 2017    Dr. Carmen Moura - superficial, w/excision of parotid tail & dissection/preservation of facial nerve    MT INJECT ANES/STEROID FORAMEN LUMBAR/SACRAL W IMG GUIDE ,1 LEVEL Right 2018    RIGHT L4, L5 LUMBAR TRANSFORAMINAL EPIDURAL STEROID INJECTION WITH FLUOROSCOPY performed by Nargis Olivares MD at [facility] Left 2019    TOTAL KNEE ARTHROPLASTY      right x1 and left x2    TUNNELED VENOUS PORT PLACEMENT  2018    Left SCV infusaport placement    UPPER GASTROINTESTINAL ENDOSCOPY  2018    Dr. Pete Jewell - mild non-obstructive ring distal esophagus     Social History     Socioeconomic History    Marital status:      Spouse name: Simon Novak Number of children: Not on file    Years of education: Not on file    Highest education level: Not on file   Occupational History    Not on file   Social Needs    Financial resource strain: Not on file    Food insecurity:     Worry: Not on file     Inability: Not on file    Transportation needs:     Medical: Not on file     Non-medical: Not on file   Tobacco Use    Smoking status: Former Smoker     Packs/day: 1.00     Years: 40.00     Pack years: 40.00     Last attempt to quit: 8/15/1970     Years since quittin.5    Smokeless tobacco: Never Used   Substance and Sexual Activity    Alcohol use: No    Drug use: No    Sexual activity: Yes     Partners: Female   Lifestyle    Physical activity:     Days per week: Not on file     Minutes per session: Not on file    Stress: Not on file   Relationships    Social connections:     Talks on phone: Not on file     Gets together: Not on file     Attends Hinduism service: Not on file     Active member of club or organization: Not on file     Attends meetings of clubs or organizations: Not on file     Relationship status: Not on file    Intimate monitoring kit (FREESTYLE) monitoring kit 1 kit by Does not apply route daily as needed. 1 kit 0    latanoprost (XALATAN) 0.005 % ophthalmic solution Place 1 drop into both eyes nightly. No current facility-administered medications for this visit. Allergies   Allergen Reactions    Celebrex [Celecoxib] Other (See Comments)     hallucinations    Elavil [Amitriptyline]      Severe fatigue      Naproxen      Kidney damage    Percocet [Oxycodone-Acetaminophen] Other (See Comments)     confusion    Lyrica [Pregabalin] Palpitations     Fatigue     Family Status   Relation Name Status    Father  (Not Specified)    Brother  (Not Specified)       Review of Systems  I have reviewed the review of system questionnaire filled by the patient .   Patient was advised to contact PCP for non endocrine signs and symptoms       OBJECTIVE:  /76   Pulse 83   Ht 5' 10\" (1.778 m)   Wt 203 lb (92.1 kg)   SpO2 97%   BMI 29.13 kg/m²    Wt Readings from Last 3 Encounters:   01/30/20 203 lb (92.1 kg)   01/07/20 200 lb 13.4 oz (91.1 kg)   01/02/20 197 lb (89.4 kg)       Constitutional: no acute distress, well appearing, well nourished  Psychiatric: oriented to person, place and time, judgement, insight and normal, recent and remote memory and intact and mood, affect are normal  Skin: skin and subcutaneous tissue is normal without mass,   Head and Face: examination of head and face revealed no abnormalities  Eyes: no lid or conjunctival swelling, no erythema or discharge, pupils are normal,   Ears/Nose: external inspection of ears and nose revealed no abnormalities, hearing is grossly normal  Oropharynx/Mouth/Face: lips, tongue and gums are normal with no lesions, the voice quality was normal  Neck: neck is supple and symmetric, with midline trachea and no masses, thyroid is normal    Pulmonary: no increased work of breathing or signs of respiratory distress, lungs are clear to auscultation  Cardiovascular: normal heart rate and rhythm, normal S1 and S2,   Musculoskeletal: Walks with a walker  Neurological: normal coordination, normal general cortical function          Lab Results   Component Value Date    LABA1C 9.1 01/05/2020    LABA1C 9.0 01/04/2020    LABA1C 9.5 11/01/2019         ASSESSMENT/PLAN:  1. Type 2 diabetes mellitus with stage 3 chronic kidney disease, without long-term current use of insulin 6.5>>7.2>>8.2>>9.5>>9.1     I had a lengthy discussion with the patient about  his  uncontrolled diabetes. We discussed progression of diabetes in detail and also the incidence of complications associated with uncontrolled diabetes. Wander Lloyd was advised that lifestyle modification is the key to better control of his Diabetes. We discussed carbohydrate restriction in detail. Hypoglycemia protocol reviewed in detail with patient Patient was advised to carry glucose tablets and also have glucagon emergency kit. Patient has been taking 22 units of Basaglar without any hypoglycemia, on review of his fingerstick glucose data he does have postprandial hyperglycemia I had a lengthy discussion with his wife that we need to add meal boluses they are somewhat agreeable to that but for now she wants to work on restricting carbohydrates. She will increase the Basaglar to 23 units    Due to his elevated creatinine oral hypoglycemic agent not recommended at this time  Advised to send weekly blood sugar log so I can make adjustments in insulin regimen    Patient was advised that sending of his fingerstick blood glucose logs is crucial in management of his  diabetes. I will adjust the  doses of diabetic medications  according to sent data. Health Maintenance       Last Eye Exam: advised to have annual dilated eye exam. his last eye exam was in 2019   Last Podiatry Exam:  Podiatry visit in jan 2020   Lipids: Last LDL level was 64 in march 2019   Microalbumin/Creatinine Ratio: he has CKD follows with Dr Alessandra Linder     .  Education:

## 2020-01-31 ENCOUNTER — HOSPITAL ENCOUNTER (OUTPATIENT)
Dept: MRI IMAGING | Age: 85
Discharge: HOME | End: 2020-01-31
Payer: MEDICARE

## 2020-01-31 PROCEDURE — 72148 MRI LUMBAR SPINE W/O DYE: CPT

## 2020-01-31 PROCEDURE — 70551 MRI BRAIN STEM W/O DYE: CPT

## 2020-02-05 ENCOUNTER — TELEPHONE (OUTPATIENT)
Dept: ENDOCRINOLOGY | Age: 85
End: 2020-02-05

## 2020-02-05 NOTE — TELEPHONE ENCOUNTER
Dr Judi Brady from Neurology called and would like to talk to you about this mutual pt  CB# [phone]

## 2020-02-05 NOTE — TELEPHONE ENCOUNTER
Called Dr. Augustine Woodruff he is planning on starting the patient on high doses of steroids.   I advised that the patient can start sending his sugar logs to me on regular basis so I can make recommendations about insulin dosing if his fingerstick glucose goes up

## 2020-02-06 ENCOUNTER — OFFICE VISIT (OUTPATIENT)
Dept: INTERNAL MEDICINE CLINIC | Age: 85
End: 2020-02-06
Payer: MEDICARE

## 2020-02-06 VITALS
BODY MASS INDEX: 28.84 KG/M2 | WEIGHT: 201 LBS | DIASTOLIC BLOOD PRESSURE: 58 MMHG | SYSTOLIC BLOOD PRESSURE: 136 MMHG | HEART RATE: 68 BPM

## 2020-02-06 LAB
INTERNATIONAL NORMALIZATION RATIO, POC: 2.4
PROTHROMBIN TIME, POC: NORMAL

## 2020-02-06 PROCEDURE — 99212 OFFICE O/P EST SF 10 MIN: CPT | Performed by: NURSE PRACTITIONER

## 2020-02-06 PROCEDURE — G8417 CALC BMI ABV UP PARAM F/U: HCPCS | Performed by: NURSE PRACTITIONER

## 2020-02-06 PROCEDURE — G8482 FLU IMMUNIZE ORDER/ADMIN: HCPCS | Performed by: NURSE PRACTITIONER

## 2020-02-06 PROCEDURE — 4040F PNEUMOC VAC/ADMIN/RCVD: CPT | Performed by: NURSE PRACTITIONER

## 2020-02-06 PROCEDURE — G8427 DOCREV CUR MEDS BY ELIG CLIN: HCPCS | Performed by: NURSE PRACTITIONER

## 2020-02-06 PROCEDURE — G8510 SCR DEP NEG, NO PLAN REQD: HCPCS | Performed by: NURSE PRACTITIONER

## 2020-02-06 PROCEDURE — 1036F TOBACCO NON-USER: CPT | Performed by: NURSE PRACTITIONER

## 2020-02-06 PROCEDURE — 1123F ACP DISCUSS/DSCN MKR DOCD: CPT | Performed by: NURSE PRACTITIONER

## 2020-02-06 PROCEDURE — 85610 PROTHROMBIN TIME: CPT | Performed by: NURSE PRACTITIONER

## 2020-02-06 PROCEDURE — 1111F DSCHRG MED/CURRENT MED MERGE: CPT | Performed by: NURSE PRACTITIONER

## 2020-02-06 ASSESSMENT — PATIENT HEALTH QUESTIONNAIRE - PHQ9
SUM OF ALL RESPONSES TO PHQ9 QUESTIONS 1 & 2: 0
SUM OF ALL RESPONSES TO PHQ QUESTIONS 1-9: 0
1. LITTLE INTEREST OR PLEASURE IN DOING THINGS: 0
SUM OF ALL RESPONSES TO PHQ QUESTIONS 1-9: 0
2. FEELING DOWN, DEPRESSED OR HOPELESS: 0

## 2020-02-07 ASSESSMENT — ENCOUNTER SYMPTOMS
RESPIRATORY NEGATIVE: 1
GASTROINTESTINAL NEGATIVE: 1

## 2020-02-07 NOTE — PROGRESS NOTES
SUBJECTIVE:    Patient ID: Capri Levine is a 82 y.o. male. CC: History of pulmonary embolism, chronic anticoagulation. HPI: Follow-up chronic medical problems, follow-up of recent medical issues, and INR mgmt.     He also has a history of pulmonary embolism with chronic anticoagulation.  He is taking his warfarin as directed.  He denies any side effects. Britta Farmer today is therapeutic today at 2.4. Past Medical History:   Diagnosis Date    Abdominal pain, epigastric     Arthritis     Benign prostatic hypertrophy without urinary obstruction     BPH     Cellulitis and abscess     Chronic rhinitis     Chronic systolic congestive heart failure (HCC) 4/18/2019    COPD (chronic obstructive pulmonary disease) (HCC)     Diabetes mellitus (HCC)     Dysphagia     Erectile dysfunction     GERD (gastroesophageal reflux disease)     Hx of blood clots     Hyperglycemia     Hypertension     lumbar radiculopathy     Neuropathy     Nocturia     Osteoarthritis     Other disorders of kidney and ureter in diseases classified elsewhere     Pain, joint, knee     Palpitations     skin cancer     Rt ear, nose, neck, hands/ 2018 lung    SOB (shortness of breath)     Tennis elbow     Tinea pedis     foot        Current Outpatient Medications   Medication Sig Dispense Refill    blood glucose test strips (FREESTYLE LITE) strip Test blood sugar  strip 3    finasteride (PROSCAR) 5 MG tablet Take 1 tablet by mouth daily 30 tablet 3    traMADol (ULTRAM) 50 MG tablet       gabapentin (NEURONTIN) 300 MG capsule Take 300 mg by mouth 3 times daily.       BASAGLAR KWIKPEN 100 UNIT/ML injection pen 20 units nightly (Patient taking differently: Inject 22 Units into the skin nightly 20 units nightly) 5 pen 3    warfarin (COUMADIN) 2.5 MG tablet 1.25 mg (2.5 mg x 0.5) every Sun, Tue, Thu; 2.5 mg (2.5 mg x 1) all other days 90 tablet 3    famotidine (PEPCID) 20 MG tablet Take 1 tablet by mouth 2 times daily 60 BP (!) 136/58   Pulse 68   Wt 201 lb (91.2 kg)   BMI 28.84 kg/m²      PHQ Scores 2/6/2020 9/18/2019 2/11/2019 7/10/2018 3/21/2017   PHQ2 Score 0 0 0 1 0   PHQ9 Score 0 0 0 1 0     Interpretation of Total Score Depression Severity: 1-4 = Minimal depression, 5-9 = Mild depression, 10-14 = Moderate depression, 15-19 = Moderately severe depression, 20-27 =Severe depression        ASSESSMENT/PLAN:  Ray was seen today for head congestion.   Diagnoses and all orders for this visit:    Bilateral pulmonary embolism (HCC)  - Continue anticoagulation    Anticoagulated on Coumadin  - INR 2.4  - Continue current regimen  -     POCT INR      SHA Downey - CNP

## 2020-02-10 RX ORDER — GABAPENTIN 300 MG/1
CAPSULE ORAL
Qty: 60 CAPSULE | Refills: 5 | Status: SHIPPED | OUTPATIENT
Start: 2020-02-10 | End: 2020-04-24

## 2020-02-13 NOTE — TELEPHONE ENCOUNTER
Pt's wife calling to state that her  has started his steroid therapy and his sugar last night around 6pm was 382 and this morning around 8am it was 206.  She states she knows the Dr has talked to Dr Mya Kerns about the steroid affecting his sugars and needs to know what to do to get it under control  # [phone]

## 2020-02-13 NOTE — TELEPHONE ENCOUNTER
Spoke with pt, he doesn't feel comfortable discuss recommendations he will have his wife call the office.

## 2020-02-13 NOTE — TELEPHONE ENCOUNTER
Pt wife aware of Dr Ornelas Shanelle recommendations and verbalized understanding. She would like the script for short acting sent to [facility]. She will also need more pen needles and test strips.

## 2020-02-14 ENCOUNTER — TELEPHONE (OUTPATIENT)
Dept: ENDOCRINOLOGY | Age: 85
End: 2020-02-14

## 2020-02-18 ENCOUNTER — HOSPITAL ENCOUNTER (OUTPATIENT)
Age: 85
Discharge: HOME | End: 2020-02-18
Payer: MEDICARE

## 2020-02-18 LAB
ALBUMIN SERPL-MCNC: 4 G/DL (ref 3.4–5)
ANION GAP SERPL CALCULATED.3IONS-SCNC: 17 MMOL/L (ref 3–16)
BUN BLDV-MCNC: 36 MG/DL (ref 7–20)
CALCIUM SERPL-MCNC: 8.7 MG/DL (ref 8.3–10.6)
CHLORIDE BLD-SCNC: 96 MMOL/L (ref 99–110)
CO2: 27 MMOL/L (ref 21–32)
CREAT SERPL-MCNC: 2.1 MG/DL (ref 0.8–1.3)
CREATININE URINE: 67.5 MG/DL (ref 39–259)
GFR AFRICAN AMERICAN: 37
GFR NON-AFRICAN AMERICAN: 30
GLUCOSE BLD-MCNC: 257 MG/DL (ref 70–99)
HCT VFR BLD CALC: 38.4 % (ref 40.5–52.5)
HEMOGLOBIN: 13 G/DL (ref 13.5–17.5)
MCH RBC QN AUTO: 30.8 PG (ref 26–34)
MCHC RBC AUTO-ENTMCNC: 33.9 G/DL (ref 31–36)
MCV RBC AUTO: 90.8 FL (ref 80–100)
PDW BLD-RTO: 17.1 % (ref 12.4–15.4)
PHOSPHORUS: 4 MG/DL (ref 2.5–4.9)
PLATELET # BLD: 163 K/UL (ref 135–450)
PMV BLD AUTO: 9.6 FL (ref 5–10.5)
POTASSIUM SERPL-SCNC: 3.8 MMOL/L (ref 3.5–5.1)
PROTEIN PROTEIN: 122 MG/DL
RBC # BLD: 4.23 M/UL (ref 4.2–5.9)
SODIUM BLD-SCNC: 140 MMOL/L (ref 136–145)
WBC # BLD: 8.3 K/UL (ref 4–11)

## 2020-02-18 PROCEDURE — 85027 COMPLETE CBC AUTOMATED: CPT

## 2020-02-18 PROCEDURE — 80069 RENAL FUNCTION PANEL: CPT

## 2020-02-18 PROCEDURE — 84156 ASSAY OF PROTEIN URINE: CPT

## 2020-02-18 PROCEDURE — 82570 ASSAY OF URINE CREATININE: CPT

## 2020-02-25 ENCOUNTER — OFFICE VISIT (OUTPATIENT)
Dept: INTERNAL MEDICINE CLINIC | Age: 85
End: 2020-02-25
Payer: MEDICARE

## 2020-02-25 VITALS
HEART RATE: 70 BPM | DIASTOLIC BLOOD PRESSURE: 70 MMHG | BODY MASS INDEX: 28.41 KG/M2 | WEIGHT: 198 LBS | SYSTOLIC BLOOD PRESSURE: 142 MMHG

## 2020-02-25 LAB
INTERNATIONAL NORMALIZATION RATIO, POC: 1.8
PROTHROMBIN TIME, POC: NORMAL

## 2020-02-25 PROCEDURE — 85610 PROTHROMBIN TIME: CPT | Performed by: NURSE PRACTITIONER

## 2020-02-25 PROCEDURE — 99213 OFFICE O/P EST LOW 20 MIN: CPT | Performed by: NURSE PRACTITIONER

## 2020-02-25 PROCEDURE — 4040F PNEUMOC VAC/ADMIN/RCVD: CPT | Performed by: NURSE PRACTITIONER

## 2020-02-25 PROCEDURE — G8417 CALC BMI ABV UP PARAM F/U: HCPCS | Performed by: NURSE PRACTITIONER

## 2020-02-25 PROCEDURE — G8482 FLU IMMUNIZE ORDER/ADMIN: HCPCS | Performed by: NURSE PRACTITIONER

## 2020-02-25 PROCEDURE — G8427 DOCREV CUR MEDS BY ELIG CLIN: HCPCS | Performed by: NURSE PRACTITIONER

## 2020-02-25 PROCEDURE — 1036F TOBACCO NON-USER: CPT | Performed by: NURSE PRACTITIONER

## 2020-02-25 PROCEDURE — 1123F ACP DISCUSS/DSCN MKR DOCD: CPT | Performed by: NURSE PRACTITIONER

## 2020-02-25 RX ORDER — DEXAMETHASONE 4 MG/1
4 TABLET ORAL
COMMUNITY
Start: 2020-02-11 | End: 2020-06-03

## 2020-02-25 RX ORDER — HYDROCODONE BITARTRATE AND ACETAMINOPHEN 5; 325 MG/1; MG/1
1 TABLET ORAL NIGHTLY PRN
Qty: 30 TABLET | Refills: 0 | Status: SHIPPED | OUTPATIENT
Start: 2020-02-25 | End: 2020-03-26

## 2020-02-27 PROBLEM — R11.2 NAUSEA WITH VOMITING: Status: RESOLVED | Noted: 2019-07-12 | Resolved: 2020-02-27

## 2020-02-27 PROBLEM — N30.01 ACUTE CYSTITIS WITH HEMATURIA: Status: RESOLVED | Noted: 2020-01-05 | Resolved: 2020-02-27

## 2020-02-27 PROBLEM — R31.0 GROSS HEMATURIA: Status: RESOLVED | Noted: 2020-01-05 | Resolved: 2020-02-27

## 2020-02-27 PROBLEM — J81.0 PULMONARY EDEMA, ACUTE (HCC): Status: RESOLVED | Noted: 2019-03-21 | Resolved: 2020-02-27

## 2020-02-27 PROBLEM — E11.65 POORLY CONTROLLED TYPE 2 DIABETES MELLITUS (HCC): Status: RESOLVED | Noted: 2018-05-18 | Resolved: 2020-02-27

## 2020-02-27 PROBLEM — R31.9 HEMATURIA: Status: RESOLVED | Noted: 2019-04-09 | Resolved: 2020-02-27

## 2020-02-27 PROBLEM — E83.42 HYPOMAGNESEMIA: Status: RESOLVED | Noted: 2019-07-12 | Resolved: 2020-02-27

## 2020-02-27 ASSESSMENT — ENCOUNTER SYMPTOMS
BACK PAIN: 1
GASTROINTESTINAL NEGATIVE: 1
RESPIRATORY NEGATIVE: 1

## 2020-02-27 NOTE — PROGRESS NOTES
SUBJECTIVE:    Patient ID: Maricarmen Justin is a 82 y.o. male. CC: Bilateral pulmonary embolism with chronic anticoagulation, cerebral amyloid angiopathy, back pain    HPI: The patient presents to the office today for follow-up of chronic medical conditions including history of bilateral pulmonary embolism with chronic anticoagulation, cerebral amyloid angiopathy, and worsening back pain with known lumbar stenosis/claudication/degenerative disc disease. Past Medical History:   Diagnosis Date    Abdominal pain, epigastric     Arthritis     Benign prostatic hypertrophy without urinary obstruction     BPH     Cellulitis and abscess     Chronic rhinitis     Chronic systolic congestive heart failure (HCC) 4/18/2019    COPD (chronic obstructive pulmonary disease) (HCC)     Diabetes mellitus (HCC)     Dysphagia     Erectile dysfunction     GERD (gastroesophageal reflux disease)     Hx of blood clots     Hyperglycemia     Hypertension     lumbar radiculopathy     Neuropathy     Nocturia     Osteoarthritis     Other disorders of kidney and ureter in diseases classified elsewhere     Pain, joint, knee     Palpitations     skin cancer     Rt ear, nose, neck, hands/ 2018 lung    SOB (shortness of breath)     Tennis elbow     Tinea pedis     foot        Current Outpatient Medications   Medication Sig Dispense Refill    HYDROcodone-acetaminophen (NORCO) 5-325 MG per tablet Take 1 tablet by mouth nightly as needed for Pain for up to 30 days. 30 tablet 0    blood glucose test strips (FREESTYLE LITE) strip Check blood sugar TID Dx: e11.65 100 strip 3    Insulin Pen Needle (B-D UF III MINI PEN NEEDLES) 31G X 5 MM MISC Use when injecting insulin QID Dx: e11.65 200 each 1    insulin aspart (NOVOLOG FLEXPEN) 100 UNIT/ML injection pen Inject 20 units TID follow sliding scale.  10 pen 1    gabapentin (NEURONTIN) 300 MG capsule TAKE ONE CAPSULE BY MOUTH TWICE A DAY 60 capsule 5    finasteride (PROSCAR) 5 MG

## 2020-03-12 ENCOUNTER — OFFICE VISIT (OUTPATIENT)
Dept: INTERNAL MEDICINE CLINIC | Age: 85
End: 2020-03-12
Payer: MEDICARE

## 2020-03-12 VITALS
DIASTOLIC BLOOD PRESSURE: 60 MMHG | WEIGHT: 203 LBS | HEART RATE: 66 BPM | BODY MASS INDEX: 28.72 KG/M2 | SYSTOLIC BLOOD PRESSURE: 142 MMHG

## 2020-03-12 LAB
INTERNATIONAL NORMALIZATION RATIO, POC: 2
PROTHROMBIN TIME, POC: NORMAL

## 2020-03-12 PROCEDURE — 4040F PNEUMOC VAC/ADMIN/RCVD: CPT | Performed by: NURSE PRACTITIONER

## 2020-03-12 PROCEDURE — 1036F TOBACCO NON-USER: CPT | Performed by: NURSE PRACTITIONER

## 2020-03-12 PROCEDURE — 99212 OFFICE O/P EST SF 10 MIN: CPT | Performed by: NURSE PRACTITIONER

## 2020-03-12 PROCEDURE — G8417 CALC BMI ABV UP PARAM F/U: HCPCS | Performed by: NURSE PRACTITIONER

## 2020-03-12 PROCEDURE — 85610 PROTHROMBIN TIME: CPT | Performed by: NURSE PRACTITIONER

## 2020-03-12 PROCEDURE — G8427 DOCREV CUR MEDS BY ELIG CLIN: HCPCS | Performed by: NURSE PRACTITIONER

## 2020-03-12 PROCEDURE — 1123F ACP DISCUSS/DSCN MKR DOCD: CPT | Performed by: NURSE PRACTITIONER

## 2020-03-12 PROCEDURE — G8482 FLU IMMUNIZE ORDER/ADMIN: HCPCS | Performed by: NURSE PRACTITIONER

## 2020-03-15 ASSESSMENT — ENCOUNTER SYMPTOMS
GASTROINTESTINAL NEGATIVE: 1
RESPIRATORY NEGATIVE: 1

## 2020-03-15 NOTE — PROGRESS NOTES
(PROSCAR) 5 MG tablet Take 1 tablet by mouth daily 30 tablet 3    traMADol (ULTRAM) 50 MG tablet       BASAGLARNOL GALDAMEZPEN 100 UNIT/ML injection pen 20 units nightly (Patient taking differently: Inject 22 Units into the skin nightly 20 units nightly) 5 pen 3    warfarin (COUMADIN) 2.5 MG tablet 1.25 mg (2.5 mg x 0.5) every Sun, Tue, Thu; 2.5 mg (2.5 mg x 1) all other days 90 tablet 3    famotidine (PEPCID) 20 MG tablet Take 1 tablet by mouth 2 times daily 60 tablet 5    lisinopril (PRINIVIL;ZESTRIL) 10 MG tablet Take 1 tablet by mouth daily 90 tablet 3    diphenhydrAMINE-APAP, sleep, (TYLENOL PM EXTRA STRENGTH PO) Take by mouth as needed      furosemide (LASIX) 40 MG tablet Take 1 tablet by mouth daily 180 tablet 1    DULoxetine (CYMBALTA) 30 MG extended release capsule Take 30 mg by mouth nightly       naphazoline-glycerin (CLEAR EYES REDNESS RELIEF) 0.012-0.2 % SOLN ophthalmic solution Place 1 drop into both eyes 2 times daily (Patient taking differently: Place 1 drop into both eyes 2 times daily as needed ) 1 Bottle 0    tamsulosin (FLOMAX) 0.4 MG capsule Take 1 capsule by mouth daily (Patient taking differently: Take 0.4 mg by mouth 2 times daily ) 90 capsule 1    glucose monitoring kit (FREESTYLE) monitoring kit 1 kit by Does not apply route daily as needed. 1 kit 0    latanoprost (XALATAN) 0.005 % ophthalmic solution Place 1 drop into both eyes nightly. No current facility-administered medications for this visit. Review of Systems   Constitutional: Negative. Respiratory: Negative. Cardiovascular: Negative. Gastrointestinal: Negative. Genitourinary: Negative. Psychiatric/Behavioral: Negative. OBJECTIVE:  Physical Exam  Constitutional:       General: He is not in acute distress. Appearance: He is well-developed. HENT:      Head: Normocephalic and atraumatic. Cardiovascular:      Rate and Rhythm: Normal rate and regular rhythm.    Pulmonary:      Effort: Pulmonary effort is normal.      Breath sounds: Normal breath sounds. Skin:     General: Skin is warm and dry. Neurological:      General: No focal deficit present. Mental Status: He is alert and oriented to person, place, and time. BP (!) 142/60   Pulse 66   Wt 203 lb (92.1 kg)   BMI 28.72 kg/m²      PHQ Scores 2/6/2020 9/18/2019 2/11/2019 7/10/2018 3/21/2017   PHQ2 Score 0 0 0 1 0   PHQ9 Score 0 0 0 1 0     Interpretation of Total Score Depression Severity: 1-4 = Minimal depression, 5-9 = Mild depression, 10-14 = Moderate depression, 15-19 = Moderately severe depression, 20-27 =Severe depression        ASSESSMENT/PLAN:  Ray was seen today for head congestion.   Diagnoses and all orders for this visit:    Bilateral pulmonary embolism (HCC)  - Continue anticoagulation    Anticoagulated on Coumadin  - INR 2.0  - Continue current regimen  -     POCT INR      SHA Teixeira - CNP

## 2020-03-24 RX ORDER — FAMOTIDINE 20 MG/1
20 TABLET, FILM COATED ORAL 2 TIMES DAILY
Qty: 60 TABLET | Refills: 5 | Status: SHIPPED | OUTPATIENT
Start: 2020-03-24 | End: 2020-10-14

## 2020-04-13 ENCOUNTER — OFFICE VISIT (OUTPATIENT)
Dept: INTERNAL MEDICINE CLINIC | Age: 85
End: 2020-04-13
Payer: MEDICARE

## 2020-04-13 VITALS
HEART RATE: 70 BPM | SYSTOLIC BLOOD PRESSURE: 148 MMHG | BODY MASS INDEX: 29.28 KG/M2 | DIASTOLIC BLOOD PRESSURE: 72 MMHG | WEIGHT: 207 LBS

## 2020-04-13 LAB
INTERNATIONAL NORMALIZATION RATIO, POC: 2.3
PROTHROMBIN TIME, POC: ABNORMAL

## 2020-04-13 PROCEDURE — 4040F PNEUMOC VAC/ADMIN/RCVD: CPT | Performed by: NURSE PRACTITIONER

## 2020-04-13 PROCEDURE — 85610 PROTHROMBIN TIME: CPT | Performed by: NURSE PRACTITIONER

## 2020-04-13 PROCEDURE — G8427 DOCREV CUR MEDS BY ELIG CLIN: HCPCS | Performed by: NURSE PRACTITIONER

## 2020-04-13 PROCEDURE — 1123F ACP DISCUSS/DSCN MKR DOCD: CPT | Performed by: NURSE PRACTITIONER

## 2020-04-13 PROCEDURE — 99213 OFFICE O/P EST LOW 20 MIN: CPT | Performed by: NURSE PRACTITIONER

## 2020-04-13 PROCEDURE — G8417 CALC BMI ABV UP PARAM F/U: HCPCS | Performed by: NURSE PRACTITIONER

## 2020-04-13 PROCEDURE — 1036F TOBACCO NON-USER: CPT | Performed by: NURSE PRACTITIONER

## 2020-04-13 RX ORDER — TRAMADOL HYDROCHLORIDE 50 MG/1
50 TABLET ORAL NIGHTLY PRN
Qty: 30 TABLET | Refills: 0 | Status: SHIPPED | OUTPATIENT
Start: 2020-04-13 | End: 2020-05-13 | Stop reason: SDUPTHER

## 2020-04-13 ASSESSMENT — ENCOUNTER SYMPTOMS
GASTROINTESTINAL NEGATIVE: 1
RESPIRATORY NEGATIVE: 1
BACK PAIN: 1

## 2020-04-14 LAB
ESTIMATED AVERAGE GLUCOSE: 162.8 MG/DL
HBA1C MFR BLD: 7.3 %

## 2020-04-20 ENCOUNTER — TELEPHONE (OUTPATIENT)
Dept: OTHER | Facility: CLINIC | Age: 85
End: 2020-04-20

## 2020-04-22 ENCOUNTER — TELEPHONE (OUTPATIENT)
Dept: INTERNAL MEDICINE CLINIC | Age: 85
End: 2020-04-22

## 2020-04-22 ENCOUNTER — NURSE TRIAGE (OUTPATIENT)
Dept: OTHER | Facility: CLINIC | Age: 85
End: 2020-04-22

## 2020-04-24 ENCOUNTER — OFFICE VISIT (OUTPATIENT)
Dept: INTERNAL MEDICINE CLINIC | Age: 85
End: 2020-04-24
Payer: MEDICARE

## 2020-04-24 VITALS
SYSTOLIC BLOOD PRESSURE: 148 MMHG | WEIGHT: 208 LBS | HEART RATE: 70 BPM | DIASTOLIC BLOOD PRESSURE: 64 MMHG | BODY MASS INDEX: 29.42 KG/M2

## 2020-04-24 PROCEDURE — 4040F PNEUMOC VAC/ADMIN/RCVD: CPT | Performed by: NURSE PRACTITIONER

## 2020-04-24 PROCEDURE — G8417 CALC BMI ABV UP PARAM F/U: HCPCS | Performed by: NURSE PRACTITIONER

## 2020-04-24 PROCEDURE — 1123F ACP DISCUSS/DSCN MKR DOCD: CPT | Performed by: NURSE PRACTITIONER

## 2020-04-24 PROCEDURE — 1036F TOBACCO NON-USER: CPT | Performed by: NURSE PRACTITIONER

## 2020-04-24 PROCEDURE — 99214 OFFICE O/P EST MOD 30 MIN: CPT | Performed by: NURSE PRACTITIONER

## 2020-04-24 PROCEDURE — G8427 DOCREV CUR MEDS BY ELIG CLIN: HCPCS | Performed by: NURSE PRACTITIONER

## 2020-04-24 RX ORDER — GABAPENTIN 300 MG/1
CAPSULE ORAL
Qty: 90 CAPSULE | Refills: 5 | Status: SHIPPED | OUTPATIENT
Start: 2020-04-24 | End: 2020-11-16

## 2020-04-24 ASSESSMENT — ENCOUNTER SYMPTOMS
RESPIRATORY NEGATIVE: 1
COLOR CHANGE: 1

## 2020-04-24 NOTE — PROGRESS NOTES
disease)     Hx of blood clots     Hyperglycemia     Hypertension     lumbar radiculopathy     Neuropathy     Nocturia     Osteoarthritis     Other disorders of kidney and ureter in diseases classified elsewhere     Pain, joint, knee     Palpitations     skin cancer     Rt ear, nose, neck, hands/ 2018 lung    SOB (shortness of breath)     Tennis elbow     Tinea pedis     foot        Current Outpatient Medications   Medication Sig Dispense Refill    traMADol (ULTRAM) 50 MG tablet Take 1 tablet by mouth nightly as needed for Pain for up to 30 days. 30 tablet 0    famotidine (PEPCID) 20 MG tablet Take 1 tablet by mouth 2 times daily 60 tablet 5    dexamethasone (DECADRON) 4 MG tablet Take 4 mg by mouth      blood glucose test strips (FREESTYLE LITE) strip Check blood sugar TID Dx: e11.65 100 strip 3    Insulin Pen Needle (B-D UF III MINI PEN NEEDLES) 31G X 5 MM MISC Use when injecting insulin QID Dx: e11.65 200 each 1    insulin aspart (NOVOLOG FLEXPEN) 100 UNIT/ML injection pen Inject 20 units TID follow sliding scale.  10 pen 1    gabapentin (NEURONTIN) 300 MG capsule TAKE ONE CAPSULE BY MOUTH TWICE A DAY 60 capsule 5    finasteride (PROSCAR) 5 MG tablet Take 1 tablet by mouth daily 30 tablet 3    BASAGLAR KWIKPEN 100 UNIT/ML injection pen 20 units nightly (Patient taking differently: Inject 22 Units into the skin nightly 20 units nightly) 5 pen 3    warfarin (COUMADIN) 2.5 MG tablet 1.25 mg (2.5 mg x 0.5) every Sun, Tue, Thu; 2.5 mg (2.5 mg x 1) all other days 90 tablet 3    lisinopril (PRINIVIL;ZESTRIL) 10 MG tablet Take 1 tablet by mouth daily 90 tablet 3    diphenhydrAMINE-APAP, sleep, (TYLENOL PM EXTRA STRENGTH PO) Take by mouth as needed      furosemide (LASIX) 40 MG tablet Take 1 tablet by mouth daily 180 tablet 1    DULoxetine (CYMBALTA) 30 MG extended release capsule Take 30 mg by mouth nightly       naphazoline-glycerin (CLEAR EYES REDNESS RELIEF) 0.012-0.2 % SOLN ophthalmic

## 2020-05-11 ENCOUNTER — OFFICE VISIT (OUTPATIENT)
Dept: INTERNAL MEDICINE CLINIC | Age: 85
End: 2020-05-11
Payer: MEDICARE

## 2020-05-11 LAB
INTERNATIONAL NORMALIZATION RATIO, POC: 1.5
PROTHROMBIN TIME, POC: NORMAL

## 2020-05-11 PROCEDURE — G8427 DOCREV CUR MEDS BY ELIG CLIN: HCPCS | Performed by: NURSE PRACTITIONER

## 2020-05-11 PROCEDURE — 1123F ACP DISCUSS/DSCN MKR DOCD: CPT | Performed by: NURSE PRACTITIONER

## 2020-05-11 PROCEDURE — G8417 CALC BMI ABV UP PARAM F/U: HCPCS | Performed by: NURSE PRACTITIONER

## 2020-05-11 PROCEDURE — 85610 PROTHROMBIN TIME: CPT | Performed by: NURSE PRACTITIONER

## 2020-05-11 PROCEDURE — 99212 OFFICE O/P EST SF 10 MIN: CPT | Performed by: NURSE PRACTITIONER

## 2020-05-11 PROCEDURE — 1036F TOBACCO NON-USER: CPT | Performed by: NURSE PRACTITIONER

## 2020-05-11 PROCEDURE — 4040F PNEUMOC VAC/ADMIN/RCVD: CPT | Performed by: NURSE PRACTITIONER

## 2020-05-11 ASSESSMENT — ENCOUNTER SYMPTOMS
GASTROINTESTINAL NEGATIVE: 1
RESPIRATORY NEGATIVE: 1

## 2020-05-11 NOTE — PROGRESS NOTES
TID follow sliding scale. 10 pen 1    finasteride (PROSCAR) 5 MG tablet Take 1 tablet by mouth daily 30 tablet 3    BASAGLAR KWIKPEN 100 UNIT/ML injection pen 20 units nightly (Patient taking differently: Inject 22 Units into the skin nightly 20 units nightly) 5 pen 3    warfarin (COUMADIN) 2.5 MG tablet 1.25 mg (2.5 mg x 0.5) every Sun, Tue, Thu; 2.5 mg (2.5 mg x 1) all other days 90 tablet 3    lisinopril (PRINIVIL;ZESTRIL) 10 MG tablet Take 1 tablet by mouth daily 90 tablet 3    diphenhydrAMINE-APAP, sleep, (TYLENOL PM EXTRA STRENGTH PO) Take by mouth as needed      furosemide (LASIX) 40 MG tablet Take 1 tablet by mouth daily 180 tablet 1    DULoxetine (CYMBALTA) 30 MG extended release capsule Take 30 mg by mouth nightly       naphazoline-glycerin (CLEAR EYES REDNESS RELIEF) 0.012-0.2 % SOLN ophthalmic solution Place 1 drop into both eyes 2 times daily (Patient taking differently: Place 1 drop into both eyes 2 times daily as needed ) 1 Bottle 0    tamsulosin (FLOMAX) 0.4 MG capsule Take 1 capsule by mouth daily (Patient taking differently: Take 0.4 mg by mouth 2 times daily ) 90 capsule 1    glucose monitoring kit (FREESTYLE) monitoring kit 1 kit by Does not apply route daily as needed. 1 kit 0    latanoprost (XALATAN) 0.005 % ophthalmic solution Place 1 drop into both eyes nightly. No current facility-administered medications for this visit. Review of Systems   Constitutional: Negative. Respiratory: Negative. Cardiovascular: Negative. Gastrointestinal: Negative. Genitourinary: Negative. Psychiatric/Behavioral: Negative. All other systems reviewed and are negative. OBJECTIVE:  Physical Exam  Constitutional:       General: He is not in acute distress. Appearance: He is well-developed. HENT:      Head: Normocephalic and atraumatic. Eyes:      General: No scleral icterus.      Conjunctiva/sclera: Conjunctivae normal.   Cardiovascular:      Rate and Rhythm: Normal rate and regular rhythm. Pulmonary:      Effort: Pulmonary effort is normal.      Breath sounds: Normal breath sounds. Abdominal:      General: There is no distension. Tenderness: There is no guarding. Skin:     General: Skin is warm and dry. Neurological:      Mental Status: He is alert. There were no vitals taken for this visit. PHQ Scores 2/6/2020 9/18/2019 2/11/2019 7/10/2018 3/21/2017   PHQ2 Score 0 0 0 1 0   PHQ9 Score 0 0 0 1 0     Interpretation of Total Score Depression Severity: 1-4 = Minimal depression, 5-9 = Mild depression, 10-14 = Moderate depression, 15-19 = Moderately severe depression, 20-27 =Severe depression        ASSESSMENT/PLAN:  Ray was seen today for head congestion.   Diagnoses and all orders for this visit:    Bilateral pulmonary embolism (HCC)  - Continue anticoagulation    Anticoagulated on Coumadin  - INR 1.5  - Extra warfarin today and increase dose  -     POCT INR      SHA Hauser - CNP

## 2020-05-13 ENCOUNTER — TELEPHONE (OUTPATIENT)
Dept: INTERNAL MEDICINE CLINIC | Age: 85
End: 2020-05-13

## 2020-05-13 RX ORDER — TRAMADOL HYDROCHLORIDE 50 MG/1
50 TABLET ORAL NIGHTLY PRN
Qty: 30 TABLET | Refills: 0 | Status: SHIPPED | OUTPATIENT
Start: 2020-05-13 | End: 2020-06-15

## 2020-05-18 ENCOUNTER — TELEPHONE (OUTPATIENT)
Dept: INTERNAL MEDICINE CLINIC | Age: 85
End: 2020-05-18

## 2020-05-27 ENCOUNTER — OFFICE VISIT (OUTPATIENT)
Dept: INTERNAL MEDICINE CLINIC | Age: 85
End: 2020-05-27
Payer: MEDICARE

## 2020-05-27 LAB
INTERNATIONAL NORMALIZATION RATIO, POC: 1.9
PROTHROMBIN TIME, POC: NORMAL

## 2020-05-27 PROCEDURE — G8417 CALC BMI ABV UP PARAM F/U: HCPCS | Performed by: NURSE PRACTITIONER

## 2020-05-27 PROCEDURE — 4040F PNEUMOC VAC/ADMIN/RCVD: CPT | Performed by: NURSE PRACTITIONER

## 2020-05-27 PROCEDURE — 85610 PROTHROMBIN TIME: CPT | Performed by: NURSE PRACTITIONER

## 2020-05-27 PROCEDURE — 99212 OFFICE O/P EST SF 10 MIN: CPT | Performed by: NURSE PRACTITIONER

## 2020-05-27 PROCEDURE — G8427 DOCREV CUR MEDS BY ELIG CLIN: HCPCS | Performed by: NURSE PRACTITIONER

## 2020-05-27 PROCEDURE — 1123F ACP DISCUSS/DSCN MKR DOCD: CPT | Performed by: NURSE PRACTITIONER

## 2020-05-27 PROCEDURE — 1036F TOBACCO NON-USER: CPT | Performed by: NURSE PRACTITIONER

## 2020-05-27 ASSESSMENT — ENCOUNTER SYMPTOMS
GASTROINTESTINAL NEGATIVE: 1
RESPIRATORY NEGATIVE: 1

## 2020-05-27 NOTE — PROGRESS NOTES
SUBJECTIVE:    Patient ID: Reena Venegas is a 82 y.o. male. CC: History of pulmonary embolism, chronic anticoagulation. HPI: Follow-up chronic medical problems, follow-up of recent medical issues, and INR mgmt.     He also has a history of pulmonary embolism with chronic anticoagulation.  He is taking his warfarin as directed.  He denies any side effects. Layne Maradiaga today is low today at 1.9. Past Medical History:   Diagnosis Date    Abdominal pain, epigastric     Arthritis     Benign prostatic hypertrophy without urinary obstruction     BPH     Cellulitis and abscess     Chronic rhinitis     Chronic systolic congestive heart failure (HCC) 4/18/2019    COPD (chronic obstructive pulmonary disease) (HCC)     Diabetes mellitus (HCC)     Dysphagia     Erectile dysfunction     GERD (gastroesophageal reflux disease)     Hx of blood clots     Hyperglycemia     Hypertension     lumbar radiculopathy     Neuropathy     Nocturia     Osteoarthritis     Other disorders of kidney and ureter in diseases classified elsewhere     Pain, joint, knee     Palpitations     skin cancer     Rt ear, nose, neck, hands/ 2018 lung    SOB (shortness of breath)     Tennis elbow     Tinea pedis     foot        Current Outpatient Medications   Medication Sig Dispense Refill    traMADol (ULTRAM) 50 MG tablet Take 1 tablet by mouth nightly as needed for Pain for up to 30 days.  30 tablet 0    gabapentin (NEURONTIN) 300 MG capsule Take one in morning and two evening 90 capsule 5    famotidine (PEPCID) 20 MG tablet Take 1 tablet by mouth 2 times daily 60 tablet 5    dexamethasone (DECADRON) 4 MG tablet Take 4 mg by mouth      blood glucose test strips (FREESTYLE LITE) strip Check blood sugar TID Dx: e11.65 100 strip 3    Insulin Pen Needle (B-D UF III MINI PEN NEEDLES) 31G X 5 MM MISC Use when injecting insulin QID Dx: e11.65 200 each 1    insulin aspart (NOVOLOG FLEXPEN) 100 UNIT/ML injection pen Inject 20 units TID follow sliding scale. 10 pen 1    finasteride (PROSCAR) 5 MG tablet Take 1 tablet by mouth daily 30 tablet 3    BASAGLAR KWIKPEN 100 UNIT/ML injection pen 20 units nightly (Patient taking differently: Inject 22 Units into the skin nightly 20 units nightly) 5 pen 3    warfarin (COUMADIN) 2.5 MG tablet 1.25 mg (2.5 mg x 0.5) every Sun, Tue, Thu; 2.5 mg (2.5 mg x 1) all other days 90 tablet 3    lisinopril (PRINIVIL;ZESTRIL) 10 MG tablet Take 1 tablet by mouth daily 90 tablet 3    diphenhydrAMINE-APAP, sleep, (TYLENOL PM EXTRA STRENGTH PO) Take by mouth as needed      furosemide (LASIX) 40 MG tablet Take 1 tablet by mouth daily 180 tablet 1    DULoxetine (CYMBALTA) 30 MG extended release capsule Take 30 mg by mouth nightly       naphazoline-glycerin (CLEAR EYES REDNESS RELIEF) 0.012-0.2 % SOLN ophthalmic solution Place 1 drop into both eyes 2 times daily (Patient taking differently: Place 1 drop into both eyes 2 times daily as needed ) 1 Bottle 0    tamsulosin (FLOMAX) 0.4 MG capsule Take 1 capsule by mouth daily (Patient taking differently: Take 0.4 mg by mouth 2 times daily ) 90 capsule 1    glucose monitoring kit (FREESTYLE) monitoring kit 1 kit by Does not apply route daily as needed. 1 kit 0    latanoprost (XALATAN) 0.005 % ophthalmic solution Place 1 drop into both eyes nightly. No current facility-administered medications for this visit. Review of Systems   Constitutional: Negative. Respiratory: Negative. Cardiovascular: Negative. Gastrointestinal: Negative. Genitourinary: Negative. Psychiatric/Behavioral: Negative. All other systems reviewed and are negative. OBJECTIVE:  Physical Exam  Constitutional:       General: He is not in acute distress. Appearance: He is well-developed. HENT:      Head: Normocephalic and atraumatic. Eyes:      General: No scleral icterus.      Conjunctiva/sclera: Conjunctivae normal.   Cardiovascular:      Rate and Rhythm:

## 2020-05-28 NOTE — PROGRESS NOTES
he was inoperable at time of his  diagnosis. ProBNP is elevated at 4939. I reviewed his echocardiogram.   To me, his ejection fraction appears better than reported 30-35%. I  feel it is more like 45%, but the main finding of the echocardiogram is  right atrial enlargement and engorgement of vena cava, which is more  consistent with right heart strain with cor pulmonale. Recommend- This is a  challenging situation. Diuresis is likely going to be difficult. The  best way to deal with his peripheral edema will be with some form of  compression hose, can certainly continue Lasix orally b.i.d. We will  consider addition of ACE inhibitor instead of the use of Norvasc for his  blood pressure. Overall, this will be very challenging for this patient  due to findings noted above, not sure what role lung cancer will play. I looked at his echocardiogram for the possibility of amyloid, infiltration  of his myocardium. It does not appear that he has this problem. Past Medical History:   has a past medical history of Abdominal pain, epigastric, Arthritis, Benign prostatic hypertrophy without urinary obstruction, BPH, Cellulitis and abscess, Chronic rhinitis, Chronic systolic congestive heart failure ([address] COPD (chronic obstructive pulmonary disease) ([address] Diabetes mellitus ([address] Dysphagia, Erectile dysfunction, GERD (gastroesophageal reflux disease), Hx of blood clots, Hyperglycemia, Hypertension, lumbar radiculopathy, Neuropathy, Nocturia, Osteoarthritis, Other disorders of kidney and ureter in diseases classified elsewhere, Pain, joint, knee, Palpitations, skin cancer, SOB (shortness of breath), Tennis elbow, and Tinea pedis. Surgical History:   has a past surgical history that includes Knee Prosthesis Removal; Cholecystectomy; Colonoscopy (11/28/2011); Cataract removal (Bilateral, 09/2014); Parotidectomy (Right, 01/26/2017); skin biopsy; Upper gastrointestinal endoscopy (03/19/2018);  Total knee Tricia Espino MD   warfarin (COUMADIN) 2.5 MG tablet 1.25 mg (2.5 mg x 0.5) every Sun, Tue, Thu; 2.5 mg (2.5 mg x 1) all other days 10/31/19  Yes SHA Mobley CNP   lisinopril (PRINIVIL;ZESTRIL) 10 MG tablet Take 1 tablet by mouth daily 9/18/19  Yes SHA Mobley CNP   diphenhydrAMINE-APAP, sleep, (TYLENOL PM EXTRA STRENGTH PO) Take by mouth as needed   Yes Historical Provider, MD   furosemide (LASIX) 40 MG tablet Take 1 tablet by mouth daily 4/16/19  Yes Wendi Salvador MD   DULoxetine (CYMBALTA) 30 MG extended release capsule Take 30 mg by mouth nightly    Yes Historical Provider, MD   naphazoline-glycerin (CLEAR EYES REDNESS RELIEF) 0.012-0.2 % SOLN ophthalmic solution Place 1 drop into both eyes 2 times daily  Patient taking differently: Place 1 drop into both eyes 2 times daily as needed  4/7/18  Yes Zak Jones MD   tamsulosin (FLOMAX) 0.4 MG capsule Take 1 capsule by mouth daily  Patient taking differently: Take 0.4 mg by mouth 2 times daily  8/3/17  Yes SHA Mobley CNP   glucose monitoring kit (FREESTYLE) monitoring kit 1 kit by Does not apply route daily as needed. 4/7/14  Yes Yamel Jason MD   latanoprost (XALATAN) 0.005 % ophthalmic solution Place 1 drop into both eyes nightly. 10/27/13  Yes Historical Provider, MD   finasteride (PROSCAR) 5 MG tablet Take 1 tablet by mouth daily  Patient not taking: Reported on 5/29/2020 1/7/20   Costa De La Fuente MD        Allergies:  Celebrex [celecoxib]; Elavil [amitriptyline]; Naproxen; Percocet [oxycodone-acetaminophen]; and Lyrica [pregabalin]     Review of Systems:   · Constitutional: there has been no unanticipated weight loss. There's been no change in energy level, sleep pattern, or activity level. · Eyes: No visual changes or diplopia. No scleral icterus. · ENT: No Headaches, hearing loss or vertigo. No mouth sores or sore throat.   · Cardiovascular: Reviewed in HPI  · Respiratory: No cough or wheezing, no sputum production. No hematemesis. · Gastrointestinal: No abdominal pain, appetite loss, blood in stools. No change in bowel or bladder habits. · Genitourinary: No dysuria, trouble voiding, or hematuria. · Musculoskeletal:  No gait disturbance, weakness or joint complaints. · Integumentary: No rash or pruritis. · Neurological: No headache, diplopia, change in muscle strength, numbness or tingling. No change in gait, balance, coordination, mood, affect, memory, mentation, behavior. Worsening gait difficulties and memory  · Psychiatric: No anxiety, no depression  · Endocrine: No malaise, fatigue or temperature intolerance. No excessive thirst, fluid intake, or urination. No tremor. · Hematologic/Lymphatic: + bruising from fall, but no bleeding, blood clots or swollen lymph nodes. · Allergic/Immunologic: No nasal congestion or hives. Physical Examination:    Vitals:    05/29/20 0920   BP: 122/66   Pulse: 77   Resp: 18   Temp: 97.5 °F (36.4 °C)   SpO2: 98%        Wt Readings from Last 1 Encounters:   05/29/20 204 lb (92.5 kg)       Constitutional and General Appearance: NAD  Skin:good turgor,intact without lesions  HEENT: EOMI ,normal  Neck:no JVD    Respiratory:  · Normal excursion and expansion without use of accessory muscles  · Resp Auscultation: Normal breath sounds without dullness  Cardiovascular:  · The apical impulses not displaced  · Heart tones are crisp and normal  · Cervical veins are not engorged  · The carotid upstroke is normal in amplitude and contour without delay or bruit  · Peripheral pulses are symmetrical and full  · There is no clubbing, cyanosis of the extremities.   · No edema - no change  · Femoral Arteries: 2+ and equal  · Pedal Pulses: 2+ and equal   Abdomen:  · No masses or tenderness  · Liver/Spleen: No Abnormalities Noted  Neurological/Psychiatric:  · Alert and oriented in all spheres  · Moves all extremities well  · Gait slow, uses rollator to help with balance   · No abnormalities

## 2020-05-29 ENCOUNTER — OFFICE VISIT (OUTPATIENT)
Dept: CARDIOLOGY CLINIC | Age: 85
End: 2020-05-29
Payer: MEDICARE

## 2020-05-29 VITALS
WEIGHT: 204 LBS | SYSTOLIC BLOOD PRESSURE: 122 MMHG | HEART RATE: 77 BPM | BODY MASS INDEX: 28.56 KG/M2 | RESPIRATION RATE: 18 BRPM | DIASTOLIC BLOOD PRESSURE: 66 MMHG | TEMPERATURE: 97.5 F | HEIGHT: 71 IN | OXYGEN SATURATION: 98 %

## 2020-05-29 PROCEDURE — G8427 DOCREV CUR MEDS BY ELIG CLIN: HCPCS | Performed by: INTERNAL MEDICINE

## 2020-05-29 PROCEDURE — G8417 CALC BMI ABV UP PARAM F/U: HCPCS | Performed by: INTERNAL MEDICINE

## 2020-05-29 PROCEDURE — 1036F TOBACCO NON-USER: CPT | Performed by: INTERNAL MEDICINE

## 2020-05-29 PROCEDURE — 1123F ACP DISCUSS/DSCN MKR DOCD: CPT | Performed by: INTERNAL MEDICINE

## 2020-05-29 PROCEDURE — 4040F PNEUMOC VAC/ADMIN/RCVD: CPT | Performed by: INTERNAL MEDICINE

## 2020-05-29 PROCEDURE — 99214 OFFICE O/P EST MOD 30 MIN: CPT | Performed by: INTERNAL MEDICINE

## 2020-05-29 NOTE — LETTER
extremity edema. He has bruising on right side or torso after a recent fall. Patient is compliant with medications and is tolerating them well without side effects. 3/23/19 Consult Note:   LABORATORY DATA:  Show creatinine as up to 2. 1. Hemoglobin 11.7. Liver  tests are normal.  INR 2.85. Troponin 0.06, which is not significant. Chest x-ray shows new infiltrative process. CT scan is concerning for  recurrent lung cancer, although he was inoperable at time of his  diagnosis. ProBNP is elevated at 4939. I reviewed his echocardiogram.   To me, his ejection fraction appears better than reported 30-35%. I  feel it is more like 45%, but the main finding of the echocardiogram is  right atrial enlargement and engorgement of vena cava, which is more  consistent with right heart strain with cor pulmonale. Recommend- This is a  challenging situation. Diuresis is likely going to be difficult. The  best way to deal with his peripheral edema will be with some form of  compression hose, can certainly continue Lasix orally b.i.d. We will  consider addition of ACE inhibitor instead of the use of Norvasc for his  blood pressure. Overall, this will be very challenging for this patient  due to findings noted above, not sure what role lung cancer will play. I looked at his echocardiogram for the possibility of amyloid, infiltration  of his myocardium. It does not appear that he has this problem.      Past Medical History:   has a past medical history of Abdominal pain, epigastric, Arthritis, Benign prostatic hypertrophy without urinary obstruction, BPH, Cellulitis and abscess, Chronic rhinitis, Chronic systolic congestive heart failure ([address] COPD (chronic obstructive pulmonary disease) ([address] Diabetes mellitus ([address] Dysphagia, Erectile dysfunction, GERD (gastroesophageal reflux disease), Hx of blood clots, Hyperglycemia, Hypertension, lumbar radiculopathy, Neuropathy, Nocturia, Osteoarthritis, Other disorders of kidney and ureter in diseases classified elsewhere, Pain, joint, knee, Palpitations, skin cancer, SOB (shortness of breath), Tennis elbow, and Tinea pedis. Surgical History:   has a past surgical history that includes Knee Prosthesis Removal; Cholecystectomy; Colonoscopy (11/28/2011); Cataract removal (Bilateral, 09/2014); Parotidectomy (Right, 01/26/2017); skin biopsy; Upper gastrointestinal endoscopy (03/19/2018); Total knee arthroplasty; hip surgery (Right, 09/09/2013); other surgical history; Lung biopsy (Left, 05/09/2018); Tunneled venous port placement (06/22/2018); pr inject anes/steroid foramen lumbar/sacral w img guide ,1 level (Right, 11/21/2018); eye surgery; Mohs surgery (Bilateral); Skin cancer excision (Left, 08/14/2019); lumbar nerve block (N/A, 8/26/2019); Circumcision (N/A, 11/15/2019); joint replacement (Bilateral); Nerve Surgery (N/A, 12/20/2019); and Cystoscopy (N/A, 1/5/2020). Social History:   reports that he quit smoking about 49 years ago. He has a 40.00 pack-year smoking history. He has never used smokeless tobacco. He reports that he does not drink alcohol or use drugs. Family History:  family history includes Arthritis in his father; Diabetes in his brother and father. Home Medications:  Prior to Admission medications    Medication Sig Start Date End Date Taking? Authorizing Provider   traMADol (ULTRAM) 50 MG tablet Take 1 tablet by mouth nightly as needed for Pain for up to 30 days.  5/13/20 6/12/20 Yes SHA Lay CNP   gabapentin (NEURONTIN) 300 MG capsule Take one in morning and two evening 4/24/20 7/23/20 Yes SHA Lay CNP   famotidine (PEPCID) 20 MG tablet Take 1 tablet by mouth 2 times daily 3/24/20  Yes SHA Lay CNP   dexamethasone (DECADRON) 4 MG tablet Take 4 mg by mouth 2/11/20  Yes Historical Provider, MD Patient not taking: Reported on 5/29/2020 1/7/20   Chante Willis MD        Allergies:  Celebrex [celecoxib]; Elavil [amitriptyline]; Naproxen; Percocet [oxycodone-acetaminophen]; and Lyrica [pregabalin]     Review of Systems:   · Constitutional: there has been no unanticipated weight loss. There's been no change in energy level, sleep pattern, or activity level. · Eyes: No visual changes or diplopia. No scleral icterus. · ENT: No Headaches, hearing loss or vertigo. No mouth sores or sore throat. · Cardiovascular: Reviewed in HPI  · Respiratory: No cough or wheezing, no sputum production. No hematemesis. · Gastrointestinal: No abdominal pain, appetite loss, blood in stools. No change in bowel or bladder habits. · Genitourinary: No dysuria, trouble voiding, or hematuria. · Musculoskeletal:  No gait disturbance, weakness or joint complaints. · Integumentary: No rash or pruritis. · Neurological: No headache, diplopia, change in muscle strength, numbness or tingling. No change in gait, balance, coordination, mood, affect, memory, mentation, behavior. Worsening gait difficulties and memory  · Psychiatric: No anxiety, no depression  · Endocrine: No malaise, fatigue or temperature intolerance. No excessive thirst, fluid intake, or urination. No tremor. · Hematologic/Lymphatic: + bruising from fall, but no bleeding, blood clots or swollen lymph nodes. · Allergic/Immunologic: No nasal congestion or hives.     Physical Examination:    Vitals:    05/29/20 0920   BP: 122/66   Pulse: 77   Resp: 18   Temp: 97.5 °F (36.4 °C)   SpO2: 98%        Wt Readings from Last 1 Encounters:   05/29/20 204 lb (92.5 kg)       Constitutional and General Appearance: NAD  Skin:good turgor,intact without lesions  HEENT: EOMI ,normal  Neck:no JVD    Respiratory:  · Normal excursion and expansion without use of accessory muscles  · Resp Auscultation: Normal breath sounds without dullness  Cardiovascular: · The apical impulses not displaced  · Heart tones are crisp and normal  · Cervical veins are not engorged  · The carotid upstroke is normal in amplitude and contour without delay or bruit  · Peripheral pulses are symmetrical and full  · There is no clubbing, cyanosis of the extremities. · No edema - no change  · Femoral Arteries: 2+ and equal  · Pedal Pulses: 2+ and equal   Abdomen:  · No masses or tenderness  · Liver/Spleen: No Abnormalities Noted  Neurological/Psychiatric:  · Alert and oriented in all spheres  · Moves all extremities well  · Gait slow, uses rollator to help with balance   · No abnormalities of mood, affect, memory, mentation, or behavior are noted    3/22/19 [facility]  Summary   -Left ventricular cavity size is normal. There is moderate concentric left   ventricular hypertrophy. Overall left ventricular systolic function appears   severely reduced with a estimated ejection fraction of 30-35%. Global   hypokinesis.   -Grade I diastolic dysfunction. E/e\"=18.1   -Mild aortic and tricuspid regurgitation.   -Trivial mitral regurgitation.   -Estimated pulmonary artery systolic pressure is mildly elevated at 44 mmHg   assuming a right atrial pressure of 3 mmHg. 3/17/18 ECHO    Normal left ventricle size, wall thickness and systolic function with an   estimated ejection fraction of 55%.   No regional wall motion abnormalities. Trivial mitral regurgitation.   Mild aortic regurgitation.   Mild to moderate tricuspid regurgitation with an RVSP of 59 mmHg. ECG 7/19 normal sinus with PAC  Echo 7/19 reviewed      7/25/19 Echo   Summary   Normal left ventricular size. Concentric left venticular hypertrophy.   Globally decreased left ventricular systolic function with an estimated EF   45-50%.   E/e'=13. Diastolic filling parameters suggests grade I diastolic   dysfunction.   Mild mitral regurgitation.   The left atrium is mildly dilated.   Mild aortic regurgitation is present.  Pressure half-time is calculated at

## 2020-05-29 NOTE — PATIENT INSTRUCTIONS
1.  No change in medications  2. Continue risk factor modifications. 3.  Call for any change in symptoms.   4.  Return for regular follow up in 6 months and repeat echo

## 2020-06-03 ENCOUNTER — VIRTUAL VISIT (OUTPATIENT)
Dept: ENDOCRINOLOGY | Age: 85
End: 2020-06-03
Payer: MEDICARE

## 2020-06-03 PROCEDURE — 99443 PR PHYS/QHP TELEPHONE EVALUATION 21-30 MIN: CPT | Performed by: INTERNAL MEDICINE

## 2020-06-03 NOTE — PROGRESS NOTES
Radha Duvall is a 82 y.o. male is seen  for evaluation and management of uncontrolled Type 2  diabetes. Radha Duvall was diagnosed with Diabetes mellitus aprox in 2009   Diabetes was diagnosed at routine screening . Radha Duvall got diabetic education in the past.  Comorbid conditions: Neuropathy, Nephropathy, Retinopathy and Chronic Kidney Disease    Current diabetic medications include: glipizide BID he was on metformin     INTERIM:    Diabetes   He presents for his follow-up diabetic visit. He has type 2 diabetes mellitus. No MedicAlert identification noted. The initial diagnosis of diabetes was made 10 years ago. His disease course has been worsening. There are no hypoglycemic associated symptoms. Pertinent negatives for diabetes include no polyphagia, no polyuria and no weight loss. There are no hypoglycemic complications. Symptoms are worsening. Diabetic complications include peripheral neuropathy and retinopathy. Risk factors for coronary artery disease include diabetes mellitus, dyslipidemia, family history and male sex. He is compliant with treatment most of the time. His weight is increasing steadily. He is following a diabetic diet. When asked about meal planning, he reported none. He has not had a previous visit with a dietitian. He rarely participates in exercise. There is no change in his home blood glucose trend. His breakfast blood glucose is taken between 7-8 am. His breakfast blood glucose range is generally 180-200 mg/dl. An ACE inhibitor/angiotensin II receptor blocker is being taken. He does not see a podiatrist.Eye exam is current. He has been taking basal bolus regimen and has noticed significant improvement in her fingerstick blood glucose   Denies any hypoglycemia     Weight trend: stable  Prior visit with dietician: no  Current diet: on average, 3 meals per day  Current exercise: rare    Has there been any hospitalization, surgery or major illness since the last visit?  No   Has there been any new school, family or social problems since the last visit? No  Has there been any new family history of members with diabetes, heart disease, stroke, or endocrine related problems since the last visit?   No    He has Lung cancer s/p chemo and radiation   He has CHF and follows with cardiology       Past Medical History:   Diagnosis Date    Abdominal pain, epigastric     Arthritis     Benign prostatic hypertrophy without urinary obstruction     BPH     Cellulitis and abscess     Chronic rhinitis     Chronic systolic congestive heart failure ([facility] [address] 4/18/2019    COPD (chronic obstructive pulmonary disease) (HCC)     Diabetes mellitus (HCC)     Dysphagia     Erectile dysfunction     GERD (gastroesophageal reflux disease)     Hx of blood clots     Hyperglycemia     Hypertension     lumbar radiculopathy     Neuropathy     Nocturia     Osteoarthritis     Other disorders of kidney and ureter in diseases classified elsewhere     Pain, joint, knee     Palpitations     skin cancer     Rt ear, nose, neck, hands/ 2018 lung    SOB (shortness of breath)     Tennis elbow     Tinea pedis     foot      Patient Active Problem List   Diagnosis    Chronic rhinitis    Primary osteoarthritis involving multiple joints    Erectile dysfunction    Glaucoma    Essential hypertension    DDD (degenerative disc disease), lumbar    Lumbar stenosis with neurogenic claudication    Diabetes education, encounter for    Hearing loss of both ears    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 I will adjust the  doses of diabetic medications  according to sent data. Health Maintenance       Last Eye Exam: advised to have annual dilated eye exam. his last eye exam was in 2019   Last Podiatry Exam:  Podiatry visit in jan 2020   Lipids: Last LDL level was 64 in march 2019   Microalbumin/Creatinine Ratio: he has CKD follows with Dr Alonzo Sims     . Education: Reviewed ABCs of diabetes management (respective goals in parentheses): A1C (<7), blood pressure (<130/80), and cholesterol (LDL <100). 2. Diabetic peripheral neuropathy   He is on neurontin and cymbalta     3. Benign prostatic hyperplasia with urinary hesitancy  Stable     4. CKD (chronic kidney disease) stage 3, GFR 30-59 ml/min   Follows with Dr Alonzo Sims   Creat 1.9     5. Essential hypertension  Well controlled     6. Non-small cell cancer of left lung   In remission     7. Chronic systolic congestive heart failure ([facility] [address]  Follows withcardiology       Reviewed and/or ordered clinical lab results yes   Reviewed and/or ordered radiology tests Yes  Reviewed and/or ordered other diagnostic tests yes   Made a decision to obtain old records yes   Reviewed and summarized old records yes     Krissy Talley was counseled regarding symptoms of current diagnosis, course and complications of disease if inadequately treated, side effects of medications, diagnosis, treatment options, and prognosis, risks, benefits, complications, and alternatives of treatment, labs, imaging and other studies and treatment targets and goals. He understands instructions and counseling    This was a phone conversation in [facility] of in-person visit due to coronavirus emergency. Patient provided verbal consent for an \"over the phone visit'.   I spent 25  minutes on this call conducting an interview, performing a limited exam by phone and educating the patient

## 2020-06-04 ENCOUNTER — TELEPHONE (OUTPATIENT)
Dept: INTERNAL MEDICINE CLINIC | Age: 85
End: 2020-06-04

## 2020-06-05 ENCOUNTER — OFFICE VISIT (OUTPATIENT)
Dept: INTERNAL MEDICINE CLINIC | Age: 85
End: 2020-06-05
Payer: MEDICARE

## 2020-06-05 VITALS
HEART RATE: 87 BPM | TEMPERATURE: 99.3 F | BODY MASS INDEX: 28.78 KG/M2 | OXYGEN SATURATION: 94 % | DIASTOLIC BLOOD PRESSURE: 74 MMHG | HEIGHT: 71 IN | WEIGHT: 205.6 LBS | SYSTOLIC BLOOD PRESSURE: 128 MMHG

## 2020-06-05 PROCEDURE — 4040F PNEUMOC VAC/ADMIN/RCVD: CPT | Performed by: NURSE PRACTITIONER

## 2020-06-05 PROCEDURE — G8417 CALC BMI ABV UP PARAM F/U: HCPCS | Performed by: NURSE PRACTITIONER

## 2020-06-05 PROCEDURE — G8427 DOCREV CUR MEDS BY ELIG CLIN: HCPCS | Performed by: NURSE PRACTITIONER

## 2020-06-05 PROCEDURE — 1036F TOBACCO NON-USER: CPT | Performed by: NURSE PRACTITIONER

## 2020-06-05 PROCEDURE — 99213 OFFICE O/P EST LOW 20 MIN: CPT | Performed by: NURSE PRACTITIONER

## 2020-06-05 PROCEDURE — 1123F ACP DISCUSS/DSCN MKR DOCD: CPT | Performed by: NURSE PRACTITIONER

## 2020-06-05 RX ORDER — CEPHALEXIN 500 MG/1
500 CAPSULE ORAL 2 TIMES DAILY
Qty: 14 CAPSULE | Refills: 0 | Status: SHIPPED | OUTPATIENT
Start: 2020-06-05 | End: 2020-06-12

## 2020-06-05 ASSESSMENT — ENCOUNTER SYMPTOMS
WHEEZING: 0
COUGH: 0
CHEST TIGHTNESS: 0
SHORTNESS OF BREATH: 0

## 2020-06-05 NOTE — PROGRESS NOTES
6/5/20     Chief Complaint   Patient presents with    Wound Check     right leg, opened up- found wednesday     HPI     Usually wears support hose for swelling   When taking off stockings on Weds noticed a small sore to his right shin   Has some drainage clear drainage  Some mild surrounding erythema   Denies any fever, chills     Allergies   Allergen Reactions    Celebrex [Celecoxib] Other (See Comments)     hallucinations    Elavil [Amitriptyline]      Severe fatigue      Naproxen      Kidney damage    Percocet [Oxycodone-Acetaminophen] Other (See Comments)     confusion    Lyrica [Pregabalin] Palpitations     Fatigue     Current Outpatient Medications   Medication Sig Dispense Refill    cephALEXin (KEFLEX) 500 MG capsule Take 1 capsule by mouth 2 times daily for 7 days 14 capsule 0    furosemide (LASIX) 40 MG tablet Take 1 tablet twice a day for 5 days. Then take 1 tablet once a day. 95 tablet 1    traMADol (ULTRAM) 50 MG tablet Take 1 tablet by mouth nightly as needed for Pain for up to 30 days. 30 tablet 0    gabapentin (NEURONTIN) 300 MG capsule Take one in morning and two evening 90 capsule 5    famotidine (PEPCID) 20 MG tablet Take 1 tablet by mouth 2 times daily 60 tablet 5    blood glucose test strips (FREESTYLE LITE) strip Check blood sugar TID Dx: e11.65 100 strip 3    Insulin Pen Needle (B-D UF III MINI PEN NEEDLES) 31G X 5 MM MISC Use when injecting insulin QID Dx: e11.65 200 each 1    insulin aspart (NOVOLOG FLEXPEN) 100 UNIT/ML injection pen Inject 20 units TID follow sliding scale.  10 pen 1    BASAGLAR KWIKPEN 100 UNIT/ML injection pen 20 units nightly (Patient taking differently: Inject 22 Units into the skin nightly Pt taking 25 units nightly) 5 pen 3    warfarin (COUMADIN) 2.5 MG tablet 1.25 mg (2.5 mg x 0.5) every Sun, Tue, Thu; 2.5 mg (2.5 mg x 1) all other days 90 tablet 3    lisinopril (PRINIVIL;ZESTRIL) 10 MG tablet Take 1 tablet by mouth daily 90 tablet 3    DULoxetine

## 2020-06-08 LAB
GRAM STAIN RESULT: ABNORMAL
ORGANISM: ABNORMAL
WOUND/ABSCESS: ABNORMAL

## 2020-06-10 ENCOUNTER — TELEPHONE (OUTPATIENT)
Dept: INTERNAL MEDICINE CLINIC | Age: 85
End: 2020-06-10

## 2020-06-12 RX ORDER — CLINDAMYCIN HYDROCHLORIDE 300 MG/1
300 CAPSULE ORAL 3 TIMES DAILY
Qty: 21 CAPSULE | Refills: 0 | Status: SHIPPED | OUTPATIENT
Start: 2020-06-12 | End: 2020-06-19

## 2020-06-16 RX ORDER — TRAMADOL HYDROCHLORIDE 50 MG/1
TABLET ORAL
Qty: 30 TABLET | Refills: 0 | Status: SHIPPED | OUTPATIENT
Start: 2020-06-16 | End: 2020-07-12

## 2020-06-17 ENCOUNTER — TELEPHONE (OUTPATIENT)
Dept: INTERNAL MEDICINE CLINIC | Age: 85
End: 2020-06-17

## 2020-06-17 NOTE — TELEPHONE ENCOUNTER
Stan Collins from [facility] called to ask Aniyah Kerr for the okay to have patient be off Alberto Friend for 5 days prior to procedure on July 2nd. Alice Michael is out of the office until Monday and okay to hold.

## 2020-06-23 ENCOUNTER — TELEPHONE (OUTPATIENT)
Dept: INTERNAL MEDICINE CLINIC | Age: 85
End: 2020-06-23

## 2020-06-26 ENCOUNTER — OFFICE VISIT (OUTPATIENT)
Dept: INTERNAL MEDICINE CLINIC | Age: 85
End: 2020-06-26
Payer: MEDICARE

## 2020-06-26 ENCOUNTER — TELEPHONE (OUTPATIENT)
Dept: INTERNAL MEDICINE CLINIC | Age: 85
End: 2020-06-26

## 2020-06-26 VITALS
SYSTOLIC BLOOD PRESSURE: 122 MMHG | DIASTOLIC BLOOD PRESSURE: 62 MMHG | TEMPERATURE: 99.1 F | BODY MASS INDEX: 29.14 KG/M2 | HEART RATE: 58 BPM | WEIGHT: 206 LBS

## 2020-06-26 LAB
INTERNATIONAL NORMALIZATION RATIO, POC: 1.5
PROTHROMBIN TIME, POC: NORMAL

## 2020-06-26 PROCEDURE — G8417 CALC BMI ABV UP PARAM F/U: HCPCS | Performed by: NURSE PRACTITIONER

## 2020-06-26 PROCEDURE — 99214 OFFICE O/P EST MOD 30 MIN: CPT | Performed by: NURSE PRACTITIONER

## 2020-06-26 PROCEDURE — 1123F ACP DISCUSS/DSCN MKR DOCD: CPT | Performed by: NURSE PRACTITIONER

## 2020-06-26 PROCEDURE — 1036F TOBACCO NON-USER: CPT | Performed by: NURSE PRACTITIONER

## 2020-06-26 PROCEDURE — 85610 PROTHROMBIN TIME: CPT | Performed by: NURSE PRACTITIONER

## 2020-06-26 PROCEDURE — G8427 DOCREV CUR MEDS BY ELIG CLIN: HCPCS | Performed by: NURSE PRACTITIONER

## 2020-06-26 PROCEDURE — 4040F PNEUMOC VAC/ADMIN/RCVD: CPT | Performed by: NURSE PRACTITIONER

## 2020-06-26 NOTE — TELEPHONE ENCOUNTER
Patient's wife calling about patient INR. She states patient is going to have surgery. She states his INR is low and she needs Marguerite Aschoff to advise her about dosage.

## 2020-06-29 ASSESSMENT — ENCOUNTER SYMPTOMS
GASTROINTESTINAL NEGATIVE: 1
RESPIRATORY NEGATIVE: 1

## 2020-06-30 NOTE — PROGRESS NOTES
SUBJECTIVE:    Patient ID: Radha Duvall is a 82 y.o. male. CC: History of pulmonary embolism, chronic anticoagulation. HPI: Follow-up chronic medical problems, follow-up of recent medical issues, and INR mgmt.     He also has a history of pulmonary embolism with chronic anticoagulation.  He is taking his warfarin as directed.  He denies any side effects. Viera Helder today is low today at 1.5. Patient has a recent complicated medical history including evaluation and treatment by multiple specialist.  He has been treated for inflammatory amyloid angiopathy with steroids. He has not been referred to an additional neurologist who is recommending spinal tap procedure and repeat MRI. Today, the patient and his spouse are very torn about proceeding with the lumbar tap because they feel his memory and confusion are better. They are worried about complications related to the spinal tap. Additionally, the patient's INR is subtherapeutic today and he will need to go off Coumadin soon for the procedure. Spouse is very concerned that he was mildly subtherapeutic last visit and more so today and should he go off warfarin for a week he will have been subtherapeutic for some time.       Past Medical History:   Diagnosis Date    Abdominal pain, epigastric     Arthritis     Benign prostatic hypertrophy without urinary obstruction     BPH     Cellulitis and abscess     Chronic rhinitis     Chronic systolic congestive heart failure (HCC) 4/18/2019    COPD (chronic obstructive pulmonary disease) (HCC)     Diabetes mellitus (HCC)     Dysphagia     Erectile dysfunction     GERD (gastroesophageal reflux disease)     Hx of blood clots     Hyperglycemia     Hypertension     lumbar radiculopathy     Neuropathy     Nocturia     Osteoarthritis     Other disorders of kidney and ureter in diseases classified elsewhere     Pain, joint, knee     Palpitations     skin cancer     Rt ear, nose, neck, hands/ 2018 lung

## 2020-07-08 ENCOUNTER — TELEPHONE (OUTPATIENT)
Dept: ORTHOPEDIC SURGERY | Age: 85
End: 2020-07-08

## 2020-07-08 NOTE — TELEPHONE ENCOUNTER
L/m on v/m, per AAS, patients ov appt on 7/10/20 needs to be rescheduled.  he may reschedule to VV that afternoon with AAS or an office visit with MAC on Monday 7/13/20

## 2020-07-09 ENCOUNTER — TELEPHONE (OUTPATIENT)
Dept: ORTHOPEDIC SURGERY | Age: 85
End: 2020-07-09

## 2020-07-10 ENCOUNTER — TELEPHONE (OUTPATIENT)
Dept: INTERNAL MEDICINE CLINIC | Age: 85
End: 2020-07-10

## 2020-07-12 RX ORDER — TRAMADOL HYDROCHLORIDE 50 MG/1
50-100 TABLET ORAL DAILY PRN
Qty: 60 TABLET | Refills: 0 | Status: SHIPPED | OUTPATIENT
Start: 2020-07-12 | End: 2020-08-27 | Stop reason: SDUPTHER

## 2020-07-12 RX ORDER — CIMETIDINE 800 MG
800 TABLET ORAL 2 TIMES DAILY
Qty: 60 TABLET | Refills: 5 | Status: SHIPPED | OUTPATIENT
Start: 2020-07-12 | End: 2021-01-07

## 2020-07-13 ENCOUNTER — TELEPHONE (OUTPATIENT)
Dept: INTERNAL MEDICINE CLINIC | Age: 85
End: 2020-07-13

## 2020-07-13 ENCOUNTER — OFFICE VISIT (OUTPATIENT)
Dept: ORTHOPEDIC SURGERY | Age: 85
End: 2020-07-13
Payer: MEDICARE

## 2020-07-13 ENCOUNTER — TELEPHONE (OUTPATIENT)
Dept: ORTHOPEDIC SURGERY | Age: 85
End: 2020-07-13

## 2020-07-13 VITALS — RESPIRATION RATE: 12 BRPM | BODY MASS INDEX: 28.84 KG/M2 | TEMPERATURE: 97.2 F | WEIGHT: 206 LBS | HEIGHT: 71 IN

## 2020-07-13 PROCEDURE — G8417 CALC BMI ABV UP PARAM F/U: HCPCS | Performed by: PHYSICIAN ASSISTANT

## 2020-07-13 PROCEDURE — 99214 OFFICE O/P EST MOD 30 MIN: CPT | Performed by: PHYSICIAN ASSISTANT

## 2020-07-13 PROCEDURE — 1123F ACP DISCUSS/DSCN MKR DOCD: CPT | Performed by: PHYSICIAN ASSISTANT

## 2020-07-13 PROCEDURE — 4040F PNEUMOC VAC/ADMIN/RCVD: CPT | Performed by: PHYSICIAN ASSISTANT

## 2020-07-13 PROCEDURE — 1036F TOBACCO NON-USER: CPT | Performed by: PHYSICIAN ASSISTANT

## 2020-07-13 PROCEDURE — G8427 DOCREV CUR MEDS BY ELIG CLIN: HCPCS | Performed by: PHYSICIAN ASSISTANT

## 2020-07-13 NOTE — LETTER
Please schedule the following with:     Date:   20 @ 11:45   Account: [de-identified]  Patient: Fermin Roberto    : 1935  Address:  [address]    Phone (T):  [phone] (home)      ----------------------------------------------------------------------------------------------  Diagnosis:     ICD-10-CM    1. Lumbar stenosis with neurogenic claudication  M48.062    2. DDD (degenerative disc disease), lumbar  M51.36    3. Spondylosis of lumbar region without myelopathy or radiculopathy  M47.816    4. Lumbar radiculopathy  M54.16      Procedure: Interlaminar Epidural Steroid Injection     Levels: L4-5   Side: Midline   CPT Codes 53536    ----------------------------------------------------------------------------------------------  Injection # 1  [facility]    Attending Physician       Niyah Braswell. Legrand Kanner, MD.      ----------------------------------------------------------------------------------------------  Injection Scheduled For:    At:    1st Insurance [facility]    Pre-Cert#    2nd Insurance [employer] senior   Pre-Cert#    Comments:    · Infection control  · Tested positive for MRSA in past 12 months:  no  · Tested positive for MSSA \"staph infection\" in past 12 months: no  · Tested positive for VRE (Vancomycin Resistant Enterococci) in past 12 months:   no  · Currently on any antibiotics for an infection: no, patient did have a recent staph infection and treated with abx, no longer taking any at this time.   · Anticoagulants:  · On a blood thinner:  yes , WARFARIN , Naoma Mode, APRN- CNP  · Any history of bleeding disorder: no   · Advanced Liver disease: no   · Advanced Renal disease: no   · Glaucoma: no   · Diabetes: yes     Sedation:  Yes  -----------------------------------------------------------------------------------------------  Allergies   Allergen Reactions    Celebrex [Celecoxib] Other (See Comments)     hallucinations    Elavil [Amitriptyline]      Severe fatigue      Naproxen

## 2020-07-13 NOTE — TELEPHONE ENCOUNTER
L/m for approval to hold WARFARIN for 5  days prior to [facility] on 7/20/20. Patient aware of hold date.

## 2020-07-13 NOTE — PROGRESS NOTES
Arthritis     Benign prostatic hypertrophy without urinary obstruction     BPH     Cellulitis and abscess     Chronic rhinitis     Chronic systolic congestive heart failure (HCC) 4/18/2019    COPD (chronic obstructive pulmonary disease) (HCC)     Diabetes mellitus (HCC)     Dysphagia     Erectile dysfunction     GERD (gastroesophageal reflux disease)     Hx of blood clots     Hyperglycemia     Hypertension     lumbar radiculopathy     Neuropathy     Nocturia     Osteoarthritis     Other disorders of kidney and ureter in diseases classified elsewhere     Pain, joint, knee     Palpitations     skin cancer     Rt ear, nose, neck, hands/ 2018 lung    SOB (shortness of breath)     Tennis elbow     Tinea pedis     foot      Past Surgical History:     Past Surgical History:   Procedure Laterality Date    CATARACT REMOVAL Bilateral 09/2014    CHOLECYSTECTOMY      CIRCUMCISION N/A 11/15/2019    DORSAL SLIT performed by Nayely Christy MD at [address] [place] [address]  11/28/2011    Dr. Malik Mane - mild sigmoid diverticulosis    CYSTOSCOPY N/A 1/5/2020    CYSTOSCOPY, EVACUATION OF CLOTS performed by Kristel Marquez MD at [address] [place] [address] Right 09/09/2013    Dr. Ricardo Mathur - block for pain relief    JOINT REPLACEMENT Bilateral     knees    KNEE PROSTHESIS REMOVAL      LUMBAR NERVE BLOCK N/A 8/26/2019    L4/5 INTERLAMINAR EPIDURAL STEROID INJECTION WITH FLUOROSCOPY performed by Tasia Park MD at [address] [place] [address] Left 05/09/2018    Dr. Goss Ni - CT guided    MOHS SURGERY Bilateral     NERVE SURGERY N/A 12/20/2019    L4L5 INTERLAMINAR EPIDURAL STEROID INJECTION WITH FLUOROSCOPY performed by Tasia Park MD at [address] [place] [address]      multiple lumbar ESIs    PAROTIDECTOMY Right 01/26/2017    Dr. Odalys Joe - superficial, w/excision of parotid tail & dissection/preservation of facial nerve    IA INJECT ANES/STEROID FORAMEN LUMBAR/SACRAL W IMG GUIDE ,1 LEVEL Right 11/21/2018    RIGHT L4, L5 LUMBAR TRANSFORAMINAL EPIDURAL STEROID INJECTION WITH FLUOROSCOPY performed by Nestor Billy MD at [facility] Left 08/14/2019    TOTAL KNEE ARTHROPLASTY      right x1 and left x2    TUNNELED VENOUS PORT PLACEMENT  06/22/2018    Left [facility] infusaport placement    UPPER GASTROINTESTINAL ENDOSCOPY  03/19/2018    Dr. Danielle Snider - mild non-obstructive ring distal esophagus     Current Medications:     Current Outpatient Medications:     cimetidine (TAGAMET) 800 MG tablet, Take 1 tablet by mouth 2 times daily, Disp: 60 tablet, Rfl: 5    traMADol (ULTRAM) 50 MG tablet, Take 1-2 tablets by mouth daily as needed for Pain for up to 30 days. , Disp: 60 tablet, Rfl: 0    enoxaparin (LOVENOX) 30 MG/0.3ML injection, Inject 0.3 mLs into the skin daily, Disp: 5 Syringe, Rfl: 0    furosemide (LASIX) 40 MG tablet, Take 1 tablet twice a day for 5 days.  Then take 1 tablet once a day., Disp: 95 tablet, Rfl: 1    gabapentin (NEURONTIN) 300 MG capsule, Take one in morning and two evening, Disp: 90 capsule, Rfl: 5    famotidine (PEPCID) 20 MG tablet, Take 1 tablet by mouth 2 times daily, Disp: 60 tablet, Rfl: 5    blood glucose test strips (FREESTYLE LITE) strip, Check blood sugar TID Dx: e11.65, Disp: 100 strip, Rfl: 3    Insulin Pen Needle (B-D UF III MINI PEN NEEDLES) 31G X 5 MM MISC, Use when injecting insulin QID Dx: e11.65, Disp: 200 each, Rfl: 1    insulin aspart (NOVOLOG FLEXPEN) 100 UNIT/ML injection pen, Inject 20 units TID follow sliding scale., Disp: 10 pen, Rfl: 1    BASAGLAR KWIKPEN 100 UNIT/ML injection pen, 20 units nightly (Patient taking differently: Inject 22 Units into the skin nightly Pt taking 25 units nightly), Disp: 5 pen, Rfl: 3    warfarin (COUMADIN) 2.5 MG tablet, 1.25 mg (2.5 mg x 0.5) every Sun, Tue, Thu; 2.5 mg (2.5 mg x 1) all other days, Disp: 90 tablet, Rfl: 3    lisinopril (PRINIVIL;ZESTRIL) 10 MG tablet, palpation reveals no tenderness in upper extremities. 1+ pitting edema bilateral lower extremities with healing staph infection on the right lower extremity. Good capillary refill. · The lymphatic examination of the neck, axillae and groin reveals all areas to be without enlargement or induration  · Sensation is intact without deficit in the upper and lower extremities to light touch and pinprick  · Coordination of the upper and lower extremities are normal.  · RIGHT UPPER EXTREMITY:  Inspection/examination of the right upper extremity does not show any tenderness, deformity or injury. Range of motion is unremarkable and pain-free. There is no gross instability. There are no rashes, ulcerations or lesions. Strength and tone are normal. No atrophy or abnormal movements are noted. · LEFT UPPER EXTREMITY: Inspection/examination of the left upper extremity does not show any tenderness, deformity or injury. Range of motion is unremarkable and pain-free. There is no gross instability. There are no rashes, ulcerations or lesions. Strength and tone are normal. No atrophy or abnormal movements are noted. LUMBAR/SACRAL EXAMINATION:  · Inspection: Local inspection shows no step-off or bruising. Lumbar alignment is normal. No instability is noted. · Palpation:   No evidence of tenderness at the midline. Lumbar paraspinal tenderness: Mild L4/5 and L5/S1 tenderness  Bursal tenderness No tenderness bilaterally  There is no paraspinal spasm. · Range of Motion: limited by 50% in all planes due to pain  · Strength:   Strength testing is 5/5 in all muscle groups tested. · Special Tests:   Straight leg raise and crossed SLR negative. Samuel's testing is negative bilaterally. FADIR's testing is negative bilaterally. Bowstring test negative. Slump test negative. · Skin: There are no rashes, ulcerations or lesions. · Reflexes: Reflexes are symmetrically 1+ at the patellar and ankle tendons.   Clonus absent bilaterally at the feet. · Gait & station: ambulates with a walker and no ataxia  · Additional Examinations:  · RIGHT LOWER EXTREMITY: Inspection/examination of the right lower extremity does not show any tenderness, deformity or injury. Range of motion is normal and pain-free. There is no gross instability. There are no rashes, ulcerations or lesions. Strength and tone are normal. No atrophy or abnormal movements are noted. · LEFT LOWER EXTREMITY:  Inspection/examination of the left lower extremity does not show any tenderness, deformity or injury. Range of motion is normal and pain-free. There is no gross instability. There are no rashes, ulcerations or lesions. Strength and tone are normal. No atrophy or abnormal movements are noted. Diagnostic Testing:    MR Lumbar spine shows from 1/31/20:   FINDINGS:    BONES/ALIGNMENT: Sagittal alignment of the lumbar spine is normal.  Lumbar    vertebral bodies are normal in height.  No acute lumbar spine fracture.  The    marrow signal pattern throughout the lumbar spine is within normal limits.         SPINAL CORD: The conus terminates normally.         SOFT TISSUES: No paraspinal mass identified.         Partial visualization of large bilateral renal cysts.         L1-L2: Mild DDD.  Diffuse annular bulging.  No focal disc herniation or    central spinal canal stenosis.  No significant neural foraminal stenosis.         L2-L3: Mild DDD.  Disc bulge measures 3 mm.  Bilateral facet joint DJD. Flattening of ventral thecal sac.  Mild spinal canal stenosis.  Bilateral    lateral recess stenosis.  Minimal bilateral neural foraminal stenosis.         L3-L4: Mild DDD.  Central annular fissure.  Disc bulge measures 3 mm. Bilateral facet joint DJD.  Mild spinal canal stenosis.  Right more than left    lateral recess stenosis.  Mild bilateral neural foraminal stenosis.         L4-L5: Mild DDD.   Diffuse disc bulge measures 5 mm.  Bilateral facet joint    DJD ligamentum flavum thickening.  Severe spinal canal stenosis.  Bilateral    lateral recess stenosis.  Moderate right and mild left neural foraminal    stenosis.         L5-S1: There is no significant disc herniation, spinal canal stenosis or    neural foraminal narrowing.              Impression    1. No acute lumbar spine abnormality.  No abnormal marrow replacement to    suggest lumbar spine metastatic disease. 2. Severe multifactorial spinal canal stenosis at L4-L5.  Moderate right and    mild left neural foraminal stenosis at this level. 3. Mild multifactorial spinal canal stenosis at L2-L3 and L3-L4.  Mild    bilateral neural foraminal stenosis at L3-L4. Results for orders placed or performed in visit on 20   POCT INR   Result Value Ref Range    INR 1.5     Protime       Impression:       1. Lumbar stenosis with neurogenic claudication    2. DDD (degenerative disc disease), lumbar    3. Spondylosis of lumbar region without myelopathy or radiculopathy    4. Lumbar radiculopathy        Plan:  Clinical Course: Above diagnoses are worsening    I discussed the diagnosis and the treatment options with Alonso Hale today. In Summary:  The various treatment options were outlined and discussed with Alonso Hlae including:  Conservative care options: physical therapy, ice, medications, bracing, and activity modification. The indications for therapeutic injections. The indications for additional imaging/laboratory studies. The indications for (possible future) interventions. After considering the various options discussed, Alonso Hale elected to pursue a course of treatment that includes the followin. Medications:  No further recommendations for new medications. 2. PT:  Encouraged to continue with Home exercise program.    3. Further studies: No further studies. 4. Interventional:  We discussed pursuing a L4-5 IL epidural steroid injection to address the pain.   Radiologic imaging and symptoms confirm the [facility]  Board Certified by the [employer] on Certification of Physician Assistants  [address]  Partner of [facility] ([facility])         This dictation was performed with a verbal recognition program [facility]) and it was checked for errors. It is possible that there are still dictated errors within this office note. If so, please bring any errors to my attention for an addendum. All efforts were made to ensure that this office note is accurate.

## 2020-07-15 ENCOUNTER — TELEPHONE (OUTPATIENT)
Dept: ADMINISTRATIVE | Age: 85
End: 2020-07-15

## 2020-07-15 ENCOUNTER — TELEPHONE (OUTPATIENT)
Dept: ORTHOPEDIC SURGERY | Age: 85
End: 2020-07-15

## 2020-07-15 ENCOUNTER — OFFICE VISIT (OUTPATIENT)
Dept: PRIMARY CARE CLINIC | Age: 85
End: 2020-07-15
Payer: MEDICARE

## 2020-07-15 PROCEDURE — 99211 OFF/OP EST MAY X REQ PHY/QHP: CPT | Performed by: NURSE PRACTITIONER

## 2020-07-15 NOTE — TELEPHONE ENCOUNTER
Auth: NPR  Date: 7/20/2020  Reference # None  Spoke with: None  Type of SX: Outpatient [facility]  Location: [facility]  CPT 89879, [id number]   SX area: Lumbar spine  Insurance: Medicare A&B

## 2020-07-15 NOTE — PATIENT INSTRUCTIONS

## 2020-07-15 NOTE — PROGRESS NOTES
PATIENT'S WIFE REACHED   YES_X___NO____    PREOP INSTUCTIONS  Patient instructed to get their COVID-19 test done as directed by their doctor (5-7 days prior to procedure)  or patient states will get on ___7/15_______. I The day the COVID test is done is considered day one. Instructed to self quarantine after test until DOS. There is a one visitor policy at [facility] for the pre-op phase only for surgery and endoscopy cases. The visitor is expected to leave the facility after the patient is taken back for the procedure. Pain management is NO VISITOR policyThe patients ride is expected to remain in the car with a cell phone for communication. If the ride is leaving the hospital grounds please make sure they are back in time for pickup. Have the patient inform the staff on arrival what their rides plans are while the patient is in the facility. At the MAIN there is one visitor allowed. Please note that the visitor policy is subject to change. DATE__7/20/20_______ TIME__1145_______ARRIVAL_1045_______PLACE_[facility]___________  NOTHING TO EAT OR DRINK  AFTER MIDNIGHT THE EVENING PRIOR OR AS INSTRUCTED BY YOUR DR.  Eric Pedroza NEED A RESPONSIBLE ADULT AGE 18 OR OLDER TO DRIVE YOU HOME  PLEASE BRING INSURANCE CARD. PICTURE ID AND COMPLETE LIST OF MEDS  WEAR LOOSE COMFORTABLE CLOTHING  FOLLOW ANY INSTRUCTIONS YOUR DRS OFFICE HAS GIVEN YOU,INCLUDING WHAT MEDICATIONS TO TAKE THE AM OF PROCEDURE AND WHEN AND IF YOU NEED TO STOP ANY BLOOD THINNERS. IF YOU HAVE QUESTIONS REGARDING THIS CALL THE OFFICE  THE GOAL BLOOD SUGAR THE AM OF PROCEDURE  OR LESS ABOVE THAT THE PROCEDURE MAY BE CANCELLED  ANY QUESTIONS CALL YOUR DOCTOR. ALSO,PLEASE READ THE INSTRUCTION PACKET FROM YOUR DR IF YOU RECEIVED ONE.   SPINE INTERVENTION NUMBER [phone]

## 2020-07-17 NOTE — TELEPHONE ENCOUNTER
Received DENIAL for Cimetidine 800MG tablets, Key: [id number]. Denial letter attached. Please notify patient. Thank you.

## 2020-07-17 NOTE — TELEPHONE ENCOUNTER
Based on the reason for denial medication should have been approved. Resubmitted with necessary information.

## 2020-07-20 ENCOUNTER — APPOINTMENT (OUTPATIENT)
Dept: GENERAL RADIOLOGY | Age: 85
End: 2020-07-20
Attending: PHYSICAL MEDICINE & REHABILITATION
Payer: MEDICARE

## 2020-07-20 ENCOUNTER — HOSPITAL ENCOUNTER (OUTPATIENT)
Age: 85
Setting detail: OUTPATIENT SURGERY
Discharge: HOME | End: 2020-07-20
Attending: PHYSICAL MEDICINE & REHABILITATION | Admitting: PHYSICAL MEDICINE & REHABILITATION
Payer: MEDICARE

## 2020-07-20 VITALS
BODY MASS INDEX: 28 KG/M2 | HEART RATE: 73 BPM | RESPIRATION RATE: 16 BRPM | SYSTOLIC BLOOD PRESSURE: 146 MMHG | TEMPERATURE: 98.9 F | DIASTOLIC BLOOD PRESSURE: 68 MMHG | WEIGHT: 200 LBS | HEIGHT: 71 IN | OXYGEN SATURATION: 98 %

## 2020-07-20 LAB
GLUCOSE BLD-MCNC: 156 MG/DL (ref 70–99)
GLUCOSE BLD-MCNC: 158 MG/DL (ref 70–99)
INR BLD: 1.13 (ref 0.86–1.14)
PERFORMED ON: ABNORMAL
PERFORMED ON: ABNORMAL
PROTHROMBIN TIME: 13.1 SEC (ref 10–13.2)
SARS-COV-2, NAAT: NOT DETECTED

## 2020-07-20 PROCEDURE — 2500000003 HC RX 250 WO HCPCS: Performed by: PHYSICAL MEDICINE & REHABILITATION

## 2020-07-20 PROCEDURE — 3209999900 FLUORO FOR SURGICAL PROCEDURES

## 2020-07-20 PROCEDURE — 2709999900 HC NON-CHARGEABLE SUPPLY: Performed by: PHYSICAL MEDICINE & REHABILITATION

## 2020-07-20 PROCEDURE — U0002 COVID-19 LAB TEST NON-CDC: HCPCS

## 2020-07-20 PROCEDURE — 99152 MOD SED SAME PHYS/QHP 5/>YRS: CPT | Performed by: PHYSICAL MEDICINE & REHABILITATION

## 2020-07-20 PROCEDURE — 3610000056 HC PAIN LEVEL 4 BASE (NON-OR): Performed by: PHYSICAL MEDICINE & REHABILITATION

## 2020-07-20 PROCEDURE — 6360000002 HC RX W HCPCS: Performed by: PHYSICAL MEDICINE & REHABILITATION

## 2020-07-20 PROCEDURE — 85610 PROTHROMBIN TIME: CPT

## 2020-07-20 PROCEDURE — 3610000054 HC PAIN LEVEL 3 BASE (NON-OR): Performed by: PHYSICAL MEDICINE & REHABILITATION

## 2020-07-20 RX ORDER — LIDOCAINE HYDROCHLORIDE 10 MG/ML
INJECTION, SOLUTION INFILTRATION; PERINEURAL
Status: COMPLETED | OUTPATIENT
Start: 2020-07-20 | End: 2020-07-20

## 2020-07-20 RX ORDER — MIDAZOLAM HYDROCHLORIDE 1 MG/ML
INJECTION INTRAMUSCULAR; INTRAVENOUS
Status: COMPLETED | OUTPATIENT
Start: 2020-07-20 | End: 2020-07-20

## 2020-07-20 RX ORDER — FENTANYL CITRATE 50 UG/ML
INJECTION, SOLUTION INTRAMUSCULAR; INTRAVENOUS
Status: COMPLETED | OUTPATIENT
Start: 2020-07-20 | End: 2020-07-20

## 2020-07-20 RX ORDER — METHYLPREDNISOLONE ACETATE 80 MG/ML
INJECTION, SUSPENSION INTRA-ARTICULAR; INTRALESIONAL; INTRAMUSCULAR; SOFT TISSUE
Status: COMPLETED | OUTPATIENT
Start: 2020-07-20 | End: 2020-07-20

## 2020-07-20 RX ORDER — FINASTERIDE 5 MG/1
5 TABLET, FILM COATED ORAL DAILY
COMMUNITY

## 2020-07-20 RX ORDER — HYDROCODONE BITARTRATE AND ACETAMINOPHEN 5; 325 MG/1; MG/1
1 TABLET ORAL EVERY 6 HOURS PRN
COMMUNITY
End: 2020-08-03

## 2020-07-20 RX ORDER — DEXAMETHASONE 4 MG/1
4 TABLET ORAL
COMMUNITY
End: 2020-09-01

## 2020-07-20 ASSESSMENT — PAIN SCALES - GENERAL
PAINLEVEL_OUTOF10: 0
PAINLEVEL_OUTOF10: 0

## 2020-07-20 ASSESSMENT — PAIN - FUNCTIONAL ASSESSMENT: PAIN_FUNCTIONAL_ASSESSMENT: 0-10

## 2020-07-20 NOTE — H&P
HISTORY AND PHYSICAL/PRE-SEDATION ASSESSMENT    Patient:  Alissa Anderson   :  1935  Medical Record No.:  [id number]   Date:  2020  Physician:  Rita Marquez M.D. Facility: [address] [place] [address]    HISTORY OF PRESENT ILLNESS:                 The patient is a 82 y.o. male whom presents with low back and leg pain. Review of the imaging and physical exam of the patient confirmed the pre-procedure diagnosis. After a thorough discussion of risks, benefits and alternatives informed consent was obtained.                 Past Medical History:   Past Medical History:   Diagnosis Date    Abdominal pain, epigastric     Arthritis     Benign prostatic hypertrophy without urinary obstruction     BPH     Cellulitis and abscess     Chronic rhinitis     Chronic systolic congestive heart failure (HCC) 2019    COPD (chronic obstructive pulmonary disease) (HCC)     Diabetes mellitus (HCC)     Dysphagia     Erectile dysfunction     GERD (gastroesophageal reflux disease)     Hx of blood clots     Hyperglycemia     Hypertension     lumbar radiculopathy     Neuropathy     Nocturia     Osteoarthritis     Other disorders of kidney and ureter in diseases classified elsewhere     Pain, joint, knee     Palpitations     skin cancer     Rt ear, nose, neck, hands/ 2018 lung    SOB (shortness of breath)     Tennis elbow     Tinea pedis     foot      Past Surgical History:     Past Surgical History:   Procedure Laterality Date    CATARACT REMOVAL Bilateral 2014    CHOLECYSTECTOMY      CIRCUMCISION N/A 11/15/2019    DORSAL SLIT performed by Jose Zendejas MD at [place] [address] COLONOSCOPY  2011    Dr. Maria Elena Lee - mild sigmoid diverticulosis    CYSTOSCOPY N/A 2020    CYSTOSCOPY, EVACUATION OF CLOTS performed by Kristi Lutz MD at [address] [place] [address] Right 2013    Dr. Aislinn Monique - mildred for pain relief    JOINT REPLACEMENT Bilateral     knees    KNEE PROSTHESIS REMOVAL      LUMBAR NERVE BLOCK N/A 8/26/2019    L4/5 INTERLAMINAR EPIDURAL STEROID INJECTION WITH FLUOROSCOPY performed by Antwon Vanegas MD at [facility] Left 05/09/2018    Dr. Shirley Pratt - CT guided    MOHS SURGERY Bilateral     NERVE SURGERY N/A 12/20/2019    L4L5 INTERLAMINAR EPIDURAL STEROID INJECTION WITH FLUOROSCOPY performed by Antwon Vanegas MD at [address]      multiple lumbar ESIs    PAROTIDECTOMY Right 01/26/2017    Dr. Carley Alvarado - superficial, w/excision of parotid tail & dissection/preservation of facial nerve    CT INJECT ANES/STEROID FORAMEN LUMBAR/SACRAL W IMG GUIDE ,1 LEVEL Right 11/21/2018    RIGHT L4, L5 LUMBAR TRANSFORAMINAL EPIDURAL STEROID INJECTION WITH FLUOROSCOPY performed by Antwon Vanegas MD at [place] [address] Left 08/14/2019    TOTAL KNEE ARTHROPLASTY      right x1 and left x2    TUNNELED VENOUS PORT PLACEMENT  06/22/2018    Left [facility] infusaport placement    UPPER GASTROINTESTINAL ENDOSCOPY  03/19/2018    Dr. Bessie Watkins - mild non-obstructive ring distal esophagus     Current Medications:   Prior to Admission medications    Medication Sig Start Date End Date Taking? Authorizing Provider   dexamethasone (DECADRON) 4 MG tablet Take 4 mg by mouth daily (with breakfast)   Yes Historical Provider, MD   finasteride (PROSCAR) 5 MG tablet Take 5 mg by mouth daily   Yes Historical Provider, MD   HYDROcodone-acetaminophen (NORCO) 5-325 MG per tablet Take 1 tablet by mouth every 6 hours as needed for Pain. Yes Historical Provider, MD   diphenhydrAMINE-APAP, sleep, (TYLENOL PM EXTRA STRENGTH PO) Take by mouth as needed   Yes Historical Provider, MD   traMADol (ULTRAM) 50 MG tablet Take 1-2 tablets by mouth daily as needed for Pain for up to 30 days. 7/12/20 8/11/20 Yes Chi Huerta, APRN - CNP   furosemide (LASIX) 40 MG tablet Take 1 tablet twice a day for 5 days. Then take 1 tablet once a day.  6/4/20 10/31/19   Olegario Cowden, APRN - CNP     Allergies:  Celebrex [celecoxib]; Elavil [amitriptyline]; Naproxen; Percocet [oxycodone-acetaminophen]; and Lyrica [pregabalin]  Social History:    reports that he quit smoking about 49 years ago. He has a 40.00 pack-year smoking history. He has never used smokeless tobacco. He reports that he does not drink alcohol or use drugs. Family History:   Family History   Problem Relation Age of Onset    Arthritis Father     Diabetes Father     Diabetes Brother        Vitals: Blood pressure (!) 152/82, pulse 78, temperature 98.9 °F (37.2 °C), temperature source Temporal, resp. rate 16, height 5' 10.5\" (1.791 m), weight 200 lb (90.7 kg), SpO2 96 %. PHYSICAL EXAM:including affected areas  HENT: Airway patent and reviewed  Cardiovascular: Normal rate, regular rhythm, normal heart sounds. Pulmonary/Chest: No wheezes. No rhonchi. No rales. Abdominal: Soft. Bowel sounds are normal. No distension. Extremities: Moves all extremities equally  Cervical and Lumbar Spine: Painful range of motion, no midline tenderness       Diagnosis:Lumbar radiculopathy  M48.062    M51.36   M47.816   M54.16    Plan: Proceed with planned procedure      ASA CLASS:         []   I. Normal, healthy adult           []   II.  Mild systemic disease            [x]   III. Severe systemic disease      Mallampati: Mallampati Class II - (soft palate, fauces & uvula are visible)      Sedation plan:   [x]  Local              [x]  Minimal                  []  General anesthesia    Patient's condition acceptable for planned procedure/sedation. Post Procedure Plan   Return to same level of care   ______________________     The patient was counseled at length about the risks of tawny Covid-19 in the marjan-operative and post-operative states including the recovery window of their procedure.   The patient was made aware that tawny Covid-19 after a surgical procedure may worsen their prognosis for recovering from the virus and lend to a higher morbidity and or mortality risk. The patient was given the options of postponing their procedure. All of the risks, benefits, and alternatives were discussed. The patient does wish to proceed with the procedure. The risks and benefits as well as alternatives to the procedure have been discussed with the patient and or family. The patient and or next of kin understands and agrees to proceed.     Aram Juarez M.D.

## 2020-07-20 NOTE — OP NOTE
Patient:  Everett Strickland  YOB: 1935  Medical Record #:  [id number]   Place:   [address] [place] [address]  Date:  7/20/2020   Physician:  Charlotte Guerra MD, TANESHA    Procedure:  Lumbar Epidural Steroid Injection - Interlaminar approach  L4 - L5    Pre-Procedure Diagnosis: Lumbar radiculopathy    Post-Procedure Diagnosis: Same    Sedation: Local with 1% Lidocaine 3 ml and 1 mg of IV Versed  And 25 mcg of IV Fentanyl    EBL: None    Complications: None    Procedure Summary:    The patient was brought to the procedure suite and placed in the prone position. The skin overlying the lumbar spine was prepped and draped in the usual sterile fashion. Using fluoroscopic guidance, the L4 - L5 interlaminar space was identified. Through anesthetized skin a 20 gauge Touhy needle was advanced into the epidural space using continuous loss of resistance to saline technique. Isovue M300 was instilled and an epidurogram was noted without evidence of intrathecal or vascular spread. 10 ml of a solution containing preservative free normal saline and 80 mg of Depomedrol was instilled. The needle was removed and a band-aid applied. The patient was transferred to the post-operative area in stable condition.

## 2020-07-21 LAB
SARS-COV-2: NOT DETECTED
SOURCE: NORMAL

## 2020-08-03 ENCOUNTER — NURSE TRIAGE (OUTPATIENT)
Dept: OTHER | Facility: CLINIC | Age: 85
End: 2020-08-03

## 2020-08-03 ENCOUNTER — OFFICE VISIT (OUTPATIENT)
Dept: ORTHOPEDIC SURGERY | Age: 85
End: 2020-08-03
Payer: MEDICARE

## 2020-08-03 VITALS — RESPIRATION RATE: 14 BRPM | HEIGHT: 71 IN | BODY MASS INDEX: 27.99 KG/M2 | TEMPERATURE: 97.2 F | WEIGHT: 199.96 LBS

## 2020-08-03 PROCEDURE — 4040F PNEUMOC VAC/ADMIN/RCVD: CPT | Performed by: PHYSICIAN ASSISTANT

## 2020-08-03 PROCEDURE — G8427 DOCREV CUR MEDS BY ELIG CLIN: HCPCS | Performed by: PHYSICIAN ASSISTANT

## 2020-08-03 PROCEDURE — 1123F ACP DISCUSS/DSCN MKR DOCD: CPT | Performed by: PHYSICIAN ASSISTANT

## 2020-08-03 PROCEDURE — G8417 CALC BMI ABV UP PARAM F/U: HCPCS | Performed by: PHYSICIAN ASSISTANT

## 2020-08-03 PROCEDURE — 1036F TOBACCO NON-USER: CPT | Performed by: PHYSICIAN ASSISTANT

## 2020-08-03 PROCEDURE — 99213 OFFICE O/P EST LOW 20 MIN: CPT | Performed by: PHYSICIAN ASSISTANT

## 2020-08-03 RX ORDER — DONEPEZIL HYDROCHLORIDE 10 MG/1
5 TABLET, FILM COATED ORAL
COMMUNITY
Start: 2020-07-27

## 2020-08-03 NOTE — TELEPHONE ENCOUNTER
Patient and wife called [facility] pre-service center [facility]) to schedule appointment with red flag complaint; transferred to Nurse Access for triage. Reason for Disposition   Patient wants to be seen    Answer Assessment - Initial Assessment Questions  1. APPEARANCE of RASH: \"Describe the rash. \"       red  2. LOCATION: \"Where is the rash located? \"       Leg - previous staph infection  3. NUMBER: \"How many spots are there? \"       one  4. SIZE: \"How big are the spots? \" (Inches, centimeters or compare to size of a coin)      Small streak  5. ONSET: \"When did the rash start? \"       Yesterday  6. ITCHING: \"Does the rash itch? \" If so, ask: \"How bad is the itch? \"  (Scale 1-10; or mild, moderate, severe)      No  7. PAIN: \"Does the rash hurt? \" If so, ask: \"How bad is the pain? \"  (Scale 1-10; or mild, moderate, severe)      No  8. OTHER SYMPTOMS: \"Do you have any other symptoms? \" (e.g., fever)      No  9. PREGNANCY: \"Is there any chance you are pregnant? \" \"When was your last menstrual period? \"      N/A    Protocols used: RASH OR REDNESS - LOCALIZED-ADULT-OH    Informed of disposition. Care advice as documented. Instructed to call back with worsening symptoms. Soft transfer to [facility]) to schedule appointment as recommended. Please do not respond to the triage nurse through this encounter. Any subsequent communication should be directly with the patient.

## 2020-08-03 NOTE — PROGRESS NOTES
Follow up: Elizabet Martinez  1935  [id number]         Chief Complaint   Patient presents with    Lower Back Pain     7/20: L4 - L5 [place]         HISTORY OF PRESENT ILLNESS:  Mr. Gregorio Kaur is a 82 y.o. male returns for a follow up visit for multiple medical problems. His current presenting problems are   1. Lumbar stenosis with neurogenic claudication    2. DDD (degenerative disc disease), lumbar    3. Spondylosis of lumbar region without myelopathy or radiculopathy    4. Lumbar radiculopathy    . As per information/history obtained from the PADT(patient assessment and documentation tool) - He complains of pain in the lower back with radiation to the shoulders Right and hips Left He rates the pain 6/10 and describes it as sharp. Pain is made worse by: movement. He denies side effects from the current pain regimen. Patient reports that since the last follow up visit the physical functioning is better, family/social relationships are better, mood is better and sleep patterns are better, and that the overall functioning is better. Patient denies neurological bowel or bladder. Patient underwent an interlaminar lumbar ELEN L4-L5 on 7/20/2020. He describes that his symptoms have improved since his procedure. The patient states his left upper leg pain has dissipated. His primary concern today is his low back and right posterior rib and right shoulder pain. He reports falling backwards on 7/24/2020. Patient denies any head trauma during the fall. The patient points to more right sided rib pain versus his shoulder. He reports pain with sneezing and lying on the right side. He is ambulating today with a Rolator. The patient is also accompanied by his wife for the entirety of today's visit. The patient is slowly improving concerning the rib pain and his mean back and leg pain is significantly improving as well at approximately 70-75%.       Associated signs and symptoms:   Neurogenic bowel or bladder symptoms: no   Perceived weakness:  yes   Difficulty walking:  yes            Past medical, surgical, social and family history reviewed with the patient.      Past Medical History:   Past Medical History:   Diagnosis Date    Abdominal pain, epigastric     Arthritis     Benign prostatic hypertrophy without urinary obstruction     BPH     Cellulitis and abscess     Chronic rhinitis     Chronic systolic congestive heart failure (HCC) 4/18/2019    COPD (chronic obstructive pulmonary disease) (HCC)     Diabetes mellitus (HCC)     Dysphagia     Erectile dysfunction     GERD (gastroesophageal reflux disease)     Hx of blood clots     Hyperglycemia     Hypertension     lumbar radiculopathy     Neuropathy     Nocturia     Osteoarthritis     Other disorders of kidney and ureter in diseases classified elsewhere     Pain, joint, knee     Palpitations     skin cancer     Rt ear, nose, neck, hands/ 2018 lung    SOB (shortness of breath)     Tennis elbow     Tinea pedis     foot      Past Surgical History:     Past Surgical History:   Procedure Laterality Date    CATARACT REMOVAL Bilateral 09/2014    CHOLECYSTECTOMY      CIRCUMCISION N/A 11/15/2019    DORSAL SLIT performed by Mary Carmen Butler MD at [facility], [address]  11/28/2011    Dr. Vamsi Carrion - mild sigmoid diverticulosis    CYSTOSCOPY N/A 1/5/2020    CYSTOSCOPY, EVACUATION OF CLOTS performed by Manjit Gurrola MD at [address] [place] [address] Right 09/09/2013    Dr. Mona Eden - block for pain relief    JOINT REPLACEMENT Bilateral     knees    KNEE PROSTHESIS REMOVAL      LUMBAR NERVE BLOCK N/A 8/26/2019    L4/5 INTERLAMINAR EPIDURAL STEROID INJECTION WITH FLUOROSCOPY performed by Kory Streeter MD at [address] [place] [address] Left 05/09/2018    Dr. Vyas - CT guided    MOHS SURGERY Bilateral     NERVE SURGERY N/A 12/20/2019    L4L5 INTERLAMINAR EPIDURAL STEROID INJECTION WITH FLUOROSCOPY performed by K[facility] Streeter MD at [facility]    OTHER SURGICAL HISTORY      multiple lumbar ESIs    PAIN MANAGEMENT PROCEDURE N/A 7/20/2020    L4-L5 INTERLAMINAR EPIDURAL STEROID INJECTION WITH FLUOROSCOPY performed by Parth Loya MD at [address] [place] [address] Right 01/26/2017    Dr. Rolan Rodriguez - superficial, w/excision of parotid tail & dissection/preservation of facial nerve    [place] INJECT ANES/STEROID FORAMEN LUMBAR/SACRAL W IMG GUIDE ,1 LEVEL Right 11/21/2018    RIGHT L4, L5 LUMBAR TRANSFORAMINAL EPIDURAL STEROID INJECTION WITH FLUOROSCOPY performed by Parth Loya MD at [facility] Left 08/14/2019    TOTAL KNEE ARTHROPLASTY      right x1 and left x2    TUNNELED VENOUS PORT PLACEMENT  06/22/2018    Left [facility] infusaport placement    UPPER GASTROINTESTINAL ENDOSCOPY  03/19/2018    Dr. Armando Kay - mild non-obstructive ring distal esophagus     Current Medications:     Current Outpatient Medications:     donepezil (ARICEPT) 10 MG tablet, Take 10 mg by mouth daily (with breakfast), Disp: , Rfl:     dexamethasone (DECADRON) 4 MG tablet, Take 4 mg by mouth daily (with breakfast), Disp: , Rfl:     finasteride (PROSCAR) 5 MG tablet, Take 5 mg by mouth daily, Disp: , Rfl:     diphenhydrAMINE-APAP, sleep, (TYLENOL PM EXTRA STRENGTH PO), Take by mouth as needed, Disp: , Rfl:     cimetidine (TAGAMET) 800 MG tablet, Take 1 tablet by mouth 2 times daily, Disp: 60 tablet, Rfl: 5    traMADol (ULTRAM) 50 MG tablet, Take 1-2 tablets by mouth daily as needed for Pain for up to 30 days. , Disp: 60 tablet, Rfl: 0    furosemide (LASIX) 40 MG tablet, Take 1 tablet twice a day for 5 days.  Then take 1 tablet once a day., Disp: 95 tablet, Rfl: 1    famotidine (PEPCID) 20 MG tablet, Take 1 tablet by mouth 2 times daily, Disp: 60 tablet, Rfl: 5    blood glucose test strips (FREESTYLE LITE) strip, Check blood sugar TID Dx: e11.65, Disp: 100 strip, Rfl: 3    Insulin Pen Needle (B-D UF III MINI PEN NEEDLES) 31G X 5 MM MISC, Use when injecting insulin QID Dx: e11.65, Disp: 200 each, Rfl: 1    insulin aspart (NOVOLOG FLEXPEN) 100 UNIT/ML injection pen, Inject 20 units TID follow sliding scale., Disp: 10 pen, Rfl: 1    BASAGLAR KWIKPEN 100 UNIT/ML injection pen, 20 units nightly (Patient taking differently: Inject 22 Units into the skin nightly ), Disp: 5 pen, Rfl: 3    warfarin (COUMADIN) 2.5 MG tablet, 1.25 mg (2.5 mg x 0.5) every Sun, Tue, Thu; 2.5 mg (2.5 mg x 1) all other days, Disp: 90 tablet, Rfl: 3    lisinopril (PRINIVIL;ZESTRIL) 10 MG tablet, Take 1 tablet by mouth daily, Disp: 90 tablet, Rfl: 3    DULoxetine (CYMBALTA) 30 MG extended release capsule, Take 30 mg by mouth nightly , Disp: , Rfl:     naphazoline-glycerin (CLEAR EYES REDNESS RELIEF) 0.012-0.2 % SOLN ophthalmic solution, Place 1 drop into both eyes 2 times daily (Patient taking differently: Place 1 drop into both eyes 2 times daily as needed ), Disp: 1 Bottle, Rfl: 0    tamsulosin (FLOMAX) 0.4 MG capsule, Take 1 capsule by mouth daily (Patient taking differently: Take 0.4 mg by mouth 2 times daily ), Disp: 90 capsule, Rfl: 1    glucose monitoring kit (FREESTYLE) monitoring kit, 1 kit by Does not apply route daily as needed. , Disp: 1 kit, Rfl: 0    latanoprost (XALATAN) 0.005 % ophthalmic solution, Place 1 drop into both eyes nightly., Disp: , Rfl:     gabapentin (NEURONTIN) 300 MG capsule, Take one in morning and two evening, Disp: 90 capsule, Rfl: 5  Allergies:  Celebrex [celecoxib]; Elavil [amitriptyline]; Naproxen; Percocet [oxycodone-acetaminophen]; and Lyrica [pregabalin]  Social History:    reports that he quit smoking about 50 years ago. He has a 40.00 pack-year smoking history. He has never used smokeless tobacco. He reports that he does not drink alcohol or use drugs.   Family History:   Family History   Problem Relation Age of Onset    Arthritis Father     Diabetes Father     Diabetes Brother        REVIEW OF SYSTEMS:   CONSTITUTIONAL: Denies unexplained weight loss, fevers, chills or fatigue  NEUROLOGICAL: Denies unsteady gait or progressive weakness  MUSCULOSKELETAL: Denies joint swelling or redness  GI: Denies nausea, vomiting, diarrhea   : Denies bowel or bladder issues       PHYSICAL EXAM:    Vitals: Temperature 97.2 °F (36.2 °C), resp. rate 14, height 5' 10.51\" (1.791 m), weight 199 lb 15.3 oz (90.7 kg). GENERAL EXAM:  · General Apparence: Patient is adequately groomed with no evidence of malnutrition. · Psychiatric: Orientation: The patient is oriented to time, place and person. The patient's mood and affect are appropriate   · Vascular: Examination reveals no swelling and palpation reveals no tenderness in upper or lower extremities. Good capillary refill. · The lymphatic examination of the neck, axillae and groin reveals all areas to be without enlargement or induration  · Sensation is intact without deficit in the upper and lower extremities to light touch and pinprick  · Coordination of the upper and lower extremities are normal.  · RIGHT UPPER EXTREMITY:  Inspection/examination of the right upper extremity does not show any tenderness, deformity or injury. Range of motion is unremarkable and pain-free. There is no gross instability. There are no rashes, ulcerations or lesions. Strength and tone are normal. No atrophy or abnormal movements are noted. · LEFT UPPER EXTREMITY: Inspection/examination of the left upper extremity does not show any tenderness, deformity or injury. Range of motion is unremarkable and pain-free. There is no gross instability. There are no rashes, ulcerations or lesions. Strength and tone are normal. No atrophy or abnormal movements are noted. LUMBAR/SACRAL EXAMINATION:  · Inspection: Local inspection shows no step-off or bruising. Lumbar alignment is normal. No instability is noted. · Palpation:   No evidence of tenderness at the midline.   Lumbar paraspinal tenderness: Mild L4/5 and L5/S1 tenderness 110 mmol/L    CO2 27 21 - 32 mmol/L    Anion Gap 15 3 - 16    Glucose 197 (H) 70 - 99 mg/dL    BUN 38 (H) 7 - 20 mg/dL    CREATININE 2.2 (H) 0.8 - 1.3 mg/dL    GFR Non-African American 29 (A) >60    GFR  35 (A) >60    Calcium 8.5 8.3 - 10.6 mg/dL    Phosphorus 4.1 2.5 - 4.9 mg/dL    Alb 3.6 3.4 - 5.0 g/dL     Impression:       1. Lumbar stenosis with neurogenic claudication    2. DDD (degenerative disc disease), lumbar    3. Spondylosis of lumbar region without myelopathy or radiculopathy    4. Lumbar radiculopathy        Plan:  Clinical Course: Above diagnoses are improving     I discussed the diagnosis and the treatment options with Victorina Janes today. In Summary:  The various treatment options were outlined and discussed with Victorina Marrero including:  Conservative care options: physical therapy, ice, medications, bracing, and activity modification. The indications for therapeutic injections. The indications for additional imaging/laboratory studies. The indications for (possible future) interventions. After considering the various options discussed, Victorina Marrero elected to pursue a course of treatment that includes the followin. Medications:  No further recommendations for new medications. 2. PT:  Encouraged to continue with Home exercise program.    3. Further studies: No further studies. 4. Interventional: The patient is 70-75% relieved at this time following a lumbar epidural steroid injection. The patient will call the office if he does not see continued improvement with the rib pain. 5. Follow up:  4-6 weeks      Victorina Marrero was instructed to call the office if his symptoms worsen or if new symptoms appear prior to the next scheduled visit. He is specifically instructed to contact the office between now & his scheduled appointment if he has concerns related to his condition or if he needs assistance in scheduling the above tests.  He is welcome to call for an appointment sooner if he has any additional concerns or questions. Yasmine Benavides ATC, am scribing for and in the presence of Shante Benavides PA-C.    08/03/20 11:30 AM Dhiraj Watts ATC    The physical examination was performed between the patient and Carlos Kelley PA-C All counseling during the appointment was performed between the patient and provider. Claude Smith PA-C, personally performed the services described in this documentation as scribed by JOCY Ward in my presence and it is both accurate and complete. DIAMOND Mccollum PA-C  Board Certified by the [employer] on Certification of Physician Assistants  Hiram Thompson 60  Partner of [facility])        This dictation was performed with a verbal recognition program [facility]) and it was checked for errors. It is possible that there are still dictated errors within this office note. If so, please bring any errors to my attention for an addendum. All efforts were made to ensure that this office note is accurate.

## 2020-08-04 ENCOUNTER — OFFICE VISIT (OUTPATIENT)
Dept: INTERNAL MEDICINE CLINIC | Age: 85
End: 2020-08-04
Payer: MEDICARE

## 2020-08-04 VITALS
TEMPERATURE: 97.2 F | WEIGHT: 204 LBS | BODY MASS INDEX: 28.85 KG/M2 | SYSTOLIC BLOOD PRESSURE: 138 MMHG | HEART RATE: 58 BPM | DIASTOLIC BLOOD PRESSURE: 62 MMHG

## 2020-08-04 LAB
INTERNATIONAL NORMALIZATION RATIO, POC: 1.4
PROTHROMBIN TIME, POC: NORMAL

## 2020-08-04 PROCEDURE — 99213 OFFICE O/P EST LOW 20 MIN: CPT | Performed by: NURSE PRACTITIONER

## 2020-08-04 PROCEDURE — G8417 CALC BMI ABV UP PARAM F/U: HCPCS | Performed by: NURSE PRACTITIONER

## 2020-08-04 PROCEDURE — 4040F PNEUMOC VAC/ADMIN/RCVD: CPT | Performed by: NURSE PRACTITIONER

## 2020-08-04 PROCEDURE — 1123F ACP DISCUSS/DSCN MKR DOCD: CPT | Performed by: NURSE PRACTITIONER

## 2020-08-04 PROCEDURE — 1036F TOBACCO NON-USER: CPT | Performed by: NURSE PRACTITIONER

## 2020-08-04 PROCEDURE — 85610 PROTHROMBIN TIME: CPT | Performed by: NURSE PRACTITIONER

## 2020-08-04 PROCEDURE — G8427 DOCREV CUR MEDS BY ELIG CLIN: HCPCS | Performed by: NURSE PRACTITIONER

## 2020-08-04 NOTE — PROGRESS NOTES
SUBJECTIVE:    Patient ID: Fermin Roberto is a 82 y.o. male. CC: Leg rash,     HPI: The patient presents to the office for an acute visit. Spells noticed a red area on his right lower leg. Given his history of cellulitis, she was concerned. This is improved today. There is no warmth. There is no drainage. There are no red streaks. He denies any fever. No treatments at home. Patient has a history of pulmonary embolism. He is on chronic anticoagulation. His INR is low today. He has been on and off warfarin of late for other medical problems and procedures requiring him to discontinue warfarin for a time. Current Outpatient Medications   Medication Sig Dispense Refill    donepezil (ARICEPT) 10 MG tablet Take 10 mg by mouth daily (with breakfast)      dexamethasone (DECADRON) 4 MG tablet Take 4 mg by mouth daily (with breakfast)      finasteride (PROSCAR) 5 MG tablet Take 5 mg by mouth daily      diphenhydrAMINE-APAP, sleep, (TYLENOL PM EXTRA STRENGTH PO) Take by mouth as needed      cimetidine (TAGAMET) 800 MG tablet Take 1 tablet by mouth 2 times daily 60 tablet 5    traMADol (ULTRAM) 50 MG tablet Take 1-2 tablets by mouth daily as needed for Pain for up to 30 days. 60 tablet 0    furosemide (LASIX) 40 MG tablet Take 1 tablet twice a day for 5 days. Then take 1 tablet once a day. 95 tablet 1    famotidine (PEPCID) 20 MG tablet Take 1 tablet by mouth 2 times daily 60 tablet 5    blood glucose test strips (FREESTYLE LITE) strip Check blood sugar TID Dx: e11.65 100 strip 3    Insulin Pen Needle (B-D UF III MINI PEN NEEDLES) 31G X 5 MM MISC Use when injecting insulin QID Dx: e11.65 200 each 1    insulin aspart (NOVOLOG FLEXPEN) 100 UNIT/ML injection pen Inject 20 units TID follow sliding scale.  10 pen 1    BASAGLAR KWIKPEN 100 UNIT/ML injection pen 20 units nightly (Patient taking differently: Inject 22 Units into the skin nightly ) 5 pen 3    warfarin (COUMADIN) 2.5 MG tablet 1.25 mg (2.5 mg x 0.5) every Sun, Tue, Thu; 2.5 mg (2.5 mg x 1) all other days 90 tablet 3    lisinopril (PRINIVIL;ZESTRIL) 10 MG tablet Take 1 tablet by mouth daily 90 tablet 3    DULoxetine (CYMBALTA) 30 MG extended release capsule Take 30 mg by mouth nightly       naphazoline-glycerin (CLEAR EYES REDNESS RELIEF) 0.012-0.2 % SOLN ophthalmic solution Place 1 drop into both eyes 2 times daily (Patient taking differently: Place 1 drop into both eyes 2 times daily as needed ) 1 Bottle 0    tamsulosin (FLOMAX) 0.4 MG capsule Take 1 capsule by mouth daily (Patient taking differently: Take 0.4 mg by mouth 2 times daily ) 90 capsule 1    glucose monitoring kit (FREESTYLE) monitoring kit 1 kit by Does not apply route daily as needed. 1 kit 0    latanoprost (XALATAN) 0.005 % ophthalmic solution Place 1 drop into both eyes nightly.  gabapentin (NEURONTIN) 300 MG capsule Take one in morning and two evening 90 capsule 5     No current facility-administered medications for this visit. Review of Systems   Constitutional: Negative for fever. Respiratory: Negative for shortness of breath. Cardiovascular: Positive for leg swelling. Negative for chest pain. Gastrointestinal: Negative. Skin: Positive for rash. OBJECTIVE:  Physical Exam  Constitutional:       Appearance: Normal appearance. HENT:      Head: Normocephalic and atraumatic. Skin:     Findings: Wound present. Comments: Scabbed, superficial wound right lateral lower leg. There is minimal surrounding redness. There is no warmth. There is no induration or fluctuance. There are no red streaking. There is no drainage. Neurological:      General: No focal deficit present. Mental Status: He is alert and oriented to person, place, and time.    Psychiatric:         Mood and Affect: Mood normal.         Behavior: Behavior normal.        /62   Pulse 58   Temp 97.2 °F (36.2 °C) (Temporal)   Wt 204 lb (92.5 kg)   BMI 28.85 kg/m² PHQ Scores 2/6/2020 9/18/2019 2/11/2019 7/10/2018 3/21/2017   PHQ2 Score 0 0 0 1 0   PHQ9 Score 0 0 0 1 0     Interpretation of Total Score Depression Severity: 1-4 = Minimal depression, 5-9 = Mild depression, 10-14 = Moderate depression, 15-19 = Moderately severe depression, 20-27 =Severe depression      ASSESSMENT/PLAN:  Sheryl Spann was seen today for other and office visit for anticoagulation management.   Diagnoses and all orders for this visit:    Rash  -No evidence of infection today  - Wash with soap and water and monitor for worsening    Subtherapeutic international normalized ratio (INR)  -Extra warfarin today    History of pulmonary embolism  -Continue anticoagulation    Anticoagulated on Coumadin  - INR low today, extra Coumadin, raise dose, see flow sheets  -     POCT INR    -We will follow-up in 3 weeks after resuming Coumadin from next interruption for Mohs procedure      Richard Gaytan, SHA - CNP

## 2020-08-05 ASSESSMENT — ENCOUNTER SYMPTOMS
SHORTNESS OF BREATH: 0
GASTROINTESTINAL NEGATIVE: 1

## 2020-08-24 DIAGNOSIS — I50.22 CHRONIC SYSTOLIC CONGESTIVE HEART FAILURE (HCC): Chronic | ICD-10-CM

## 2020-08-24 DIAGNOSIS — C34.92 NON-SMALL CELL CANCER OF LEFT LUNG (HCC): ICD-10-CM

## 2020-08-24 LAB
A/G RATIO: 1.6 (ref 1.1–2.2)
ALBUMIN SERPL-MCNC: 3.4 G/DL (ref 3.4–5)
ALP BLD-CCNC: 88 U/L (ref 40–129)
ALT SERPL-CCNC: 11 U/L (ref 10–40)
ANION GAP SERPL CALCULATED.3IONS-SCNC: 13 MMOL/L (ref 3–16)
AST SERPL-CCNC: 12 U/L (ref 15–37)
BILIRUB SERPL-MCNC: 0.4 MG/DL (ref 0–1)
BUN BLDV-MCNC: 33 MG/DL (ref 7–20)
CALCIUM SERPL-MCNC: 8.3 MG/DL (ref 8.3–10.6)
CHLORIDE BLD-SCNC: 105 MMOL/L (ref 99–110)
CHOLESTEROL, TOTAL: 151 MG/DL (ref 0–199)
CO2: 24 MMOL/L (ref 21–32)
CREAT SERPL-MCNC: 2.2 MG/DL (ref 0.8–1.3)
GFR AFRICAN AMERICAN: 35
GFR NON-AFRICAN AMERICAN: 29
GLOBULIN: 2.1 G/DL
GLUCOSE BLD-MCNC: 184 MG/DL (ref 70–99)
HDLC SERPL-MCNC: 29 MG/DL (ref 40–60)
LDL CHOLESTEROL CALCULATED: 81 MG/DL
POTASSIUM SERPL-SCNC: 3.9 MMOL/L (ref 3.5–5.1)
SODIUM BLD-SCNC: 142 MMOL/L (ref 136–145)
TOTAL PROTEIN: 5.5 G/DL (ref 6.4–8.2)
TRIGL SERPL-MCNC: 203 MG/DL (ref 0–150)
TSH SERPL DL<=0.05 MIU/L-ACNC: 2.5 UIU/ML (ref 0.27–4.2)
VLDLC SERPL CALC-MCNC: 41 MG/DL

## 2020-08-25 LAB
ESTIMATED AVERAGE GLUCOSE: 185.8 MG/DL
HBA1C MFR BLD: 8.1 %

## 2020-08-27 ENCOUNTER — OFFICE VISIT (OUTPATIENT)
Dept: INTERNAL MEDICINE CLINIC | Age: 85
End: 2020-08-27
Payer: MEDICARE

## 2020-08-27 ENCOUNTER — TELEPHONE (OUTPATIENT)
Dept: ADMINISTRATIVE | Age: 85
End: 2020-08-27

## 2020-08-27 VITALS
SYSTOLIC BLOOD PRESSURE: 152 MMHG | TEMPERATURE: 98.1 F | HEART RATE: 64 BPM | WEIGHT: 204 LBS | DIASTOLIC BLOOD PRESSURE: 72 MMHG | BODY MASS INDEX: 28.85 KG/M2

## 2020-08-27 LAB
INTERNATIONAL NORMALIZATION RATIO, POC: 1.4
PROTHROMBIN TIME, POC: NORMAL

## 2020-08-27 PROCEDURE — 99213 OFFICE O/P EST LOW 20 MIN: CPT | Performed by: NURSE PRACTITIONER

## 2020-08-27 PROCEDURE — 1036F TOBACCO NON-USER: CPT | Performed by: NURSE PRACTITIONER

## 2020-08-27 PROCEDURE — G8427 DOCREV CUR MEDS BY ELIG CLIN: HCPCS | Performed by: NURSE PRACTITIONER

## 2020-08-27 PROCEDURE — 1123F ACP DISCUSS/DSCN MKR DOCD: CPT | Performed by: NURSE PRACTITIONER

## 2020-08-27 PROCEDURE — G8417 CALC BMI ABV UP PARAM F/U: HCPCS | Performed by: NURSE PRACTITIONER

## 2020-08-27 PROCEDURE — 85610 PROTHROMBIN TIME: CPT | Performed by: NURSE PRACTITIONER

## 2020-08-27 PROCEDURE — 3052F HG A1C>EQUAL 8.0%<EQUAL 9.0%: CPT | Performed by: NURSE PRACTITIONER

## 2020-08-27 PROCEDURE — 4040F PNEUMOC VAC/ADMIN/RCVD: CPT | Performed by: NURSE PRACTITIONER

## 2020-08-27 RX ORDER — TRAMADOL HYDROCHLORIDE 50 MG/1
50-100 TABLET ORAL DAILY PRN
Qty: 60 TABLET | Refills: 0 | Status: SHIPPED | OUTPATIENT
Start: 2020-08-27 | End: 2020-09-28 | Stop reason: SDUPTHER

## 2020-08-28 ASSESSMENT — ENCOUNTER SYMPTOMS
RESPIRATORY NEGATIVE: 1
BACK PAIN: 1
GASTROINTESTINAL NEGATIVE: 1

## 2020-08-28 NOTE — PROGRESS NOTES
SUBJECTIVE:    Patient ID: Sahra Daly is a 82 y.o. male. CC: History of pulmonary embolism, chronic anticoagulation. HPI: Follow-up chronic medical problems, follow-up of recent medical issues, and INR mgmt.     He also has a history of pulmonary embolism with chronic anticoagulation.  He is taking his warfarin as directed.  He denies any side effects. Beata Nicolas today is low today at 1.4. Patient's main complaint today remains neuropathy of his feet, worse on the right. He has been seen by multiple specialist for this. He has been on multiple treatments for this. He is also on tramadol for this. He typically uses no more than 1-2/day. Past Medical History:   Diagnosis Date    Abdominal pain, epigastric     Arthritis     Benign prostatic hypertrophy without urinary obstruction     BPH     Cellulitis and abscess     Chronic rhinitis     Chronic systolic congestive heart failure (HCC) 4/18/2019    COPD (chronic obstructive pulmonary disease) (HCC)     Diabetes mellitus (HCC)     Dysphagia     Erectile dysfunction     GERD (gastroesophageal reflux disease)     Hx of blood clots     Hyperglycemia     Hypertension     lumbar radiculopathy     Neuropathy     Nocturia     Osteoarthritis     Other disorders of kidney and ureter in diseases classified elsewhere     Pain, joint, knee     Palpitations     skin cancer     Rt ear, nose, neck, hands/ 2018 lung    SOB (shortness of breath)     Tennis elbow     Tinea pedis     foot        Current Outpatient Medications   Medication Sig Dispense Refill    traMADol (ULTRAM) 50 MG tablet Take 1-2 tablets by mouth daily as needed for Pain for up to 30 days.  60 tablet 0    donepezil (ARICEPT) 10 MG tablet Take 10 mg by mouth daily (with breakfast)      dexamethasone (DECADRON) 4 MG tablet Take 4 mg by mouth daily (with breakfast)      finasteride (PROSCAR) 5 MG tablet Take 5 mg by mouth daily      diphenhydrAMINE-APAP, sleep, (TYLENOL PM EXTRA STRENGTH PO) Take by mouth as needed      cimetidine (TAGAMET) 800 MG tablet Take 1 tablet by mouth 2 times daily 60 tablet 5    furosemide (LASIX) 40 MG tablet Take 1 tablet twice a day for 5 days. Then take 1 tablet once a day. 95 tablet 1    famotidine (PEPCID) 20 MG tablet Take 1 tablet by mouth 2 times daily 60 tablet 5    blood glucose test strips (FREESTYLE LITE) strip Check blood sugar TID Dx: e11.65 100 strip 3    Insulin Pen Needle (B-D UF III MINI PEN NEEDLES) 31G X 5 MM MISC Use when injecting insulin QID Dx: e11.65 200 each 1    insulin aspart (NOVOLOG FLEXPEN) 100 UNIT/ML injection pen Inject 20 units TID follow sliding scale. 10 pen 1    BASAGLAR KWIKPEN 100 UNIT/ML injection pen 20 units nightly (Patient taking differently: Inject 22 Units into the skin nightly ) 5 pen 3    warfarin (COUMADIN) 2.5 MG tablet 1.25 mg (2.5 mg x 0.5) every Sun, Tue, Thu; 2.5 mg (2.5 mg x 1) all other days 90 tablet 3    lisinopril (PRINIVIL;ZESTRIL) 10 MG tablet Take 1 tablet by mouth daily 90 tablet 3    DULoxetine (CYMBALTA) 30 MG extended release capsule Take 30 mg by mouth nightly       naphazoline-glycerin (CLEAR EYES REDNESS RELIEF) 0.012-0.2 % SOLN ophthalmic solution Place 1 drop into both eyes 2 times daily (Patient taking differently: Place 1 drop into both eyes 2 times daily as needed ) 1 Bottle 0    tamsulosin (FLOMAX) 0.4 MG capsule Take 1 capsule by mouth daily (Patient taking differently: Take 0.4 mg by mouth 2 times daily ) 90 capsule 1    glucose monitoring kit (FREESTYLE) monitoring kit 1 kit by Does not apply route daily as needed. 1 kit 0    latanoprost (XALATAN) 0.005 % ophthalmic solution Place 1 drop into both eyes nightly.  gabapentin (NEURONTIN) 300 MG capsule Take one in morning and two evening 90 capsule 5     No current facility-administered medications for this visit. Review of Systems   Constitutional: Negative. Respiratory: Negative.     Cardiovascular: Negative. Gastrointestinal: Negative. Genitourinary: Negative. Musculoskeletal: Positive for back pain. Neurological: Positive for weakness and numbness. Forgetfulness, confusion at times   Psychiatric/Behavioral: Negative. All other systems reviewed and are negative. OBJECTIVE:  Physical Exam  Constitutional:       General: He is not in acute distress. Appearance: He is well-developed. HENT:      Head: Normocephalic and atraumatic. Eyes:      General: No scleral icterus. Conjunctiva/sclera: Conjunctivae normal.   Cardiovascular:      Rate and Rhythm: Normal rate and regular rhythm. Pulmonary:      Effort: Pulmonary effort is normal.      Breath sounds: Normal breath sounds. Abdominal:      General: There is no distension. Tenderness: There is no guarding. Skin:     General: Skin is warm and dry. Neurological:      Mental Status: He is alert. BP (!) 152/72   Pulse 64   Temp 98.1 °F (36.7 °C) (Temporal)   Wt 204 lb (92.5 kg)   BMI 28.85 kg/m²      PHQ Scores 2/6/2020 9/18/2019 2/11/2019 7/10/2018 3/21/2017   PHQ2 Score 0 0 0 1 0   PHQ9 Score 0 0 0 1 0     Interpretation of Total Score Depression Severity: 1-4 = Minimal depression, 5-9 = Mild depression, 10-14 = Moderate depression, 15-19 = Moderately severe depression, 20-27 =Severe depression        Assessment/Plan:  [address] [place] [address] was seen today for office visit for anticoagulation management. 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 Type 2 diabetes mellitus with stage 3 chronic kidney disease, without long-term current use of insulin (HCC)  -     traMADol (ULTRAM) 50 MG tablet; Take 1-2 tablets by mouth daily as needed for Pain for up to 30 days.     Bilateral pulmonary embolism (HCC)  -Continue anticoagulation    Anticoagulated on

## 2020-09-01 ENCOUNTER — OFFICE VISIT (OUTPATIENT)
Dept: ENDOCRINOLOGY | Age: 85
End: 2020-09-01
Payer: MEDICARE

## 2020-09-01 VITALS
TEMPERATURE: 97.6 F | RESPIRATION RATE: 16 BRPM | DIASTOLIC BLOOD PRESSURE: 70 MMHG | WEIGHT: 203 LBS | BODY MASS INDEX: 29.06 KG/M2 | SYSTOLIC BLOOD PRESSURE: 125 MMHG | HEART RATE: 73 BPM | HEIGHT: 70 IN

## 2020-09-01 PROCEDURE — 4040F PNEUMOC VAC/ADMIN/RCVD: CPT | Performed by: INTERNAL MEDICINE

## 2020-09-01 PROCEDURE — 1036F TOBACCO NON-USER: CPT | Performed by: INTERNAL MEDICINE

## 2020-09-01 PROCEDURE — G8417 CALC BMI ABV UP PARAM F/U: HCPCS | Performed by: INTERNAL MEDICINE

## 2020-09-01 PROCEDURE — 99214 OFFICE O/P EST MOD 30 MIN: CPT | Performed by: INTERNAL MEDICINE

## 2020-09-01 PROCEDURE — 1123F ACP DISCUSS/DSCN MKR DOCD: CPT | Performed by: INTERNAL MEDICINE

## 2020-09-01 PROCEDURE — 3052F HG A1C>EQUAL 8.0%<EQUAL 9.0%: CPT | Performed by: INTERNAL MEDICINE

## 2020-09-01 PROCEDURE — G8427 DOCREV CUR MEDS BY ELIG CLIN: HCPCS | Performed by: INTERNAL MEDICINE

## 2020-09-01 NOTE — PROGRESS NOTES
Bharti Mario is a 82 y.o. male is seen  for evaluation and management of uncontrolled Type 2  diabetes. Bharti Mario was diagnosed with Diabetes mellitus aprox in 2009   Diabetes was diagnosed at routine screening . Bharti Mario got diabetic education in the past.  Comorbid conditions: Neuropathy, Nephropathy, Retinopathy and Chronic Kidney Disease    INTERIM:    Diabetes   He presents for his follow-up diabetic visit. He has type 2 diabetes mellitus. No MedicAlert identification noted. The initial diagnosis of diabetes was made 10 years ago. His disease course has been worsening. There are no hypoglycemic associated symptoms. Pertinent negatives for diabetes include no polyphagia, no polyuria and no weight loss. There are no hypoglycemic complications. Symptoms are worsening. Diabetic complications include peripheral neuropathy and retinopathy. Risk factors for coronary artery disease include diabetes mellitus, dyslipidemia, family history and male sex. He is compliant with treatment most of the time. His weight is increasing steadily. He is following a diabetic diet. When asked about meal planning, he reported none. He has not had a previous visit with a dietitian. He rarely participates in exercise. There is no change in his home blood glucose trend. His breakfast blood glucose is taken between 7-8 am. His breakfast blood glucose range is generally 180-200 mg/dl. An ACE inhibitor/angiotensin II receptor blocker is being taken. He does not see a podiatrist.Eye exam is current. He has been taking basal bolus regimen   Wife gives injections   Denies any hypoglycemia   Mohs treatment in August 2020 still healing and wife feels that's causing high Glucs    Weight trend: stable  Prior visit with dietician: no  Current diet: on average, 3 meals per day  Current exercise: rare    Has there been any hospitalization, surgery or major illness since the last visit?  No   Has there been any new school, family or social problems since the last visit? No  Has there been any new family history of members with diabetes, heart disease, stroke, or endocrine related problems since the last visit?   No    He has Lung cancer s/p chemo and radiation   He has CHF and follows with cardiology       Past Medical History:   Diagnosis Date    Abdominal pain, epigastric     Arthritis     Benign prostatic hypertrophy without urinary obstruction     BPH     Cellulitis and abscess     Chronic rhinitis     Chronic systolic congestive heart failure ([address] 4/18/2019    COPD (chronic obstructive pulmonary disease) (HCC)     Diabetes mellitus (HCC)     Dysphagia     Erectile dysfunction     GERD (gastroesophageal reflux disease)     Hx of blood clots     Hyperglycemia     Hypertension     lumbar radiculopathy     Neuropathy     Nocturia     Osteoarthritis     Other disorders of kidney and ureter in diseases classified elsewhere     Pain, joint, knee     Palpitations     skin cancer     Rt ear, nose, neck, hands/ 2018 lung    SOB (shortness of breath)     Tennis elbow     Tinea pedis     foot      Patient Active Problem List   Diagnosis    Chronic rhinitis    Primary osteoarthritis involving multiple joints    Erectile dysfunction    Glaucoma    Essential hypertension    DDD (degenerative disc disease), lumbar    Lumbar stenosis with neurogenic claudication    Diabetes education, encounter for    Hearing loss of both ears    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    CKD (chronic kidney disease) stage 3, GFR 30-59 ml/min (HCC)    Bilateral pulmonary embolism (HCC)    Overweight    Diverticulosis of large intestine without diverticulitis    Chronic obstructive pulmonary disease (HCC)    Benign prostatic hyperplasia with urinary hesitancy    Gastroesophageal reflux disease without esophagitis    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)    Pulmonary nodule    Chronic systolic congestive heart failure ([address]    Vitreous degeneration, bilateral    Secondary cataract    Primary open-angle glaucoma, bilateral, moderate stage    Posterior vitreous detachment of both eyes    Peripapillary atrophy of both eyes    Nodular corneal degeneration, right eye    Neoplastic malignant related fatigue    Entropion of left lower eyelid    Early stage nonexudative age-related macular degeneration of both eyes    Diabetic peripheral neuropathy (HCC)    Dermatochalasis of right upper eyelid    Bilateral posterior capsular opacification    Aortic valvular disease    Phimosis of penis    Renal insufficiency    Recurrent UTI     Past Surgical History:   Procedure Laterality Date    CATARACT REMOVAL Bilateral 09/2014    CHOLECYSTECTOMY      CIRCUMCISION N/A 11/15/2019    DORSAL SLIT performed by Bal Limon MD at [facility] COLONOSCOPY  11/28/2011    Dr. Odilon Haley - mild sigmoid diverticulosis    CYSTOSCOPY N/A 1/5/2020    CYSTOSCOPY, EVACUATION OF CLOTS performed by Cristofer Ibrahim MD at [address] [place] [address] Right 09/09/2013    Dr. Augie Ely - block for pain relief    JOINT REPLACEMENT Bilateral     knees    KNEE PROSTHESIS REMOVAL      LUMBAR NERVE BLOCK N/A 8/26/2019    L4/5 INTERLAMINAR EPIDURAL STEROID INJECTION WITH FLUOROSCOPY performed by Seth Ellis MD at [address] [place] [address] Left 05/09/2018    Dr. Rosslyn Mortimer - CT guided    MOHS SURGERY Bilateral     NERVE SURGERY N/A 12/20/2019    L4L5 INTERLAMINAR EPIDURAL STEROID INJECTION WITH FLUOROSCOPY performed by Seth Ellis MD at [address] [place] [address]      multiple lumbar ESIs    PAIN MANAGEMENT PROCEDURE N/A 7/20/2020    L4-L5 INTERLAMINAR EPIDURAL STEROID INJECTION WITH FLUOROSCOPY performed by Seth Ellis MD at [address] [place] [address] Right 01/26/2017    Dr. Farzaneh Vallecillo - superficial, w/excision of parotid tail & dissection/preservation of facial nerve    MI INJECT ANES/STEROID FORAMEN LUMBAR/SACRAL W IMG GUIDE ,1 LEVEL Right 2018    RIGHT L4, L5 LUMBAR TRANSFORAMINAL EPIDURAL STEROID INJECTION WITH FLUOROSCOPY performed by Patrick Samuel MD at [facility] Left 2019    TOTAL KNEE ARTHROPLASTY      right x1 and left x2    TUNNELED VENOUS PORT PLACEMENT  2018    Left [facility]CV infusaport placement    UPPER GASTROINTESTINAL ENDOSCOPY  2018    Dr. Charlotte Banegas - mild non-obstructive ring distal esophagus     Social History     Socioeconomic History    Marital status:      Spouse name: Zay Mahajan Number of children: Not on file    Years of education: Not on file    Highest education level: Not on file   Occupational History    Not on file   Social Needs    Financial resource strain: Not on file    Food insecurity     Worry: Not on file     Inability: Not on file   [employer] needs     Medical: Not on file     Non-medical: Not on file   Tobacco Use    Smoking status: Former Smoker     Packs/day: 1.00     Years: 40.00     Pack years: 40.00     Last attempt to quit: 8/15/1970     Years since quittin.0    Smokeless tobacco: Never Used   Substance and Sexual Activity    Alcohol use: No    Drug use: No    Sexual activity: Yes     Partners: Female   Lifestyle    Physical activity     Days per week: Not on file     Minutes per session: Not on file    Stress: Not on file   Relationships    Social connections     Talks on phone: Not on file     Gets together: Not on file     Attends Zoroastrian service: Not on file     Active member of club or organization: Not on file     Attends meetings of clubs or organizations: Not on file     Relationship status: Not on file    Intimate partner violence     Fear of current or ex partner: Not on file     Emotionally abused: Not on file     Physically abused: Not on file     Forced sexual activity: Not on file   Other Topics Concern    Not on file   Social History Narrative    Not on file     Family History   Problem Relation Age of Onset    Arthritis Father     Diabetes Father     Diabetes Brother      Current Outpatient Medications   Medication Sig Dispense Refill    traMADol (ULTRAM) 50 MG tablet Take 1-2 tablets by mouth daily as needed for Pain for up to 30 days. 60 tablet 0    donepezil (ARICEPT) 10 MG tablet Take 5 mg by mouth daily (with breakfast)       finasteride (PROSCAR) 5 MG tablet Take 5 mg by mouth daily      cimetidine (TAGAMET) 800 MG tablet Take 1 tablet by mouth 2 times daily 60 tablet 5    furosemide (LASIX) 40 MG tablet Take 1 tablet twice a day for 5 days. Then take 1 tablet once a day. 95 tablet 1    gabapentin (NEURONTIN) 300 MG capsule Take one in morning and two evening 90 capsule 5    famotidine (PEPCID) 20 MG tablet Take 1 tablet by mouth 2 times daily 60 tablet 5    blood glucose test strips (FREESTYLE LITE) strip Check blood sugar TID Dx: e11.65 100 strip 3    Insulin Pen Needle (B-D UF III MINI PEN NEEDLES) 31G X 5 MM MISC Use when injecting insulin QID Dx: e11.65 200 each 1    insulin aspart (NOVOLOG FLEXPEN) 100 UNIT/ML injection pen Inject 20 units TID follow sliding scale.  10 pen 1    BASAGLAR KWIKPEN 100 UNIT/ML injection pen 20 units nightly (Patient taking differently: Inject 22 Units into the skin nightly ) 5 pen 3    warfarin (COUMADIN) 2.5 MG tablet 1.25 mg (2.5 mg x 0.5) every Sun, Tue, Thu; 2.5 mg (2.5 mg x 1) all other days 90 tablet 3    lisinopril (PRINIVIL;ZESTRIL) 10 MG tablet Take 1 tablet by mouth daily 90 tablet 3    DULoxetine (CYMBALTA) 30 MG extended release capsule Take 30 mg by mouth nightly       naphazoline-glycerin (CLEAR EYES REDNESS RELIEF) 0.012-0.2 % SOLN ophthalmic solution Place 1 drop into both eyes 2 times daily (Patient taking differently: Place 1 drop into both eyes 2 times daily as needed ) 1 Bottle 0    tamsulosin (FLOMAX) 0.4 MG capsule Take 1 capsule by mouth daily (Patient taking differently: Take 0.4 mg by mouth 2 times daily ) 90 capsule 1    glucose monitoring kit (FREESTYLE) monitoring kit 1 kit by Does not apply route daily as needed. 1 kit 0    latanoprost (XALATAN) 0.005 % ophthalmic solution Place 1 drop into both eyes nightly. No current facility-administered medications for this visit. Allergies   Allergen Reactions    Celebrex [Celecoxib] Other (See Comments)     hallucinations    Elavil [Amitriptyline]      Severe fatigue      Naproxen      Kidney damage    Percocet [Oxycodone-Acetaminophen] Other (See Comments)     confusion    Lyrica [Pregabalin] Palpitations     Fatigue     Family Status   Relation Name Status    Father  (Not Specified)    Brother  (Not Specified)     ROS   I have reviewed the review of system questionnaire filled by the patient .   Patient was advised to contact PCP for non endocrine signs and symptoms         OBJECTIVE:  /70   Pulse 73   Temp 97.6 °F (36.4 °C)   Resp 16   Ht 5' 10\" (1.778 m)   Wt 203 lb (92.1 kg)   BMI 29.13 kg/m²    Wt Readings from Last 3 Encounters:   09/01/20 203 lb (92.1 kg)   08/27/20 204 lb (92.5 kg)   08/04/20 204 lb (92.5 kg)       EXAM   Constitutional: no acute distress, well appearing, well nourished  Psychiatric: oriented to person, place and time, judgement, insight and normal, recent and remote memory and intact and mood, affect are normal  Skin: skin and subcutaneous tissue is normal without mass,   Head and Face: examination of head and face revealed no abnormalities  Eyes: no lid or conjunctival swelling, no erythema or discharge, pupils are normal,   Ears/Nose: external inspection of ears and nose revealed no abnormalities, hearing is grossly normal  Oropharynx/Mouth/Face: lips, tongue and gums are normal with no lesions, the voice quality was normal  Neck: neck is supple and symmetric, with midline trachea and no masses, thyroid is normal    Pulmonary: no increased work of breathing or signs of respiratory distress, lungs are clear to auscultation  Cardiovascular: normal heart rate and rhythm, normal S1 and S2,   Musculoskeletal: uses a walker    Neurological: normal coordination, normal general cortical function        Lab Results   Component Value Date    LABA1C 8.1 08/24/2020    LABA1C 7.3 04/13/2020    LABA1C 9.1 01/05/2020         ASSESSMENT/PLAN:  1. Type 2 diabetes mellitus with stage 3 chronic kidney disease,  long-term current use of insulin 6.5>>7.2>>8.2>>9.5>>9.1>>7.3>>8.1    Patient has been taking 24 units of Basaglar advisedto increase to 26 units and then 28 and keep on titrating up if fastings are not at target   He has been taking novolog with each meal aprox 4-6 units. He is using sliding scale of insulin to cover meals --we discussed this in detail   Reviewed lab results with wife and patient in detail   Due to his elevated creatinine most of oral hypoglycemic agent not recommended  Advised to send weekly blood sugar log so I can make adjustments in insulin regimen   I had a lengthy discussion with the patient about  his  uncontrolled diabetes. We discussed progression of diabetes in detail and also the incidence of complications associated with uncontrolled diabetes. Juliette Cisneros was advised that lifestyle modification is the key to better control of his Diabetes. We discussed carbohydrate restriction in detail. Hypoglycemia protocol reviewed in detail with patient Patient was advised to carry glucose tablets   Patient was advised that sending of his fingerstick blood glucose logs is crucial in management of his  diabetes. I will adjust the  doses of diabetic medications  according to sent data.      Health Maintenance       Last Eye Exam: advised to have annual dilated eye exam. his last eye exam was in sept 2020 retinopathy gets injection   Last Podiatry Exam:  Podiatry visit in jan 2020   Lipids: Last LDL level was 64 in march 2019   Microalbumin/Creatinine Ratio: he has CKD follows with  R[facility]     . Education: Reviewed ABCs of diabetes management (respective goals in parentheses): A1C (<7), blood pressure (<130/80), and cholesterol (LDL <100). 2. Diabetic peripheral neuropathy   He is on neurontin and cymbalta   Denies any worsening of pain     3. Benign prostatic hyperplasia with urinary hesitancy  Stable     4. CKD (chronic kidney disease) stage 3, GFR 30-59 ml/min   Follows with Dr Janell Jenkins 1.9     5. Essential hypertension  Well controlled   Continue with current regimen   Denies any headaches denies any chest pain     6. Non-small cell cancer of left lung   In remission     7. Chronic systolic congestive heart failure ([address]  Follows withcardiology       Reviewed and/or ordered clinical lab results yes   Reviewed and/or ordered radiology tests Yes  Reviewed and/or ordered other diagnostic tests yes   Made a decision to obtain old records yes   Reviewed and summarized old records yes     Erika Paulino was counseled regarding symptoms of current diagnosis, course and complications of disease if inadequately treated, side effects of medications, diagnosis, treatment options, and prognosis, risks, benefits, complications, and alternatives of treatment, labs, imaging and other studies and treatment targets and goals. He understands instructions and counseling    Return in about 3 months (around 12/1/2020). Please note that some or all of this report was generated using voice recognition software. Please notify me in case of any questions about the content of this document, as some errors in transcription may have occurred .

## 2020-09-15 RX ORDER — PEN NEEDLE, DIABETIC 31 GX5/16"
NEEDLE, DISPOSABLE MISCELLANEOUS
Qty: 100 EACH | Refills: 5 | Status: SHIPPED | OUTPATIENT
Start: 2020-09-15 | End: 2020-10-19 | Stop reason: SDUPTHER

## 2020-09-15 NOTE — TELEPHONE ENCOUNTER
Medication:   Requested Prescriptions     Pending Prescriptions Disp Refills    Insulin Pen Needle (B-D UF III MINI PEN NEEDLES) 31G X 5 MM MISC [Pharmacy Med Name: BD UF MINI PEN NEEDLE [id number]] 100 each 5     Sig: USE ONE NEEDLE AS DIRECTED DAILY         Last appt: 09/01/2020  Next appt: 01/7/2021      Last [place]ARRS:   RX Monitoring 8/27/2020   Attestation -   Periodic Controlled Substance Monitoring Assessed functional status. ;No signs of potential drug abuse or diversion identified.

## 2020-09-16 RX ORDER — LISINOPRIL 10 MG/1
TABLET ORAL
Qty: 90 TABLET | Refills: 2 | Status: SHIPPED | OUTPATIENT
Start: 2020-09-16 | End: 2021-06-21

## 2020-09-28 ENCOUNTER — OFFICE VISIT (OUTPATIENT)
Dept: INTERNAL MEDICINE CLINIC | Age: 85
End: 2020-09-28
Payer: MEDICARE

## 2020-09-28 ENCOUNTER — TELEPHONE (OUTPATIENT)
Dept: INTERNAL MEDICINE CLINIC | Age: 85
End: 2020-09-28

## 2020-09-28 VITALS
BODY MASS INDEX: 28.12 KG/M2 | WEIGHT: 196 LBS | DIASTOLIC BLOOD PRESSURE: 78 MMHG | SYSTOLIC BLOOD PRESSURE: 128 MMHG | TEMPERATURE: 97.9 F

## 2020-09-28 PROBLEM — I68.0 CEREBRAL AMYLOID ANGIOPATHY (HCC): Status: ACTIVE | Noted: 2020-07-23

## 2020-09-28 PROBLEM — F02.80 LATE ONSET ALZHEIMER'S DISEASE WITHOUT BEHAVIORAL DISTURBANCE (HCC): Status: ACTIVE | Noted: 2020-07-23

## 2020-09-28 PROBLEM — E85.4 CEREBRAL AMYLOID ANGIOPATHY (HCC): Status: ACTIVE | Noted: 2020-07-23

## 2020-09-28 PROBLEM — G30.1 LATE ONSET ALZHEIMER'S DISEASE WITHOUT BEHAVIORAL DISTURBANCE (HCC): Status: ACTIVE | Noted: 2020-07-23

## 2020-09-28 LAB
INTERNATIONAL NORMALIZATION RATIO, POC: 1.7
PROTHROMBIN TIME, POC: NORMAL

## 2020-09-28 PROCEDURE — 4040F PNEUMOC VAC/ADMIN/RCVD: CPT | Performed by: NURSE PRACTITIONER

## 2020-09-28 PROCEDURE — G0008 ADMIN INFLUENZA VIRUS VAC: HCPCS | Performed by: NURSE PRACTITIONER

## 2020-09-28 PROCEDURE — G0439 PPPS, SUBSEQ VISIT: HCPCS | Performed by: NURSE PRACTITIONER

## 2020-09-28 PROCEDURE — 85610 PROTHROMBIN TIME: CPT | Performed by: NURSE PRACTITIONER

## 2020-09-28 PROCEDURE — 3052F HG A1C>EQUAL 8.0%<EQUAL 9.0%: CPT | Performed by: NURSE PRACTITIONER

## 2020-09-28 PROCEDURE — 90694 VACC AIIV4 NO PRSRV 0.5ML IM: CPT | Performed by: NURSE PRACTITIONER

## 2020-09-28 PROCEDURE — 1123F ACP DISCUSS/DSCN MKR DOCD: CPT | Performed by: NURSE PRACTITIONER

## 2020-09-28 RX ORDER — TRAMADOL HYDROCHLORIDE 50 MG/1
50-100 TABLET ORAL DAILY PRN
Qty: 60 TABLET | Refills: 0 | Status: SHIPPED | OUTPATIENT
Start: 2020-09-28 | End: 2020-11-09 | Stop reason: SDUPTHER

## 2020-09-28 ASSESSMENT — PATIENT HEALTH QUESTIONNAIRE - PHQ9
1. LITTLE INTEREST OR PLEASURE IN DOING THINGS: 0
SUM OF ALL RESPONSES TO PHQ QUESTIONS 1-9: 0
SUM OF ALL RESPONSES TO PHQ QUESTIONS 1-9: 0
2. FEELING DOWN, DEPRESSED OR HOPELESS: 0
SUM OF ALL RESPONSES TO PHQ9 QUESTIONS 1 & 2: 0

## 2020-09-28 ASSESSMENT — LIFESTYLE VARIABLES: HOW OFTEN DO YOU HAVE A DRINK CONTAINING ALCOHOL: 0

## 2020-09-28 NOTE — PROGRESS NOTES
Medicare Annual Wellness Visit  Name: Hope Monroe Date: 2020   MRN: [id number] Sex: Male   Age: 82 y.o. Ethnicity: Non-/Non    : 1935 Race: Katie Bentley is here for Office Visit for Anticoagulation Management (INR coumadin 3.75 mg every Thu; 2.5 mg all other day)    Screenings for behavioral, psychosocial and functional/safety risks, and cognitive dysfunction are all negative except as indicated below. These results, as well as other patient data from the [address] form, are documented in Flowsheets linked to this Encounter. Allergies   Allergen Reactions    Celebrex [Celecoxib] Other (See Comments)     hallucinations    Elavil [Amitriptyline]      Severe fatigue      Naproxen      Kidney damage    Percocet [Oxycodone-Acetaminophen] Other (See Comments)     confusion    Lyrica [Pregabalin] Palpitations     Fatigue       Prior to Visit Medications    Medication Sig Taking? Authorizing Provider   blood glucose test strips (FREESTYLE LITE) strip USE ONE STRIP TO TEST THREE TIMES A DAY Yes Kg Liang MD   lisinopril (PRINIVIL;ZESTRIL) 10 MG tablet TAKE ONE TABLET BY MOUTH DAILY Yes SHA Leonardo CNP   Insulin Pen Needle (B-D UF III MINI PEN NEEDLES) 31G X 5 MM MISC USE ONE NEEDLE AS DIRECTED DAILY Yes MD DANIEL JacobIKPEN 100 UNIT/ML injection pen INJECT 30 UNITS UNDER THE SKIN NIGHTLY Yes Kg Liang MD   donepezil (ARICEPT) 10 MG tablet Take 5 mg by mouth daily (with breakfast)  Yes Historical Provider, MD   finasteride (PROSCAR) 5 MG tablet Take 5 mg by mouth daily Yes Historical Provider, MD   cimetidine (TAGAMET) 800 MG tablet Take 1 tablet by mouth 2 times daily Yes SHA Leonardo CNP   furosemide (LASIX) 40 MG tablet Take 1 tablet twice a day for 5 days. Then take 1 tablet once a day.  Yes Lucila Conte MD   famotidine (PEPCID) 20 MG tablet Take 1 tablet by mouth 2 times daily Yes SHA Leonardo CNP insulin aspart (NOVOLOG FLEXPEN) 100 UNIT/ML injection pen Inject 20 units TID follow sliding scale. Yes Adan Acosta MD   warfarin (COUMADIN) 2.5 MG tablet 1.25 mg (2.5 mg x 0.5) every Sun, Tue, Thu; 2.5 mg (2.5 mg x 1) all other days Yes SHA Doran CNP   DULoxetine (CYMBALTA) 30 MG extended release capsule Take 30 mg by mouth nightly  Yes Historical Provider, MD   naphazoline-glycerin (CLEAR EYES REDNESS RELIEF) 0.012-0.2 % SOLN ophthalmic solution Place 1 drop into both eyes 2 times daily  Patient taking differently: Place 1 drop into both eyes 2 times daily as needed  Yes Purnima Haynes MD   tamsulosin (FLOMAX) 0.4 MG capsule Take 1 capsule by mouth daily  Patient taking differently: Take 0.4 mg by mouth 2 times daily  Yes SHA Doran CNP   glucose monitoring kit (FREESTYLE) monitoring kit 1 kit by Does not apply route daily as needed. Yes Yoel Solares MD   latanoprost (XALATAN) 0.005 % ophthalmic solution Place 1 drop into both eyes nightly.  Yes Historical Provider, MD   gabapentin (NEURONTIN) 300 MG capsule Take one in morning and two evening  SHA Doran CNP       Past Medical History:   Diagnosis Date    Abdominal pain, epigastric     Arthritis     Benign prostatic hypertrophy without urinary obstruction     BPH     Cellulitis and abscess     Chronic rhinitis     Chronic systolic congestive heart failure ([address] 4/18/2019    COPD (chronic obstructive pulmonary disease) ([address]     Diabetes mellitus ([address]     Dysphagia     Erectile dysfunction     GERD (gastroesophageal reflux disease)     Hx of blood clots     Hyperglycemia     Hypertension     lumbar radiculopathy     Neuropathy     Nocturia     Osteoarthritis     Other disorders of kidney and ureter in diseases classified elsewhere     Pain, joint, knee     Palpitations     skin cancer     Rt ear, nose, neck, hands/ 2018 lung    SOB (shortness of breath)     Tennis elbow     Tinea pedis     foot       Past Surgical History:   Procedure Laterality Date    CATARACT REMOVAL Bilateral 09/2014    CHOLECYSTECTOMY      CIRCUMCISION N/A 11/15/2019    DORSAL SLIT performed by Vadim Gordon MD at [facility] COLONOSCOPY  11/28/2011    Dr. [place] - mild sigmoid diverticulosis    CYSTOSCOPY N/A 1/5/2020    CYSTOSCOPY, EVACUATION OF CLOTS performed by General Scooter MD at [address] [facility] Right 09/09/2013    Dr. Raghav Ellis - block for pain relief    JOINT REPLACEMENT Bilateral     knees    KNEE PROSTHESIS REMOVAL      LUMBAR NERVE BLOCK N/A 8/26/2019    L4/5 INTERLAMINAR EPIDURAL STEROID INJECTION WITH FLUOROSCOPY performed by Guido Jacinto MD at [address] [place] [address] Left 05/09/2018    Dr. Vyas - CT guided    MOHS SURGERY Bilateral     NERVE SURGERY N/A 12/20/2019    L4L5 INTERLAMINAR EPIDURAL STEROID INJECTION WITH FLUOROSCOPY performed by Guido Jacinto MD at [address] [place] [address]      multiple lumbar ESIs    PAIN MANAGEMENT PROCEDURE N/A 7/20/2020    L4-L5 INTERLAMINAR EPIDURAL STEROID INJECTION WITH FLUOROSCOPY performed by Guido Jacinto MD at [address] [place] [address] Right 01/26/2017    Dr. Field [facility] - superficial, w/excision of parotid tail & dissection/preservation of facial nerve    RI INJECT ANES/STEROID FORAMEN LUMBAR/SACRAL W IMG GUIDE ,1 LEVEL Right 11/21/2018    RIGHT L4, L5 LUMBAR TRANSFORAMINAL EPIDURAL STEROID INJECTION WITH FLUOROSCOPY performed by Guido Jacinto MD at [place] [address] Left 08/14/2019    TOTAL KNEE ARTHROPLASTY      right x1 and left x2    TUNNELED VENOUS PORT PLACEMENT  06/22/2018    Left [facility] infusaport placement    UPPER GASTROINTESTINAL ENDOSCOPY  03/19/2018    Dr. Lucas Thacker - mild non-obstructive ring distal esophagus       Family History   Problem Relation Age of Onset    Arthritis Father     Diabetes Father     Diabetes Brother        CareTeam (Including Habits/Nutrition Interventions:  · Inadequate physical activity:  patient is not ready to increase his/her physical activity level at this time  · Dental exam overdue:  patient declines dental evaluation    Hearing/Vision:  No exam data present  Hearing/Vision  Do you or your family notice any trouble with your hearing?: (!) Yes  Do you have difficulty driving, watching TV, or doing any of your daily activities because of your eyesight?: No  Have you had an eye exam within the past year?: Yes  Hearing/Vision Interventions:  · Hearing concerns:  patient declines any further evaluation/treatment for hearing issues    ADL:  ADLs  In the past 7 days, did you need help from others to perform any of the following everyday activities? Eating, dressing, grooming, bathing, toileting, or walking/balance?: (!) Walking/Balance, Dressing  In the past 7 days, did you need help from others to take care of any of the following?  Laundry, housekeeping, banking/finances, shopping, telephone use, food preparation, transportation, or taking medications?: (!) Telephone Use, Transportation, Food Preparation  ADL Interventions:  · Patient declines any further evaluation/treatment for this issue    Personalized Preventive Plan   Current Health Maintenance Status  Immunization History   Administered Date(s) Administered    Influenza 11/01/2011, 09/30/2013    Influenza Vaccine, unspecified formulation 10/15/2018    Influenza Virus Vaccine 11/01/2011, 09/30/2013, 09/17/2014, 09/29/2015    Influenza, High Dose (Fluzone 65 yrs and older) 09/17/2014, 09/29/2015, 11/03/2017, 10/09/2018, 09/18/2019    Influenza, Quadv, IM, PF (6 mo and older Fluzone, Flulaval, Fluarix, and 3 yrs and older Afluria) 11/01/2016    Pneumococcal Conjugate 13-valent ([id number]) 09/29/2015    Pneumococcal Polysaccharide ([id number]) 09/17/2014        Health Maintenance   Topic Date Due    DTaP/Tdap/Td vaccine (1 - Tdap) 08/18/1954    Shingles Vaccine (1 of 2) daily as needed for Pain for up to 30 days. Rash  -     fluocinonide (LIDEX) 0.05 % cream; Apply topically 2 times daily. Need for influenza vaccination  -     INFLUENZA, QUADV, ADJUVANTED, 72 YRS =, IM, PF, PREFILL SYR, 0.5ML (FLUAD)    Controlled Substance Monitoring:  Acute and Chronic Pain Monitoring:   RX Monitoring 9/28/2020   Attestation -   Periodic Controlled Substance Monitoring No signs of potential drug abuse or diversion identified. ;Assessed functional status. ;Obtaining appropriate analgesic effect of treatment.          Sharri Dsouza, APRN - CNP

## 2020-09-28 NOTE — TELEPHONE ENCOUNTER
----- Message from [facility] sent at 9/28/2020  1:21 PM EDT -----  Subject: Message to Provider    QUESTIONS  Information for Provider? Patients wife called and said Amanda Bruno has only 2   surgeries with the Dermotologist   one on 10/8/2020 & 10/22/2020. Needs to know what to do with coummadin   dose.  ---------------------------------------------------------------------------  --------------  CALL BACK INFO  What is the best way for the office to contact you? OK to leave message on   voicemail  Preferred Call Back Phone Number? [phone]  ---------------------------------------------------------------------------  --------------  SCRIPT ANSWERS  Relationship to Patient? Other  Representative Name? Felicita Pineda  Is the [facility] Life on the appropriate HIPAA document in Epic?  Yes

## 2020-09-28 NOTE — PATIENT INSTRUCTIONS
Personalized Preventive Plan for Raudel Collado - 9/28/2020  Medicare offers a range of preventive health benefits. Some of the tests and screenings are paid in full while other may be subject to a deductible, co-insurance, and/or copay. Some of these benefits include a comprehensive review of your medical history including lifestyle, illnesses that may run in your family, and various assessments and screenings as appropriate. After reviewing your medical record and screening and assessments performed today your provider may have ordered immunizations, labs, imaging, and/or referrals for you. A list of these orders (if applicable) as well as your Preventive Care list are included within your After Visit Summary for your review. Other Preventive Recommendations:    · A preventive eye exam performed by an eye specialist is recommended every 1-2 years to screen for glaucoma; cataracts, macular degeneration, and other eye disorders. · A preventive dental visit is recommended every 6 months. · Try to get at least 150 minutes of exercise per week or 10,000 steps per day on a pedometer . · Order or download the FREE \"Exercise & Physical Activity: Your Everyday Guide\" from [employer]. Call 7-690.546.6689 or search [employer] online. · You need 4905-3244 mg of calcium and 2323-3409 IU of vitamin D per day. It is possible to meet your calcium requirement with diet alone, but a vitamin D supplement is usually necessary to meet this goal.  · When exposed to the sun, use a sunscreen that protects against both UVA and UVB radiation with an SPF of 30 or greater. Reapply every 2 to 3 hours or after sweating, drying off with a towel, or swimming. · Always wear a seat belt when traveling in a car. Always wear a helmet when riding a bicycle or motorcycle.

## 2020-10-14 RX ORDER — FAMOTIDINE 20 MG/1
TABLET, FILM COATED ORAL
Qty: 60 TABLET | Refills: 4 | Status: SHIPPED | OUTPATIENT
Start: 2020-10-14 | End: 2021-03-29

## 2020-10-19 RX ORDER — PEN NEEDLE, DIABETIC 31 GX5/16"
NEEDLE, DISPOSABLE MISCELLANEOUS
Qty: 200 EACH | Refills: 5 | Status: SHIPPED | OUTPATIENT
Start: 2020-10-19 | End: 2021-11-24

## 2020-11-03 ENCOUNTER — HOSPITAL ENCOUNTER (OUTPATIENT)
Dept: CT IMAGING | Age: 85
Discharge: HOME | End: 2020-11-03
Payer: MEDICARE

## 2020-11-03 PROCEDURE — 71250 CT THORAX DX C-: CPT

## 2020-11-09 ENCOUNTER — OFFICE VISIT (OUTPATIENT)
Dept: INTERNAL MEDICINE CLINIC | Age: 85
End: 2020-11-09
Payer: MEDICARE

## 2020-11-09 VITALS
DIASTOLIC BLOOD PRESSURE: 70 MMHG | BODY MASS INDEX: 29.13 KG/M2 | SYSTOLIC BLOOD PRESSURE: 150 MMHG | WEIGHT: 203 LBS | HEART RATE: 70 BPM | TEMPERATURE: 97.3 F

## 2020-11-09 LAB
INTERNATIONAL NORMALIZATION RATIO, POC: 2
PROTHROMBIN TIME, POC: NORMAL

## 2020-11-09 PROCEDURE — 99213 OFFICE O/P EST LOW 20 MIN: CPT | Performed by: NURSE PRACTITIONER

## 2020-11-09 PROCEDURE — G8427 DOCREV CUR MEDS BY ELIG CLIN: HCPCS | Performed by: NURSE PRACTITIONER

## 2020-11-09 PROCEDURE — 3052F HG A1C>EQUAL 8.0%<EQUAL 9.0%: CPT | Performed by: NURSE PRACTITIONER

## 2020-11-09 PROCEDURE — 4040F PNEUMOC VAC/ADMIN/RCVD: CPT | Performed by: NURSE PRACTITIONER

## 2020-11-09 PROCEDURE — 85610 PROTHROMBIN TIME: CPT | Performed by: NURSE PRACTITIONER

## 2020-11-09 PROCEDURE — 1123F ACP DISCUSS/DSCN MKR DOCD: CPT | Performed by: NURSE PRACTITIONER

## 2020-11-09 PROCEDURE — G8417 CALC BMI ABV UP PARAM F/U: HCPCS | Performed by: NURSE PRACTITIONER

## 2020-11-09 PROCEDURE — 1036F TOBACCO NON-USER: CPT | Performed by: NURSE PRACTITIONER

## 2020-11-09 PROCEDURE — G8484 FLU IMMUNIZE NO ADMIN: HCPCS | Performed by: NURSE PRACTITIONER

## 2020-11-09 RX ORDER — DULOXETIN HYDROCHLORIDE 60 MG/1
60 CAPSULE, DELAYED RELEASE ORAL NIGHTLY
Qty: 90 CAPSULE | Refills: 1 | Status: SHIPPED | OUTPATIENT
Start: 2020-11-09 | End: 2020-12-01 | Stop reason: SDUPTHER

## 2020-11-09 RX ORDER — TRAMADOL HYDROCHLORIDE 50 MG/1
50 TABLET ORAL EVERY 8 HOURS PRN
Qty: 90 TABLET | Refills: 2 | Status: SHIPPED | OUTPATIENT
Start: 2020-11-09 | End: 2020-12-09

## 2020-11-10 ENCOUNTER — PROCEDURE VISIT (OUTPATIENT)
Dept: CARDIOLOGY CLINIC | Age: 85
End: 2020-11-10
Payer: MEDICARE

## 2020-11-10 ENCOUNTER — OFFICE VISIT (OUTPATIENT)
Dept: CARDIOLOGY CLINIC | Age: 85
End: 2020-11-10
Payer: MEDICARE

## 2020-11-10 VITALS
HEIGHT: 70 IN | SYSTOLIC BLOOD PRESSURE: 146 MMHG | BODY MASS INDEX: 29.06 KG/M2 | DIASTOLIC BLOOD PRESSURE: 82 MMHG | OXYGEN SATURATION: 98 % | RESPIRATION RATE: 18 BRPM | WEIGHT: 203 LBS | HEART RATE: 64 BPM

## 2020-11-10 LAB
LV EF: 50 %
LVEF MODALITY: NORMAL

## 2020-11-10 PROCEDURE — G8417 CALC BMI ABV UP PARAM F/U: HCPCS | Performed by: INTERNAL MEDICINE

## 2020-11-10 PROCEDURE — G8427 DOCREV CUR MEDS BY ELIG CLIN: HCPCS | Performed by: INTERNAL MEDICINE

## 2020-11-10 PROCEDURE — 1123F ACP DISCUSS/DSCN MKR DOCD: CPT | Performed by: INTERNAL MEDICINE

## 2020-11-10 PROCEDURE — 1036F TOBACCO NON-USER: CPT | Performed by: INTERNAL MEDICINE

## 2020-11-10 PROCEDURE — G8484 FLU IMMUNIZE NO ADMIN: HCPCS | Performed by: INTERNAL MEDICINE

## 2020-11-10 PROCEDURE — 4040F PNEUMOC VAC/ADMIN/RCVD: CPT | Performed by: INTERNAL MEDICINE

## 2020-11-10 PROCEDURE — 99214 OFFICE O/P EST MOD 30 MIN: CPT | Performed by: INTERNAL MEDICINE

## 2020-11-10 PROCEDURE — 93306 TTE W/DOPPLER COMPLETE: CPT | Performed by: INTERNAL MEDICINE

## 2020-11-10 ASSESSMENT — ENCOUNTER SYMPTOMS
GASTROINTESTINAL NEGATIVE: 1
BACK PAIN: 1
RESPIRATORY NEGATIVE: 1

## 2020-11-10 NOTE — PROGRESS NOTES
SUBJECTIVE:    Patient ID: Patrick Murray is a 82 y.o. male. CC: History of pulmonary embolism, chronic anticoagulation, steroid-induced diabetes, neuropathy    HPI: Follow-up chronic medical problems, follow-up of recent medical issues, and INR mgmt.     He also has a history of pulmonary embolism with chronic anticoagulation.  He is taking his warfarin as directed.  He denies any side effects. Candido Capone today is therapeutic today at 2.0. He has a history of well-controlled diabetes which is been less well controlled due to steroids for CAA. He is due for A1c recheck. Neuropathy remains problematic. He uses tramadol nightly. Spouse is concerned about increasing gabapentin due to its neurological effects. We also discussed increasing Cymbalta which they would like to try.       Past Medical History:   Diagnosis Date    Abdominal pain, epigastric     Arthritis     Benign prostatic hypertrophy without urinary obstruction     BPH     Cellulitis and abscess     Chronic rhinitis     Chronic systolic congestive heart failure (HCC) 4/18/2019    COPD (chronic obstructive pulmonary disease) (HCC)     Diabetes mellitus (HCC)     Dysphagia     Erectile dysfunction     GERD (gastroesophageal reflux disease)     Hx of blood clots     Hyperglycemia     Hypertension     Late onset Alzheimer's disease without behavioral disturbance ([facility] 7/23/2020    lumbar radiculopathy     Neuropathy     Nocturia     Osteoarthritis     Other disorders of kidney and ureter in diseases classified elsewhere     Pain, joint, knee     Palpitations     skin cancer     Rt ear, nose, neck, hands/ 2018 lung    SOB (shortness of breath)     Tennis elbow     Tinea pedis     foot        Current Outpatient Medications   Medication Sig Dispense Refill    DULoxetine (CYMBALTA) 60 MG extended release capsule Take 1 capsule by mouth nightly 90 capsule 1    traMADol (ULTRAM) 50 MG tablet Take 1 tablet by mouth every 8 hours as needed for Pain for up to 30 days. 90 tablet 2    Insulin Pen Needle (B-D UF III MINI PEN NEEDLES) 31G X 5 MM MISC Use to inject insulin 4 times daily. 200 each 5    famotidine (PEPCID) 20 MG tablet TAKE ONE TABLET BY MOUTH TWICE A DAY 60 tablet 4    fluocinonide (LIDEX) 0.05 % cream Apply topically 2 times daily. 60 g 2    diphenhydrAMINE-APAP, sleep, (TYLENOL PM EXTRA STRENGTH PO) Take by mouth as needed      dexamethasone (DECADRON) 4 MG tablet Take 4 mg by mouth daily (with breakfast)      blood glucose test strips (FREESTYLE LITE) strip USE ONE STRIP TO TEST THREE TIMES A  strip 5    lisinopril (PRINIVIL;ZESTRIL) 10 MG tablet TAKE ONE TABLET BY MOUTH DAILY 90 tablet 2    BASAGLAR KWIKPEN 100 UNIT/ML injection pen INJECT 30 UNITS UNDER THE SKIN NIGHTLY 15 pen 2    donepezil (ARICEPT) 10 MG tablet Take 5 mg by mouth daily (with breakfast)       finasteride (PROSCAR) 5 MG tablet Take 5 mg by mouth daily      cimetidine (TAGAMET) 800 MG tablet Take 1 tablet by mouth 2 times daily 60 tablet 5    furosemide (LASIX) 40 MG tablet Take 1 tablet twice a day for 5 days. Then take 1 tablet once a day. 95 tablet 1    insulin aspart (NOVOLOG FLEXPEN) 100 UNIT/ML injection pen Inject 20 units TID follow sliding scale. 10 pen 1    warfarin (COUMADIN) 2.5 MG tablet 1.25 mg (2.5 mg x 0.5) every Sun, Tue, Thu; 2.5 mg (2.5 mg x 1) all other days 90 tablet 3    naphazoline-glycerin (CLEAR EYES REDNESS RELIEF) 0.012-0.2 % SOLN ophthalmic solution Place 1 drop into both eyes 2 times daily (Patient taking differently: Place 1 drop into both eyes 2 times daily as needed ) 1 Bottle 0    tamsulosin (FLOMAX) 0.4 MG capsule Take 1 capsule by mouth daily (Patient taking differently: Take 0.4 mg by mouth 2 times daily ) 90 capsule 1    glucose monitoring kit (FREESTYLE) monitoring kit 1 kit by Does not apply route daily as needed.  1 kit 0    latanoprost (XALATAN) 0.005 % ophthalmic solution Place 1 drop into both eyes nightly.  gabapentin (NEURONTIN) 300 MG capsule Take one in morning and two evening 90 capsule 5     No current facility-administered medications for this visit. Review of Systems   Constitutional: Negative. Respiratory: Negative. Cardiovascular: Negative. Gastrointestinal: Negative. Genitourinary: Negative. Musculoskeletal: Positive for back pain. Neurological: Positive for numbness. Psychiatric/Behavioral: Negative. OBJECTIVE:  Physical Exam  Constitutional:       General: He is not in acute distress. Appearance: He is well-developed. HENT:      Head: Normocephalic and atraumatic. Cardiovascular:      Rate and Rhythm: Normal rate and regular rhythm. Pulmonary:      Effort: Pulmonary effort is normal.      Breath sounds: Normal breath sounds. Skin:     General: Skin is warm and dry. Neurological:      General: No focal deficit present. Mental Status: He is alert and oriented to person, place, and time. BP (!) 150/70   Pulse 70   Temp 97.3 °F (36.3 °C) (Temporal)   Wt 203 lb (92.1 kg)   BMI 29.13 kg/m²      PHQ Scores 9/28/2020 2/6/2020 9/18/2019 2/11/2019 7/10/2018 3/21/2017   PHQ2 Score 0 0 0 0 1 0   PHQ9 Score 0 0 0 0 1 0     Interpretation of Total Score Depression Severity: 1-4 = Minimal depression, 5-9 = Mild depression, 10-14 = Moderate depression, 15-19 = Moderately severe depression, 20-27 =Severe depression        Assessment/Plan:  Josef Dale was seen today for office visit for anticoagulation management. Diagnoses and all orders for this visit:    Bilateral pulmonary embolism (HCC)  - No recurrence, continue anticoagulation     Anticoagulated on Coumadin  -     POCT INR    Type 2 diabetes mellitus with stage 3 chronic kidney disease, without long-term current use of insulin (HCC)  -     traMADol (ULTRAM) 50 MG tablet; Take 1 tablet by mouth every 8 hours as needed for Pain for up to 30 days.     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 Peripheral polyneuropathy  -     DULoxetine (CYMBALTA) 60 MG extended release capsule; Take 1 capsule by mouth nightly    Late onset Alzheimer's disease without behavioral disturbance ([facility]  - Monitor    Controlled Substance Monitoring:  Acute and Chronic Pain Monitoring:   RX Monitoring 9/28/2020   Attestation -   Periodic Controlled Substance Monitoring No signs of potential drug abuse or diversion identified. ;Assessed functional status. ;Obtaining appropriate analgesic effect of treatment.          SHA Willis - CNP

## 2020-12-01 ENCOUNTER — TELEPHONE (OUTPATIENT)
Dept: INTERNAL MEDICINE CLINIC | Age: 85
End: 2020-12-01

## 2020-12-01 RX ORDER — DULOXETIN HYDROCHLORIDE 30 MG/1
30 CAPSULE, DELAYED RELEASE ORAL NIGHTLY
Qty: 90 CAPSULE | Refills: 1 | Status: SHIPPED | OUTPATIENT
Start: 2020-12-01 | End: 2021-02-25 | Stop reason: SDUPTHER

## 2020-12-01 NOTE — TELEPHONE ENCOUNTER
Danielle Guerrero, pt's wife called stating that Pretty Ram increased the pt's cymbalta from 30 to 60 mg. She reports that the pt is having some side effects. He was having trouble urinating. She read on the data sheet that the cymbalta can cause urinary retention. His tremor in his hands have worsened. She is asking if he can go back on the 30 mg tablet? Please advise. travis on file.

## 2020-12-07 ENCOUNTER — OFFICE VISIT (OUTPATIENT)
Dept: INTERNAL MEDICINE CLINIC | Age: 85
End: 2020-12-07
Payer: MEDICARE

## 2020-12-07 VITALS
SYSTOLIC BLOOD PRESSURE: 146 MMHG | WEIGHT: 206 LBS | HEART RATE: 72 BPM | TEMPERATURE: 97.1 F | DIASTOLIC BLOOD PRESSURE: 84 MMHG | BODY MASS INDEX: 29.56 KG/M2

## 2020-12-07 LAB
INTERNATIONAL NORMALIZATION RATIO, POC: 2.7
PROTHROMBIN TIME, POC: NORMAL

## 2020-12-07 PROCEDURE — 85610 PROTHROMBIN TIME: CPT | Performed by: NURSE PRACTITIONER

## 2020-12-07 PROCEDURE — 1036F TOBACCO NON-USER: CPT | Performed by: NURSE PRACTITIONER

## 2020-12-07 PROCEDURE — G8484 FLU IMMUNIZE NO ADMIN: HCPCS | Performed by: NURSE PRACTITIONER

## 2020-12-07 PROCEDURE — G8417 CALC BMI ABV UP PARAM F/U: HCPCS | Performed by: NURSE PRACTITIONER

## 2020-12-07 PROCEDURE — 99213 OFFICE O/P EST LOW 20 MIN: CPT | Performed by: NURSE PRACTITIONER

## 2020-12-07 PROCEDURE — 4040F PNEUMOC VAC/ADMIN/RCVD: CPT | Performed by: NURSE PRACTITIONER

## 2020-12-07 PROCEDURE — 1123F ACP DISCUSS/DSCN MKR DOCD: CPT | Performed by: NURSE PRACTITIONER

## 2020-12-07 PROCEDURE — G8427 DOCREV CUR MEDS BY ELIG CLIN: HCPCS | Performed by: NURSE PRACTITIONER

## 2020-12-07 ASSESSMENT — ENCOUNTER SYMPTOMS
ABDOMINAL PAIN: 1
NAUSEA: 0
RESPIRATORY NEGATIVE: 1
DIARRHEA: 0
CONSTIPATION: 0
VOMITING: 0

## 2020-12-07 NOTE — PROGRESS NOTES
SUBJECTIVE:    Patient ID: Peg Cerna is a 82 y.o. male. CC: Abdominal pain, history of pulmonary embolism, chronic anticoagulation, steroid-induced diabetes, neuropathy    HPI: Follow-up chronic medical problems, follow-up of recent medical issues, and INR mgmt. The patient reports right upper quadrant abdominal pain for the past week. He denies any known cause. There is no associated nausea or vomiting. He has intermittent constipation and diarrhea. His wife will sometimes give him medicines for each of these issues depending on what is present. Gallbladder was removed distantly.     He also has a history of pulmonary embolism with chronic anticoagulation.  He is taking his warfarin as directed.  He denies any side effects. Herberth Simple today is therapeutic today at 2.0. He has a history of well-controlled diabetes which is been less well controlled due to steroids for CAA. He is due for A1c recheck. Neuropathy remains problematic. He uses tramadol nightly. Spouse is concerned about increasing gabapentin due to its neurological effects. We also discussed increasing Cymbalta which they would like to try.       Past Medical History:   Diagnosis Date    Abdominal pain, epigastric     Arthritis     Benign prostatic hypertrophy without urinary obstruction     BPH     Cellulitis and abscess     Chronic rhinitis     Chronic systolic congestive heart failure (HCC) 4/18/2019    COPD (chronic obstructive pulmonary disease) (HCC)     Diabetes mellitus (HCC)     Dysphagia     Erectile dysfunction     GERD (gastroesophageal reflux disease)     Hx of blood clots     Hyperglycemia     Hypertension     Late onset Alzheimer's disease without behavioral disturbance ([facility] [address] 7/23/2020    lumbar radiculopathy     Neuropathy     Nocturia     Osteoarthritis     Other disorders of kidney and ureter in diseases classified elsewhere     Pain, joint, knee     Palpitations     skin cancer     Rt ear, nose, neck, hands/ 2018 lung    SOB (shortness of breath)     Tennis elbow     Tinea pedis     foot        Current Outpatient Medications   Medication Sig Dispense Refill    furosemide (LASIX) 40 MG tablet TAKE ONE TABLET BY MOUTH TWICE A DAY FOR 5 DAYS THEN TAKE ONE TABLET BY MOUTH DAILY 90 tablet 0    DULoxetine (CYMBALTA) 30 MG extended release capsule Take 1 capsule by mouth nightly 90 capsule 1    insulin aspart (NOVOLOG FLEXPEN) 100 UNIT/ML injection pen INJECT 20 UNITS UNDER THE SKIN THREE TIMES A DAY ACCORDING TO SLIDING SCALE 15 pen 1    gabapentin (NEURONTIN) 300 MG capsule TAKE ONE CAPSULE BY MOUTH EVERY MORNING AND TAKE TWO CAPSULES BY MOUTH EVERY NIGHT AT BEDTIME 90 capsule 5    traMADol (ULTRAM) 50 MG tablet Take 1 tablet by mouth every 8 hours as needed for Pain for up to 30 days. 90 tablet 2    Insulin Pen Needle (B-D UF III MINI PEN NEEDLES) 31G X 5 MM MISC Use to inject insulin 4 times daily. 200 each 5    famotidine (PEPCID) 20 MG tablet TAKE ONE TABLET BY MOUTH TWICE A DAY 60 tablet 4    fluocinonide (LIDEX) 0.05 % cream Apply topically 2 times daily.  60 g 2    diphenhydrAMINE-APAP, sleep, (TYLENOL PM EXTRA STRENGTH PO) Take by mouth as needed      dexamethasone (DECADRON) 4 MG tablet Take 4 mg by mouth daily (with breakfast)      blood glucose test strips (FREESTYLE LITE) strip USE ONE STRIP TO TEST THREE TIMES A  strip 5    lisinopril (PRINIVIL;ZESTRIL) 10 MG tablet TAKE ONE TABLET BY MOUTH DAILY 90 tablet 2    BASAGLAR KWIKPEN 100 UNIT/ML injection pen INJECT 30 UNITS UNDER THE SKIN NIGHTLY 15 pen 2    donepezil (ARICEPT) 10 MG tablet Take 5 mg by mouth daily (with breakfast)       finasteride (PROSCAR) 5 MG tablet Take 5 mg by mouth daily      cimetidine (TAGAMET) 800 MG tablet Take 1 tablet by mouth 2 times daily 60 tablet 5    warfarin (COUMADIN) 2.5 MG tablet 1.25 mg (2.5 mg x 0.5) every Sun, Tue, Thu; 2.5 mg (2.5 mg x 1) all other days 90 tablet 3    naphazoline-glycerin (CLEAR EYES REDNESS RELIEF) 0.012-0.2 % SOLN ophthalmic solution Place 1 drop into both eyes 2 times daily (Patient taking differently: Place 1 drop into both eyes 2 times daily as needed ) 1 Bottle 0    tamsulosin (FLOMAX) 0.4 MG capsule Take 1 capsule by mouth daily (Patient taking differently: Take 0.4 mg by mouth 2 times daily ) 90 capsule 1    glucose monitoring kit (FREESTYLE) monitoring kit 1 kit by Does not apply route daily as needed. 1 kit 0    latanoprost (XALATAN) 0.005 % ophthalmic solution Place 1 drop into both eyes nightly. No current facility-administered medications for this visit. Review of Systems   Constitutional: Negative. Negative for appetite change and fever. Respiratory: Negative. Cardiovascular: Negative. Gastrointestinal: Positive for abdominal pain. Negative for constipation, diarrhea, nausea and vomiting. Genitourinary: Negative. Psychiatric/Behavioral: Negative. OBJECTIVE:  Physical Exam  Constitutional:       General: He is not in acute distress. Appearance: He is well-developed. HENT:      Head: Normocephalic and atraumatic. Cardiovascular:      Rate and Rhythm: Normal rate and regular rhythm. Pulmonary:      Effort: Pulmonary effort is normal.      Breath sounds: Normal breath sounds. Abdominal:      General: Bowel sounds are normal.      Palpations: Abdomen is soft. Tenderness: There is no abdominal tenderness. Skin:     General: Skin is warm and dry. Neurological:      General: No focal deficit present. Mental Status: He is alert and oriented to person, place, and time.         BP (!) 146/84   Pulse 72   Temp 97.1 °F (36.2 °C) (Temporal)   Wt 206 lb (93.4 kg)   BMI 29.56 kg/m²      PHQ Scores 9/28/2020 2/6/2020 9/18/2019 2/11/2019 7/10/2018 3/21/2017   PHQ2 Score 0 0 0 0 1 0   PHQ9 Score 0 0 0 0 1 0     Interpretation of Total Score Depression Severity: 1-4 = Minimal depression, 5-9 = Mild depression, 10-14 = Moderate depression, 15-19 = Moderately severe depression, 20-27 =Severe depression        Assessment/Plan:  Fuentes LUGO was seen today for office visit for anticoagulation management and other. Diagnoses and all orders for this visit:    RUQ pain  -Intermittent right upper quadrant pain of unclear etiology. He has no gallbladder. He does not have any current pain. He denies any association with eating or drinking. He has intermittent constipation and diarrhea and takes medications for both of these at times which may be playing a role. -If symptoms persist, will need labs and abdominal CT.   He would like to hold off for now    Bilateral pulmonary embolism (HCC)  -Continue anticoagulation    Anticoagulated on Coumadin  - INR 2.7  - Continue current regimen, see flow sheets  -     POCT INR      SHA Diana - CNP

## 2020-12-08 ENCOUNTER — TELEPHONE (OUTPATIENT)
Dept: INTERNAL MEDICINE CLINIC | Age: 85
End: 2020-12-08

## 2020-12-08 DIAGNOSIS — R10.11 RUQ PAIN: ICD-10-CM

## 2020-12-08 LAB
A/G RATIO: 1.6 (ref 1.1–2.2)
ALBUMIN SERPL-MCNC: 3.7 G/DL (ref 3.4–5)
ALP BLD-CCNC: 91 U/L (ref 40–129)
ALT SERPL-CCNC: 17 U/L (ref 10–40)
AMYLASE: 78 U/L (ref 25–115)
ANION GAP SERPL CALCULATED.3IONS-SCNC: 14 MMOL/L (ref 3–16)
AST SERPL-CCNC: 11 U/L (ref 15–37)
BASOPHILS ABSOLUTE: 0.1 K/UL (ref 0–0.2)
BASOPHILS RELATIVE PERCENT: 1 %
BILIRUB SERPL-MCNC: 0.5 MG/DL (ref 0–1)
BUN BLDV-MCNC: 29 MG/DL (ref 7–20)
CALCIUM SERPL-MCNC: 8.3 MG/DL (ref 8.3–10.6)
CHLORIDE BLD-SCNC: 99 MMOL/L (ref 99–110)
CO2: 26 MMOL/L (ref 21–32)
CREAT SERPL-MCNC: 1.9 MG/DL (ref 0.8–1.3)
EOSINOPHILS ABSOLUTE: 0.2 K/UL (ref 0–0.6)
EOSINOPHILS RELATIVE PERCENT: 3.2 %
GFR AFRICAN AMERICAN: 41
GFR NON-AFRICAN AMERICAN: 34
GLOBULIN: 2.3 G/DL
GLUCOSE BLD-MCNC: 222 MG/DL (ref 70–99)
HCT VFR BLD CALC: 35.9 % (ref 40.5–52.5)
HEMOGLOBIN: 12.2 G/DL (ref 13.5–17.5)
LIPASE: 12 U/L (ref 13–60)
LYMPHOCYTES ABSOLUTE: 1.4 K/UL (ref 1–5.1)
LYMPHOCYTES RELATIVE PERCENT: 21 %
MCH RBC QN AUTO: 29 PG (ref 26–34)
MCHC RBC AUTO-ENTMCNC: 34.1 G/DL (ref 31–36)
MCV RBC AUTO: 84.8 FL (ref 80–100)
MONOCYTES ABSOLUTE: 0.6 K/UL (ref 0–1.3)
MONOCYTES RELATIVE PERCENT: 8.7 %
NEUTROPHILS ABSOLUTE: 4.5 K/UL (ref 1.7–7.7)
NEUTROPHILS RELATIVE PERCENT: 66.1 %
PDW BLD-RTO: 16.6 % (ref 12.4–15.4)
PLATELET # BLD: 162 K/UL (ref 135–450)
PMV BLD AUTO: 9.1 FL (ref 5–10.5)
POTASSIUM SERPL-SCNC: 3.9 MMOL/L (ref 3.5–5.1)
RBC # BLD: 4.23 M/UL (ref 4.2–5.9)
SODIUM BLD-SCNC: 139 MMOL/L (ref 136–145)
TOTAL PROTEIN: 6 G/DL (ref 6.4–8.2)
WBC # BLD: 6.8 K/UL (ref 4–11)

## 2020-12-08 NOTE — TELEPHONE ENCOUNTER
Patient's wife states patient had a bad night last night with stomach pain. She states he declined lab work yesterday and now has reconsidered and would like to get lab work today. Please advise.

## 2021-01-07 ENCOUNTER — VIRTUAL VISIT (OUTPATIENT)
Dept: ENDOCRINOLOGY | Age: 86
End: 2021-01-07
Payer: MEDICARE

## 2021-01-07 DIAGNOSIS — N18.30 STAGE 3 CHRONIC KIDNEY DISEASE, UNSPECIFIED WHETHER STAGE 3A OR 3B CKD (HCC): ICD-10-CM

## 2021-01-07 DIAGNOSIS — I10 ESSENTIAL HYPERTENSION: ICD-10-CM

## 2021-01-07 PROCEDURE — 99442 PR PHYS/QHP TELEPHONE EVALUATION 11-20 MIN: CPT | Performed by: INTERNAL MEDICINE

## 2021-01-07 RX ORDER — TRAMADOL HYDROCHLORIDE 50 MG/1
TABLET ORAL EVERY 6 HOURS PRN
COMMUNITY
Start: 2021-01-04 | End: 2021-03-25 | Stop reason: SDUPTHER

## 2021-01-07 NOTE — PROGRESS NOTES
since the last visit? No  Has there been any new family history of members with diabetes, heart disease, stroke, or endocrine related problems since the last visit?   No    He has Lung cancer s/p chemo and radiation   He has CHF and follows with cardiology       Past Medical History:   Diagnosis Date    Abdominal pain, epigastric     Arthritis     Benign prostatic hypertrophy without urinary obstruction     BPH     Cellulitis and abscess     Chronic rhinitis     Chronic systolic congestive heart failure ([facility] [address] 4/18/2019    COPD (chronic obstructive pulmonary disease) (HCC)     Diabetes mellitus (HCC)     Dysphagia     Erectile dysfunction     GERD (gastroesophageal reflux disease)     Hx of blood clots     Hyperglycemia     Hypertension     Late onset Alzheimer's disease without behavioral disturbance ([facility] [address] 7/23/2020    lumbar radiculopathy     Neuropathy     Nocturia     Osteoarthritis     Other disorders of kidney and ureter in diseases classified elsewhere     Pain, joint, knee     Palpitations     skin cancer     Rt ear, nose, neck, hands/ 2018 lung    SOB (shortness of breath)     Tennis elbow     Tinea pedis     foot      Patient Active Problem List   Diagnosis    Chronic rhinitis    Primary osteoarthritis involving multiple joints    Erectile dysfunction    Glaucoma    Essential hypertension    DDD (degenerative disc disease), lumbar    Lumbar stenosis with neurogenic claudication    Diabetes education, encounter for    Hearing loss of both ears    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    CKD (chronic kidney disease) stage 3, GFR 30-59 ml/min    Bilateral pulmonary embolism (HCC)    Overweight    Diverticulosis of large intestine without diverticulitis    Chronic obstructive pulmonary disease (HCC)    Benign prostatic hyperplasia with urinary hesitancy    Gastroesophageal reflux disease without esophagitis    Non-small cell cancer of left lung ([facility] [address]    Pulmonary nodule    Chronic systolic congestive heart failure (HCC)    Vitreous degeneration, bilateral    Secondary cataract    Primary open-angle glaucoma, bilateral, moderate stage    Posterior vitreous detachment of both eyes    Peripapillary atrophy of both eyes    Nodular corneal degeneration, right eye    Neoplastic malignant related fatigue    Entropion of left lower eyelid    Early stage nonexudative age-related macular degeneration of both eyes    Diabetic peripheral neuropathy (HCC)    Dermatochalasis of right upper eyelid    Bilateral posterior capsular opacification    Aortic valvular disease    Phimosis of penis    Renal insufficiency    Recurrent UTI    Cerebral amyloid angiopathy ([address]    Late onset Alzheimer's disease without behavioral disturbance ([address]     Past Surgical History:   Procedure Laterality Date    CATARACT REMOVAL Bilateral 09/2014    CHOLECYSTECTOMY      CIRCUMCISION N/A 11/15/2019    DORSAL SLIT performed by Cayetano Johnson MD at [facility] [address] COLONOSCOPY  11/28/2011    Dr. Daisy Olea - mild sigmoid diverticulosis    CYSTOSCOPY N/A 1/5/2020    CYSTOSCOPY, EVACUATION OF CLOTS performed by Dilshad Solares MD at [address] [facility] Right 09/09/2013    Dr. Glory Ferraro - block for pain relief    JOINT REPLACEMENT Bilateral     knees    KNEE PROSTHESIS REMOVAL      LUMBAR NERVE BLOCK N/A 8/26/2019    L4/5 INTERLAMINAR EPIDURAL STEROID INJECTION WITH FLUOROSCOPY performed by Hanane Buitrago MD at [address] [place] [address] Left 05/09/2018    Dr. Vyas - CT guided    MOHS SURGERY Bilateral     NERVE SURGERY N/A 12/20/2019    L4L5 INTERLAMINAR EPIDURAL STEROID INJECTION WITH FLUOROSCOPY performed by Hanane Buitrago MD at [address] [place] [address]      multiple lumbar ESIs    PAIN MANAGEMENT PROCEDURE N/A 7/20/2020    L4-L5 INTERLAMINAR EPIDURAL STEROID INJECTION WITH FLUOROSCOPY performed by Hanane Buitrago MD at [address] [facility]  PAROTIDECTOMY Right 2017    Dr. Josiah Coker - superficial, w/excision of parotid tail & dissection/preservation of facial nerve    SD NJX AA&/STRD TFRML EPI LUMBAR/SACRAL 1 LEVEL Right 2018    RIGHT L4, L5 LUMBAR TRANSFORAMINAL EPIDURAL STEROID INJECTION WITH FLUOROSCOPY performed by Spenser Morocho MD at [facility] [address] Left 2019    TOTAL KNEE ARTHROPLASTY      right x1 and left x2    TUNNELED VENOUS PORT PLACEMENT  2018    Left [facility] infusaport placement    UPPER GASTROINTESTINAL ENDOSCOPY  2018    Dr. Senait Urbano - mild non-obstructive ring distal esophagus     Social History     Socioeconomic History    Marital status:      Spouse name: Sulaiman Gay Number of children: Not on file    Years of education: Not on file    Highest education level: Not on file   Occupational History    Not on file   Social Needs    Financial resource strain: Not on file    Food insecurity     Worry: Not on file     Inability: Not on file   [employer] needs     Medical: Not on file     Non-medical: Not on file   Tobacco Use    Smoking status: Former Smoker     Packs/day: 1.00     Years: 40.00     Pack years: 40.00     Quit date: 8/15/1970     Years since quittin.4    Smokeless tobacco: Never Used   Substance and Sexual Activity    Alcohol use: No    Drug use: No    Sexual activity: Yes     Partners: Female   Lifestyle    Physical activity     Days per week: Not on file     Minutes per session: Not on file    Stress: Not on file   Relationships    Social connections     Talks on phone: Not on file     Gets together: Not on file     Attends [employer] service: Not on file     Active member of club or organization: Not on file     Attends meetings of clubs or organizations: Not on file     Relationship status: Not on file    Intimate partner violence     Fear of current or ex partner: Not on file     Emotionally abused: Not on file     Physically abused: Not on file     Forced sexual activity: Not on file   Other Topics Concern    Not on file   Social History Narrative    Not on file     Family History   Problem Relation Age of Onset    Arthritis Father     Diabetes Father     Diabetes Brother      Current Outpatient Medications   Medication Sig Dispense Refill    traMADol (ULTRAM) 50 MG tablet every 6 hours as needed.  warfarin (COUMADIN) 2.5 MG tablet 3.75 mg (2.5 mg x 1.5) every Thu; 2.5 mg (2.5 mg x 1) all other days 90 tablet 3    furosemide (LASIX) 40 MG tablet TAKE ONE TABLET BY MOUTH TWICE A DAY FOR 5 DAYS THEN TAKE ONE TABLET BY MOUTH DAILY 90 tablet 0    DULoxetine (CYMBALTA) 30 MG extended release capsule Take 1 capsule by mouth nightly 90 capsule 1    insulin aspart (NOVOLOG FLEXPEN) 100 UNIT/ML injection pen INJECT 20 UNITS UNDER THE SKIN THREE TIMES A DAY ACCORDING TO SLIDING SCALE 15 pen 1    gabapentin (NEURONTIN) 300 MG capsule TAKE ONE CAPSULE BY MOUTH EVERY MORNING AND TAKE TWO CAPSULES BY MOUTH EVERY NIGHT AT BEDTIME 90 capsule 5    Insulin Pen Needle (B-D UF III MINI PEN NEEDLES) 31G X 5 MM MISC Use to inject insulin 4 times daily. 200 each 5    famotidine (PEPCID) 20 MG tablet TAKE ONE TABLET BY MOUTH TWICE A DAY 60 tablet 4    fluocinonide (LIDEX) 0.05 % cream Apply topically 2 times daily. 60 g 2    blood glucose test strips (FREESTYLE LITE) strip USE ONE STRIP TO TEST THREE TIMES A  strip 5    lisinopril (PRINIVIL;ZESTRIL) 10 MG tablet TAKE ONE TABLET BY MOUTH DAILY 90 tablet 2    BASAGLAR KWIKPEN 100 UNIT/ML injection pen INJECT 30 UNITS UNDER THE SKIN NIGHTLY (Patient taking differently: Pt is taking 26 units daily. ) 15 pen 2    donepezil (ARICEPT) 10 MG tablet Take 5 mg by mouth daily (with breakfast)       finasteride (PROSCAR) 5 MG tablet Take 5 mg by mouth daily      naphazoline-glycerin (CLEAR EYES REDNESS RELIEF) 0.012-0.2 % SOLN ophthalmic solution Place 1 drop into both eyes 2 times daily (Patient taking differently: Place 1 drop into both eyes 2 times daily as needed ) 1 Bottle 0    tamsulosin (FLOMAX) 0.4 MG capsule Take 1 capsule by mouth daily (Patient taking differently: Take 0.4 mg by mouth 2 times daily ) 90 capsule 1    glucose monitoring kit (FREESTYLE) monitoring kit 1 kit by Does not apply route daily as needed. 1 kit 0    latanoprost (XALATAN) 0.005 % ophthalmic solution Place 1 drop into both eyes nightly. No current facility-administered medications for this visit. Allergies   Allergen Reactions    Celebrex [Celecoxib] Other (See Comments)     hallucinations    Elavil [Amitriptyline]      Severe fatigue      Naproxen      Kidney damage    Percocet [Oxycodone-Acetaminophen] Other (See Comments)     confusion    Lyrica [Pregabalin] Palpitations     Fatigue     Family Status   Relation Name Status    Father  (Not Specified)    Brother  (Not Specified)     ROS   I have reviewed the review of system questionnaire filled by the patient . Patient was advised to contact PCP for non endocrine signs and symptoms         OBJECTIVE:  There were no vitals taken for this visit. Wt Readings from Last 3 Encounters:   12/07/20 206 lb (93.4 kg)   11/10/20 203 lb (92.1 kg)   11/09/20 203 lb (92.1 kg)       EXAM     Patient is  alert oriented to time ,place and person. Both recent and remote memory intact . Patient has weighed themselves in the last few  weeks and it has been stable           Lab Results   Component Value Date    LABA1C 8.1 08/24/2020    LABA1C 7.3 04/13/2020    LABA1C 9.1 01/05/2020         ASSESSMENT/PLAN:  1. Type 2 diabetes mellitus with stage 3 chronic kidney disease,  long-term current use of insulin 6.5>>7.2>>8.2>>9.5>>9.1>>7.3>>8.1    Patient has been taking 26  units of Basaglar advised to increase to 28 units   He has been taking novolog with each meal aprox 4-6 units.  He is using sliding scale of insulin to cover meals --we discussed this in detail Checks fingerstick blood glucose before breakfast and dinner   Due to his elevated creatinine most of oral hypoglycemic agent not recommended  Advised to send weekly blood sugar log so I can make adjustments in insulin regimen   I had a lengthy discussion with the patient about  his  uncontrolled diabetes. We discussed progression of diabetes in detail and also the incidence of complications associated with uncontrolled diabetes. Dev Leonard was advised that lifestyle modification is the key to better control of his Diabetes. We discussed carbohydrate restriction in detail. Hypoglycemia protocol reviewed in detail with patient Patient was advised to carry glucose tablets   Patient was advised that sending of his fingerstick blood glucose logs is crucial in management of his  diabetes. I will adjust the  doses of diabetic medications  according to sent data. Health Maintenance       Last Eye Exam: advised to have annual dilated eye exam. his last eye exam was in jan 2020 retinopathy gets injection   Last Podiatry Exam:  Podiatry visit in jan 2020   Lipids: Last LDL level was 64 in march 2019   Microalbumin/Creatinine Ratio: he has CKD follows with Dr Raquel Perry     . Education: Reviewed ABCs of diabetes management (respective goals in parentheses): A1C (<7), blood pressure (<130/80), and cholesterol (LDL <100). 2. Diabetic peripheral neuropathy   He is on neurontin and cymbalta   Denies any worsening of pain     3. Benign prostatic hyperplasia with urinary hesitancy  Stable     4. CKD (chronic kidney disease) stage 3, GFR 30-59 ml/min   Follows with Dr Raquel Perry   Creat 1.9     5. Essential hypertension  Well controlled   Continue with current regimen   Denies any headaches denies any chest pain     6. Non-small cell cancer of left lung   In remission     7.  Chronic systolic congestive heart failure ([address]  Follows withcardiology       Reviewed and/or ordered clinical lab results yes   Reviewed and/or

## 2021-01-08 ENCOUNTER — OFFICE VISIT (OUTPATIENT)
Dept: INTERNAL MEDICINE CLINIC | Age: 86
End: 2021-01-08
Payer: MEDICARE

## 2021-01-08 VITALS
BODY MASS INDEX: 29.84 KG/M2 | TEMPERATURE: 95.8 F | WEIGHT: 208 LBS | DIASTOLIC BLOOD PRESSURE: 68 MMHG | SYSTOLIC BLOOD PRESSURE: 140 MMHG | HEART RATE: 66 BPM

## 2021-01-08 DIAGNOSIS — L97.511 SKIN ULCER OF SECOND TOE OF RIGHT FOOT, LIMITED TO BREAKDOWN OF SKIN (HCC): ICD-10-CM

## 2021-01-08 DIAGNOSIS — I26.99 BILATERAL PULMONARY EMBOLISM (HCC): Primary | ICD-10-CM

## 2021-01-08 DIAGNOSIS — Z79.01 ANTICOAGULATED ON COUMADIN: ICD-10-CM

## 2021-01-08 LAB
INTERNATIONAL NORMALIZATION RATIO, POC: 2.9
PROTHROMBIN TIME, POC: NORMAL

## 2021-01-08 PROCEDURE — 99213 OFFICE O/P EST LOW 20 MIN: CPT | Performed by: NURSE PRACTITIONER

## 2021-01-08 PROCEDURE — G8484 FLU IMMUNIZE NO ADMIN: HCPCS | Performed by: NURSE PRACTITIONER

## 2021-01-08 PROCEDURE — 1123F ACP DISCUSS/DSCN MKR DOCD: CPT | Performed by: NURSE PRACTITIONER

## 2021-01-08 PROCEDURE — 4040F PNEUMOC VAC/ADMIN/RCVD: CPT | Performed by: NURSE PRACTITIONER

## 2021-01-08 PROCEDURE — G8417 CALC BMI ABV UP PARAM F/U: HCPCS | Performed by: NURSE PRACTITIONER

## 2021-01-08 PROCEDURE — G8427 DOCREV CUR MEDS BY ELIG CLIN: HCPCS | Performed by: NURSE PRACTITIONER

## 2021-01-08 PROCEDURE — 1036F TOBACCO NON-USER: CPT | Performed by: NURSE PRACTITIONER

## 2021-01-08 PROCEDURE — G8510 SCR DEP NEG, NO PLAN REQD: HCPCS | Performed by: NURSE PRACTITIONER

## 2021-01-08 PROCEDURE — 85610 PROTHROMBIN TIME: CPT | Performed by: NURSE PRACTITIONER

## 2021-01-08 RX ORDER — DOXYCYCLINE HYCLATE 100 MG
100 TABLET ORAL 2 TIMES DAILY
Qty: 14 TABLET | Refills: 0 | Status: SHIPPED | OUTPATIENT
Start: 2021-01-08 | End: 2021-01-15

## 2021-01-08 RX ORDER — CEPHALEXIN 500 MG/1
500 CAPSULE ORAL 3 TIMES DAILY
Qty: 21 CAPSULE | Refills: 0 | Status: SHIPPED | OUTPATIENT
Start: 2021-01-08 | End: 2021-01-15

## 2021-01-08 ASSESSMENT — PATIENT HEALTH QUESTIONNAIRE - PHQ9
SUM OF ALL RESPONSES TO PHQ QUESTIONS 1-9: 0
SUM OF ALL RESPONSES TO PHQ QUESTIONS 1-9: 0
2. FEELING DOWN, DEPRESSED OR HOPELESS: 0
SUM OF ALL RESPONSES TO PHQ9 QUESTIONS 1 & 2: 0

## 2021-01-08 NOTE — PROGRESS NOTES
SUBJECTIVE:    Patient ID: Gautam Guerrero is a 82 y.o. male. CC: Toe injury, history of pulmonary embolism, chronic anticoagulation    HPI: The patient presents the office today for follow-up of chronic medical conditions and management of chronic anticoagulation as well as for an acute concern with his right foot. Patient is seen today with his spouse who provides most of the history. She reports noting a sore on his right second toe. This has been present for about 1 week. There was no known injury or trauma. She has been treating this with topical antibiotic ointment and dressing. He has a history of bilateral pulmonary embolism on chronic anticoagulation. He is taking his medication as directed. Past Medical History:   Diagnosis Date    Abdominal pain, epigastric     Arthritis     Benign prostatic hypertrophy without urinary obstruction     BPH     Cellulitis and abscess     Chronic rhinitis     Chronic systolic congestive heart failure (HCC) 4/18/2019    COPD [facility]ch[facility] obstructive pulmonary disease) [facility])     Diabetes mellitus (HCC)     Dysphagia     Erectile dysfunction     GERD (gastroesophageal reflux disease)     Hx of blood clots     Hyperglycemia     Hypertension     [facility] 7[facility]23/2020    lumbar radiculopathy     Neuropathy     Nocturia     Osteoarthritis     Other disorders of kidney and ureter in diseases classified elsewhere     Pain, joint, knee     Palpitations     skin cancer     Rt ear, nose, neck, hands/ 2018 lung    SOB (shortness of breath)     Tennis elbow     Tinea pedis     foot        Current Outpatient Medications   Medication Sig Dispense Refill    traMADol (ULTRAM) 50 MG tablet every 6 hours as needed.        warfarin (COUMADIN) 2.5 MG tablet 3.75 mg (2.5 mg x 1.5) every Thu; 2.5 mg (2.5 mg x 1) all other days 90 tablet 3    furosemide (LASIX) 40 MG tablet TAKE ONE TABLET BY MOUTH TWICE A DAY FOR 5 DAYS THEN TAKE ONE TABLET BY MOUTH DAILY 90 tablet 0    DULoxetine (CYMBALTA) 30 MG extended release capsule Take 1 capsule by mouth nightly 90 capsule 1    insulin aspart (NOVOLOG FLEXPEN) 100 UNIT/ML injection pen INJECT 20 UNITS UNDER THE SKIN THREE TIMES A DAY ACCORDING TO SLIDING SCALE 15 pen 1    gabapentin (NEURONTIN) 300 MG capsule TAKE ONE CAPSULE BY MOUTH EVERY MORNING AND TAKE TWO CAPSULES BY MOUTH EVERY NIGHT AT BEDTIME 90 capsule 5    Insulin Pen Needle (B-D UF III MINI PEN NEEDLES) 31G X 5 MM MISC Use to inject insulin 4 times daily. 200 each 5    famotidine (PEPCID) 20 MG tablet TAKE ONE TABLET BY MOUTH TWICE A DAY 60 tablet 4    fluocinonide (LIDEX) 0.05 % cream Apply topically 2 times daily. 60 g 2    blood glucose test strips (FREESTYLE LITE) strip USE ONE STRIP TO TEST THREE TIMES A  strip 5    lisinopril (PRINIVIL;ZESTRIL) 10 MG tablet TAKE ONE TABLET BY MOUTH DAILY 90 tablet 2    BASAGLAR KWIKPEN 100 UNIT/ML injection pen INJECT 30 UNITS UNDER THE SKIN NIGHTLY (Patient taking differently: Pt is taking 26 units daily. ) 15 pen 2    donepezil (ARICEPT) 10 MG tablet Take 5 mg by mouth daily (with breakfast)       finasteride (PROSCAR) 5 MG tablet Take 5 mg by mouth daily      naphazoline-glycerin (CLEAR EYES REDNESS RELIEF) 0.012-0.2 % SOLN ophthalmic solution Place 1 drop into both eyes 2 times daily (Patient taking differently: Place 1 drop into both eyes 2 times daily as needed ) 1 Bottle 0    tamsulosin (FLOMAX) 0.4 MG capsule Take 1 capsule by mouth daily (Patient taking differently: Take 0.4 mg by mouth 2 times daily ) 90 capsule 1    glucose monitoring kit (FREESTYLE) monitoring kit 1 kit by Does not apply route daily as needed. 1 kit 0    latanoprost (XALATAN) 0.005 % ophthalmic solution Place 1 drop into both eyes nightly. No current facility-administered medications for this visit. Review of Systems   Constitutional: Negative. Respiratory: Negative. Cardiovascular: Negative. Gastrointestinal: Negative. Genitourinary: Negative. Skin: Positive for wound. Neurological: Positive for numbness. All other systems reviewed and are negative. OBJECTIVE:  Physical Exam  Constitutional:       Appearance: Normal appearance. Pulmonary:      Effort: Pulmonary effort is normal. No respiratory distress. Skin:     Findings: Wound present. Comments: On the medial aspect of the distal right second toe there is a skin ulceration which is red. There is no tenderness likely due to the patient's neuropathy. There is redness and warmth concerning for possible secondary infection. The wound appears dry. Toes are cramped and wound may be from friction injury from rubbing on the great toe. Neurological:      Mental Status: He is alert. Psychiatric:         Mood and Affect: Mood normal.         Behavior: Behavior normal.        BP (!) 140/68   Pulse 66   Temp 95.8 °F (35.4 °C) (Temporal)   Wt 208 lb (94.3 kg)   BMI 29.84 kg/m²      PHQ Scores 1/8/2021 9/28/2020 2/6/2020 9/18/2019 2/11/2019 7/10/2018 3/21/2017   PHQ2 Score 0 0 0 0 0 1 0   PHQ9 Score 0 0 0 0 0 1 0     Interpretation of Total Score Depression Severity: 1-4 = Minimal depression, 5-9 = Mild depression, 10-14 = Moderate depression, 15-19 = Moderately severe depression, 20-27 =Severe depression      ASSESSMENT/PLAN:  Ray was seen today for office visit for anticoagulation management and other. Diagnoses and all orders for this visit:    Bilateral pulmonary embolism (HCC)  -Continue anticoagulation    Anticoagulated on Coumadin  - INR 2.9  - Continue current regimen, see flowsheet  -     POCT INR    Skin ulcer of second toe of right foot, limited to breakdown of skin ([address]  - Treat toe ulcer is diabetic wound. May be from friction injury from great toe.   Monitor for worsening and refer to wound center for lack of healing or worsening  -     cephALEXin (Gustavo Rodriguez) 500 MG capsule; Take 1 capsule by mouth 3 times daily for 7 days  -     doxycycline hyclate (VIBRA-TABS) 100 MG tablet;  Take 1 tablet by mouth 2 times daily for 7 days      SHA Morgan - CNP

## 2021-01-11 DIAGNOSIS — N18.30 STAGE 3 CHRONIC KIDNEY DISEASE, UNSPECIFIED WHETHER STAGE 3A OR 3B CKD (HCC): ICD-10-CM

## 2021-01-11 DIAGNOSIS — I10 ESSENTIAL HYPERTENSION: ICD-10-CM

## 2021-01-12 LAB
A/G RATIO: 1.3 (ref 1.1–2.2)
ALBUMIN SERPL-MCNC: 3.5 G/DL (ref 3.4–5)
ALP BLD-CCNC: 83 U/L (ref 40–129)
ALT SERPL-CCNC: 12 U/L (ref 10–40)
ANION GAP SERPL CALCULATED.3IONS-SCNC: 13 MMOL/L (ref 3–16)
AST SERPL-CCNC: 11 U/L (ref 15–37)
BILIRUB SERPL-MCNC: 0.8 MG/DL (ref 0–1)
BUN BLDV-MCNC: 34 MG/DL (ref 7–20)
CALCIUM SERPL-MCNC: 8.7 MG/DL (ref 8.3–10.6)
CHLORIDE BLD-SCNC: 102 MMOL/L (ref 99–110)
CHOLESTEROL, TOTAL: 153 MG/DL (ref 0–199)
CO2: 27 MMOL/L (ref 21–32)
CREAT SERPL-MCNC: 2.2 MG/DL (ref 0.8–1.3)
CREATININE URINE: 91.6 MG/DL (ref 39–259)
ESTIMATED AVERAGE GLUCOSE: 185.8 MG/DL
GFR AFRICAN AMERICAN: 35
GFR NON-AFRICAN AMERICAN: 29
GLOBULIN: 2.8 G/DL
GLUCOSE BLD-MCNC: 176 MG/DL (ref 70–99)
HBA1C MFR BLD: 8.1 %
HDLC SERPL-MCNC: 35 MG/DL (ref 40–60)
LDL CHOLESTEROL CALCULATED: 92 MG/DL
MICROALBUMIN UR-MCNC: 56.5 MG/DL
MICROALBUMIN/CREAT UR-RTO: 616.8 MG/G (ref 0–30)
POTASSIUM SERPL-SCNC: 3.8 MMOL/L (ref 3.5–5.1)
SODIUM BLD-SCNC: 142 MMOL/L (ref 136–145)
TOTAL PROTEIN: 6.3 G/DL (ref 6.4–8.2)
TRIGL SERPL-MCNC: 129 MG/DL (ref 0–150)
TSH SERPL DL<=0.05 MIU/L-ACNC: 1.42 UIU/ML (ref 0.27–4.2)
VLDLC SERPL CALC-MCNC: 26 MG/DL

## 2021-01-12 ASSESSMENT — ENCOUNTER SYMPTOMS
RESPIRATORY NEGATIVE: 1
GASTROINTESTINAL NEGATIVE: 1

## 2021-01-15 ENCOUNTER — OFFICE VISIT (OUTPATIENT)
Dept: INTERNAL MEDICINE CLINIC | Age: 86
End: 2021-01-15
Payer: MEDICARE

## 2021-01-15 VITALS
TEMPERATURE: 96.3 F | SYSTOLIC BLOOD PRESSURE: 120 MMHG | BODY MASS INDEX: 29.56 KG/M2 | OXYGEN SATURATION: 98 % | HEART RATE: 82 BPM | WEIGHT: 206 LBS | DIASTOLIC BLOOD PRESSURE: 80 MMHG

## 2021-01-15 DIAGNOSIS — I26.99 BILATERAL PULMONARY EMBOLISM (HCC): Primary | ICD-10-CM

## 2021-01-15 DIAGNOSIS — Z79.01 ANTICOAGULATED ON COUMADIN: ICD-10-CM

## 2021-01-15 DIAGNOSIS — L97.511 SKIN ULCER OF SECOND TOE OF RIGHT FOOT, LIMITED TO BREAKDOWN OF SKIN (HCC): ICD-10-CM

## 2021-01-15 LAB
INTERNATIONAL NORMALIZATION RATIO, POC: 1.5
PROTHROMBIN TIME, POC: ABNORMAL

## 2021-01-15 PROCEDURE — G8417 CALC BMI ABV UP PARAM F/U: HCPCS | Performed by: NURSE PRACTITIONER

## 2021-01-15 PROCEDURE — G8484 FLU IMMUNIZE NO ADMIN: HCPCS | Performed by: NURSE PRACTITIONER

## 2021-01-15 PROCEDURE — 1036F TOBACCO NON-USER: CPT | Performed by: NURSE PRACTITIONER

## 2021-01-15 PROCEDURE — 85610 PROTHROMBIN TIME: CPT | Performed by: NURSE PRACTITIONER

## 2021-01-15 PROCEDURE — 4040F PNEUMOC VAC/ADMIN/RCVD: CPT | Performed by: NURSE PRACTITIONER

## 2021-01-15 PROCEDURE — 1123F ACP DISCUSS/DSCN MKR DOCD: CPT | Performed by: NURSE PRACTITIONER

## 2021-01-15 PROCEDURE — 99213 OFFICE O/P EST LOW 20 MIN: CPT | Performed by: NURSE PRACTITIONER

## 2021-01-15 PROCEDURE — G8428 CUR MEDS NOT DOCUMENT: HCPCS | Performed by: NURSE PRACTITIONER

## 2021-01-19 ASSESSMENT — ENCOUNTER SYMPTOMS
GASTROINTESTINAL NEGATIVE: 1
RESPIRATORY NEGATIVE: 1

## 2021-01-19 NOTE — PROGRESS NOTES
SUBJECTIVE:    Patient ID: [place] is a 82 y.o. male. CC: Toe infection, history of pulmonary embolism, chronic anticoagulation    HPI: The patient presents the office today for follow-up of chronic medical conditions and management of chronic anticoagulation as well as for an acute concern with his right foot. Previously reported a sore on the right second toe. This was treated with antibiotics so he returned today for short follow-up and evaluation of the toe and recheck of INR with antibiotic use. Spouse feels the toe is improved. He has a history of bilateral pulmonary embolism on chronic anticoagulation. He is taking a reduced dose of warfarin given antibiotic use. Past Medical History:   Diagnosis Date    Abdominal pain, epigastric     Arthritis     Benign prostatic hypertrophy without urinary obstruction     BPH     Cellulitis and abscess     Chronic rhinitis     Chronic systolic congestive heart failure (HCC) 4/18/2019    [facility]     Diabetes mellitus (HCC)     Dysphagia     Erectile dysfunction     GERD (gastroesophageal reflux disease)     Hx of blood clots     Hyperglycemia     Hypertension     [facility]    lumbar radiculopathy     Neuropathy     Nocturia     Osteoarthritis     Other disorders of kidney and ureter in diseases classified elsewhere     Pain, joint, knee     Palpitations     skin cancer     Rt ear, nose, neck, hands/ 2018 lung    SOB (shortness of breath)     Tennis elbow     Tinea pedis     foot        Current Outpatient Medications   Medication Sig Dispense Refill    traMADol (ULTRAM) 50 MG tablet every 6 hours as needed.        warfarin (COUMADIN) 2.5 MG tablet 3.75 mg (2.5 mg x 1.5) every Thu; 2.5 mg (2.5 mg x 1) all other days 90 tablet 3    furosemide (LASIX) 40 MG tablet TAKE ONE TABLET BY MOUTH TWICE A DAY FOR 5 DAYS THEN TAKE ONE TABLET BY MOUTH DAILY 90 tablet 0    DULoxetine (CYMBALTA) 30 MG extended release capsule Take 1 capsule by mouth nightly 90 capsule 1    insulin aspart (NOVOLOG FLEXPEN) 100 UNIT/ML injection pen INJECT 20 UNITS UNDER THE SKIN THREE TIMES A DAY ACCORDING TO SLIDING SCALE 15 pen 1    gabapentin (NEURONTIN) 300 MG capsule TAKE ONE CAPSULE BY MOUTH EVERY MORNING AND TAKE TWO CAPSULES BY MOUTH EVERY NIGHT AT BEDTIME 90 capsule 5    Insulin Pen Needle (B-D UF III MINI PEN NEEDLES) 31G X 5 MM MISC Use to inject insulin 4 times daily. 200 each 5    famotidine (PEPCID) 20 MG tablet TAKE ONE TABLET BY MOUTH TWICE A DAY 60 tablet 4    fluocinonide (LIDEX) 0.05 % cream Apply topically 2 times daily. 60 g 2    blood glucose test strips (FREESTYLE LITE) strip USE ONE STRIP TO TEST THREE TIMES A  strip 5    lisinopril (PRINIVIL;ZESTRIL) 10 MG tablet TAKE ONE TABLET BY MOUTH DAILY 90 tablet 2    BASAGLAR KWIKPEN 100 UNIT/ML injection pen INJECT 30 UNITS UNDER THE SKIN NIGHTLY (Patient taking differently: Pt is taking 26 units daily. ) 15 pen 2    donepezil (ARICEPT) 10 MG tablet Take 5 mg by mouth daily (with breakfast)       finasteride (PROSCAR) 5 MG tablet Take 5 mg by mouth daily      naphazoline-glycerin (CLEAR EYES REDNESS RELIEF) 0.012-0.2 % SOLN ophthalmic solution Place 1 drop into both eyes 2 times daily (Patient taking differently: Place 1 drop into both eyes 2 times daily as needed ) 1 Bottle 0    tamsulosin (FLOMAX) 0.4 MG capsule Take 1 capsule by mouth daily (Patient taking differently: Take 0.4 mg by mouth 2 times daily ) 90 capsule 1    glucose monitoring kit (FREESTYLE) monitoring kit 1 kit by Does not apply route daily as needed. 1 kit 0    latanoprost (XALATAN) 0.005 % ophthalmic solution Place 1 drop into both eyes nightly. No current facility-administered medications for this visit. Review of Systems   Constitutional: Negative. Respiratory: Negative. Cardiovascular: Negative. Gastrointestinal: Negative. Genitourinary: Negative. Skin: Positive for wound. Neurological: Positive for numbness. All other systems reviewed and are negative. OBJECTIVE:  Physical Exam  Constitutional:       Appearance: Normal appearance. Pulmonary:      Effort: Pulmonary effort is normal. No respiratory distress. Skin:     Findings: Wound present. Comments: The previously noted ulcer on the medial aspect of the distal right second toe has improved. There is less redness and warmth. There are no red streaks. The wound appears largely closed. Neurological:      Mental Status: He is alert. Psychiatric:         Mood and Affect: Mood normal.         Behavior: Behavior normal.        /80   Pulse 82   Temp 96.3 °F (35.7 °C)   Wt 206 lb (93.4 kg)   SpO2 98%   BMI 29.56 kg/m²      PHQ Scores 1/8/2021 9/28/2020 2/6/2020 9/18/2019 2/11/2019 7/10/2018 3/21/2017   PHQ2 Score 0 0 0 0 0 1 0   PHQ9 Score 0 0 0 0 0 1 0     Interpretation of Total Score Depression Severity: 1-4 = Minimal depression, 5-9 = Mild depression, 10-14 = Moderate depression, 15-19 = Moderately severe depression, 20-27 =Severe depression      ASSESSMENT/PLAN:  Avila Crutch was seen today for office visit for anticoagulation management and other. Diagnoses and all orders for this visit:    Bilateral pulmonary embolism (HCC)  -Continue anticoagulation    Anticoagulated on Coumadin  - INR 1.5  - Low INR explained by patient taking one half normal warfarin dose due to antibiotic use. Take extra 1/2 pill today and tomorrow then resume normal dosing, see flowsheet   -     POCT INR    Skin ulcer of second toe of right foot, limited to breakdown of skin ([address]  - Improved with antibiotic. Decrease friction.   Continue to monitor      Dia Mancia, APRN - CNP

## 2021-01-28 ENCOUNTER — IMMUNIZATION (OUTPATIENT)
Dept: PRIMARY CARE CLINIC | Age: 86
End: 2021-01-28
Payer: MEDICARE

## 2021-01-28 PROCEDURE — 0001A COVID-19, PFIZER VACCINE 30MCG/0.3ML DOSE: CPT | Performed by: FAMILY MEDICINE

## 2021-01-28 PROCEDURE — 91300 COVID-19, PFIZER VACCINE 30MCG/0.3ML DOSE: CPT | Performed by: FAMILY MEDICINE

## 2021-02-02 ENCOUNTER — TELEPHONE (OUTPATIENT)
Dept: ENDOCRINOLOGY | Age: 86
End: 2021-02-02

## 2021-02-02 NOTE — TELEPHONE ENCOUNTER
Fax from [facility]/ [facility] care regarding patient care summary that needs to be signed and returned

## 2021-02-18 ENCOUNTER — IMMUNIZATION (OUTPATIENT)
Dept: PRIMARY CARE CLINIC | Age: 86
End: 2021-02-18
Payer: MEDICARE

## 2021-02-18 PROCEDURE — 0002A COVID-19, PFIZER VACCINE 30MCG/0.3ML DOSE: CPT | Performed by: FAMILY MEDICINE

## 2021-02-18 PROCEDURE — 91300 COVID-19, PFIZER VACCINE 30MCG/0.3ML DOSE: CPT | Performed by: FAMILY MEDICINE

## 2021-02-25 ENCOUNTER — OFFICE VISIT (OUTPATIENT)
Dept: INTERNAL MEDICINE CLINIC | Age: 86
End: 2021-02-25
Payer: MEDICARE

## 2021-02-25 VITALS
HEART RATE: 68 BPM | DIASTOLIC BLOOD PRESSURE: 80 MMHG | WEIGHT: 205 LBS | SYSTOLIC BLOOD PRESSURE: 120 MMHG | OXYGEN SATURATION: 96 % | BODY MASS INDEX: 29.41 KG/M2 | TEMPERATURE: 97.1 F

## 2021-02-25 DIAGNOSIS — Z86.711 HISTORY OF PULMONARY EMBOLISM: ICD-10-CM

## 2021-02-25 DIAGNOSIS — G62.9 PERIPHERAL POLYNEUROPATHY: Primary | ICD-10-CM

## 2021-02-25 DIAGNOSIS — Z79.01 ANTICOAGULATED ON COUMADIN: ICD-10-CM

## 2021-02-25 LAB
INTERNATIONAL NORMALIZATION RATIO, POC: 2.2
PROTHROMBIN TIME, POC: NORMAL

## 2021-02-25 PROCEDURE — 4040F PNEUMOC VAC/ADMIN/RCVD: CPT | Performed by: NURSE PRACTITIONER

## 2021-02-25 PROCEDURE — G8484 FLU IMMUNIZE NO ADMIN: HCPCS | Performed by: NURSE PRACTITIONER

## 2021-02-25 PROCEDURE — G8417 CALC BMI ABV UP PARAM F/U: HCPCS | Performed by: NURSE PRACTITIONER

## 2021-02-25 PROCEDURE — 85610 PROTHROMBIN TIME: CPT | Performed by: NURSE PRACTITIONER

## 2021-02-25 PROCEDURE — 1123F ACP DISCUSS/DSCN MKR DOCD: CPT | Performed by: NURSE PRACTITIONER

## 2021-02-25 PROCEDURE — G8427 DOCREV CUR MEDS BY ELIG CLIN: HCPCS | Performed by: NURSE PRACTITIONER

## 2021-02-25 PROCEDURE — 1036F TOBACCO NON-USER: CPT | Performed by: NURSE PRACTITIONER

## 2021-02-25 PROCEDURE — 99213 OFFICE O/P EST LOW 20 MIN: CPT | Performed by: NURSE PRACTITIONER

## 2021-02-25 RX ORDER — DULOXETIN HYDROCHLORIDE 30 MG/1
30 CAPSULE, DELAYED RELEASE ORAL NIGHTLY
Qty: 90 CAPSULE | Refills: 3 | Status: SHIPPED | OUTPATIENT
Start: 2021-02-25 | End: 2022-02-23

## 2021-03-02 ASSESSMENT — ENCOUNTER SYMPTOMS
RESPIRATORY NEGATIVE: 1
GASTROINTESTINAL NEGATIVE: 1

## 2021-03-02 NOTE — PROGRESS NOTES
SUBJECTIVE:    Patient ID: Austyn Restrepo is a 82 y.o. male. CC: History of pulmonary embolism, chronic anticoagulation, neuropathy    HPI: Follow-up chronic medical problems, follow-up of recent medical issues, and INR mgmt.     He also has a history of pulmonary embolism with chronic anticoagulation.  He is taking his warfarin as directed.  He denies any side effects. Deborah Arin today is therapeutic today at 2.2. He continues to be plagued with chronic neuropathy, worse at night. He has been on multiple treatments for this with intolerability or limited improvement. He currently seems to be doing best with 1-2 tramadol at night as needed. Past Medical History:   Diagnosis Date    Abdominal pain, epigastric     Arthritis     Benign prostatic hypertrophy without urinary obstruction     BPH     Cellulitis and abscess     Chronic rhinitis     Chronic systolic congestive heart failure (HCC) 4/18/2019    [facility]     Diabetes mellitus (HCC)     Dysphagia     Erectile dysfunction     GERD (gastroesophageal reflux disease)     Hx of blood clots     Hyperglycemia     Hypertension     [facility]    lumbar radiculopathy     Neuropathy     Nocturia     Osteoarthritis     Other disorders of kidney and ureter in diseases classified elsewhere     Pain, joint, knee     Palpitations     skin cancer     Rt ear, nose, neck, hands/ 2018 lung    SOB (shortness of breath)     Tennis elbow     Tinea pedis     foot        Current Outpatient Medications   Medication Sig Dispense Refill    DULoxetine (CYMBALTA) 30 MG extended release capsule Take 1 capsule by mouth nightly 90 capsule 3    traMADol (ULTRAM) 50 MG tablet every 6 hours as needed.        warfarin (COUMADIN) 2.5 MG tablet 3.75 mg (2.5 mg x 1.5) every Thu; 2.5 mg (2.5 mg x 1) all other days 90 tablet 3    furosemide (LASIX) 40 MG tablet TAKE ONE TABLET BY MOUTH TWICE A DAY FOR 5 DAYS THEN TAKE ONE TABLET BY MOUTH DAILY 90 tablet 0    insulin aspart (NOVOLOG FLEXPEN) 100 UNIT/ML injection pen INJECT 20 UNITS UNDER THE SKIN THREE TIMES A DAY ACCORDING TO SLIDING SCALE 15 pen 1    gabapentin (NEURONTIN) 300 MG capsule TAKE ONE CAPSULE BY MOUTH EVERY MORNING AND TAKE TWO CAPSULES BY MOUTH EVERY NIGHT AT BEDTIME 90 capsule 5    Insulin Pen Needle (B-D UF III MINI PEN NEEDLES) 31G X 5 MM MISC Use to inject insulin 4 times daily. 200 each 5    famotidine (PEPCID) 20 MG tablet TAKE ONE TABLET BY MOUTH TWICE A DAY 60 tablet 4    fluocinonide (LIDEX) 0.05 % cream Apply topically 2 times daily. 60 g 2    blood glucose test strips (FREESTYLE LITE) strip USE ONE STRIP TO TEST THREE TIMES A  strip 5    lisinopril (PRINIVIL;ZESTRIL) 10 MG tablet TAKE ONE TABLET BY MOUTH DAILY 90 tablet 2    BASAGLAR KWIKPEN 100 UNIT/ML injection pen INJECT 30 UNITS UNDER THE SKIN NIGHTLY (Patient taking differently: Pt is taking 26 units daily. ) 15 pen 2    donepezil (ARICEPT) 10 MG tablet Take 5 mg by mouth daily (with breakfast)       finasteride (PROSCAR) 5 MG tablet Take 5 mg by mouth daily      naphazoline-glycerin (CLEAR EYES REDNESS RELIEF) 0.012-0.2 % SOLN ophthalmic solution Place 1 drop into both eyes 2 times daily (Patient taking differently: Place 1 drop into both eyes 2 times daily as needed ) 1 Bottle 0    tamsulosin (FLOMAX) 0.4 MG capsule Take 1 capsule by mouth daily (Patient taking differently: Take 0.4 mg by mouth 2 times daily ) 90 capsule 1    glucose monitoring kit (FREESTYLE) monitoring kit 1 kit by Does not apply route daily as needed. 1 kit 0    latanoprost (XALATAN) 0.005 % ophthalmic solution Place 1 drop into both eyes nightly. No current facility-administered medications for this visit. Review of Systems   Constitutional: Negative. Respiratory: Negative. Cardiovascular: Negative.     Gastrointestinal: Negative. Genitourinary: Negative. Neurological: Positive for numbness. Psychiatric/Behavioral: Negative. OBJECTIVE:  Physical Exam  Constitutional:       General: He is not in acute distress. Appearance: He is well-developed. HENT:      Head: Normocephalic and atraumatic. Cardiovascular:      Rate and Rhythm: Normal rate and regular rhythm. Pulmonary:      Effort: Pulmonary effort is normal.      Breath sounds: Normal breath sounds. Skin:     General: Skin is warm and dry. Neurological:      General: No focal deficit present. Mental Status: He is alert and oriented to person, place, and time. /80   Pulse 68   Temp 97.1 °F (36.2 °C)   Wt 205 lb (93 kg)   SpO2 96%   BMI 29.41 kg/m²      PHQ Scores 1/8/2021 9/28/2020 2/6/2020 9/18/2019 2/11/2019 7/10/2018 3/21/2017   PHQ2 Score 0 0 0 0 0 1 0   PHQ9 Score 0 0 0 0 0 1 0     Interpretation of Total Score Depression Severity: 1-4 = Minimal depression, 5-9 = Mild depression, 10-14 = Moderate depression, 15-19 = Moderately severe depression, 20-27 =Severe depression        Assessment/Plan:  [address] [place] [address] was seen today for office visit for anticoagulation management. Diagnoses and all orders for this visit:    Peripheral polyneuropathy  - Continue Cymbalta, was intolerant to 60 mg  - Continue tramadol 1-2 tabs as needed nightly  -     DULoxetine (CYMBALTA) 30 MG extended release capsule;  Take 1 capsule by mouth nightly    History of pulmonary embolism  -Continue anticoagulation    Anticoagulated on Coumadin  - INR 2.2  - Therapeutic, continue same dose, see flowsheet  -     POCT INR        SHA Chapa - CNP

## 2021-03-25 ENCOUNTER — OFFICE VISIT (OUTPATIENT)
Dept: INTERNAL MEDICINE CLINIC | Age: 86
End: 2021-03-25
Payer: MEDICARE

## 2021-03-25 VITALS
TEMPERATURE: 97.3 F | OXYGEN SATURATION: 98 % | HEART RATE: 70 BPM | WEIGHT: 209 LBS | BODY MASS INDEX: 29.99 KG/M2 | DIASTOLIC BLOOD PRESSURE: 78 MMHG | SYSTOLIC BLOOD PRESSURE: 136 MMHG

## 2021-03-25 DIAGNOSIS — G62.9 PERIPHERAL POLYNEUROPATHY: Primary | ICD-10-CM

## 2021-03-25 DIAGNOSIS — I26.99 BILATERAL PULMONARY EMBOLISM (HCC): ICD-10-CM

## 2021-03-25 DIAGNOSIS — Z79.01 ANTICOAGULATED ON COUMADIN: ICD-10-CM

## 2021-03-25 LAB
INTERNATIONAL NORMALIZATION RATIO, POC: 2.3
PROTHROMBIN TIME, POC: NORMAL

## 2021-03-25 PROCEDURE — G8417 CALC BMI ABV UP PARAM F/U: HCPCS | Performed by: NURSE PRACTITIONER

## 2021-03-25 PROCEDURE — 4040F PNEUMOC VAC/ADMIN/RCVD: CPT | Performed by: NURSE PRACTITIONER

## 2021-03-25 PROCEDURE — 1123F ACP DISCUSS/DSCN MKR DOCD: CPT | Performed by: NURSE PRACTITIONER

## 2021-03-25 PROCEDURE — G8428 CUR MEDS NOT DOCUMENT: HCPCS | Performed by: NURSE PRACTITIONER

## 2021-03-25 PROCEDURE — 85610 PROTHROMBIN TIME: CPT | Performed by: NURSE PRACTITIONER

## 2021-03-25 PROCEDURE — 1036F TOBACCO NON-USER: CPT | Performed by: NURSE PRACTITIONER

## 2021-03-25 PROCEDURE — 99213 OFFICE O/P EST LOW 20 MIN: CPT | Performed by: NURSE PRACTITIONER

## 2021-03-25 PROCEDURE — G8484 FLU IMMUNIZE NO ADMIN: HCPCS | Performed by: NURSE PRACTITIONER

## 2021-03-25 RX ORDER — TRAMADOL HYDROCHLORIDE 50 MG/1
50 TABLET ORAL 3 TIMES DAILY PRN
Qty: 90 TABLET | Refills: 0 | Status: SHIPPED | OUTPATIENT
Start: 2021-03-25 | End: 2021-04-24

## 2021-03-29 RX ORDER — FAMOTIDINE 20 MG/1
TABLET, FILM COATED ORAL
Qty: 60 TABLET | Refills: 3 | Status: SHIPPED | OUTPATIENT
Start: 2021-03-29 | End: 2021-07-30

## 2021-03-29 ASSESSMENT — ENCOUNTER SYMPTOMS
RESPIRATORY NEGATIVE: 1
GASTROINTESTINAL NEGATIVE: 1

## 2021-03-29 NOTE — PROGRESS NOTES
(COUMADIN) 2.5 MG tablet 3.75 mg (2.5 mg x 1.5) every Thu; 2.5 mg (2.5 mg x 1) all other days 90 tablet 3    insulin aspart (NOVOLOG FLEXPEN) 100 UNIT/ML injection pen INJECT 20 UNITS UNDER THE SKIN THREE TIMES A DAY ACCORDING TO SLIDING SCALE 15 pen 1    gabapentin (NEURONTIN) 300 MG capsule TAKE ONE CAPSULE BY MOUTH EVERY MORNING AND TAKE TWO CAPSULES BY MOUTH EVERY NIGHT AT BEDTIME 90 capsule 5    Insulin Pen Needle (B-D UF III MINI PEN NEEDLES) 31G X 5 MM MISC Use to inject insulin 4 times daily. 200 each 5    famotidine (PEPCID) 20 MG tablet TAKE ONE TABLET BY MOUTH TWICE A DAY 60 tablet 4    fluocinonide (LIDEX) 0.05 % cream Apply topically 2 times daily. 60 g 2    blood glucose test strips (FREESTYLE LITE) strip USE ONE STRIP TO TEST THREE TIMES A  strip 5    lisinopril (PRINIVIL;ZESTRIL) 10 MG tablet TAKE ONE TABLET BY MOUTH DAILY 90 tablet 2    BASAGLAR KWIKPEN 100 UNIT/ML injection pen INJECT 30 UNITS UNDER THE SKIN NIGHTLY (Patient taking differently: Pt is taking 26 units daily. ) 15 pen 2    donepezil (ARICEPT) 10 MG tablet Take 5 mg by mouth daily (with breakfast)       finasteride (PROSCAR) 5 MG tablet Take 5 mg by mouth daily      naphazoline-glycerin (CLEAR EYES REDNESS RELIEF) 0.012-0.2 % SOLN ophthalmic solution Place 1 drop into both eyes 2 times daily (Patient taking differently: Place 1 drop into both eyes 2 times daily as needed ) 1 Bottle 0    tamsulosin (FLOMAX) 0.4 MG capsule Take 1 capsule by mouth daily (Patient taking differently: Take 0.4 mg by mouth 2 times daily ) 90 capsule 1    glucose monitoring kit (FREESTYLE) monitoring kit 1 kit by Does not apply route daily as needed. 1 kit 0    latanoprost (XALATAN) 0.005 % ophthalmic solution Place 1 drop into both eyes nightly. No current facility-administered medications for this visit. Review of Systems   Constitutional: Negative. Respiratory: Negative. Cardiovascular: Negative. Gastrointestinal: Negative. Genitourinary: Negative. Neurological: Positive for numbness. Psychiatric/Behavioral: Negative. OBJECTIVE:  Physical Exam  Constitutional:       General: He is not in acute distress. Appearance: He is well-developed. HENT:      Head: Normocephalic and atraumatic. Cardiovascular:      Rate and Rhythm: Normal rate and regular rhythm. Pulmonary:      Effort: Pulmonary effort is normal.      Breath sounds: Normal breath sounds. Skin:     General: Skin is warm and dry. Neurological:      General: No focal deficit present. Mental Status: He is alert and oriented to person, place, and time. /78   Pulse 70   Temp 97.3 °F (36.3 °C)   Wt 209 lb (94.8 kg)   SpO2 98%   BMI 29.99 kg/m²      PHQ Scores 1/8/2021 9/28/2020 2/6/2020 9/18/2019 2/11/2019 7/10/2018 3/21/2017   PHQ2 Score 0 0 0 0 0 1 0   PHQ9 Score 0 0 0 0 0 1 0     Interpretation of Total Score Depression Severity: 1-4 = Minimal depression, 5-9 = Mild depression, 10-14 = Moderate depression, 15-19 = Moderately severe depression, 20-27 =Severe depression        Assessment/Plan:  Dagoberto Aguero was seen today for office visit for anticoagulation management and chronic pain. Diagnoses and all orders for this visit:    Peripheral polyneuropathy  - Chronic, remains problematic  - Discussed getting him back into pain management. -     traMADol (ULTRAM) 50 MG tablet; Take 1 tablet by mouth 3 times daily as needed for Pain for up to 30 days. Bilateral pulmonary embolism (HCC)  -History of bilateral pulmonary embolism, continue anticoagulation    Anticoagulated on Coumadin  - INR therapeutic at 2.3  - Continue current regimen, see flowsheet  -     POCT INR      Controlled Substance Monitoring:  Acute and Chronic Pain Monitoring:   RX Monitoring 2/25/2021   Attestation -   Periodic Controlled Substance Monitoring Assessed functional status.          SHA Kenyon - CNP

## 2021-04-23 ENCOUNTER — OFFICE VISIT (OUTPATIENT)
Dept: INTERNAL MEDICINE CLINIC | Age: 86
End: 2021-04-23
Payer: MEDICARE

## 2021-04-23 VITALS
DIASTOLIC BLOOD PRESSURE: 60 MMHG | WEIGHT: 208 LBS | SYSTOLIC BLOOD PRESSURE: 138 MMHG | BODY MASS INDEX: 29.84 KG/M2 | OXYGEN SATURATION: 98 % | HEART RATE: 76 BPM

## 2021-04-23 DIAGNOSIS — N18.4 TYPE 2 DIABETES MELLITUS WITH STAGE 4 CHRONIC KIDNEY DISEASE, WITHOUT LONG-TERM CURRENT USE OF INSULIN (HCC): ICD-10-CM

## 2021-04-23 DIAGNOSIS — G30.1 LATE ONSET ALZHEIMER'S DISEASE WITHOUT BEHAVIORAL DISTURBANCE (HCC): ICD-10-CM

## 2021-04-23 DIAGNOSIS — Z79.01 ANTICOAGULATED ON COUMADIN: ICD-10-CM

## 2021-04-23 DIAGNOSIS — F02.80 LATE ONSET ALZHEIMER'S DISEASE WITHOUT BEHAVIORAL DISTURBANCE (HCC): ICD-10-CM

## 2021-04-23 DIAGNOSIS — J44.9 CHRONIC OBSTRUCTIVE PULMONARY DISEASE, UNSPECIFIED COPD TYPE (HCC): ICD-10-CM

## 2021-04-23 DIAGNOSIS — E85.4 CEREBRAL AMYLOID ANGIOPATHY (HCC): ICD-10-CM

## 2021-04-23 DIAGNOSIS — C34.92 NON-SMALL CELL CANCER OF LEFT LUNG (HCC): ICD-10-CM

## 2021-04-23 DIAGNOSIS — G62.9 PERIPHERAL POLYNEUROPATHY: ICD-10-CM

## 2021-04-23 DIAGNOSIS — I68.0 CEREBRAL AMYLOID ANGIOPATHY (HCC): ICD-10-CM

## 2021-04-23 DIAGNOSIS — I26.99 BILATERAL PULMONARY EMBOLISM (HCC): Primary | ICD-10-CM

## 2021-04-23 DIAGNOSIS — E11.22 TYPE 2 DIABETES MELLITUS WITH STAGE 4 CHRONIC KIDNEY DISEASE, WITHOUT LONG-TERM CURRENT USE OF INSULIN (HCC): ICD-10-CM

## 2021-04-23 DIAGNOSIS — I50.22 CHRONIC SYSTOLIC CONGESTIVE HEART FAILURE (HCC): ICD-10-CM

## 2021-04-23 LAB
INTERNATIONAL NORMALIZATION RATIO, POC: 2
PROTHROMBIN TIME, POC: NORMAL

## 2021-04-23 PROCEDURE — 1123F ACP DISCUSS/DSCN MKR DOCD: CPT | Performed by: NURSE PRACTITIONER

## 2021-04-23 PROCEDURE — G8926 SPIRO NO PERF OR DOC: HCPCS | Performed by: NURSE PRACTITIONER

## 2021-04-23 PROCEDURE — 3023F SPIROM DOC REV: CPT | Performed by: NURSE PRACTITIONER

## 2021-04-23 PROCEDURE — 3052F HG A1C>EQUAL 8.0%<EQUAL 9.0%: CPT | Performed by: NURSE PRACTITIONER

## 2021-04-23 PROCEDURE — 4040F PNEUMOC VAC/ADMIN/RCVD: CPT | Performed by: NURSE PRACTITIONER

## 2021-04-23 PROCEDURE — 99214 OFFICE O/P EST MOD 30 MIN: CPT | Performed by: NURSE PRACTITIONER

## 2021-04-23 PROCEDURE — G8417 CALC BMI ABV UP PARAM F/U: HCPCS | Performed by: NURSE PRACTITIONER

## 2021-04-23 PROCEDURE — 85610 PROTHROMBIN TIME: CPT | Performed by: NURSE PRACTITIONER

## 2021-04-23 PROCEDURE — G8427 DOCREV CUR MEDS BY ELIG CLIN: HCPCS | Performed by: NURSE PRACTITIONER

## 2021-04-23 PROCEDURE — 1036F TOBACCO NON-USER: CPT | Performed by: NURSE PRACTITIONER

## 2021-04-23 ASSESSMENT — ENCOUNTER SYMPTOMS
GASTROINTESTINAL NEGATIVE: 1
RESPIRATORY NEGATIVE: 1
BACK PAIN: 1

## 2021-04-23 NOTE — PROGRESS NOTES
SUBJECTIVE:    Patient ID: Steven Mccann is a 82 y.o. male. CC: History of pulmonary embolism, chronic anticoagulation, neuropathy, history of lung cancer, memory disorder, COPD    HPI: Follow-up chronic medical problems, follow-up of recent medical issues, and INR mgmt.     He also has a history of pulmonary embolism with chronic anticoagulation.  He is taking his warfarin as directed.  He denies any side effects. Luis Alfredo Hind today is therapeutic today at 2.2. He continues to be plagued with chronic neuropathy, worse at night. He has been on multiple treatments for this with intolerability or limited improvement. He currently seems to be doing best with 1-2 tramadol at night as needed. Past Medical History:   Diagnosis Date    Abdominal pain, epigastric     Arthritis     Benign prostatic hypertrophy without urinary obstruction     BPH     Cellulitis and abscess     Chronic rhinitis     Chronic systolic congestive heart failure ([facility] [address] 4/18/2019    [facility]     Diabetes mellitus (HCC)     Dysphagia     Erectile dysfunction     GERD (gastroesophageal reflux disease)     Hx of blood clots     Hyperglycemia     Hypertension     Late onset Alzheimer's disease without behavioral disturbance ([facility] [address] 7/23/2020    lumbar radiculopathy     Neuropathy     Nocturia     Osteoarthritis     Other disorders of kidney and ureter in diseases classified elsewhere     Pain, joint, knee     Palpitations     skin cancer     Rt ear, nose, neck, hands/ 2018 lung    SOB (shortness of breath)     Tennis elbow     Tinea pedis     foot        Current Outpatient Medications   Medication Sig Dispense Refill    famotidine (PEPCID) 20 MG tablet TAKE ONE TABLET BY MOUTH TWICE A DAY 60 tablet 3    traMADol (ULTRAM) 50 MG tablet Take 1 tablet by mouth 3 times daily as needed for Pain for up to 30 days.  90 tablet 0    furosemide (LASIX) 40 MG tablet TAKE ONE TABLET BY MOUTH DAILY 90 tablet 1    DULoxetine (CYMBALTA) 30 MG extended release capsule Take 1 capsule by mouth nightly 90 capsule 3    warfarin (COUMADIN) 2.5 MG tablet 3.75 mg (2.5 mg x 1.5) every Thu; 2.5 mg (2.5 mg x 1) all other days 90 tablet 3    insulin aspart (NOVOLOG FLEXPEN) 100 UNIT/ML injection pen INJECT 20 UNITS UNDER THE SKIN THREE TIMES A DAY ACCORDING TO SLIDING SCALE 15 pen 1    gabapentin (NEURONTIN) 300 MG capsule TAKE ONE CAPSULE BY MOUTH EVERY MORNING AND TAKE TWO CAPSULES BY MOUTH EVERY NIGHT AT BEDTIME 90 capsule 5    Insulin Pen Needle (B-D UF III MINI PEN NEEDLES) 31G X 5 MM MISC Use to inject insulin 4 times daily. 200 each 5    fluocinonide (LIDEX) 0.05 % cream Apply topically 2 times daily. 60 g 2    blood glucose test strips (FREESTYLE LITE) strip USE ONE STRIP TO TEST THREE TIMES A  strip 5    lisinopril (PRINIVIL;ZESTRIL) 10 MG tablet TAKE ONE TABLET BY MOUTH DAILY 90 tablet 2    BASAGLAR KWIKPEN 100 UNIT/ML injection pen INJECT 30 UNITS UNDER THE SKIN NIGHTLY (Patient taking differently: Pt is taking 26 units daily. ) 15 pen 2    donepezil (ARICEPT) 10 MG tablet Take 5 mg by mouth daily (with breakfast)       finasteride (PROSCAR) 5 MG tablet Take 5 mg by mouth daily      naphazoline-glycerin (CLEAR EYES REDNESS RELIEF) 0.012-0.2 % SOLN ophthalmic solution Place 1 drop into both eyes 2 times daily (Patient taking differently: Place 1 drop into both eyes 2 times daily as needed ) 1 Bottle 0    tamsulosin (FLOMAX) 0.4 MG capsule Take 1 capsule by mouth daily (Patient taking differently: Take 0.4 mg by mouth 2 times daily ) 90 capsule 1    glucose monitoring kit (FREESTYLE) monitoring kit 1 kit by Does not apply route daily as needed. 1 kit 0    latanoprost (XALATAN) 0.005 % ophthalmic solution Place 1 drop into both eyes nightly. No current facility-administered medications for this visit. Review of Systems   Constitutional: Negative. Respiratory: Negative. Cardiovascular: Negative. Gastrointestinal: Negative. Genitourinary: Negative. Musculoskeletal: Positive for back pain. Neurological: Positive for numbness. Psychiatric/Behavioral: Negative. OBJECTIVE:  Physical Exam  Vitals signs reviewed. Constitutional:       General: He is not in acute distress. Appearance: He is well-developed. He is not diaphoretic. HENT:      Head: Normocephalic and atraumatic. Eyes:      General: No scleral icterus. Conjunctiva/sclera: Conjunctivae normal.   Neck:      Musculoskeletal: Neck supple. Vascular: No JVD. Cardiovascular:      Rate and Rhythm: Normal rate and regular rhythm. Pulmonary:      Effort: Pulmonary effort is normal. No respiratory distress. Breath sounds: Normal breath sounds. No wheezing or rales. Abdominal:      General: There is no distension. Palpations: Abdomen is soft. Tenderness: There is no abdominal tenderness. There is no guarding or rebound. Musculoskeletal: Normal range of motion. Skin:     General: Skin is warm and dry. Neurological:      General: No focal deficit present. Mental Status: He is alert and oriented to person, place, and time. Psychiatric:         Behavior: Behavior normal.         Thought Content: Thought content normal.         Cognition and Memory: Memory is impaired. /60   Pulse 76   Wt 208 lb (94.3 kg)   SpO2 98%   BMI 29.84 kg/m²      PHQ Scores 1/8/2021 9/28/2020 2/6/2020 9/18/2019 2/11/2019 7/10/2018 3/21/2017   PHQ2 Score 0 0 0 0 0 1 0   PHQ9 Score 0 0 0 0 0 1 0     Interpretation of Total Score Depression Severity: 1-4 = Minimal depression, 5-9 = Mild depression, 10-14 = Moderate depression, 15-19 = Moderately severe depression, 20-27 =Severe depression      Assessment/Plan:  Bessie Elena was seen today for office visit for anticoagulation management.   Diagnoses and all orders for this visit:    Bilateral pulmonary embolism ([address]  -Continue

## 2021-05-13 ENCOUNTER — OFFICE VISIT (OUTPATIENT)
Dept: CARDIOLOGY CLINIC | Age: 86
End: 2021-05-13
Payer: MEDICARE

## 2021-05-13 VITALS
HEIGHT: 70 IN | BODY MASS INDEX: 30.06 KG/M2 | SYSTOLIC BLOOD PRESSURE: 148 MMHG | RESPIRATION RATE: 18 BRPM | HEART RATE: 74 BPM | WEIGHT: 210 LBS | OXYGEN SATURATION: 98 % | DIASTOLIC BLOOD PRESSURE: 76 MMHG

## 2021-05-13 DIAGNOSIS — I26.99 BILATERAL PULMONARY EMBOLISM (HCC): ICD-10-CM

## 2021-05-13 DIAGNOSIS — I50.22 CHRONIC SYSTOLIC CONGESTIVE HEART FAILURE (HCC): Primary | ICD-10-CM

## 2021-05-13 DIAGNOSIS — I35.9 AORTIC VALVULAR DISEASE: ICD-10-CM

## 2021-05-13 DIAGNOSIS — I10 ESSENTIAL HYPERTENSION: ICD-10-CM

## 2021-05-13 DIAGNOSIS — I36.1 NON-RHEUMATIC TRICUSPID VALVE INSUFFICIENCY: ICD-10-CM

## 2021-05-13 DIAGNOSIS — N18.30 STAGE 3 CHRONIC KIDNEY DISEASE, UNSPECIFIED WHETHER STAGE 3A OR 3B CKD (HCC): ICD-10-CM

## 2021-05-13 PROCEDURE — 4040F PNEUMOC VAC/ADMIN/RCVD: CPT | Performed by: INTERNAL MEDICINE

## 2021-05-13 PROCEDURE — 99214 OFFICE O/P EST MOD 30 MIN: CPT | Performed by: INTERNAL MEDICINE

## 2021-05-13 PROCEDURE — 1036F TOBACCO NON-USER: CPT | Performed by: INTERNAL MEDICINE

## 2021-05-13 PROCEDURE — 1123F ACP DISCUSS/DSCN MKR DOCD: CPT | Performed by: INTERNAL MEDICINE

## 2021-05-13 PROCEDURE — G8417 CALC BMI ABV UP PARAM F/U: HCPCS | Performed by: INTERNAL MEDICINE

## 2021-05-13 PROCEDURE — G8427 DOCREV CUR MEDS BY ELIG CLIN: HCPCS | Performed by: INTERNAL MEDICINE

## 2021-05-13 RX ORDER — TRAMADOL HYDROCHLORIDE 50 MG/1
50 TABLET ORAL EVERY 6 HOURS PRN
COMMUNITY
End: 2021-06-30 | Stop reason: ALTCHOICE

## 2021-05-13 NOTE — PROGRESS NOTES
[facility]   Cardiac Follow up    Referring Provider:  Caroline Humphreys, SHA - CNP     Chief Complaint   Patient presents with    6 Month Follow-Up    Hypertension      History of Present Illness:   Mr. Kim Bryant has a history of cardiomyopathy, hypertension, and valvular disease. He also has chronic obstructive pulmonary disease. He is a remote cigarette smoker, history of diabetes, history of lung cancer (could not be  treated surgically), was treated with radiation and chemotherapy. History of pulmonary embolus in the past, he takes warfarin. He follows with Dr. Charmaine Cardenas for renal insufficiency. In 3/2019 he was admitted to the hospital with generalized swelling of his lower extremities and shortness of breath with exertion. During this admission he had an abnormal Echo. See details below. Today, he is at the visit with his wife. He has difficulty walking and walks with a rollator. His biggest complaint is neuropathy pains, pain is worse at night. This has affected his balance. He has shortness of breath with activity, but this has not worsened. Patient is compliant with medications and is tolerating them well without side effects. He is now getting shots in his eyes for macular degeneration. 3/23/19 Consult Note:   LABORATORY DATA:  Show creatinine as up to 2. 1. Hemoglobin 11.7. Liver  tests are normal.  INR 2.85. Troponin 0.06, which is not significant. Chest x-ray shows new infiltrative process. CT scan is concerning for  recurrent lung cancer, although he was inoperable at time of his  diagnosis. ProBNP is elevated at 4939. I reviewed his echocardiogram.   To me, his ejection fraction appears better than reported 30-35%. I  feel it is more like 45%, but the main finding of the echocardiogram is  right atrial enlargement and engorgement of vena cava, which is more  consistent with right heart strain with cor pulmonale. Recommend- This is a  challenging situation. Diuresis is likely going to be difficult. The  best way to deal with his peripheral edema will be with some form of  compression hose, can certainly continue Lasix orally b.i.d. We will  consider addition of ACE inhibitor instead of the use of Norvasc for his  blood pressure. Overall, this will be very challenging for this patient  due to findings noted above, not sure what role lung cancer will play. I looked at his echocardiogram for the possibility of amyloid, infiltration  of his myocardium. It does not appear that he has this problem. Past Medical History:   has a past medical history of Abdominal pain, epigastric, Arthritis, Benign prostatic hypertrophy without urinary obstruction, BPH, Cellulitis and abscess, Chronic rhinitis, Chronic systolic congestive heart failure ([address] COPD (chronic obstructive pulmonary disease) ([address] Diabetes mellitus ([address] Dysphagia, Erectile dysfunction, GERD (gastroesophageal reflux disease), Hx of blood clots, Hyperglycemia, Hypertension, Late onset Alzheimer's disease without behavioral disturbance ([address] lumbar radiculopathy, Neuropathy, Nocturia, Osteoarthritis, Other disorders of kidney and ureter in diseases classified elsewhere, Pain, joint, knee, Palpitations, skin cancer, SOB (shortness of breath), Tennis elbow, and Tinea pedis. Surgical History:   has a past surgical history that includes Knee Prosthesis Removal; Cholecystectomy; Colonoscopy (11/28/2011); Cataract removal (Bilateral, 09/2014); Parotidectomy (Right, 01/26/2017); skin biopsy; Upper gastrointestinal endoscopy (03/19/2018); Total knee arthroplasty; hip surgery (Right, 09/09/2013); other surgical history; Lung biopsy (Left, 05/09/2018); Tunneled venous port placement (06/22/2018); pr njx aa&/strd tfrml epi lumbar/sacral 1 level (Right, 11/21/2018); eye surgery; Mohs surgery (Bilateral); Skin cancer excision (Left, 08/14/2019); lumbar nerve block (N/A, 8/26/2019);  Circumcision (N/A, 11/15/2019); joint replacement (Bilateral); Nerve Surgery (N/A, 12/20/2019); Cystoscopy (N/A, 1/5/2020); and Pain management procedure (N/A, 7/20/2020). Social History:   reports that he quit smoking about 50 years ago. He has a 40.00 pack-year smoking history. He has never used smokeless tobacco. He reports that he does not drink alcohol or use drugs. Family History:  family history includes Arthritis in his father; Diabetes in his brother and father. Home Medications:  Prior to Admission medications    Medication Sig Start Date End Date Taking? Authorizing Provider   traMADol (ULTRAM) 50 MG tablet Take 50 mg by mouth every 6 hours as needed for Pain. Yes Historical Provider, MD   famotidine (PEPCID) 20 MG tablet TAKE ONE TABLET BY MOUTH TWICE A DAY 3/29/21  Yes SHA Martins CNP   furosemide (LASIX) 40 MG tablet TAKE ONE TABLET BY MOUTH DAILY 3/5/21  Yes Pierre Cunningham MD   DULoxetine (CYMBALTA) 30 MG extended release capsule Take 1 capsule by mouth nightly 2/25/21  Yes SHA Martins CNP   warfarin (COUMADIN) 2.5 MG tablet 3.75 mg (2.5 mg x 1.5) every Thu; 2.5 mg (2.5 mg x 1) all other days 12/28/20  Yes SHA Martins CNP   insulin aspart (NOVOLOG FLEXPEN) 100 UNIT/ML injection pen INJECT 20 UNITS UNDER THE SKIN THREE TIMES A DAY ACCORDING TO SLIDING SCALE 11/19/20  Yes Dane Cross MD   gabapentin (NEURONTIN) 300 MG capsule TAKE ONE CAPSULE BY MOUTH EVERY MORNING AND TAKE TWO CAPSULES BY MOUTH EVERY NIGHT AT BEDTIME 11/16/20 5/14/21 Yes SHA Martins CNP   Insulin Pen Needle (B-D UF III MINI PEN NEEDLES) 31G X 5 MM MISC Use to inject insulin 4 times daily. 10/19/20  Yes Dane Cross MD   fluocinonide (LIDEX) 0.05 % cream Apply topically 2 times daily.  9/28/20  Yes SHA Martins CNP   blood glucose test strips (FREESTYLE LITE) strip USE ONE STRIP TO TEST THREE TIMES A DAY 9/24/20  Yes Dane Cross MD   lisinopril (PRINIVIL;ZESTRIL) 10 MG tablet TAKE ONE TABLET BY MOUTH DAILY 9/16/20  Yes SHA Valentin CNP   BASAGLARNOL GALDAMEZPEN 100 UNIT/ML injection pen INJECT 30 UNITS UNDER THE SKIN NIGHTLY  Patient taking differently: Pt is taking 26 units daily. 9/8/20  Yes Yash Ramirez MD   donepezil (ARICEPT) 10 MG tablet Take 5 mg by mouth daily (with breakfast)  7/27/20  Yes Historical Provider, MD   finasteride (PROSCAR) 5 MG tablet Take 5 mg by mouth daily   Yes Historical Provider, MD   naphazoline-glycerin (CLEAR EYES REDNESS RELIEF) 0.012-0.2 % SOLN ophthalmic solution Place 1 drop into both eyes 2 times daily  Patient taking differently: Place 1 drop into both eyes 2 times daily as needed  4/7/18  Yes Nubia Arauz MD   tamsulosin (FLOMAX) 0.4 MG capsule Take 1 capsule by mouth daily  Patient taking differently: Take 0.4 mg by mouth 2 times daily  8/3/17  Yes SHA Valentin CNP   glucose monitoring kit (FREESTYLE) monitoring kit 1 kit by Does not apply route daily as needed. 4/7/14  Yes Hank Wade MD   latanoprost (XALATAN) 0.005 % ophthalmic solution Place 1 drop into both eyes nightly. 10/27/13  Yes Historical Provider, MD        Allergies:  Celebrex [celecoxib], Elavil [amitriptyline], Naproxen, Percocet [oxycodone-acetaminophen], and Lyrica [pregabalin]     Review of Systems:   · Constitutional: there has been no unanticipated weight loss. There's been no change in energy level, sleep pattern, or activity level. · Eyes: No visual changes or diplopia. No scleral icterus. · ENT: No Headaches, hearing loss or vertigo. No mouth sores or sore throat. · Cardiovascular: Reviewed in HPI  · Respiratory: No cough or wheezing, no sputum production. No hematemesis. · Gastrointestinal: No abdominal pain, appetite loss, blood in stools. No change in bowel or bladder habits. · Genitourinary: No dysuria, trouble voiding, or hematuria. · Musculoskeletal:  No gait disturbance, weakness or joint complaints.   · Integumentary: No rash or pruritis. · Neurological: No headache, diplopia, change in muscle strength, numbness or tingling. No change in gait, balance, coordination, mood, affect, memory, mentation, behavior. Worsening gait difficulties and memory  · Psychiatric: No anxiety, no depression  · Endocrine: No malaise, fatigue or temperature intolerance. No excessive thirst, fluid intake, or urination. No tremor. · Hematologic/Lymphatic: + bruising from fall, but no bleeding, blood clots or swollen lymph nodes. · Allergic/Immunologic: No nasal congestion or hives. Physical Examination:    Vitals:    05/13/21 1102   BP: (!) 148/76   Pulse: 74   Resp: 18   SpO2: 98%        Wt Readings from Last 1 Encounters:   05/13/21 210 lb (95.3 kg)       Constitutional and General Appearance: NAD  Skin:good turgor,intact without lesions  HEENT: EOMI ,normal  Neck:no JVD    Respiratory:  · Normal excursion and expansion without use of accessory muscles  · Resp Auscultation: Normal breath sounds without dullness  Cardiovascular:  · The apical impulses not displaced  · Heart tones are crisp and normal  · Cervical veins are not engorged  · The carotid upstroke is normal in amplitude and contour without delay or bruit  · Peripheral pulses are symmetrical and full  · There is no clubbing, cyanosis of the extremities. · No edema - no change  · Femoral Arteries: 2+ and equal  · Pedal Pulses: 2+ and equal   Abdomen:  · No masses or tenderness  · Liver/Spleen: No Abnormalities Noted  Neurological/Psychiatric:  · Alert and oriented in all spheres  · Moves all extremities well  · Gait slow, uses rollator to help with balance   · No abnormalities of mood, affect, memory, mentation, or behavior are noted    3/22/19 [facility]  Summary   -Left ventricular cavity size is normal. There is moderate concentric left   ventricular hypertrophy. Overall left ventricular systolic function appears   severely reduced with a estimated ejection fraction of 30-35%. Cardiac test and lab results personally reviewed by me during this office visit and discussed. 1.  No change in medications  2. Continue risk factor modifications. 3.  Call for any change in symptoms. 4.  Return for regular follow up in 6 months with echo. I appreciate the opportunity of cooperating in the care of this individual.    Judy Delgadillo. Cori Mai M.D., [facility] [place]    Patient's problem list, medications, allergies, past medical, surgical, social and family histories were reviewed and updated as appropriate. Scribe's attestation: This note was scribed in the presence of Dr. Cori Mai MD, by Xin Gage RN. The scribe's documentation has been prepared under my direction and personally reviewed by me in its entirety. I confirm that the note above accurately reflects all work, treatment, procedures, and medical decision making performed by me.

## 2021-05-17 RX ORDER — GABAPENTIN 300 MG/1
CAPSULE ORAL
Qty: 90 CAPSULE | Refills: 4 | Status: SHIPPED | OUTPATIENT
Start: 2021-05-17 | End: 2021-10-14

## 2021-05-20 ENCOUNTER — HOSPITAL ENCOUNTER (OUTPATIENT)
Dept: CT IMAGING | Age: 86
Discharge: HOME | End: 2021-05-20
Payer: MEDICARE

## 2021-05-20 DIAGNOSIS — C34.92 NON-SMALL CELL CANCER OF LEFT LUNG (HCC): ICD-10-CM

## 2021-05-20 PROCEDURE — 71250 CT THORAX DX C-: CPT

## 2021-05-25 ENCOUNTER — OFFICE VISIT (OUTPATIENT)
Dept: INTERNAL MEDICINE CLINIC | Age: 86
End: 2021-05-25
Payer: MEDICARE

## 2021-05-25 VITALS
SYSTOLIC BLOOD PRESSURE: 138 MMHG | HEART RATE: 72 BPM | BODY MASS INDEX: 30.16 KG/M2 | WEIGHT: 210.2 LBS | OXYGEN SATURATION: 97 % | DIASTOLIC BLOOD PRESSURE: 68 MMHG

## 2021-05-25 DIAGNOSIS — Z79.01 ANTICOAGULATED ON COUMADIN: ICD-10-CM

## 2021-05-25 DIAGNOSIS — M48.062 LUMBAR STENOSIS WITH NEUROGENIC CLAUDICATION: ICD-10-CM

## 2021-05-25 DIAGNOSIS — I26.99 BILATERAL PULMONARY EMBOLISM (HCC): Primary | ICD-10-CM

## 2021-05-25 DIAGNOSIS — G62.9 PERIPHERAL POLYNEUROPATHY: ICD-10-CM

## 2021-05-25 DIAGNOSIS — M15.9 PRIMARY OSTEOARTHRITIS INVOLVING MULTIPLE JOINTS: ICD-10-CM

## 2021-05-25 LAB
INTERNATIONAL NORMALIZATION RATIO, POC: 3
PROTHROMBIN TIME, POC: NORMAL

## 2021-05-25 PROCEDURE — 99214 OFFICE O/P EST MOD 30 MIN: CPT | Performed by: NURSE PRACTITIONER

## 2021-05-25 PROCEDURE — 4040F PNEUMOC VAC/ADMIN/RCVD: CPT | Performed by: NURSE PRACTITIONER

## 2021-05-25 PROCEDURE — G8427 DOCREV CUR MEDS BY ELIG CLIN: HCPCS | Performed by: NURSE PRACTITIONER

## 2021-05-25 PROCEDURE — G8417 CALC BMI ABV UP PARAM F/U: HCPCS | Performed by: NURSE PRACTITIONER

## 2021-05-25 PROCEDURE — 1123F ACP DISCUSS/DSCN MKR DOCD: CPT | Performed by: NURSE PRACTITIONER

## 2021-05-25 PROCEDURE — 1036F TOBACCO NON-USER: CPT | Performed by: NURSE PRACTITIONER

## 2021-05-25 PROCEDURE — 85610 PROTHROMBIN TIME: CPT | Performed by: NURSE PRACTITIONER

## 2021-05-25 RX ORDER — HYDROCODONE BITARTRATE AND ACETAMINOPHEN 5; 325 MG/1; MG/1
1 TABLET ORAL 2 TIMES DAILY PRN
Qty: 60 TABLET | Refills: 0 | Status: SHIPPED | OUTPATIENT
Start: 2021-05-25 | End: 2021-06-29 | Stop reason: SDUPTHER

## 2021-05-25 ASSESSMENT — ENCOUNTER SYMPTOMS
GASTROINTESTINAL NEGATIVE: 1
RESPIRATORY NEGATIVE: 1
BACK PAIN: 1

## 2021-05-25 NOTE — PROGRESS NOTES
SUBJECTIVE:    Patient ID: Davidson Otto is a 82 y.o. male. CC: History of bilateral pulmonary embolism, anticoagulation on warfarin, polyneuropathy, lumbar stenosis    HPI: The patient presents to the office today for follow-up of history of bilateral pulmonary embolism with chronic anticoagulation on warfarin. He also has ongoing and severe peripheral polyneuropathy. Denies any recurrent pulmonary embolism. He is taking warfarin as directed. He denies any side effects. History of lumbar stenosis and severe polyneuropathy which are felt to be related. He has been on numerous treatments for this. He is currently on duloxetine, gabapentin, and tramadol as needed. Unfortunately, he is no longer getting relief from tramadol despite taking 2 at times. Past Medical History:   Diagnosis Date    Abdominal pain, epigastric     Arthritis     Benign prostatic hypertrophy without urinary obstruction     BPH     Cellulitis and abscess     Chronic rhinitis     Chronic systolic congestive heart failure ([facility] [address] 4/18/2019    COPD (chronic obstructive pulmonary disease) (HCC)     Diabetes mellitus (HCC)     Dysphagia     Erectile dysfunction     GERD (gastroesophageal reflux disease)     Hx of blood clots     Hyperglycemia     Hypertension     Late onset Alzheimer's disease without behavioral disturbance ([facility] [address] 7/23/2020    lumbar radiculopathy     Neuropathy     Nocturia     Osteoarthritis     Other disorders of kidney and ureter in diseases classified elsewhere     Pain, joint, knee     Palpitations     skin cancer     Rt ear, nose, neck, hands/ 2018 lung    SOB (shortness of breath)     Tennis elbow     Tinea pedis     foot        Current Outpatient Medications   Medication Sig Dispense Refill    HYDROcodone-acetaminophen (NORCO) 5-325 MG per tablet Take 1 tablet by mouth 2 times daily as needed for Pain for up to 30 days.  60 tablet 0    gabapentin (NEURONTIN) 300 MG capsule TAKE ONE CAPSULE BY MOUTH EVERY MORNING AND TAKE 2 CAPSULES EVERY NIGHT AT BEDTIME 90 capsule 4    traMADol (ULTRAM) 50 MG tablet Take 50 mg by mouth every 6 hours as needed for Pain.  famotidine (PEPCID) 20 MG tablet TAKE ONE TABLET BY MOUTH TWICE A DAY 60 tablet 3    furosemide (LASIX) 40 MG tablet TAKE ONE TABLET BY MOUTH DAILY 90 tablet 1    DULoxetine (CYMBALTA) 30 MG extended release capsule Take 1 capsule by mouth nightly 90 capsule 3    warfarin (COUMADIN) 2.5 MG tablet 3.75 mg (2.5 mg x 1.5) every Thu; 2.5 mg (2.5 mg x 1) all other days 90 tablet 3    insulin aspart (NOVOLOG FLEXPEN) 100 UNIT/ML injection pen INJECT 20 UNITS UNDER THE SKIN THREE TIMES A DAY ACCORDING TO SLIDING SCALE 15 pen 1    Insulin Pen Needle (B-D UF III MINI PEN NEEDLES) 31G X 5 MM MISC Use to inject insulin 4 times daily. 200 each 5    fluocinonide (LIDEX) 0.05 % cream Apply topically 2 times daily. 60 g 2    blood glucose test strips (FREESTYLE LITE) strip USE ONE STRIP TO TEST THREE TIMES A  strip 5    lisinopril (PRINIVIL;ZESTRIL) 10 MG tablet TAKE ONE TABLET BY MOUTH DAILY 90 tablet 2    BASAGLAR KWIKPEN 100 UNIT/ML injection pen INJECT 30 UNITS UNDER THE SKIN NIGHTLY (Patient taking differently: Pt is taking 26 units daily. ) 15 pen 2    donepezil (ARICEPT) 10 MG tablet Take 5 mg by mouth daily (with breakfast)       finasteride (PROSCAR) 5 MG tablet Take 5 mg by mouth daily      naphazoline-glycerin (CLEAR EYES REDNESS RELIEF) 0.012-0.2 % SOLN ophthalmic solution Place 1 drop into both eyes 2 times daily (Patient taking differently: Place 1 drop into both eyes 2 times daily as needed ) 1 Bottle 0    tamsulosin (FLOMAX) 0.4 MG capsule Take 1 capsule by mouth daily (Patient taking differently: Take 0.4 mg by mouth 2 times daily ) 90 capsule 1    glucose monitoring kit (FREESTYLE) monitoring kit 1 kit by Does not apply route daily as needed.  1 kit 0    latanoprost (XALATAN) 0.005 % ophthalmic solution Place 1 drop into both eyes nightly. No current facility-administered medications for this visit. Review of Systems   Constitutional: Negative. Respiratory: Negative. Cardiovascular: Negative. Gastrointestinal: Negative. Genitourinary: Negative. Musculoskeletal: Positive for back pain. Neurological: Positive for numbness. All other systems reviewed and are negative. OBJECTIVE:  Physical Exam  Vitals reviewed. Constitutional:       General: He is not in acute distress. Appearance: Normal appearance. He is well-developed. He is not diaphoretic. HENT:      Head: Normocephalic and atraumatic. Eyes:      General: No scleral icterus. Conjunctiva/sclera: Conjunctivae normal.   Neck:      Vascular: No JVD. Cardiovascular:      Rate and Rhythm: Normal rate and regular rhythm. Pulmonary:      Effort: Pulmonary effort is normal. No respiratory distress. Breath sounds: Normal breath sounds. No wheezing or rales. Abdominal:      General: There is no distension. Palpations: Abdomen is soft. Tenderness: There is no abdominal tenderness. There is no guarding or rebound. Musculoskeletal:         General: Normal range of motion. Cervical back: Neck supple. Skin:     General: Skin is warm and dry. Neurological:      General: No focal deficit present. Mental Status: He is alert and oriented to person, place, and time. Psychiatric:         Behavior: Behavior normal.         Thought Content: Thought content normal.         Cognition and Memory: Memory is impaired.         /68   Pulse 72   Wt 210 lb 3.2 oz (95.3 kg)   SpO2 97%   BMI 30.16 kg/m²      PHQ Scores 1/8/2021 9/28/2020 2/6/2020 9/18/2019 2/11/2019 7/10/2018 3/21/2017   PHQ2 Score 0 0 0 0 0 1 0   PHQ9 Score 0 0 0 0 0 1 0     Interpretation of Total Score Depression Severity: 1-4 = Minimal depression, 5-9 = Mild depression, 10-14 = Moderate depression, 15-19 = Moderately severe depression, 20-27 =Severe depression      ASSESSMENT/PLAN:  Charmayne Pagoda was seen today for office visit for anticoagulation management. Diagnoses and all orders for this visit:    Bilateral pulmonary embolism (HCC)  -Continue anticoagulation    Anticoagulated on Coumadin  - INR 3.0  - Therapeutic, continue current regimen, see flowsheet  -     POCT INR    Peripheral polyneuropathy  -     HYDROcodone-acetaminophen (NORCO) 5-325 MG per tablet; Take 1 tablet by mouth 2 times daily as needed for Pain for up to 30 days. Lumbar stenosis with neurogenic claudication  -     HYDROcodone-acetaminophen (NORCO) 5-325 MG per tablet; Take 1 tablet by mouth 2 times daily as needed for Pain for up to 30 days. Primary osteoarthritis involving multiple joints  -     HYDROcodone-acetaminophen (NORCO) 5-325 MG per tablet; Take 1 tablet by mouth 2 times daily as needed for Pain for up to 30 days.    -Continues to deal with lumbar back pain and peripheral neuropathy which is severe. He is on numerous treatments for this. We have discussed pain management and he will consider this. He was getting relief with tramadol but this is weaned. We can try Norco.  He is taken this in the past.    Controlled Substance Monitoring:  Acute and Chronic Pain Monitoring:   RX Monitoring 2/25/2021   Attestation -   Periodic Controlled Substance Monitoring Assessed functional status.          SHA Hunt - CNP

## 2021-06-21 RX ORDER — LISINOPRIL 10 MG/1
TABLET ORAL
Qty: 90 TABLET | Refills: 3 | Status: SHIPPED
Start: 2021-06-21 | End: 2022-05-10 | Stop reason: SINTOL

## 2021-06-29 ENCOUNTER — OFFICE VISIT (OUTPATIENT)
Dept: INTERNAL MEDICINE CLINIC | Age: 86
End: 2021-06-29
Payer: MEDICARE

## 2021-06-29 VITALS
SYSTOLIC BLOOD PRESSURE: 120 MMHG | BODY MASS INDEX: 29.99 KG/M2 | DIASTOLIC BLOOD PRESSURE: 60 MMHG | WEIGHT: 209 LBS | HEART RATE: 76 BPM | OXYGEN SATURATION: 98 %

## 2021-06-29 DIAGNOSIS — Z79.01 ANTICOAGULATED ON COUMADIN: ICD-10-CM

## 2021-06-29 DIAGNOSIS — I26.99 BILATERAL PULMONARY EMBOLISM (HCC): Primary | ICD-10-CM

## 2021-06-29 DIAGNOSIS — M48.062 LUMBAR STENOSIS WITH NEUROGENIC CLAUDICATION: ICD-10-CM

## 2021-06-29 DIAGNOSIS — G62.9 PERIPHERAL POLYNEUROPATHY: ICD-10-CM

## 2021-06-29 DIAGNOSIS — M15.9 PRIMARY OSTEOARTHRITIS INVOLVING MULTIPLE JOINTS: ICD-10-CM

## 2021-06-29 LAB
INTERNATIONAL NORMALIZATION RATIO, POC: 2.5
PROTHROMBIN TIME, POC: NORMAL

## 2021-06-29 PROCEDURE — 1123F ACP DISCUSS/DSCN MKR DOCD: CPT | Performed by: NURSE PRACTITIONER

## 2021-06-29 PROCEDURE — 4040F PNEUMOC VAC/ADMIN/RCVD: CPT | Performed by: NURSE PRACTITIONER

## 2021-06-29 PROCEDURE — 85610 PROTHROMBIN TIME: CPT | Performed by: NURSE PRACTITIONER

## 2021-06-29 PROCEDURE — G8417 CALC BMI ABV UP PARAM F/U: HCPCS | Performed by: NURSE PRACTITIONER

## 2021-06-29 PROCEDURE — 99214 OFFICE O/P EST MOD 30 MIN: CPT | Performed by: NURSE PRACTITIONER

## 2021-06-29 PROCEDURE — G8427 DOCREV CUR MEDS BY ELIG CLIN: HCPCS | Performed by: NURSE PRACTITIONER

## 2021-06-29 PROCEDURE — 1036F TOBACCO NON-USER: CPT | Performed by: NURSE PRACTITIONER

## 2021-06-29 RX ORDER — HYDROCODONE BITARTRATE AND ACETAMINOPHEN 5; 325 MG/1; MG/1
1 TABLET ORAL 3 TIMES DAILY PRN
Qty: 90 TABLET | Refills: 0 | Status: SHIPPED | OUTPATIENT
Start: 2021-06-29 | End: 2021-08-16

## 2021-06-30 ASSESSMENT — ENCOUNTER SYMPTOMS
BACK PAIN: 1
GASTROINTESTINAL NEGATIVE: 1
RESPIRATORY NEGATIVE: 1

## 2021-07-01 NOTE — PROGRESS NOTES
SUBJECTIVE:    Patient ID: Katarzyna Leon is a 82 y.o. male. CC: History of bilateral pulmonary embolism, anticoagulation on warfarin, polyneuropathy, lumbar stenosis    HPI: The patient presents to the office today for follow-up of history of bilateral pulmonary embolism with chronic anticoagulation on warfarin. He also has ongoing and severe peripheral polyneuropathy. Denies any recurrent pulmonary embolism. He is taking warfarin as directed. He denies any side effects. History of lumbar stenosis and severe polyneuropathy which are felt to be related. He has been on numerous treatments for this. He is currently on duloxetine, gabapentin, and Norco as needed. His regimen for this has been uptitrated with almost every office visit. He is no longer on tramadol but now on Norco.  This helps for a time. Past Medical History:   Diagnosis Date    Abdominal pain, epigastric     Arthritis     Benign prostatic hypertrophy without urinary obstruction     BPH     Cellulitis and abscess     Chronic rhinitis     Chronic systolic congestive heart failure ([facility] 4/18/2019    COPD (chronic obstructive pulmonary disease) (HCC)     Diabetes mellitus (HCC)     Dysphagia     Erectile dysfunction     GERD (gastroesophageal reflux disease)     Hx of blood clots     Hyperglycemia     Hypertension     Late onset Alzheimer's disease without behavioral disturbance ([facility] 7/23/2020    lumbar radiculopathy     Neuropathy     Nocturia     Osteoarthritis     Other disorders of kidney and ureter in diseases classified elsewhere     Pain, joint, knee     Palpitations     skin cancer     Rt ear, nose, neck, hands/ 2018 lung    SOB (shortness of breath)     Tennis elbow     Tinea pedis     foot        Current Outpatient Medications   Medication Sig Dispense Refill    HYDROcodone-acetaminophen (NORCO) 5-325 MG per tablet Take 1 tablet by mouth 3 times daily as needed for Pain for up to 30 days.  90 tablet 0 latanoprost (XALATAN) 0.005 % ophthalmic solution Place 1 drop into both eyes nightly. No current facility-administered medications for this visit. Review of Systems   Constitutional: Negative. Respiratory: Negative. Cardiovascular: Negative. Gastrointestinal: Negative. Genitourinary: Negative. Musculoskeletal: Positive for back pain. Neurological: Positive for numbness. All other systems reviewed and are negative. OBJECTIVE:  Physical Exam  Vitals reviewed. Constitutional:       General: He is not in acute distress. Appearance: Normal appearance. He is well-developed. He is not diaphoretic. HENT:      Head: Normocephalic and atraumatic. Eyes:      General: No scleral icterus. Conjunctiva/sclera: Conjunctivae normal.   Neck:      Vascular: No JVD. Cardiovascular:      Rate and Rhythm: Normal rate and regular rhythm. Pulmonary:      Effort: Pulmonary effort is normal. No respiratory distress. Breath sounds: Normal breath sounds. No wheezing or rales. Abdominal:      General: There is no distension. Palpations: Abdomen is soft. Tenderness: There is no abdominal tenderness. There is no guarding or rebound. Musculoskeletal:         General: Normal range of motion. Cervical back: Neck supple. Skin:     General: Skin is warm and dry. Neurological:      General: No focal deficit present. Mental Status: He is alert and oriented to person, place, and time. Psychiatric:         Behavior: Behavior normal.         Thought Content: Thought content normal.         Cognition and Memory: Memory is impaired.         /60   Pulse 76   Wt 209 lb (94.8 kg)   SpO2 98%   BMI 29.99 kg/m²      PHQ Scores 1/8/2021 9/28/2020 2/6/2020 9/18/2019 2/11/2019 7/10/2018 3/21/2017   PHQ2 Score 0 0 0 0 0 1 0   PHQ9 Score 0 0 0 0 0 1 0     Interpretation of Total Score Depression Severity: 1-4 = Minimal depression, 5-9 = Mild depression, 10-14 = Moderate depression, 15-19 = Moderately severe depression, 20-27 =Severe depression      Assessment/Plan:  Juanjo was seen today for office visit for anticoagulation management. Diagnoses and all orders for this visit:    Bilateral pulmonary embolism (HCC)  -No evidence of recurrence  -Continue anticoagulation    Anticoagulated on Coumadin  - INR therapeutic, continue current regimen, see flow sheets  -     POCT INR    Peripheral polyneuropathy  -     HYDROcodone-acetaminophen (NORCO) 5-325 MG per tablet; Take 1 tablet by mouth 3 times daily as needed for Pain for up to 30 days. Lumbar stenosis with neurogenic claudication  -     HYDROcodone-acetaminophen (NORCO) 5-325 MG per tablet; Take 1 tablet by mouth 3 times daily as needed for Pain for up to 30 days. Primary osteoarthritis involving multiple joints  -     HYDROcodone-acetaminophen (NORCO) 5-325 MG per tablet; Take 1 tablet by mouth 3 times daily as needed for Pain for up to 30 days.      -Continues to deal with lumbar back pain and peripheral neuropathy which is severe. He is on numerous treatments for this. We have discussed pain management and he will consider this. He was getting relief with tramadol but this is weaned. We started Norco which helps for a time. This will be increased to 3 times daily as needed. Controlled Substance Monitoring:  Acute and Chronic Pain Monitoring:   RX Monitoring 2/25/2021   Attestation -   Periodic Controlled Substance Monitoring Assessed functional status.          SHA Mcdonough - CNP

## 2021-07-29 ENCOUNTER — NURSE ONLY (OUTPATIENT)
Dept: INTERNAL MEDICINE CLINIC | Age: 86
End: 2021-07-29
Payer: MEDICARE

## 2021-07-29 ENCOUNTER — TELEPHONE (OUTPATIENT)
Dept: INTERNAL MEDICINE CLINIC | Age: 86
End: 2021-07-29

## 2021-07-29 DIAGNOSIS — Z79.01 ANTICOAGULATED ON COUMADIN: Primary | ICD-10-CM

## 2021-07-29 LAB
INTERNATIONAL NORMALIZATION RATIO, POC: 3
PROTHROMBIN TIME, POC: NORMAL

## 2021-07-29 PROCEDURE — 85610 PROTHROMBIN TIME: CPT | Performed by: NURSE PRACTITIONER

## 2021-07-29 NOTE — TELEPHONE ENCOUNTER
Patient had a [facility] appointment on 07/27/21 @ 10 am. Patient's wife called in because they missed that appointment, and I rescheduled them for 08/17/21 which was Keshawn's first available. Patient's wife asked if there was any way patient could be moved up since he gets his coumadin levels  checked once a month.        Please call and advise

## 2021-07-29 NOTE — TELEPHONE ENCOUNTER
Called pt to advise he can see any one of the practitioners to have his INR checked. He will call back after he checks with his wife. He can be placed on anyone's schedule. Please schedule when he calls back.

## 2021-07-30 RX ORDER — FAMOTIDINE 20 MG/1
TABLET, FILM COATED ORAL
Qty: 60 TABLET | Refills: 5 | Status: SHIPPED | OUTPATIENT
Start: 2021-07-30 | End: 2022-02-02

## 2021-08-18 ENCOUNTER — TELEPHONE (OUTPATIENT)
Dept: INTERNAL MEDICINE CLINIC | Age: 86
End: 2021-08-18

## 2021-08-19 DIAGNOSIS — N28.1 RENAL CYST, LEFT: Primary | ICD-10-CM

## 2021-08-19 NOTE — TELEPHONE ENCOUNTER
Call returned. Left   Will order renal US to evaluate cyst.    Renal ultrasound ordered.   They can schedule with Ras Mathews anytime

## 2021-08-23 ENCOUNTER — HOSPITAL ENCOUNTER (OUTPATIENT)
Dept: ULTRASOUND IMAGING | Age: 86
Discharge: HOME | End: 2021-08-23
Payer: MEDICARE

## 2021-08-23 DIAGNOSIS — N28.1 RENAL CYST, LEFT: ICD-10-CM

## 2021-08-23 PROCEDURE — 76770 US EXAM ABDO BACK WALL COMP: CPT

## 2021-08-27 ENCOUNTER — OFFICE VISIT (OUTPATIENT)
Dept: INTERNAL MEDICINE CLINIC | Age: 86
End: 2021-08-27
Payer: MEDICARE

## 2021-08-27 VITALS
WEIGHT: 207 LBS | DIASTOLIC BLOOD PRESSURE: 60 MMHG | BODY MASS INDEX: 29.7 KG/M2 | HEART RATE: 70 BPM | SYSTOLIC BLOOD PRESSURE: 138 MMHG | OXYGEN SATURATION: 97 %

## 2021-08-27 DIAGNOSIS — M48.062 LUMBAR STENOSIS WITH NEUROGENIC CLAUDICATION: ICD-10-CM

## 2021-08-27 DIAGNOSIS — M15.9 PRIMARY OSTEOARTHRITIS INVOLVING MULTIPLE JOINTS: ICD-10-CM

## 2021-08-27 DIAGNOSIS — G62.9 PERIPHERAL POLYNEUROPATHY: ICD-10-CM

## 2021-08-27 DIAGNOSIS — I26.99 BILATERAL PULMONARY EMBOLISM (HCC): Primary | ICD-10-CM

## 2021-08-27 DIAGNOSIS — Z79.01 ANTICOAGULATED ON COUMADIN: ICD-10-CM

## 2021-08-27 LAB
INTERNATIONAL NORMALIZATION RATIO, POC: 3.1
PROTHROMBIN TIME, POC: NORMAL

## 2021-08-27 PROCEDURE — 1123F ACP DISCUSS/DSCN MKR DOCD: CPT | Performed by: NURSE PRACTITIONER

## 2021-08-27 PROCEDURE — 85610 PROTHROMBIN TIME: CPT | Performed by: NURSE PRACTITIONER

## 2021-08-27 PROCEDURE — G8417 CALC BMI ABV UP PARAM F/U: HCPCS | Performed by: NURSE PRACTITIONER

## 2021-08-27 PROCEDURE — 1036F TOBACCO NON-USER: CPT | Performed by: NURSE PRACTITIONER

## 2021-08-27 PROCEDURE — 99214 OFFICE O/P EST MOD 30 MIN: CPT | Performed by: NURSE PRACTITIONER

## 2021-08-27 PROCEDURE — 4040F PNEUMOC VAC/ADMIN/RCVD: CPT | Performed by: NURSE PRACTITIONER

## 2021-08-27 PROCEDURE — G8427 DOCREV CUR MEDS BY ELIG CLIN: HCPCS | Performed by: NURSE PRACTITIONER

## 2021-08-27 RX ORDER — HYDROCODONE BITARTRATE AND ACETAMINOPHEN 5; 325 MG/1; MG/1
1 TABLET ORAL 4 TIMES DAILY PRN
Qty: 120 TABLET | Refills: 0 | Status: SHIPPED | OUTPATIENT
Start: 2021-08-27 | End: 2021-11-19 | Stop reason: SDUPTHER

## 2021-08-27 ASSESSMENT — ENCOUNTER SYMPTOMS
GASTROINTESTINAL NEGATIVE: 1
RESPIRATORY NEGATIVE: 1
BACK PAIN: 1

## 2021-08-27 NOTE — PROGRESS NOTES
foot        Current Outpatient Medications   Medication Sig Dispense Refill    HYDROcodone-acetaminophen (NORCO) 5-325 MG per tablet Take 1 tablet by mouth 4 times daily as needed for Pain for up to 30 days. 120 tablet 0    famotidine (PEPCID) 20 MG tablet TAKE ONE TABLET BY MOUTH TWICE A DAY 60 tablet 5    lisinopril (PRINIVIL;ZESTRIL) 10 MG tablet TAKE ONE TABLET BY MOUTH DAILY 90 tablet 3    gabapentin (NEURONTIN) 300 MG capsule TAKE ONE CAPSULE BY MOUTH EVERY MORNING AND TAKE 2 CAPSULES EVERY NIGHT AT BEDTIME 90 capsule 4    furosemide (LASIX) 40 MG tablet TAKE ONE TABLET BY MOUTH DAILY 90 tablet 1    DULoxetine (CYMBALTA) 30 MG extended release capsule Take 1 capsule by mouth nightly 90 capsule 3    warfarin (COUMADIN) 2.5 MG tablet 3.75 mg (2.5 mg x 1.5) every Thu; 2.5 mg (2.5 mg x 1) all other days 90 tablet 3    insulin aspart (NOVOLOG FLEXPEN) 100 UNIT/ML injection pen INJECT 20 UNITS UNDER THE SKIN THREE TIMES A DAY ACCORDING TO SLIDING SCALE 15 pen 1    Insulin Pen Needle (B-D UF III MINI PEN NEEDLES) 31G X 5 MM MISC Use to inject insulin 4 times daily. 200 each 5    fluocinonide (LIDEX) 0.05 % cream Apply topically 2 times daily. 60 g 2    blood glucose test strips (FREESTYLE LITE) strip USE ONE STRIP TO TEST THREE TIMES A  strip 5    BASAGLAR KWIKPEN 100 UNIT/ML injection pen INJECT 30 UNITS UNDER THE SKIN NIGHTLY (Patient taking differently: Pt is taking 26 units daily. ) 15 pen 2    donepezil (ARICEPT) 10 MG tablet Take 5 mg by mouth daily (with breakfast)       finasteride (PROSCAR) 5 MG tablet Take 5 mg by mouth daily      naphazoline-glycerin (CLEAR EYES REDNESS RELIEF) 0.012-0.2 % SOLN ophthalmic solution Place 1 drop into both eyes 2 times daily (Patient taking differently: Place 1 drop into both eyes 2 times daily as needed ) 1 Bottle 0    tamsulosin (FLOMAX) 0.4 MG capsule Take 1 capsule by mouth daily (Patient taking differently: Take 0.4 mg by mouth 2 times daily ) 90 capsule 1    glucose monitoring kit (FREESTYLE) monitoring kit 1 kit by Does not apply route daily as needed. 1 kit 0    latanoprost (XALATAN) 0.005 % ophthalmic solution Place 1 drop into both eyes nightly. No current facility-administered medications for this visit. Review of Systems   Constitutional: Positive for fever. Respiratory: Negative. Cardiovascular: Negative. Negative for chest pain. Gastrointestinal: Negative. Genitourinary: Negative. Musculoskeletal: Positive for back pain. Neurological: Positive for weakness and numbness. OBJECTIVE:  Physical Exam  Vitals reviewed. Constitutional:       General: He is not in acute distress. Appearance: Normal appearance. He is well-developed. He is not diaphoretic. HENT:      Head: Normocephalic and atraumatic. Eyes:      General: No scleral icterus. Conjunctiva/sclera: Conjunctivae normal.   Neck:      Vascular: No JVD. Cardiovascular:      Rate and Rhythm: Normal rate and regular rhythm. Pulmonary:      Effort: Pulmonary effort is normal. No respiratory distress. Breath sounds: Normal breath sounds. No wheezing or rales. Abdominal:      General: There is no distension. Palpations: Abdomen is soft. Tenderness: There is no abdominal tenderness. There is no guarding or rebound. Musculoskeletal:      Cervical back: Neck supple. Comments: Ambulating with a walker due to chronic back pain, peripheral neuropathy and lower extremity weakness   Skin:     General: Skin is warm and dry. Neurological:      General: No focal deficit present. Mental Status: He is alert and oriented to person, place, and time. Psychiatric:         Behavior: Behavior normal.         Thought Content: Thought content normal.         Cognition and Memory: Memory is impaired.         /60   Pulse 70   Wt 207 lb (93.9 kg)   SpO2 97%   BMI 29.70 kg/m²      PHQ Scores 1/8/2021 9/28/2020 2/6/2020 9/18/2019 2/11/2019 7/10/2018 3/21/2017   PHQ2 Score 0 0 0 0 0 1 0   PHQ9 Score 0 0 0 0 0 1 0     Interpretation of Total Score Depression Severity: 1-4 = Minimal depression, 5-9 = Mild depression, 10-14 = Moderate depression, 15-19 = Moderately severe depression, 20-27 =Severe depression        ASSESSMENT/PLAN:  Dileep Robles was seen today for office visit for anticoagulation management. Diagnoses and all orders for this visit:    Bilateral pulmonary embolism (HCC)  -Continue anticoagulation    Anticoagulated on Coumadin  - INR mildly elevated at 3.1  - Hold warfarin x1 day, continue current dose otherwise  -     POCT INR    Peripheral polyneuropathy  -     HYDROcodone-acetaminophen (NORCO) 5-325 MG per tablet; Take 1 tablet by mouth 4 times daily as needed for Pain for up to 30 days. Lumbar stenosis with neurogenic claudication  -     HYDROcodone-acetaminophen (NORCO) 5-325 MG per tablet; Take 1 tablet by mouth 4 times daily as needed for Pain for up to 30 days. Primary osteoarthritis involving multiple joints  -     HYDROcodone-acetaminophen (NORCO) 5-325 MG per tablet; Take 1 tablet by mouth 4 times daily as needed for Pain for up to 30 days. Controlled Substance Monitoring:  Acute and Chronic Pain Monitoring:   RX Monitoring 2/25/2021   Attestation -   Periodic Controlled Substance Monitoring Assessed functional status. Long discussion regarding the patient's chronic back pain and peripheral neuropathy. He is seeing Dr. Judd Johnson and considering implanted device to address peripheral neuropathy. Injections have been helping his back. Symptoms remain severe and affecting his quality of life. He is ambulating with a walker due to back pain, weakness, and peripheral neuropathy. He seems to be tolerating Norco and is using 2-3 daily although some days could use a fourth dose.   We have discussed the risks of this medication in the past and the benefit seems to outweigh the risk given his severe symptomology and decreased quality of life.         Sheila Chen, APRN - CNP

## 2021-09-22 PROBLEM — D69.6 THROMBOCYTOPENIA, UNSPECIFIED (HCC): Status: ACTIVE | Noted: 2021-09-22

## 2021-09-30 ENCOUNTER — OFFICE VISIT (OUTPATIENT)
Dept: INTERNAL MEDICINE CLINIC | Age: 86
End: 2021-09-30
Payer: MEDICARE

## 2021-09-30 VITALS
HEART RATE: 64 BPM | WEIGHT: 210 LBS | BODY MASS INDEX: 30.13 KG/M2 | OXYGEN SATURATION: 98 % | SYSTOLIC BLOOD PRESSURE: 120 MMHG | DIASTOLIC BLOOD PRESSURE: 66 MMHG

## 2021-09-30 DIAGNOSIS — Z23 NEED FOR INFLUENZA VACCINATION: ICD-10-CM

## 2021-09-30 DIAGNOSIS — Z79.01 ANTICOAGULATED ON COUMADIN: Primary | ICD-10-CM

## 2021-09-30 LAB
INTERNATIONAL NORMALIZATION RATIO, POC: 3.2
PROTHROMBIN TIME, POC: ABNORMAL

## 2021-09-30 PROCEDURE — 90694 VACC AIIV4 NO PRSRV 0.5ML IM: CPT | Performed by: NURSE PRACTITIONER

## 2021-09-30 PROCEDURE — 4040F PNEUMOC VAC/ADMIN/RCVD: CPT | Performed by: NURSE PRACTITIONER

## 2021-09-30 PROCEDURE — G0008 ADMIN INFLUENZA VIRUS VAC: HCPCS | Performed by: NURSE PRACTITIONER

## 2021-09-30 PROCEDURE — 1123F ACP DISCUSS/DSCN MKR DOCD: CPT | Performed by: NURSE PRACTITIONER

## 2021-09-30 PROCEDURE — G8427 DOCREV CUR MEDS BY ELIG CLIN: HCPCS | Performed by: NURSE PRACTITIONER

## 2021-09-30 PROCEDURE — G8417 CALC BMI ABV UP PARAM F/U: HCPCS | Performed by: NURSE PRACTITIONER

## 2021-09-30 PROCEDURE — 99213 OFFICE O/P EST LOW 20 MIN: CPT | Performed by: NURSE PRACTITIONER

## 2021-09-30 PROCEDURE — 85610 PROTHROMBIN TIME: CPT | Performed by: NURSE PRACTITIONER

## 2021-09-30 PROCEDURE — 1036F TOBACCO NON-USER: CPT | Performed by: NURSE PRACTITIONER

## 2021-10-05 ASSESSMENT — ENCOUNTER SYMPTOMS
RESPIRATORY NEGATIVE: 1
GASTROINTESTINAL NEGATIVE: 1
BACK PAIN: 1

## 2021-10-05 NOTE — PROGRESS NOTES
SUBJECTIVE:    Patient ID: Naina Anguiano is a 82 y.o. male. CC: Pulmonary embolism, chronic anticoagulation, lumbar stenosis, peripheral neuropathy    HPI: The patient presents to the office today accompanied by his wife for follow-up of chronic medical conditions as well as management of chronic anticoagulation and to discuss possible treatment for peripheral neuropathy. Has a history of bilateral pulmonary embolism. Long-term anticoagulation has been recommended he has been on this for some time. He is taking warfarin. His INR is mildly elevated today at 3.2. Patient has a history of lumbar stenosis and severe peripheral neuropathy. This has been very problematic for the patient with escalating doses of treatment. This has been complicated by intolerance to some treatments. He has been seen by numerous specialists, most recently Dr. Leroy Hansen who has done injections in his back with success in the past.  There is now discussion about an implanted device to help with peripheral neuropathy. The patient is considering this.       Past Medical History:   Diagnosis Date    Abdominal pain, epigastric     Arthritis     Benign prostatic hypertrophy without urinary obstruction     BPH     Cellulitis and abscess     Chronic rhinitis     Chronic systolic congestive heart failure (HCC) 4/18/2019    COPD (chronic obstructive pulmonary disease) (HCC)     Diabetes mellitus (HCC)     Dysphagia     Erectile dysfunction     GERD (gastroesophageal reflux disease)     Hx of blood clots     Hyperglycemia     Hypertension     Late [facility] 7/23/2020    lumbar radiculopathy     Neuropathy     Nocturia     Osteoarthritis     Other disorders of kidney and ureter in diseases classified elsewhere     Pain, joint, knee     Palpitations     skin cancer     Rt ear, nose, neck, hands/ 2018 lung    SOB (shortness of breath)     Tennis elbow     Tinea pedis foot        Current Outpatient Medications   Medication Sig Dispense Refill    furosemide (LASIX) 40 MG tablet TAKE ONE TABLET BY MOUTH DAILY 90 tablet 1    NOVOLOG FLEXPEN 100 UNIT/ML injection pen INJECT 20 UNITS UNDER THE SKIN THREE TIMES A DAY ACCORDING TO SLIDING SCALE 15 pen 1    famotidine (PEPCID) 20 MG tablet TAKE ONE TABLET BY MOUTH TWICE A DAY 60 tablet 5    lisinopril (PRINIVIL;ZESTRIL) 10 MG tablet TAKE ONE TABLET BY MOUTH DAILY 90 tablet 3    gabapentin (NEURONTIN) 300 MG capsule TAKE ONE CAPSULE BY MOUTH EVERY MORNING AND TAKE 2 CAPSULES EVERY NIGHT AT BEDTIME 90 capsule 4    DULoxetine (CYMBALTA) 30 MG extended release capsule Take 1 capsule by mouth nightly 90 capsule 3    warfarin (COUMADIN) 2.5 MG tablet 3.75 mg (2.5 mg x 1.5) every Thu; 2.5 mg (2.5 mg x 1) all other days 90 tablet 3    Insulin Pen Needle (B-D UF III MINI PEN NEEDLES) 31G X 5 MM MISC Use to inject insulin 4 times daily. 200 each 5    fluocinonide (LIDEX) 0.05 % cream Apply topically 2 times daily. 60 g 2    blood glucose test strips (FREESTYLE LITE) strip USE ONE STRIP TO TEST THREE TIMES A  strip 5    BASAGLAR KWIKPEN 100 UNIT/ML injection pen INJECT 30 UNITS UNDER THE SKIN NIGHTLY (Patient taking differently: Pt is taking 26 units daily. ) 15 pen 2    donepezil (ARICEPT) 10 MG tablet Take 5 mg by mouth daily (with breakfast)       finasteride (PROSCAR) 5 MG tablet Take 5 mg by mouth daily      naphazoline-glycerin (CLEAR EYES REDNESS RELIEF) 0.012-0.2 % SOLN ophthalmic solution Place 1 drop into both eyes 2 times daily (Patient taking differently: Place 1 drop into both eyes 2 times daily as needed ) 1 Bottle 0    tamsulosin (FLOMAX) 0.4 MG capsule Take 1 capsule by mouth daily (Patient taking differently: Take 0.4 mg by mouth 2 times daily ) 90 capsule 1    glucose monitoring kit (FREESTYLE) monitoring kit 1 kit by Does not apply route daily as needed.  1 kit 0    latanoprost (XALATAN) 0.005 % ophthalmic solution Place 1 drop into both eyes nightly. No current facility-administered medications for this visit. Review of Systems   Respiratory: Negative. Cardiovascular: Negative. Negative for chest pain. Gastrointestinal: Negative. Genitourinary: Negative. Musculoskeletal: Positive for back pain. Neurological: Positive for numbness. OBJECTIVE:  Physical Exam  Vitals reviewed. Constitutional:       General: He is not in acute distress. Appearance: Normal appearance. He is well-developed. He is not diaphoretic. HENT:      Head: Normocephalic and atraumatic. Eyes:      General: No scleral icterus. Conjunctiva/sclera: Conjunctivae normal.   Neck:      Vascular: No JVD. Cardiovascular:      Rate and Rhythm: Normal rate and regular rhythm. Pulmonary:      Effort: Pulmonary effort is normal. No respiratory distress. Breath sounds: Normal breath sounds. No wheezing or rales. Abdominal:      General: There is no distension. Palpations: Abdomen is soft. Tenderness: There is no abdominal tenderness. There is no guarding or rebound. Musculoskeletal:      Cervical back: Neck supple. Comments: Ambulating with a walker due to chronic back pain, peripheral neuropathy and lower extremity weakness   Skin:     General: Skin is warm and dry. Neurological:      General: No focal deficit present. Mental Status: He is alert and oriented to person, place, and time. Psychiatric:         Behavior: Behavior normal.         Thought Content: Thought content normal.         Cognition and Memory: Memory is impaired.         /66   Pulse 64   Wt 210 lb (95.3 kg)   SpO2 98%   BMI 30.13 kg/m²      PHQ Scores 1/8/2021 9/28/2020 2/6/2020 9/18/2019 2/11/2019 7/10/2018 3/21/2017   PHQ2 Score 0 0 0 0 0 1 0   PHQ9 Score 0 0 0 0 0 1 0     Interpretation of Total Score Depression Severity: 1-4 = Minimal depression, 5-9 = Mild depression, 10-14 = Moderate depression, 15-19 = Moderately severe depression, 20-27 =Severe depression        ASSESSMENT/PLAN:  Roxann Tripp was seen today for office visit for anticoagulation management. Diagnoses and all orders for this visit:    Bilateral pulmonary embolism (HCC)  -Continue anticoagulation    Anticoagulated on Coumadin  - INR mildly elevated at 3.2, previously 3.1  - Hold warfarin x1 day, slight reduction in weekly warfarin dosing.    -     POCT INR    Peripheral polyneuropathy  - Chronic, remains problematic  -     Continue current regimen    Lumbar stenosis with neurogenic claudication  - Chronic, remains problematic  -     Continue current regimen    Primary osteoarthritis involving multiple joints  - Chronic, remains problematic  -     Continue current regimen    Long discussion regarding the patient's chronic back pain and peripheral neuropathy. He is seeing Dr. Marco Antonio Perez and considering implanted device to address peripheral neuropathy. Injections have been helping his back. Symptoms remain severe and affecting his quality of life. Desire Trevizo was increased at previous visit by an additional tab daily family reports he is rarely taking more than 3 a day.       Elida Barclay, APRN - CNP Yes

## 2021-10-06 ENCOUNTER — TELEPHONE (OUTPATIENT)
Dept: INTERNAL MEDICINE CLINIC | Age: 86
End: 2021-10-06

## 2021-10-06 NOTE — TELEPHONE ENCOUNTER
Karthik Chu with [employer] states patient will be having an epidural steroid injection 10/20/21. She called to ask Gege Perdue if okay for patient to stop coumadin 5 days prior to procedure. Phone #[phone]  Fax #[phone].

## 2021-10-14 RX ORDER — GABAPENTIN 300 MG/1
CAPSULE ORAL
Qty: 90 CAPSULE | Refills: 5 | Status: SHIPPED | OUTPATIENT
Start: 2021-10-14 | End: 2022-04-11

## 2021-10-15 ENCOUNTER — TELEPHONE (OUTPATIENT)
Dept: INTERNAL MEDICINE CLINIC | Age: 86
End: 2021-10-15

## 2021-10-15 NOTE — TELEPHONE ENCOUNTER
This patient's wife called to find out if the patient's provider had given Dr. Stuart Lucero office approval to hold his blood thinners while he get back injections. I read the provider's note to the patient giving the okay for him to hold the medication.

## 2021-10-25 DIAGNOSIS — E11.22 TYPE 2 DIABETES MELLITUS WITH STAGE 3 CHRONIC KIDNEY DISEASE, WITHOUT LONG-TERM CURRENT USE OF INSULIN (HCC): ICD-10-CM

## 2021-10-25 DIAGNOSIS — N18.30 TYPE 2 DIABETES MELLITUS WITH STAGE 3 CHRONIC KIDNEY DISEASE, WITHOUT LONG-TERM CURRENT USE OF INSULIN (HCC): ICD-10-CM

## 2021-10-26 RX ORDER — INSULIN GLARGINE 100 [IU]/ML
INJECTION, SOLUTION SUBCUTANEOUS
Qty: 5 PEN | Refills: 2 | Status: SHIPPED | OUTPATIENT
Start: 2021-10-26 | End: 2022-04-04 | Stop reason: SDUPTHER

## 2021-10-28 ENCOUNTER — TELEPHONE (OUTPATIENT)
Dept: ENDOCRINOLOGY | Age: 86
End: 2021-10-28

## 2021-11-04 ENCOUNTER — VIRTUAL VISIT (OUTPATIENT)
Dept: ENDOCRINOLOGY | Age: 86
End: 2021-11-04
Payer: MEDICARE

## 2021-11-04 DIAGNOSIS — C34.92 NON-SMALL CELL CANCER OF LEFT LUNG (HCC): ICD-10-CM

## 2021-11-04 DIAGNOSIS — N18.4 TYPE 2 DIABETES MELLITUS WITH STAGE 4 CHRONIC KIDNEY DISEASE, WITHOUT LONG-TERM CURRENT USE OF INSULIN (HCC): Primary | ICD-10-CM

## 2021-11-04 DIAGNOSIS — E11.22 TYPE 2 DIABETES MELLITUS WITH STAGE 4 CHRONIC KIDNEY DISEASE, WITHOUT LONG-TERM CURRENT USE OF INSULIN (HCC): Primary | ICD-10-CM

## 2021-11-04 PROCEDURE — 99442 PR PHYS/QHP TELEPHONE EVALUATION 11-20 MIN: CPT | Performed by: INTERNAL MEDICINE

## 2021-11-04 NOTE — PROGRESS NOTES
[facility]   Cardiac Follow up    Referring Provider:  Debbie Ricci, APRN - CNP     Chief Complaint   Patient presents with    Hypertension    Congestive Heart Failure      History of Present Illness:   Mr. Angela Chavarria has a history of cardiomyopathy, hypertension, and valvular disease. He also has chronic obstructive pulmonary disease. He is a remote cigarette smoker, history of diabetes, history of lung cancer (could not be  treated surgically), was treated with radiation and chemotherapy. History of pulmonary embolus in the past, he takes warfarin. He follows with Dr. Meet Gonzalez for renal insufficiency. In 3/2019 he was admitted to the hospital with generalized swelling of his lower extremities and shortness of breath with exertion. During this admission he had an abnormal Echo. See details below. 3/23/19 Consult Note:   LABORATORY DATA:  Show creatinine as up to 2. 1. Hemoglobin 11.7. Liver tests are normal.  INR 2.85. Troponin 0.06, which is not significant. Chest x-ray shows new infiltrative process. CT scan is concerning for recurrent lung cancer, although he was inoperable at time of his diagnosis. ProBNP is elevated at 4939. I reviewed his echocardiogram. To me, his ejection fraction appears better than reported 30-35%. I feel it is more like 45%, but the main finding of the echocardiogram is right atrial enlargement and engorgement of vena cava, which is more  consistent with right heart strain with cor pulmonale. Recommend- This is a challenging situation. Diuresis is likely going to be difficult. The best way to deal with his peripheral edema will be with some form of compression hose, can certainly continue Lasix orally b.i.d. We will consider addition of ACE inhibitor instead of the use of Norvasc for his blood pressure. Overall, this will be very challenging for this patient  due to findings noted above, not sure what role lung cancer will play.  I looked at his echocardiogram for the possibility of amyloid, infiltration of his myocardium. It does not appear that he has this problem. Today he reports feeling well. He has trouble walking d/t his neuropathy and has had falls. He is using a rollater walker today and is accompanied by his wife. No other complaints today. Reports he is in remission from his cancer. Past Medical History:   has a past medical history of Abdominal pain, epigastric, Arthritis, Benign prostatic hypertrophy without urinary obstruction, BPH, Cellulitis and abscess, Chronic rhinitis, Chronic systolic congestive heart failure ([address] COPD (chronic obstructive pulmonary disease) ([address] Diabetes mellitus ([address] Dysphagia, Erectile dysfunction, GERD (gastroesophageal reflux disease), Hx of blood clots, Hyperglycemia, Hypertension, Late onset Alzheimer's disease without behavioral disturbance ([address] lumbar radiculopathy, Neuropathy, Nocturia, Osteoarthritis, Other disorders of kidney and ureter in diseases classified elsewhere, Pain, joint, knee, Palpitations, skin cancer, SOB (shortness of breath), Tennis elbow, and Tinea pedis. Surgical History:   has a past surgical history that includes Knee Prosthesis Removal; Cholecystectomy; Colonoscopy (11/28/2011); Cataract removal (Bilateral, 09/2014); Parotidectomy (Right, 01/26/2017); skin biopsy; Upper gastrointestinal endoscopy (03/19/2018); Total knee arthroplasty; hip surgery (Right, 09/09/2013); other surgical history; Lung biopsy (Left, 05/09/2018); Tunneled venous port placement (06/22/2018); pr njx aa&/strd tfrml epi lumbar/sacral 1 level (Right, 11/21/2018); eye surgery; Mohs surgery (Bilateral); Skin cancer excision (Left, 08/14/2019); lumbar nerve block (N/A, 8/26/2019); Circumcision (N/A, 11/15/2019); joint replacement (Bilateral); Nerve Surgery (N/A, 12/20/2019); Cystoscopy (N/A, 1/5/2020); and Pain management procedure (N/A, 7/20/2020).      Social History:   reports that he quit smoking about 51 years ago. He has a 40.00 pack-year smoking history. He has never used smokeless tobacco. He reports that he does not drink alcohol and does not use drugs. Family History:  family history includes Arthritis in his father; Diabetes in his brother and father. Home Medications:  Prior to Admission medications    Medication Sig Start Date End Date Taking? Authorizing Provider   BASAGLAR KWIKPEN 100 UNIT/ML injection pen INJECT 30 UNITS UNDER THE SKIN ONCE NIGHTLY 10/26/21  Yes Christiano Castrejon MD   blood glucose test strips (FREESTYLE LITE) strip USE TO TEST THREE TIMES A DAY 10/18/21  Yes Christiano Castrejon MD   gabapentin (NEURONTIN) 300 MG capsule TAKE ONE CAPSULE BY MOUTH EVERY MORNING AND TAKE TWO CAPSULES BY MOUTH EVERY NIGHT AT BEDTIME 10/14/21 4/12/22 Yes SHA Olguin CNP   furosemide (LASIX) 40 MG tablet TAKE ONE TABLET BY MOUTH DAILY 9/21/21  Yes Morgan Miranda MD   NOVOLOG FLEXPEN 100 UNIT/ML injection pen INJECT 20 UNITS UNDER THE SKIN THREE TIMES A DAY ACCORDING TO SLIDING SCALE 9/14/21  Yes Christiano Castrejon MD   famotidine (PEPCID) 20 MG tablet TAKE ONE TABLET BY MOUTH TWICE A DAY 7/30/21  Yes SHA Olguin CNP   lisinopril (PRINIVIL;ZESTRIL) 10 MG tablet TAKE ONE TABLET BY MOUTH DAILY 6/21/21  Yes SHA Olguin CNP   DULoxetine (CYMBALTA) 30 MG extended release capsule Take 1 capsule by mouth nightly 2/25/21  Yes SHA Olguin CNP   warfarin (COUMADIN) 2.5 MG tablet 3.75 mg (2.5 mg x 1.5) every Thu; 2.5 mg (2.5 mg x 1) all other days  Patient taking differently: 2.5 mg daily 12/28/20  Yes SHA Olguin CNP   Insulin Pen Needle (B-D UF III MINI PEN NEEDLES) 31G X 5 MM MISC Use to inject insulin 4 times daily. 10/19/20  Yes Christiano Castrejon MD   fluocinonide (LIDEX) 0.05 % cream Apply topically 2 times daily.  9/28/20  Yes SHA Olguin CNP   donepezil (ARICEPT) 10 MG tablet Take 5 mg by mouth daily (with breakfast)  7/27/20  Yes Historical Provider, MD   finasteride (PROSCAR) 5 MG tablet Take 5 mg by mouth daily   Yes Historical Provider, MD   naphazoline-glycerin (CLEAR EYES REDNESS RELIEF) 0.012-0.2 % SOLN ophthalmic solution Place 1 drop into both eyes 2 times daily  Patient taking differently: Place 1 drop into both eyes 2 times daily as needed  4/7/18  Yes Ana Mane MD   tamsulosin (FLOMAX) 0.4 MG capsule Take 1 capsule by mouth daily  Patient taking differently: Take 0.4 mg by mouth 2 times daily  8/3/17  Yes SHA Wood CNP   glucose monitoring kit (FREESTYLE) monitoring kit 1 kit by Does not apply route daily as needed. 4/7/14  Yes Lizbeth Contreras MD   latanoprost (XALATAN) 0.005 % ophthalmic solution Place 1 drop into both eyes nightly. 10/27/13  Yes Historical Provider, MD        Allergies:  Celebrex [celecoxib], Elavil [amitriptyline], Naproxen, Percocet [oxycodone-acetaminophen], and Lyrica [pregabalin]     Review of Systems:   · Constitutional: there has been no unanticipated weight loss. There's been no change in energy level, sleep pattern, or activity level. · Eyes: No visual changes or diplopia. No scleral icterus. · ENT: No Headaches, hearing loss or vertigo. No mouth sores or sore throat. · Cardiovascular: Reviewed in HPI  · Respiratory: No cough or wheezing, no sputum production. No hematemesis. · Gastrointestinal: No abdominal pain, appetite loss, blood in stools. No change in bowel or bladder habits. · Genitourinary: No dysuria, trouble voiding, or hematuria. · Musculoskeletal:  No gait disturbance, weakness or joint complaints. · Integumentary: No rash or pruritis. · Neurological: No headache, diplopia, change in muscle strength, numbness or tingling. No change in gait, balance, coordination, mood, affect, memory, mentation, behavior. Worsening gait difficulties and memory  · Psychiatric: No anxiety, no depression  · Endocrine: No malaise, fatigue or temperature intolerance.  No excessive thirst, fluid intake, or urination. No tremor. · Hematologic/Lymphatic: + bruising from fall, but no bleeding, blood clots or swollen lymph nodes. · Allergic/Immunologic: No nasal congestion or hives. Physical Examination:    Vitals:    11/12/21 1124   BP: (!) 140/70   Pulse:    SpO2:         Wt Readings from Last 1 Encounters:   11/12/21 213 lb (96.6 kg)       Constitutional and General Appearance: NAD  Skin:good turgor,intact without lesions  HEENT: EOMI ,normal  Neck:no JVD    Respiratory:  · Normal excursion and expansion without use of accessory muscles  · Resp Auscultation: Normal breath sounds without dullness  Cardiovascular:  · The apical impulses not displaced  · Heart tones are crisp and normal  · Cervical veins are not engorged  · The carotid upstroke is normal in amplitude and contour without delay or bruit  · Peripheral pulses are symmetrical and full  · There is no clubbing, cyanosis of the extremities. · No edema - no change  · Femoral Arteries: 2+ and equal  · Pedal Pulses: 2+ and equal   Abdomen:  · No masses or tenderness  · Liver/Spleen: No Abnormalities Noted  Neurological/Psychiatric:  · Alert and oriented in all spheres  · Moves all extremities well  · Gait slow, uses rollator to help with balance   · No abnormalities of mood, affect, memory, mentation, or behavior are noted    3/22/19 [facility]  Summary   -Left ventricular cavity size is normal. There is moderate concentric left   ventricular hypertrophy. Overall left ventricular systolic function appears   severely reduced with a estimated ejection fraction of 30-35%. Global   hypokinesis.   -Grade I diastolic dysfunction. E/e\"=18.1   -Mild aortic and tricuspid regurgitation.   -Trivial mitral regurgitation.   -Estimated pulmonary artery systolic pressure is mildly elevated at 44 mmHg   assuming a right atrial pressure of 3 mmHg.     3/17/18 ECHO    Normal left ventricle size, wall thickness and systolic function with an   estimated ejection fraction of 55%.   No regional wall motion abnormalities. Trivial mitral regurgitation.   Mild aortic regurgitation.   Mild to moderate tricuspid regurgitation with an RVSP of 59 mmHg. ECG 7/19 normal sinus with PAC  Echo 7/19 reviewed      7/25/19 Echo   Summary   Normal left ventricular size. Concentric left venticular hypertrophy.   Globally decreased left ventricular systolic function with an estimated EF   45-50%.   E/e'=13. Diastolic filling parameters suggests grade I diastolic   dysfunction.   Mild mitral regurgitation.   The left atrium is mildly dilated.   Mild aortic regurgitation is present. Pressure half-time is calculated at   495 msec.   Mildly dilated coronary sinus 4.1-4.2.   The ascending aorta is normal in size 3.6-3.8.   Mild tricuspid regurgitation. PASP 35mmHg.  Elvia Pod is a trivial pericardial effusion noted.   Frequent ectopy noted during the exam.        Assessment:      CHF   Well compensated by exam today. Lisinopril 10mg, Lasix 40mg daily. Tolerating medications without side effects. Hypertension   On Lisinopril - stable BP on current treatment  Blood pressure (!) 140/70, pulse 66, height 5' 10\" (1.778 m), weight 213 lb (96.6 kg), SpO2 97 %. Aortic/Tricuspid Regurgitation   ECHO 7/25/19> Stable findings (mild AI, mild TR, PASP 35 mmHg)   ECHO 11/10/20> stable   ECHO 11/12/21> stable    CRI  4/19/19> creat 3.0  5/8/19> creat 1.8  2/18/20 > 2.1  4/13/20 > 2.0  9/23/20> 2.4   F/w Dr. Cordboa Aid    Hx of Pulmonary embolus    anticoagulated with warfarin      Plan:   Gustavo Celestin  has a stable cardiac status. Cardiac test and lab results personally reviewed by me during this office visit and discussed. 1.  No change in medications  2. Continue risk factor modifications. 3.  Call for any change in symptoms. 4.  Return for regular follow up in 6 months       I appreciate the opportunity of cooperating in the care of this individual.    Ever Lee.  Azalia Montalvo M.D., [facility]    Patient's problem list, medications, allergies, past medical, surgical, social and family histories were reviewed and updated as appropriate. Scribe's Attestation: This note was scribed in the presence of Jovani Ramírez M.D., [address] [facility] by Bobbi Samaniego RN. The scribe's documentation has been prepared under my direction and personally reviewed by me in its entirety. I confirm that the note above accurately reflects all work, treatment, procedures, and medical decision making performed by me.

## 2021-11-04 NOTE — PROGRESS NOTES
Raquel Alonzo is a 82 y.o. male is seen  for evaluation and management of uncontrolled Type 2  diabetes. Raquel Alonzo was diagnosed with Diabetes mellitus aprox in 2009   Diabetes was diagnosed at routine screening . Raquel Alonzo got diabetic education in the past.  Comorbid conditions: Neuropathy, Nephropathy, Retinopathy and Chronic Kidney Disease    INTERIM:    Diabetes  He presents for his follow-up diabetic visit. He has type 2 diabetes mellitus. No MedicAlert identification noted. The initial diagnosis of diabetes was made 10 years ago. His disease course has been worsening. There are no hypoglycemic associated symptoms. Pertinent negatives for diabetes include no polyphagia, no polyuria and no weight loss. There are no hypoglycemic complications. Symptoms are worsening. Diabetic complications include peripheral neuropathy and retinopathy. Risk factors for coronary artery disease include diabetes mellitus, dyslipidemia, family history and male sex. He is compliant with treatment most of the time. His weight is increasing steadily. He is following a diabetic diet. When asked about meal planning, he reported none. He has not had a previous visit with a dietitian. He rarely participates in exercise. There is no change in his home blood glucose trend. His breakfast blood glucose is taken between 7-8 am. His breakfast blood glucose range is generally 180-200 mg/dl. An ACE inhibitor/angiotensin II receptor blocker is being taken. He does not see a podiatrist.Eye exam is current. No recent blood work   He has been taking basal bolus regimen   Wife gives injections   Denies any hypoglycemia     Weight trend: stable  Prior visit with dietician: no  Current diet: on average, 3 meals per day  Current exercise: rare    Has there been any hospitalization, surgery or major illness since the last visit? No   Has there been any new school, family or social problems since the last visit?   No  Has there been any new family history of members with diabetes, heart disease, stroke, or endocrine related problems since the last visit?   No    He has Lung cancer s/p chemo and radiation   He has CHF and follows with cardiology       Past Medical History:   Diagnosis Date    Abdominal pain, epigastric     Arthritis     Benign prostatic hypertrophy without urinary obstruction     BPH     Cellulitis and abscess     Chronic rhinitis     Chronic systolic congestive heart failure ([facility] 4/18/2019    [facility]     Diabetes mellitus (HCC)     Dysphagia     Erectile dysfunction     GERD (gastroesophageal reflux disease)     Hx of blood clots     Hyperglycemia     Hypertension     Late onset Alzheimer's disease without behavioral disturbance ([facility] 7/23/2020    lumbar radiculopathy     Neuropathy     Nocturia     Osteoarthritis     Other disorders of kidney and ureter in diseases classified elsewhere     Pain, joint, knee     Palpitations     skin cancer     Rt ear, nose, neck, hands/ 2018 lung    SOB (shortness of breath)     Tennis elbow     Tinea pedis     foot      Patient Active Problem List   Diagnosis    Chronic rhinitis    Primary osteoarthritis involving multiple joints    Erectile dysfunction    Glaucoma    Essential hypertension    DDD (degenerative disc disease), lumbar    Lumbar stenosis with neurogenic claudication    Diabetes education, encounter for    Hearing loss of both ears    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    CKD (chronic kidney disease) stage 3, GFR 30-59 ml/min (HCC)    Bilateral pulmonary embolism (HCC)    Overweight    Diverticulosis of large intestine without diverticulitis    Chronic obstructive pulmonary disease (HCC)    Benign prostatic hyperplasia with urinary hesitancy    Gastroesophageal reflux disease without esophagitis    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)    Type 2 diabetes mellitus with stage 4 chronic kidney disease, without long-term current use of insulin (HCC)    Pulmonary nodule    Chronic systolic congestive heart failure (HCC)    Vitreous degeneration, bilateral    Secondary cataract    Primary open-angle glaucoma, bilateral, moderate stage    Posterior vitreous detachment of both eyes    Peripapillary atrophy of both eyes    Nodular corneal degeneration, right eye    Neoplastic malignant related fatigue    Entropion of left lower eyelid    Early stage nonexudative age-related macular degeneration of both eyes    Diabetic peripheral neuropathy (HCC)    Dermatochalasis of right upper eyelid    Bilateral posterior capsular opacification    Aortic valvular disease    Phimosis of penis    Renal insufficiency    Recurrent UTI    Cerebral amyloid angiopathy (HCC)    Late onset Alzheimer's disease without behavioral disturbance (HCC)    Thrombocytopenia, unspecified     Past Surgical History:   Procedure Laterality Date    CATARACT REMOVAL Bilateral 09/2014    CHOLECYSTECTOMY      CIRCUMCISION N/A 11/15/2019    DORSAL SLIT performed by Kalyn Orellana MD at [facility], [address]  11/28/2011    Dr. Wai Peters - mild sigmoid diverticulosis    CYSTOSCOPY N/A 1/5/2020    CYSTOSCOPY, EVACUATION OF CLOTS performed by Celeste Feliciano MD at [address] [place] [address] Right 09/09/2013    Dr. Vandana Bergeron - block for pain relief    JOINT REPLACEMENT Bilateral     knees    KNEE PROSTHESIS REMOVAL      LUMBAR NERVE BLOCK N/A 8/26/2019    L4/5 INTERLAMINAR EPIDURAL STEROID INJECTION WITH FLUOROSCOPY performed by Baljit Sinclair MD at [address] [place] [address] Left 05/09/2018    Dr. Vyas - CT guided    MOHS SURGERY Bilateral     NERVE SURGERY N/A 12/20/2019    L4L5 INTERLAMINAR EPIDURAL STEROID INJECTION WITH FLUOROSCOPY performed by Baljit Sinclair MD at [address] [place] [address]      multiple lumbar ESIs    PAIN MANAGEMENT PROCEDURE N/A 7/20/2020    L4-L5 INTERLAMINAR EPIDURAL STEROID INJECTION WITH FLUOROSCOPY performed by Joselo Winslow MD at [address] [place] [address] Right 2017    Dr. Ant Gracia - superficial, w/excision of parotid tail & dissection/preservation of facial nerve    FL NJX AA&/STRD TFRML EPI LUMBAR/SACRAL 1 LEVEL Right 2018    RIGHT L4, L5 LUMBAR TRANSFORAMINAL EPIDURAL STEROID INJECTION WITH FLUOROSCOPY performed by Joselo Winslow MD at [place] [address] Left 2019    TOTAL KNEE ARTHROPLASTY      right x1 and left x2    TUNNELED VENOUS PORT PLACEMENT  2018    Left SCV infusaport placement    UPPER GASTROINTESTINAL ENDOSCOPY  2018    Dr. Elli Givens - mild non-obstructive ring distal esophagus     Social History     Socioeconomic History    Marital status:      Spouse name: Damaris Gallagher Number of children: Not on file    Years of education: Not on file    Highest education level: Not on file   Occupational History    Not on file   Tobacco Use    Smoking status: Former Smoker     Packs/day: 1.00     Years: 40.00     Pack years: 40.00     Quit date: 8/15/1970     Years since quittin.2    Smokeless tobacco: Never Used   Vaping Use    Vaping Use: Never used   Substance and Sexual Activity    Alcohol use: No    Drug use: No    Sexual activity: Yes     Partners: Female   Other Topics Concern    Not on file   Social History Narrative    Not on file     Social Determinants of Health     Financial Resource Strain:     Difficulty of Paying Living Expenses:    Food Insecurity:     Worried About Running Out of Food in the Last Year:     [address] in the Last Year:    Transportation Needs:     Lack of Transportation (Medical):      Lack of Transportation (Non-Medical):    Physical Activity:     Days of Exercise per Week:     Minutes of Exercise per Session:    Stress:     Feeling of Stress :    Social Connections:     Frequency of Communication with Friends and Family:     Place 1 drop into both eyes 2 times daily (Patient taking differently: Place 1 drop into both eyes 2 times daily as needed ) 1 Bottle 0    tamsulosin (FLOMAX) 0.4 MG capsule Take 1 capsule by mouth daily (Patient taking differently: Take 0.4 mg by mouth 2 times daily ) 90 capsule 1    glucose monitoring kit (FREESTYLE) monitoring kit 1 kit by Does not apply route daily as needed. 1 kit 0    latanoprost (XALATAN) 0.005 % ophthalmic solution Place 1 drop into both eyes nightly. No current facility-administered medications for this visit. Allergies   Allergen Reactions    Celebrex [Celecoxib] Other (See Comments)     hallucinations    Elavil [Amitriptyline]      Severe fatigue      Naproxen      Kidney damage    Percocet [Oxycodone-Acetaminophen] Other (See Comments)     confusion    Lyrica [Pregabalin] Palpitations     Fatigue     Family Status   Relation Name Status    Father  (Not Specified)    Brother  (Not Specified)     OBJECTIVE:  There were no vitals taken for this visit. Wt Readings from Last 3 Encounters:   09/30/21 210 lb (95.3 kg)   09/22/21 207 lb (93.9 kg)   08/27/21 207 lb (93.9 kg)       EXAM     Patient is  alert oriented to time ,place and person. Both recent and remote memory intact . Patient has weighed themselves in the last few  weeks and it has been stable       Lab Results   Component Value Date    LABA1C 8.1 01/11/2021    LABA1C 8.1 08/24/2020    LABA1C 7.3 04/13/2020         ASSESSMENT/PLAN:  1. Type 2 diabetes mellitus with stage 3 chronic kidney disease,  long-term current use of insulin 6.5>>7.2>>8.2>>9.5>>9.1>>7.3>>8.1    Patient has been taking 28 units of Basaglar advised to increase to 30  units   He has been taking novolog with each meal aprox 4-6 units. He is using sliding scale of insulin to cover meals --we discussed this in detail.   Fasting 140--160   Checks fingerstick blood glucose before breakfast and dinner   Due to his elevated creatinine most of oral hypoglycemic agent not recommended  Advised to send weekly blood sugar log so I can make adjustments in insulin regimen    Health Maintenance       Last Eye Exam: advised to have annual dilated eye exam. his last eye exam was in jan 2020 retinopathy gets injection   Last Podiatry Exam:  Podiatry visit in jan 2020   Lipids: Last LDL level was 64 in march 2019   Microalbumin/Creatinine Ratio: he has CKD follows with Dr Hernando Andrade     . Education: Reviewed ABCs of diabetes management (respective goals in parentheses): A1C (<7), blood pressure (<130/80), and cholesterol (LDL <100). 2. Diabetic peripheral neuropathy   He is on neurontin and cymbalta   Denies any worsening of pain     3. Benign prostatic hyperplasia with urinary hesitancy  Stable     4. CKD (chronic kidney disease) stage 3, GFR 30-59 ml/min   Follows with Dr Hernando Andrade   Creat 1.9     5. Essential hypertension  Well controlled   Continue with current regimen   Denies any headaches denies any chest pain     6. Non-small cell cancer of left lung   In remission     7. Chronic systolic congestive heart failure ([address]  Follows withcardiology       Reviewed and/or ordered clinical lab results yes   Reviewed and/or ordered radiology tests Yes  Reviewed and/or ordered other diagnostic tests yes   Made a decision to obtain old records yes   Reviewed and summarized old records yes     Donny Casanova was counseled regarding symptoms of current diagnosis, course and complications of disease if inadequately treated, side effects of medications, diagnosis, treatment options, and prognosis, risks, benefits, complications, and alternatives of treatment, labs, imaging and other studies and treatment targets and goals. He understands instructions and counseling    This was a phone conversation in [place] of in-person visit due to coronavirus emergency. Patient provided verbal consent for an \"over the phone visit'.   Location of patient; home  Total time spent 15 +  minutes on this call conducting an interview, collecting data and reviewing her chart, performing a limited exam by phone and educating the patient on my assessment and plan. Return in about 3 months (around 2/4/2022). Please note that some or all of this report was generated using voice recognition software. Please notify me in case of any questions about the content of this document, as some errors in transcription may have occurred .

## 2021-11-05 ENCOUNTER — TELEPHONE (OUTPATIENT)
Dept: ENDOCRINOLOGY | Age: 86
End: 2021-11-05

## 2021-11-11 ENCOUNTER — OFFICE VISIT (OUTPATIENT)
Dept: INTERNAL MEDICINE CLINIC | Age: 86
End: 2021-11-11
Payer: MEDICARE

## 2021-11-11 VITALS
HEIGHT: 70 IN | WEIGHT: 215 LBS | HEART RATE: 70 BPM | OXYGEN SATURATION: 95 % | BODY MASS INDEX: 30.78 KG/M2 | DIASTOLIC BLOOD PRESSURE: 80 MMHG | SYSTOLIC BLOOD PRESSURE: 138 MMHG

## 2021-11-11 DIAGNOSIS — Z00.00 ROUTINE GENERAL MEDICAL EXAMINATION AT A HEALTH CARE FACILITY: ICD-10-CM

## 2021-11-11 DIAGNOSIS — Z79.01 ANTICOAGULATED ON COUMADIN: Primary | ICD-10-CM

## 2021-11-11 DIAGNOSIS — I26.99 BILATERAL PULMONARY EMBOLISM (HCC): ICD-10-CM

## 2021-11-11 LAB
INTERNATIONAL NORMALIZATION RATIO, POC: 2.8
PROTHROMBIN TIME, POC: NORMAL

## 2021-11-11 PROCEDURE — 4040F PNEUMOC VAC/ADMIN/RCVD: CPT | Performed by: NURSE PRACTITIONER

## 2021-11-11 PROCEDURE — G8484 FLU IMMUNIZE NO ADMIN: HCPCS | Performed by: NURSE PRACTITIONER

## 2021-11-11 PROCEDURE — 1123F ACP DISCUSS/DSCN MKR DOCD: CPT | Performed by: NURSE PRACTITIONER

## 2021-11-11 PROCEDURE — 85610 PROTHROMBIN TIME: CPT | Performed by: NURSE PRACTITIONER

## 2021-11-11 PROCEDURE — G0439 PPPS, SUBSEQ VISIT: HCPCS | Performed by: NURSE PRACTITIONER

## 2021-11-11 ASSESSMENT — PATIENT HEALTH QUESTIONNAIRE - PHQ9
SUM OF ALL RESPONSES TO PHQ QUESTIONS 1-9: 0
2. FEELING DOWN, DEPRESSED OR HOPELESS: 0
SUM OF ALL RESPONSES TO PHQ QUESTIONS 1-9: 0
1. LITTLE INTEREST OR PLEASURE IN DOING THINGS: 0
SUM OF ALL RESPONSES TO PHQ9 QUESTIONS 1 & 2: 0
SUM OF ALL RESPONSES TO PHQ QUESTIONS 1-9: 0

## 2021-11-11 ASSESSMENT — LIFESTYLE VARIABLES: HOW OFTEN DO YOU HAVE A DRINK CONTAINING ALCOHOL: 0

## 2021-11-12 ENCOUNTER — PROCEDURE VISIT (OUTPATIENT)
Dept: CARDIOLOGY CLINIC | Age: 86
End: 2021-11-12
Payer: MEDICARE

## 2021-11-12 ENCOUNTER — OFFICE VISIT (OUTPATIENT)
Dept: CARDIOLOGY CLINIC | Age: 86
End: 2021-11-12
Payer: MEDICARE

## 2021-11-12 ENCOUNTER — TELEPHONE (OUTPATIENT)
Dept: INTERNAL MEDICINE CLINIC | Age: 86
End: 2021-11-12

## 2021-11-12 VITALS
OXYGEN SATURATION: 97 % | HEART RATE: 66 BPM | WEIGHT: 213 LBS | BODY MASS INDEX: 30.49 KG/M2 | DIASTOLIC BLOOD PRESSURE: 70 MMHG | SYSTOLIC BLOOD PRESSURE: 140 MMHG | HEIGHT: 70 IN

## 2021-11-12 DIAGNOSIS — I36.1 NON-RHEUMATIC TRICUSPID VALVE INSUFFICIENCY: ICD-10-CM

## 2021-11-12 DIAGNOSIS — I25.5 ISCHEMIC CARDIOMYOPATHY: ICD-10-CM

## 2021-11-12 DIAGNOSIS — I50.22 CHRONIC SYSTOLIC CONGESTIVE HEART FAILURE (HCC): Primary | ICD-10-CM

## 2021-11-12 DIAGNOSIS — I35.9 AORTIC VALVULAR DISEASE: ICD-10-CM

## 2021-11-12 DIAGNOSIS — I50.22 CHRONIC SYSTOLIC CONGESTIVE HEART FAILURE (HCC): ICD-10-CM

## 2021-11-12 LAB
LV EF: 50 %
LVEF MODALITY: NORMAL

## 2021-11-12 PROCEDURE — 4040F PNEUMOC VAC/ADMIN/RCVD: CPT | Performed by: INTERNAL MEDICINE

## 2021-11-12 PROCEDURE — 1123F ACP DISCUSS/DSCN MKR DOCD: CPT | Performed by: INTERNAL MEDICINE

## 2021-11-12 PROCEDURE — G8427 DOCREV CUR MEDS BY ELIG CLIN: HCPCS | Performed by: INTERNAL MEDICINE

## 2021-11-12 PROCEDURE — G8484 FLU IMMUNIZE NO ADMIN: HCPCS | Performed by: INTERNAL MEDICINE

## 2021-11-12 PROCEDURE — 99214 OFFICE O/P EST MOD 30 MIN: CPT | Performed by: INTERNAL MEDICINE

## 2021-11-12 PROCEDURE — 1036F TOBACCO NON-USER: CPT | Performed by: INTERNAL MEDICINE

## 2021-11-12 PROCEDURE — G8417 CALC BMI ABV UP PARAM F/U: HCPCS | Performed by: INTERNAL MEDICINE

## 2021-11-12 PROCEDURE — 93306 TTE W/DOPPLER COMPLETE: CPT | Performed by: INTERNAL MEDICINE

## 2021-11-16 ENCOUNTER — TELEPHONE (OUTPATIENT)
Dept: INTERNAL MEDICINE CLINIC | Age: 86
End: 2021-11-16

## 2021-11-16 DIAGNOSIS — Z51.81 ENCOUNTER FOR MONITORING BRIDGING ANTICOAGULATION THERAPY: ICD-10-CM

## 2021-11-16 DIAGNOSIS — Z79.01 ENCOUNTER FOR MONITORING BRIDGING ANTICOAGULATION THERAPY: ICD-10-CM

## 2021-11-16 DIAGNOSIS — Z86.711 HISTORY OF PULMONARY EMBOLISM: Primary | ICD-10-CM

## 2021-11-16 NOTE — TELEPHONE ENCOUNTER
Procedure 11/22 in AM.    Take warfarin today. Hold warfarin starting tomorrow and start Lovenox BID bridging tomorrow. The day prior, will only need AM dose.

## 2021-11-16 NOTE — TELEPHONE ENCOUNTER
----- Message from Renato Posadas sent at 11/16/2021  9:30 AM EST -----  Subject: Message to Provider    QUESTIONS  Information for Provider? Leander Bear called in and ask that Dr. Isabell Kelley to   call and let her know what his decision is on pt blood thinners, if there   is a shot she is asking that it is called into the Pharmacy today [facility]   on [phone]  ---------------------------------------------------------------------------  --------------  [address]  What is the best way for the office to contact you? OK to leave message on   voicemail  Preferred Call Back Phone Number? [phone]  ---------------------------------------------------------------------------  --------------  SCRIPT ANSWERS  Relationship to Patient? Other  Representative Name? Chava Galaviz  Is the Representative on the appropriate HIPAA document in Epic?  Yes

## 2021-11-18 NOTE — TELEPHONE ENCOUNTER
He is already holding warfarin and being bridged with Lovenox due to multiple Coumadin interruptions close together

## 2021-11-18 NOTE — TELEPHONE ENCOUNTER
Called -- have to leave a msg for [employer] call center   We have had 3 calls regarding this and this is the 3 rd message left with call center who says they are forwarding the message !

## 2021-11-19 ENCOUNTER — TELEPHONE (OUTPATIENT)
Dept: INTERNAL MEDICINE CLINIC | Age: 86
End: 2021-11-19

## 2021-11-19 DIAGNOSIS — M15.9 PRIMARY OSTEOARTHRITIS INVOLVING MULTIPLE JOINTS: ICD-10-CM

## 2021-11-19 DIAGNOSIS — M48.062 LUMBAR STENOSIS WITH NEUROGENIC CLAUDICATION: ICD-10-CM

## 2021-11-19 DIAGNOSIS — G62.9 PERIPHERAL POLYNEUROPATHY: ICD-10-CM

## 2021-11-19 RX ORDER — HYDROCODONE BITARTRATE AND ACETAMINOPHEN 5; 325 MG/1; MG/1
1 TABLET ORAL 4 TIMES DAILY PRN
Qty: 120 TABLET | Refills: 0 | Status: SHIPPED | OUTPATIENT
Start: 2021-11-19 | End: 2021-12-19

## 2021-11-28 NOTE — PROGRESS NOTES
Medicare Annual Wellness Visit  Name: Monika Common Date: 2021   MRN: [id number] Sex: Male   Age: 82 y.o. Ethnicity: Non- / Non    : 1935 Race: White (non-)      Justin Roy is here for Medicare AWV and Office Visit for Anticoagulation Management (inr  2.8   on 2.5 mg qd )    Screenings for behavioral, psychosocial and functional/safety risks, and cognitive dysfunction are all negative except as indicated below. These results, as well as other patient data from the [address] form, are documented in Flowsheets linked to this Encounter. Allergies   Allergen Reactions    Celebrex [Celecoxib] Other (See Comments)     hallucinations    Elavil [Amitriptyline]      Severe fatigue      Naproxen      Kidney damage    Percocet [Oxycodone-Acetaminophen] Other (See Comments)     confusion    Lyrica [Pregabalin] Palpitations     Fatigue       Prior to Visit Medications    Medication Sig Taking? Authorizing Provider   Insulin Pen Needle (B-D UF III MINI PEN NEEDLES) 31G X 5 MM MISC USE TO INJECT INSULIN FOUR TIMES A DAY  Justine Shah MD   HYDROcodone-acetaminophen (NORCO) 5-325 MG per tablet Take 1 tablet by mouth 4 times daily as needed for Pain for up to 30 days.   Thi Nery, APRN - CNP   enoxaparin (LOVENOX) 100 MG/ML injection Inject 1 mL into the skin 2 times daily  Thi Nery, APRN - CNP   BASAGLAR KWIKPEN 100 UNIT/ML injection pen INJECT 30 UNITS UNDER THE SKIN ONCE NIGHTLY  Justine Shah MD   blood glucose test strips (FREESTYLE LITE) strip USE TO TEST THREE TIMES A DAY  Justine Shah MD   gabapentin (NEURONTIN) 300 MG capsule TAKE ONE CAPSULE BY MOUTH EVERY MORNING AND TAKE TWO CAPSULES BY MOUTH EVERY NIGHT AT BEDTIME  Thi Nery, APRN - CNP   furosemide (LASIX) 40 MG tablet TAKE ONE TABLET BY MOUTH DAILY  Morgan Miranda MD   NOVOLOG FLEXPEN 100 UNIT/ML injection pen INJECT 20 UNITS UNDER THE SKIN THREE TIMES A DAY ACCORDING TO SLIDING João Samuels MD   famotidine (PEPCID) 20 MG tablet TAKE ONE TABLET BY MOUTH TWICE A DAY  SHA Yuan Se, CNP   lisinopril (PRINIVIL;ZESTRIL) 10 MG tablet TAKE ONE TABLET BY MOUTH DAILY  SHA Yuan Se, CNP   DULoxetine (CYMBALTA) 30 MG extended release capsule Take 1 capsule by mouth nightly  SHA Yuan Se, CNP   warfarin (COUMADIN) 2.5 MG tablet 3.75 mg (2.5 mg x 1.5) every Thu; 2.5 mg (2.5 mg x 1) all other days  Patient taking differently: 2.5 mg daily  SHA Yuan Se, CNP   fluocinonide (LIDEX) 0.05 % cream Apply topically 2 times daily. SHA Yuan Se, CNP   donepezil (ARICEPT) 10 MG tablet Take 5 mg by mouth daily (with breakfast)   Historical Provider, MD   finasteride (PROSCAR) 5 MG tablet Take 5 mg by mouth daily  Historical Provider, MD   naphazoline-glycerin (CLEAR EYES REDNESS RELIEF) 0.012-0.2 % SOLN ophthalmic solution Place 1 drop into both eyes 2 times daily  Patient taking differently: Place 1 drop into both eyes 2 times daily as needed   Mariana Bryant MD   tamsulosin (FLOMAX) 0.4 MG capsule Take 1 capsule by mouth daily  Patient taking differently: Take 0.4 mg by mouth 2 times daily   SHA Yuan Se, CNP   glucose monitoring kit (FREESTYLE) monitoring kit 1 kit by Does not apply route daily as needed. Jen Townsend MD   latanoprost (XALATAN) 0.005 % ophthalmic solution Place 1 drop into both eyes nightly.   Historical Provider, MD       Past Medical History:   Diagnosis Date    Abdominal pain, epigastric     Arthritis     Benign prostatic hypertrophy without urinary obstruction     BPH     Cellulitis and abscess     Chronic rhinitis     Chronic systolic congestive heart failure ([facility] 4/18/2019    COPD (chronic obstructive pulmonary disease) (HCC)     Diabetes mellitus (HCC)     Dysphagia     Erectile dysfunction     GERD (gastroesophageal reflux disease)     Hx of blood clots     Hyperglycemia     Hypertension     ARTHROPLASTY      right x1 and left x2    TUNNELED VENOUS PORT PLACEMENT  06/22/2018    Left SCV infusaport placement    UPPER GASTROINTESTINAL ENDOSCOPY  03/19/2018    Dr. Tracy Barreto - mild non-obstructive ring distal esophagus       Family History   Problem Relation Age of Onset    Arthritis Father     Diabetes Father     Diabetes Brother        CareTeam (Including outside providers/suppliers regularly involved in providing care):   Patient Care Team:  SHA Nash CNP as PCP - General (Nurse Practitioner)  SHA Nash CNP as P[facility]  Lexis Evans MD as Consulting Physician (Otolaryngology)  Diana Ann MD as Consulting Physician (Nephrology)    Wt Readings from Last 3 Encounters:   11/12/21 213 lb (96.6 kg)   11/11/21 215 lb (97.5 kg)   09/30/21 210 lb (95.3 kg)     Vitals:    11/11/21 1528   BP: 138/80   Pulse: 70   SpO2: 95%   Weight: 215 lb (97.5 kg)   Height: 5' 10\" (1.778 m)     Body mass index is 30.85 kg/m². Based upon direct observation of the patient, evaluation of cognition reveals recent and remote memory intact. Patient's complete Health Risk Assessment and screening values have been reviewed and are found in Flowsheets. The following problems were reviewed today and where indicated follow up appointments were made and/or referrals ordered. Positive Risk Factor Screenings with Interventions:     Fall Risk:  2 or more falls in past year?: (!) yes  Fall with injury in past year?: (!) yes  Fall Risk Interventions:    · Home safety tips provided        General Health and ACP:  General  In general, how would you say your health is?: (!) Poor  In the past 7 days, have you experienced any of the following?  New or Increased Pain, New or Increased Fatigue, Loneliness, Social Isolation, Stress or Anger?: None of These  Do you get the social and emotional support that you need?: Yes  Do you have a Living Will?: (!) No  Advance Directives Power of  Living Will ACP-Advance Directive ACP-Power of     Not on File Not on File Not on File Not on File      General Health Risk Interventions:  · Wife assisting    Health Habits/Nutrition:  Health Habits/Nutrition  Do you exercise for at least 20 minutes 2-3 times per week?: Yes  Have you lost any weight without trying in the past 3 months?: No  Do you eat only one meal per day?: No  Have you seen the dentist within the past year?: (!) No  Body mass index: (!) 30.85  Health Habits/Nutrition Interventions:  · Dental exam overdue:  patient encouraged to make appointment with his/her dentist    Hearing/Vision:  No exam data present  Hearing/Vision  Do you or your family notice any trouble with your hearing that hasn't been managed with hearing aids?:  (has hearing aids)  Do you have difficulty driving, watching TV, or doing any of your daily activities because of your eyesight?: (!) Yes  Have you had an eye exam within the past year?: Yes  Hearing/Vision Interventions:  · Wife assists      Personalized Preventive Plan   Current Health Maintenance Status  Immunization History   Administered Date(s) Administered    COVID-19, Madhu Leigh PF, 30mcg/0.3mL 01/28/2021, 02/18/2021    Influenza 11/01/2011, 09/30/2013    Influenza Vaccine, unspecified formulation 10/15/2018    Influenza Virus Vaccine 11/01/2011, 09/30/2013, 09/17/2014, 09/29/2015    Influenza, High Dose (Fluzone 65 yrs and older) 09/17/2014, 09/29/2015, 11/03/2017, 10/09/2018, 09/18/2019    Influenza, Quadv, IM, PF (6 mo and older Fluzone, Flulaval, Fluarix, and 3 yrs and older Afluria) 11/01/2016    Influenza, Quadv, adjuvanted, 65 yrs +, IM, PF (Fluad) 09/28/2020, 09/30/2021    Pneumococcal Conjugate 13-valent ([id number]) 09/29/2015    Pneumococcal Polysaccharide ([id number]) 09/17/2014        Health Maintenance   Topic Date Due    DTaP/Tdap/Td vaccine (1 - Tdap) Never done    Shingles Vaccine (1 of 2) Never done    Diabetic foot

## 2021-11-28 NOTE — PATIENT INSTRUCTIONS
Personalized Preventive Plan for Danice Simmonds - 11/11/2021  Medicare offers a range of preventive health benefits. Some of the tests and screenings are paid in full while other may be subject to a deductible, co-insurance, and/or copay. Some of these benefits include a comprehensive review of your medical history including lifestyle, illnesses that may run in your family, and various assessments and screenings as appropriate. After reviewing your medical record and screening and assessments performed today your provider may have ordered immunizations, labs, imaging, and/or referrals for you. A list of these orders (if applicable) as well as your Preventive Care list are included within your After Visit Summary for your review. Other Preventive Recommendations:    · A preventive eye exam performed by an eye specialist is recommended every 1-2 years to screen for glaucoma; cataracts, macular degeneration, and other eye disorders. · A preventive dental visit is recommended every 6 months. · Try to get at least 150 minutes of exercise per week or 10,000 steps per day on a pedometer . · Order or download the FREE \"Exercise & Physical Activity: Your Everyday Guide\" from [employer]. Call 2-893.988.2346 or search [employer] online. · You need 4020-3793 mg of calcium and 4817-1082 IU of vitamin D per day. It is possible to meet your calcium requirement with diet alone, but a vitamin D supplement is usually necessary to meet this goal.  · When exposed to the sun, use a sunscreen that protects against both UVA and UVB radiation with an SPF of 30 or greater. Reapply every 2 to 3 hours or after sweating, drying off with a towel, or swimming. · Always wear a seat belt when traveling in a car. Always wear a helmet when riding a bicycle or motorcycle.

## 2021-11-29 ENCOUNTER — OFFICE VISIT (OUTPATIENT)
Dept: INTERNAL MEDICINE CLINIC | Age: 86
End: 2021-11-29
Payer: MEDICARE

## 2021-11-29 VITALS
OXYGEN SATURATION: 96 % | WEIGHT: 211 LBS | HEART RATE: 76 BPM | DIASTOLIC BLOOD PRESSURE: 68 MMHG | BODY MASS INDEX: 30.28 KG/M2 | SYSTOLIC BLOOD PRESSURE: 120 MMHG

## 2021-11-29 DIAGNOSIS — Z98.890 STATUS POST MOHS SURGERY: ICD-10-CM

## 2021-11-29 DIAGNOSIS — C44.602 SKIN CANCER OF ARM, RIGHT: Primary | ICD-10-CM

## 2021-11-29 DIAGNOSIS — Z79.01 ANTICOAGULATED ON COUMADIN: ICD-10-CM

## 2021-11-29 DIAGNOSIS — I26.99 BILATERAL PULMONARY EMBOLISM (HCC): ICD-10-CM

## 2021-11-29 DIAGNOSIS — S40.021A CONTUSION OF RIGHT UPPER EXTREMITY, INITIAL ENCOUNTER: ICD-10-CM

## 2021-11-29 LAB
INTERNATIONAL NORMALIZATION RATIO, POC: 1.6
PROTHROMBIN TIME, POC: NORMAL

## 2021-11-29 PROCEDURE — 1123F ACP DISCUSS/DSCN MKR DOCD: CPT | Performed by: NURSE PRACTITIONER

## 2021-11-29 PROCEDURE — G8417 CALC BMI ABV UP PARAM F/U: HCPCS | Performed by: NURSE PRACTITIONER

## 2021-11-29 PROCEDURE — 1036F TOBACCO NON-USER: CPT | Performed by: NURSE PRACTITIONER

## 2021-11-29 PROCEDURE — 99213 OFFICE O/P EST LOW 20 MIN: CPT | Performed by: NURSE PRACTITIONER

## 2021-11-29 PROCEDURE — G8427 DOCREV CUR MEDS BY ELIG CLIN: HCPCS | Performed by: NURSE PRACTITIONER

## 2021-11-29 PROCEDURE — 85610 PROTHROMBIN TIME: CPT | Performed by: NURSE PRACTITIONER

## 2021-11-29 PROCEDURE — 4040F PNEUMOC VAC/ADMIN/RCVD: CPT | Performed by: NURSE PRACTITIONER

## 2021-11-29 PROCEDURE — G8484 FLU IMMUNIZE NO ADMIN: HCPCS | Performed by: NURSE PRACTITIONER

## 2021-11-29 ASSESSMENT — ENCOUNTER SYMPTOMS
RESPIRATORY NEGATIVE: 1
GASTROINTESTINAL NEGATIVE: 1
BACK PAIN: 1

## 2021-11-29 NOTE — PROGRESS NOTES
SUBJECTIVE:    Patient ID: Chata Garcia is a 82 y.o. male. CC: Right arm wound, bilateral pulmonary embolism, chronic anticoagulation    HPI: Patient presents to the office today for follow-up of medical problems, management of chronic anticoagulation, and to evaluate a right arm wound. Patient has a history of bilateral pulmonary embolism and is on lifelong anticoagulation with warfarin. He recently had 2 different procedures close together that required warfarin to be stopped for about 2 weeks. Because of this, the patient was bridged on Lovenox between the 2 procedures. He took this medication without issue. He is now back on warfarin. His INR is low today at 1.6 which is likely due to being off medication for procedure. One of the procedures was a Mohs surgery on his right arm. He reports after the surgery the surgical site swelled up and bled. They contacted the dermatology office who recommended wrapping it. Today, they report continued swelling at the site with minimal drainage.       Past Medical History:   Diagnosis Date    Abdominal pain, epigastric     Arthritis     Benign prostatic hypertrophy without urinary obstruction     BPH     Cellulitis and abscess     Chronic rhinitis     Chronic systolic congestive heart failure (HCC) 4/18/2019    COPD (chronic obstructive pulmonary disease) (HCC)     Diabetes mellitus (HCC)     Dysphagia     Erectile dysfunction     GERD (gastroesophageal reflux disease)     Hx of blood clots     Hyperglycemia     Hypertension     Late onset Alzheimer's disease without behavioral disturbance ([facility] [address] 7/23/2020    lumbar radiculopathy     Neuropathy     Nocturia     Osteoarthritis     Other disorders of kidney and ureter in diseases classified elsewhere     Pain, joint, knee     Palpitations     skin cancer     Rt ear, nose, neck, hands/ 2018 lung    SOB (shortness of breath)     Tennis elbow     Tinea pedis     foot        Current differently: Take 0.4 mg by mouth 2 times daily ) 90 capsule 1    glucose monitoring kit (FREESTYLE) monitoring kit 1 kit by Does not apply route daily as needed. 1 kit 0    latanoprost (XALATAN) 0.005 % ophthalmic solution Place 1 drop into both eyes nightly. No current facility-administered medications for this visit. Review of Systems   Constitutional: Negative. Respiratory: Negative. Cardiovascular: Negative. Gastrointestinal: Negative. Genitourinary: Negative. Musculoskeletal: Positive for back pain. Skin: Positive for wound. Neurological: Positive for numbness. All other systems reviewed and are negative. OBJECTIVE:  Physical Exam  Constitutional:       Appearance: Normal appearance. HENT:      Head: Normocephalic and atraumatic. Skin:     Comments: Wound on the mid right forearm. The wound bed is red. It is moist but there is no leslie bleeding. There is a quarter sized raised area consistent with subcutaneous hematoma. There is no pustular drainage. There is no warmth. There is no surrounding redness or red streaks. Neurological:      General: No focal deficit present. Mental Status: He is alert and oriented to person, place, and time. Psychiatric:         Mood and Affect: Mood normal.         Behavior: Behavior normal.        /68   Pulse 76   Wt 211 lb (95.7 kg)   SpO2 96%   BMI 30.28 kg/m²      PHQ Scores 11/11/2021 1/8/2021 9/28/2020 2/6/2020 9/18/2019 2/11/2019 7/10/2018   PHQ2 Score 0 0 0 0 0 0 1   PHQ9 Score 0 0 0 0 0 0 1     Interpretation of Total Score Depression Severity: 1-4 = Minimal depression, 5-9 = Mild depression, 10-14 = Moderate depression, 15-19 = Moderately severe depression, 20-27 =Severe depression        ASSESSMENT/PLAN:  Wander Moy was seen today for office visit for anticoagulation management.   Diagnoses and all orders for this visit:    Skin cancer of arm, right  Status post Mohs surgery  Contusion of right upper extremity, initial encounter  -Hematoma of the right arm at the surgical site  -No current evidence of infection.  -Home dressing was taken down. Wound was covered with fenestrated, nonadherent dressing. Thin layer of antibiotic ointment was applied. This was covered with 2 x 2 and gauze. This was then wrapped with Coban. Bilateral pulmonary embolism (HCC)  -History of pulmonary embolism on chronic anticoagulation  -Continue warfarin    Anticoagulated on Coumadin  - INR slightly low today at 1.6. He was recently off Coumadin for an extended time. He was bridged with Lovenox. Coumadin resumed last week. - Take extra tab of warfarin today only then resume normal dosing.   Recheck in 1 month  -     POCT INR        SHA Gamboa - CNP

## 2021-11-30 DIAGNOSIS — E11.22 TYPE 2 DIABETES MELLITUS WITH STAGE 4 CHRONIC KIDNEY DISEASE, WITHOUT LONG-TERM CURRENT USE OF INSULIN (HCC): ICD-10-CM

## 2021-11-30 DIAGNOSIS — N18.4 TYPE 2 DIABETES MELLITUS WITH STAGE 4 CHRONIC KIDNEY DISEASE, WITHOUT LONG-TERM CURRENT USE OF INSULIN (HCC): ICD-10-CM

## 2021-11-30 RX ORDER — PEN NEEDLE, DIABETIC 31 GX5/16"
NEEDLE, DISPOSABLE MISCELLANEOUS
Qty: 100 EACH | Refills: 2 | Status: SHIPPED | OUTPATIENT
Start: 2021-11-30

## 2021-12-13 DIAGNOSIS — E11.22 TYPE 2 DIABETES MELLITUS WITH STAGE 4 CHRONIC KIDNEY DISEASE, WITHOUT LONG-TERM CURRENT USE OF INSULIN (HCC): ICD-10-CM

## 2021-12-13 DIAGNOSIS — R80.9 NON-NEPHROTIC RANGE PROTEINURIA: ICD-10-CM

## 2021-12-13 DIAGNOSIS — I10 ESSENTIAL HYPERTENSION: ICD-10-CM

## 2021-12-13 DIAGNOSIS — C34.92 NON-SMALL CELL CANCER OF LEFT LUNG (HCC): ICD-10-CM

## 2021-12-13 DIAGNOSIS — E87.79 OTHER HYPERVOLEMIA: ICD-10-CM

## 2021-12-13 DIAGNOSIS — N18.4 TYPE 2 DIABETES MELLITUS WITH STAGE 4 CHRONIC KIDNEY DISEASE, WITHOUT LONG-TERM CURRENT USE OF INSULIN (HCC): ICD-10-CM

## 2021-12-13 DIAGNOSIS — N18.4 CKD (CHRONIC KIDNEY DISEASE), STAGE IV (HCC): ICD-10-CM

## 2021-12-13 LAB
A/G RATIO: 1.3 (ref 1.1–2.2)
ALBUMIN SERPL-MCNC: 3.8 G/DL (ref 3.4–5)
ALP BLD-CCNC: 84 U/L (ref 40–129)
ALT SERPL-CCNC: 12 U/L (ref 10–40)
ANION GAP SERPL CALCULATED.3IONS-SCNC: 14 MMOL/L (ref 3–16)
AST SERPL-CCNC: 18 U/L (ref 15–37)
BILIRUB SERPL-MCNC: 0.4 MG/DL (ref 0–1)
BUN BLDV-MCNC: 34 MG/DL (ref 7–20)
CALCIUM SERPL-MCNC: 8.6 MG/DL (ref 8.3–10.6)
CHLORIDE BLD-SCNC: 105 MMOL/L (ref 99–110)
CHOLESTEROL, TOTAL: 149 MG/DL (ref 0–199)
CO2: 24 MMOL/L (ref 21–32)
CREAT SERPL-MCNC: 2.4 MG/DL (ref 0.8–1.3)
CREATININE URINE: 96.3 MG/DL (ref 39–259)
CREATININE URINE: 97.1 MG/DL (ref 39–259)
GFR AFRICAN AMERICAN: 31
GFR NON-AFRICAN AMERICAN: 26
GLUCOSE BLD-MCNC: 117 MG/DL (ref 70–99)
HCT VFR BLD CALC: 38.5 % (ref 40.5–52.5)
HDLC SERPL-MCNC: 34 MG/DL (ref 40–60)
HEMOGLOBIN: 12.3 G/DL (ref 13.5–17.5)
LDL CHOLESTEROL CALCULATED: 89 MG/DL
MCH RBC QN AUTO: 27.7 PG (ref 26–34)
MCHC RBC AUTO-ENTMCNC: 32 G/DL (ref 31–36)
MCV RBC AUTO: 86.4 FL (ref 80–100)
MICROALBUMIN UR-MCNC: 113.7 MG/DL
MICROALBUMIN/CREAT UR-RTO: 1171 MG/G (ref 0–30)
PARATHYROID HORMONE INTACT: 161 PG/ML (ref 14–72)
PDW BLD-RTO: 16.6 % (ref 12.4–15.4)
PHOSPHORUS: 3.2 MG/DL (ref 2.5–4.9)
PLATELET # BLD: 156 K/UL (ref 135–450)
PMV BLD AUTO: 9 FL (ref 5–10.5)
POTASSIUM SERPL-SCNC: 4.4 MMOL/L (ref 3.5–5.1)
PROTEIN PROTEIN: 200 MG/DL
PROTEIN/CREAT RATIO: 2.1 MG/DL
RBC # BLD: 4.46 M/UL (ref 4.2–5.9)
SODIUM BLD-SCNC: 143 MMOL/L (ref 136–145)
TOTAL PROTEIN: 6.8 G/DL (ref 6.4–8.2)
TRIGL SERPL-MCNC: 130 MG/DL (ref 0–150)
TSH SERPL DL<=0.05 MIU/L-ACNC: 1.28 UIU/ML (ref 0.27–4.2)
VITAMIN D 25-HYDROXY: 12.1 NG/ML
VLDLC SERPL CALC-MCNC: 26 MG/DL
WBC # BLD: 6.5 K/UL (ref 4–11)

## 2021-12-14 LAB
ESTIMATED AVERAGE GLUCOSE: 180 MG/DL
HBA1C MFR BLD: 7.9 %

## 2022-01-01 ENCOUNTER — HOSPITAL ENCOUNTER (INPATIENT)
Age: 87
LOS: 5 days | DRG: 177 | End: 2023-01-01
Attending: EMERGENCY MEDICINE | Admitting: FAMILY MEDICINE
Payer: MEDICARE

## 2022-01-01 ENCOUNTER — APPOINTMENT (OUTPATIENT)
Dept: GENERAL RADIOLOGY | Age: 87
DRG: 177 | End: 2022-01-01
Payer: MEDICARE

## 2022-01-01 DIAGNOSIS — Z79.01 ANTICOAGULATED: ICD-10-CM

## 2022-01-01 DIAGNOSIS — U07.1 PNEUMONIA DUE TO COVID-19 VIRUS: Primary | ICD-10-CM

## 2022-01-01 DIAGNOSIS — J12.82 PNEUMONIA DUE TO COVID-19 VIRUS: Primary | ICD-10-CM

## 2022-01-01 DIAGNOSIS — N18.30 TYPE 2 DIABETES MELLITUS WITH STAGE 3 CHRONIC KIDNEY DISEASE, WITHOUT LONG-TERM CURRENT USE OF INSULIN (HCC): ICD-10-CM

## 2022-01-01 DIAGNOSIS — N18.9 CHRONIC KIDNEY DISEASE, UNSPECIFIED CKD STAGE: ICD-10-CM

## 2022-01-01 DIAGNOSIS — E11.22 TYPE 2 DIABETES MELLITUS WITH STAGE 3 CHRONIC KIDNEY DISEASE, WITHOUT LONG-TERM CURRENT USE OF INSULIN (HCC): ICD-10-CM

## 2022-01-01 DIAGNOSIS — I48.91 NEW ONSET ATRIAL FIBRILLATION (HCC): ICD-10-CM

## 2022-01-01 LAB
A/G RATIO: 0.8 (ref 1.1–2.2)
A/G RATIO: 1.1 (ref 1.1–2.2)
ABO/RH: NORMAL
ALBUMIN SERPL-MCNC: 2.9 G/DL (ref 3.4–5)
ALBUMIN SERPL-MCNC: 3.4 G/DL (ref 3.4–5)
ALP BLD-CCNC: 80 U/L (ref 40–129)
ALP BLD-CCNC: 81 U/L (ref 40–129)
ALT SERPL-CCNC: 14 U/L (ref 10–40)
ALT SERPL-CCNC: 20 U/L (ref 10–40)
ANION GAP SERPL CALCULATED.3IONS-SCNC: 11 MMOL/L (ref 3–16)
ANION GAP SERPL CALCULATED.3IONS-SCNC: 12 MMOL/L (ref 3–16)
ANION GAP SERPL CALCULATED.3IONS-SCNC: 14 MMOL/L (ref 3–16)
ANION GAP SERPL CALCULATED.3IONS-SCNC: 15 MMOL/L (ref 3–16)
ANION GAP SERPL CALCULATED.3IONS-SCNC: 16 MMOL/L (ref 3–16)
ANION GAP SERPL CALCULATED.3IONS-SCNC: 17 MMOL/L (ref 3–16)
ANTIBODY SCREEN: NORMAL
AST SERPL-CCNC: 12 U/L (ref 15–37)
AST SERPL-CCNC: 21 U/L (ref 15–37)
BACTERIA: ABNORMAL /HPF
BASE EXCESS ARTERIAL: -3.7 MMOL/L (ref -3–3)
BASE EXCESS VENOUS: -2 MMOL/L (ref -3–3)
BASE EXCESS VENOUS: -4.4 MMOL/L (ref -3–3)
BASOPHILS ABSOLUTE: 0 K/UL (ref 0–0.2)
BASOPHILS RELATIVE PERCENT: 0.1 %
BILIRUB SERPL-MCNC: 0.5 MG/DL (ref 0–1)
BILIRUB SERPL-MCNC: 0.6 MG/DL (ref 0–1)
BILIRUBIN URINE: NEGATIVE
BLOOD BANK DISPENSE STATUS: NORMAL
BLOOD BANK DISPENSE STATUS: NORMAL
BLOOD BANK PRODUCT CODE: NORMAL
BLOOD BANK PRODUCT CODE: NORMAL
BLOOD CULTURE, ROUTINE: NORMAL
BLOOD, URINE: ABNORMAL
BPU ID: NORMAL
BPU ID: NORMAL
BUDDING YEAST: PRESENT
BUN BLDV-MCNC: 102 MG/DL (ref 7–20)
BUN BLDV-MCNC: 111 MG/DL (ref 7–20)
BUN BLDV-MCNC: 61 MG/DL (ref 7–20)
BUN BLDV-MCNC: 76 MG/DL (ref 7–20)
BUN BLDV-MCNC: 83 MG/DL (ref 7–20)
BUN BLDV-MCNC: 85 MG/DL (ref 7–20)
C-REACTIVE PROTEIN: 130.1 MG/L (ref 0–5.1)
C-REACTIVE PROTEIN: 148.5 MG/L (ref 0–5.1)
CALCIUM SERPL-MCNC: 7.6 MG/DL (ref 8.3–10.6)
CALCIUM SERPL-MCNC: 7.6 MG/DL (ref 8.3–10.6)
CALCIUM SERPL-MCNC: 7.7 MG/DL (ref 8.3–10.6)
CALCIUM SERPL-MCNC: 7.9 MG/DL (ref 8.3–10.6)
CALCIUM SERPL-MCNC: 7.9 MG/DL (ref 8.3–10.6)
CALCIUM SERPL-MCNC: 8 MG/DL (ref 8.3–10.6)
CARBOXYHEMOGLOBIN ARTERIAL: 1.4 % (ref 0–1.5)
CARBOXYHEMOGLOBIN: 3.1 % (ref 0–1.5)
CARBOXYHEMOGLOBIN: 4.3 % (ref 0–1.5)
CHLORIDE BLD-SCNC: 100 MMOL/L (ref 99–110)
CHLORIDE BLD-SCNC: 100 MMOL/L (ref 99–110)
CHLORIDE BLD-SCNC: 101 MMOL/L (ref 99–110)
CHLORIDE BLD-SCNC: 101 MMOL/L (ref 99–110)
CHLORIDE BLD-SCNC: 102 MMOL/L (ref 99–110)
CHLORIDE BLD-SCNC: 104 MMOL/L (ref 99–110)
CLARITY: ABNORMAL
CO2: 23 MMOL/L (ref 21–32)
CO2: 24 MMOL/L (ref 21–32)
CO2: 26 MMOL/L (ref 21–32)
COLOR: YELLOW
CREAT SERPL-MCNC: 3.2 MG/DL (ref 0.8–1.3)
CREAT SERPL-MCNC: 3.4 MG/DL (ref 0.8–1.3)
CREAT SERPL-MCNC: 3.6 MG/DL (ref 0.8–1.3)
CREAT SERPL-MCNC: 3.8 MG/DL (ref 0.8–1.3)
CREAT SERPL-MCNC: 4.1 MG/DL (ref 0.8–1.3)
CREAT SERPL-MCNC: 4.2 MG/DL (ref 0.8–1.3)
CULTURE, BLOOD 2: NORMAL
DESCRIPTION BLOOD BANK: NORMAL
DESCRIPTION BLOOD BANK: NORMAL
EKG ATRIAL RATE: 125 BPM
EKG ATRIAL RATE: 277 BPM
EKG DIAGNOSIS: NORMAL
EKG DIAGNOSIS: NORMAL
EKG Q-T INTERVAL: 300 MS
EKG Q-T INTERVAL: 358 MS
EKG QRS DURATION: 82 MS
EKG QRS DURATION: 86 MS
EKG QTC CALCULATION (BAZETT): 436 MS
EKG QTC CALCULATION (BAZETT): 437 MS
EKG R AXIS: 108 DEGREES
EKG R AXIS: 82 DEGREES
EKG T AXIS: -28 DEGREES
EKG T AXIS: 66 DEGREES
EKG VENTRICULAR RATE: 127 BPM
EKG VENTRICULAR RATE: 90 BPM
EOSINOPHILS ABSOLUTE: 0 K/UL (ref 0–0.6)
EOSINOPHILS RELATIVE PERCENT: 0 %
EPITHELIAL CELLS, UA: 1 /HPF (ref 0–5)
ESTIMATED AVERAGE GLUCOSE: 159.9 MG/DL
GFR SERPL CREATININE-BSD FRML MDRD: 13 ML/MIN/{1.73_M2}
GFR SERPL CREATININE-BSD FRML MDRD: 14 ML/MIN/{1.73_M2}
GFR SERPL CREATININE-BSD FRML MDRD: 15 ML/MIN/{1.73_M2}
GFR SERPL CREATININE-BSD FRML MDRD: 16 ML/MIN/{1.73_M2}
GFR SERPL CREATININE-BSD FRML MDRD: 17 ML/MIN/{1.73_M2}
GFR SERPL CREATININE-BSD FRML MDRD: 18 ML/MIN/{1.73_M2}
GLUCOSE BLD-MCNC: 139 MG/DL (ref 70–99)
GLUCOSE BLD-MCNC: 140 MG/DL (ref 70–99)
GLUCOSE BLD-MCNC: 148 MG/DL (ref 70–99)
GLUCOSE BLD-MCNC: 168 MG/DL (ref 70–99)
GLUCOSE BLD-MCNC: 170 MG/DL (ref 70–99)
GLUCOSE BLD-MCNC: 214 MG/DL (ref 70–99)
GLUCOSE BLD-MCNC: 240 MG/DL (ref 70–99)
GLUCOSE BLD-MCNC: 241 MG/DL (ref 70–99)
GLUCOSE BLD-MCNC: 248 MG/DL (ref 70–99)
GLUCOSE BLD-MCNC: 260 MG/DL (ref 70–99)
GLUCOSE BLD-MCNC: 261 MG/DL (ref 70–99)
GLUCOSE BLD-MCNC: 280 MG/DL (ref 70–99)
GLUCOSE BLD-MCNC: 281 MG/DL (ref 70–99)
GLUCOSE BLD-MCNC: 285 MG/DL (ref 70–99)
GLUCOSE BLD-MCNC: 286 MG/DL (ref 70–99)
GLUCOSE BLD-MCNC: 292 MG/DL (ref 70–99)
GLUCOSE BLD-MCNC: 294 MG/DL (ref 70–99)
GLUCOSE BLD-MCNC: 298 MG/DL (ref 70–99)
GLUCOSE BLD-MCNC: 300 MG/DL (ref 70–99)
GLUCOSE BLD-MCNC: 304 MG/DL (ref 70–99)
GLUCOSE BLD-MCNC: 305 MG/DL (ref 70–99)
GLUCOSE BLD-MCNC: 317 MG/DL (ref 70–99)
GLUCOSE BLD-MCNC: 329 MG/DL (ref 70–99)
GLUCOSE URINE: 250 MG/DL
HAV IGM SER IA-ACNC: NORMAL
HBA1C MFR BLD: 7.2 %
HCO3 ARTERIAL: 22.6 MMOL/L (ref 21–29)
HCO3 VENOUS: 22.7 MMOL/L (ref 23–29)
HCO3 VENOUS: 23.8 MMOL/L (ref 23–29)
HCT VFR BLD CALC: 28.5 % (ref 40.5–52.5)
HCT VFR BLD CALC: 28.6 % (ref 40.5–52.5)
HCT VFR BLD CALC: 29.3 % (ref 40.5–52.5)
HCT VFR BLD CALC: 30.3 % (ref 40.5–52.5)
HCT VFR BLD CALC: 32.4 % (ref 40.5–52.5)
HEMOGLOBIN, ART, EXTENDED: 9.1 G/DL (ref 13.5–17.5)
HEMOGLOBIN: 10.6 G/DL (ref 13.5–17.5)
HEMOGLOBIN: 9.3 G/DL (ref 13.5–17.5)
HEMOGLOBIN: 9.4 G/DL (ref 13.5–17.5)
HEMOGLOBIN: 9.5 G/DL (ref 13.5–17.5)
HEMOGLOBIN: 9.9 G/DL (ref 13.5–17.5)
HEPATITIS B CORE IGM ANTIBODY: NORMAL
HEPATITIS B SURFACE ANTIGEN INTERPRETATION: NORMAL
HEPATITIS C ANTIBODY INTERPRETATION: NORMAL
HYALINE CASTS: 6 /LPF (ref 0–8)
INR BLD: 3.41 (ref 0.87–1.14)
INR BLD: 4.41 (ref 0.87–1.14)
INR BLD: 4.65 (ref 0.87–1.14)
INR BLD: 4.74 (ref 0.87–1.14)
INR BLD: 5.02 (ref 0.87–1.14)
KETONES, URINE: NEGATIVE MG/DL
LACTIC ACID, SEPSIS: 1.1 MMOL/L (ref 0.4–1.9)
LEUKOCYTE ESTERASE, URINE: NEGATIVE
LIPASE: 10 U/L (ref 13–60)
LYMPHOCYTES ABSOLUTE: 0.5 K/UL (ref 1–5.1)
LYMPHOCYTES RELATIVE PERCENT: 4.2 %
MAGNESIUM: 2.2 MG/DL (ref 1.8–2.4)
MCH RBC QN AUTO: 27.9 PG (ref 26–34)
MCH RBC QN AUTO: 27.9 PG (ref 26–34)
MCH RBC QN AUTO: 28 PG (ref 26–34)
MCH RBC QN AUTO: 28.2 PG (ref 26–34)
MCH RBC QN AUTO: 28.6 PG (ref 26–34)
MCHC RBC AUTO-ENTMCNC: 32.4 G/DL (ref 31–36)
MCHC RBC AUTO-ENTMCNC: 32.5 G/DL (ref 31–36)
MCHC RBC AUTO-ENTMCNC: 32.7 G/DL (ref 31–36)
MCHC RBC AUTO-ENTMCNC: 32.7 G/DL (ref 31–36)
MCHC RBC AUTO-ENTMCNC: 32.9 G/DL (ref 31–36)
MCV RBC AUTO: 85.4 FL (ref 80–100)
MCV RBC AUTO: 86 FL (ref 80–100)
MCV RBC AUTO: 86.2 FL (ref 80–100)
MCV RBC AUTO: 86.3 FL (ref 80–100)
MCV RBC AUTO: 86.8 FL (ref 80–100)
METHEMOGLOBIN ARTERIAL: <0 %
METHEMOGLOBIN VENOUS: 0 %
METHEMOGLOBIN VENOUS: 0.2 %
MICROSCOPIC EXAMINATION: YES
MONOCYTES ABSOLUTE: 1.4 K/UL (ref 0–1.3)
MONOCYTES RELATIVE PERCENT: 11.2 %
NEUTROPHILS ABSOLUTE: 10.7 K/UL (ref 1.7–7.7)
NEUTROPHILS RELATIVE PERCENT: 84.5 %
NITRITE, URINE: NEGATIVE
O2 CONTENT, VEN: 13 VOL %
O2 CONTENT, VEN: 15 VOL %
O2 SAT, ARTERIAL: 99.8 %
O2 SAT, VEN: 100 %
O2 SAT, VEN: 98 %
O2 THERAPY: ABNORMAL
PCO2 ARTERIAL: 46.1 MMHG (ref 35–45)
PCO2, VEN: 44.1 MMHG (ref 40–50)
PCO2, VEN: 50.8 MMHG (ref 40–50)
PDW BLD-RTO: 16.3 % (ref 12.4–15.4)
PDW BLD-RTO: 16.4 % (ref 12.4–15.4)
PDW BLD-RTO: 16.5 % (ref 12.4–15.4)
PDW BLD-RTO: 16.6 % (ref 12.4–15.4)
PDW BLD-RTO: 16.7 % (ref 12.4–15.4)
PERFORMED ON: ABNORMAL
PH ARTERIAL: 7.3 (ref 7.35–7.45)
PH UA: 5 (ref 5–8)
PH VENOUS: 7.26 (ref 7.35–7.45)
PH VENOUS: 7.34 (ref 7.35–7.45)
PLATELET # BLD: 215 K/UL (ref 135–450)
PLATELET # BLD: 219 K/UL (ref 135–450)
PLATELET # BLD: 309 K/UL (ref 135–450)
PLATELET # BLD: 316 K/UL (ref 135–450)
PLATELET # BLD: 349 K/UL (ref 135–450)
PMV BLD AUTO: 7.5 FL (ref 5–10.5)
PMV BLD AUTO: 7.8 FL (ref 5–10.5)
PMV BLD AUTO: 8 FL (ref 5–10.5)
PMV BLD AUTO: 8.2 FL (ref 5–10.5)
PMV BLD AUTO: 8.6 FL (ref 5–10.5)
PO2 ARTERIAL: 235 MMHG (ref 75–108)
PO2, VEN: 181 MMHG (ref 25–40)
PO2, VEN: 97.3 MMHG (ref 25–40)
POTASSIUM REFLEX MAGNESIUM: 3.8 MMOL/L (ref 3.5–5.1)
POTASSIUM SERPL-SCNC: 3.7 MMOL/L (ref 3.5–5.1)
POTASSIUM SERPL-SCNC: 3.8 MMOL/L (ref 3.5–5.1)
POTASSIUM SERPL-SCNC: 3.8 MMOL/L (ref 3.5–5.1)
POTASSIUM SERPL-SCNC: 4 MMOL/L (ref 3.5–5.1)
POTASSIUM SERPL-SCNC: 4.1 MMOL/L (ref 3.5–5.1)
PRO-BNP: ABNORMAL PG/ML (ref 0–449)
PRO-BNP: ABNORMAL PG/ML (ref 0–449)
PROCALCITONIN: 0.76 NG/ML (ref 0–0.15)
PROTEIN UA: 300 MG/DL
PROTHROMBIN TIME: 34.7 SEC (ref 11.7–14.5)
PROTHROMBIN TIME: 42.5 SEC (ref 11.7–14.5)
PROTHROMBIN TIME: 44.3 SEC (ref 11.7–14.5)
PROTHROMBIN TIME: 45 SEC (ref 11.7–14.5)
PROTHROMBIN TIME: 47.1 SEC (ref 11.7–14.5)
RAPID INFLUENZA  B AGN: NEGATIVE
RAPID INFLUENZA A AGN: NEGATIVE
RBC # BLD: 3.28 M/UL (ref 4.2–5.9)
RBC # BLD: 3.34 M/UL (ref 4.2–5.9)
RBC # BLD: 3.4 M/UL (ref 4.2–5.9)
RBC # BLD: 3.52 M/UL (ref 4.2–5.9)
RBC # BLD: 3.76 M/UL (ref 4.2–5.9)
RBC UA: 5 /HPF (ref 0–4)
SARS-COV-2, NAAT: DETECTED
SODIUM BLD-SCNC: 136 MMOL/L (ref 136–145)
SODIUM BLD-SCNC: 138 MMOL/L (ref 136–145)
SODIUM BLD-SCNC: 139 MMOL/L (ref 136–145)
SODIUM BLD-SCNC: 140 MMOL/L (ref 136–145)
SODIUM BLD-SCNC: 140 MMOL/L (ref 136–145)
SODIUM BLD-SCNC: 142 MMOL/L (ref 136–145)
SPECIFIC GRAVITY UA: 1.01 (ref 1–1.03)
TCO2 ARTERIAL: 53.9 MMOL/L
TCO2 CALC VENOUS: 54 MMOL/L
TCO2 CALC VENOUS: 56 MMOL/L
TOTAL CK: 128 U/L (ref 39–308)
TOTAL PROTEIN: 6.6 G/DL (ref 6.4–8.2)
TOTAL PROTEIN: 6.7 G/DL (ref 6.4–8.2)
TROPONIN: 0.1 NG/ML
TROPONIN: 0.16 NG/ML
TSH REFLEX: 0.87 UIU/ML (ref 0.27–4.2)
URINE REFLEX TO CULTURE: ABNORMAL
URINE TYPE: ABNORMAL
UROBILINOGEN, URINE: 0.2 E.U./DL
WBC # BLD: 10.4 K/UL (ref 4–11)
WBC # BLD: 12.6 K/UL (ref 4–11)
WBC # BLD: 13.3 K/UL (ref 4–11)
WBC # BLD: 14.8 K/UL (ref 4–11)
WBC # BLD: 18.8 K/UL (ref 4–11)
WBC UA: 8 /HPF (ref 0–5)

## 2022-01-01 PROCEDURE — 85027 COMPLETE CBC AUTOMATED: CPT

## 2022-01-01 PROCEDURE — 2580000003 HC RX 258: Performed by: EMERGENCY MEDICINE

## 2022-01-01 PROCEDURE — 36430 TRANSFUSION BLD/BLD COMPNT: CPT

## 2022-01-01 PROCEDURE — 6360000002 HC RX W HCPCS: Performed by: INTERNAL MEDICINE

## 2022-01-01 PROCEDURE — 82803 BLOOD GASES ANY COMBINATION: CPT

## 2022-01-01 PROCEDURE — 6370000000 HC RX 637 (ALT 250 FOR IP): Performed by: FAMILY MEDICINE

## 2022-01-01 PROCEDURE — 83690 ASSAY OF LIPASE: CPT

## 2022-01-01 PROCEDURE — 80074 ACUTE HEPATITIS PANEL: CPT

## 2022-01-01 PROCEDURE — 6370000000 HC RX 637 (ALT 250 FOR IP): Performed by: PHYSICIAN ASSISTANT

## 2022-01-01 PROCEDURE — 99223 1ST HOSP IP/OBS HIGH 75: CPT | Performed by: INTERNAL MEDICINE

## 2022-01-01 PROCEDURE — 85610 PROTHROMBIN TIME: CPT

## 2022-01-01 PROCEDURE — 2000000000 HC ICU R&B

## 2022-01-01 PROCEDURE — 6370000000 HC RX 637 (ALT 250 FOR IP): Performed by: INTERNAL MEDICINE

## 2022-01-01 PROCEDURE — 2580000003 HC RX 258: Performed by: FAMILY MEDICINE

## 2022-01-01 PROCEDURE — 2700000000 HC OXYGEN THERAPY PER DAY

## 2022-01-01 PROCEDURE — 90935 HEMODIALYSIS ONE EVALUATION: CPT

## 2022-01-01 PROCEDURE — 6360000002 HC RX W HCPCS: Performed by: FAMILY MEDICINE

## 2022-01-01 PROCEDURE — 6360000002 HC RX W HCPCS: Performed by: PHYSICIAN ASSISTANT

## 2022-01-01 PROCEDURE — 94660 CPAP INITIATION&MGMT: CPT

## 2022-01-01 PROCEDURE — 94640 AIRWAY INHALATION TREATMENT: CPT

## 2022-01-01 PROCEDURE — P9017 PLASMA 1 DONOR FRZ W/IN 8 HR: HCPCS

## 2022-01-01 PROCEDURE — 6370000000 HC RX 637 (ALT 250 FOR IP): Performed by: NURSE PRACTITIONER

## 2022-01-01 PROCEDURE — 36415 COLL VENOUS BLD VENIPUNCTURE: CPT

## 2022-01-01 PROCEDURE — 51798 US URINE CAPACITY MEASURE: CPT

## 2022-01-01 PROCEDURE — 80048 BASIC METABOLIC PNL TOTAL CA: CPT

## 2022-01-01 PROCEDURE — 93005 ELECTROCARDIOGRAM TRACING: CPT

## 2022-01-01 PROCEDURE — 1200000000 HC SEMI PRIVATE

## 2022-01-01 PROCEDURE — 71045 X-RAY EXAM CHEST 1 VIEW: CPT

## 2022-01-01 PROCEDURE — 84484 ASSAY OF TROPONIN QUANT: CPT

## 2022-01-01 PROCEDURE — 93005 ELECTROCARDIOGRAM TRACING: CPT | Performed by: EMERGENCY MEDICINE

## 2022-01-01 PROCEDURE — 99285 EMERGENCY DEPT VISIT HI MDM: CPT

## 2022-01-01 PROCEDURE — 94669 MECHANICAL CHEST WALL OSCILL: CPT

## 2022-01-01 PROCEDURE — 93010 ELECTROCARDIOGRAM REPORT: CPT | Performed by: INTERNAL MEDICINE

## 2022-01-01 PROCEDURE — 36556 INSERT NON-TUNNEL CV CATH: CPT | Performed by: INTERNAL MEDICINE

## 2022-01-01 PROCEDURE — 94761 N-INVAS EAR/PLS OXIMETRY MLT: CPT

## 2022-01-01 PROCEDURE — 83605 ASSAY OF LACTIC ACID: CPT

## 2022-01-01 PROCEDURE — C8929 TTE W OR WO FOL WCON,DOPPLER: HCPCS

## 2022-01-01 PROCEDURE — 36600 WITHDRAWAL OF ARTERIAL BLOOD: CPT

## 2022-01-01 PROCEDURE — 86850 RBC ANTIBODY SCREEN: CPT

## 2022-01-01 PROCEDURE — 86901 BLOOD TYPING SEROLOGIC RH(D): CPT

## 2022-01-01 PROCEDURE — 86140 C-REACTIVE PROTEIN: CPT

## 2022-01-01 PROCEDURE — P9047 ALBUMIN (HUMAN), 25%, 50ML: HCPCS | Performed by: INTERNAL MEDICINE

## 2022-01-01 PROCEDURE — 83880 ASSAY OF NATRIURETIC PEPTIDE: CPT

## 2022-01-01 PROCEDURE — 97530 THERAPEUTIC ACTIVITIES: CPT

## 2022-01-01 PROCEDURE — 86900 BLOOD TYPING SEROLOGIC ABO: CPT

## 2022-01-01 PROCEDURE — 99291 CRITICAL CARE FIRST HOUR: CPT | Performed by: INTERNAL MEDICINE

## 2022-01-01 PROCEDURE — 6370000000 HC RX 637 (ALT 250 FOR IP)

## 2022-01-01 PROCEDURE — 81001 URINALYSIS AUTO W/SCOPE: CPT

## 2022-01-01 PROCEDURE — 6360000002 HC RX W HCPCS

## 2022-01-01 PROCEDURE — 96375 TX/PRO/DX INJ NEW DRUG ADDON: CPT

## 2022-01-01 PROCEDURE — 83036 HEMOGLOBIN GLYCOSYLATED A1C: CPT

## 2022-01-01 PROCEDURE — 87804 INFLUENZA ASSAY W/OPTIC: CPT

## 2022-01-01 PROCEDURE — 6360000002 HC RX W HCPCS: Performed by: EMERGENCY MEDICINE

## 2022-01-01 PROCEDURE — 5A1D70Z PERFORMANCE OF URINARY FILTRATION, INTERMITTENT, LESS THAN 6 HOURS PER DAY: ICD-10-PCS | Performed by: INTERNAL MEDICINE

## 2022-01-01 PROCEDURE — 80053 COMPREHEN METABOLIC PANEL: CPT

## 2022-01-01 PROCEDURE — 97166 OT EVAL MOD COMPLEX 45 MIN: CPT

## 2022-01-01 PROCEDURE — 99497 ADVNCD CARE PLAN 30 MIN: CPT | Performed by: INTERNAL MEDICINE

## 2022-01-01 PROCEDURE — 97535 SELF CARE MNGMENT TRAINING: CPT

## 2022-01-01 PROCEDURE — 02HV33Z INSERTION OF INFUSION DEVICE INTO SUPERIOR VENA CAVA, PERCUTANEOUS APPROACH: ICD-10-PCS | Performed by: INTERNAL MEDICINE

## 2022-01-01 PROCEDURE — 2500000003 HC RX 250 WO HCPCS: Performed by: INTERNAL MEDICINE

## 2022-01-01 PROCEDURE — 82550 ASSAY OF CK (CPK): CPT

## 2022-01-01 PROCEDURE — 85025 COMPLETE CBC W/AUTO DIFF WBC: CPT

## 2022-01-01 PROCEDURE — 87635 SARS-COV-2 COVID-19 AMP PRB: CPT

## 2022-01-01 PROCEDURE — 6370000000 HC RX 637 (ALT 250 FOR IP): Performed by: EMERGENCY MEDICINE

## 2022-01-01 PROCEDURE — 2500000003 HC RX 250 WO HCPCS: Performed by: EMERGENCY MEDICINE

## 2022-01-01 PROCEDURE — 36556 INSERT NON-TUNNEL CV CATH: CPT

## 2022-01-01 PROCEDURE — 84145 PROCALCITONIN (PCT): CPT

## 2022-01-01 PROCEDURE — 97162 PT EVAL MOD COMPLEX 30 MIN: CPT

## 2022-01-01 PROCEDURE — 96374 THER/PROPH/DIAG INJ IV PUSH: CPT

## 2022-01-01 PROCEDURE — 87040 BLOOD CULTURE FOR BACTERIA: CPT

## 2022-01-01 PROCEDURE — 84443 ASSAY THYROID STIM HORMONE: CPT

## 2022-01-01 PROCEDURE — 6360000002 HC RX W HCPCS: Performed by: NURSE PRACTITIONER

## 2022-01-01 PROCEDURE — 83735 ASSAY OF MAGNESIUM: CPT

## 2022-01-01 RX ORDER — DEXMEDETOMIDINE HYDROCHLORIDE 4 UG/ML
INJECTION, SOLUTION INTRAVENOUS
Status: COMPLETED
Start: 2022-01-01 | End: 2022-01-01

## 2022-01-01 RX ORDER — HEPARIN SODIUM (PORCINE) LOCK FLUSH IV SOLN 100 UNIT/ML 100 UNIT/ML
100 SOLUTION INTRAVENOUS PRN
Status: DISCONTINUED | OUTPATIENT
Start: 2022-01-01 | End: 2023-01-02 | Stop reason: HOSPADM

## 2022-01-01 RX ORDER — IPRATROPIUM BROMIDE AND ALBUTEROL SULFATE 2.5; .5 MG/3ML; MG/3ML
1 SOLUTION RESPIRATORY (INHALATION) EVERY 4 HOURS PRN
Status: DISCONTINUED | OUTPATIENT
Start: 2022-01-01 | End: 2023-01-02 | Stop reason: HOSPADM

## 2022-01-01 RX ORDER — HEPARIN SODIUM 1000 [USP'U]/ML
INJECTION, SOLUTION INTRAVENOUS; SUBCUTANEOUS
Status: COMPLETED
Start: 2022-01-01 | End: 2022-01-01

## 2022-01-01 RX ORDER — IPRATROPIUM BROMIDE AND ALBUTEROL SULFATE 2.5; .5 MG/3ML; MG/3ML
SOLUTION RESPIRATORY (INHALATION)
Status: COMPLETED
Start: 2022-01-01 | End: 2022-01-01

## 2022-01-01 RX ORDER — SODIUM CHLORIDE 9 MG/ML
INJECTION, SOLUTION INTRAVENOUS PRN
Status: DISCONTINUED | OUTPATIENT
Start: 2022-01-01 | End: 2023-01-01

## 2022-01-01 RX ORDER — DULOXETIN HYDROCHLORIDE 30 MG/1
30 CAPSULE, DELAYED RELEASE ORAL DAILY
Status: DISCONTINUED | OUTPATIENT
Start: 2022-01-01 | End: 2022-01-01

## 2022-01-01 RX ORDER — INSULIN LISPRO 100 [IU]/ML
0-4 INJECTION, SOLUTION INTRAVENOUS; SUBCUTANEOUS NIGHTLY
Status: DISCONTINUED | OUTPATIENT
Start: 2022-01-01 | End: 2022-01-01

## 2022-01-01 RX ORDER — BLOOD-GLUCOSE METER
KIT MISCELLANEOUS
Qty: 200 STRIP | Refills: 5 | OUTPATIENT
Start: 2022-01-01

## 2022-01-01 RX ORDER — DONEPEZIL HYDROCHLORIDE 5 MG/1
5 TABLET, FILM COATED ORAL
Status: DISCONTINUED | OUTPATIENT
Start: 2022-01-01 | End: 2023-01-01

## 2022-01-01 RX ORDER — SODIUM CHLORIDE 0.9 % (FLUSH) 0.9 %
5-40 SYRINGE (ML) INJECTION EVERY 12 HOURS SCHEDULED
Status: DISCONTINUED | OUTPATIENT
Start: 2022-01-01 | End: 2023-01-01

## 2022-01-01 RX ORDER — TAMSULOSIN HYDROCHLORIDE 0.4 MG/1
0.4 CAPSULE ORAL 2 TIMES DAILY
Status: DISCONTINUED | OUTPATIENT
Start: 2022-01-01 | End: 2023-01-01

## 2022-01-01 RX ORDER — GUAIFENESIN 600 MG/1
600 TABLET, EXTENDED RELEASE ORAL 2 TIMES DAILY
Status: DISCONTINUED | OUTPATIENT
Start: 2022-01-01 | End: 2023-01-01

## 2022-01-01 RX ORDER — DEXMEDETOMIDINE HYDROCHLORIDE 4 UG/ML
.1-1.5 INJECTION, SOLUTION INTRAVENOUS CONTINUOUS
Status: DISCONTINUED | OUTPATIENT
Start: 2022-01-01 | End: 2023-01-01

## 2022-01-01 RX ORDER — GABAPENTIN 300 MG/1
300 CAPSULE ORAL DAILY
Status: DISCONTINUED | OUTPATIENT
Start: 2022-01-01 | End: 2023-01-01

## 2022-01-01 RX ORDER — ARFORMOTEROL TARTRATE 15 UG/2ML
15 SOLUTION RESPIRATORY (INHALATION) 2 TIMES DAILY
Status: DISCONTINUED | OUTPATIENT
Start: 2022-01-01 | End: 2023-01-02 | Stop reason: HOSPADM

## 2022-01-01 RX ORDER — ALBUMIN (HUMAN) 12.5 G/50ML
25 SOLUTION INTRAVENOUS EVERY 12 HOURS
Status: COMPLETED | OUTPATIENT
Start: 2022-01-01 | End: 2022-01-01

## 2022-01-01 RX ORDER — IPRATROPIUM BROMIDE AND ALBUTEROL SULFATE 2.5; .5 MG/3ML; MG/3ML
1 SOLUTION RESPIRATORY (INHALATION) 3 TIMES DAILY
Status: DISCONTINUED | OUTPATIENT
Start: 2022-01-01 | End: 2022-01-01

## 2022-01-01 RX ORDER — POLYETHYLENE GLYCOL 3350 17 G/17G
17 POWDER, FOR SOLUTION ORAL DAILY
Status: DISCONTINUED | OUTPATIENT
Start: 2022-01-01 | End: 2023-01-01

## 2022-01-01 RX ORDER — BUDESONIDE 0.5 MG/2ML
0.5 INHALANT ORAL 2 TIMES DAILY
Status: DISCONTINUED | OUTPATIENT
Start: 2022-01-01 | End: 2023-01-01

## 2022-01-01 RX ORDER — TAMSULOSIN HYDROCHLORIDE 0.4 MG/1
0.4 CAPSULE ORAL DAILY
Status: DISCONTINUED | OUTPATIENT
Start: 2022-01-01 | End: 2022-01-01

## 2022-01-01 RX ORDER — LORAZEPAM 2 MG/ML
2 INJECTION INTRAMUSCULAR ONCE
Status: COMPLETED | OUTPATIENT
Start: 2022-01-01 | End: 2022-01-01

## 2022-01-01 RX ORDER — LORAZEPAM 2 MG/ML
1 INJECTION INTRAMUSCULAR ONCE
Status: DISCONTINUED | OUTPATIENT
Start: 2022-01-01 | End: 2022-01-01

## 2022-01-01 RX ORDER — GABAPENTIN 300 MG/1
300 CAPSULE ORAL NIGHTLY
Status: DISCONTINUED | OUTPATIENT
Start: 2022-01-01 | End: 2023-01-01

## 2022-01-01 RX ORDER — LORAZEPAM 0.5 MG/1
0.5 TABLET ORAL EVERY 6 HOURS PRN
Status: DISCONTINUED | OUTPATIENT
Start: 2022-01-01 | End: 2022-01-01

## 2022-01-01 RX ORDER — INSULIN LISPRO 100 [IU]/ML
0-8 INJECTION, SOLUTION INTRAVENOUS; SUBCUTANEOUS
Status: DISCONTINUED | OUTPATIENT
Start: 2022-01-01 | End: 2022-01-01

## 2022-01-01 RX ORDER — INSULIN LISPRO 100 [IU]/ML
0-16 INJECTION, SOLUTION INTRAVENOUS; SUBCUTANEOUS
Status: DISCONTINUED | OUTPATIENT
Start: 2022-01-01 | End: 2023-01-01

## 2022-01-01 RX ORDER — METHYLPREDNISOLONE SODIUM SUCCINATE 40 MG/ML
40 INJECTION, POWDER, LYOPHILIZED, FOR SOLUTION INTRAMUSCULAR; INTRAVENOUS EVERY 8 HOURS
Status: DISCONTINUED | OUTPATIENT
Start: 2022-01-01 | End: 2023-01-01

## 2022-01-01 RX ORDER — INSULIN GLARGINE 100 [IU]/ML
35 INJECTION, SOLUTION SUBCUTANEOUS NIGHTLY
Status: DISCONTINUED | OUTPATIENT
Start: 2022-01-01 | End: 2023-01-01

## 2022-01-01 RX ORDER — GABAPENTIN 300 MG/1
600 CAPSULE ORAL NIGHTLY
Status: DISCONTINUED | OUTPATIENT
Start: 2022-01-01 | End: 2022-01-01

## 2022-01-01 RX ORDER — HYDROCODONE BITARTRATE AND ACETAMINOPHEN 7.5; 325 MG/1; MG/1
1 TABLET ORAL 4 TIMES DAILY PRN
Status: DISCONTINUED | OUTPATIENT
Start: 2022-01-01 | End: 2022-01-01

## 2022-01-01 RX ORDER — DIAZEPAM 5 MG/ML
2.5 INJECTION, SOLUTION INTRAMUSCULAR; INTRAVENOUS ONCE
Status: COMPLETED | OUTPATIENT
Start: 2022-01-01 | End: 2022-01-01

## 2022-01-01 RX ORDER — ASPIRIN 81 MG/1
81 TABLET ORAL DAILY
Status: DISCONTINUED | OUTPATIENT
Start: 2022-01-01 | End: 2023-01-01

## 2022-01-01 RX ORDER — FUROSEMIDE 10 MG/ML
40 INJECTION INTRAMUSCULAR; INTRAVENOUS DAILY
Status: DISCONTINUED | OUTPATIENT
Start: 2022-01-01 | End: 2022-01-01

## 2022-01-01 RX ORDER — FUROSEMIDE 10 MG/ML
80 INJECTION INTRAMUSCULAR; INTRAVENOUS ONCE
Status: COMPLETED | OUTPATIENT
Start: 2022-01-01 | End: 2022-01-01

## 2022-01-01 RX ORDER — DULOXETIN HYDROCHLORIDE 30 MG/1
30 CAPSULE, DELAYED RELEASE ORAL NIGHTLY
Status: DISCONTINUED | OUTPATIENT
Start: 2022-01-01 | End: 2023-01-01

## 2022-01-01 RX ORDER — DEXAMETHASONE SODIUM PHOSPHATE 10 MG/ML
6 INJECTION, SOLUTION INTRAMUSCULAR; INTRAVENOUS ONCE
Status: COMPLETED | OUTPATIENT
Start: 2022-01-01 | End: 2022-01-01

## 2022-01-01 RX ORDER — POLYETHYLENE GLYCOL 3350 17 G/17G
17 POWDER, FOR SOLUTION ORAL DAILY PRN
Status: DISCONTINUED | OUTPATIENT
Start: 2022-01-01 | End: 2022-01-01

## 2022-01-01 RX ORDER — DILTIAZEM HYDROCHLORIDE 120 MG/1
120 CAPSULE, COATED, EXTENDED RELEASE ORAL DAILY
Status: DISCONTINUED | OUTPATIENT
Start: 2022-01-01 | End: 2023-01-01

## 2022-01-01 RX ORDER — FUROSEMIDE 10 MG/ML
40 INJECTION INTRAMUSCULAR; INTRAVENOUS ONCE
Status: COMPLETED | OUTPATIENT
Start: 2022-01-01 | End: 2022-01-01

## 2022-01-01 RX ORDER — ONDANSETRON 2 MG/ML
4 INJECTION INTRAMUSCULAR; INTRAVENOUS EVERY 6 HOURS PRN
Status: DISCONTINUED | OUTPATIENT
Start: 2022-01-01 | End: 2023-01-02 | Stop reason: HOSPADM

## 2022-01-01 RX ORDER — INSULIN LISPRO 100 [IU]/ML
3 INJECTION, SOLUTION INTRAVENOUS; SUBCUTANEOUS
Status: DISCONTINUED | OUTPATIENT
Start: 2022-01-01 | End: 2023-01-01

## 2022-01-01 RX ORDER — HYDRALAZINE HYDROCHLORIDE 25 MG/1
50 TABLET, FILM COATED ORAL 2 TIMES DAILY
Status: DISCONTINUED | OUTPATIENT
Start: 2022-01-01 | End: 2023-01-01

## 2022-01-01 RX ORDER — BENZONATATE 100 MG/1
100 CAPSULE ORAL 3 TIMES DAILY PRN
Status: DISCONTINUED | OUTPATIENT
Start: 2022-01-01 | End: 2023-01-02 | Stop reason: HOSPADM

## 2022-01-01 RX ORDER — FAMOTIDINE 20 MG/1
10 TABLET, FILM COATED ORAL DAILY
Status: DISCONTINUED | OUTPATIENT
Start: 2022-01-01 | End: 2023-01-01

## 2022-01-01 RX ORDER — SODIUM CHLORIDE 0.9 % (FLUSH) 0.9 %
5-40 SYRINGE (ML) INJECTION PRN
Status: DISCONTINUED | OUTPATIENT
Start: 2022-01-01 | End: 2023-01-02 | Stop reason: HOSPADM

## 2022-01-01 RX ORDER — INSULIN GLARGINE 100 [IU]/ML
30 INJECTION, SOLUTION SUBCUTANEOUS NIGHTLY
Status: DISCONTINUED | OUTPATIENT
Start: 2022-01-01 | End: 2022-01-01

## 2022-01-01 RX ORDER — SENNA AND DOCUSATE SODIUM 50; 8.6 MG/1; MG/1
2 TABLET, FILM COATED ORAL 2 TIMES DAILY
Status: DISCONTINUED | OUTPATIENT
Start: 2022-01-01 | End: 2023-01-01

## 2022-01-01 RX ORDER — DEXTROSE MONOHYDRATE 100 MG/ML
INJECTION, SOLUTION INTRAVENOUS CONTINUOUS PRN
Status: DISCONTINUED | OUTPATIENT
Start: 2022-01-01 | End: 2023-01-02 | Stop reason: HOSPADM

## 2022-01-01 RX ORDER — DILTIAZEM HCL/D5W 125 MG/125
2.5-15 PLASTIC BAG, INJECTION (ML) INTRAVENOUS CONTINUOUS
Status: DISCONTINUED | OUTPATIENT
Start: 2022-01-01 | End: 2022-01-01

## 2022-01-01 RX ORDER — FUROSEMIDE 40 MG/1
40 TABLET ORAL DAILY
Status: DISCONTINUED | OUTPATIENT
Start: 2022-01-01 | End: 2022-01-01

## 2022-01-01 RX ORDER — IPRATROPIUM BROMIDE AND ALBUTEROL SULFATE 2.5; .5 MG/3ML; MG/3ML
1 SOLUTION RESPIRATORY (INHALATION) 2 TIMES DAILY
Status: DISCONTINUED | OUTPATIENT
Start: 2023-01-01 | End: 2023-01-01

## 2022-01-01 RX ORDER — ONDANSETRON 4 MG/1
4 TABLET, ORALLY DISINTEGRATING ORAL EVERY 8 HOURS PRN
Status: DISCONTINUED | OUTPATIENT
Start: 2022-01-01 | End: 2023-01-02 | Stop reason: HOSPADM

## 2022-01-01 RX ORDER — DILTIAZEM HYDROCHLORIDE 5 MG/ML
10 INJECTION INTRAVENOUS ONCE
Status: COMPLETED | OUTPATIENT
Start: 2022-01-01 | End: 2022-01-01

## 2022-01-01 RX ORDER — INSULIN LISPRO 100 [IU]/ML
0-4 INJECTION, SOLUTION INTRAVENOUS; SUBCUTANEOUS NIGHTLY
Status: DISCONTINUED | OUTPATIENT
Start: 2022-01-01 | End: 2023-01-01

## 2022-01-01 RX ORDER — ATORVASTATIN CALCIUM 10 MG/1
10 TABLET, FILM COATED ORAL NIGHTLY
Status: DISCONTINUED | OUTPATIENT
Start: 2022-01-01 | End: 2023-01-01

## 2022-01-01 RX ORDER — DEXAMETHASONE SODIUM PHOSPHATE 4 MG/ML
4 INJECTION, SOLUTION INTRA-ARTICULAR; INTRALESIONAL; INTRAMUSCULAR; INTRAVENOUS; SOFT TISSUE EVERY 12 HOURS
Status: DISCONTINUED | OUTPATIENT
Start: 2022-01-01 | End: 2022-01-01

## 2022-01-01 RX ORDER — HYDROXYZINE HYDROCHLORIDE 10 MG/1
10 TABLET, FILM COATED ORAL 3 TIMES DAILY PRN
Status: DISCONTINUED | OUTPATIENT
Start: 2022-01-01 | End: 2022-01-01

## 2022-01-01 RX ORDER — ACETAMINOPHEN 325 MG/1
650 TABLET ORAL EVERY 6 HOURS PRN
Status: DISCONTINUED | OUTPATIENT
Start: 2022-01-01 | End: 2023-01-02 | Stop reason: HOSPADM

## 2022-01-01 RX ORDER — HEPARIN SODIUM 1000 [USP'U]/ML
INJECTION, SOLUTION INTRAVENOUS; SUBCUTANEOUS
Status: DISCONTINUED
Start: 2022-01-01 | End: 2022-01-01 | Stop reason: WASHOUT

## 2022-01-01 RX ORDER — IPRATROPIUM BROMIDE AND ALBUTEROL SULFATE 2.5; .5 MG/3ML; MG/3ML
1 SOLUTION RESPIRATORY (INHALATION)
Status: DISCONTINUED | OUTPATIENT
Start: 2022-01-01 | End: 2022-01-01

## 2022-01-01 RX ORDER — LORAZEPAM 2 MG/ML
INJECTION INTRAMUSCULAR
Status: DISPENSED
Start: 2022-01-01 | End: 2023-01-01

## 2022-01-01 RX ORDER — LATANOPROST 50 UG/ML
1 SOLUTION/ DROPS OPHTHALMIC NIGHTLY
Status: DISCONTINUED | OUTPATIENT
Start: 2022-01-01 | End: 2023-01-01

## 2022-01-01 RX ORDER — ENOXAPARIN SODIUM 100 MG/ML
30 INJECTION SUBCUTANEOUS NIGHTLY
Status: DISCONTINUED | OUTPATIENT
Start: 2022-01-01 | End: 2022-01-01 | Stop reason: ALTCHOICE

## 2022-01-01 RX ORDER — METOPROLOL TARTRATE 5 MG/5ML
5 INJECTION INTRAVENOUS EVERY 6 HOURS PRN
Status: DISCONTINUED | OUTPATIENT
Start: 2022-01-01 | End: 2023-01-01

## 2022-01-01 RX ORDER — SODIUM CHLORIDE 9 MG/ML
INJECTION, SOLUTION INTRAVENOUS PRN
Status: DISCONTINUED | OUTPATIENT
Start: 2022-01-01 | End: 2023-01-02 | Stop reason: HOSPADM

## 2022-01-01 RX ORDER — ACETAMINOPHEN 650 MG/1
650 SUPPOSITORY RECTAL EVERY 6 HOURS PRN
Status: DISCONTINUED | OUTPATIENT
Start: 2022-01-01 | End: 2023-01-02 | Stop reason: HOSPADM

## 2022-01-01 RX ORDER — ACETAMINOPHEN 500 MG
1000 TABLET ORAL ONCE
Status: COMPLETED | OUTPATIENT
Start: 2022-01-01 | End: 2022-01-01

## 2022-01-01 RX ORDER — FINASTERIDE 5 MG/1
5 TABLET, FILM COATED ORAL DAILY
Status: DISCONTINUED | OUTPATIENT
Start: 2022-01-01 | End: 2023-01-01

## 2022-01-01 RX ORDER — LORAZEPAM 0.5 MG/1
0.25 TABLET ORAL EVERY 12 HOURS PRN
Status: DISCONTINUED | OUTPATIENT
Start: 2022-01-01 | End: 2023-01-02 | Stop reason: HOSPADM

## 2022-01-01 RX ADMIN — INSULIN LISPRO 2 UNITS: 100 INJECTION, SOLUTION INTRAVENOUS; SUBCUTANEOUS at 09:28

## 2022-01-01 RX ADMIN — TAMSULOSIN HYDROCHLORIDE 0.4 MG: 0.4 CAPSULE ORAL at 21:06

## 2022-01-01 RX ADMIN — HYDRALAZINE HYDROCHLORIDE 50 MG: 25 TABLET, FILM COATED ORAL at 08:56

## 2022-01-01 RX ADMIN — TAMSULOSIN HYDROCHLORIDE 0.4 MG: 0.4 CAPSULE ORAL at 20:46

## 2022-01-01 RX ADMIN — LATANOPROST 1 DROP: 50 SOLUTION OPHTHALMIC at 21:06

## 2022-01-01 RX ADMIN — INSULIN LISPRO 8 UNITS: 100 INJECTION, SOLUTION INTRAVENOUS; SUBCUTANEOUS at 11:57

## 2022-01-01 RX ADMIN — STANDARDIZED SENNA CONCENTRATE AND DOCUSATE SODIUM 2 TABLET: 8.6; 5 TABLET ORAL at 11:15

## 2022-01-01 RX ADMIN — IPRATROPIUM BROMIDE AND ALBUTEROL SULFATE 1 AMPULE: .5; 3 SOLUTION RESPIRATORY (INHALATION) at 21:31

## 2022-01-01 RX ADMIN — HYDRALAZINE HYDROCHLORIDE 50 MG: 25 TABLET, FILM COATED ORAL at 20:46

## 2022-01-01 RX ADMIN — BUDESONIDE 500 MCG: 0.5 SUSPENSION RESPIRATORY (INHALATION) at 08:54

## 2022-01-01 RX ADMIN — Medication 10 ML: at 11:57

## 2022-01-01 RX ADMIN — FINASTERIDE 5 MG: 5 TABLET, FILM COATED ORAL at 08:31

## 2022-01-01 RX ADMIN — TAMSULOSIN HYDROCHLORIDE 0.4 MG: 0.4 CAPSULE ORAL at 11:16

## 2022-01-01 RX ADMIN — FAMOTIDINE 10 MG: 20 TABLET, FILM COATED ORAL at 09:26

## 2022-01-01 RX ADMIN — BUDESONIDE 500 MCG: 0.5 SUSPENSION RESPIRATORY (INHALATION) at 21:31

## 2022-01-01 RX ADMIN — INSULIN LISPRO 3 UNITS: 100 INJECTION, SOLUTION INTRAVENOUS; SUBCUTANEOUS at 18:07

## 2022-01-01 RX ADMIN — METHYLPREDNISOLONE SODIUM SUCCINATE 40 MG: 40 INJECTION, POWDER, FOR SOLUTION INTRAMUSCULAR; INTRAVENOUS at 06:18

## 2022-01-01 RX ADMIN — LATANOPROST 1 DROP: 50 SOLUTION OPHTHALMIC at 20:44

## 2022-01-01 RX ADMIN — STANDARDIZED SENNA CONCENTRATE AND DOCUSATE SODIUM 2 TABLET: 8.6; 5 TABLET ORAL at 09:55

## 2022-01-01 RX ADMIN — METHYLPREDNISOLONE SODIUM SUCCINATE 40 MG: 40 INJECTION, POWDER, FOR SOLUTION INTRAMUSCULAR; INTRAVENOUS at 14:30

## 2022-01-01 RX ADMIN — DIAZEPAM 2.5 MG: 5 INJECTION, SOLUTION INTRAMUSCULAR; INTRAVENOUS at 23:47

## 2022-01-01 RX ADMIN — FINASTERIDE 5 MG: 5 TABLET, FILM COATED ORAL at 08:56

## 2022-01-01 RX ADMIN — GABAPENTIN 300 MG: 300 CAPSULE ORAL at 20:46

## 2022-01-01 RX ADMIN — DILTIAZEM HYDROCHLORIDE 120 MG: 120 CAPSULE, COATED, EXTENDED RELEASE ORAL at 09:26

## 2022-01-01 RX ADMIN — GABAPENTIN 300 MG: 300 CAPSULE ORAL at 09:26

## 2022-01-01 RX ADMIN — GUAIFENESIN 600 MG: 600 TABLET, EXTENDED RELEASE ORAL at 22:06

## 2022-01-01 RX ADMIN — DEXAMETHASONE SODIUM PHOSPHATE 4 MG: 4 INJECTION, SOLUTION INTRAMUSCULAR; INTRAVENOUS at 21:07

## 2022-01-01 RX ADMIN — ASPIRIN 81 MG: 81 TABLET, COATED ORAL at 11:16

## 2022-01-01 RX ADMIN — GUAIFENESIN 600 MG: 600 TABLET, EXTENDED RELEASE ORAL at 08:54

## 2022-01-01 RX ADMIN — FUROSEMIDE 40 MG: 10 INJECTION, SOLUTION INTRAMUSCULAR; INTRAVENOUS at 12:51

## 2022-01-01 RX ADMIN — BUDESONIDE 500 MCG: 0.5 SUSPENSION RESPIRATORY (INHALATION) at 20:21

## 2022-01-01 RX ADMIN — ASPIRIN 81 MG: 81 TABLET, COATED ORAL at 11:00

## 2022-01-01 RX ADMIN — GABAPENTIN 600 MG: 300 CAPSULE ORAL at 21:45

## 2022-01-01 RX ADMIN — POLYETHYLENE GLYCOL 3350 17 G: 17 POWDER, FOR SOLUTION ORAL at 08:30

## 2022-01-01 RX ADMIN — FAMOTIDINE 10 MG: 20 TABLET, FILM COATED ORAL at 11:17

## 2022-01-01 RX ADMIN — SODIUM CHLORIDE, PRESERVATIVE FREE 10 ML: 5 INJECTION INTRAVENOUS at 21:43

## 2022-01-01 RX ADMIN — DILTIAZEM HYDROCHLORIDE 120 MG: 120 CAPSULE, COATED, EXTENDED RELEASE ORAL at 11:16

## 2022-01-01 RX ADMIN — INSULIN LISPRO 12 UNITS: 100 INJECTION, SOLUTION INTRAVENOUS; SUBCUTANEOUS at 08:56

## 2022-01-01 RX ADMIN — DONEPEZIL HYDROCHLORIDE 5 MG: 5 TABLET, FILM COATED ORAL at 09:30

## 2022-01-01 RX ADMIN — IPRATROPIUM BROMIDE AND ALBUTEROL SULFATE 1 AMPULE: .5; 3 SOLUTION RESPIRATORY (INHALATION) at 20:21

## 2022-01-01 RX ADMIN — FUROSEMIDE 80 MG: 10 INJECTION, SOLUTION INTRAMUSCULAR; INTRAVENOUS at 17:32

## 2022-01-01 RX ADMIN — DILTIAZEM HYDROCHLORIDE 120 MG: 120 CAPSULE, COATED, EXTENDED RELEASE ORAL at 21:45

## 2022-01-01 RX ADMIN — FUROSEMIDE 40 MG: 10 INJECTION, SOLUTION INTRAMUSCULAR; INTRAVENOUS at 09:27

## 2022-01-01 RX ADMIN — DILTIAZEM HYDROCHLORIDE 10 MG: 5 INJECTION INTRAVENOUS at 15:35

## 2022-01-01 RX ADMIN — LATANOPROST 1 DROP: 50 SOLUTION OPHTHALMIC at 23:24

## 2022-01-01 RX ADMIN — DULOXETINE 30 MG: 30 CAPSULE, DELAYED RELEASE ORAL at 21:19

## 2022-01-01 RX ADMIN — DEXMEDETOMIDINE HYDROCHLORIDE 1 MCG/KG/HR: 4 INJECTION, SOLUTION INTRAVENOUS at 17:31

## 2022-01-01 RX ADMIN — TAMSULOSIN HYDROCHLORIDE 0.4 MG: 0.4 CAPSULE ORAL at 08:30

## 2022-01-01 RX ADMIN — FUROSEMIDE 40 MG: 10 INJECTION, SOLUTION INTRAMUSCULAR; INTRAVENOUS at 15:33

## 2022-01-01 RX ADMIN — BUDESONIDE 500 MCG: 0.5 SUSPENSION RESPIRATORY (INHALATION) at 08:44

## 2022-01-01 RX ADMIN — BUDESONIDE 500 MCG: 0.5 SUSPENSION RESPIRATORY (INHALATION) at 19:52

## 2022-01-01 RX ADMIN — HYDRALAZINE HYDROCHLORIDE 50 MG: 25 TABLET, FILM COATED ORAL at 09:26

## 2022-01-01 RX ADMIN — GABAPENTIN 300 MG: 300 CAPSULE ORAL at 08:54

## 2022-01-01 RX ADMIN — INSULIN GLARGINE 35 UNITS: 100 INJECTION, SOLUTION SUBCUTANEOUS at 20:46

## 2022-01-01 RX ADMIN — DEXAMETHASONE SODIUM PHOSPHATE 4 MG: 4 INJECTION, SOLUTION INTRAMUSCULAR; INTRAVENOUS at 09:27

## 2022-01-01 RX ADMIN — HYDRALAZINE HYDROCHLORIDE 50 MG: 25 TABLET, FILM COATED ORAL at 21:18

## 2022-01-01 RX ADMIN — ARFORMOTEROL TARTRATE 15 MCG: 15 SOLUTION RESPIRATORY (INHALATION) at 19:52

## 2022-01-01 RX ADMIN — LORAZEPAM 0.5 MG: 0.5 TABLET ORAL at 15:08

## 2022-01-01 RX ADMIN — BUDESONIDE 500 MCG: 0.5 SUSPENSION RESPIRATORY (INHALATION) at 09:32

## 2022-01-01 RX ADMIN — TAMSULOSIN HYDROCHLORIDE 0.4 MG: 0.4 CAPSULE ORAL at 09:30

## 2022-01-01 RX ADMIN — DEXAMETHASONE SODIUM PHOSPHATE 6 MG: 10 INJECTION, SOLUTION INTRAMUSCULAR; INTRAVENOUS at 15:34

## 2022-01-01 RX ADMIN — DULOXETINE 30 MG: 30 CAPSULE, DELAYED RELEASE ORAL at 20:46

## 2022-01-01 RX ADMIN — INSULIN GLARGINE 30 UNITS: 100 INJECTION, SOLUTION SUBCUTANEOUS at 00:35

## 2022-01-01 RX ADMIN — ATORVASTATIN CALCIUM 10 MG: 10 TABLET, FILM COATED ORAL at 21:19

## 2022-01-01 RX ADMIN — GABAPENTIN 300 MG: 300 CAPSULE ORAL at 21:18

## 2022-01-01 RX ADMIN — DEXMEDETOMIDINE HYDROCHLORIDE 0.8 MCG/KG/HR: 4 INJECTION, SOLUTION INTRAVENOUS at 23:20

## 2022-01-01 RX ADMIN — DILTIAZEM HYDROCHLORIDE 5 MG/HR: 5 INJECTION, SOLUTION INTRAVENOUS at 15:37

## 2022-01-01 RX ADMIN — POLYETHYLENE GLYCOL 3350 17 G: 17 POWDER, FOR SOLUTION ORAL at 11:18

## 2022-01-01 RX ADMIN — LATANOPROST 1 DROP: 50 SOLUTION OPHTHALMIC at 20:55

## 2022-01-01 RX ADMIN — IPRATROPIUM BROMIDE AND ALBUTEROL SULFATE 1 AMPULE: .5; 3 SOLUTION RESPIRATORY (INHALATION) at 15:27

## 2022-01-01 RX ADMIN — INSULIN LISPRO 4 UNITS: 100 INJECTION, SOLUTION INTRAVENOUS; SUBCUTANEOUS at 20:38

## 2022-01-01 RX ADMIN — ARFORMOTEROL TARTRATE 15 MCG: 15 SOLUTION RESPIRATORY (INHALATION) at 21:31

## 2022-01-01 RX ADMIN — HYDRALAZINE HYDROCHLORIDE 50 MG: 25 TABLET, FILM COATED ORAL at 11:21

## 2022-01-01 RX ADMIN — ACETAMINOPHEN 1000 MG: 500 TABLET ORAL at 13:02

## 2022-01-01 RX ADMIN — HEPARIN SODIUM: 1000 INJECTION INTRAVENOUS; SUBCUTANEOUS at 11:23

## 2022-01-01 RX ADMIN — ARFORMOTEROL TARTRATE 15 MCG: 15 SOLUTION RESPIRATORY (INHALATION) at 20:21

## 2022-01-01 RX ADMIN — METHYLPREDNISOLONE SODIUM SUCCINATE 40 MG: 40 INJECTION, POWDER, FOR SOLUTION INTRAMUSCULAR; INTRAVENOUS at 15:46

## 2022-01-01 RX ADMIN — GUAIFENESIN 600 MG: 600 TABLET, EXTENDED RELEASE ORAL at 21:19

## 2022-01-01 RX ADMIN — METOPROLOL TARTRATE 5 MG: 5 INJECTION, SOLUTION INTRAVENOUS at 17:42

## 2022-01-01 RX ADMIN — INSULIN LISPRO 8 UNITS: 100 INJECTION, SOLUTION INTRAVENOUS; SUBCUTANEOUS at 18:07

## 2022-01-01 RX ADMIN — Medication 10 ML: at 20:18

## 2022-01-01 RX ADMIN — STANDARDIZED SENNA CONCENTRATE AND DOCUSATE SODIUM 2 TABLET: 8.6; 5 TABLET ORAL at 08:30

## 2022-01-01 RX ADMIN — DULOXETINE 30 MG: 30 CAPSULE, DELAYED RELEASE ORAL at 23:24

## 2022-01-01 RX ADMIN — IPRATROPIUM BROMIDE AND ALBUTEROL SULFATE 1 AMPULE: .5; 3 SOLUTION RESPIRATORY (INHALATION) at 08:43

## 2022-01-01 RX ADMIN — LORAZEPAM 2 MG: 2 INJECTION INTRAMUSCULAR; INTRAVENOUS at 17:30

## 2022-01-01 RX ADMIN — DONEPEZIL HYDROCHLORIDE 5 MG: 5 TABLET, FILM COATED ORAL at 08:31

## 2022-01-01 RX ADMIN — HYDRALAZINE HYDROCHLORIDE 50 MG: 25 TABLET, FILM COATED ORAL at 00:35

## 2022-01-01 RX ADMIN — POLYETHYLENE GLYCOL 3350 17 G: 17 POWDER, FOR SOLUTION ORAL at 09:55

## 2022-01-01 RX ADMIN — BENZONATATE 100 MG: 100 CAPSULE ORAL at 09:57

## 2022-01-01 RX ADMIN — FINASTERIDE 5 MG: 5 TABLET, FILM COATED ORAL at 11:17

## 2022-01-01 RX ADMIN — TAMSULOSIN HYDROCHLORIDE 0.4 MG: 0.4 CAPSULE ORAL at 08:54

## 2022-01-01 RX ADMIN — METHYLPREDNISOLONE SODIUM SUCCINATE 40 MG: 40 INJECTION, POWDER, FOR SOLUTION INTRAMUSCULAR; INTRAVENOUS at 06:35

## 2022-01-01 RX ADMIN — GUAIFENESIN 600 MG: 600 TABLET, EXTENDED RELEASE ORAL at 08:31

## 2022-01-01 RX ADMIN — DILTIAZEM HYDROCHLORIDE 120 MG: 120 CAPSULE, COATED, EXTENDED RELEASE ORAL at 08:31

## 2022-01-01 RX ADMIN — DULOXETINE 30 MG: 30 CAPSULE, DELAYED RELEASE ORAL at 21:07

## 2022-01-01 RX ADMIN — DEXAMETHASONE SODIUM PHOSPHATE 4 MG: 4 INJECTION, SOLUTION INTRAMUSCULAR; INTRAVENOUS at 08:56

## 2022-01-01 RX ADMIN — FINASTERIDE 5 MG: 5 TABLET, FILM COATED ORAL at 09:26

## 2022-01-01 RX ADMIN — GABAPENTIN 300 MG: 300 CAPSULE ORAL at 11:16

## 2022-01-01 RX ADMIN — INSULIN GLARGINE 35 UNITS: 100 INJECTION, SOLUTION SUBCUTANEOUS at 20:39

## 2022-01-01 RX ADMIN — ATORVASTATIN CALCIUM 10 MG: 10 TABLET, FILM COATED ORAL at 21:07

## 2022-01-01 RX ADMIN — ATORVASTATIN CALCIUM 10 MG: 10 TABLET, FILM COATED ORAL at 20:48

## 2022-01-01 RX ADMIN — DONEPEZIL HYDROCHLORIDE 5 MG: 5 TABLET, FILM COATED ORAL at 11:17

## 2022-01-01 RX ADMIN — Medication 10 ML: at 11:21

## 2022-01-01 RX ADMIN — INSULIN LISPRO 6 UNITS: 100 INJECTION, SOLUTION INTRAVENOUS; SUBCUTANEOUS at 17:00

## 2022-01-01 RX ADMIN — INSULIN LISPRO 2 UNITS: 100 INJECTION, SOLUTION INTRAVENOUS; SUBCUTANEOUS at 14:05

## 2022-01-01 RX ADMIN — METHYLPREDNISOLONE SODIUM SUCCINATE 40 MG: 40 INJECTION, POWDER, FOR SOLUTION INTRAMUSCULAR; INTRAVENOUS at 21:19

## 2022-01-01 RX ADMIN — ALBUMIN (HUMAN) 25 G: 0.25 INJECTION, SOLUTION INTRAVENOUS at 09:55

## 2022-01-01 RX ADMIN — IPRATROPIUM BROMIDE AND ALBUTEROL SULFATE 1 AMPULE: .5; 2.5 SOLUTION RESPIRATORY (INHALATION) at 17:32

## 2022-01-01 RX ADMIN — FAMOTIDINE 10 MG: 20 TABLET, FILM COATED ORAL at 08:31

## 2022-01-01 RX ADMIN — STANDARDIZED SENNA CONCENTRATE AND DOCUSATE SODIUM 2 TABLET: 8.6; 5 TABLET ORAL at 21:19

## 2022-01-01 RX ADMIN — DILTIAZEM HYDROCHLORIDE 120 MG: 120 CAPSULE, COATED, EXTENDED RELEASE ORAL at 08:56

## 2022-01-01 RX ADMIN — IPRATROPIUM BROMIDE AND ALBUTEROL SULFATE 1 AMPULE: .5; 3 SOLUTION RESPIRATORY (INHALATION) at 13:04

## 2022-01-01 RX ADMIN — INSULIN GLARGINE 30 UNITS: 100 INJECTION, SOLUTION SUBCUTANEOUS at 21:07

## 2022-01-01 RX ADMIN — IPRATROPIUM BROMIDE AND ALBUTEROL SULFATE 1 AMPULE: .5; 3 SOLUTION RESPIRATORY (INHALATION) at 09:32

## 2022-01-01 RX ADMIN — ARFORMOTEROL TARTRATE 15 MCG: 15 SOLUTION RESPIRATORY (INHALATION) at 08:44

## 2022-01-01 RX ADMIN — INSULIN LISPRO 3 UNITS: 100 INJECTION, SOLUTION INTRAVENOUS; SUBCUTANEOUS at 11:57

## 2022-01-01 RX ADMIN — INSULIN LISPRO 3 UNITS: 100 INJECTION, SOLUTION INTRAVENOUS; SUBCUTANEOUS at 08:34

## 2022-01-01 RX ADMIN — METHYLPREDNISOLONE SODIUM SUCCINATE 40 MG: 40 INJECTION, POWDER, FOR SOLUTION INTRAMUSCULAR; INTRAVENOUS at 21:43

## 2022-01-01 RX ADMIN — FAMOTIDINE 10 MG: 20 TABLET, FILM COATED ORAL at 08:56

## 2022-01-01 RX ADMIN — Medication 10 ML: at 09:27

## 2022-01-01 RX ADMIN — ARFORMOTEROL TARTRATE 15 MCG: 15 SOLUTION RESPIRATORY (INHALATION) at 08:54

## 2022-01-01 RX ADMIN — LATANOPROST 1 DROP: 50 SOLUTION OPHTHALMIC at 21:19

## 2022-01-01 RX ADMIN — ASPIRIN 81 MG: 81 TABLET, COATED ORAL at 08:56

## 2022-01-01 RX ADMIN — GABAPENTIN 300 MG: 300 CAPSULE ORAL at 08:31

## 2022-01-01 RX ADMIN — METHYLPREDNISOLONE SODIUM SUCCINATE 40 MG: 40 INJECTION, POWDER, FOR SOLUTION INTRAMUSCULAR; INTRAVENOUS at 18:32

## 2022-01-01 RX ADMIN — LORAZEPAM 0.25 MG: 0.5 TABLET ORAL at 18:28

## 2022-01-01 RX ADMIN — Medication 10 ML: at 21:45

## 2022-01-01 RX ADMIN — TAMSULOSIN HYDROCHLORIDE 0.4 MG: 0.4 CAPSULE ORAL at 21:18

## 2022-01-01 RX ADMIN — GUAIFENESIN 600 MG: 600 TABLET, EXTENDED RELEASE ORAL at 16:50

## 2022-01-01 RX ADMIN — METHYLPREDNISOLONE SODIUM SUCCINATE 40 MG: 40 INJECTION, POWDER, FOR SOLUTION INTRAMUSCULAR; INTRAVENOUS at 20:48

## 2022-01-01 RX ADMIN — GABAPENTIN 300 MG: 300 CAPSULE ORAL at 21:07

## 2022-01-01 RX ADMIN — Medication 10 ML: at 21:08

## 2022-01-01 RX ADMIN — IPRATROPIUM BROMIDE AND ALBUTEROL SULFATE 1 AMPULE: 2.5; .5 SOLUTION RESPIRATORY (INHALATION) at 17:32

## 2022-01-01 RX ADMIN — HYDROXYZINE HYDROCHLORIDE 10 MG: 10 TABLET ORAL at 09:55

## 2022-01-01 RX ADMIN — LORAZEPAM 0.25 MG: 0.5 TABLET ORAL at 14:48

## 2022-01-01 RX ADMIN — DONEPEZIL HYDROCHLORIDE 5 MG: 5 TABLET, FILM COATED ORAL at 08:56

## 2022-01-01 RX ADMIN — Medication 10 ML: at 21:02

## 2022-01-01 RX ADMIN — HYDRALAZINE HYDROCHLORIDE 50 MG: 25 TABLET, FILM COATED ORAL at 08:30

## 2022-01-01 RX ADMIN — TAMSULOSIN HYDROCHLORIDE 0.4 MG: 0.4 CAPSULE ORAL at 23:24

## 2022-01-01 RX ADMIN — IPRATROPIUM BROMIDE AND ALBUTEROL SULFATE 1 AMPULE: .5; 3 SOLUTION RESPIRATORY (INHALATION) at 08:54

## 2022-01-01 RX ADMIN — INSULIN LISPRO 12 UNITS: 100 INJECTION, SOLUTION INTRAVENOUS; SUBCUTANEOUS at 08:34

## 2022-01-01 RX ADMIN — INSULIN GLARGINE 30 UNITS: 100 INJECTION, SOLUTION SUBCUTANEOUS at 21:32

## 2022-01-01 RX ADMIN — STANDARDIZED SENNA CONCENTRATE AND DOCUSATE SODIUM 2 TABLET: 8.6; 5 TABLET ORAL at 20:46

## 2022-01-01 RX ADMIN — HYDRALAZINE HYDROCHLORIDE 50 MG: 25 TABLET, FILM COATED ORAL at 21:06

## 2022-01-01 RX ADMIN — ALBUMIN (HUMAN) 25 G: 0.25 INJECTION, SOLUTION INTRAVENOUS at 21:31

## 2022-01-01 ASSESSMENT — ENCOUNTER SYMPTOMS
CONSTIPATION: 0
FACIAL SWELLING: 0
EYE DISCHARGE: 0
VOMITING: 0
EYE REDNESS: 0
SHORTNESS OF BREATH: 1
BLOOD IN STOOL: 0
CHEST TIGHTNESS: 0
CHEST TIGHTNESS: 0
EYE DISCHARGE: 0
ABDOMINAL DISTENTION: 0
BLOOD IN STOOL: 0
CONSTIPATION: 0
FACIAL SWELLING: 0
PHOTOPHOBIA: 0
VOMITING: 0
ABDOMINAL PAIN: 0
COUGH: 0
ABDOMINAL PAIN: 0
DIARRHEA: 0
PHOTOPHOBIA: 0
DIARRHEA: 0
COUGH: 0
SHORTNESS OF BREATH: 1
NAUSEA: 0
ABDOMINAL DISTENTION: 0
EYE REDNESS: 0
NAUSEA: 0

## 2022-01-01 ASSESSMENT — PAIN SCALES - PAIN ASSESSMENT IN ADVANCED DEMENTIA (PAINAD)
BODYLANGUAGE: 1
TOTALSCORE: 4
CONSOLABILITY: 1
BREATHING: 0
FACIALEXPRESSION: 0
NEGVOCALIZATION: 2

## 2022-01-01 ASSESSMENT — PAIN - FUNCTIONAL ASSESSMENT: PAIN_FUNCTIONAL_ASSESSMENT: NONE - DENIES PAIN

## 2022-01-02 DIAGNOSIS — I26.99 BILATERAL PULMONARY EMBOLISM (HCC): ICD-10-CM

## 2022-01-02 DIAGNOSIS — Z79.01 ANTICOAGULATED ON COUMADIN: ICD-10-CM

## 2022-01-03 RX ORDER — WARFARIN SODIUM 2.5 MG/1
TABLET ORAL
Qty: 90 TABLET | Refills: 3 | Status: SHIPPED | OUTPATIENT
Start: 2022-01-03

## 2022-01-06 ENCOUNTER — OFFICE VISIT (OUTPATIENT)
Dept: INTERNAL MEDICINE CLINIC | Age: 87
End: 2022-01-06
Payer: MEDICARE

## 2022-01-06 VITALS
DIASTOLIC BLOOD PRESSURE: 60 MMHG | WEIGHT: 218 LBS | OXYGEN SATURATION: 96 % | SYSTOLIC BLOOD PRESSURE: 132 MMHG | HEART RATE: 67 BPM | BODY MASS INDEX: 31.28 KG/M2

## 2022-01-06 DIAGNOSIS — J44.9 CHRONIC OBSTRUCTIVE PULMONARY DISEASE, UNSPECIFIED COPD TYPE (HCC): ICD-10-CM

## 2022-01-06 DIAGNOSIS — I10 ESSENTIAL HYPERTENSION: ICD-10-CM

## 2022-01-06 DIAGNOSIS — C34.92 NON-SMALL CELL CANCER OF LEFT LUNG (HCC): ICD-10-CM

## 2022-01-06 DIAGNOSIS — F02.80 LATE ONSET ALZHEIMER'S DISEASE WITHOUT BEHAVIORAL DISTURBANCE (HCC): ICD-10-CM

## 2022-01-06 DIAGNOSIS — G30.1 LATE ONSET ALZHEIMER'S DISEASE WITHOUT BEHAVIORAL DISTURBANCE (HCC): ICD-10-CM

## 2022-01-06 DIAGNOSIS — N18.4 TYPE 2 DIABETES MELLITUS WITH STAGE 4 CHRONIC KIDNEY DISEASE, WITHOUT LONG-TERM CURRENT USE OF INSULIN (HCC): ICD-10-CM

## 2022-01-06 DIAGNOSIS — E11.22 TYPE 2 DIABETES MELLITUS WITH STAGE 4 CHRONIC KIDNEY DISEASE, WITHOUT LONG-TERM CURRENT USE OF INSULIN (HCC): ICD-10-CM

## 2022-01-06 DIAGNOSIS — I26.99 BILATERAL PULMONARY EMBOLISM (HCC): ICD-10-CM

## 2022-01-06 DIAGNOSIS — E85.4 CEREBRAL AMYLOID ANGIOPATHY (HCC): ICD-10-CM

## 2022-01-06 DIAGNOSIS — M15.9 PRIMARY OSTEOARTHRITIS INVOLVING MULTIPLE JOINTS: ICD-10-CM

## 2022-01-06 DIAGNOSIS — G62.9 PERIPHERAL POLYNEUROPATHY: ICD-10-CM

## 2022-01-06 DIAGNOSIS — I68.0 CEREBRAL AMYLOID ANGIOPATHY (HCC): ICD-10-CM

## 2022-01-06 DIAGNOSIS — M48.062 LUMBAR STENOSIS WITH NEUROGENIC CLAUDICATION: Primary | ICD-10-CM

## 2022-01-06 DIAGNOSIS — N18.4 CHRONIC KIDNEY DISEASE (CKD) STAGE G4/A3, SEVERELY DECREASED GLOMERULAR FILTRATION RATE (GFR) BETWEEN 15-29 ML/MIN/1.73 SQUARE METER AND ALBUMINURIA CREATININE RATIO GREATER THAN 300 MG/G (HCC): ICD-10-CM

## 2022-01-06 DIAGNOSIS — D69.6 THROMBOCYTOPENIA, UNSPECIFIED (HCC): ICD-10-CM

## 2022-01-06 DIAGNOSIS — I50.22 CHRONIC SYSTOLIC CONGESTIVE HEART FAILURE (HCC): ICD-10-CM

## 2022-01-06 DIAGNOSIS — Z79.01 ANTICOAGULATED ON COUMADIN: ICD-10-CM

## 2022-01-06 PROBLEM — L97.511 SKIN ULCER OF SECOND TOE OF RIGHT FOOT, LIMITED TO BREAKDOWN OF SKIN (HCC): Status: ACTIVE | Noted: 2022-01-06

## 2022-01-06 LAB
INTERNATIONAL NORMALIZATION RATIO, POC: 2.8
PROTHROMBIN TIME, POC: NORMAL

## 2022-01-06 PROCEDURE — 85610 PROTHROMBIN TIME: CPT | Performed by: NURSE PRACTITIONER

## 2022-01-06 PROCEDURE — G8427 DOCREV CUR MEDS BY ELIG CLIN: HCPCS | Performed by: NURSE PRACTITIONER

## 2022-01-06 PROCEDURE — 3023F SPIROM DOC REV: CPT | Performed by: NURSE PRACTITIONER

## 2022-01-06 PROCEDURE — G8484 FLU IMMUNIZE NO ADMIN: HCPCS | Performed by: NURSE PRACTITIONER

## 2022-01-06 PROCEDURE — G8417 CALC BMI ABV UP PARAM F/U: HCPCS | Performed by: NURSE PRACTITIONER

## 2022-01-06 PROCEDURE — 4040F PNEUMOC VAC/ADMIN/RCVD: CPT | Performed by: NURSE PRACTITIONER

## 2022-01-06 PROCEDURE — 99214 OFFICE O/P EST MOD 30 MIN: CPT | Performed by: NURSE PRACTITIONER

## 2022-01-06 PROCEDURE — 1036F TOBACCO NON-USER: CPT | Performed by: NURSE PRACTITIONER

## 2022-01-06 PROCEDURE — 1123F ACP DISCUSS/DSCN MKR DOCD: CPT | Performed by: NURSE PRACTITIONER

## 2022-01-06 RX ORDER — HYDROCODONE BITARTRATE AND ACETAMINOPHEN 7.5; 325 MG/1; MG/1
1 TABLET ORAL 4 TIMES DAILY PRN
Qty: 120 TABLET | Refills: 0 | Status: SHIPPED | OUTPATIENT
Start: 2022-01-06 | End: 2022-03-28 | Stop reason: SDUPTHER

## 2022-01-10 ASSESSMENT — ENCOUNTER SYMPTOMS
RESPIRATORY NEGATIVE: 1
BACK PAIN: 1
GASTROINTESTINAL NEGATIVE: 1

## 2022-01-10 NOTE — PROGRESS NOTES
SUBJECTIVE:    Patient ID: Jatinder Soni is a 82 y.o. male. CC: Pulmonary embolism, chronic anticoagulation, lumbar stenosis, peripheral neuropathy    HPI: The patient presents to the office today accompanied by his wife for follow-up of chronic medical conditions as well as management of chronic anticoagulation and to discuss possible treatment for peripheral neuropathy. Has a history of bilateral pulmonary embolism. Long-term anticoagulation has been recommended he has been on this for some time. He is taking warfarin. His INR is 2.8. Patient has a history of lumbar stenosis and severe peripheral neuropathy. This has been very problematic for the patient with escalating doses of treatment. This has been complicated by intolerance to some treatments. He has been seen by numerous specialists, most recently Dr. Tao Young who has done injections in his back with success in the past.  There is now discussion about an implanted device to help with peripheral neuropathy. The patient is considering this. He has a history of hypertension. He denies any chest pain or shortness of breath.       Past Medical History:   Diagnosis Date    Abdominal pain, epigastric     Arthritis     Benign prostatic hypertrophy without urinary obstruction     BPH     Cellulitis and abscess     Chronic rhinitis     Chronic systolic congestive heart failure (HCC) 4/18/2019    COPD (chronic obstructive pulmonary disease) (HCC)     Diabetes mellitus (HCC)     Dysphagia     Erectile dysfunction     GERD (gastroesophageal reflux disease)     Hx of blood clots     Hyperglycemia     Hypertension     [facility]    lumbar radiculopathy     Neuropathy     Nocturia     Osteoarthritis     Other disorders of kidney and ureter in diseases classified elsewhere     Pain, joint, knee     Palpitations     skin cancer     Rt ear, nose, neck, hands/ 2018 lung    SOB (shortness of breath)     Tennis elbow     Tinea pedis     foot        Current Outpatient Medications   Medication Sig Dispense Refill    HYDROcodone-acetaminophen (NORCO) 7.5-325 MG per tablet Take 1 tablet by mouth 4 times daily as needed for Pain for up to 120 days. Intended supply: 30 days 120 tablet 0    warfarin (COUMADIN) 2.5 MG tablet TAKE ONE AND ONE-HALF TABLETS BY MOUTH EVERY THURSDAY, THEN TAKE ONE TABLET BY MOUTH ON ALL OTHER DAYS 90 tablet 3    B-D UF III MINI PEN NEEDLES 31G X 5 MM MISC USE TO INJECT INSULIN FOUR TIMES A  each 2    BASAGLAR KWIKPEN 100 UNIT/ML injection pen INJECT 30 UNITS UNDER THE SKIN ONCE NIGHTLY 5 pen 2    blood glucose test strips (FREESTYLE LITE) strip USE TO TEST THREE TIMES A  strip 5    gabapentin (NEURONTIN) 300 MG capsule TAKE ONE CAPSULE BY MOUTH EVERY MORNING AND TAKE TWO CAPSULES BY MOUTH EVERY NIGHT AT BEDTIME 90 capsule 5    furosemide (LASIX) 40 MG tablet TAKE ONE TABLET BY MOUTH DAILY 90 tablet 1    NOVOLOG FLEXPEN 100 UNIT/ML injection pen INJECT 20 UNITS UNDER THE SKIN THREE TIMES A DAY ACCORDING TO SLIDING SCALE 15 pen 1    famotidine (PEPCID) 20 MG tablet TAKE ONE TABLET BY MOUTH TWICE A DAY 60 tablet 5    lisinopril (PRINIVIL;ZESTRIL) 10 MG tablet TAKE ONE TABLET BY MOUTH DAILY 90 tablet 3    DULoxetine (CYMBALTA) 30 MG extended release capsule Take 1 capsule by mouth nightly 90 capsule 3    fluocinonide (LIDEX) 0.05 % cream Apply topically 2 times daily.  60 g 2    donepezil (ARICEPT) 10 MG tablet Take 5 mg by mouth daily (with breakfast)       finasteride (PROSCAR) 5 MG tablet Take 5 mg by mouth daily      naphazoline-glycerin (CLEAR EYES REDNESS RELIEF) 0.012-0.2 % SOLN ophthalmic solution Place 1 drop into both eyes 2 times daily (Patient taking differently: Place 1 drop into both eyes 2 times daily as needed ) 1 Bottle 0    tamsulosin (FLOMAX) 0.4 MG capsule Take 1 capsule by mouth daily (Patient taking differently: Take 0.4 mg by mouth 2 times daily ) 90 capsule 1    glucose monitoring kit (FREESTYLE) monitoring kit 1 kit by Does not apply route daily as needed. 1 kit 0    latanoprost (XALATAN) 0.005 % ophthalmic solution Place 1 drop into both eyes nightly. No current facility-administered medications for this visit. Review of Systems   Respiratory: Negative. Cardiovascular: Negative. Negative for chest pain. Gastrointestinal: Negative. Genitourinary: Negative. Musculoskeletal: Positive for back pain. Neurological: Positive for numbness. OBJECTIVE:  Physical Exam  Vitals reviewed. Constitutional:       General: He is not in acute distress. Appearance: Normal appearance. He is well-developed. He is not diaphoretic. HENT:      Head: Normocephalic and atraumatic. Eyes:      General: No scleral icterus. Conjunctiva/sclera: Conjunctivae normal.   Neck:      Vascular: No JVD. Cardiovascular:      Rate and Rhythm: Normal rate and regular rhythm. Pulmonary:      Effort: Pulmonary effort is normal. No respiratory distress. Breath sounds: Normal breath sounds. No wheezing or rales. Abdominal:      General: There is no distension. Palpations: Abdomen is soft. Tenderness: There is no abdominal tenderness. There is no guarding or rebound. Musculoskeletal:      Cervical back: Neck supple. Comments: Ambulating with a walker due to chronic back pain, peripheral neuropathy and lower extremity weakness   Skin:     General: Skin is warm and dry. Neurological:      General: No focal deficit present. Mental Status: He is alert and oriented to person, place, and time. Psychiatric:         Behavior: Behavior normal.         Thought Content: Thought content normal.         Cognition and Memory: Memory is impaired.         /60   Pulse 67   Wt 218 lb (98.9 kg)   SpO2 96%   BMI 31.28 kg/m²      PHQ Scores 11/11/2021 1/8/2021 9/28/2020 2/6/2020 9/18/2019 2/11/2019 7/10/2018   PHQ2 Score 0 0 0 0 0 0 1   PHQ9 Score 0 0 0 0 0 0 1     Interpretation of Total Score Depression Severity: 1-4 = Minimal depression, 5-9 = Mild depression, 10-14 = Moderate depression, 15-19 = Moderately severe depression, 20-27 =Severe depression        Assessment/Plan:  Remi Mejia was seen today for office visit for anticoagulation management and other. Diagnoses and all orders for this visit:    Lumbar stenosis with neurogenic claudication  -     HYDROcodone-acetaminophen (NORCO) 7.5-325 MG per tablet; Take 1 tablet by mouth 4 times daily as needed for Pain for up to 120 days. Intended supply: 30 days    Peripheral polyneuropathy  -     HYDROcodone-acetaminophen (NORCO) 7.5-325 MG per tablet; Take 1 tablet by mouth 4 times daily as needed for Pain for up to 120 days. Intended supply: 30 days    Bilateral pulmonary embolism (HCC)  -No evidence of recurrent  -Continue anticoagulation    Anticoagulated on Coumadin  - INR therapeutic  - Continue current regimen, see flowsheet  -     POCT INR    Primary osteoarthritis involving multiple joints  -     HYDROcodone-acetaminophen (NORCO) 7.5-325 MG per tablet; Take 1 tablet by mouth 4 times daily as needed for Pain for up to 120 days.  Intended supply: 30 days    Chronic kidney disease (CKD) stage G4/A3, severely decreased glomerular filtration rate (GFR) between 15-29 mL/min/1.73 square meter and albuminuria creatinine ratio greater than 300 mg/g (HCC)  -Chronic, stable  -Continue monitoring avoid nephrotoxic medication    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)  -Chronic  -Continue plan per oncology    Late onset Alzheimer's disease without behavioral disturbance (HCC)  -Chronic  -Continue monitoring    Chronic obstructive pulmonary disease, unspecified COPD type (HCC)  -Chronic  -Continue current regimen    Chronic systolic congestive heart failure (HCC)  -Chronic  -Continue current regimen    Cerebral amyloid angiopathy (HCC)  -Chronic  -Continue current regimen    Thrombocytopenia, unspecified (HCC)  -Chronic  -Continue current regimen    Type 2 diabetes mellitus with stage 4 chronic kidney disease, without long-term current use of insulin (HCC)  -Chronic  -Continue current regimen    Essential hypertension  - Normotensive  - he has met [facility] standards.  - Continue current regimen. Long discussion regarding the patient's chronic back pain and peripheral neuropathy. He is seeing Dr. Charity Miles and considering implanted device to address peripheral neuropathy. Injections have been helping his back. Symptoms remain severe and affecting his quality of life.   We will increase Norco dosing      SHA Hyde - CNP

## 2022-02-01 ENCOUNTER — TELEPHONE (OUTPATIENT)
Dept: INTERNAL MEDICINE CLINIC | Age: 87
End: 2022-02-01

## 2022-02-01 NOTE — TELEPHONE ENCOUNTER
Fatuma Loera from Dr Rima Townsend office calling---pt having Steroid injection epidural 2/7 ---asking to have pt be off his Coumadin for 5 days prior---please call Ftauma Loera at [phone] to give ok---Thanks

## 2022-02-22 ENCOUNTER — OFFICE VISIT (OUTPATIENT)
Dept: INTERNAL MEDICINE CLINIC | Age: 87
End: 2022-02-22
Payer: MEDICARE

## 2022-02-22 VITALS
DIASTOLIC BLOOD PRESSURE: 70 MMHG | OXYGEN SATURATION: 98 % | BODY MASS INDEX: 30.99 KG/M2 | SYSTOLIC BLOOD PRESSURE: 144 MMHG | HEART RATE: 58 BPM | WEIGHT: 216 LBS

## 2022-02-22 DIAGNOSIS — M48.062 LUMBAR STENOSIS WITH NEUROGENIC CLAUDICATION: Primary | ICD-10-CM

## 2022-02-22 DIAGNOSIS — I26.99 BILATERAL PULMONARY EMBOLISM (HCC): ICD-10-CM

## 2022-02-22 DIAGNOSIS — L97.511 SKIN ULCER OF SECOND TOE OF RIGHT FOOT, LIMITED TO BREAKDOWN OF SKIN (HCC): ICD-10-CM

## 2022-02-22 DIAGNOSIS — Z79.01 ANTICOAGULATED ON COUMADIN: ICD-10-CM

## 2022-02-22 DIAGNOSIS — G62.9 PERIPHERAL POLYNEUROPATHY: ICD-10-CM

## 2022-02-22 LAB
INTERNATIONAL NORMALIZATION RATIO, POC: 2.3
PROTHROMBIN TIME, POC: NORMAL

## 2022-02-22 PROCEDURE — 1123F ACP DISCUSS/DSCN MKR DOCD: CPT | Performed by: NURSE PRACTITIONER

## 2022-02-22 PROCEDURE — 85610 PROTHROMBIN TIME: CPT | Performed by: NURSE PRACTITIONER

## 2022-02-22 PROCEDURE — 4040F PNEUMOC VAC/ADMIN/RCVD: CPT | Performed by: NURSE PRACTITIONER

## 2022-02-22 PROCEDURE — 99213 OFFICE O/P EST LOW 20 MIN: CPT | Performed by: NURSE PRACTITIONER

## 2022-02-22 PROCEDURE — G8427 DOCREV CUR MEDS BY ELIG CLIN: HCPCS | Performed by: NURSE PRACTITIONER

## 2022-02-22 PROCEDURE — 1036F TOBACCO NON-USER: CPT | Performed by: NURSE PRACTITIONER

## 2022-02-22 PROCEDURE — G8417 CALC BMI ABV UP PARAM F/U: HCPCS | Performed by: NURSE PRACTITIONER

## 2022-02-22 PROCEDURE — G8484 FLU IMMUNIZE NO ADMIN: HCPCS | Performed by: NURSE PRACTITIONER

## 2022-02-22 RX ORDER — KETOCONAZOLE 20 MG/G
CREAM TOPICAL
COMMUNITY
Start: 2022-02-17

## 2022-02-22 ASSESSMENT — ENCOUNTER SYMPTOMS
BACK PAIN: 1
GASTROINTESTINAL NEGATIVE: 1
RESPIRATORY NEGATIVE: 1

## 2022-02-22 NOTE — PROGRESS NOTES
SUBJECTIVE:    Patient ID: Tao Colon is a 82 y.o. male. CC: Pulmonary embolism, chronic anticoagulation, lumbar stenosis, peripheral neuropathy    HPI: The patient presents to the office today accompanied by his wife for follow-up of chronic medical conditions as well as management of chronic anticoagulation and to discuss possible treatment for peripheral neuropathy. Has a history of bilateral pulmonary embolism. Long-term anticoagulation has been recommended he has been on this for some time. He is taking warfarin. His INR is 2.3. Patient has a history of lumbar stenosis and severe peripheral neuropathy. This has been very problematic for the patient with escalating doses of treatment. This has been complicated by intolerance to some treatments. He has been seen by numerous specialists, most recently Dr. Germaine Luque who has done injections in his back with success in the past.  There is now discussion about an implanted device to help with peripheral neuropathy. He has a history of hypertension. He denies any chest pain or shortness of breath.       Past Medical History:   Diagnosis Date    Abdominal pain, epigastric     Arthritis     Benign prostatic hypertrophy without urinary obstruction     BPH     Cellulitis and abscess     Chronic rhinitis     Chronic systolic congestive heart failure (HCC) 4/18/2019    COPD (chronic obstructive pulmonary disease) (HCC)     Diabetes mellitus (HCC)     Dysphagia     Erectile dysfunction     GERD (gastroesophageal reflux disease)     Hx of blood clots     Hyperglycemia     Hypertension     Late onset Alzheimer's disease without behavioral disturbance ([facility] [address] 7/23/2020    lumbar radiculopathy     Neuropathy     Nocturia     Osteoarthritis     Other disorders of kidney and ureter in diseases classified elsewhere     Pain, joint, knee     Palpitations     skin cancer     Rt ear, nose, neck, hands/ 2018 lung    SOB (shortness of breath)     Tennis elbow     Tinea pedis     foot        Current Outpatient Medications   Medication Sig Dispense Refill    ketoconazole (NIZORAL) 2 % cream       famotidine (PEPCID) 20 MG tablet TAKE ONE TABLET BY MOUTH TWICE A DAY 60 tablet 1    HYDROcodone-acetaminophen (NORCO) 7.5-325 MG per tablet Take 1 tablet by mouth 4 times daily as needed for Pain for up to 120 days. Intended supply: 30 days 120 tablet 0    warfarin (COUMADIN) 2.5 MG tablet TAKE ONE AND ONE-HALF TABLETS BY MOUTH EVERY THURSDAY, THEN TAKE ONE TABLET BY MOUTH ON ALL OTHER DAYS 90 tablet 3    B-D UF III MINI PEN NEEDLES 31G X 5 MM MISC USE TO INJECT INSULIN FOUR TIMES A  each 2    BASAGLAR KWIKPEN 100 UNIT/ML injection pen INJECT 30 UNITS UNDER THE SKIN ONCE NIGHTLY 5 pen 2    blood glucose test strips (FREESTYLE LITE) strip USE TO TEST THREE TIMES A  strip 5    gabapentin (NEURONTIN) 300 MG capsule TAKE ONE CAPSULE BY MOUTH EVERY MORNING AND TAKE TWO CAPSULES BY MOUTH EVERY NIGHT AT BEDTIME 90 capsule 5    furosemide (LASIX) 40 MG tablet TAKE ONE TABLET BY MOUTH DAILY 90 tablet 1    NOVOLOG FLEXPEN 100 UNIT/ML injection pen INJECT 20 UNITS UNDER THE SKIN THREE TIMES A DAY ACCORDING TO SLIDING SCALE 15 pen 1    lisinopril (PRINIVIL;ZESTRIL) 10 MG tablet TAKE ONE TABLET BY MOUTH DAILY 90 tablet 3    DULoxetine (CYMBALTA) 30 MG extended release capsule Take 1 capsule by mouth nightly 90 capsule 3    fluocinonide (LIDEX) 0.05 % cream Apply topically 2 times daily.  60 g 2    donepezil (ARICEPT) 10 MG tablet Take 5 mg by mouth daily (with breakfast)       finasteride (PROSCAR) 5 MG tablet Take 5 mg by mouth daily      naphazoline-glycerin (CLEAR EYES REDNESS RELIEF) 0.012-0.2 % SOLN ophthalmic solution Place 1 drop into both eyes 2 times daily (Patient taking differently: Place 1 drop into both eyes 2 times daily as needed ) 1 Bottle 0    tamsulosin (FLOMAX) 0.4 MG capsule Take 1 capsule by mouth daily (Patient taking differently: Take 0.4 mg by mouth 2 times daily ) 90 capsule 1    glucose monitoring kit (FREESTYLE) monitoring kit 1 kit by Does not apply route daily as needed. 1 kit 0    latanoprost (XALATAN) 0.005 % ophthalmic solution Place 1 drop into both eyes nightly. No current facility-administered medications for this visit. Review of Systems   Respiratory: Negative. Cardiovascular: Negative. Negative for chest pain. Gastrointestinal: Negative. Genitourinary: Negative. Musculoskeletal: Positive for back pain. Neurological: Positive for numbness. OBJECTIVE:  Physical Exam  Vitals reviewed. Constitutional:       General: He is not in acute distress. Appearance: Normal appearance. He is well-developed. He is not diaphoretic. HENT:      Head: Normocephalic and atraumatic. Eyes:      General: No scleral icterus. Conjunctiva/sclera: Conjunctivae normal.   Neck:      Vascular: No JVD. Cardiovascular:      Rate and Rhythm: Normal rate and regular rhythm. Pulmonary:      Effort: Pulmonary effort is normal. No respiratory distress. Breath sounds: Normal breath sounds. No wheezing or rales. Abdominal:      General: There is no distension. Palpations: Abdomen is soft. Tenderness: There is no abdominal tenderness. There is no guarding or rebound. Musculoskeletal:      Cervical back: Neck supple. Comments: Ambulating with a walker due to chronic back pain, peripheral neuropathy and lower extremity weakness   Skin:     General: Skin is warm and dry. Comments: Superficial ulceration distal #2 toe right foot without redness, swelling, drainage. No induration or fluctuance. Neurological:      General: No focal deficit present. Mental Status: He is alert and oriented to person, place, and time. Psychiatric:         Behavior: Behavior normal.         Thought Content: Thought content normal.         Cognition and Memory: Memory is impaired. BP (!) 144/70   Pulse 58   Wt 216 lb (98 kg)   SpO2 98%   BMI 30.99 kg/m²      PHQ Scores 11/11/2021 1/8/2021 9/28/2020 2/6/2020 9/18/2019 2/11/2019 7/10/2018   PHQ2 Score 0 0 0 0 0 0 1   PHQ9 Score 0 0 0 0 0 0 1     Interpretation of Total Score Depression Severity: 1-4 = Minimal depression, 5-9 = Mild depression, 10-14 = Moderate depression, 15-19 = Moderately severe depression, 20-27 =Severe depression        Assessment/Plan:  Juan Jose Real was seen today for office visit for anticoagulation management.   Diagnoses and all orders for this visit:    Lumbar stenosis with neurogenic claudication  -Chronic, remains problematic  -Continue Cymbalta, gabapentin, Norco  -Follow-up pain management as needed    Peripheral polyneuropathy  -Chronic, remains problematic  -Continue Cymbalta, gabapentin, Norco  -Follow-up pain management as needed    Bilateral pulmonary embolism (HCC)  -No recurrence  -Continue anticoagulation    Anticoagulated on Coumadin  - INR therapeutic  - Continue current regimen, see flowsheet  -     POCT INR    Skin ulcer of second toe of right foot, limited to breakdown of skin ([address]  -Superficial ulceration without evidence of infection  -Continue wound care and continue follow-up podiatry        SHA Ruano - CNP

## 2022-02-23 ENCOUNTER — HOSPITAL ENCOUNTER (OUTPATIENT)
Dept: CT IMAGING | Age: 87
Discharge: HOME | End: 2022-02-23
Payer: MEDICARE

## 2022-02-23 DIAGNOSIS — C34.92 NON-SMALL CELL CANCER OF LEFT LUNG (HCC): ICD-10-CM

## 2022-02-23 DIAGNOSIS — G62.9 PERIPHERAL POLYNEUROPATHY: ICD-10-CM

## 2022-02-23 PROCEDURE — 74176 CT ABD & PELVIS W/O CONTRAST: CPT

## 2022-02-23 RX ORDER — DULOXETIN HYDROCHLORIDE 30 MG/1
CAPSULE, DELAYED RELEASE ORAL
Qty: 90 CAPSULE | Refills: 3 | Status: SHIPPED | OUTPATIENT
Start: 2022-02-23

## 2022-03-25 ENCOUNTER — OFFICE VISIT (OUTPATIENT)
Dept: INTERNAL MEDICINE CLINIC | Age: 87
End: 2022-03-25
Payer: MEDICARE

## 2022-03-25 VITALS
BODY MASS INDEX: 30.85 KG/M2 | OXYGEN SATURATION: 97 % | WEIGHT: 215 LBS | DIASTOLIC BLOOD PRESSURE: 80 MMHG | HEART RATE: 76 BPM | SYSTOLIC BLOOD PRESSURE: 132 MMHG

## 2022-03-25 DIAGNOSIS — Z79.01 ANTICOAGULATED ON COUMADIN: ICD-10-CM

## 2022-03-25 DIAGNOSIS — M48.062 LUMBAR STENOSIS WITH NEUROGENIC CLAUDICATION: Primary | ICD-10-CM

## 2022-03-25 DIAGNOSIS — G62.9 PERIPHERAL POLYNEUROPATHY: ICD-10-CM

## 2022-03-25 DIAGNOSIS — I26.99 BILATERAL PULMONARY EMBOLISM (HCC): ICD-10-CM

## 2022-03-25 LAB
INTERNATIONAL NORMALIZATION RATIO, POC: 2.1
PROTHROMBIN TIME, POC: NORMAL

## 2022-03-25 PROCEDURE — 1123F ACP DISCUSS/DSCN MKR DOCD: CPT | Performed by: NURSE PRACTITIONER

## 2022-03-25 PROCEDURE — 85610 PROTHROMBIN TIME: CPT | Performed by: NURSE PRACTITIONER

## 2022-03-25 PROCEDURE — 99213 OFFICE O/P EST LOW 20 MIN: CPT | Performed by: NURSE PRACTITIONER

## 2022-03-25 PROCEDURE — G8427 DOCREV CUR MEDS BY ELIG CLIN: HCPCS | Performed by: NURSE PRACTITIONER

## 2022-03-25 PROCEDURE — 4040F PNEUMOC VAC/ADMIN/RCVD: CPT | Performed by: NURSE PRACTITIONER

## 2022-03-25 PROCEDURE — G8484 FLU IMMUNIZE NO ADMIN: HCPCS | Performed by: NURSE PRACTITIONER

## 2022-03-25 PROCEDURE — 1036F TOBACCO NON-USER: CPT | Performed by: NURSE PRACTITIONER

## 2022-03-25 PROCEDURE — G8417 CALC BMI ABV UP PARAM F/U: HCPCS | Performed by: NURSE PRACTITIONER

## 2022-03-25 ASSESSMENT — ENCOUNTER SYMPTOMS
GASTROINTESTINAL NEGATIVE: 1
BACK PAIN: 1
RESPIRATORY NEGATIVE: 1

## 2022-03-25 NOTE — PROGRESS NOTES
SUBJECTIVE:    Patient ID: Sophia Limon is a 82 y.o. male. CC: Pulmonary embolism, chronic anticoagulation, lumbar stenosis, peripheral neuropathy    HPI: The patient presents to the office today accompanied by his wife for follow-up of chronic medical conditions as well as management of chronic anticoagulation and to discuss possible treatment for peripheral neuropathy. Has a history of bilateral pulmonary embolism. Long-term anticoagulation has been recommended he has been on this for some time. He is taking warfarin. His INR is 2.1. Patient has a history of lumbar stenosis and severe peripheral neuropathy. This has been very problematic for the patient with escalating doses of treatment. This has been complicated by intolerance to some treatments. He has been seen by numerous specialists, most recently Dr. Rima Townsend who has done injections in his back with success in the past.  He is in need of a replacement chair to help with standing. He has a history of hypertension. He denies any chest pain or shortness of breath.       Past Medical History:   Diagnosis Date    Abdominal pain, epigastric     Arthritis     Benign prostatic hypertrophy without urinary obstruction     BPH     Cellulitis and abscess     Chronic rhinitis     Chronic systolic congestive heart failure (HCC) 4/18/2019    COPD (chronic obstructive pulmonary disease) (HCC)     Diabetes mellitus (HCC)     Dysphagia     Erectile dysfunction     GERD (gastroesophageal reflux disease)     Hx of blood clots     Hyperglycemia     Hypertension     Late onset Alzheimer's disease without behavioral disturbance ([facility] [address] 7/23/2020    lumbar radiculopathy     Neuropathy     Nocturia     Osteoarthritis     Other disorders of kidney and ureter in diseases classified elsewhere     Pain, joint, knee     Palpitations     skin cancer     Rt ear, nose, neck, hands/ 2018 lung    SOB (shortness of breath)     Tennis elbow     Tinea pedis     foot        Current Outpatient Medications   Medication Sig Dispense Refill    DULoxetine (CYMBALTA) 30 MG extended release capsule TAKE ONE CAPSULE BY MOUTH ONCE NIGHTLY 90 capsule 3    ketoconazole (NIZORAL) 2 % cream       famotidine (PEPCID) 20 MG tablet TAKE ONE TABLET BY MOUTH TWICE A DAY 60 tablet 1    HYDROcodone-acetaminophen (NORCO) 7.5-325 MG per tablet Take 1 tablet by mouth 4 times daily as needed for Pain for up to 120 days. Intended supply: 30 days 120 tablet 0    warfarin (COUMADIN) 2.5 MG tablet TAKE ONE AND ONE-HALF TABLETS BY MOUTH EVERY THURSDAY, THEN TAKE ONE TABLET BY MOUTH ON ALL OTHER DAYS 90 tablet 3    B-D UF III MINI PEN NEEDLES 31G X 5 MM MISC USE TO INJECT INSULIN FOUR TIMES A  each 2    BASAGLAR KWIKPEN 100 UNIT/ML injection pen INJECT 30 UNITS UNDER THE SKIN ONCE NIGHTLY 5 pen 2    blood glucose test strips (FREESTYLE LITE) strip USE TO TEST THREE TIMES A  strip 5    gabapentin (NEURONTIN) 300 MG capsule TAKE ONE CAPSULE BY MOUTH EVERY MORNING AND TAKE TWO CAPSULES BY MOUTH EVERY NIGHT AT BEDTIME 90 capsule 5    furosemide (LASIX) 40 MG tablet TAKE ONE TABLET BY MOUTH DAILY 90 tablet 1    NOVOLOG FLEXPEN 100 UNIT/ML injection pen INJECT 20 UNITS UNDER THE SKIN THREE TIMES A DAY ACCORDING TO SLIDING SCALE 15 pen 1    lisinopril (PRINIVIL;ZESTRIL) 10 MG tablet TAKE ONE TABLET BY MOUTH DAILY 90 tablet 3    fluocinonide (LIDEX) 0.05 % cream Apply topically 2 times daily.  60 g 2    donepezil (ARICEPT) 10 MG tablet Take 5 mg by mouth daily (with breakfast)       finasteride (PROSCAR) 5 MG tablet Take 5 mg by mouth daily      naphazoline-glycerin (CLEAR EYES REDNESS RELIEF) 0.012-0.2 % SOLN ophthalmic solution Place 1 drop into both eyes 2 times daily (Patient taking differently: Place 1 drop into both eyes 2 times daily as needed ) 1 Bottle 0    tamsulosin (FLOMAX) 0.4 MG capsule Take 1 capsule by mouth daily (Patient taking differently: Take 0.4 mg by mouth 2 times daily ) 90 capsule 1    glucose monitoring kit (FREESTYLE) monitoring kit 1 kit by Does not apply route daily as needed. 1 kit 0    latanoprost (XALATAN) 0.005 % ophthalmic solution Place 1 drop into both eyes nightly. No current facility-administered medications for this visit. Review of Systems   Respiratory: Negative. Cardiovascular: Negative. Negative for chest pain. Gastrointestinal: Negative. Genitourinary: Negative. Musculoskeletal: Positive for back pain. Neurological: Positive for numbness. OBJECTIVE:  Physical Exam  Vitals reviewed. Constitutional:       General: He is not in acute distress. Appearance: Normal appearance. He is well-developed. He is not diaphoretic. HENT:      Head: Normocephalic and atraumatic. Eyes:      General: No scleral icterus. Conjunctiva/sclera: Conjunctivae normal.   Neck:      Vascular: No JVD. Cardiovascular:      Rate and Rhythm: Normal rate and regular rhythm. Pulmonary:      Effort: Pulmonary effort is normal. No respiratory distress. Breath sounds: Normal breath sounds. No wheezing or rales. Abdominal:      General: There is no distension. Palpations: Abdomen is soft. Tenderness: There is no abdominal tenderness. There is no guarding or rebound. Musculoskeletal:      Cervical back: Neck supple. Comments: Ambulating with a walker due to chronic back pain, peripheral neuropathy and lower extremity weakness   Skin:     General: Skin is warm and dry. Comments: Superficial ulceration distal #2 toe right foot without redness, swelling, drainage. No induration or fluctuance. Neurological:      General: No focal deficit present. Mental Status: He is alert and oriented to person, place, and time. Psychiatric:         Behavior: Behavior normal.         Thought Content: Thought content normal.         Cognition and Memory: Memory is impaired.         /80   Pulse 76 Wt 215 lb (97.5 kg)   SpO2 97%   BMI 30.85 kg/m²      PHQ Scores 11/11/2021 1/8/2021 9/28/2020 2/6/2020 9/18/2019 2/11/2019 7/10/2018   PHQ2 Score 0 0 0 0 0 0 1   PHQ9 Score 0 0 0 0 0 0 1     Interpretation of Total Score Depression Severity: 1-4 = Minimal depression, 5-9 = Mild depression, 10-14 = Moderate depression, 15-19 = Moderately severe depression, 20-27 =Severe depression        Assessment/Plan:  Fuentes LUGO was seen today for office visit for anticoagulation management. Diagnoses and all orders for this visit:    Lumbar stenosis with neurogenic claudication  -Chronic, remains problematic  -Continue Cymbalta, gabapentin, Norco  -Follow-up pain management as needed  -     DME Order for (Specify) as OP      Peripheral polyneuropathy  -Chronic, remains problematic  -Continue Cymbalta, gabapentin, Norco  -Follow-up pain management as needed  -     DME Order for (Specify) as OP      Bilateral pulmonary embolism (HCC)  -No recurrence  -Continue anticoagulation    Anticoagulated on Coumadin  - INR therapeutic  - Continue current regimen, see flowsheet  -     POCT INR    Patient is in need of a replacement chair to aid with standing. He has severe lumbar stenosis with neurogenic claudication and peripheral neuropathy which impairs his ambulation and transfer. A device such as a chair to assist with standing is appropriate and should be considered medically necessary.         Ari Jacobson, APRN - CNP

## 2022-03-28 ENCOUNTER — TELEPHONE (OUTPATIENT)
Dept: INTERNAL MEDICINE CLINIC | Age: 87
End: 2022-03-28

## 2022-03-28 DIAGNOSIS — M48.062 LUMBAR STENOSIS WITH NEUROGENIC CLAUDICATION: ICD-10-CM

## 2022-03-28 DIAGNOSIS — G62.9 PERIPHERAL POLYNEUROPATHY: ICD-10-CM

## 2022-03-28 DIAGNOSIS — M15.9 PRIMARY OSTEOARTHRITIS INVOLVING MULTIPLE JOINTS: ICD-10-CM

## 2022-03-28 RX ORDER — HYDROCODONE BITARTRATE AND ACETAMINOPHEN 7.5; 325 MG/1; MG/1
1 TABLET ORAL 4 TIMES DAILY PRN
Qty: 120 TABLET | Refills: 0 | Status: SHIPPED | OUTPATIENT
Start: 2022-03-28 | End: 2022-05-24 | Stop reason: SDUPTHER

## 2022-03-28 NOTE — TELEPHONE ENCOUNTER
Patient calling requesting refill of HYDROcodone-acetaminophen (NORCO) 7.5-325 MG per tablet. Last written 01/06/22  Last OV 03/25/22  Next OV 04/25/22  Last recommended OV 04/22/22     Please send to [facility] on [facility] in [facility].

## 2022-04-04 DIAGNOSIS — E11.22 TYPE 2 DIABETES MELLITUS WITH STAGE 3 CHRONIC KIDNEY DISEASE, WITHOUT LONG-TERM CURRENT USE OF INSULIN (HCC): ICD-10-CM

## 2022-04-04 DIAGNOSIS — N18.30 TYPE 2 DIABETES MELLITUS WITH STAGE 3 CHRONIC KIDNEY DISEASE, WITHOUT LONG-TERM CURRENT USE OF INSULIN (HCC): ICD-10-CM

## 2022-04-04 RX ORDER — INSULIN GLARGINE 100 [IU]/ML
INJECTION, SOLUTION SUBCUTANEOUS
Qty: 5 PEN | Refills: 2 | Status: SHIPPED | OUTPATIENT
Start: 2022-04-04 | End: 2022-09-01 | Stop reason: SDUPTHER

## 2022-04-04 RX ORDER — FAMOTIDINE 20 MG/1
TABLET, FILM COATED ORAL
Qty: 60 TABLET | Refills: 11 | Status: SHIPPED | OUTPATIENT
Start: 2022-04-04

## 2022-04-04 NOTE — TELEPHONE ENCOUNTER
Pt's wife calling and states a refill request was suppose to come over for [employer] send to [facility] in [address] 11-4-21  FOV   4-13-22

## 2022-04-11 RX ORDER — GABAPENTIN 300 MG/1
CAPSULE ORAL
Qty: 90 CAPSULE | Refills: 5 | Status: SHIPPED | OUTPATIENT
Start: 2022-04-11 | End: 2022-10-10

## 2022-04-12 DIAGNOSIS — C34.92 NON-SMALL CELL CANCER OF LEFT LUNG (HCC): ICD-10-CM

## 2022-04-12 DIAGNOSIS — E11.22 TYPE 2 DIABETES MELLITUS WITH STAGE 4 CHRONIC KIDNEY DISEASE, WITHOUT LONG-TERM CURRENT USE OF INSULIN (HCC): ICD-10-CM

## 2022-04-12 DIAGNOSIS — N18.4 TYPE 2 DIABETES MELLITUS WITH STAGE 4 CHRONIC KIDNEY DISEASE, WITHOUT LONG-TERM CURRENT USE OF INSULIN (HCC): ICD-10-CM

## 2022-04-12 LAB
A/G RATIO: 1.4 (ref 1.1–2.2)
ALBUMIN SERPL-MCNC: 3.5 G/DL (ref 3.4–5)
ALP BLD-CCNC: 79 U/L (ref 40–129)
ALT SERPL-CCNC: 11 U/L (ref 10–40)
ANION GAP SERPL CALCULATED.3IONS-SCNC: 13 MMOL/L (ref 3–16)
AST SERPL-CCNC: 11 U/L (ref 15–37)
BILIRUB SERPL-MCNC: 0.4 MG/DL (ref 0–1)
BUN BLDV-MCNC: 34 MG/DL (ref 7–20)
CALCIUM SERPL-MCNC: 7.9 MG/DL (ref 8.3–10.6)
CHLORIDE BLD-SCNC: 106 MMOL/L (ref 99–110)
CO2: 24 MMOL/L (ref 21–32)
CREAT SERPL-MCNC: 2.7 MG/DL (ref 0.8–1.3)
GFR AFRICAN AMERICAN: 27
GFR NON-AFRICAN AMERICAN: 23
GLUCOSE BLD-MCNC: 166 MG/DL (ref 70–99)
POTASSIUM SERPL-SCNC: 4.2 MMOL/L (ref 3.5–5.1)
SODIUM BLD-SCNC: 143 MMOL/L (ref 136–145)
TOTAL PROTEIN: 6 G/DL (ref 6.4–8.2)
TSH SERPL DL<=0.05 MIU/L-ACNC: 2.1 UIU/ML (ref 0.27–4.2)

## 2022-04-13 ENCOUNTER — OFFICE VISIT (OUTPATIENT)
Dept: ENDOCRINOLOGY | Age: 87
End: 2022-04-13
Payer: MEDICARE

## 2022-04-13 VITALS
WEIGHT: 216 LBS | DIASTOLIC BLOOD PRESSURE: 92 MMHG | SYSTOLIC BLOOD PRESSURE: 165 MMHG | RESPIRATION RATE: 16 BRPM | HEIGHT: 70 IN | BODY MASS INDEX: 30.92 KG/M2 | HEART RATE: 61 BPM

## 2022-04-13 DIAGNOSIS — E78.2 MIXED HYPERLIPIDEMIA: ICD-10-CM

## 2022-04-13 LAB
CREATININE URINE: 80.9 MG/DL (ref 39–259)
ESTIMATED AVERAGE GLUCOSE: 188.6 MG/DL
HBA1C MFR BLD: 8.2 %
MICROALBUMIN UR-MCNC: 158.5 MG/DL
MICROALBUMIN/CREAT UR-RTO: 1959.2 MG/G (ref 0–30)

## 2022-04-13 PROCEDURE — 99214 OFFICE O/P EST MOD 30 MIN: CPT | Performed by: INTERNAL MEDICINE

## 2022-04-13 PROCEDURE — 3052F HG A1C>EQUAL 8.0%<EQUAL 9.0%: CPT | Performed by: INTERNAL MEDICINE

## 2022-04-13 PROCEDURE — 1036F TOBACCO NON-USER: CPT | Performed by: INTERNAL MEDICINE

## 2022-04-13 PROCEDURE — G8427 DOCREV CUR MEDS BY ELIG CLIN: HCPCS | Performed by: INTERNAL MEDICINE

## 2022-04-13 PROCEDURE — G8417 CALC BMI ABV UP PARAM F/U: HCPCS | Performed by: INTERNAL MEDICINE

## 2022-04-13 PROCEDURE — 4040F PNEUMOC VAC/ADMIN/RCVD: CPT | Performed by: INTERNAL MEDICINE

## 2022-04-13 PROCEDURE — 1123F ACP DISCUSS/DSCN MKR DOCD: CPT | Performed by: INTERNAL MEDICINE

## 2022-04-13 NOTE — Clinical Note
Hi there I saw Mr. Flex Reed had made a few changes in his insulin regimen, since they are seeing multiple physicians to consolidate their care I am sending them back to you for continued diabetes care if that is okay with you otherwise they are more than welcome to follow back with me

## 2022-04-13 NOTE — PROGRESS NOTES
Jayla Trevizo is a 82 y.o. male is seen  for evaluation and management of uncontrolled Type 2  diabetes. Jayla Trevizo was diagnosed with Diabetes mellitus aprox in 2009   Diabetes was diagnosed at routine screening . Jayla Trevizo got diabetic education in the past.  Comorbid conditions: Neuropathy, Nephropathy, Retinopathy and Chronic Kidney Disease    INTERIM:    Diabetes  He presents for his follow-up diabetic visit. He has type 2 diabetes mellitus. No MedicAlert identification noted. The initial diagnosis of diabetes was made 10 years ago. His disease course has been worsening. There are no hypoglycemic associated symptoms. Pertinent negatives for diabetes include no polyphagia, no polyuria and no weight loss. There are no hypoglycemic complications. Symptoms are worsening. Diabetic complications include peripheral neuropathy and retinopathy. Risk factors for coronary artery disease include diabetes mellitus, dyslipidemia, family history and male sex. He is compliant with treatment most of the time. His weight is increasing steadily. He is following a diabetic diet. When asked about meal planning, he reported none. He has not had a previous visit with a dietitian. He rarely participates in exercise. There is no change in his home blood glucose trend. His breakfast blood glucose is taken between 7-8 am. His breakfast blood glucose range is generally 180-200 mg/dl. An ACE inhibitor/angiotensin II receptor blocker is being taken. He does not see a podiatrist.Eye exam is current.     -Patient is in a wheelchair denies any hypoglycemia  He has been taking basal bolus regimen   Wife gives injections     Denies any hypoglycemia     Weight trend: stable  Prior visit with dietician: no  Current diet: on average, 3 meals per day  Current exercise: rare    Has there been any hospitalization, surgery or major illness since the last visit? No   Has there been any new school, family or social problems since the last visit?   No  Has there been any new family history of members with diabetes, heart disease, stroke, or endocrine related problems since the last visit?   No    He has Lung cancer s/p chemo and radiation   He has CHF and follows with cardiology       Past Medical History:   Diagnosis Date    Abdominal pain, epigastric     Arthritis     Benign prostatic hypertrophy without urinary obstruction     BPH     Cellulitis and abscess     Chronic rhinitis     Chronic systolic congestive heart failure ([facility] [address] 4/18/2019    COPD (ch[facility] obstructive pulmonary disease) ([facility])     Diabetes mellitus (HCC)     Dysphagia     Erectile dysfunction     GERD (gastroesophageal reflux disease)     Hx of blood clots     Hyperglycemia     Hypertension     Late onset Alzheimer's disease without behavioral disturbance ([facility] [address] 7/23/2020    lumbar radiculopathy     Neuropathy     Nocturia     Osteoarthritis     Other disorders of kidney and ureter in diseases classified elsewhere     Pain, joint, knee     Palpitations     skin cancer     Rt ear, nose, neck, hands/ 2018 lung    SOB (shortness of breath)     Tennis elbow     Tinea pedis     foot      Patient Active Problem List   Diagnosis    Chronic rhinitis    Primary osteoarthritis involving multiple joints    Erectile dysfunction    Glaucoma    Essential hypertension    DDD (degenerative disc disease), lumbar    Lumbar stenosis with neurogenic claudication    Diabetes education, encounter for    Hearing loss of both ears    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    CKD (chronic kidney disease) stage 3, GFR 30-59 ml/min (HCC)    Bilateral pulmonary embolism (HCC)    Overweight    Diverticulosis of large intestine without diverticulitis    Chronic obstructive pulmonary disease (HCC)    Benign prostatic hyperplasia with urinary hesitancy    Gastroesophageal reflux disease without esophagitis    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)    Type 2 diabetes mellitus with stage 4 chronic kidney disease, without long-term current use of insulin (HCC)    Pulmonary nodule    Chronic systolic congestive heart failure (HCC)    Vitreous degeneration, bilateral    Secondary cataract    Primary open-angle glaucoma, bilateral, moderate stage    Posterior vitreous detachment of both eyes    Peripapillary atrophy of both eyes    Nodular corneal degeneration, right eye    Neoplastic malignant related fatigue    Entropion of left lower eyelid    Early stage nonexudative age-related macular degeneration of both eyes    Diabetic peripheral neuropathy (HCC)    Dermatochalasis of right upper eyelid    Bilateral posterior capsular opacification    Aortic valvular disease    Phimosis of penis    Renal insufficiency    Recurrent UTI    Cerebral amyloid angiopathy (HCC)    Late onset Alzheimer's disease without behavioral disturbance (HCC)    Thrombocytopenia, unspecified    Skin ulcer of second toe of right foot, limited to breakdown of skin (HCC)    Chronic kidney disease (CKD) stage G4/A3, severely decreased glomerular filtration rate (GFR) between 15-29 mL/min/1.73 square meter and albuminuria creatinine ratio greater than 300 mg/g [facility])     Past Surgical History:   Procedure Laterality Date    CATARACT REMOVAL Bilateral 09/2014    CHOLECYSTECTOMY      CIRCUMCISION N/A 11/15/2019    DORSAL SLIT performed by Robert Peters MD at [place] [address] COLONOSCOPY  11/28/2011    Dr. Angeline Espinosa - mild sigmoid diverticulosis    CYSTOSCOPY N/A 1/5/2020    CYSTOSCOPY, EVACUATION OF CLOTS performed by Nicolette Bains MD at [address] [place] [address] Right 09/09/2013    Dr. Ivonne Aranda - block for pain relief    JOINT REPLACEMENT Bilateral     knees    KNEE PROSTHESIS REMOVAL      LUMBAR NERVE BLOCK N/A 8/26/2019    L4/5 INTERLAMINAR EPIDURAL STEROID INJECTION WITH FLUOROSCOPY performed by Alejandra Kelly MD at [address] [place] [address] Left 05/09/2018     Lilliam - CT guided    MOHS SURGERY Bilateral     NERVE SURGERY N/A 2019    L4L5 INTERLAMINAR EPIDURAL STEROID INJECTION WITH FLUOROSCOPY performed by Guillermina Rinaldi MD at [address] [place] [address]      multiple lumbar ESIs    PAIN MANAGEMENT PROCEDURE N/A 2020    L4-L5 INTERLAMINAR EPIDURAL STEROID INJECTION WITH FLUOROSCOPY performed by Guillermina Rinaldi MD at [address] [place] [address] Right 2017    Dr. Miranda Good - superficial, w/excision of parotid tail & dissection/preservation of facial nerve    ND NJX AA&/STRD TFRML EPI LUMBAR/SACRAL 1 LEVEL Right 2018    RIGHT L4, L5 LUMBAR TRANSFORAMINAL EPIDURAL STEROID INJECTION WITH FLUOROSCOPY performed by Guillermina Rinaldi MD at [place] [address] Left 2019    TOTAL KNEE ARTHROPLASTY      right x1 and left x2    TUNNELED VENOUS PORT PLACEMENT  2018    Left [facility]CV infusaport placement    UPPER GASTROINTESTINAL ENDOSCOPY  2018    Dr. Evelia Johnson - mild non-obstructive ring distal esophagus     Social History     Socioeconomic History    Marital status:      Spouse name: Jamison Aranda Number of children: Not on file    Years of education: Not on file    Highest education level: Not on file   Occupational History    Not on file   Tobacco Use    Smoking status: Former Smoker     Packs/day: 1.00     Years: 40.00     Pack years: 40.00     Quit date: 8/15/1970     Years since quittin.6    Smokeless tobacco: Never Used   Vaping Use    Vaping Use: Never used   Substance and Sexual Activity    Alcohol use: No    Drug use: No    Sexual activity: Yes     Partners: Female   Other Topics Concern    Not on file   Social History Narrative    Not on file     Social Determinants of Health     Financial Resource Strain:     Difficulty of Paying Living Expenses: Not on file   Food Insecurity:     Worried About [address] in the Last Year: Not on file    [address] in the Last Year: Not on file   Transportation Needs:     Lack of Transportation (Medical): Not on file    Lack of Transportation (Non-Medical): Not on file   Physical Activity:     Days of Exercise per Week: Not on file    Minutes of Exercise per Session: Not on file   Stress:     Feeling of Stress : Not on file   Social Connections:     Frequency of Communication with Friends and Family: Not on file    Frequency of Social Gatherings with Friends and Family: Not on file    Attends [employer] Services: Not on file    Active Member of Clubs or Organizations: Not on file    Attends Club or Organization Meetings: Not on file    Marital Status: Not on file   Intimate Partner Violence:     Fear of Current or Ex-Partner: Not on file    Emotionally Abused: Not on file    Physically Abused: Not on file    Sexually Abused: Not on file   Housing Stability:     Unable to Pay for Housing in the Last Year: Not on file    Number of Places Lived in the Last Year: Not on file    Unstable Housing in the Last Year: Not on file     Family History   Problem Relation Age of Onset    Arthritis Father     Diabetes Father     Diabetes Brother      Current Outpatient Medications   Medication Sig Dispense Refill    gabapentin (NEURONTIN) 300 MG capsule TAKE ONE CAPSULE BY MOUTH EVERY MORNING AND TAKE TWO CAPSULES BY MOUTH AT BEDTIME 90 capsule 5    BASAGLAR KWIKPEN 100 UNIT/ML injection pen INJECT 30 UNITS UNDER THE SKIN ONCE NIGHTLY 5 pen 2    famotidine (PEPCID) 20 MG tablet TAKE ONE TABLET BY MOUTH TWICE A DAY 60 tablet 11    HYDROcodone-acetaminophen (NORCO) 7.5-325 MG per tablet Take 1 tablet by mouth 4 times daily as needed for Pain for up to 120 days.  Intended supply: 30 days 120 tablet 0    DULoxetine (CYMBALTA) 30 MG extended release capsule TAKE ONE CAPSULE BY MOUTH ONCE NIGHTLY 90 capsule 3    ketoconazole (NIZORAL) 2 % cream       warfarin (COUMADIN) 2.5 MG tablet TAKE ONE AND ONE-HALF TABLETS BY MOUTH EVERY THURSDAY, THEN TAKE ONE TABLET BY MOUTH ON ALL OTHER DAYS 90 tablet 3    B-D UF III MINI PEN NEEDLES 31G X 5 MM MISC USE TO INJECT INSULIN FOUR TIMES A  each 2    blood glucose test strips (FREESTYLE LITE) strip USE TO TEST THREE TIMES A  strip 5    furosemide (LASIX) 40 MG tablet TAKE ONE TABLET BY MOUTH DAILY 90 tablet 1    NOVOLOG FLEXPEN 100 UNIT/ML injection pen INJECT 20 UNITS UNDER THE SKIN THREE TIMES A DAY ACCORDING TO SLIDING SCALE 15 pen 1    lisinopril (PRINIVIL;ZESTRIL) 10 MG tablet TAKE ONE TABLET BY MOUTH DAILY 90 tablet 3    fluocinonide (LIDEX) 0.05 % cream Apply topically 2 times daily. 60 g 2    donepezil (ARICEPT) 10 MG tablet Take 5 mg by mouth daily (with breakfast)       finasteride (PROSCAR) 5 MG tablet Take 5 mg by mouth daily      naphazoline-glycerin (CLEAR EYES REDNESS RELIEF) 0.012-0.2 % SOLN ophthalmic solution Place 1 drop into both eyes 2 times daily (Patient taking differently: Place 1 drop into both eyes 2 times daily as needed ) 1 Bottle 0    tamsulosin (FLOMAX) 0.4 MG capsule Take 1 capsule by mouth daily (Patient taking differently: Take 0.4 mg by mouth 2 times daily ) 90 capsule 1    glucose monitoring kit (FREESTYLE) monitoring kit 1 kit by Does not apply route daily as needed. 1 kit 0    latanoprost (XALATAN) 0.005 % ophthalmic solution Place 1 drop into both eyes nightly. No current facility-administered medications for this visit.      Allergies   Allergen Reactions    Celebrex [Celecoxib] Other (See Comments)     hallucinations    Elavil [Amitriptyline]      Severe fatigue      Naproxen      Kidney damage    Percocet [Oxycodone-Acetaminophen] Other (See Comments)     confusion    Lyrica [Pregabalin] Palpitations     Fatigue     Family Status   Relation Name Status    Father  (Not Specified)    Brother  (Not Specified)     OBJECTIVE:  BP (!) 165/92   Pulse 61   Resp 16   Ht 5' 10\" (1.778 m)   Wt 216 lb (98 kg)   BMI 30.99 kg/m² Wt Readings from Last 3 Encounters:   04/13/22 216 lb (98 kg)   03/25/22 215 lb (97.5 kg)   02/22/22 216 lb (98 kg)       EXAM   Constitutional: no acute distress, well appearing, well nourished  Psychiatric: oriented to person, place and time, judgement, insight and normal, recent and remote memory and intact and mood, affect are normal  Skin: skin and subcutaneous tissue is normal without mass,   Head and Face: examination of head and face revealed no abnormalities  Eyes: no lid or conjunctival swelling, no erythema or discharge, pupils are normal,   Ears/Nose: external inspection of ears and nose revealed no abnormalities, hearing is grossly normal  Oropharynx/Mouth/Face: lips, tongue and gums are normal with no lesions, the voice quality was normal  Neck: neck is supple and symmetric, with midline trachea and no masses, thyroid is normal    Pulmonary: no increased work of breathing or signs of respiratory distress, lungs are clear to auscultation  Cardiovascular: normal heart rate and rhythm, normal S1 and S2,         Lab Results   Component Value Date    LABA1C 8.2 04/12/2022    LABA1C 7.9 12/13/2021    LABA1C 8.1 01/11/2021         ASSESSMENT/PLAN:  1. Type 2 diabetes mellitus with stage 3 chronic kidney disease,  long-term current use of insulin 6.5>>7.2>>8.2>>9.5>>9.1>>7.3>>8.1    Patient has been taking 30 units of Basaglar advised to increase to 34 units   He has been taking novolog with each meal aprox 4-6 units. He is using sliding scale of insulin to cover meals --we discussed this in detail. Fasting 180---210  He will start  taking meal boluses with lunch   Increase lunch bolus to 6 units   Checks fingerstick blood glucose before breakfast and dinner   Due to his elevated creatinine most of oral hypoglycemic agent not recommended  Patient will follow up with his primary care physician whom he sees on regular basis every month, patient does tell me that he has to see numerous physicians.   So advised that they can follow-up with the primary care physician for the [facility] care. Health Maintenance       Last Eye Exam: advised to have annual dilated eye exam. his last eye exam was in April 2022 retinopathy gets injection   Last Podiatry Exam:  Podiatry visit in jan 2022  Lipids: Last LDL level was 64 in march 2019   Microalbumin/Creatinine Ratio: he has CKD follows with Dr Snow Arteaga     . Education: Reviewed ABCs of diabetes management (respective goals in parentheses): A1C (<7), blood pressure (<130/80), and cholesterol (LDL <100). 2. Diabetic peripheral neuropathy   He is on neurontin and cymbalta   Denies any worsening of pain     3. Benign prostatic hyperplasia with urinary hesitancy  Stable     4. CKD (chronic kidney disease) stage 3, GFR 30-59 ml/min   Follows with Dr Snow Arteaga   Creat 1.9     5. Essential hypertension  Well controlled   Continue with current regimen   Denies any headaches denies any chest pain     6. Non-small cell cancer of left lung   In remission     7. Chronic systolic congestive heart failure ([address]  Follows withcardiology       Reviewed and/or ordered clinical lab results yes   Reviewed and/or ordered radiology tests Yes  Reviewed and/or ordered other diagnostic tests yes   Made a decision to obtain old records yes   Reviewed and summarized old records yes     Dionne Sims was counseled regarding symptoms of current diagnosis, course and complications of disease if inadequately treated, side effects of medications, diagnosis, treatment options, and prognosis, risks, benefits, complications, and alternatives of treatment, labs, imaging and other studies and treatment targets and goals. He understands instructions and counseling       No follow-ups on file. Please note that some or all of this report was generated using voice recognition software.  Please notify me in case of any questions about the content of this document, as some errors in transcription may have occurred Abhinav Sheridan

## 2022-04-25 ENCOUNTER — HOSPITAL ENCOUNTER (OUTPATIENT)
Age: 87
Discharge: HOME | End: 2022-04-25
Payer: MEDICARE

## 2022-04-25 ENCOUNTER — OFFICE VISIT (OUTPATIENT)
Dept: INTERNAL MEDICINE CLINIC | Age: 87
End: 2022-04-25
Payer: MEDICARE

## 2022-04-25 VITALS
SYSTOLIC BLOOD PRESSURE: 138 MMHG | OXYGEN SATURATION: 98 % | WEIGHT: 215 LBS | HEART RATE: 46 BPM | BODY MASS INDEX: 30.85 KG/M2 | DIASTOLIC BLOOD PRESSURE: 66 MMHG

## 2022-04-25 DIAGNOSIS — R00.1 BRADYCARDIA: ICD-10-CM

## 2022-04-25 DIAGNOSIS — I26.99 BILATERAL PULMONARY EMBOLISM (HCC): Primary | ICD-10-CM

## 2022-04-25 DIAGNOSIS — N18.4 CHRONIC KIDNEY DISEASE (CKD) STAGE G4/A3, SEVERELY DECREASED GLOMERULAR FILTRATION RATE (GFR) BETWEEN 15-29 ML/MIN/1.73 SQUARE METER AND ALBUMINURIA CREATININE RATIO GREATER THAN 300 MG/G (HCC): ICD-10-CM

## 2022-04-25 DIAGNOSIS — Z79.01 ANTICOAGULATED ON COUMADIN: ICD-10-CM

## 2022-04-25 DIAGNOSIS — I49.9 IRREGULARLY IRREGULAR HEART RHYTHM: ICD-10-CM

## 2022-04-25 LAB
ALBUMIN SERPL-MCNC: 3.6 G/DL (ref 3.4–5)
ANION GAP SERPL CALCULATED.3IONS-SCNC: 17 MMOL/L (ref 3–16)
BUN BLDV-MCNC: 37 MG/DL (ref 7–20)
CALCIUM SERPL-MCNC: 8.2 MG/DL (ref 8.3–10.6)
CHLORIDE BLD-SCNC: 103 MMOL/L (ref 99–110)
CO2: 25 MMOL/L (ref 21–32)
CREAT SERPL-MCNC: 2.9 MG/DL (ref 0.8–1.3)
EKG ATRIAL RATE: 95 BPM
EKG DIAGNOSIS: NORMAL
EKG P AXIS: -1 DEGREES
EKG P-R INTERVAL: 184 MS
EKG Q-T INTERVAL: 372 MS
EKG QRS DURATION: 84 MS
EKG QTC CALCULATION (BAZETT): 467 MS
EKG R AXIS: 34 DEGREES
EKG T AXIS: 36 DEGREES
EKG VENTRICULAR RATE: 95 BPM
GFR AFRICAN AMERICAN: 25
GFR NON-AFRICAN AMERICAN: 21
GLUCOSE BLD-MCNC: 165 MG/DL (ref 70–99)
HCT VFR BLD CALC: 38.3 % (ref 40.5–52.5)
HEMOGLOBIN: 12.9 G/DL (ref 13.5–17.5)
INTERNATIONAL NORMALIZATION RATIO, POC: 2.8
MCH RBC QN AUTO: 29.5 PG (ref 26–34)
MCHC RBC AUTO-ENTMCNC: 33.8 G/DL (ref 31–36)
MCV RBC AUTO: 87.2 FL (ref 80–100)
PDW BLD-RTO: 17 % (ref 12.4–15.4)
PHOSPHORUS: 3.7 MG/DL (ref 2.5–4.9)
PLATELET # BLD: 157 K/UL (ref 135–450)
PMV BLD AUTO: 9.2 FL (ref 5–10.5)
POTASSIUM SERPL-SCNC: 4.3 MMOL/L (ref 3.5–5.1)
PROTHROMBIN TIME, POC: NORMAL
RBC # BLD: 4.39 M/UL (ref 4.2–5.9)
SODIUM BLD-SCNC: 145 MMOL/L (ref 136–145)
WBC # BLD: 6.3 K/UL (ref 4–11)

## 2022-04-25 PROCEDURE — G8417 CALC BMI ABV UP PARAM F/U: HCPCS | Performed by: NURSE PRACTITIONER

## 2022-04-25 PROCEDURE — 93005 ELECTROCARDIOGRAM TRACING: CPT

## 2022-04-25 PROCEDURE — 1123F ACP DISCUSS/DSCN MKR DOCD: CPT | Performed by: NURSE PRACTITIONER

## 2022-04-25 PROCEDURE — 99213 OFFICE O/P EST LOW 20 MIN: CPT | Performed by: NURSE PRACTITIONER

## 2022-04-25 PROCEDURE — 85610 PROTHROMBIN TIME: CPT | Performed by: NURSE PRACTITIONER

## 2022-04-25 PROCEDURE — 93010 ELECTROCARDIOGRAM REPORT: CPT | Performed by: INTERNAL MEDICINE

## 2022-04-25 PROCEDURE — G8427 DOCREV CUR MEDS BY ELIG CLIN: HCPCS | Performed by: NURSE PRACTITIONER

## 2022-04-25 PROCEDURE — 4040F PNEUMOC VAC/ADMIN/RCVD: CPT | Performed by: NURSE PRACTITIONER

## 2022-04-25 PROCEDURE — 1036F TOBACCO NON-USER: CPT | Performed by: NURSE PRACTITIONER

## 2022-04-25 ASSESSMENT — ENCOUNTER SYMPTOMS
BACK PAIN: 1
GASTROINTESTINAL NEGATIVE: 1
RESPIRATORY NEGATIVE: 1

## 2022-04-25 NOTE — PROGRESS NOTES
SUBJECTIVE:    Patient ID: Chad Martin is a 82 y.o. male. CC: Pulmonary embolism, chronic anticoagulation    HPI: Patient presents to the office today for follow-up of chronic medical conditions and management of chronic anticoagulation. Patient has a history of pulmonary embolism. He is chronically anticoagulated on warfarin. He is compliant with his medication regimen. His INR is therapeutic at 2.8. Patient is noted to have low heart rate today. He denies any symptomology. Past Medical History:   Diagnosis Date    Abdominal pain, epigastric     Arthritis     Benign prostatic hypertrophy without urinary obstruction     BPH     Cellulitis and abscess     Chronic rhinitis     Chronic systolic congestive heart failure (HCC) 4/18/2019    COPD [facility]chronic obstructive pulmonary disease) ([facility])     Diabetes mellitus ([facility])     Dysphagia     Erectile dysfunction     GERD (gastroesophageal reflux disease)     Hx of blood clots     Hyperglycemia     Hypertension     Late onset Alzheimer's disease without behavioral disturbance ([facility]) 7/23/2020    lumbar radiculopathy     Neuropathy     Nocturia     Osteoarthritis     Other disorders of kidney and ureter in diseases classified elsewhere     Pain, joint, knee     Palpitations     skin cancer     Rt ear, nose, neck, hands/ 2018 lung    SOB (shortness of breath)     Tennis elbow     Tinea pedis     foot        Current Outpatient Medications   Medication Sig Dispense Refill    gabapentin (NEURONTIN) 300 MG capsule TAKE ONE CAPSULE BY MOUTH EVERY MORNING AND TAKE TWO CAPSULES BY MOUTH AT BEDTIME 90 capsule 5    BASAGLAR KWIKPEN 100 UNIT/ML injection pen INJECT 30 UNITS UNDER THE SKIN ONCE NIGHTLY 5 pen 2    famotidine (PEPCID) 20 MG tablet TAKE ONE TABLET BY MOUTH TWICE A DAY 60 tablet 11    HYDROcodone-acetaminophen (NORCO) 7.5-325 MG per tablet Take 1 tablet by mouth 4 times daily as needed for Pain for up to 120 days.  Intended supply: 30 days 120 tablet 0    DULoxetine (CYMBALTA) 30 MG extended release capsule TAKE ONE CAPSULE BY MOUTH ONCE NIGHTLY 90 capsule 3    ketoconazole (NIZORAL) 2 % cream       warfarin (COUMADIN) 2.5 MG tablet TAKE ONE AND ONE-HALF TABLETS BY MOUTH EVERY THURSDAY, THEN TAKE ONE TABLET BY MOUTH ON ALL OTHER DAYS 90 tablet 3    B-D UF III MINI PEN NEEDLES 31G X 5 MM MISC USE TO INJECT INSULIN FOUR TIMES A  each 2    blood glucose test strips (FREESTYLE LITE) strip USE TO TEST THREE TIMES A  strip 5    furosemide (LASIX) 40 MG tablet TAKE ONE TABLET BY MOUTH DAILY 90 tablet 1    NOVOLOG FLEXPEN 100 UNIT/ML injection pen INJECT 20 UNITS UNDER THE SKIN THREE TIMES A DAY ACCORDING TO SLIDING SCALE 15 pen 1    lisinopril (PRINIVIL;ZESTRIL) 10 MG tablet TAKE ONE TABLET BY MOUTH DAILY 90 tablet 3    fluocinonide (LIDEX) 0.05 % cream Apply topically 2 times daily. 60 g 2    donepezil (ARICEPT) 10 MG tablet Take 5 mg by mouth daily (with breakfast)       finasteride (PROSCAR) 5 MG tablet Take 5 mg by mouth daily      naphazoline-glycerin (CLEAR EYES REDNESS RELIEF) 0.012-0.2 % SOLN ophthalmic solution Place 1 drop into both eyes 2 times daily (Patient taking differently: Place 1 drop into both eyes 2 times daily as needed ) 1 Bottle 0    tamsulosin (FLOMAX) 0.4 MG capsule Take 1 capsule by mouth daily (Patient taking differently: Take 0.4 mg by mouth 2 times daily ) 90 capsule 1    glucose monitoring kit (FREESTYLE) monitoring kit 1 kit by Does not apply route daily as needed. 1 kit 0    latanoprost (XALATAN) 0.005 % ophthalmic solution Place 1 drop into both eyes nightly. No current facility-administered medications for this visit. Review of Systems   Constitutional: Positive for fatigue. Respiratory: Negative. Cardiovascular: Negative. Gastrointestinal: Negative. Genitourinary: Negative. Musculoskeletal: Positive for back pain.          OBJECTIVE:  Physical Exam  Vitals reviewed. Constitutional:       General: He is not in acute distress. Appearance: Normal appearance. He is well-developed. He is not diaphoretic. HENT:      Head: Normocephalic and atraumatic. Eyes:      General: No scleral icterus. Conjunctiva/sclera: Conjunctivae normal.   Neck:      Vascular: No JVD. Cardiovascular:      Rate and Rhythm: Bradycardia present. Rhythm regularly irregular. Pulmonary:      Effort: Pulmonary effort is normal. No respiratory distress. Breath sounds: Normal breath sounds. No wheezing or rales. Abdominal:      General: There is no distension. Palpations: Abdomen is soft. Tenderness: There is no abdominal tenderness. There is no guarding or rebound. Musculoskeletal:      Cervical back: Neck supple. Comments: Ambulating with a walker due to chronic back pain, peripheral neuropathy and lower extremity weakness   Skin:     General: Skin is warm and dry. Neurological:      General: No focal deficit present. Mental Status: He is alert and oriented to person, place, and time. Psychiatric:         Behavior: Behavior normal.         Thought Content: Thought content normal.         Cognition and Memory: Memory is impaired. /66   Pulse (!) 46   Wt 215 lb (97.5 kg)   SpO2 98%   BMI 30.85 kg/m²      PHQ Scores 11/11/2021 1/8/2021 9/28/2020 2/6/2020 9/18/2019 2/11/2019 7/10/2018   PHQ2 Score 0 0 0 0 0 0 1   PHQ9 Score 0 0 0 0 0 0 1     Interpretation of Total Score Depression Severity: 1-4 = Minimal depression, 5-9 = Mild depression, 10-14 = Moderate depression, 15-19 = Moderately severe depression, 20-27 =Severe depression        ASSESSMENT/PLAN:  Yocasta Bose was seen today for office visit for anticoagulation management.   Diagnoses and all orders for this visit:    Bilateral pulmonary embolism (HCC)  -No episode of recurrence  -Continue anticoagulation    Anticoagulated on Coumadin  - INR 2.8  - Continue current regimen, see flow sheets  - POCT INR    Bradycardia  -     EKG 12 lead; Future    Irregularly irregular heart rhythm  -     EKG 12 lead; Future    Patient is noted to have a low pulse today at 46. On examination, heart sounds are irregularly irregular. Pulse oximetry shows heart rate as low as 40, as high as 60. Concern will be for atrial fibrillation. Patient does not have a history of atrial fibrillation although he is already anticoagulated on warfarin due to history of recurrent pulmonary embolism. He has labs today for nephrology. He will get an EKG done at the hospital.  We will advise his cardiologist pending the results.         Angelo Salguero, SHA - CNP

## 2022-05-09 NOTE — PROGRESS NOTES
[facility]   Cardiac Follow up    Referring Provider:  SHA Whalen - MARTIN     Chief Complaint   Patient presents with    Hypertension    Congestive Heart Failure    6 Month Follow-Up      History of Present Illness:   Mr. Amelia Eldridge has a history of cardiomyopathy, hypertension, and valvular disease. He also has chronic obstructive pulmonary disease. He is a remote cigarette smoker, history of diabetes, history of lung cancer (could not be  treated surgically), was treated with radiation and chemotherapy. History of pulmonary embolus in the past, he takes warfarin. He follows with Dr. Sherie aJcinto for renal insufficiency. Today he is here for 6 month follow up. He is here with his wife and he is using a rolling walker with a seat. He states he is feeling good, he has been outside mowing his lawn some. Denies any shortness of breath. His wife reports that he snores. Past Medical History:   has a past medical history of Abdominal pain, epigastric, Arthritis, Benign prostatic hypertrophy without urinary obstruction, BPH, Cellulitis and abscess, Chronic rhinitis, Chronic systolic congestive heart failure ([address] COPD (chronic obstructive pulmonary disease) ([address] Diabetes mellitus ([address] Dysphagia, Erectile dysfunction, GERD (gastroesophageal reflux disease), Hx of blood clots, Hyperglycemia, Hypertension, Late onset Alzheimer's disease without behavioral disturbance ([address] lumbar radiculopathy, Neuropathy, Nocturia, Osteoarthritis, Other disorders of kidney and ureter in diseases classified elsewhere, Pain, joint, knee, Palpitations, skin cancer, SOB (shortness of breath), Tennis elbow, and Tinea pedis. Surgical History:   has a past surgical history that includes Knee Prosthesis Removal; Cholecystectomy; Colonoscopy (11/28/2011); Cataract removal (Bilateral, 09/2014); Parotidectomy (Right, 01/26/2017); skin biopsy; Upper gastrointestinal endoscopy (03/19/2018);  Total knee arthroplasty; hip surgery (Right, 09/09/2013); other surgical history; Lung biopsy (Left, 05/09/2018); Tunneled venous port placement (06/22/2018); pr njx aa&/strd tfrml epi lumbar/sacral 1 level (Right, 11/21/2018); eye surgery; Mohs surgery (Bilateral); Skin cancer excision (Left, 08/14/2019); lumbar nerve block (N/A, 8/26/2019); Circumcision (N/A, 11/15/2019); joint replacement (Bilateral); Nerve Surgery (N/A, 12/20/2019); Cystoscopy (N/A, 1/5/2020); and Pain management procedure (N/A, 7/20/2020). Social History:   reports that he quit smoking about 51 years ago. He has a 40.00 pack-year smoking history. He has never used smokeless tobacco. He reports that he does not drink alcohol and does not use drugs. Family History:  family history includes Arthritis in his father; Diabetes in his brother and father. Home Medications:  Prior to Admission medications    Medication Sig Start Date End Date Taking? Authorizing Provider   gabapentin (NEURONTIN) 300 MG capsule TAKE ONE CAPSULE BY MOUTH EVERY MORNING AND TAKE TWO CAPSULES BY MOUTH AT BEDTIME 4/11/22 10/8/22 Yes SHA Quintanilla CNP   BASAGLAR KWIKPEN 100 UNIT/ML injection pen INJECT 30 UNITS UNDER THE SKIN ONCE NIGHTLY 4/4/22  Yes Herberth Dong MD   famotidine (PEPCID) 20 MG tablet TAKE ONE TABLET BY MOUTH TWICE A DAY 4/4/22  Yes SHA Quintanilla CNP   HYDROcodone-acetaminophen (NORCO) 7.5-325 MG per tablet Take 1 tablet by mouth 4 times daily as needed for Pain for up to 120 days.  Intended supply: 30 days 3/28/22 7/26/22 Yes SHA Quintanilla CNP   DULoxetine (CYMBALTA) 30 MG extended release capsule TAKE ONE CAPSULE BY MOUTH ONCE NIGHTLY 2/23/22  Yes SHA Quintanilla CNP   ketoconazole (NIZORAL) 2 % cream  2/17/22  Yes Historical Provider, MD   warfarin (COUMADIN) 2.5 MG tablet TAKE ONE AND ONE-HALF TABLETS BY MOUTH EVERY THURSDAY, THEN TAKE ONE TABLET BY MOUTH ON ALL OTHER DAYS  Patient taking differently: 2.5 mg daily 2.5mg daily 1/3/22  Yes Floyd Patch APRN - CNP   B-D UF III MINI PEN NEEDLES 31G X 5 MM MISC USE TO INJECT INSULIN FOUR TIMES A DAY 11/30/21  Yes Bianca Crowder MD   blood glucose test strips (FREESTYLE LITE) strip USE TO TEST THREE TIMES A DAY 10/18/21  Yes Bianca Crowder MD   furosemide (LASIX) 40 MG tablet TAKE ONE TABLET BY MOUTH DAILY 9/21/21  Yes Morgan Miranda MD   NOVOLOG FLEXPEN 100 UNIT/ML injection pen INJECT 20 UNITS UNDER THE SKIN THREE TIMES A DAY ACCORDING TO SLIDING SCALE 9/14/21  Yes Bianca Crowder MD   lisinopril (PRINIVIL;ZESTRIL) 10 MG tablet TAKE ONE TABLET BY MOUTH DAILY 6/21/21  Yes Floyd PatchSHA - CNP   fluocinonide (LIDEX) 0.05 % cream Apply topically 2 times daily. 9/28/20  Yes Floyd PatchSHA - CNP   donepezil (ARICEPT) 10 MG tablet Take 5 mg by mouth daily (with breakfast)  7/27/20  Yes Historical Provider, MD   finasteride (PROSCAR) 5 MG tablet Take 5 mg by mouth daily   Yes Historical Provider, MD   naphazoline-glycerin (CLEAR EYES REDNESS RELIEF) 0.012-0.2 % SOLN ophthalmic solution Place 1 drop into both eyes 2 times daily  Patient taking differently: Place 1 drop into both eyes 2 times daily as needed  4/7/18  Yes Rosie Mata MD   tamsulosin (FLOMAX) 0.4 MG capsule Take 1 capsule by mouth daily  Patient taking differently: Take 0.4 mg by mouth 2 times daily  8/3/17  Yes Floyd Patch APRN - CNP   glucose monitoring kit (FREESTYLE) monitoring kit 1 kit by Does not apply route daily as needed. 4/7/14  Yes Elyssa Gonzalez MD   latanoprost (XALATAN) 0.005 % ophthalmic solution Place 1 drop into both eyes nightly. 10/27/13  Yes Historical Provider, MD        Allergies:  Celebrex [celecoxib], Elavil [amitriptyline], Naproxen, Percocet [oxycodone-acetaminophen], and Lyrica [pregabalin]     Review of Systems:   · Constitutional: there has been no unanticipated weight loss. There's been no change in energy level, sleep pattern, or activity level.      · Eyes: No visual changes or diplopia. No scleral icterus. · ENT: No Headaches, hearing loss or vertigo. No mouth sores or sore throat. · Cardiovascular: Reviewed in HPI  · Respiratory: No cough or wheezing, no sputum production. No hematemesis. · Gastrointestinal: No abdominal pain, appetite loss, blood in stools. No change in bowel or bladder habits. · Genitourinary: No dysuria, trouble voiding, or hematuria. · Musculoskeletal:  No gait disturbance, weakness or joint complaints. · Integumentary: No rash or pruritis. · Neurological: No headache, diplopia, change in muscle strength, numbness or tingling. No change in gait, balance, coordination, mood, affect, memory, mentation, behavior. Worsening gait difficulties and memory  · Psychiatric: No anxiety, no depression  · Endocrine: No malaise, fatigue or temperature intolerance. No excessive thirst, fluid intake, or urination. No tremor. · Hematologic/Lymphatic: + bruising from fall, but no bleeding, blood clots or swollen lymph nodes. · Allergic/Immunologic: No nasal congestion or hives. Physical Examination:    Vitals:    05/10/22 1128   BP: 128/70   Pulse: 85   SpO2: 98%        Wt Readings from Last 1 Encounters:   05/10/22 205 lb 9.6 oz (93.3 kg)       Constitutional and General Appearance: NAD  Skin:good turgor,intact without lesions  HEENT: EOMI ,normal  Neck:no JVD    Respiratory:  · Normal excursion and expansion without use of accessory muscles  · Resp Auscultation: Normal breath sounds without dullness  Cardiovascular:  · The apical impulses not displaced  · Heart tones are crisp and normal  · Cervical veins are not engorged  · The carotid upstroke is normal in amplitude and contour without delay or bruit  · Peripheral pulses are symmetrical and full  · There is no clubbing, cyanosis of the extremities.   · No edema - no change  · Femoral Arteries: 2+ and equal  · Pedal Pulses: 2+ and equal   Abdomen:  · No masses or tenderness  · Liver/Spleen: No Abnormalities Noted  Neurological/Psychiatric:  · Alert and oriented in all spheres  · Moves all extremities well  · Gait slow, uses rollator to help with balance   · No abnormalities of mood, affect, memory, mentation, or behavior are noted    3/22/19 [facility]  Summary   -Left ventricular cavity size is normal. There is moderate concentric left   ventricular hypertrophy. Overall left ventricular systolic function appears   severely reduced with a estimated ejection fraction of 30-35%. Global   hypokinesis.   -Grade I diastolic dysfunction. E/e\"=18.1   -Mild aortic and tricuspid regurgitation.   -Trivial mitral regurgitation.   -Estimated pulmonary artery systolic pressure is mildly elevated at 44 mmHg   assuming a right atrial pressure of 3 mmHg. 3/17/18 ECHO    Normal left ventricle size, wall thickness and systolic function with an   estimated ejection fraction of 55%.   No regional wall motion abnormalities. Trivial mitral regurgitation.   Mild aortic regurgitation.   Mild to moderate tricuspid regurgitation with an RVSP of 59 mmHg. ECG 7/19 normal sinus with PAC  Echo 7/19 reviewed      7/25/19 Echo   Summary   Normal left ventricular size. Concentric left venticular hypertrophy.   Globally decreased left ventricular systolic function with an estimated EF   45-50%.   E/e'=13. Diastolic filling parameters suggests grade I diastolic   dysfunction.   Mild mitral regurgitation.   The left atrium is mildly dilated.   Mild aortic regurgitation is present. Pressure half-time is calculated at   495 msec.   Mildly dilated coronary sinus 4.1-4.2.   The ascending aorta is normal in size 3.6-3.8.   Mild tricuspid regurgitation. PASP 35mmHg.  Tasia Rhianna is a trivial pericardial effusion noted.   Frequent ectopy noted during the exam.        Assessment:      CHF   Well compensated by exam today. Lisinopril 10mg, Lasix 40mg daily. Tolerating medications without side effects.     Stop lisinopril and start     Hypertension   On Lisinopril - stable BP on current treatment  Blood pressure 128/70, pulse 85, height 5' 10\" (1.778 m), weight 205 lb 9.6 oz (93.3 kg), SpO2 98 %. Aortic/Tricuspid Regurgitation   ECHO 7/25/19> Stable findings (mild AI, mild TR, PASP 35 mmHg)   ECHO 11/10/20> stable   ECHO 11/12/21> stable    CRI  4/19/19> creat 3.0  5/8/19> creat 1.8  2/18/20 > 2.1  4/13/20 > 2.0  9/23/20> 2.4   4/25/22 > 2.9  F/w Dr. Daniela Dennis    Hx of Pulmonary embolus    anticoagulated with warfarin      Plan:   Rosita Smith  has a stable cardiac status. Cardiac test and lab results personally reviewed by me during this office visit and discussed. 1.  Switch lisinopril to amlodipine. Take daily unless systolic bp is <032  2. Continue risk factor modifications. 3.  Call for any change in symptoms. 4.  Return for regular follow up in 6 months       I appreciate the opportunity of cooperating in the care of this individual.    David Gonzalez. Gabino Marcos M.D., [facility]    Patient's problem list, medications, allergies, past medical, surgical, social and family histories were reviewed and updated as appropriate. Scribe's Attestation: This note was scribed in the presence of Jack Limon M.D., [facility] by Vince Fernandez RN. The scribe's documentation has been prepared under my direction and personally reviewed by me in its entirety. I confirm that the note above accurately reflects all work, treatment, procedures, and medical decision making performed by me.

## 2022-05-10 ENCOUNTER — OFFICE VISIT (OUTPATIENT)
Dept: CARDIOLOGY CLINIC | Age: 87
End: 2022-05-10
Payer: MEDICARE

## 2022-05-10 VITALS
HEIGHT: 70 IN | DIASTOLIC BLOOD PRESSURE: 70 MMHG | OXYGEN SATURATION: 98 % | BODY MASS INDEX: 29.43 KG/M2 | HEART RATE: 85 BPM | SYSTOLIC BLOOD PRESSURE: 128 MMHG | WEIGHT: 205.6 LBS

## 2022-05-10 DIAGNOSIS — I35.9 AORTIC VALVULAR DISEASE: ICD-10-CM

## 2022-05-10 DIAGNOSIS — I50.22 CHRONIC SYSTOLIC CONGESTIVE HEART FAILURE (HCC): Primary | Chronic | ICD-10-CM

## 2022-05-10 DIAGNOSIS — I10 ESSENTIAL HYPERTENSION: ICD-10-CM

## 2022-05-10 DIAGNOSIS — I26.99 BILATERAL PULMONARY EMBOLISM (HCC): ICD-10-CM

## 2022-05-10 PROCEDURE — 1123F ACP DISCUSS/DSCN MKR DOCD: CPT | Performed by: INTERNAL MEDICINE

## 2022-05-10 PROCEDURE — 99214 OFFICE O/P EST MOD 30 MIN: CPT | Performed by: INTERNAL MEDICINE

## 2022-05-10 PROCEDURE — 4040F PNEUMOC VAC/ADMIN/RCVD: CPT | Performed by: INTERNAL MEDICINE

## 2022-05-10 PROCEDURE — G8417 CALC BMI ABV UP PARAM F/U: HCPCS | Performed by: INTERNAL MEDICINE

## 2022-05-10 PROCEDURE — G8427 DOCREV CUR MEDS BY ELIG CLIN: HCPCS | Performed by: INTERNAL MEDICINE

## 2022-05-10 PROCEDURE — 1036F TOBACCO NON-USER: CPT | Performed by: INTERNAL MEDICINE

## 2022-05-10 RX ORDER — AMLODIPINE BESYLATE 5 MG/1
5 TABLET ORAL DAILY
Qty: 90 TABLET | Refills: 3 | Status: SHIPPED | OUTPATIENT
Start: 2022-05-10 | End: 2022-08-30 | Stop reason: ALTCHOICE

## 2022-05-10 NOTE — PATIENT INSTRUCTIONS
Stop taking lisinopril   Start taking amlodipine every day (unless bp is <120)  Follow up in 6 months

## 2022-05-17 ENCOUNTER — OFFICE VISIT (OUTPATIENT)
Dept: INTERNAL MEDICINE CLINIC | Age: 87
End: 2022-05-17
Payer: MEDICARE

## 2022-05-17 ENCOUNTER — NURSE TRIAGE (OUTPATIENT)
Dept: OTHER | Facility: CLINIC | Age: 87
End: 2022-05-17

## 2022-05-17 VITALS
WEIGHT: 214 LBS | OXYGEN SATURATION: 99 % | HEART RATE: 68 BPM | SYSTOLIC BLOOD PRESSURE: 138 MMHG | DIASTOLIC BLOOD PRESSURE: 60 MMHG | BODY MASS INDEX: 30.71 KG/M2

## 2022-05-17 DIAGNOSIS — R60.9 DEPENDENT EDEMA: ICD-10-CM

## 2022-05-17 DIAGNOSIS — S80.12XA CONTUSION OF LEFT LOWER EXTREMITY, INITIAL ENCOUNTER: Primary | ICD-10-CM

## 2022-05-17 PROCEDURE — 1036F TOBACCO NON-USER: CPT | Performed by: NURSE PRACTITIONER

## 2022-05-17 PROCEDURE — 1123F ACP DISCUSS/DSCN MKR DOCD: CPT | Performed by: NURSE PRACTITIONER

## 2022-05-17 PROCEDURE — 99213 OFFICE O/P EST LOW 20 MIN: CPT | Performed by: NURSE PRACTITIONER

## 2022-05-17 PROCEDURE — G8417 CALC BMI ABV UP PARAM F/U: HCPCS | Performed by: NURSE PRACTITIONER

## 2022-05-17 PROCEDURE — G8427 DOCREV CUR MEDS BY ELIG CLIN: HCPCS | Performed by: NURSE PRACTITIONER

## 2022-05-17 PROCEDURE — 4040F PNEUMOC VAC/ADMIN/RCVD: CPT | Performed by: NURSE PRACTITIONER

## 2022-05-17 NOTE — PROGRESS NOTES
SUBJECTIVE:    Patient ID: Melissa Pino is a 82 y.o. male. CC: Swelling and discoloration left lower leg    HPI: The patient presents to the office for an acute visit. Patient is here at the behest of his wife for left leg swelling and discoloration. He states he does not feel he needs an appointment. Patient has a history of bilateral lower extremity edema. He normally treats this with compression hose. Patient's lower extremity edema is at about baseline. Wife noticed discoloration on the outer aspect of the lower leg. Patient feels he may have hit his leg on something. There is some discomfort in the area but no pain. There are no open lesions or sores. There is no redness. There is no drainage. Current Outpatient Medications   Medication Sig Dispense Refill    amLODIPine (NORVASC) 5 MG tablet Take 1 tablet by mouth daily 90 tablet 3    gabapentin (NEURONTIN) 300 MG capsule TAKE ONE CAPSULE BY MOUTH EVERY MORNING AND TAKE TWO CAPSULES BY MOUTH AT BEDTIME 90 capsule 5    BASAGLAR KWIKPEN 100 UNIT/ML injection pen INJECT 30 UNITS UNDER THE SKIN ONCE NIGHTLY 5 pen 2    famotidine (PEPCID) 20 MG tablet TAKE ONE TABLET BY MOUTH TWICE A DAY 60 tablet 11    HYDROcodone-acetaminophen (NORCO) 7.5-325 MG per tablet Take 1 tablet by mouth 4 times daily as needed for Pain for up to 120 days.  Intended supply: 30 days 120 tablet 0    DULoxetine (CYMBALTA) 30 MG extended release capsule TAKE ONE CAPSULE BY MOUTH ONCE NIGHTLY 90 capsule 3    ketoconazole (NIZORAL) 2 % cream       warfarin (COUMADIN) 2.5 MG tablet TAKE ONE AND ONE-HALF TABLETS BY MOUTH EVERY THURSDAY, THEN TAKE ONE TABLET BY MOUTH ON ALL OTHER DAYS (Patient taking differently: 2.5 mg daily 2.5mg daily) 90 tablet 3    B-D UF III MINI PEN NEEDLES 31G X 5 MM MISC USE TO INJECT INSULIN FOUR TIMES A  each 2    blood glucose test strips (FREESTYLE LITE) strip USE TO TEST THREE TIMES A  strip 5    furosemide (LASIX) 40 MG tablet TAKE ONE TABLET BY MOUTH DAILY 90 tablet 1    NOVOLOG FLEXPEN 100 UNIT/ML injection pen INJECT 20 UNITS UNDER THE SKIN THREE TIMES A DAY ACCORDING TO SLIDING SCALE 15 pen 1    fluocinonide (LIDEX) 0.05 % cream Apply topically 2 times daily. 60 g 2    donepezil (ARICEPT) 10 MG tablet Take 5 mg by mouth daily (with breakfast)       finasteride (PROSCAR) 5 MG tablet Take 5 mg by mouth daily      naphazoline-glycerin (CLEAR EYES REDNESS RELIEF) 0.012-0.2 % SOLN ophthalmic solution Place 1 drop into both eyes 2 times daily (Patient taking differently: Place 1 drop into both eyes 2 times daily as needed ) 1 Bottle 0    tamsulosin (FLOMAX) 0.4 MG capsule Take 1 capsule by mouth daily (Patient taking differently: Take 0.4 mg by mouth 2 times daily ) 90 capsule 1    glucose monitoring kit (FREESTYLE) monitoring kit 1 kit by Does not apply route daily as needed. 1 kit 0    latanoprost (XALATAN) 0.005 % ophthalmic solution Place 1 drop into both eyes nightly. No current facility-administered medications for this visit. Review of Systems   Cardiovascular: Positive for leg swelling (chronic). Musculoskeletal: Positive for gait problem (chronic). Skin: Positive for wound (Discoloration left lower outer leg). OBJECTIVE:  Physical Exam  Constitutional:       General: He is not in acute distress. Appearance: Normal appearance. He is not ill-appearing. HENT:      Head: Normocephalic and atraumatic. Musculoskeletal:      Right lower leg: 3+ Pitting Edema present. Left lower leg: 3+ Pitting Edema present. Skin:     Findings: Bruising present. Comments: On the left lower leg posterior to the ankle and running linearly along the lower 1/4 of the lateral lower leg, there is a 1-2\"wide area of what appears to be bruising. This is tender to touch but not painful. There are no open lesions or sores. Negative Homans' sign   Neurological:      General: No focal deficit present. Mental Status: He is alert and oriented to person, place, and time. /60   Pulse 68   Wt 214 lb (97.1 kg)   SpO2 99%   BMI 30.71 kg/m²      PHQ Scores 11/11/2021 1/8/2021 9/28/2020 2/6/2020 9/18/2019 2/11/2019 7/10/2018   PHQ2 Score 0 0 0 0 0 0 1   PHQ9 Score 0 0 0 0 0 0 1     Interpretation of Total Score Depression Severity: 1-4 = Minimal depression, 5-9 = Mild depression, 10-14 = Moderate depression, 15-19 = Moderately severe depression, 20-27 =Severe depression        ASSESSMENT/PLAN:  Ray was seen today for leg swelling and other. Diagnoses and all orders for this visit:    Contusion of left lower extremity, initial encounter  -What appears to be bruising on the left lower lateral leg.  -Tender but nonpainful. No open lesions or sores  -Wife was worried about DVT but there is no warmth, redness, negative Homans' sign, and he is on anticoagulation with most recent INR therapeutic  -Monitor for worsening or new symptoms  -Continue compression hose for dependent edema.   Ice for discomfort if needed    Dependent edema  -3+ pitting bilateral lower extremity edema which is not new and  -Compression hose      SHA Yost - CNP

## 2022-05-17 NOTE — TELEPHONE ENCOUNTER
Received call from Day Machado at [facility] with [employer]. Subjective: Caller wife Florencio Whiting states \"Just woke up this morning has a red, raised swollen area/rash on back side of left leg toward ankle. Currently taking blood thinners\"     Current Symptoms: raised redness to left calf, swelling noted about 3 inches, bruising/discoloration about 3 inches, slightly warm to touch, mild pain    Onset: a few hours ago; sudden    Associated Symptoms: reduced activity    Pain Severity: 3/10; dull, aching; constant    Temperature: Denies      What has been tried: did not provide. LMP: NA Pregnant: NA    Recommended disposition: Go to ED/UCC Now (Or to Office with PCP Approval)    Care advice provided, patient verbalizes understanding; denies any other questions or concerns; instructed to call back for any new or worsening symptoms. Writer provided warm transfer to [facility] at [facility] for second level triage. Attention Provider: Thank you for allowing me to participate in the care of your patient. The patient was connected to triage in response to information provided to the ECC/PSC. Please do not respond through this encounter as the response is not directed to a shared pool.         Reason for Disposition   Thigh, calf, or ankle swelling in only one leg    Protocols used: LEG SWELLING AND EDEMA-ADULT-OH

## 2022-05-18 ENCOUNTER — HOSPITAL ENCOUNTER (OUTPATIENT)
Dept: ULTRASOUND IMAGING | Age: 87
Discharge: HOME | End: 2022-05-18
Payer: MEDICARE

## 2022-05-18 DIAGNOSIS — N17.9 AKI (ACUTE KIDNEY INJURY) (HCC): ICD-10-CM

## 2022-05-18 PROCEDURE — 76770 US EXAM ABDO BACK WALL COMP: CPT

## 2022-05-24 ENCOUNTER — OFFICE VISIT (OUTPATIENT)
Dept: INTERNAL MEDICINE CLINIC | Age: 87
End: 2022-05-24
Payer: MEDICARE

## 2022-05-24 VITALS
WEIGHT: 215.6 LBS | OXYGEN SATURATION: 97 % | HEIGHT: 70 IN | BODY MASS INDEX: 30.86 KG/M2 | DIASTOLIC BLOOD PRESSURE: 74 MMHG | HEART RATE: 50 BPM | SYSTOLIC BLOOD PRESSURE: 140 MMHG

## 2022-05-24 DIAGNOSIS — L03.116 LEFT LEG CELLULITIS: Primary | ICD-10-CM

## 2022-05-24 DIAGNOSIS — Z79.01 ANTICOAGULATED ON COUMADIN: ICD-10-CM

## 2022-05-24 DIAGNOSIS — M48.062 LUMBAR STENOSIS WITH NEUROGENIC CLAUDICATION: ICD-10-CM

## 2022-05-24 DIAGNOSIS — M15.9 PRIMARY OSTEOARTHRITIS INVOLVING MULTIPLE JOINTS: ICD-10-CM

## 2022-05-24 DIAGNOSIS — G62.9 PERIPHERAL POLYNEUROPATHY: ICD-10-CM

## 2022-05-24 LAB
INTERNATIONAL NORMALIZATION RATIO, POC: 2.5
PROTHROMBIN TIME, POC: NORMAL

## 2022-05-24 PROCEDURE — G8427 DOCREV CUR MEDS BY ELIG CLIN: HCPCS | Performed by: NURSE PRACTITIONER

## 2022-05-24 PROCEDURE — 1123F ACP DISCUSS/DSCN MKR DOCD: CPT | Performed by: NURSE PRACTITIONER

## 2022-05-24 PROCEDURE — 85610 PROTHROMBIN TIME: CPT | Performed by: NURSE PRACTITIONER

## 2022-05-24 PROCEDURE — 1036F TOBACCO NON-USER: CPT | Performed by: NURSE PRACTITIONER

## 2022-05-24 PROCEDURE — G8417 CALC BMI ABV UP PARAM F/U: HCPCS | Performed by: NURSE PRACTITIONER

## 2022-05-24 PROCEDURE — 99213 OFFICE O/P EST LOW 20 MIN: CPT | Performed by: NURSE PRACTITIONER

## 2022-05-24 RX ORDER — HYDROCODONE BITARTRATE AND ACETAMINOPHEN 7.5; 325 MG/1; MG/1
1 TABLET ORAL 4 TIMES DAILY PRN
Qty: 120 TABLET | Refills: 0 | Status: SHIPPED | OUTPATIENT
Start: 2022-05-24 | End: 2022-08-30 | Stop reason: SDUPTHER

## 2022-05-24 RX ORDER — AMOXICILLIN 500 MG/1
500 CAPSULE ORAL 2 TIMES DAILY
Qty: 20 CAPSULE | Refills: 0 | Status: SHIPPED | OUTPATIENT
Start: 2022-05-24 | End: 2022-06-03 | Stop reason: ALTCHOICE

## 2022-05-24 RX ORDER — DOXYCYCLINE HYCLATE 100 MG
100 TABLET ORAL 2 TIMES DAILY
Qty: 20 TABLET | Refills: 0 | Status: SHIPPED | OUTPATIENT
Start: 2022-05-24 | End: 2022-06-03 | Stop reason: ALTCHOICE

## 2022-05-24 SDOH — ECONOMIC STABILITY: FOOD INSECURITY: WITHIN THE PAST 12 MONTHS, THE FOOD YOU BOUGHT JUST DIDN'T LAST AND YOU DIDN'T HAVE MONEY TO GET MORE.: NEVER TRUE

## 2022-05-24 SDOH — ECONOMIC STABILITY: FOOD INSECURITY: WITHIN THE PAST 12 MONTHS, YOU WORRIED THAT YOUR FOOD WOULD RUN OUT BEFORE YOU GOT MONEY TO BUY MORE.: NEVER TRUE

## 2022-05-24 ASSESSMENT — ENCOUNTER SYMPTOMS: COLOR CHANGE: 1

## 2022-05-24 ASSESSMENT — SOCIAL DETERMINANTS OF HEALTH (SDOH): HOW HARD IS IT FOR YOU TO PAY FOR THE VERY BASICS LIKE FOOD, HOUSING, MEDICAL CARE, AND HEATING?: NOT HARD AT ALL

## 2022-05-24 NOTE — PROGRESS NOTES
20 tablet 0    HYDROcodone-acetaminophen (NORCO) 7.5-325 MG per tablet Take 1 tablet by mouth 4 times daily as needed for Pain for up to 120 days. Intended supply: 30 days 120 tablet 0    amLODIPine (NORVASC) 5 MG tablet Take 1 tablet by mouth daily 90 tablet 3    gabapentin (NEURONTIN) 300 MG capsule TAKE ONE CAPSULE BY MOUTH EVERY MORNING AND TAKE TWO CAPSULES BY MOUTH AT BEDTIME 90 capsule 5    BASAGLAR KWIKPEN 100 UNIT/ML injection pen INJECT 30 UNITS UNDER THE SKIN ONCE NIGHTLY 5 pen 2    famotidine (PEPCID) 20 MG tablet TAKE ONE TABLET BY MOUTH TWICE A DAY 60 tablet 11    DULoxetine (CYMBALTA) 30 MG extended release capsule TAKE ONE CAPSULE BY MOUTH ONCE NIGHTLY 90 capsule 3    ketoconazole (NIZORAL) 2 % cream       warfarin (COUMADIN) 2.5 MG tablet TAKE ONE AND ONE-HALF TABLETS BY MOUTH EVERY THURSDAY, THEN TAKE ONE TABLET BY MOUTH ON ALL OTHER DAYS (Patient taking differently: 2.5 mg daily 2.5mg daily) 90 tablet 3    furosemide (LASIX) 40 MG tablet TAKE ONE TABLET BY MOUTH DAILY 90 tablet 1    NOVOLOG FLEXPEN 100 UNIT/ML injection pen INJECT 20 UNITS UNDER THE SKIN THREE TIMES A DAY ACCORDING TO SLIDING SCALE 15 pen 1    fluocinonide (LIDEX) 0.05 % cream Apply topically 2 times daily. 60 g 2    donepezil (ARICEPT) 10 MG tablet Take 5 mg by mouth daily (with breakfast)       finasteride (PROSCAR) 5 MG tablet Take 5 mg by mouth daily      naphazoline-glycerin (CLEAR EYES REDNESS RELIEF) 0.012-0.2 % SOLN ophthalmic solution Place 1 drop into both eyes 2 times daily (Patient taking differently: Place 1 drop into both eyes 2 times daily as needed ) 1 Bottle 0    tamsulosin (FLOMAX) 0.4 MG capsule Take 1 capsule by mouth daily (Patient taking differently: Take 0.4 mg by mouth 2 times daily ) 90 capsule 1    latanoprost (XALATAN) 0.005 % ophthalmic solution Place 1 drop into both eyes nightly.       B-D UF III MINI PEN NEEDLES 31G X 5 MM MISC USE TO INJECT INSULIN FOUR TIMES A  each 2    blood glucose test strips (FREESTYLE LITE) strip USE TO TEST THREE TIMES A  strip 5    glucose monitoring kit (FREESTYLE) monitoring kit 1 kit by Does not apply route daily as needed. 1 kit 0     No current facility-administered medications for this visit. Review of Systems   Constitutional: Negative for fever. Cardiovascular: Positive for leg swelling. Skin: Positive for color change. Negative for wound. Neurological: Positive for numbness. OBJECTIVE:  Physical Exam  Constitutional:       Appearance: Normal appearance. HENT:      Head: Normocephalic and atraumatic. Skin:     General: Skin is warm and dry. Findings: Erythema present. Comments: Left mid lower leg is erythematous and tender. There is 3-4+ pitting edema. There are no obvious open lesions or sores. There is no drainage. Neurological:      General: No focal deficit present. Mental Status: He is alert and oriented to person, place, and time. Psychiatric:         Mood and Affect: Mood normal.         Behavior: Behavior normal.        BP (!) 140/74   Pulse 50   Ht 5' 10\" (1.778 m)   Wt 215 lb 9.6 oz (97.8 kg)   SpO2 97%   BMI 30.94 kg/m²      PHQ Scores 11/11/2021 1/8/2021 9/28/2020 2/6/2020 9/18/2019 2/11/2019 7/10/2018   PHQ2 Score 0 0 0 0 0 0 1   PHQ9 Score 0 0 0 0 0 0 1     Interpretation of Total Score Depression Severity: 1-4 = Minimal depression, 5-9 = Mild depression, 10-14 = Moderate depression, 15-19 = Moderately severe depression, 20-27 =Severe depression      ASSESSMENT/PLAN:  Ray was seen today for 1 month follow-up and bradycardia. Diagnoses and all orders for this visit:    Left leg cellulitis  - Left leg red, swollen, tender  - Previous bruising has resolved but it appears the patient now has cellulitis of the left leg  - There are no clear open lesions or sores. No drainage. No fever  - He appears safe for outpatient treatment. Will initiate amoxicillin and doxycycline. Due to antibiotic use, the patient has been advised to take one half of his normal warfarin dosing while on antibiotics  - Keep leg clean and dry. Wash daily with soap and water. Continue to use compression hose and elevation. Return for worsening  - RTC 1 week  -     amoxicillin (AMOXIL) 500 MG capsule; Take 1 capsule by mouth 2 times daily for 10 days  -     doxycycline hyclate (VIBRA-TABS) 100 MG tablet; Take 1 tablet by mouth 2 times daily for 10 days    Anticoagulated on Coumadin  - INR therapeutic. Will take half of normal dosing while on antibiotics  -     POCT INR    Peripheral polyneuropathy  -     HYDROcodone-acetaminophen (NORCO) 7.5-325 MG per tablet; Take 1 tablet by mouth 4 times daily as needed for Pain for up to 120 days. Intended supply: 30 days    Lumbar stenosis with neurogenic claudication  -     HYDROcodone-acetaminophen (NORCO) 7.5-325 MG per tablet; Take 1 tablet by mouth 4 times daily as needed for Pain for up to 120 days. Intended supply: 30 days    Primary osteoarthritis involving multiple joints  -     HYDROcodone-acetaminophen (NORCO) 7.5-325 MG per tablet; Take 1 tablet by mouth 4 times daily as needed for Pain for up to 120 days. Intended supply: 30 days      Controlled Substance Monitoring:  Acute and Chronic Pain Monitoring:   RX Monitoring 5/24/2022   Attestation -   Periodic Controlled Substance Monitoring No signs of potential drug abuse or diversion identified.;Obtaining appropriate analgesic effect of treatment. ;Assessed functional status.          SHA Buck - CNP

## 2022-06-03 ENCOUNTER — OFFICE VISIT (OUTPATIENT)
Dept: INTERNAL MEDICINE CLINIC | Age: 87
End: 2022-06-03
Payer: MEDICARE

## 2022-06-03 VITALS
DIASTOLIC BLOOD PRESSURE: 70 MMHG | BODY MASS INDEX: 30.49 KG/M2 | WEIGHT: 213 LBS | HEIGHT: 70 IN | OXYGEN SATURATION: 94 % | SYSTOLIC BLOOD PRESSURE: 138 MMHG | HEART RATE: 81 BPM

## 2022-06-03 DIAGNOSIS — E11.22 TYPE 2 DIABETES MELLITUS WITH STAGE 4 CHRONIC KIDNEY DISEASE, WITHOUT LONG-TERM CURRENT USE OF INSULIN (HCC): ICD-10-CM

## 2022-06-03 DIAGNOSIS — L03.116 LEFT LEG CELLULITIS: Primary | ICD-10-CM

## 2022-06-03 DIAGNOSIS — Z79.01 ANTICOAGULATED ON COUMADIN: ICD-10-CM

## 2022-06-03 DIAGNOSIS — N18.4 TYPE 2 DIABETES MELLITUS WITH STAGE 4 CHRONIC KIDNEY DISEASE, WITHOUT LONG-TERM CURRENT USE OF INSULIN (HCC): ICD-10-CM

## 2022-06-03 LAB
INTERNATIONAL NORMALIZATION RATIO, POC: 1.4
PROTHROMBIN TIME, POC: ABNORMAL

## 2022-06-03 PROCEDURE — G8428 CUR MEDS NOT DOCUMENT: HCPCS | Performed by: NURSE PRACTITIONER

## 2022-06-03 PROCEDURE — 3052F HG A1C>EQUAL 8.0%<EQUAL 9.0%: CPT | Performed by: NURSE PRACTITIONER

## 2022-06-03 PROCEDURE — 99213 OFFICE O/P EST LOW 20 MIN: CPT | Performed by: NURSE PRACTITIONER

## 2022-06-03 PROCEDURE — G8417 CALC BMI ABV UP PARAM F/U: HCPCS | Performed by: NURSE PRACTITIONER

## 2022-06-03 PROCEDURE — 85610 PROTHROMBIN TIME: CPT | Performed by: NURSE PRACTITIONER

## 2022-06-03 PROCEDURE — 1123F ACP DISCUSS/DSCN MKR DOCD: CPT | Performed by: NURSE PRACTITIONER

## 2022-06-03 PROCEDURE — 1036F TOBACCO NON-USER: CPT | Performed by: NURSE PRACTITIONER

## 2022-06-03 RX ORDER — INSULIN ASPART 100 [IU]/ML
INJECTION, SOLUTION INTRAVENOUS; SUBCUTANEOUS
Qty: 5 PEN | Refills: 5 | Status: SHIPPED | OUTPATIENT
Start: 2022-06-03

## 2022-06-03 RX ORDER — LISINOPRIL 10 MG/1
10 TABLET ORAL DAILY
COMMUNITY
End: 2022-06-13

## 2022-06-03 ASSESSMENT — ENCOUNTER SYMPTOMS: COLOR CHANGE: 1

## 2022-06-03 NOTE — PROGRESS NOTES
SUBJECTIVE:    Patient ID: Rochelle Lainez is a 82 y.o. male. CC: Left leg cellulitis, chronic anticoagulation    HPI: The patient presents the office today for an acute visit. The patient presents to the office today for 1 week follow-up of left leg cellulitis. Patient had sustained a left leg injury of unknown etiology. It was felt to be an injury from his walker. This was bruised and later turned into redness, tenderness, and swelling felt to represent cellulitis. Patient was started on a 10-day course of doxycycline and amoxicillin which he took as directed. He completed the antibiotic yesterday. Since completing the antibiotic, both patient and spouse report decreased leg pain, tenderness, and swelling. He has chronic leg swelling and his swelling has returned to his norm. He is using compression hose. He denies any fever or chills. He denies any drainage. Due to antibiotic use, the patient was asked to take one half of his normal warfarin dosing. His INR was previously therapeutic at 2.5. Not surprisingly, he is subtherapeutic today at 1.4 after taking a lower dose of warfarin while on antibiotics.       Past Medical History:   Diagnosis Date    Abdominal pain, epigastric     Arthritis     Benign prostatic hypertrophy without urinary obstruction     BPH     Cellulitis and abscess     Chronic rhinitis     Chronic systolic congestive heart failure (HCC) 4/18/2019    COPD (chronic obstructive pulmonary disease) (HCC)     Diabetes mellitus (HCC)     Dysphagia     Erectile dysfunction     GERD (gastroesophageal reflux disease)     Hx of blood clots     Hyperglycemia     Hypertension     Late onset Alzheimer's disease without behavioral disturbance ([facility] [address] 7/23/2020    lumbar radiculopathy     Neuropathy     Nocturia     Osteoarthritis     Other disorders of kidney and ureter in diseases classified elsewhere     Pain, joint, knee     Palpitations     skin cancer     Rt ear, nose, neck, hands/ 2018 lung    SOB (shortness of breath)     Tennis elbow     Tinea pedis     foot        Current Outpatient Medications   Medication Sig Dispense Refill    lisinopril (PRINIVIL;ZESTRIL) 10 MG tablet Take 10 mg by mouth daily Restarted on own      insulin aspart (NOVOLOG FLEXPEN) 100 UNIT/ML injection pen Use sliding scale provided by endocrinology 5 pen 5    HYDROcodone-acetaminophen (NORCO) 7.5-325 MG per tablet Take 1 tablet by mouth 4 times daily as needed for Pain for up to 120 days. Intended supply: 30 days 120 tablet 0    gabapentin (NEURONTIN) 300 MG capsule TAKE ONE CAPSULE BY MOUTH EVERY MORNING AND TAKE TWO CAPSULES BY MOUTH AT BEDTIME 90 capsule 5    BASAGLAR KWIKPEN 100 UNIT/ML injection pen INJECT 30 UNITS UNDER THE SKIN ONCE NIGHTLY 5 pen 2    famotidine (PEPCID) 20 MG tablet TAKE ONE TABLET BY MOUTH TWICE A DAY 60 tablet 11    DULoxetine (CYMBALTA) 30 MG extended release capsule TAKE ONE CAPSULE BY MOUTH ONCE NIGHTLY 90 capsule 3    warfarin (COUMADIN) 2.5 MG tablet TAKE ONE AND ONE-HALF TABLETS BY MOUTH EVERY THURSDAY, THEN TAKE ONE TABLET BY MOUTH ON ALL OTHER DAYS (Patient taking differently: 2.5 mg daily 2.5mg daily) 90 tablet 3    furosemide (LASIX) 40 MG tablet TAKE ONE TABLET BY MOUTH DAILY 90 tablet 1    fluocinonide (LIDEX) 0.05 % cream Apply topically 2 times daily.  60 g 2    donepezil (ARICEPT) 10 MG tablet Take 5 mg by mouth daily (with breakfast)       finasteride (PROSCAR) 5 MG tablet Take 5 mg by mouth daily      naphazoline-glycerin (CLEAR EYES REDNESS RELIEF) 0.012-0.2 % SOLN ophthalmic solution Place 1 drop into both eyes 2 times daily (Patient taking differently: Place 1 drop into both eyes 2 times daily as needed ) 1 Bottle 0    tamsulosin (FLOMAX) 0.4 MG capsule Take 1 capsule by mouth daily (Patient taking differently: Take 0.4 mg by mouth 2 times daily ) 90 capsule 1    latanoprost (XALATAN) 0.005 % ophthalmic solution Place 1 drop into both eyes nightly.  amLODIPine (NORVASC) 5 MG tablet Take 1 tablet by mouth daily (Patient not taking: Reported on 6/3/2022) 90 tablet 3    ketoconazole (NIZORAL) 2 % cream  (Patient not taking: Reported on 6/3/2022)      B-D UF III MINI PEN NEEDLES 31G X 5 MM MISC USE TO INJECT INSULIN FOUR TIMES A  each 2    blood glucose test strips (FREESTYLE LITE) strip USE TO TEST THREE TIMES A  strip 5    glucose monitoring kit (FREESTYLE) monitoring kit 1 kit by Does not apply route daily as needed. 1 kit 0     No current facility-administered medications for this visit. Review of Systems   Constitutional: Negative for fever. Cardiovascular: Positive for leg swelling. Skin: Positive for color change. Negative for wound. Neurological: Positive for numbness. OBJECTIVE:  Physical Exam  Constitutional:       Appearance: Normal appearance. HENT:      Head: Normocephalic and atraumatic. Skin:     General: Skin is warm and dry. Findings: Erythema present. Comments: Left leg edema has returned to his baseline. He is wearing compression hose. Redness, warmth, and tenderness are all improved. Neurological:      General: No focal deficit present. Mental Status: He is alert and oriented to person, place, and time.    Psychiatric:         Mood and Affect: Mood normal.         Behavior: Behavior normal.        /70 (Site: Right Upper Arm, Position: Sitting, Cuff Size: Medium Adult) Comment: on lisinopril 10 mg about 3-4 weeks  Pulse 81   Ht 5' 10\" (1.778 m)   Wt 213 lb (96.6 kg)   SpO2 94%   BMI 30.56 kg/m²      PHQ Scores 11/11/2021 1/8/2021 9/28/2020 2/6/2020 9/18/2019 2/11/2019 7/10/2018   PHQ2 Score 0 0 0 0 0 0 1   PHQ9 Score 0 0 0 0 0 0 1     Interpretation of Total Score Depression Severity: 1-4 = Minimal depression, 5-9 = Mild depression, 10-14 = Moderate depression, 15-19 = Moderately severe depression, 20-27 =Severe depression      Assessment/Plan:  Lesly Cullen was seen today for follow-up, cellulitis and other.   Diagnoses and all orders for this visit:    Left leg cellulitis  -Appears resolved after treatment with amoxicillin, doxycycline  -Continue compression for chronic leg edema    Anticoagulated on Coumadin  - INR was subtherapeutic after taking decreased dose of warfarin while on antibiotics  - Extra half tab today and tomorrow then resume previous warfarin dosing which INR was therapeutic   -     POCT INR    Type 2 diabetes mellitus with stage 4 chronic kidney disease, without long-term current use of insulin (HCC)  -     insulin aspart (NOVOLOG FLEXPEN) 100 UNIT/ML injection pen; Use sliding scale provided by endocrinology        SHA Diana - CNP

## 2022-06-13 RX ORDER — LISINOPRIL 10 MG/1
TABLET ORAL
Qty: 90 TABLET | Refills: 2 | Status: SHIPPED | OUTPATIENT
Start: 2022-06-13

## 2022-06-24 ENCOUNTER — HOSPITAL ENCOUNTER (OUTPATIENT)
Dept: CT IMAGING | Age: 87
Discharge: HOME | End: 2022-06-24
Payer: MEDICARE

## 2022-06-24 DIAGNOSIS — C34.32 PRIMARY MALIGNANT NEOPLASM OF BRONCHUS OF LEFT LOWER LOBE (HCC): ICD-10-CM

## 2022-06-24 PROCEDURE — 74176 CT ABD & PELVIS W/O CONTRAST: CPT

## 2022-07-01 ENCOUNTER — OFFICE VISIT (OUTPATIENT)
Dept: INTERNAL MEDICINE CLINIC | Age: 87
End: 2022-07-01
Payer: MEDICARE

## 2022-07-01 VITALS
DIASTOLIC BLOOD PRESSURE: 84 MMHG | HEART RATE: 90 BPM | SYSTOLIC BLOOD PRESSURE: 124 MMHG | WEIGHT: 215 LBS | OXYGEN SATURATION: 98 % | BODY MASS INDEX: 30.85 KG/M2 | TEMPERATURE: 97.2 F

## 2022-07-01 DIAGNOSIS — I26.99 BILATERAL PULMONARY EMBOLISM (HCC): Primary | ICD-10-CM

## 2022-07-01 DIAGNOSIS — Z79.01 ANTICOAGULATED ON COUMADIN: ICD-10-CM

## 2022-07-01 LAB
INTERNATIONAL NORMALIZATION RATIO, POC: 2
PROTHROMBIN TIME, POC: NORMAL

## 2022-07-01 PROCEDURE — G8417 CALC BMI ABV UP PARAM F/U: HCPCS | Performed by: NURSE PRACTITIONER

## 2022-07-01 PROCEDURE — 85610 PROTHROMBIN TIME: CPT | Performed by: NURSE PRACTITIONER

## 2022-07-01 PROCEDURE — 1123F ACP DISCUSS/DSCN MKR DOCD: CPT | Performed by: NURSE PRACTITIONER

## 2022-07-01 PROCEDURE — 1036F TOBACCO NON-USER: CPT | Performed by: NURSE PRACTITIONER

## 2022-07-01 PROCEDURE — 99212 OFFICE O/P EST SF 10 MIN: CPT | Performed by: NURSE PRACTITIONER

## 2022-07-01 PROCEDURE — G8427 DOCREV CUR MEDS BY ELIG CLIN: HCPCS | Performed by: NURSE PRACTITIONER

## 2022-07-01 RX ORDER — CEPHALEXIN 500 MG/1
CAPSULE ORAL
COMMUNITY
Start: 2022-06-21

## 2022-07-01 ASSESSMENT — PATIENT HEALTH QUESTIONNAIRE - PHQ9
SUM OF ALL RESPONSES TO PHQ QUESTIONS 1-9: 0
SUM OF ALL RESPONSES TO PHQ9 QUESTIONS 1 & 2: 0
SUM OF ALL RESPONSES TO PHQ QUESTIONS 1-9: 0
SUM OF ALL RESPONSES TO PHQ QUESTIONS 1-9: 0
2. FEELING DOWN, DEPRESSED OR HOPELESS: 0
SUM OF ALL RESPONSES TO PHQ QUESTIONS 1-9: 0
1. LITTLE INTEREST OR PLEASURE IN DOING THINGS: 0

## 2022-07-01 ASSESSMENT — ENCOUNTER SYMPTOMS
GASTROINTESTINAL NEGATIVE: 1
RESPIRATORY NEGATIVE: 1
BACK PAIN: 1

## 2022-07-01 NOTE — PROGRESS NOTES
SUBJECTIVE:    Patient ID: Meseret Sagastume is a 82 y.o. male. CC: Pulmonary embolism, chronic anticoagulation    HPI: Patient presents to the office today for follow-up of chronic medical conditions and management of chronic anticoagulation. Patient has a history of pulmonary embolism. He is chronically anticoagulated on warfarin. He is compliant with his medication regimen. His INR is therapeutic at 2.0. Past Medical History:   Diagnosis Date    Abdominal pain, epigastric     Arthritis     Benign prostatic hypertrophy without urinary obstruction     BPH     Cellulitis and abscess     Chronic rhinitis     Chronic systolic congestive heart failure (HCC) 4/18/2019    COPD (chronic obstructive pulmonary disease) ([facility])     Diabetes mellitus ([facility])     Dysphagia     Erectile dysfunction     GERD (gastroesophageal reflux disease)     Hx of blood clots     Hyperglycemia     Hypertension     Late onset Alzheimer's disease without behavioral disturbance ([facility]) 7/23/2020    lumbar radiculopathy     Neuropathy     Nocturia     Osteoarthritis     Other disorders of kidney and ureter in diseases classified elsewhere     Pain, joint, knee     Palpitations     skin cancer     Rt ear, nose, neck, hands/ 2018 lung    SOB (shortness of breath)     Tennis elbow     Tinea pedis     foot        Current Outpatient Medications   Medication Sig Dispense Refill    cephALEXin (KEFLEX) 500 MG capsule       lisinopril (PRINIVIL;ZESTRIL) 10 MG tablet TAKE ONE TABLET BY MOUTH DAILY 90 tablet 2    insulin aspart (NOVOLOG FLEXPEN) 100 UNIT/ML injection pen Use sliding scale provided by endocrinology 5 pen 5    HYDROcodone-acetaminophen (NORCO) 7.5-325 MG per tablet Take 1 tablet by mouth 4 times daily as needed for Pain for up to 120 days.  Intended supply: 30 days 120 tablet 0    amLODIPine (NORVASC) 5 MG tablet Take 1 tablet by mouth daily (Patient not taking: Reported on 6/3/2022) 90 tablet 3    gabapentin Negative. Genitourinary: Negative. Musculoskeletal: Positive for back pain. OBJECTIVE:  Physical Exam  Vitals reviewed. Constitutional:       General: He is not in acute distress. Appearance: Normal appearance. He is well-developed. He is not diaphoretic. HENT:      Head: Normocephalic and atraumatic. Eyes:      General: No scleral icterus. Conjunctiva/sclera: Conjunctivae normal.   Neck:      Vascular: No JVD. Cardiovascular:      Rate and Rhythm: Bradycardia present. Rhythm regularly irregular. Pulmonary:      Effort: Pulmonary effort is normal. No respiratory distress. Breath sounds: Normal breath sounds. No wheezing or rales. Abdominal:      General: There is no distension. Palpations: Abdomen is soft. Tenderness: There is no abdominal tenderness. There is no guarding or rebound. Musculoskeletal:      Cervical back: Neck supple. Comments: Ambulating with a walker due to chronic back pain, peripheral neuropathy and lower extremity weakness   Skin:     General: Skin is warm and dry. Neurological:      General: No focal deficit present. Mental Status: He is alert and oriented to person, place, and time. Psychiatric:         Behavior: Behavior normal.         Thought Content: Thought content normal.         Cognition and Memory: Memory is impaired. /84   Pulse 90   Temp 97.2 °F (36.2 °C)   Wt 215 lb (97.5 kg)   SpO2 98%   BMI 30.85 kg/m²      PHQ Scores 7/1/2022 11/11/2021 1/8/2021 9/28/2020 2/6/2020 9/18/2019 2/11/2019   PHQ2 Score 0 0 0 0 0 0 0   PHQ9 Score 0 0 0 0 0 0 0     Interpretation of Total Score Depression Severity: 1-4 = Minimal depression, 5-9 = Mild depression, 10-14 = Moderate depression, 15-19 = Moderately severe depression, 20-27 =Severe depression        ASSESSMENT/PLAN:  Ray was seen today for office visit for anticoagulation management.   Diagnoses and all orders for this visit:    Bilateral pulmonary embolism ([address]  -No episode of recurrence  -Continue anticoagulation    Anticoagulated on Coumadin  - INR 2.0  - Continue current regimen, see flow sheets  -     POCT INR      SHA Martinez - CNP

## 2022-07-15 DIAGNOSIS — N17.9 AKI (ACUTE KIDNEY INJURY) (HCC): ICD-10-CM

## 2022-07-15 LAB
A/G RATIO: 1.8 (ref 1.1–2.2)
ALBUMIN SERPL-MCNC: 3.6 G/DL (ref 3.4–5)
ALP BLD-CCNC: 73 U/L (ref 40–129)
ALT SERPL-CCNC: 11 U/L (ref 10–40)
ANION GAP SERPL CALCULATED.3IONS-SCNC: 12 MMOL/L (ref 3–16)
AST SERPL-CCNC: 12 U/L (ref 15–37)
BILIRUB SERPL-MCNC: 0.5 MG/DL (ref 0–1)
BUN BLDV-MCNC: 42 MG/DL (ref 7–20)
CALCIUM SERPL-MCNC: 7.5 MG/DL (ref 8.3–10.6)
CHLORIDE BLD-SCNC: 104 MMOL/L (ref 99–110)
CO2: 26 MMOL/L (ref 21–32)
CREAT SERPL-MCNC: 2.9 MG/DL (ref 0.8–1.3)
CREATININE URINE: 69.5 MG/DL (ref 39–259)
GFR AFRICAN AMERICAN: 25
GFR NON-AFRICAN AMERICAN: 21
GLUCOSE BLD-MCNC: 206 MG/DL (ref 70–99)
HCT VFR BLD CALC: 36 % (ref 40.5–52.5)
HEMOGLOBIN: 12.1 G/DL (ref 13.5–17.5)
MCH RBC QN AUTO: 28.9 PG (ref 26–34)
MCHC RBC AUTO-ENTMCNC: 33.5 G/DL (ref 31–36)
MCV RBC AUTO: 86.4 FL (ref 80–100)
PDW BLD-RTO: 16.7 % (ref 12.4–15.4)
PLATELET # BLD: 140 K/UL (ref 135–450)
PMV BLD AUTO: 9.2 FL (ref 5–10.5)
POTASSIUM SERPL-SCNC: 4.3 MMOL/L (ref 3.5–5.1)
PROTEIN PROTEIN: 249 MG/DL
PROTEIN/CREAT RATIO: 3.6 MG/DL
RBC # BLD: 4.17 M/UL (ref 4.2–5.9)
SODIUM BLD-SCNC: 142 MMOL/L (ref 136–145)
TOTAL PROTEIN: 5.6 G/DL (ref 6.4–8.2)
WBC # BLD: 6.3 K/UL (ref 4–11)

## 2022-07-20 PROBLEM — N25.81 SECONDARY HYPERPARATHYROIDISM OF RENAL ORIGIN (HCC): Status: ACTIVE | Noted: 2022-07-20

## 2022-07-29 ENCOUNTER — OFFICE VISIT (OUTPATIENT)
Dept: INTERNAL MEDICINE CLINIC | Age: 87
End: 2022-07-29
Payer: MEDICARE

## 2022-07-29 VITALS
HEART RATE: 95 BPM | BODY MASS INDEX: 30.28 KG/M2 | OXYGEN SATURATION: 98 % | SYSTOLIC BLOOD PRESSURE: 132 MMHG | WEIGHT: 211 LBS | DIASTOLIC BLOOD PRESSURE: 80 MMHG

## 2022-07-29 DIAGNOSIS — Z79.01 ANTICOAGULATED ON COUMADIN: ICD-10-CM

## 2022-07-29 DIAGNOSIS — M54.50 LEFT LUMBAR PAIN: ICD-10-CM

## 2022-07-29 DIAGNOSIS — I26.99 BILATERAL PULMONARY EMBOLISM (HCC): Primary | ICD-10-CM

## 2022-07-29 LAB
INTERNATIONAL NORMALIZATION RATIO, POC: 2
PROTHROMBIN TIME, POC: 0

## 2022-07-29 PROCEDURE — 85610 PROTHROMBIN TIME: CPT | Performed by: NURSE PRACTITIONER

## 2022-07-29 PROCEDURE — 1123F ACP DISCUSS/DSCN MKR DOCD: CPT | Performed by: NURSE PRACTITIONER

## 2022-07-29 PROCEDURE — 99213 OFFICE O/P EST LOW 20 MIN: CPT | Performed by: NURSE PRACTITIONER

## 2022-07-29 PROCEDURE — G8417 CALC BMI ABV UP PARAM F/U: HCPCS | Performed by: NURSE PRACTITIONER

## 2022-07-29 PROCEDURE — 1036F TOBACCO NON-USER: CPT | Performed by: NURSE PRACTITIONER

## 2022-07-29 PROCEDURE — G8427 DOCREV CUR MEDS BY ELIG CLIN: HCPCS | Performed by: NURSE PRACTITIONER

## 2022-07-29 RX ORDER — TIZANIDINE 2 MG/1
2 TABLET ORAL 2 TIMES DAILY PRN
Qty: 30 TABLET | Refills: 0 | Status: SHIPPED | OUTPATIENT
Start: 2022-07-29

## 2022-07-29 ASSESSMENT — ENCOUNTER SYMPTOMS
BACK PAIN: 1
GASTROINTESTINAL NEGATIVE: 1
RESPIRATORY NEGATIVE: 1

## 2022-07-29 NOTE — PROGRESS NOTES
SUBJECTIVE:    Patient ID: Donny Casanova is a 82 y.o. male. CC: Pulmonary embolism, chronic anticoagulation, lower back pain    HPI: Patient presents to the office today for follow-up of chronic medical conditions and management of chronic anticoagulation. Patient has a history of pulmonary embolism. He is chronically anticoagulated on warfarin. He is compliant with his medication regimen. His INR is therapeutic at 2.0. Patient had an episode of stumbling at home. His wife reports he did not fall. Since then, he has had left-sided lower back pain just above the buttock. He does not feel this is his hip. He has pain with walking but no catching or clicking in the hip. He is requesting a muscle relaxant to see if this helps. He has pain medication at home and is taken a dose which helped.       Past Medical History:   Diagnosis Date    Abdominal pain, epigastric     Arthritis     Benign prostatic hypertrophy without urinary obstruction     BPH     Cellulitis and abscess     Chronic rhinitis     Chronic systolic congestive heart failure ([facility] [address] 4/18/2019    COPD (chronic obstructive pulmonary disease) ([facility])     Diabetes mellitus (HCC)     Dysphagia     Erectile dysfunction     GERD (gastroesophageal reflux disease)     Hx of blood clots     Hyperglycemia     Hypertension     Late onset Alzheimer's disease without behavioral disturbance ([facility] [address] 7/23/2020    lumbar radiculopathy     Neuropathy     Nocturia     Osteoarthritis     Other disorders of kidney and ureter in diseases classified elsewhere     Pain, joint, knee     Palpitations     skin cancer     Rt ear, nose, neck, hands/ 2018 lung    SOB (shortness of breath)     Tennis elbow     Tinea pedis     foot        Current Outpatient Medications   Medication Sig Dispense Refill    tiZANidine (ZANAFLEX) 2 MG tablet Take 1 tablet by mouth 2 times daily as needed (back pain) 30 tablet 0    hydrALAZINE (APRESOLINE) 50 MG tablet Take 1 tablet by mouth in the morning and at bedtime 60 tablet 0    cephALEXin (KEFLEX) 500 MG capsule       lisinopril (PRINIVIL;ZESTRIL) 10 MG tablet TAKE ONE TABLET BY MOUTH DAILY 90 tablet 2    insulin aspart (NOVOLOG FLEXPEN) 100 UNIT/ML injection pen Use sliding scale provided by endocrinology 5 pen 5    HYDROcodone-acetaminophen (NORCO) 7.5-325 MG per tablet Take 1 tablet by mouth 4 times daily as needed for Pain for up to 120 days. Intended supply: 30 days 120 tablet 0    gabapentin (NEURONTIN) 300 MG capsule TAKE ONE CAPSULE BY MOUTH EVERY MORNING AND TAKE TWO CAPSULES BY MOUTH AT BEDTIME 90 capsule 5    BASAGLAR KWIKPEN 100 UNIT/ML injection pen INJECT 30 UNITS UNDER THE SKIN ONCE NIGHTLY 5 pen 2    famotidine (PEPCID) 20 MG tablet TAKE ONE TABLET BY MOUTH TWICE A DAY 60 tablet 11    DULoxetine (CYMBALTA) 30 MG extended release capsule TAKE ONE CAPSULE BY MOUTH ONCE NIGHTLY 90 capsule 3    ketoconazole (NIZORAL) 2 % cream       warfarin (COUMADIN) 2.5 MG tablet TAKE ONE AND ONE-HALF TABLETS BY MOUTH EVERY THURSDAY, THEN TAKE ONE TABLET BY MOUTH ON ALL OTHER DAYS (Patient taking differently: 2.5 mg  in the morning. 2.5mg daily.) 90 tablet 3    B-D UF III MINI PEN NEEDLES 31G X 5 MM MISC USE TO INJECT INSULIN FOUR TIMES A  each 2    blood glucose test strips (FREESTYLE LITE) strip USE TO TEST THREE TIMES A  strip 5    furosemide (LASIX) 40 MG tablet TAKE ONE TABLET BY MOUTH DAILY 90 tablet 1    fluocinonide (LIDEX) 0.05 % cream Apply topically 2 times daily.  60 g 2    donepezil (ARICEPT) 10 MG tablet Take 5 mg by mouth daily (with breakfast)       finasteride (PROSCAR) 5 MG tablet Take 5 mg by mouth daily      naphazoline-glycerin (CLEAR EYES REDNESS RELIEF) 0.012-0.2 % SOLN ophthalmic solution Place 1 drop into both eyes 2 times daily (Patient taking differently: Place 1 drop into both eyes 2 times daily as needed) 1 Bottle 0    tamsulosin (FLOMAX) 0.4 MG capsule Take 1 capsule by mouth daily (Patient taking differently: Take 0.4 mg by mouth in the morning and 0.4 mg before bedtime.) 90 capsule 1    glucose monitoring kit (FREESTYLE) monitoring kit 1 kit by Does not apply route daily as needed. 1 kit 0    latanoprost (XALATAN) 0.005 % ophthalmic solution Place 1 drop into both eyes nightly. amLODIPine (NORVASC) 5 MG tablet Take 1 tablet by mouth daily (Patient not taking: No sig reported) 90 tablet 3     No current facility-administered medications for this visit. Review of Systems   Respiratory: Negative. Cardiovascular: Negative. Gastrointestinal: Negative. Genitourinary: Negative. Musculoskeletal:  Positive for back pain. OBJECTIVE:  Physical Exam  Vitals reviewed. Constitutional:       General: He is not in acute distress. Appearance: Normal appearance. He is well-developed. He is not diaphoretic. HENT:      Head: Normocephalic and atraumatic. Eyes:      General: No scleral icterus. Neck:      Vascular: No JVD. Cardiovascular:      Rate and Rhythm: Rhythm regularly irregular. Pulmonary:      Effort: Pulmonary effort is normal. No respiratory distress. Musculoskeletal:      Comments: Ambulating with a walker due to chronic back pain, peripheral neuropathy and lower extremity weakness   Skin:     General: Skin is warm and dry. Neurological:      General: No focal deficit present. Mental Status: He is alert and oriented to person, place, and time. Psychiatric:         Behavior: Behavior normal.         Thought Content: Thought content normal.         Cognition and Memory: Memory is impaired.       /80 (Site: Left Upper Arm, Position: Sitting, Cuff Size: Large Adult)   Pulse 95   Wt 211 lb (95.7 kg)   SpO2 98%   BMI 30.28 kg/m²      PHQ Scores 7/1/2022 11/11/2021 1/8/2021 9/28/2020 2/6/2020 9/18/2019 2/11/2019   PHQ2 Score 0 0 0 0 0 0 0   PHQ9 Score 0 0 0 0 0 0 0     Interpretation of Total Score Depression Severity: 1-4 = Minimal depression, 5-9 = Mild depression, 10-14 = Moderate depression, 15-19 = Moderately severe depression, 20-27 =Severe depression        ASSESSMENT/PLAN:  Danielle Charlton was seen today for office visit for anticoagulation management. Diagnoses and all orders for this visit:    Bilateral pulmonary embolism (HCC)  -No episode of recurrence  -Continue anticoagulation    Anticoagulated on Coumadin  - INR 2.0  - Continue current regimen, see flow sheets  -     POCT INR    Left lumbar pain  - Stumbled at home but did not fall. This is created some left-sided lateral back pain. - Discussed hip x-ray but wife reports he did not fall. He does not feel it is his hip.  - He wants to try muscle relaxant. We discussed the risks of this medication. He would like to proceed and we will try a low-dose with rare use  - Any pain medication which he already has at home  - Continue heat or ice  - Consider x-ray, referral to physical therapy if not improving  -     tiZANidine (ZANAFLEX) 2 MG tablet;  Take 1 tablet by mouth 2 times daily as needed (back pain)        SHA Nash - CNP

## 2022-08-12 ENCOUNTER — HOSPITAL ENCOUNTER (OUTPATIENT)
Age: 87
Discharge: HOME | End: 2022-08-12
Payer: MEDICARE

## 2022-08-12 ENCOUNTER — TELEPHONE (OUTPATIENT)
Dept: INTERNAL MEDICINE CLINIC | Age: 87
End: 2022-08-12

## 2022-08-12 ENCOUNTER — HOSPITAL ENCOUNTER (OUTPATIENT)
Dept: GENERAL RADIOLOGY | Age: 87
Discharge: HOME | End: 2022-08-12
Payer: MEDICARE

## 2022-08-12 DIAGNOSIS — M25.552 HIP PAIN, LEFT: ICD-10-CM

## 2022-08-12 DIAGNOSIS — M54.50 ACUTE LEFT-SIDED LOW BACK PAIN, UNSPECIFIED WHETHER SCIATICA PRESENT: ICD-10-CM

## 2022-08-12 DIAGNOSIS — M54.50 ACUTE LEFT-SIDED LOW BACK PAIN, UNSPECIFIED WHETHER SCIATICA PRESENT: Primary | ICD-10-CM

## 2022-08-12 PROCEDURE — 72100 X-RAY EXAM L-S SPINE 2/3 VWS: CPT

## 2022-08-12 PROCEDURE — 73502 X-RAY EXAM HIP UNI 2-3 VIEWS: CPT

## 2022-08-12 NOTE — TELEPHONE ENCOUNTER
PT wife calling stating that Dwight Newton hurt his back a few days before July 29, 2022 office visit with Elena Sequeira. Was told by Elena Sequeira if pain did not get any better that he would send him for an xray. They are requesting that the orders be placed. Please advise and contact wife once orders are done. Thank you.

## 2022-08-15 NOTE — RESULT ENCOUNTER NOTE
Multilevel degenerative disc disease and facet joint arthropathy. Also have mild hip arthritis. Reviewing the last note by Elkin Rollins, I recommend patient start physical therapy.   Let me know

## 2022-08-30 ENCOUNTER — OFFICE VISIT (OUTPATIENT)
Dept: INTERNAL MEDICINE CLINIC | Age: 87
End: 2022-08-30
Payer: MEDICARE

## 2022-08-30 VITALS
TEMPERATURE: 97.8 F | WEIGHT: 216 LBS | BODY MASS INDEX: 30.92 KG/M2 | OXYGEN SATURATION: 95 % | HEART RATE: 97 BPM | HEIGHT: 70 IN | SYSTOLIC BLOOD PRESSURE: 132 MMHG | DIASTOLIC BLOOD PRESSURE: 70 MMHG

## 2022-08-30 DIAGNOSIS — M25.551 BILATERAL HIP PAIN: ICD-10-CM

## 2022-08-30 DIAGNOSIS — Z79.01 ANTICOAGULATED ON COUMADIN: ICD-10-CM

## 2022-08-30 DIAGNOSIS — I26.99 BILATERAL PULMONARY EMBOLISM (HCC): Primary | ICD-10-CM

## 2022-08-30 DIAGNOSIS — M25.552 BILATERAL HIP PAIN: ICD-10-CM

## 2022-08-30 DIAGNOSIS — M15.9 PRIMARY OSTEOARTHRITIS INVOLVING MULTIPLE JOINTS: ICD-10-CM

## 2022-08-30 DIAGNOSIS — M48.062 LUMBAR STENOSIS WITH NEUROGENIC CLAUDICATION: ICD-10-CM

## 2022-08-30 DIAGNOSIS — M51.36 DDD (DEGENERATIVE DISC DISEASE), LUMBAR: ICD-10-CM

## 2022-08-30 DIAGNOSIS — G62.9 PERIPHERAL POLYNEUROPATHY: ICD-10-CM

## 2022-08-30 LAB
INTERNATIONAL NORMALIZATION RATIO, POC: 1.7
PROTHROMBIN TIME, POC: NORMAL

## 2022-08-30 PROCEDURE — 1123F ACP DISCUSS/DSCN MKR DOCD: CPT | Performed by: NURSE PRACTITIONER

## 2022-08-30 PROCEDURE — G8417 CALC BMI ABV UP PARAM F/U: HCPCS | Performed by: NURSE PRACTITIONER

## 2022-08-30 PROCEDURE — 99213 OFFICE O/P EST LOW 20 MIN: CPT | Performed by: NURSE PRACTITIONER

## 2022-08-30 PROCEDURE — 1036F TOBACCO NON-USER: CPT | Performed by: NURSE PRACTITIONER

## 2022-08-30 PROCEDURE — 85610 PROTHROMBIN TIME: CPT | Performed by: NURSE PRACTITIONER

## 2022-08-30 PROCEDURE — G8427 DOCREV CUR MEDS BY ELIG CLIN: HCPCS | Performed by: NURSE PRACTITIONER

## 2022-08-30 RX ORDER — HYDROCODONE BITARTRATE AND ACETAMINOPHEN 7.5; 325 MG/1; MG/1
1 TABLET ORAL 4 TIMES DAILY PRN
Qty: 120 TABLET | Refills: 0 | Status: SHIPPED | OUTPATIENT
Start: 2022-08-30 | End: 2022-10-31 | Stop reason: SDUPTHER

## 2022-08-30 ASSESSMENT — ENCOUNTER SYMPTOMS
GASTROINTESTINAL NEGATIVE: 1
BACK PAIN: 1
RESPIRATORY NEGATIVE: 1
COLOR CHANGE: 1

## 2022-08-30 NOTE — PROGRESS NOTES
SUBJECTIVE:    Patient ID: Justin Roy is a 82 y.o. male. CC: Pulmonary embolism, chronic anticoagulation, lower back pain, neuropathy, leg redness    HPI: Patient presents to the office today for follow-up of chronic medical conditions and management of chronic anticoagulation. Patient has a history of pulmonary embolism. He is chronically anticoagulated on warfarin. He is compliant with his medication regimen. His INR is low today at 1.7, previously 2.0. He has a history of chronic back pain and severe neuropathy. He is remained intermittently problematic and severe at times. He has been tried on a number of medications some of which caused side effects. At present he is tolerating gabapentin, duloxetine, and will use Norco 7.5 mg tabs as needed. History of cellulitis of the lower extremities. Wife reports mild redness on the left anterior lower leg. She just noticed this yesterday. There is no warmth. There is no drainage. There is no fever. Leg edema continues to improve.       Past Medical History:   Diagnosis Date    Abdominal pain, epigastric     Arthritis     Benign prostatic hypertrophy without urinary obstruction     BPH     Cellulitis and abscess     Chronic rhinitis     Chronic systolic congestive heart failure ([address] 4/18/2019    COPD (chronic obstructive pulmonary disease) (HCC)     Diabetes mellitus (HCC)     Dysphagia     Erectile dysfunction     GERD (gastroesophageal reflux disease)     Hx of blood clots     Hyperglycemia     Hypertension     Late onset Alzheimer's disease without behavioral disturbance ([address] 7/23/2020    lumbar radiculopathy     Neuropathy     Nocturia     Osteoarthritis     Other disorders of kidney and ureter in diseases classified elsewhere     Pain, joint, knee     Palpitations     skin cancer     Rt ear, nose, neck, hands/ 2018 lung    SOB (shortness of breath)     Tennis elbow     Tinea pedis     foot        Current Outpatient Medications   Medication Sig Dispense Refill    HYDROcodone-acetaminophen (NORCO) 7.5-325 MG per tablet Take 1 tablet by mouth 4 times daily as needed for Pain for up to 120 days. Intended supply: 30 days 120 tablet 0    hydrALAZINE (APRESOLINE) 50 MG tablet TAKE ONE TABLET BY MOUTH EVERY MORNING AND TAKE ONE TABLET BY MOUTH EVERY NIGHT AT BEDTIME 60 tablet 3    tiZANidine (ZANAFLEX) 2 MG tablet Take 1 tablet by mouth 2 times daily as needed (back pain) 30 tablet 0    cephALEXin (KEFLEX) 500 MG capsule       lisinopril (PRINIVIL;ZESTRIL) 10 MG tablet TAKE ONE TABLET BY MOUTH DAILY 90 tablet 2    insulin aspart (NOVOLOG FLEXPEN) 100 UNIT/ML injection pen Use sliding scale provided by endocrinology 5 pen 5    gabapentin (NEURONTIN) 300 MG capsule TAKE ONE CAPSULE BY MOUTH EVERY MORNING AND TAKE TWO CAPSULES BY MOUTH AT BEDTIME 90 capsule 5    BASAGLAR KWIKPEN 100 UNIT/ML injection pen INJECT 30 UNITS UNDER THE SKIN ONCE NIGHTLY 5 pen 2    famotidine (PEPCID) 20 MG tablet TAKE ONE TABLET BY MOUTH TWICE A DAY 60 tablet 11    DULoxetine (CYMBALTA) 30 MG extended release capsule TAKE ONE CAPSULE BY MOUTH ONCE NIGHTLY 90 capsule 3    ketoconazole (NIZORAL) 2 % cream       warfarin (COUMADIN) 2.5 MG tablet TAKE ONE AND ONE-HALF TABLETS BY MOUTH EVERY THURSDAY, THEN TAKE ONE TABLET BY MOUTH ON ALL OTHER DAYS (Patient taking differently: 2.5 mg daily 2.5mg daily) 90 tablet 3    B-D UF III MINI PEN NEEDLES 31G X 5 MM MISC USE TO INJECT INSULIN FOUR TIMES A  each 2    blood glucose test strips (FREESTYLE LITE) strip USE TO TEST THREE TIMES A  strip 5    furosemide (LASIX) 40 MG tablet TAKE ONE TABLET BY MOUTH DAILY 90 tablet 1    fluocinonide (LIDEX) 0.05 % cream Apply topically 2 times daily.  60 g 2    donepezil (ARICEPT) 10 MG tablet Take 5 mg by mouth daily (with breakfast)       finasteride (PROSCAR) 5 MG tablet Take 5 mg by mouth daily      naphazoline-glycerin (CLEAR EYES REDNESS RELIEF) 0.012-0.2 % SOLN ophthalmic solution Place 1 drop into both eyes 2 times daily (Patient taking differently: Place 1 drop into both eyes 2 times daily as needed) 1 Bottle 0    tamsulosin (FLOMAX) 0.4 MG capsule Take 1 capsule by mouth daily (Patient taking differently: Take 0.4 mg by mouth 2 times daily) 90 capsule 1    glucose monitoring kit (FREESTYLE) monitoring kit 1 kit by Does not apply route daily as needed. 1 kit 0    latanoprost (XALATAN) 0.005 % ophthalmic solution Place 1 drop into both eyes nightly. No current facility-administered medications for this visit. Review of Systems   Respiratory: Negative. Cardiovascular: Negative. Gastrointestinal: Negative. Genitourinary: Negative. Musculoskeletal:  Positive for back pain and gait problem. Skin:  Positive for color change. Neurological:         Neropathy       OBJECTIVE:  Physical Exam  Vitals reviewed. Constitutional:       General: He is not in acute distress. Appearance: Normal appearance. He is well-developed. He is not diaphoretic. HENT:      Head: Normocephalic and atraumatic. Eyes:      General: No scleral icterus. Neck:      Vascular: No JVD. Cardiovascular:      Rate and Rhythm: Rhythm regularly irregular. Pulmonary:      Effort: Pulmonary effort is normal. No respiratory distress. Musculoskeletal:      Comments: Ambulating with a walker due to chronic back pain, peripheral neuropathy and lower extremity weakness   Skin:     General: Skin is warm and dry. Comments: Subtle redness anterior left lower leg and a small area mid shin. There are no open lesions or sores. There is no warmth. There are no red streaks. Neurological:      General: No focal deficit present. Mental Status: He is alert and oriented to person, place, and time. Psychiatric:         Behavior: Behavior normal.         Thought Content: Thought content normal.         Cognition and Memory: Memory is impaired.       /70   Pulse 97   Temp 97.8 °F (36.6 °C) Ht 5' 10\" (1.778 m)   Wt 216 lb (98 kg)   SpO2 95%   BMI 30.99 kg/m²      PHQ Scores 7/1/2022 11/11/2021 1/8/2021 9/28/2020 2/6/2020 9/18/2019 2/11/2019   PHQ2 Score 0 0 0 0 0 0 0   PHQ9 Score 0 0 0 0 0 0 0     Interpretation of Total Score Depression Severity: 1-4 = Minimal depression, 5-9 = Mild depression, 10-14 = Moderate depression, 15-19 = Moderately severe depression, 20-27 =Severe depression        ASSESSMENT/PLAN:  Juanjo was seen today for office visit for anticoagulation management. Diagnoses and all orders for this visit:    Bilateral pulmonary embolism (HCC)  -No episode of recurrence  -Continue anticoagulation    Anticoagulated on Coumadin  - INR 1.7, previously 2.0  - Increase current regimen by 1/2 tab, see flow sheets  -     POCT INR    DDD (degenerative disc disease), lumbar  -     [facility] [place]  -     HYDROcodone-acetaminophen (NORCO) 7.5-325 MG per tablet; Take 1 tablet by mouth 4 times daily as needed for Pain for up to 120 days. Intended supply: 30 days    Bilateral hip pain  -     [facility] [place] [facility]  -     HYDROcodone-acetaminophen (NORCO) 7.5-325 MG per tablet; Take 1 tablet by mouth 4 times daily as needed for Pain for up to 120 days. Intended supply: 30 days    Peripheral polyneuropathy  -     HYDROcodone-acetaminophen (NORCO) 7.5-325 MG per tablet; Take 1 tablet by mouth 4 times daily as needed for Pain for up to 120 days. Intended supply: 30 days    Lumbar stenosis with neurogenic claudication  -     HYDROcodone-acetaminophen (NORCO) 7.5-325 MG per tablet; Take 1 tablet by mouth 4 times daily as needed for Pain for up to 120 days. Intended supply: 30 days    Primary osteoarthritis involving multiple joints  -     HYDROcodone-acetaminophen (NORCO) 7.5-325 MG per tablet; Take 1 tablet by mouth 4 times daily as needed for Pain for up to 120 days.  Intended supply: 30 days    Controlled Substance Monitoring:  Acute and

## 2022-09-01 ENCOUNTER — TELEPHONE (OUTPATIENT)
Dept: INTERNAL MEDICINE CLINIC | Age: 87
End: 2022-09-01

## 2022-09-01 DIAGNOSIS — N18.30 TYPE 2 DIABETES MELLITUS WITH STAGE 3 CHRONIC KIDNEY DISEASE, WITHOUT LONG-TERM CURRENT USE OF INSULIN (HCC): ICD-10-CM

## 2022-09-01 DIAGNOSIS — E11.22 TYPE 2 DIABETES MELLITUS WITH STAGE 3 CHRONIC KIDNEY DISEASE, WITHOUT LONG-TERM CURRENT USE OF INSULIN (HCC): ICD-10-CM

## 2022-09-01 RX ORDER — INSULIN GLARGINE 100 [IU]/ML
30 INJECTION, SOLUTION SUBCUTANEOUS NIGHTLY
Qty: 5 ADJUSTABLE DOSE PRE-FILLED PEN SYRINGE | Refills: 3 | Status: SHIPPED | OUTPATIENT
Start: 2022-09-01

## 2022-09-01 NOTE — TELEPHONE ENCOUNTER
Pt calling requesting refill of   Lilo Sanderson     Last written  4/4/22  by Dr Cornelious Habermann  8/30/22  Next OV  9/30/22  Last recommended OV  NA    Please send to   [facility]

## 2022-09-13 ENCOUNTER — HOSPITAL ENCOUNTER (OUTPATIENT)
Dept: PHYSICAL THERAPY | Age: 87
Setting detail: THERAPIES SERIES
Discharge: HOME | End: 2022-09-13
Payer: MEDICARE

## 2022-09-13 PROCEDURE — 97140 MANUAL THERAPY 1/> REGIONS: CPT

## 2022-09-13 PROCEDURE — 97163 PT EVAL HIGH COMPLEX 45 MIN: CPT

## 2022-09-13 NOTE — PLAN OF CARE
hip pain. Having difficulty with his mobility, sleeping, standing and walking. Patient uses rollator for ambulation, heavily reliant on spouse for daily needs. Spouse reports that patient hasn't had a fall in the past 30 days. Relevant Medical History:n/a  Functional Scale/Score: Foto FS L/spine 36, KEELY 55.2% disability; Foto Hip 26, LEFS 10.9    8/12/2022 Lumbar imaging  1. Multilevel degenerative disc disease and facet joint arthropathy   progressed compared to 2017   2. No acute lumbar spine abnormality     8/12/2022 Hip imaging    No acute fracture or dislocation of the left hip. Mild degenerative changes. Pain Scale: 6/10  Easing factors: not moving  Provocative factors: standing, walking     Type: [x]Constant   []Intermittent  []Radiating []Localized []other:     Numbness/Tingling: n/a    Occupation/School:retired    Living Status/Prior Level of Function: Independent with ADLs and IADLs    OBJECTIVE:     Lumbar ROM: formal measures to follow ( patient with difficulty with positions)    ROM LEFT RIGHT   HIP Flex 110 WNL   HIP Abd     HIP Ext     HIP IR 5 12   HIP ER 22 28   Strength  LEFT RIGHT   HIP Flexors 3+ 4   HIP Abductors (in supine) 3- 4-   HIP Ext (in supine) Little to no activation 3+   Hip ER       Reflexes/Sensation:    [x]Dermatomes/Myotomes intact    [x]Reflexes equal and normal bilaterally   []Other:    Joint mobility: bilateral hips   []Normal    [x]Hypo   []Hyper    Palpation: tender along SP lumbar spine (L3-4 and L4-5), tender to palpate along bilateral lateral hip     Functional Mobility/Transfers: limited in standing, walking and sleeping    Posture: forward flexed posture to 10-15 degrees of lumbar flexion    Bandages/Dressings/Incisions: n/a    Gait: (include devices/WB status) forward flexed posture, decreased hip extension, use of rollator for ambulation    Orthopedic Special Tests:                        [x] Patient history, allergies, meds reviewed.  Medical chart reviewed. See intake form. Review Of Systems (ROS):  [x]Performed Review of systems (Integumentary, CardioPulmonary, Neurological) by intake and observation. Intake form has been scanned into medical record. Patient has been instructed to contact their primary care physician regarding ROS issues if not already being addressed at this time. Co-morbidities/Complexities (which will affect course of rehabilitation):   []None           Arthritic conditions   []Rheumatoid arthritis (M05.9)  [x]Osteoarthritis (M19.91)   Cardiovascular conditions   [x]Hypertension (I10)  []Hyperlipidemia (E78.5)  []Angina pectoris (I20)  []Atherosclerosis (I70)  []CVA Musculoskeletal conditions   []Disc pathology   []Congenital spine pathologies   []Prior surgical intervention  []Osteoporosis (M81.8)  []Osteopenia (M85.8)   Endocrine conditions   []Hypothyroid (E03.9)  []Hyperthyroid Gastrointestinal conditions   []Constipation (D27.10)   Metabolic conditions   [x]Morbid obesity (E66.01)  [x]Diabetes type 1(E10.65) or 2 (E11.65)   []Neuropathy (G60.9)     Pulmonary conditions   []Asthma (J45)  []Coughing   []COPD (J44.9)   Psychological Disorders  []Anxiety (F41.9)  []Depression (F32.9)   []Other:   [x]Other:  hx CA        Barriers to/and or personal factors that will affect rehab potential:              [x]Age  []Sex    []Smoker              []Motivation/Lack of Motivation                        []Co-Morbidities              []Cognitive Function, education/learning barriers              []Environmental, home barriers              []profession/work barriers  []past PT/medical experience  []other:  Justification:     Falls Risk Assessment (30 days):   [] Falls Risk assessed and no intervention required.   [x] Falls Risk assessed and Patient requires intervention due to being higher risk   TUG score (>12s at risk):     [x] Falls education provided, including continued use of rollator for all mobility        ASSESSMENT: Patient is an 83 yo male who presents to therapy with chronic back pain and bilateral hip pain. Upon assessment, patient with increased standing posture of 10-15 degrees of forward trunk flexion. Patient with decreased hip ROM, especially hip extension and IR. Patient with increased pain with palpation to lateral hips, glute med/max bilaterally, as well as SP of lumbar spine. Patient with decreased core and proximal hip strength and NM control. Patient deficits are limiting him in standing, walking and sleeping. Patient ambulates with rollator which keeps him more flexed through trunk, so this may be limiting in progress. Patient will benefit from further skilled PT services to address noted deficits.      Functional Impairments:     [x]Noted lumbar/proximal hip/LE hypomobility   [x]Decreased LE functional ROM   [x]Decreased core/proximal hip strength and neuromuscular control   [x]Decreased LE functional strength   [x]Reduced balance/proprioceptive control   []other:      Functional Activity Limitations (from functional questionnaire and intake)   [x]Reduced ability to tolerate prolonged functional positions   [x]Reduced ability or difficulty with changes of positions or transfers between positions   []Reduced ability to maintain good posture and demonstrate good body mechanics with sitting, bending, and lifting   [x]Reduced ability to sleep   [] Reduced ability or tolerance with driving and/or computer work   []Reduced ability to perform lifting, carrying tasks   []Reduced ability to squat   []Reduced ability to forward bend   [x]Reduced ability to ambulate prolonged functional periods/distances/surfaces   []Reduced ability to ascend/descend stairs   []Reduced ability to run, hop or jump   []other:     Participation Restrictions   [x]Reduced participation in self care activities   []Reduced participation in home management activities   []Reduced participation in work activities   []Reduced participation in social activities. []Reduced participation in sport activities. Classification :    []Signs/symptoms consistent with post-surgical status including decreased ROM, strength and function. []Signs/symptoms consistent with joint sprain/strain   []Signs/symptoms consistent with patella-femoral syndrome   [x]Signs/symptoms consistent with knee OA/hip OA   []Signs/symptoms consistent with internal derangement of knee/Hip   [x]Signs/symptoms consistent with functional hip weakness/NMR control      []Signs/symptoms consistent with tendinitis/tendinosis    []signs/symptoms consistent with pathology which may benefit from Dry needling      [x]other:  signs/symptoms consistent with lumbar spine OA and decreased core control     Prognosis/Rehab Potential:      []Excellent   []Good    [x]Fair   []Poor    Tolerance of evaluation/treatment:    []Excellent   []Good    [x]Fair   []Poor    Physical Therapy Evaluation Complexity Justification  [x] A history of present problem with:  [] no personal factors and/or comorbidities that impact the plan of care;  []1-2 personal factors and/or comorbidities that impact the plan of care  [x]3 personal factors and/or comorbidities that impact the plan of care  [x] An examination of body systems using standardized tests and measures addressing any of the following: body structures and functions (impairments), activity limitations, and/or participation restrictions;:  [] a total of 1-2 or more elements   [x] a total of 3 or more elements   [] a total of 4 or more elements   [x] A clinical presentation with:  [] stable and/or uncomplicated characteristics   [] evolving clinical presentation with changing characteristics  [x] unstable and unpredictable characteristics;   [x] Clinical decision making of [] low, [] moderate, [x] high complexity using standardized patient assessment instrument and/or measurable assessment of functional outcome.     [] EVAL (LOW) 79127 (typically 20 minutes face-to-face)  [] EVAL (MOD) 26298 (typically 30 minutes face-to-face)  [x] EVAL (HIGH) 64730 (typically 45 minutes face-to-face)  [] RE-EVAL     PLAN:  Frequency/Duration:  1-2 days per week for 6 Weeks:  Interventions:  [x]  Therapeutic exercise including: strength training, ROM, for Lower extremity and core   [x]  NMR activation and proprioception for LE, Glutes and Core   [x]  Manual therapy as indicated for LE, Hip and spine to include: Dry Needling/IASTM, STM, PROM, Gr I-IV mobilizations, manipulation. [x] Modalities as needed that may include: thermal agents, E-stim, Biofeedback, US, iontophoresis as indicated  [x] Patient education on joint protection, postural re-education, activity modification, progression of HEP. HEP instruction: Kandice Shepard (see scanned forms)    GOALS:  Patient stated goal: improve sleeping  [] Progressing: [] Met: [] Not Met: [] Adjusted    Therapist goals for Patient:   Short Term Goals: To be achieved in: 2 weeks  1. Independent in HEP and progression per patient tolerance, in order to prevent re-injury. [] Progressing: [] Met: [] Not Met: [] Adjusted  2. Patient will have a decrease in pain to facilitate improvement in movement, function, and ADLs as indicated by Functional Deficits. [] Progressing: [] Met: [] Not Met: [] Adjusted    Long Term Goals: To be achieved in: 6 weeks  1. Disability index score of 20/80 or better for the LEFS and 50% disability or better for KEELY to assist with reaching prior level of function. [] Progressing: [] Met: [] Not Met: [] Adjusted  2. Patient will demonstrate increased AROM to bilateral hip IR to at least 15 degrees to allow for proper joint functioning as indicated by patients Functional Deficits. [] Progressing: [] Met: [] Not Met: [] Adjusted  3.  Patient will demonstrate an increase in Strength to good proximal hip strength and control, within 5lb HHD in LE to allow for proper functional mobility as indicated by patients Functional Deficits. [] Progressing: [] Met: [] Not Met: [] Adjusted  4. Patient will return to standing and walking (in home) functional activities without increased symptoms or restriction. [] Progressing: [] Met: [] Not Met: [] Adjusted  5.  Patient will report being able to sleep for at least 4-6 hours without increased symptoms or restriction in back/hips.(patient specific functional goal)    [] Progressing: [] Met: [] Not Met: [] Adjusted     Electronically signed by:  Geovanna Esposito PT

## 2022-09-13 NOTE — FLOWSHEET NOTE
[facility] [address] [address]  Phone: [phone] Fax: [phone]    Physical Therapy Treatment Note/ Progress Report:       Date:  2022    Patient Name:  Bita Vieira    :  1935  MRN: [id number]  Restrictions/Precautions:    Medical/Treatment Diagnosis Information:  Diagnosis: lumbar DDD, low back pain, bilateral hip pain  Treatment Diagnosis: lumbar DDD M51.36, low back pain M54.5, bilateral hip pain M25.551, H48.746  Insurance/Certification information:  PT Insurance Information: Medicare/Aetna  Physician Information:  JAYNA Vásquez  Plan of care signed (Y/N):     Date of Patient follow up with Physician:      Progress Report: [x]  Yes  []  No     Date Range for reporting period:  Beginnin2022  Ending:     Progress report due (10 Rx/or 30 days whichever is less):      Recertification due (POC duration/ or 90 days whichever is less): 10/27/2022     Visit # Insurance Allowable Auth Needed   1 Medicare/Aetna []Yes    []No     Latex Allergy:  [x]NO      []YES  Preferred Language for Healthcare:   [x]English       []other:  Functional Scale: Foto FS L/spine 36, KEELY 55.2% disability;  Foto Hip 26, LEFS 10.9 Date assessed:2022    Pain level:  6/10     SUBJECTIVE:  See eval    OBJECTIVE: See eval  Observation:   Test measurements:      RESTRICTIONS/PRECAUTIONS: n/a    Exercises/Interventions:     Therapeutic Ex (95468)   Min: Sets/sec Reps Notes/CUES   Retro Stepper/BIKE      Alter G      BFR      Sportcord March      3 way SLR      SAQ      Clam ABD      Hip Ext [place]      BOSU fwd/side lunge      BOSU squat      Leg Press Iso/Con/Ecc 0-      Cybex HS curl      TKE      Glute side walks      RDL      Slide Lunge      Slide HS eccentrics      Step ups/ecc step down      Swissball wall rolls- in SLS- hip drive      Quad hip ext/wall-ball rolls                  Manual Intervention (88637)  Min:20      Bilateral hip belt mobs 1 10    Bilateral SL hip ext mobs 1 5    SL lumbar mobs 1 5    Ankle mobs                  NMR re-education (82470)  Min:   CUES NEEDED   [place]/Biofeedback 10/10      BFR      G. Med activation      Hip Ext full ROM/ G. Activation      Bosu Bal and Prop- G Med      Single leg stance/Balance/Prop      Bosu Retro G. Med act      Prone Hip froggers- sliders/elevated            Therapeutic Activity (45957)  Min:      Ladders      [place]      Dynamic Balance                            Therapeutic Exercise and NMR EXR  [x] (52436) Provided verbal/tactile cueing for activities related to strengthening, flexibility, endurance, ROM for improvements in LE, proximal hip, and core control with self care, mobility, lifting, ambulation. [x] (24660) Provided verbal/tactile cueing for activities related to improving balance, coordination, kinesthetic sense, posture, motor skill, proprioception  to assist with LE, proximal hip, and core control in self care, mobility, lifting, ambulation and eccentric single leg control.      NMR and Therapeutic Activities:    [x] (11375 or 36890) Provided verbal/tactile cueing for activities related to improving balance, coordination, kinesthetic sense, posture, motor skill, proprioception and motor activation to allow for proper function of core, proximal hip and LE with self care and ADLs and functional mobility.   [] (33583) Gait Re-education- Provided training and instruction to the patient for proper LE, core and proximal hip recruitment and positioning and eccentric body weight control with ambulation re-education including up and down stairs     Home Exercise Program:    [x] (67023) Reviewed/Progressed HEP activities related to strengthening, flexibility, endurance, ROM of core, proximal hip and LE for functional self-care, mobility, lifting and ambulation/stair navigation   [] (40222)Reviewed/Progressed HEP activities related to improving balance, coordination, kinesthetic sense, posture, motor skill, proprioception of core, proximal hip and LE for self care, mobility, lifting, and ambulation/stair navigation      Manual Treatments:  PROM / STM / Oscillations-Mobs:  G-I, II, III, IV (PA's, Inf., Post.)  [x] (07068) Provided manual therapy to mobilize LE, proximal hip and/or LS spine soft tissue/joints for the purpose of modulating pain, promoting relaxation,  increasing ROM, reducing/eliminating soft tissue swelling/inflammation/restriction, improving soft tissue extensibility and allowing for proper ROM for normal function with self care, mobility, lifting and ambulation. Modalities:     [] GAME READY (VASO)- for significant edema, swelling, pain control. Charges:  Timed Code Treatment Minutes: 20   Total Treatment Minutes: 40      [] EVAL (LOW) 44941 (typically 20 minutes face-to-face)  [] EVAL (MOD) 81592 (typically 30 minutes face-to-face)  [x] EVAL (HIGH) 79141 (typically 45 minutes face-to-face)  [] RE-EVAL     [] SS(34956) x     [] DRY NEEDLE 1 OR 2 MUSCLES  [] NMR (01318) x     [] DRY NEEDLE 3+ MUSCLES  [x] Manual (89120) x       [] TA (82820) x     [] Mech Traction (82634)  [] ES(attended) (24544)     [] ES (un) (78661):   [] VASO (25825)  [] Other:    If [facility] Please Indicate Time In/Out  CPT Code Time in Time out                                   GOALS:  Patient stated goal: improve sleeping  [] Progressing: [] Met: [] Not Met: [] Adjusted    Therapist goals for Patient:   Short Term Goals: To be achieved in: 2 weeks  1. Independent in HEP and progression per patient tolerance, in order to prevent re-injury. [] Progressing: [] Met: [] Not Met: [] Adjusted  2. Patient will have a decrease in pain to facilitate improvement in movement, function, and ADLs as indicated by Functional Deficits. [] Progressing: [] Met: [] Not Met: [] Adjusted    Long Term Goals: To be achieved in: 6 weeks  1.  Disability index score of 20/80 or better for the LEFS and 50% disability or better for KEELY to assist with reaching prior level of function. [] Progressing: [] Met: [] Not Met: [] Adjusted  2. Patient will demonstrate increased AROM to bilateral hip IR to at least 15 degrees to allow for proper joint functioning as indicated by patients Functional Deficits. [] Progressing: [] Met: [] Not Met: [] Adjusted  3. Patient will demonstrate an increase in Strength to good proximal hip strength and control, within 5lb HHD in LE to allow for proper functional mobility as indicated by patients Functional Deficits. [] Progressing: [] Met: [] Not Met: [] Adjusted  4. Patient will return to standing and walking (in home) functional activities without increased symptoms or restriction. [] Progressing: [] Met: [] Not Met: [] Adjusted  5. Patient will report being able to sleep for at least 4-6 hours without increased symptoms or restriction in back/hips.(patient specific functional goal)    [] Progressing: [] Met: [] Not Met: [] Adjusted     ASSESSMENT:  See eval    Return to Play: (if applicable)   []  Stage 1: Intro to Strength   []  Stage 2: Return to Run and Strength   []  Stage 3: Return to Jump and Strength   []  Stage 4: Dynamic Strength and Agility   []  Stage 5: Sport Specific Training     []  Ready to Return to Play, Meets All Above Stages   []  Not Ready for Return to Sports   Comments:            Treatment/Activity Tolerance:  [x] Patient tolerated treatment well [] Patient limited by fatique  [] Patient limited by pain  [] Patient limited by other medical complications  [] Other:     Overall Progression Towards Functional goals/ Treatment Progress Update:  [] Patient is progressing as expected towards functional goals listed. [] Progression is slowed due to complexities/Impairments listed. [] Progression has been slowed due to co-morbidities.   [x] Plan just implemented, too soon to assess goals progression <30days   [] Goals require adjustment due to lack of progress  [] Patient is not progressing as expected and requires additional follow up with physician  [] Other    Prognosis for POC: [x] Good [] Fair  [] Poor    Patient requires continued skilled intervention: [x] Yes  [] No        PLAN: See eval  [] Continue per plan of care [] Alter current plan (see comments)  [x] Plan of care initiated [] Hold pending MD visit [] Discharge    Electronically signed by: Adis Ybarra, PT    Note: If patient does not return for scheduled/recommended follow up visits, this note will serve as a discharge from care along with the most recent update on progress.

## 2022-09-15 ENCOUNTER — HOSPITAL ENCOUNTER (OUTPATIENT)
Dept: PHYSICAL THERAPY | Age: 87
Setting detail: THERAPIES SERIES
Discharge: HOME | End: 2022-09-15
Payer: MEDICARE

## 2022-09-15 PROCEDURE — 97110 THERAPEUTIC EXERCISES: CPT

## 2022-09-15 PROCEDURE — 97140 MANUAL THERAPY 1/> REGIONS: CPT

## 2022-09-15 NOTE — FLOWSHEET NOTE
[place] [address] [address]  Phone: [phone] Fax: [phone]    Physical Therapy Treatment Note/ Progress Report:       Date:  09/15/2022    Patient Name:  Felecia Hoff    :  1935  MRN: [id number]  Restrictions/Precautions:    Medical/Treatment Diagnosis Information:  Diagnosis: lumbar DDD, low back pain, bilateral hip pain  Treatment Diagnosis: lumbar DDD M51.36, low back pain M54.5, bilateral hip pain M25.551, R16.226  Insurance/Certification information:  PT Insurance Information: Medicare/Aetna  Physician Information:  JAYNA Rivero  Plan of care signed (Y/N):     Date of Patient follow up with Physician:      Progress Report: [x]  Yes  []  No     Date Range for reporting period:  Beginnin2022  Ending:     Progress report due (10 Rx/or 30 days whichever is less):      Recertification due (POC duration/ or 90 days whichever is less): 10/27/2022     Visit # Insurance Allowable Auth Needed   2 Medicare/Aetna []Yes    []No     Latex Allergy:  [x]NO      []YES  Preferred Language for Healthcare:   [x]English       []other:  Functional Scale: Foto FS L/spine 36, KEELY 55.2% disability; Foto Hip 26, LEFS 10.9 Date assessed:2022    Pain level:  6/10     SUBJECTIVE:  Hips felt a little better after last session.      OBJECTIVE: See eval  Observation:   Test measurements:      RESTRICTIONS/PRECAUTIONS: n/a    Exercises/Interventions:     Therapeutic Ex (34958)   Min: 15 Sets/sec Reps Notes/CUES   Retro Stepper/BIKE      Alter G      BFR      Sportcord March      SLR 1 15 bilat   Hooklying clamshell 2 10 green   Hooklying glute set 10 10    LTR 2 10    BOSU fwd/side lunge      BOSU squat      Leg Press Iso/Con/Ecc 0-      Cybex HS curl      TKE      Glute side walks      RDL      Slide Lunge      Slide HS eccentrics      Step ups/ecc step down      Swissball wall rolls- in SLS- hip drive      Quad ambulation/stair navigation   [] (00520)Reviewed/Progressed HEP activities related to improving balance, coordination, kinesthetic sense, posture, motor skill, proprioception of core, proximal hip and LE for self care, mobility, lifting, and ambulation/stair navigation      Manual Treatments:  PROM / STM / Oscillations-Mobs:  G-I, II, III, IV (PA's, Inf., Post.)  [x] (74075) Provided manual therapy to mobilize LE, proximal hip and/or LS spine soft tissue/joints for the purpose of modulating pain, promoting relaxation,  increasing ROM, reducing/eliminating soft tissue swelling/inflammation/restriction, improving soft tissue extensibility and allowing for proper ROM for normal function with self care, mobility, lifting and ambulation. Modalities:     [] GAME READY (VASO)- for significant edema, swelling, pain control. Charges:  Timed Code Treatment Minutes: 45   Total Treatment Minutes: 45      [] EVAL (LOW) 65574 (typically 20 minutes face-to-face)  [] EVAL (MOD) 27342 (typically 30 minutes face-to-face)  [] EVAL (HIGH) 32554 (typically 45 minutes face-to-face)  [] RE-EVAL     [x] AH(11161) x   1  [] DRY NEEDLE 1 OR 2 MUSCLES  [] NMR (89682) x     [] DRY NEEDLE 3+ MUSCLES  [x] Manual (13387) x  2     [] TA (73093) x     [] Mech Traction (75082)  [] ES(attended) (38693)     [] ES (un) (25963):   [] VASO (19267)  [] Other:    If [facility] Please Indicate Time In/Out  CPT Code Time in Time out                                   GOALS:  Patient stated goal: improve sleeping  [] Progressing: [] Met: [] Not Met: [] Adjusted    Therapist goals for Patient:   Short Term Goals: To be achieved in: 2 weeks  1. Independent in HEP and progression per patient tolerance, in order to prevent re-injury. [] Progressing: [] Met: [] Not Met: [] Adjusted  2. Patient will have a decrease in pain to facilitate improvement in movement, function, and ADLs as indicated by Functional Deficits.   [] Progressing: [] Met: [] Not Met: [] Adjusted    Long Term Goals: To be achieved in: 6 weeks  1. Disability index score of 20/80 or better for the LEFS and 50% disability or better for KEELY to assist with reaching prior level of function. [] Progressing: [] Met: [] Not Met: [] Adjusted  2. Patient will demonstrate increased AROM to bilateral hip IR to at least 15 degrees to allow for proper joint functioning as indicated by patients Functional Deficits. [] Progressing: [] Met: [] Not Met: [] Adjusted  3. Patient will demonstrate an increase in Strength to good proximal hip strength and control, within 5lb HHD in LE to allow for proper functional mobility as indicated by patients Functional Deficits. [] Progressing: [] Met: [] Not Met: [] Adjusted  4. Patient will return to standing and walking (in home) functional activities without increased symptoms or restriction. [] Progressing: [] Met: [] Not Met: [] Adjusted  5. Patient will report being able to sleep for at least 4-6 hours without increased symptoms or restriction in back/hips.(patient specific functional goal)    [] Progressing: [] Met: [] Not Met: [] Adjusted     ASSESSMENT:  Continues with tightness in bilateral hip and lumbar spine. Good tolerance to improvement in joint mobility. Added additional exercises for proximal hip strengthening. Feeling better at conclusion.      Return to Play: (if applicable)   []  Stage 1: Intro to Strength   []  Stage 2: Return to Run and Strength   []  Stage 3: Return to Jump and Strength   []  Stage 4: Dynamic Strength and Agility   []  Stage 5: Sport Specific Training     []  Ready to Return to Play, Meets All Above Stages   []  Not Ready for Return to Sports   Comments:            Treatment/Activity Tolerance:  [x] Patient tolerated treatment well [] Patient limited by fatique  [] Patient limited by pain  [] Patient limited by other medical complications  [] Other:     Overall Progression Towards Functional goals/ Treatment Progress Update:  [] Name band; Patient is progressing as expected towards functional goals listed. [] Progression is slowed due to complexities/Impairments listed. [] Progression has been slowed due to co-morbidities. [x] Plan just implemented, too soon to assess goals progression <30days   [] Goals require adjustment due to lack of progress  [] Patient is not progressing as expected and requires additional follow up with physician  [] Other    Prognosis for POC: [x] Good [] Fair  [] Poor    Patient requires continued skilled intervention: [x] Yes  [] No        PLAN: See eval  [] Continue per plan of care [] Alter current plan (see comments)  [x] Plan of care initiated [] Hold pending MD visit [] Discharge    Electronically signed by: Jennifer Em PT    Note: If patient does not return for scheduled/recommended follow up visits, this note will serve as a discharge from care along with the most recent update on progress.

## 2022-09-20 ENCOUNTER — HOSPITAL ENCOUNTER (OUTPATIENT)
Dept: PHYSICAL THERAPY | Age: 87
Setting detail: THERAPIES SERIES
Discharge: HOME | End: 2022-09-20
Payer: MEDICARE

## 2022-09-20 PROCEDURE — 97140 MANUAL THERAPY 1/> REGIONS: CPT

## 2022-09-20 PROCEDURE — 97110 THERAPEUTIC EXERCISES: CPT

## 2022-09-20 NOTE — FLOWSHEET NOTE
[place] [address] [facility] [address]  Phone: [phone] Fax: [phone]    Physical Therapy Treatment Note/ Progress Report:       Date:  2022    Patient Name:  Brandy Elena    :  1935  MRN: [id number]  Restrictions/Precautions:    Medical/Treatment Diagnosis Information:  Diagnosis: lumbar DDD, low back pain, bilateral hip pain  Treatment Diagnosis: lumbar DDD M51.36, low back pain M54.5, bilateral hip pain M25.551, U77.642  Insurance/Certification information:  PT Insurance Information: Medicare/Aetna  Physician Information:  SHA Bah*  Plan of care signed (Y/N):     Date of Patient follow up with Physician:      Progress Report: [x]  Yes  []  No     Date Range for reporting period:  Beginnin2022  Ending:     Progress report due (10 Rx/or 30 days whichever is less):      Recertification due (POC duration/ or 90 days whichever is less): 10/27/2022     Visit # Insurance Allowable Auth Needed   3 Medicare/Aetna []Yes    []No     Latex Allergy:  [x]NO      []YES  Preferred Language for Healthcare:   [x]English       []other:  Functional Scale: Foto FS L/spine 36, KEELY 55.2% disability; Foto Hip 26, LEFS 10.9 Date assessed:2022    Pain level:  6/10     SUBJECTIVE:  Hips felt a little better after last session. States improvement lasted until the following day. Feeling more sore in his low back today.     OBJECTIVE: See eval  Observation:   Test measurements:      RESTRICTIONS/PRECAUTIONS: n/a    Exercises/Interventions:     Therapeutic Ex (77390)   Min: 13 Sets/sec Reps Notes/CUES   Retro Stepper/BIKE      Alter G      BFR      Sportcord March      SLR 1 15 bilat   Hooklying clamshell 2 10 green   Hooklying glute set 10 10    LTR 2 10    BOSU fwd/side lunge      BOSU squat      Leg Press Iso/Con/Ecc 0-      Cybex HS curl      TKE      Glute side walks      RDL      Slide Lunge      Slide HS eccentrics      Step ups/ecc step down      Swissball wall rolls- in SLS- hip drive      Quad hip ext/wall-ball rolls                  Manual Intervention (95458)  Min: 32      Bilateral hip belt mobs 1 10    Bilateral SL hip ext mobs 1 6    SL lumbar mobs 1 0    Posterior hip mobs in supine- bilat 1 6    STM lumbar paraspinals , QL and iliac crest 1 10 bilat         NMR re-education (70834)  Min:   CUES NEEDED   Jamaican/Biofeedback 10/10      BFR      G. Med activation      Hip Ext full ROM/ G. Activation      Bosu Bal and Prop- G Med      Single leg stance/Balance/Prop      Bosu Retro G. Med act      Prone Hip froggers- sliders/elevated            Therapeutic Activity (87668)  Min:      Ladders      Plyos      Dynamic Balance                            Therapeutic Exercise and NMR EXR  [x] (09795) Provided verbal/tactile cueing for activities related to strengthening, flexibility, endurance, ROM for improvements in LE, proximal hip, and core control with self care, mobility, lifting, ambulation. [x] (20705) Provided verbal/tactile cueing for activities related to improving balance, coordination, kinesthetic sense, posture, motor skill, proprioception  to assist with LE, proximal hip, and core control in self care, mobility, lifting, ambulation and eccentric single leg control.      NMR and Therapeutic Activities:    [x] (74642 or 62992) Provided verbal/tactile cueing for activities related to improving balance, coordination, kinesthetic sense, posture, motor skill, proprioception and motor activation to allow for proper function of core, proximal hip and LE with self care and ADLs and functional mobility.   [] (72068) Gait Re-education- Provided training and instruction to the patient for proper LE, core and proximal hip recruitment and positioning and eccentric body weight control with ambulation re-education including up and down stairs     Home Exercise Program:    [x] (15539) Reviewed/Progressed HEP activities related to strengthening, flexibility, endurance, ROM of core, proximal hip and LE for functional self-care, mobility, lifting and ambulation/stair navigation   [] (44620)Reviewed/Progressed HEP activities related to improving balance, coordination, kinesthetic sense, posture, motor skill, proprioception of core, proximal hip and LE for self care, mobility, lifting, and ambulation/stair navigation      Manual Treatments:  PROM / STM / Oscillations-Mobs:  G-I, II, III, IV (PA's, Inf., Post.)  [x] (70618) Provided manual therapy to mobilize LE, proximal hip and/or LS spine soft tissue/joints for the purpose of modulating pain, promoting relaxation,  increasing ROM, reducing/eliminating soft tissue swelling/inflammation/restriction, improving soft tissue extensibility and allowing for proper ROM for normal function with self care, mobility, lifting and ambulation. Modalities:     [] GAME READY (VASO)- for significant edema, swelling, pain control. Charges:  Timed Code Treatment Minutes: 45   Total Treatment Minutes: 45      [] EVAL (LOW) 97299 (typically 20 minutes face-to-face)  [] EVAL (MOD) 79699 (typically 30 minutes face-to-face)  [] EVAL (HIGH) 16555 (typically 45 minutes face-to-face)  [] RE-EVAL     [x] SE(49020) x   1  [] DRY NEEDLE 1 OR 2 MUSCLES  [] NMR (51311) x     [] DRY NEEDLE 3+ MUSCLES  [x] Manual (17554) x  2     [] TA (67038) x     [] Mech Traction (63320)  [] ES(attended) (10998)     [] ES (un) (68342):   [] VASO (42345)  [] Other:    If [facility] Please Indicate Time In/Out  CPT Code Time in Time out                                   GOALS:  Patient stated goal: improve sleeping  [] Progressing: [] Met: [] Not Met: [] Adjusted    Therapist goals for Patient:   Short Term Goals: To be achieved in: 2 weeks  1. Independent in HEP and progression per patient tolerance, in order to prevent re-injury. [] Progressing: [] Met: [] Not Met: [] Adjusted  2.  Patient will have a decrease in pain to

## 2022-09-27 ENCOUNTER — HOSPITAL ENCOUNTER (OUTPATIENT)
Dept: PHYSICAL THERAPY | Age: 87
Setting detail: THERAPIES SERIES
Discharge: HOME | End: 2022-09-27
Payer: MEDICARE

## 2022-09-27 PROCEDURE — 97140 MANUAL THERAPY 1/> REGIONS: CPT

## 2022-09-27 PROCEDURE — 97110 THERAPEUTIC EXERCISES: CPT

## 2022-09-27 NOTE — FLOWSHEET NOTE
[facility] [address] [facility] [address]  Phone: [phone] Fax: [phone]    Physical Therapy Treatment Note/ Progress Report:       Date:  2022    Patient Name:  Brandon Christie    :  1935  MRN: [id number]  Restrictions/Precautions:    Medical/Treatment Diagnosis Information:  Diagnosis: lumbar DDD, low back pain, bilateral hip pain  Treatment Diagnosis: lumbar DDD M51.36, low back pain M54.5, bilateral hip pain M25.551, R30.172  Insurance/Certification information:  PT Insurance Information: Medicare/Aetna  Physician Information:  SHA Smith*  Plan of care signed (Y/N):     Date of Patient follow up with Physician:      Progress Report: [x]  Yes  []  No     Date Range for reporting period:  Beginnin2022  Ending:     Progress report due (10 Rx/or 30 days whichever is less):      Recertification due (POC duration/ or 90 days whichever is less): 10/27/2022     Visit # Insurance Allowable Auth Needed   4 Medicare/Aetna []Yes    []No     Latex Allergy:  [x]NO      []YES  Preferred Language for Healthcare:   [x]English       []other:  Functional Scale: Foto FS L/spine 36, KEELY 55.2% disability; Foto Hip 26, LEFS 10.9 Date assessed:2022    Pain level:  6/10     SUBJECTIVE:  Hips felt a little better after last session. States improvement lasted for about 2 days. States that he is feeling more sore in his back today though.      OBJECTIVE: See eval  Observation:   Test measurements:      RESTRICTIONS/PRECAUTIONS: n/a    Exercises/Interventions:     Therapeutic Ex (27001)   Min: 13 Sets/sec Reps Notes/CUES   Retro Stepper/BIKE      Alter G      BFR      Sportcord March      SLR 1 15 bilat   Hooklying clamshell 2 10 green   Hooklying glute set 10 10    LTR 2 10    BOSU fwd/side lunge      BOSU squat      Leg Press Iso/Con/Ecc 0-      Cybex HS curl      TKE      Glute side walks      RDL      Slide Lunge Slide HS eccentrics      Step ups/ecc step down      Swissball wall rolls- in SLS- hip drive      Quad hip ext/wall-ball rolls                  Manual Intervention (47778)  Min: 36      Bilateral hip belt mobs 1 10    Bilateral SL hip ext mobs 1 6    SL lumbar mobs 1 4 bilat   Posterior hip mobs in supine- bilat 1 6    STM lumbar paraspinals , QL and iliac crest 1 10 bilat         NMR re-education (64144)  Min:   CUES NEEDED   American/Biofeedback 10/10      BFR      G. Med activation      Hip Ext full ROM/ G. Activation      Bosu Bal and Prop- G Med      Single leg stance/Balance/Prop      Bosu Retro G. Med act      Prone Hip froggers- sliders/elevated            Therapeutic Activity (42026)  Min:      Ladders      Plyos      Dynamic Balance                            Therapeutic Exercise and NMR EXR  [x] (20960) Provided verbal/tactile cueing for activities related to strengthening, flexibility, endurance, ROM for improvements in LE, proximal hip, and core control with self care, mobility, lifting, ambulation. [x] (12247) Provided verbal/tactile cueing for activities related to improving balance, coordination, kinesthetic sense, posture, motor skill, proprioception  to assist with LE, proximal hip, and core control in self care, mobility, lifting, ambulation and eccentric single leg control.      NMR and Therapeutic Activities:    [x] (15208 or 87567) Provided verbal/tactile cueing for activities related to improving balance, coordination, kinesthetic sense, posture, motor skill, proprioception and motor activation to allow for proper function of core, proximal hip and LE with self care and ADLs and functional mobility.   [] (28921) Gait Re-education- Provided training and instruction to the patient for proper LE, core and proximal hip recruitment and positioning and eccentric body weight control with ambulation re-education including up and down stairs     Home Exercise Program:    [x] (27829) Reviewed/Progressed HEP activities related to strengthening, flexibility, endurance, ROM of core, proximal hip and LE for functional self-care, mobility, lifting and ambulation/stair navigation   [] (34967)Reviewed/Progressed HEP activities related to improving balance, coordination, kinesthetic sense, posture, motor skill, proprioception of core, proximal hip and LE for self care, mobility, lifting, and ambulation/stair navigation      Manual Treatments:  PROM / STM / Oscillations-Mobs:  G-I, II, III, IV (PA's, Inf., Post.)  [x] (25589) Provided manual therapy to mobilize LE, proximal hip and/or LS spine soft tissue/joints for the purpose of modulating pain, promoting relaxation,  increasing ROM, reducing/eliminating soft tissue swelling/inflammation/restriction, improving soft tissue extensibility and allowing for proper ROM for normal function with self care, mobility, lifting and ambulation. Modalities:     [] GAME READY (VASO)- for significant edema, swelling, pain control. Charges:  Timed Code Treatment Minutes: 49   Total Treatment Minutes: 50      [] EVAL (LOW) 85154 (typically 20 minutes face-to-face)  [] EVAL (MOD) 65122 (typically 30 minutes face-to-face)  [] EVAL (HIGH) 40097 (typically 45 minutes face-to-face)  [] RE-EVAL     [x] EMILY(28537) x   1  [] DRY NEEDLE 1 OR 2 MUSCLES  [] NMR (08426) x     [] DRY NEEDLE 3+ MUSCLES  [x] Manual (44616) x  2     [] TA (37327) x     [] Mech Traction (35188)  [] ES(attended) (02808)     [] ES (un) (73793):   [] VASO (61425)  [] Other:    If [facility] Please Indicate Time In/Out  CPT Code Time in Time out                                   GOALS:  Patient stated goal: improve sleeping  [] Progressing: [] Met: [] Not Met: [] Adjusted    Therapist goals for Patient:   Short Term Goals: To be achieved in: 2 weeks  1. Independent in HEP and progression per patient tolerance, in order to prevent re-injury. [] Progressing: [] Met: [] Not Met: [] Adjusted  2.  Patient will have a decrease in pain to facilitate improvement in movement, function, and ADLs as indicated by Functional Deficits. [] Progressing: [] Met: [] Not Met: [] Adjusted    Long Term Goals: To be achieved in: 6 weeks  1. Disability index score of 20/80 or better for the LEFS and 50% disability or better for KEELY to assist with reaching prior level of function. [] Progressing: [] Met: [] Not Met: [] Adjusted  2. Patient will demonstrate increased AROM to bilateral hip IR to at least 15 degrees to allow for proper joint functioning as indicated by patients Functional Deficits. [] Progressing: [] Met: [] Not Met: [] Adjusted  3. Patient will demonstrate an increase in Strength to good proximal hip strength and control, within 5lb HHD in LE to allow for proper functional mobility as indicated by patients Functional Deficits. [] Progressing: [] Met: [] Not Met: [] Adjusted  4. Patient will return to standing and walking (in home) functional activities without increased symptoms or restriction. [] Progressing: [] Met: [] Not Met: [] Adjusted  5. Patient will report being able to sleep for at least 4-6 hours without increased symptoms or restriction in back/hips.(patient specific functional goal)    [] Progressing: [] Met: [] Not Met: [] Adjusted     ASSESSMENT:  Continues with tightness in bilateral hip and lumbar spine. Very restricted along iliac crest and QL bilaterally. Good improvement with STM and stretching to area. Patient with less pain during turning over today compared to prior sessions. Good tolerance to improvement in joint mobility and strengthening. Feeling better at conclusion.      Return to Play: (if applicable)   []  Stage 1: Intro to Strength   []  Stage 2: Return to Run and Strength   []  Stage 3: Return to Jump and Strength   []  Stage 4: Dynamic Strength and Agility   []  Stage 5: Sport Specific Training     []  Ready to Return to Play, Meets All Above Stages   []  Not Ready for Return to Sports   Comments:            Treatment/Activity Tolerance:  [x] Patient tolerated treatment well [] Patient limited by fatique  [] Patient limited by pain  [] Patient limited by other medical complications  [] Other:     Overall Progression Towards Functional goals/ Treatment Progress Update:  [] Patient is progressing as expected towards functional goals listed. [] Progression is slowed due to complexities/Impairments listed. [] Progression has been slowed due to co-morbidities. [x] Plan just implemented, too soon to assess goals progression <30days   [] Goals require adjustment due to lack of progress  [] Patient is not progressing as expected and requires additional follow up with physician  [] Other    Prognosis for POC: [x] Good [] Fair  [] Poor    Patient requires continued skilled intervention: [x] Yes  [] No        PLAN: See eval  [] Continue per plan of care [] Alter current plan (see comments)  [x] Plan of care initiated [] Hold pending MD visit [] Discharge    Electronically signed by: Paulina Patricio PT    Note: If patient does not return for scheduled/recommended follow up visits, this note will serve as a discharge from care along with the most recent update on progress.

## 2022-09-29 ENCOUNTER — HOSPITAL ENCOUNTER (OUTPATIENT)
Dept: PHYSICAL THERAPY | Age: 87
Setting detail: THERAPIES SERIES
Discharge: HOME | End: 2022-09-29
Payer: MEDICARE

## 2022-09-29 PROCEDURE — 97110 THERAPEUTIC EXERCISES: CPT

## 2022-09-29 PROCEDURE — 97140 MANUAL THERAPY 1/> REGIONS: CPT

## 2022-09-29 NOTE — FLOWSHEET NOTE
[place] [address] [address]  Phone: [phone] Fax: [phone]    Physical Therapy Treatment Note/ Progress Report:       Date:  2022    Patient Name:  Mariann Whitman    :  1935  MRN: [id number]  Restrictions/Precautions:    Medical/Treatment Diagnosis Information:  Diagnosis: lumbar DDD, low back pain, bilateral hip pain  Treatment Diagnosis: lumbar DDD M51.36, low back pain M54.5, bilateral hip pain M25.551, O13.828  Insurance/Certification information:  PT Insurance Information: Medicare/Aetna  Physician Information:  JAYNA Shaikh  Plan of care signed (Y/N):     Date of Patient follow up with Physician:      Progress Report: [x]  Yes  []  No     Date Range for reporting period:  Beginnin2022  Ending:     Progress report due (10 Rx/or 30 days whichever is less):      Recertification due (POC duration/ or 90 days whichever is less): 10/27/2022     Visit # Insurance Allowable Auth Needed   5 Medicare/Aetna []Yes    []No     Latex Allergy:  [x]NO      []YES  Preferred Language for Healthcare:   [x]English       []other:  Functional Scale: Foto FS L/spine 36, KEELY 55.2% disability; Foto Hip 26, LEFS 10.9 Date assessed:2022    Pain level:  6/10     SUBJECTIVE:  Hips felt a little better after last session.       OBJECTIVE: See eval  Observation:   Test measurements:      RESTRICTIONS/PRECAUTIONS: n/a    Exercises/Interventions:     Therapeutic Ex (95351)   Min: 13 Sets/sec Reps Notes/CUES   Retro Stepper/BIKE      Alter G      BFR      Sportcord March      SLR 1 15 bilat   Hooklying clamshell 2 10 green   Hooklying glute set 10 10    LTR 2 10    BOSU fwd/side lunge      BOSU squat      Leg Press Iso/Con/Ecc 0-      Cybex HS curl      TKE      Glute side walks      RDL      Slide Lunge      Slide HS eccentrics      Step ups/ecc step down      Swissball wall rolls- in SLS- hip drive      Quad hip ext/wall-ball rolls                  Manual Intervention (18738)  Min: 36      Bilateral hip belt mobs 1 10    Bilateral SL hip ext mobs 1 6    SL lumbar mobs 1 4 bilat   Posterior hip mobs in supine- bilat 1 6    STM lumbar paraspinals , QL and iliac crest 1 10 bilat         NMR re-education (80975)  Min:   CUES NEEDED   Cymro/Biofeedback 10/10      BFR      G. Med activation      Hip Ext full ROM/ G. Activation      Bosu Bal and Prop- G Med      Single leg stance/Balance/Prop      Bosu Retro G. Med act      Prone Hip froggers- sliders/elevated            Therapeutic Activity (15546)  Min:      Ladders      Plyos      Dynamic Balance                            Therapeutic Exercise and NMR EXR  [x] (31031) Provided verbal/tactile cueing for activities related to strengthening, flexibility, endurance, ROM for improvements in LE, proximal hip, and core control with self care, mobility, lifting, ambulation. [x] (68301) Provided verbal/tactile cueing for activities related to improving balance, coordination, kinesthetic sense, posture, motor skill, proprioception  to assist with LE, proximal hip, and core control in self care, mobility, lifting, ambulation and eccentric single leg control.      NMR and Therapeutic Activities:    [x] (21878 or 02564) Provided verbal/tactile cueing for activities related to improving balance, coordination, kinesthetic sense, posture, motor skill, proprioception and motor activation to allow for proper function of core, proximal hip and LE with self care and ADLs and functional mobility.   [] (13809) Gait Re-education- Provided training and instruction to the patient for proper LE, core and proximal hip recruitment and positioning and eccentric body weight control with ambulation re-education including up and down stairs     Home Exercise Program:    [x] (83331) Reviewed/Progressed HEP activities related to strengthening, flexibility, endurance, ROM of core, proximal hip and LE for Progressing: [] Met: [] Not Met: [] Adjusted    Long Term Goals: To be achieved in: 6 weeks  1. Disability index score of 20/80 or better for the LEFS and 50% disability or better for KEELY to assist with reaching prior level of function. [] Progressing: [] Met: [] Not Met: [] Adjusted  2. Patient will demonstrate increased AROM to bilateral hip IR to at least 15 degrees to allow for proper joint functioning as indicated by patients Functional Deficits. [] Progressing: [] Met: [] Not Met: [] Adjusted  3. Patient will demonstrate an increase in Strength to good proximal hip strength and control, within 5lb HHD in LE to allow for proper functional mobility as indicated by patients Functional Deficits. [] Progressing: [] Met: [] Not Met: [] Adjusted  4. Patient will return to standing and walking (in home) functional activities without increased symptoms or restriction. [] Progressing: [] Met: [] Not Met: [] Adjusted  5. Patient will report being able to sleep for at least 4-6 hours without increased symptoms or restriction in back/hips.(patient specific functional goal)    [] Progressing: [] Met: [] Not Met: [] Adjusted     ASSESSMENT:  Continues with tightness in bilateral hip and lumbar spine. Very restricted along iliac crest and QL bilaterally, however quicker improvement in pliability of tissue with soft tissue mobilization. L anterior hip continues to be restricted into hip extension, R anterior hip improving more quickly. Patient with less pain during turning over today compared to prior sessions. Good tolerance to improvement in joint mobility and strengthening. Feeling better at conclusion and posture more upright with ambulation.      Return to Play: (if applicable)   []  Stage 1: Intro to Strength   []  Stage 2: Return to Run and Strength   []  Stage 3: Return to Jump and Strength   []  Stage 4: Dynamic Strength and Agility   []  Stage 5: Sport Specific Training     []  Ready to Return to Play, [employer]

## 2022-09-30 ENCOUNTER — OFFICE VISIT (OUTPATIENT)
Dept: INTERNAL MEDICINE CLINIC | Age: 87
End: 2022-09-30
Payer: MEDICARE

## 2022-09-30 VITALS
DIASTOLIC BLOOD PRESSURE: 76 MMHG | OXYGEN SATURATION: 95 % | TEMPERATURE: 97.8 F | SYSTOLIC BLOOD PRESSURE: 130 MMHG | BODY MASS INDEX: 31.07 KG/M2 | HEIGHT: 70 IN | HEART RATE: 84 BPM | WEIGHT: 217 LBS

## 2022-09-30 DIAGNOSIS — Z23 NEED FOR INFLUENZA VACCINATION: ICD-10-CM

## 2022-09-30 DIAGNOSIS — M48.062 LUMBAR STENOSIS WITH NEUROGENIC CLAUDICATION: ICD-10-CM

## 2022-09-30 DIAGNOSIS — Z79.01 ANTICOAGULATED ON COUMADIN: ICD-10-CM

## 2022-09-30 DIAGNOSIS — K59.03 CONSTIPATION DUE TO OPIOID THERAPY: ICD-10-CM

## 2022-09-30 DIAGNOSIS — I26.99 BILATERAL PULMONARY EMBOLISM (HCC): Primary | ICD-10-CM

## 2022-09-30 DIAGNOSIS — T40.2X5A CONSTIPATION DUE TO OPIOID THERAPY: ICD-10-CM

## 2022-09-30 DIAGNOSIS — M54.2 CERVICAL PAIN: ICD-10-CM

## 2022-09-30 LAB
INTERNATIONAL NORMALIZATION RATIO, POC: 3
PROTHROMBIN TIME, POC: NORMAL

## 2022-09-30 PROCEDURE — 90694 VACC AIIV4 NO PRSRV 0.5ML IM: CPT | Performed by: NURSE PRACTITIONER

## 2022-09-30 PROCEDURE — 1123F ACP DISCUSS/DSCN MKR DOCD: CPT | Performed by: NURSE PRACTITIONER

## 2022-09-30 PROCEDURE — 85610 PROTHROMBIN TIME: CPT | Performed by: NURSE PRACTITIONER

## 2022-09-30 PROCEDURE — 1036F TOBACCO NON-USER: CPT | Performed by: NURSE PRACTITIONER

## 2022-09-30 PROCEDURE — G0008 ADMIN INFLUENZA VIRUS VAC: HCPCS | Performed by: NURSE PRACTITIONER

## 2022-09-30 PROCEDURE — G8417 CALC BMI ABV UP PARAM F/U: HCPCS | Performed by: NURSE PRACTITIONER

## 2022-09-30 PROCEDURE — G8427 DOCREV CUR MEDS BY ELIG CLIN: HCPCS | Performed by: NURSE PRACTITIONER

## 2022-09-30 PROCEDURE — 99213 OFFICE O/P EST LOW 20 MIN: CPT | Performed by: NURSE PRACTITIONER

## 2022-09-30 ASSESSMENT — ENCOUNTER SYMPTOMS
CONSTIPATION: 1
BACK PAIN: 1
RESPIRATORY NEGATIVE: 1

## 2022-09-30 NOTE — PROGRESS NOTES
SUBJECTIVE:    Patient ID: Sylvie Delgado is a 82 y.o. male. CC: Pulmonary embolism, chronic anticoagulation, neuropathy, lumbar radiculopathy, constipation, neck pain    HPI: Patient presents to the office today for follow-up of chronic medical conditions and management of chronic anticoagulation. Patient has a history of pulmonary embolism. He is chronically anticoagulated on warfarin. He is compliant with his medication regimen. His INR is therapeutic at 3.0. He has a history of severe neuropathy and lumbar radiculopathy. Because of this, he has been started on opioid pain medication which has been uptitrated. He takes the medication as directed but this has caused constipation. His wife has been giving him stool softeners. He has cervical pain and stiffness. He is getting physical therapy in his lower back but they will not help him with his neck without an order.       Past Medical History:   Diagnosis Date    Abdominal pain, epigastric     Arthritis     Benign prostatic hypertrophy without urinary obstruction     BPH     Cellulitis and abscess     Chronic rhinitis     Chronic systolic congestive heart failure ([facility] [address] 4/18/2019    COPD (chronic obstructive pulmonary disease) ([facility])     Diabetes mellitus (HCC)     Dysphagia     Erectile dysfunction     GERD (gastroesophageal reflux disease)     Hx of blood clots     Hyperglycemia     Hypertension     Late onset Alzheimer's disease without behavioral disturbance ([address] 7/23/2020    lumbar radiculopathy     Neuropathy     Nocturia     Osteoarthritis     Other disorders of kidney and ureter in diseases classified elsewhere     Pain, joint, knee     Palpitations     skin cancer     Rt ear, nose, neck, hands/ 2018 lung    SOB (shortness of breath)     Tennis elbow     Tinea pedis     foot        Current Outpatient Medications   Medication Sig Dispense Refill    BASAGLAR KWIKPEN 100 UNIT/ML injection pen Inject 30 Units into the skin nightly 5 Adjustable Dose Pre-filled Pen Syringe 3    furosemide (LASIX) 40 MG tablet TAKE ONE TABLET BY MOUTH DAILY 90 tablet 1    HYDROcodone-acetaminophen (NORCO) 7.5-325 MG per tablet Take 1 tablet by mouth 4 times daily as needed for Pain for up to 120 days. Intended supply: 30 days 120 tablet 0    hydrALAZINE (APRESOLINE) 50 MG tablet TAKE ONE TABLET BY MOUTH EVERY MORNING AND TAKE ONE TABLET BY MOUTH EVERY NIGHT AT BEDTIME 60 tablet 3    tiZANidine (ZANAFLEX) 2 MG tablet Take 1 tablet by mouth 2 times daily as needed (back pain) 30 tablet 0    cephALEXin (KEFLEX) 500 MG capsule       lisinopril (PRINIVIL;ZESTRIL) 10 MG tablet TAKE ONE TABLET BY MOUTH DAILY 90 tablet 2    insulin aspart (NOVOLOG FLEXPEN) 100 UNIT/ML injection pen Use sliding scale provided by endocrinology 5 pen 5    gabapentin (NEURONTIN) 300 MG capsule TAKE ONE CAPSULE BY MOUTH EVERY MORNING AND TAKE TWO CAPSULES BY MOUTH AT BEDTIME 90 capsule 5    famotidine (PEPCID) 20 MG tablet TAKE ONE TABLET BY MOUTH TWICE A DAY 60 tablet 11    DULoxetine (CYMBALTA) 30 MG extended release capsule TAKE ONE CAPSULE BY MOUTH ONCE NIGHTLY 90 capsule 3    ketoconazole (NIZORAL) 2 % cream       warfarin (COUMADIN) 2.5 MG tablet TAKE ONE AND ONE-HALF TABLETS BY MOUTH EVERY THURSDAY, THEN TAKE ONE TABLET BY MOUTH ON ALL OTHER DAYS (Patient taking differently: 2.5 mg daily 2.5mg daily) 90 tablet 3    B-D UF III MINI PEN NEEDLES 31G X 5 MM MISC USE TO INJECT INSULIN FOUR TIMES A  each 2    blood glucose test strips (FREESTYLE LITE) strip USE TO TEST THREE TIMES A  strip 5    fluocinonide (LIDEX) 0.05 % cream Apply topically 2 times daily.  60 g 2    donepezil (ARICEPT) 10 MG tablet Take 5 mg by mouth daily (with breakfast)       finasteride (PROSCAR) 5 MG tablet Take 5 mg by mouth daily      naphazoline-glycerin (CLEAR EYES REDNESS RELIEF) 0.012-0.2 % SOLN ophthalmic solution Place 1 drop into both eyes 2 times daily (Patient taking differently: Place 1 drop into both eyes 2 times daily as needed) 1 Bottle 0    tamsulosin (FLOMAX) 0.4 MG capsule Take 1 capsule by mouth daily (Patient taking differently: Take 0.4 mg by mouth 2 times daily) 90 capsule 1    glucose monitoring kit (FREESTYLE) monitoring kit 1 kit by Does not apply route daily as needed. 1 kit 0    latanoprost (XALATAN) 0.005 % ophthalmic solution Place 1 drop into both eyes nightly. No current facility-administered medications for this visit. Review of Systems   Respiratory: Negative. Cardiovascular: Negative. Gastrointestinal:  Positive for constipation. Genitourinary: Negative. Musculoskeletal:  Positive for back pain. Neurological:         Neuropathy       OBJECTIVE:  Physical Exam  Vitals reviewed. Constitutional:       General: He is not in acute distress. Appearance: Normal appearance. He is well-developed. He is not diaphoretic. HENT:      Head: Normocephalic and atraumatic. Eyes:      General: No scleral icterus. Neck:      Vascular: No JVD. Musculoskeletal:      Comments: Ambulating with a walker due to chronic back pain, peripheral neuropathy and lower extremity weakness   Skin:     General: Skin is warm and dry. Neurological:      General: No focal deficit present. Mental Status: He is alert and oriented to person, place, and time. Psychiatric:         Behavior: Behavior normal.         Thought Content: Thought content normal.         Cognition and Memory: Memory is impaired.       /76   Pulse 84   Temp 97.8 °F (36.6 °C)   Ht 5' 10\" (1.778 m)   Wt 217 lb (98.4 kg)   SpO2 95%   BMI 31.14 kg/m²      PHQ Scores 7/1/2022 11/11/2021 1/8/2021 9/28/2020 2/6/2020 9/18/2019 2/11/2019   PHQ2 Score 0 0 0 0 0 0 0   PHQ9 Score 0 0 0 0 0 0 0     Interpretation of Total Score Depression Severity: 1-4 = Minimal depression, 5-9 = Mild depression, 10-14 = Moderate depression, 15-19 = Moderately severe depression, 20-27 =Severe depression        Assessment/Plan:  Jose Luis SFuentes was seen today for follow-up.   Diagnoses and all orders for this visit:    Bilateral pulmonary embolism (HCC)  - Chronic  - Continue anticoagulation    Anticoagulated on Coumadin  - INR 3.0  - Continue current regimen, see flowsheet  -     POCT INR    Lumbar stenosis with neurogenic claudication  -Chronic  -Continue current regimen    Cervical pain  -     [facility]    Constipation due to opioid therapy  -Constipation due to opioid therapy  -We discussed the benefits of fiber, stool softener, MiraLAX  -If no improvement on over-the-counter remedies, consider prescriptive therapy for opioid-induced constipation    Need for influenza vaccination  -     Influenza, FLUAD, (age 72 y+), IM, Preservative Free, 0.5 mL        SHA Olguin - CNP

## 2022-10-04 ENCOUNTER — HOSPITAL ENCOUNTER (OUTPATIENT)
Dept: PHYSICAL THERAPY | Age: 87
Setting detail: THERAPIES SERIES
Discharge: HOME | End: 2022-10-04
Payer: MEDICARE

## 2022-10-04 PROCEDURE — 97140 MANUAL THERAPY 1/> REGIONS: CPT

## 2022-10-04 PROCEDURE — 97110 THERAPEUTIC EXERCISES: CPT

## 2022-10-04 NOTE — FLOWSHEET NOTE
[place] [address] [address]  Phone: [phone] Fax: [phone]    Physical Therapy Treatment Note/ Progress Report:       Date:  10/04/2022    Patient Name:  Ora Galeas    :  1935  MRN: [id number]  Restrictions/Precautions:    Medical/Treatment Diagnosis Information:  Diagnosis: lumbar DDD, low back pain, bilateral hip pain  Treatment Diagnosis: lumbar DDD M51.36, low back pain M54.5, bilateral hip pain M25.551, A90.172  Insurance/Certification information:  PT Insurance Information: Medicare/Aetna  Physician Information:  JAYNA Meehan  Plan of care signed (Y/N):     Date of Patient follow up with Physician:      Progress Report: [x]  Yes  []  No     Date Range for reporting period:  Beginnin2022  Ending:     Progress report due (10 Rx/or 30 days whichever is less):      Recertification due (POC duration/ or 90 days whichever is less): 10/27/2022     Visit # Insurance Allowable Auth Needed   6 Medicare/Aetna []Yes    []No     Latex Allergy:  [x]NO      []YES  Preferred Language for Healthcare:   [x]English       []other:  Functional Scale: Foto FS L/spine 36, KEELY 55.2% disability; Foto Hip 26, LEFS 10.9 Date assessed:2022    Pain level:  4/10     SUBJECTIVE:  Hips felt a little better after last session. Notes that his back isn't feeling too badly today. Feels like it is getting some better. Was able to walk 9-10 times up and down driveway, which is about 90 feet long each direction. Having some issues with his neck, will be having doc send order for neck.       OBJECTIVE: See eval  Observation:   Test measurements:      RESTRICTIONS/PRECAUTIONS: n/a    Exercises/Interventions:     Therapeutic Ex (08940)   Min: 13 Sets/sec Reps Notes/CUES   Retro Stepper/BIKE      Alter G      BFR      Sportcord March      SLR 1 15 bilat   Hooklying clamshell 2 10 green   Hooklying glute set +TA activation 10 10    LTR 2 10    TA activation + small bridge 5sec 10    BOSU squat      Leg Press Iso/Con/Ecc 0-      Cybex HS curl      TKE      Glute side walks      RDL      Slide Lunge      Slide HS eccentrics      Step ups/ecc step down      Swissball wall rolls- in SLS- hip drive      Quad hip ext/wall-ball rolls                  Manual Intervention (38962)  Min: 34      Bilateral hip belt mobs 1 10    Bilateral SL hip ext mobs 1 6    SL lumbar mobs 1 4 bilat   Posterior hip mobs in supine- bilat 1 6    STM lumbar paraspinals , QL and iliac crest 1 10 bilat         NMR re-education (35971)  Min:   CUES NEEDED   Grenadian/Biofeedback 10/10      BFR      G. Med activation      Hip Ext full ROM/ G. Activation      Bosu Bal and Prop- G Med      Single leg stance/Balance/Prop      Bosu Retro G. Med act      Prone Hip froggers- sliders/elevated            Therapeutic Activity (87012)  Min:      Ladders      Plyos      Dynamic Balance                            Therapeutic Exercise and NMR EXR  [x] (40061) Provided verbal/tactile cueing for activities related to strengthening, flexibility, endurance, ROM for improvements in LE, proximal hip, and core control with self care, mobility, lifting, ambulation. [x] (43796) Provided verbal/tactile cueing for activities related to improving balance, coordination, kinesthetic sense, posture, motor skill, proprioception  to assist with LE, proximal hip, and core control in self care, mobility, lifting, ambulation and eccentric single leg control.      NMR and Therapeutic Activities:    [x] (80991 or 98510) Provided verbal/tactile cueing for activities related to improving balance, coordination, kinesthetic sense, posture, motor skill, proprioception and motor activation to allow for proper function of core, proximal hip and LE with self care and ADLs and functional mobility.   [] (98624) Gait Re-education- Provided training and instruction to the patient for proper LE, core and proximal hip recruitment and positioning and eccentric body weight control with ambulation re-education including up and down stairs     Home Exercise Program:    [x] (51391) Reviewed/Progressed HEP activities related to strengthening, flexibility, endurance, ROM of core, proximal hip and LE for functional self-care, mobility, lifting and ambulation/stair navigation   [] (49632)Reviewed/Progressed HEP activities related to improving balance, coordination, kinesthetic sense, posture, motor skill, proprioception of core, proximal hip and LE for self care, mobility, lifting, and ambulation/stair navigation      Manual Treatments:  PROM / STM / Oscillations-Mobs:  G-I, II, III, IV (PA's, Inf., Post.)  [x] (20212) Provided manual therapy to mobilize LE, proximal hip and/or LS spine soft tissue/joints for the purpose of modulating pain, promoting relaxation,  increasing ROM, reducing/eliminating soft tissue swelling/inflammation/restriction, improving soft tissue extensibility and allowing for proper ROM for normal function with self care, mobility, lifting and ambulation. Modalities:     [] GAME READY (VASO)- for significant edema, swelling, pain control. Charges:  Timed Code Treatment Minutes: 47   Total Treatment Minutes: 48      [] EVAL (LOW) 97642 (typically 20 minutes face-to-face)  [] EVAL (MOD) 05818 (typically 30 minutes face-to-face)  [] EVAL (HIGH) 35803 (typically 45 minutes face-to-face)  [] RE-EVAL     [x] VM(76928) x   1  [] DRY NEEDLE 1 OR 2 MUSCLES  [] NMR (39704) x     [] DRY NEEDLE 3+ MUSCLES  [x] Manual (48543) x  2     [] TA (95014) x     [] [facility] (27879[facility]  [] ES(attended) (19843)     [] ES (un) (78004):   [] VASO (83436)  [] Other:    If [facility] Please Indicate Time In/Out  CPT Code Time in Time out                                   GOALS:  Patient stated goal: improve sleeping  [] Progressing: [] Met: [] Not Met: [] Adjusted    Therapist goals for Patient:   Short Term Goals:  To be achieved in: 2 weeks  1. Independent in HEP and progression per patient tolerance, in order to prevent re-injury. [] Progressing: [] Met: [] Not Met: [] Adjusted  2. Patient will have a decrease in pain to facilitate improvement in movement, function, and ADLs as indicated by Functional Deficits. [] Progressing: [] Met: [] Not Met: [] Adjusted    Long Term Goals: To be achieved in: 6 weeks  1. Disability index score of 20/80 or better for the LEFS and 50% disability or better for KEELY to assist with reaching prior level of function. [] Progressing: [] Met: [] Not Met: [] Adjusted  2. Patient will demonstrate increased AROM to bilateral hip IR to at least 15 degrees to allow for proper joint functioning as indicated by patients Functional Deficits. [] Progressing: [] Met: [] Not Met: [] Adjusted  3. Patient will demonstrate an increase in Strength to good proximal hip strength and control, within 5lb HHD in LE to allow for proper functional mobility as indicated by patients Functional Deficits. [] Progressing: [] Met: [] Not Met: [] Adjusted  4. Patient will return to standing and walking (in home) functional activities without increased symptoms or restriction. [] Progressing: [] Met: [] Not Met: [] Adjusted  5. Patient will report being able to sleep for at least 4-6 hours without increased symptoms or restriction in back/hips.(patient specific functional goal)    [] Progressing: [] Met: [] Not Met: [] Adjusted     ASSESSMENT:  Continues with tightness in bilateral hip and lumbar spine. Less overall restriction along iliac crest and QL bilaterally. Improving pliability of joint mobility with hip mobs with improved hip IR and anterior hip mobs. Patient with less pain and improved ease during turning over today compared to prior sessions. Good tolerance to improvement in joint mobility and strengthening. Feeling better at conclusion and posture more upright with ambulation.      Return to Play: (if applicable)   []  Stage 1: Intro to Strength   []  Stage 2: Return to Run and Strength   []  Stage 3: Return to Jump and Strength   []  Stage 4: Dynamic Strength and Agility   []  Stage 5: Sport Specific Training     []  Ready to Return to Play, Meets All Above Stages   []  Not Ready for Return to Sports   Comments:            Treatment/Activity Tolerance:  [x] Patient tolerated treatment well [] Patient limited by fatique  [] Patient limited by pain  [] Patient limited by other medical complications  [] Other:     Overall Progression Towards Functional goals/ Treatment Progress Update:  [] Patient is progressing as expected towards functional goals listed. [] Progression is slowed due to complexities/Impairments listed. [] Progression has been slowed due to co-morbidities. [x] Plan just implemented, too soon to assess goals progression <30days   [] Goals require adjustment due to lack of progress  [] Patient is not progressing as expected and requires additional follow up with physician  [] Other    Prognosis for POC: [x] Good [] Fair  [] Poor    Patient requires continued skilled intervention: [x] Yes  [] No        PLAN: See eval  [] Continue per plan of care [] Alter current plan (see comments)  [x] Plan of care initiated [] Hold pending MD visit [] Discharge    Electronically signed by: Edgard Winston PT    Note: If patient does not return for scheduled/recommended follow up visits, this note will serve as a discharge from care along with the most recent update on progress.

## 2022-10-06 ENCOUNTER — HOSPITAL ENCOUNTER (OUTPATIENT)
Dept: PHYSICAL THERAPY | Age: 87
Setting detail: THERAPIES SERIES
Discharge: HOME | End: 2022-10-06
Payer: MEDICARE

## 2022-10-06 PROCEDURE — 97140 MANUAL THERAPY 1/> REGIONS: CPT

## 2022-10-06 PROCEDURE — 97110 THERAPEUTIC EXERCISES: CPT

## 2022-10-06 NOTE — FLOWSHEET NOTE
[place] [address] [place] [address]  Phone: [phone] Fax: [phone]    Physical Therapy Treatment Note/ Progress Report:       Date:  10/06/2022    Patient Name:  Padmini Mays    :  1935  MRN: [id number]  Restrictions/Precautions:    Medical/Treatment Diagnosis Information:  Diagnosis: lumbar DDD, low back pain, bilateral hip pain  Treatment Diagnosis: lumbar DDD M51.36, low back pain M54.5, bilateral hip pain M25.551, T92.288  Insurance/Certification information:  PT Insurance Information: Medicare/Aetna  Physician Information:  JAYNA Vaughan  Plan of care signed (Y/N):     Date of Patient follow up with Physician:      Progress Report: [x]  Yes  []  No     Date Range for reporting period:  Beginnin2022  Ending:     Progress report due (10 Rx/or 30 days whichever is less):      Recertification due (POC duration/ or 90 days whichever is less): 10/27/2022     Visit # Insurance Allowable Auth Needed   7 Medicare/Aetna []Yes    []No     Latex Allergy:  [x]NO      []YES  Preferred Language for Healthcare:   [x]English       []other:  Functional Scale: Foto FS L/spine 36, KEELY 55.2% disability; Foto Hip 26, LEFS 10.9 Date assessed:2022    Pain level:  4/10     SUBJECTIVE:  Hips felt a little better after last session. Reports that he is having less pain in back when he is at home.        OBJECTIVE: See eval  Observation:   Test measurements:      RESTRICTIONS/PRECAUTIONS: n/a    Exercises/Interventions:     Therapeutic Ex ()   Min: 13 Sets/sec Reps Notes/CUES   Retro Stepper/BIKE      Alter G      BFR      Sportcord March      SLR 1 15 bilat   Hooklying clamshell 2 10 green   Hooklying glute set +TA activation 10 10    LTR 2 10    TA activation + small bridge 5sec 10    BOSU squat      Leg Press Iso/Con/Ecc 0-      Cybex HS curl      TKE      Glute side walks      RDL      Slide Lunge      Slide HS eccentrics      Step ups/ecc step down      Swissball wall rolls- in SLS- hip drive      Quad hip ext/wall-ball rolls                  Manual Intervention (96761)  Min: 34      Bilateral hip belt mobs 1 10    Bilateral SL hip ext mobs 1 6    SL lumbar mobs 1 4 bilat   Posterior hip mobs in supine- bilat 1 6    STM lumbar paraspinals , QL and iliac crest 1 10 bilat         NMR re-education (23381)  Min:   CUES NEEDED   Brazilian/Biofeedback 10/10      BFR      G. Med activation      Hip Ext full ROM/ G. Activation      Bosu Bal and Prop- G Med      Single leg stance/Balance/Prop      Bosu Retro G. Med act      Prone Hip froggers- sliders/elevated            Therapeutic Activity (44241)  Min:      Ladders      Plyos      Dynamic Balance                            Therapeutic Exercise and NMR EXR  [x] (76630) Provided verbal/tactile cueing for activities related to strengthening, flexibility, endurance, ROM for improvements in LE, proximal hip, and core control with self care, mobility, lifting, ambulation. [x] (22751) Provided verbal/tactile cueing for activities related to improving balance, coordination, kinesthetic sense, posture, motor skill, proprioception  to assist with LE, proximal hip, and core control in self care, mobility, lifting, ambulation and eccentric single leg control.      NMR and Therapeutic Activities:    [x] (77143 or 36892) Provided verbal/tactile cueing for activities related to improving balance, coordination, kinesthetic sense, posture, motor skill, proprioception and motor activation to allow for proper function of core, proximal hip and LE with self care and ADLs and functional mobility.   [] (15692) Gait Re-education- Provided training and instruction to the patient for proper LE, core and proximal hip recruitment and positioning and eccentric body weight control with ambulation re-education including up and down stairs     Home Exercise Program:    [x] (15745) Reviewed/Progressed HEP activities related to strengthening, flexibility, endurance, ROM of core, proximal hip and LE for functional self-care, mobility, lifting and ambulation/stair navigation   [] (07208)Reviewed/Progressed HEP activities related to improving balance, coordination, kinesthetic sense, posture, motor skill, proprioception of core, proximal hip and LE for self care, mobility, lifting, and ambulation/stair navigation      Manual Treatments:  PROM / STM / Oscillations-Mobs:  G-I, II, III, IV (PA's, Inf., Post.)  [x] (04007) Provided manual therapy to mobilize LE, proximal hip and/or LS spine soft tissue/joints for the purpose of modulating pain, promoting relaxation,  increasing ROM, reducing/eliminating soft tissue swelling/inflammation/restriction, improving soft tissue extensibility and allowing for proper ROM for normal function with self care, mobility, lifting and ambulation. Modalities:     [] GAME READY (VASO)- for significant edema, swelling, pain control. Charges:  Timed Code Treatment Minutes: 47   Total Treatment Minutes: 48      [] EVAL (LOW) 98625 (typically 20 minutes face-to-face)  [] EVAL (MOD) 90006 (typically 30 minutes face-to-face)  [] EVAL (HIGH) 21762 (typically 45 minutes face-to-face)  [] RE-EVAL     [x] IU(42816) x   1  [] DRY NEEDLE 1 OR 2 MUSCLES  [] NMR (19920) x     [] DRY NEEDLE 3+ MUSCLES  [x] Manual (48274) x  2     [] TA (91565) x     [] Mech Traction (60512)  [] ES(attended) (54432)     [] ES (un) (76901):   [] VASO (07232)  [] Other:    If [facility] Please Indicate Time In/Out  CPT Code Time in Time out                                   GOALS:  Patient stated goal: improve sleeping  [] Progressing: [] Met: [] Not Met: [] Adjusted    Therapist goals for Patient:   Short Term Goals: To be achieved in: 2 weeks  1. Independent in HEP and progression per patient tolerance, in order to prevent re-injury. [] Progressing: [] Met: [] Not Met: [] Adjusted  2.  Patient will have a decrease in pain to facilitate improvement in movement, function, and ADLs as indicated by Functional Deficits. [] Progressing: [] Met: [] Not Met: [] Adjusted    Long Term Goals: To be achieved in: 6 weeks  1. Disability index score of 20/80 or better for the LEFS and 50% disability or better for KEELY to assist with reaching prior level of function. [] Progressing: [] Met: [] Not Met: [] Adjusted  2. Patient will demonstrate increased AROM to bilateral hip IR to at least 15 degrees to allow for proper joint functioning as indicated by patients Functional Deficits. [] Progressing: [] Met: [] Not Met: [] Adjusted  3. Patient will demonstrate an increase in Strength to good proximal hip strength and control, within 5lb HHD in LE to allow for proper functional mobility as indicated by patients Functional Deficits. [] Progressing: [] Met: [] Not Met: [] Adjusted  4. Patient will return to standing and walking (in home) functional activities without increased symptoms or restriction. [] Progressing: [] Met: [] Not Met: [] Adjusted  5. Patient will report being able to sleep for at least 4-6 hours without increased symptoms or restriction in back/hips.(patient specific functional goal)    [] Progressing: [] Met: [] Not Met: [] Adjusted     ASSESSMENT:  Continues with tightness in bilateral hip and lumbar spine. Less overall restriction along iliac crest and QL bilaterally. Improving pliability of joint mobility with hip mobs with improved hip IR and anterior hip mobs. Patient with less pain and improved ease during turning over today compared to prior sessions. Good tolerance to improvement in joint mobility and strengthening. Feeling better at conclusion and posture more upright with ambulation.      Return to Play: (if applicable)   []  Stage 1: Intro to Strength   []  Stage 2: Return to Run and Strength   []  Stage 3: Return to Jump and Strength   []  Stage 4: Dynamic Strength and Agility   []  Stage 5: Sport Specific Training     []  Ready to Return to Play, Meets All Above Stages   []  Not Ready for Return to Sports   Comments:            Treatment/Activity Tolerance:  [x] Patient tolerated treatment well [] Patient limited by fatique  [] Patient limited by pain  [] Patient limited by other medical complications  [] Other:     Overall Progression Towards Functional goals/ Treatment Progress Update:  [] Patient is progressing as expected towards functional goals listed. [] Progression is slowed due to complexities/Impairments listed. [] Progression has been slowed due to co-morbidities. [x] Plan just implemented, too soon to assess goals progression <30days   [] Goals require adjustment due to lack of progress  [] Patient is not progressing as expected and requires additional follow up with physician  [] Other    Prognosis for POC: [x] Good [] Fair  [] Poor    Patient requires continued skilled intervention: [x] Yes  [] No        PLAN: See eval  [] Continue per plan of care [] Alter current plan (see comments)  [x] Plan of care initiated [] Hold pending MD visit [] Discharge    Electronically signed by: Lisa Bey PT    Note: If patient does not return for scheduled/recommended follow up visits, this note will serve as a discharge from care along with the most recent update on progress.

## 2022-10-10 RX ORDER — GABAPENTIN 300 MG/1
CAPSULE ORAL
Qty: 270 CAPSULE | Refills: 3 | Status: SHIPPED | OUTPATIENT
Start: 2022-10-10 | End: 2023-10-10

## 2022-10-11 ENCOUNTER — HOSPITAL ENCOUNTER (OUTPATIENT)
Dept: PHYSICAL THERAPY | Age: 87
Setting detail: THERAPIES SERIES
Discharge: HOME | End: 2022-10-11
Payer: MEDICARE

## 2022-10-11 PROCEDURE — 97110 THERAPEUTIC EXERCISES: CPT

## 2022-10-11 PROCEDURE — 97140 MANUAL THERAPY 1/> REGIONS: CPT

## 2022-10-11 NOTE — FLOWSHEET NOTE
[facility] [address] [facility] [address] [address]  Phone: [phone] Fax: [phone]    Physical Therapy Treatment Note/ Progress Report:       Date:  10/11/2022    Patient Name:  Bashir Pineda    :  1935  MRN: [id number]  Restrictions/Precautions:    Medical/Treatment Diagnosis Information:  Diagnosis: lumbar DDD, low back pain, bilateral hip pain  Treatment Diagnosis: lumbar DDD M51.36, low back pain M54.5, bilateral hip pain M25.551, T37.749  Insurance/Certification information:  PT Insurance Information: Medicare/Aetna  Physician Information:  JAYNA Hauser  Plan of care signed (Y/N):      Date of Patient follow up with Physician:      Progress Report: [x]  Yes  []  No     Date Range for reporting period:  Beginnin2022  Ending:     Progress report due (10 Rx/or 30 days whichever is less):      Recertification due (POC duration/ or 90 days whichever is less): 10/27/2022     Visit # Insurance Allowable Auth Needed   8 Medicare/Aetna []Yes    []No     Latex Allergy:  [x]NO      []YES  Preferred Language for Healthcare:   [x]English       []other:  Functional Scale: Foto FS L/spine 36, KEELY 55.2% disability; Foto Hip 26, LEFS 10.9 Date assessed:2022    Pain level:  4/10     SUBJECTIVE:  Hips felt a little better after last session. Reports that he isn't really feeling any pain when he is walking, only feels in the small of his back when he is standing still.      OBJECTIVE: See eval  Observation:   Test measurements:      RESTRICTIONS/PRECAUTIONS: n/a    Exercises/Interventions:     Therapeutic Ex (24642)   Min: 15 Sets/sec Reps Notes/CUES   Retro Stepper/BIKE      Alter G      BFR      Sportcord March      SLR 1 15 bilat   Hooklying clamshell 0  green   Hooklying glute set +TA activation 10 10    LTR 2 10    TA activation + small bridge 5sec 10    Standing hip ext over table 2 10    Standing pelvic/trunk ext at table 5sec 6    Cybex HS curl      TKE      Glute side walks      RDL      Slide Lunge      Slide HS eccentrics      Step ups/ecc step down      Swissball wall rolls- in SLS- hip drive      Quad hip ext/wall-ball rolls                  Manual Intervention (06121)  Min: 31      Bilateral hip belt mobs 1 10    Bilateral SL hip ext mobs 1 6    SL lumbar mobs 1 4 bilat   Posterior hip mobs in supine- bilat 1 6    STM lumbar paraspinals , QL and iliac crest 1 5 bilat         NMR re-education (78614)  Min:   CUES NEEDED   Tajik/Biofeedback 10/10      BFR      G. Med activation      Hip Ext full ROM/ G. Activation      Bosu Bal and Prop- G Med      Single leg stance/Balance/Prop      Bosu Retro G. Med act      Prone Hip froggers- sliders/elevated            Therapeutic Activity (41710)  Min:      Ladders      Plyos      Dynamic Balance                            Therapeutic Exercise and NMR EXR  [x] (36137) Provided verbal/tactile cueing for activities related to strengthening, flexibility, endurance, ROM for improvements in LE, proximal hip, and core control with self care, mobility, lifting, ambulation. [x] (03487) Provided verbal/tactile cueing for activities related to improving balance, coordination, kinesthetic sense, posture, motor skill, proprioception  to assist with LE, proximal hip, and core control in self care, mobility, lifting, ambulation and eccentric single leg control.      NMR and Therapeutic Activities:    [x] (84439 or 77409) Provided verbal/tactile cueing for activities related to improving balance, coordination, kinesthetic sense, posture, motor skill, proprioception and motor activation to allow for proper function of core, proximal hip and LE with self care and ADLs and functional mobility.   [] (72873) Gait Re-education- Provided training and instruction to the patient for proper LE, core and proximal hip recruitment and positioning and eccentric body weight control with ambulation re-education including up and down stairs     Home Exercise Program:    [x] (28504) Reviewed/Progressed HEP activities related to strengthening, flexibility, endurance, ROM of core, proximal hip and LE for functional self-care, mobility, lifting and ambulation/stair navigation   [] (92937)Reviewed/Progressed HEP activities related to improving balance, coordination, kinesthetic sense, posture, motor skill, proprioception of core, proximal hip and LE for self care, mobility, lifting, and ambulation/stair navigation      Manual Treatments:  PROM / STM / Oscillations-Mobs:  G-I, II, III, IV (PA's, Inf., Post.)  [x] (30023) Provided manual therapy to mobilize LE, proximal hip and/or LS spine soft tissue/joints for the purpose of modulating pain, promoting relaxation,  increasing ROM, reducing/eliminating soft tissue swelling/inflammation/restriction, improving soft tissue extensibility and allowing for proper ROM for normal function with self care, mobility, lifting and ambulation. Modalities:     [] GAME READY (VASO)- for significant edema, swelling, pain control. Charges:  Timed Code Treatment Minutes: 46   Total Treatment Minutes: 46      [] EVAL (LOW) 74277 (typically 20 minutes face-to-face)  [] EVAL (MOD) 64429 (typically 30 minutes face-to-face)  [] EVAL (HIGH) 52482 (typically 45 minutes face-to-face)  [] RE-EVAL     [x] ZX(57938) x   1  [] DRY NEEDLE 1 OR 2 MUSCLES  [] NMR (22409) x     [] DRY NEEDLE 3+ MUSCLES  [x] Manual (44618) x  2     [] TA (17649) x     [] [facility] T[facility] (93027)  [] ES(attended) (59091)     [] ES (un) (98990):   [] VASO (85640)  [] Other:    If [facility] Please Indicate Time In/Out  CPT Code Time in Time out                                   GOALS:  Patient stated goal: improve sleeping  [] Progressing: [] Met: [] Not Met: [] Adjusted    Therapist goals for Patient:   Short Term Goals: To be achieved in: 2 weeks  1.  Independent in HEP and progression per patient tolerance, in order to prevent re-injury. [] Progressing: [] Met: [] Not Met: [] Adjusted  2. Patient will have a decrease in pain to facilitate improvement in movement, function, and ADLs as indicated by Functional Deficits. [] Progressing: [] Met: [] Not Met: [] Adjusted    Long Term Goals: To be achieved in: 6 weeks  1. Disability index score of 20/80 or better for the LEFS and 50% disability or better for KEELY to assist with reaching prior level of function. [] Progressing: [] Met: [] Not Met: [] Adjusted  2. Patient will demonstrate increased AROM to bilateral hip IR to at least 15 degrees to allow for proper joint functioning as indicated by patients Functional Deficits. [] Progressing: [] Met: [] Not Met: [] Adjusted  3. Patient will demonstrate an increase in Strength to good proximal hip strength and control, within 5lb HHD in LE to allow for proper functional mobility as indicated by patients Functional Deficits. [] Progressing: [] Met: [] Not Met: [] Adjusted  4. Patient will return to standing and walking (in home) functional activities without increased symptoms or restriction. [] Progressing: [] Met: [] Not Met: [] Adjusted  5. Patient will report being able to sleep for at least 4-6 hours without increased symptoms or restriction in back/hips.(patient specific functional goal)    [] Progressing: [] Met: [] Not Met: [] Adjusted     ASSESSMENT:  Patient with less overall QL restriction today. Hips continue to be limited in mobility but with fair improvement with belt mobs and ant hip mobs in SL. Worked on further focus of glute activation and erect posture in standing. Less pain at conclusion.      Return to Play: (if applicable)   []  Stage 1: Intro to Strength   []  Stage 2: Return to Run and Strength   []  Stage 3: Return to Jump and Strength   []  Stage 4: Dynamic Strength and Agility   []  Stage 5: Sport Specific Training     []  Ready to Return to Play, Meets All Above Stages   []  Not Ready for Return to Sports   Comments:            Treatment/Activity Tolerance:  [x] Patient tolerated treatment well [] Patient limited by fatique  [] Patient limited by pain  [] Patient limited by other medical complications  [] Other:     Overall Progression Towards Functional goals/ Treatment Progress Update:  [] Patient is progressing as expected towards functional goals listed. [] Progression is slowed due to complexities/Impairments listed. [] Progression has been slowed due to co-morbidities. [x] Plan just implemented, too soon to assess goals progression <30days   [] Goals require adjustment due to lack of progress  [] Patient is not progressing as expected and requires additional follow up with physician  [] Other    Prognosis for POC: [x] Good [] Fair  [] Poor    Patient requires continued skilled intervention: [x] Yes  [] No        PLAN: See eval  [] Continue per plan of care [] Alter current plan (see comments)  [x] Plan of care initiated [] Hold pending MD visit [] Discharge    Electronically signed by: Laura Diallo PT    Note: If patient does not return for scheduled/recommended follow up visits, this note will serve as a discharge from care along with the most recent update on progress.

## 2022-10-13 ENCOUNTER — HOSPITAL ENCOUNTER (OUTPATIENT)
Dept: PHYSICAL THERAPY | Age: 87
Setting detail: THERAPIES SERIES
Discharge: HOME | End: 2022-10-13
Payer: MEDICARE

## 2022-10-13 PROCEDURE — 97110 THERAPEUTIC EXERCISES: CPT

## 2022-10-13 PROCEDURE — 97140 MANUAL THERAPY 1/> REGIONS: CPT

## 2022-10-13 NOTE — FLOWSHEET NOTE
[place] [address] [address]  Phone: [phone] Fax: [phone]    Physical Therapy Treatment Note/ Progress Report:       Date:  10/13/2022    Patient Name:  Harriet Delgado    :  1935  MRN: [id number]  Restrictions/Precautions:    Medical/Treatment Diagnosis Information:  Diagnosis: lumbar DDD, low back pain, bilateral hip pain  Treatment Diagnosis: lumbar DDD M51.36, low back pain M54.5, bilateral hip pain M25.551, M41.995  Insurance/Certification information:  PT Insurance Information: Medicare/Aetna  Physician Information:  JAYNA Skaggs  Plan of care signed (Y/N):      Date of Patient follow up with Physician:      Progress Report: [x]  Yes  []  No     Date Range for reporting period:  Beginnin2022  Ending:     Progress report due (10 Rx/or 30 days whichever is less):      Recertification due (POC duration/ or 90 days whichever is less): 10/27/2022     Visit # Insurance Allowable Auth Needed   9 Medicare/Aetna []Yes    []No     Latex Allergy:  [x]NO      []YES  Preferred Language for Healthcare:   [x]English       []other:  Functional Scale: Foto FS L/spine 36, KEELY 55.2% disability; Foto Hip 26, LEFS 10.9 Date assessed:2022    Pain level:  4/10     SUBJECTIVE:  Hips felt a little better after last session. States that he continues to feel pain when he stands for greater than 10 min.      OBJECTIVE: See eval  Observation:   Test measurements:      RESTRICTIONS/PRECAUTIONS: n/a    Exercises/Interventions:     Therapeutic Ex (08925)   Min: 15 Sets/sec Reps Notes/CUES   Retro Stepper/BIKE      Alter G      BFR      Sportcord March      SLR 1 15 bilat   Hooklying clamshell 0  green   Hooklying glute set +TA activation 10 10    LTR 2 10    TA activation + small bridge 5sec 10    Standing hip ext over table 2 10    Standing pelvic/trunk ext at table 0     Band pull down for posture 1 15    TKE Exercise Program:    [x] (17213) Reviewed/Progressed HEP activities related to strengthening, flexibility, endurance, ROM of core, proximal hip and LE for functional self-care, mobility, lifting and ambulation/stair navigation   [] (57153)Reviewed/Progressed HEP activities related to improving balance, coordination, kinesthetic sense, posture, motor skill, proprioception of core, proximal hip and LE for self care, mobility, lifting, and ambulation/stair navigation      Manual Treatments:  PROM / STM / Oscillations-Mobs:  G-I, II, III, IV (PA's, Inf., Post.)  [x] (10866) Provided manual therapy to mobilize LE, proximal hip and/or LS spine soft tissue/joints for the purpose of modulating pain, promoting relaxation,  increasing ROM, reducing/eliminating soft tissue swelling/inflammation/restriction, improving soft tissue extensibility and allowing for proper ROM for normal function with self care, mobility, lifting and ambulation. Modalities:     [] GAME READY (VASO)- for significant edema, swelling, pain control. Charges:  Timed Code Treatment Minutes: 46   Total Treatment Minutes: 46      [] EVAL (LOW) 87662 (typically 20 minutes face-to-face)  [] EVAL (MOD) 77029 (typically 30 minutes face-to-face)  [] EVAL (HIGH) 55003 (typically 45 minutes face-to-face)  [] RE-EVAL     [x] QY(96450) x   1  [] DRY NEEDLE 1 OR 2 MUSCLES  [] NMR (12812) x     [] DRY NEEDLE 3+ MUSCLES  [x] Manual (80556) x  2     [] TA (63579) x     [] Mech Traction (43815)  [] ES(attended) (86888)     [] ES (un) (39782):   [] VASO (64725)  [] Other:    If [facility] Please Indicate Time In/Out  CPT Code Time in Time out                                   GOALS:  Patient stated goal: improve sleeping  [] Progressing: [] Met: [] Not Met: [] Adjusted    Therapist goals for Patient:   Short Term Goals: To be achieved in: 2 weeks  1. Independent in HEP and progression per patient tolerance, in order to prevent re-injury.    [] Progressing: [] Met: [] Not Met: [] Adjusted  2. Patient will have a decrease in pain to facilitate improvement in movement, function, and ADLs as indicated by Functional Deficits. [] Progressing: [] Met: [] Not Met: [] Adjusted    Long Term Goals: To be achieved in: 6 weeks  1. Disability index score of 20/80 or better for the LEFS and 50% disability or better for KEELY to assist with reaching prior level of function. [] Progressing: [] Met: [] Not Met: [] Adjusted  2. Patient will demonstrate increased AROM to bilateral hip IR to at least 15 degrees to allow for proper joint functioning as indicated by patients Functional Deficits. [] Progressing: [] Met: [] Not Met: [] Adjusted  3. Patient will demonstrate an increase in Strength to good proximal hip strength and control, within 5lb HHD in LE to allow for proper functional mobility as indicated by patients Functional Deficits. [] Progressing: [] Met: [] Not Met: [] Adjusted  4. Patient will return to standing and walking (in home) functional activities without increased symptoms or restriction. [] Progressing: [] Met: [] Not Met: [] Adjusted  5. Patient will report being able to sleep for at least 4-6 hours without increased symptoms or restriction in back/hips.(patient specific functional goal)    [] Progressing: [] Met: [] Not Met: [] Adjusted     ASSESSMENT:  Patient with less overall QL restriction today. Less tender overall with hip mobs as well, and with improved overall ROM. Patient continues to lack hip extension and glute strength which is limiting ability to stand fully erect during ambulation. Worked on further focus of glute activation and erect posture in standing. Less pain at conclusion.      Return to Play: (if applicable)   []  Stage 1: Intro to Strength   []  Stage 2: Return to Run and Strength   []  Stage 3: Return to Jump and Strength   []  Stage 4: Dynamic Strength and Agility   []  Stage 5: Sport Specific Training     []  Ready to Return to Play, Meets All Above Stages   []  Not Ready for Return to Sports   Comments:            Treatment/Activity Tolerance:  [x] Patient tolerated treatment well [] Patient limited by fatique  [] Patient limited by pain  [] Patient limited by other medical complications  [] Other:     Overall Progression Towards Functional goals/ Treatment Progress Update:  [] Patient is progressing as expected towards functional goals listed. [] Progression is slowed due to complexities/Impairments listed. [] Progression has been slowed due to co-morbidities. [x] Plan just implemented, too soon to assess goals progression <30days   [] Goals require adjustment due to lack of progress  [] Patient is not progressing as expected and requires additional follow up with physician  [] Other    Prognosis for POC: [x] Good [] Fair  [] Poor    Patient requires continued skilled intervention: [x] Yes  [] No        PLAN: See eval  [] Continue per plan of care [] Alter current plan (see comments)  [x] Plan of care initiated [] Hold pending MD visit [] Discharge    Electronically signed by: Jolene Juarez PT    Note: If patient does not return for scheduled/recommended follow up visits, this note will serve as a discharge from care along with the most recent update on progress.

## 2022-10-19 ENCOUNTER — HOSPITAL ENCOUNTER (OUTPATIENT)
Dept: PHYSICAL THERAPY | Age: 87
Setting detail: THERAPIES SERIES
Discharge: HOME | End: 2022-10-19
Payer: MEDICARE

## 2022-10-19 PROCEDURE — 97140 MANUAL THERAPY 1/> REGIONS: CPT

## 2022-10-19 PROCEDURE — 97110 THERAPEUTIC EXERCISES: CPT

## 2022-10-19 NOTE — PLAN OF CARE
[place] [address] [address]  Phone: [phone] Fax: [phone]        Physical Therapy Re-Certification Plan of Care/MD UPDATE      Dear Marian Gaston   ,    We had the pleasure of treating the following patient for physical therapy services at [address] [place] [address]. A summary of our findings can be found in the updated assessment below. This includes our plan of care. If you have any questions or concerns regarding these findings, please do not hesitate to contact me at the office phone number checked above.   Thank you for the referral.     Physician Signature:________________________________Date:__________________  By signing above (or electronic signature), therapists plan is approved by physician    Date Range Of Visits: 2022 thru 10/19/2022  Total Visits to Date: 8  Overall Response to Treatment:   [x]Patient is responding well to treatment and improvement is noted with regards  to goals   []Patient should continue to improve in reasonable time if they continue HEP   []Patient has plateaued and is no longer responding to skilled PT intervention    []Patient is getting worse and would benefit from return to referring MD   []Patient unable to adhere to initial POC   []Other:     Physical Therapy Treatment Note/ Progress Report:       Date:  10/19/2022    Patient Name:  Padmini Mays    :  1935  MRN: [id number]  Restrictions/Precautions:    Medical/Treatment Diagnosis Information:  Diagnosis: lumbar DDD, low back pain, bilateral hip pain  Treatment Diagnosis: lumbar DDD M51.36, low back pain M54.5, bilateral hip pain M25.551, V02.383  Insurance/Certification information:  PT Insurance Information: Medicare/Aetna  Physician Information:  SHA Vaughan*  Plan of care signed (Y/N):      Date of Patient follow up with Physician:      Progress Report: [x]  Yes  []  No     Date Range for reporting period:  Beginnin2022  Ending:     Progress report due (10 Rx/or 30 days whichever is less):      Recertification due (POC duration/ or 90 days whichever is less): 10/27/2022     Visit # Insurance Allowable Auth Needed   10 Medicare/Aetna []Yes    []No     Latex Allergy:  [x]NO      []YES  Preferred Language for Healthcare:   [x]English       []other:  Functional Scale: Foto FS L/spine NA KEELY NA disability; Foto Hip 40, LEFS 23.7 Date assessed:10/19/2022    Pain level:  4/10     SUBJECTIVE:  Hips are feeling better, not as stiff. Also not having as much pain along his sides of trunk. States that he continues to feel pain when he stands and walks for greater than 10 min in the small of his back.      OBJECTIVE:   Observation:   Test measurements:        ROM LEFT RIGHT   HIP Flex 110 WNL   HIP Abd       HIP Ext       HIP IR 22 22   HIP ER 48 34       RESTRICTIONS/PRECAUTIONS: n/a    Exercises/Interventions:     Therapeutic Ex (43009)   Min: 15 Sets/sec Reps Notes/CUES   Retro Stepper/BIKE      Alter G      BFR      Sportcord March      SLR + TA activation 1 15 bilat   Hooklying clamshell 0  green   Hooklying glute set +TA activation 10 10    LTR 2 10    Hooklying TA + alt march 1 10    TA activation + small bridge 5sec 10    Standing hip ext over table 0     Standing pelvic/trunk ext at table 0     Band pull down for posture 0     TKE      Glute side walks      RDL      Slide Lunge      Slide HS eccentrics      Step ups/ecc step down      Swissball wall rolls- in SLS- hip drive      Quad hip ext/wall-ball rolls                  Manual Intervention (65406)  Min: 31      Bilateral hip belt mobs 1 10    Bilateral SL hip ext mobs 1 6    SL lumbar mobs 1 4 bilat   Posterior hip mobs in supine- bilat 1 6    STM lumbar paraspinals , QL and iliac crest 1 5 bilat         NMR re-education (65596)  Min:   CUES NEEDED   [place]/Biofeedback 10/10      BFR      G. Med activation      Hip Ext full ROM/ G. Activation      Bosu Bal and Prop- G Med      Single leg stance/Balance/Prop      Bosu Retro G. Med act      Prone Hip froggers- sliders/elevated            Therapeutic Activity (08161)  Min:      Ladders      Plyos      Dynamic Balance                            Therapeutic Exercise and NMR EXR  [x] (04581) Provided verbal/tactile cueing for activities related to strengthening, flexibility, endurance, ROM for improvements in LE, proximal hip, and core control with self care, mobility, lifting, ambulation. [x] (61779) Provided verbal/tactile cueing for activities related to improving balance, coordination, kinesthetic sense, posture, motor skill, proprioception  to assist with LE, proximal hip, and core control in self care, mobility, lifting, ambulation and eccentric single leg control.      NMR and Therapeutic Activities:    [x] (92633 or 74447) Provided verbal/tactile cueing for activities related to improving balance, coordination, kinesthetic sense, posture, motor skill, proprioception and motor activation to allow for proper function of core, proximal hip and LE with self care and ADLs and functional mobility.   [] (02962) Gait Re-education- Provided training and instruction to the patient for proper LE, core and proximal hip recruitment and positioning and eccentric body weight control with ambulation re-education including up and down stairs     Home Exercise Program:    [x] (87495) Reviewed/Progressed HEP activities related to strengthening, flexibility, endurance, ROM of core, proximal hip and LE for functional self-care, mobility, lifting and ambulation/stair navigation   [] (33086)Reviewed/Progressed HEP activities related to improving balance, coordination, kinesthetic sense, posture, motor skill, proprioception of core, proximal hip and LE for self care, mobility, lifting, and ambulation/stair navigation      Manual Treatments:  PROM / STM / Oscillations-Mobs:  G-I, II, III, IV (PA's, Inf., Post.)  [x] (90937) Provided manual therapy to mobilize LE, proximal hip and/or LS spine soft tissue/joints for the purpose of modulating pain, promoting relaxation,  increasing ROM, reducing/eliminating soft tissue swelling/inflammation/restriction, improving soft tissue extensibility and allowing for proper ROM for normal function with self care, mobility, lifting and ambulation. Modalities:     [] GAME READY (VASO)- for significant edema, swelling, pain control. Charges:  Timed Code Treatment Minutes: 46   Total Treatment Minutes: 46      [] EVAL (LOW) 10304 (typically 20 minutes face-to-face)  [] EVAL (MOD) 95618 (typically 30 minutes face-to-face)  [] EVAL (HIGH) 71298 (typically 45 minutes face-to-face)  [] RE-EVAL     [x] RH(38771) x   1  [] DRY NEEDLE 1 OR 2 MUSCLES  [] NMR (57019) x     [] DRY NEEDLE 3+ MUSCLES  [x] Manual (03994) x  2     [] TA (83421) x     [] Mech Traction (52712)  [] ES(attended) (29560)     [] ES (un) (05723):   [] VASO (47880)  [] Other:    If [facility] Please Indicate Time In/Out  CPT Code Time in Time out                                   GOALS:  Patient stated goal: improve sleeping  [x] Progressing: [] Met: [] Not Met: [] Adjusted    Therapist goals for Patient:   Short Term Goals: To be achieved in: 2 weeks  1. Independent in HEP and progression per patient tolerance, in order to prevent re-injury. [] Progressing: [x] Met: [] Not Met: [] Adjusted  2. Patient will have a decrease in pain to facilitate improvement in movement, function, and ADLs as indicated by Functional Deficits. [x] Progressing: [] Met: [] Not Met: [] Adjusted    Long Term Goals: To be achieved in: 6 weeks  1. Disability index score of 20/80 or better for the LEFS -METand 50% disability or better for KEELY to assist with reaching prior level of function. [x] Progressing: [] Met: [] Not Met: [] Adjusted  2.  Patient will demonstrate increased AROM to bilateral hip IR to at least 15 degrees to allow for proper joint functioning as indicated by patients Functional Deficits. [] Progressing: [x] Met: [] Not Met: [] Adjusted  3. Patient will demonstrate an increase in Strength to good proximal hip strength and control, within 5lb HHD in LE to allow for proper functional mobility as indicated by patients Functional Deficits. [x] Progressing: [] Met: [] Not Met: [] Adjusted  4. Patient will return to standing and walking (in home) functional activities without increased symptoms or restriction. [x] Progressing: [] Met: [] Not Met: [] Adjusted  5. Patient will report being able to sleep for at least 4-6 hours without increased symptoms or restriction in back/hips.(patient specific functional goal)    [x] Progressing: [] Met: [] Not Met: [] Adjusted     ASSESSMENT:  Patient has been seen for 10 visits of physical therapy to address low back pain and hip pain. Patient hip IR and extension ROM is improving as well improving soft tissue restriction in QL. Patient still with tightness and restriction in l/spine. Needs continued improvement in glute and core strength which is limiting ability to stand fully erect during ambulation. Patient will benefit from further skilled PT services to further work on progression of strength, posture and ROM to allow for overall improved pain with mobility.      Return to Play: (if applicable)   []  Stage 1: Intro to Strength   []  Stage 2: Return to Run and Strength   []  Stage 3: Return to Jump and Strength   []  Stage 4: Dynamic Strength and Agility   []  Stage 5: Sport Specific Training     []  Ready to Return to Play, Meets All Above Stages   []  Not Ready for Return to Sports   Comments:            Treatment/Activity Tolerance:  [x] Patient tolerated treatment well [] Patient limited by fatique  [] Patient limited by pain  [] Patient limited by other medical complications  [] Other:     Overall Progression Towards Functional goals/ Treatment Progress Update:  [x] Patient is progressing as expected towards functional goals listed. [] Progression is slowed due to complexities/Impairments listed. [] Progression has been slowed due to co-morbidities. [] Plan just implemented, too soon to assess goals progression <30days   [] Goals require adjustment due to lack of progress  [] Patient is not progressing as expected and requires additional follow up with physician  [] Other    Prognosis for POC: [x] Good [] Fair  [] Poor    Patient requires continued skilled intervention: [x] Yes  [] No        PLAN: Continue 2x week  [x] Continue per plan of care [] Alter current plan (see comments)  [] Plan of care initiated [] Hold pending MD visit [] Discharge    Electronically signed by: Mere Betancourt, PT    Note: If patient does not return for scheduled/recommended follow up visits, this note will serve as a discharge from care along with the most recent update on progress.

## 2022-10-24 ENCOUNTER — OFFICE VISIT (OUTPATIENT)
Dept: INTERNAL MEDICINE CLINIC | Age: 87
End: 2022-10-24
Payer: MEDICARE

## 2022-10-24 VITALS
SYSTOLIC BLOOD PRESSURE: 150 MMHG | HEART RATE: 90 BPM | BODY MASS INDEX: 30.85 KG/M2 | DIASTOLIC BLOOD PRESSURE: 84 MMHG | WEIGHT: 215 LBS

## 2022-10-24 DIAGNOSIS — M54.50 LEFT LUMBAR PAIN: ICD-10-CM

## 2022-10-24 DIAGNOSIS — M25.552 HIP PAIN, LEFT: ICD-10-CM

## 2022-10-24 DIAGNOSIS — M25.551 BILATERAL HIP PAIN: ICD-10-CM

## 2022-10-24 DIAGNOSIS — M48.062 LUMBAR STENOSIS WITH NEUROGENIC CLAUDICATION: Primary | ICD-10-CM

## 2022-10-24 DIAGNOSIS — M15.9 PRIMARY OSTEOARTHRITIS INVOLVING MULTIPLE JOINTS: ICD-10-CM

## 2022-10-24 DIAGNOSIS — M25.552 BILATERAL HIP PAIN: ICD-10-CM

## 2022-10-24 DIAGNOSIS — M54.50 ACUTE LEFT-SIDED LOW BACK PAIN, UNSPECIFIED WHETHER SCIATICA PRESENT: ICD-10-CM

## 2022-10-24 PROCEDURE — 99214 OFFICE O/P EST MOD 30 MIN: CPT | Performed by: NURSE PRACTITIONER

## 2022-10-24 PROCEDURE — G8427 DOCREV CUR MEDS BY ELIG CLIN: HCPCS | Performed by: NURSE PRACTITIONER

## 2022-10-24 PROCEDURE — 1036F TOBACCO NON-USER: CPT | Performed by: NURSE PRACTITIONER

## 2022-10-24 PROCEDURE — G8484 FLU IMMUNIZE NO ADMIN: HCPCS | Performed by: NURSE PRACTITIONER

## 2022-10-24 PROCEDURE — 1123F ACP DISCUSS/DSCN MKR DOCD: CPT | Performed by: NURSE PRACTITIONER

## 2022-10-24 PROCEDURE — G8417 CALC BMI ABV UP PARAM F/U: HCPCS | Performed by: NURSE PRACTITIONER

## 2022-10-24 RX ORDER — LIDOCAINE 50 MG/G
1 PATCH TOPICAL DAILY
Qty: 30 PATCH | Refills: 0 | Status: SHIPPED | OUTPATIENT
Start: 2022-10-24 | End: 2022-11-23

## 2022-10-24 NOTE — PROGRESS NOTES
10/24/22     Chief Complaint   Patient presents with    Hip Pain     Left sided hip and leg pain. Started Wednesday night. Worse at night. HPI    Here with his wife for an acute exacerbation of lower back pain, left hip and leg pain. Started on Weds, worse at night. Is feeling better today compared to the weekend. He is on gabapentin and duloxetine. He took a pain pill (norco) this morning and it has helped a lot. He is going to PT and has therapy scheduled for Weds and Friday this week. Allergies   Allergen Reactions    Celebrex [Celecoxib] Other (See Comments)     hallucinations    Elavil [Amitriptyline]      Severe fatigue      Naproxen      Kidney damage    Percocet [Oxycodone-Acetaminophen] Other (See Comments)     confusion    Lyrica [Pregabalin] Palpitations     Fatigue     Current Outpatient Medications   Medication Sig Dispense Refill    lidocaine (LIDODERM) 5 % Place 1 patch onto the skin daily 12 hours on, 12 hours off. 30 patch 0    gabapentin (NEURONTIN) 300 MG capsule TAKE ONE CAPSULE BY MOUTH EVERY MORNING AND TAKE TWO CAPSULES BY MOUTH AT BEDTIME 270 capsule 3    BASAGLAR KWIKPEN 100 UNIT/ML injection pen Inject 30 Units into the skin nightly 5 Adjustable Dose Pre-filled Pen Syringe 3    furosemide (LASIX) 40 MG tablet TAKE ONE TABLET BY MOUTH DAILY 90 tablet 1    HYDROcodone-acetaminophen (NORCO) 7.5-325 MG per tablet Take 1 tablet by mouth 4 times daily as needed for Pain for up to 120 days.  Intended supply: 30 days 120 tablet 0    hydrALAZINE (APRESOLINE) 50 MG tablet TAKE ONE TABLET BY MOUTH EVERY MORNING AND TAKE ONE TABLET BY MOUTH EVERY NIGHT AT BEDTIME 60 tablet 3    tiZANidine (ZANAFLEX) 2 MG tablet Take 1 tablet by mouth 2 times daily as needed (back pain) 30 tablet 0    lisinopril (PRINIVIL;ZESTRIL) 10 MG tablet TAKE ONE TABLET BY MOUTH DAILY 90 tablet 2    insulin aspart (NOVOLOG FLEXPEN) 100 UNIT/ML injection pen Use sliding scale provided by endocrinology 5 pen 5 famotidine (PEPCID) 20 MG tablet TAKE ONE TABLET BY MOUTH TWICE A DAY 60 tablet 11    DULoxetine (CYMBALTA) 30 MG extended release capsule TAKE ONE CAPSULE BY MOUTH ONCE NIGHTLY 90 capsule 3    ketoconazole (NIZORAL) 2 % cream       warfarin (COUMADIN) 2.5 MG tablet TAKE ONE AND ONE-HALF TABLETS BY MOUTH EVERY THURSDAY, THEN TAKE ONE TABLET BY MOUTH ON ALL OTHER DAYS (Patient taking differently: 2.5 mg daily 2.5mg daily) 90 tablet 3    B-D UF III MINI PEN NEEDLES 31G X 5 MM MISC USE TO INJECT INSULIN FOUR TIMES A  each 2    blood glucose test strips (FREESTYLE LITE) strip USE TO TEST THREE TIMES A  strip 5    fluocinonide (LIDEX) 0.05 % cream Apply topically 2 times daily. 60 g 2    donepezil (ARICEPT) 10 MG tablet Take 5 mg by mouth daily (with breakfast)       finasteride (PROSCAR) 5 MG tablet Take 5 mg by mouth daily      naphazoline-glycerin (CLEAR EYES REDNESS RELIEF) 0.012-0.2 % SOLN ophthalmic solution Place 1 drop into both eyes 2 times daily (Patient taking differently: Place 1 drop into both eyes 2 times daily as needed) 1 Bottle 0    tamsulosin (FLOMAX) 0.4 MG capsule Take 1 capsule by mouth daily (Patient taking differently: Take 0.4 mg by mouth 2 times daily) 90 capsule 1    glucose monitoring kit (FREESTYLE) monitoring kit 1 kit by Does not apply route daily as needed. 1 kit 0    latanoprost (XALATAN) 0.005 % ophthalmic solution Place 1 drop into both eyes nightly. cephALEXin (KEFLEX) 500 MG capsule  (Patient not taking: Reported on 10/24/2022)       No current facility-administered medications for this visit. Review of Systems  Negative other than HPI     Vitals:    10/24/22 1235 10/24/22 1237   BP: (!) 154/84 (!) 150/84   Pulse: 90    Weight: 215 lb (97.5 kg)       Physical Exam  Constitutional:       General: He is not in acute distress. Appearance: Normal appearance. HENT:      Head: Normocephalic and atraumatic.    Pulmonary:      Effort: Pulmonary effort is normal. No respiratory distress. Musculoskeletal:      Lumbar back: Spasms and tenderness present. Decreased range of motion. Left hip: Tenderness present. No bony tenderness. Decreased range of motion. Neurological:      Mental Status: He is alert. Mental status is at baseline. Motor: Weakness present. Gait: Gait abnormal (gait is slow and steady with walker). Psychiatric:         Mood and Affect: Mood normal.     Assessment/Plan:  Lumbar stenosis with neurogenic claudication Bilateral hip pain/ Primary osteoarthritis involving multiple joints/ Acute left-sided low back pain, unspecified whether sciatica present Hip pain, left/ Left lumbar pain  Chronic with acute exacerbation, some improvement today   Reviewed supportive care - Continue ice/ heat  Continue PT   Trial of lidocaine patches   Continue taking gabapentin and duloxetine as prescribed   Okay to use prn norco   Keep apt with PCP on Monday as scheduled   - lidocaine (LIDODERM) 5 %; Place 1 patch onto the skin daily 12 hours on, 12 hours off. Dispense: 30 patch; Refill: 0    Discussed medications with patient, who voiced understanding of their use and indications. All questions answered.     Electronically signed by SHA Louie CNP on 10/24/2022 at 1:04 PM

## 2022-10-26 ENCOUNTER — HOSPITAL ENCOUNTER (OUTPATIENT)
Dept: PHYSICAL THERAPY | Age: 87
Setting detail: THERAPIES SERIES
Discharge: HOME | End: 2022-10-26
Payer: MEDICARE

## 2022-10-26 PROCEDURE — 97140 MANUAL THERAPY 1/> REGIONS: CPT

## 2022-10-26 NOTE — FLOWSHEET NOTE
[place] [address] [facility] [address]  Phone: [phone] Fax: [phone]        Physical Therapy Treatment Note/ Progress Report:       Date:  10/26/2022    Patient Name:  Ely Recio    :  1935  MRN: [id number]  Restrictions/Precautions:    Medical/Treatment Diagnosis Information:  Diagnosis: lumbar DDD, low back pain, bilateral hip pain  Treatment Diagnosis: lumbar DDD M51.36, low back pain M54.5, bilateral hip pain M25.551, G20.491  Insurance/Certification information:  PT Insurance Information: Medicare/Aetna  Physician Information:  JAYNA Palumbo  Plan of care signed (Y/N):      Date of Patient follow up with Physician:      Progress Report: [x]  Yes  []  No     Date Range for reporting period:  Beginnin2022  Ending:     Progress report due (10 Rx/or 30 days whichever is less):      Recertification due (POC duration/ or 90 days whichever is less): 10/27/2022     Visit # Insurance Allowable Auth Needed   11 Medicare/Aetna []Yes    []No     Latex Allergy:  [x]NO      []YES  Preferred Language for Healthcare:   [x]English       []other:  Functional Scale: Foto FS L/spine NA KEELY NA disability; Foto Hip 40, LEFS 23.7 Date assessed:10/19/2022    Pain level:  4/10     SUBJECTIVE:  Patient reports that he felt good leaving after last session, but for some reason had increased back and L hip pain that has been persisting since last session.       OBJECTIVE:   Observation:   Test measurements:        ROM LEFT RIGHT   HIP Flex 110 WNL   HIP Abd       HIP Ext       HIP IR 22 22   HIP ER 48 34       RESTRICTIONS/PRECAUTIONS: n/a    Exercises/Interventions:     Therapeutic Ex (46063)   Min: 0 Sets/sec Reps Notes/CUES   Retro Stepper/BIKE      Alter G      BFR      Sportco      SLR + TA activation 1 15 bilat   Hooklying clamshell 0  green   Hooklying glute set +TA activation 10 10    LTR 2 10    Hooklying TA + alt march 1 10    TA activation + small bridge 5sec 10    Standing hip ext over table 0     Standing pelvic/trunk ext at table 0     Band pull down for posture 0     TKE      Glute side walks      RDL      Slide Lunge      Slide HS eccentrics      Step ups/ecc step down      Swissball wall rolls- in SLS- hip drive      Quad hip ext/wall-ball rolls                  Manual Intervention (05392)  Min: 36      Bilateral hip belt mobs 1 10    Bilateral SL hip ext mobs 1 6    SL lumbar mobs 1 4 bilat   Posterior hip mobs in supine- bilat 1 6    STM lumbar paraspinals , QL and iliac crest 1 10 bilat         NMR re-education (28208)  Min:   CUES NEEDED   Martiniquais/Biofeedback 10/10      BFR      G. Med activation      Hip Ext full ROM/ G. Activation      Bosu Bal and Prop- G Med      Single leg stance/Balance/Prop      Bosu Retro G. Med act      Prone Hip froggers- sliders/elevated            Therapeutic Activity (10443)  Min:      Ladders      Plyos      Dynamic Balance                            Therapeutic Exercise and NMR EXR  [x] (07414) Provided verbal/tactile cueing for activities related to strengthening, flexibility, endurance, ROM for improvements in LE, proximal hip, and core control with self care, mobility, lifting, ambulation. [x] (54484) Provided verbal/tactile cueing for activities related to improving balance, coordination, kinesthetic sense, posture, motor skill, proprioception  to assist with LE, proximal hip, and core control in self care, mobility, lifting, ambulation and eccentric single leg control.      NMR and Therapeutic Activities:    [x] (80374 or 11536) Provided verbal/tactile cueing for activities related to improving balance, coordination, kinesthetic sense, posture, motor skill, proprioception and motor activation to allow for proper function of core, proximal hip and LE with self care and ADLs and functional mobility.   [] (28485) Gait Re-education- Provided training and instruction to the patient for proper LE, core and proximal hip recruitment and positioning and eccentric body weight control with ambulation re-education including up and down stairs     Home Exercise Program:    [x] (00990) Reviewed/Progressed HEP activities related to strengthening, flexibility, endurance, ROM of core, proximal hip and LE for functional self-care, mobility, lifting and ambulation/stair navigation   [] (31181)Reviewed/Progressed HEP activities related to improving balance, coordination, kinesthetic sense, posture, motor skill, proprioception of core, proximal hip and LE for self care, mobility, lifting, and ambulation/stair navigation      Manual Treatments:  PROM / STM / Oscillations-Mobs:  G-I, II, III, IV (PA's, Inf., Post.)  [x] (49163) Provided manual therapy to mobilize LE, proximal hip and/or LS spine soft tissue/joints for the purpose of modulating pain, promoting relaxation,  increasing ROM, reducing/eliminating soft tissue swelling/inflammation/restriction, improving soft tissue extensibility and allowing for proper ROM for normal function with self care, mobility, lifting and ambulation. Modalities:     [] GAME READY (VASO)- for significant edema, swelling, pain control.      Charges:  Timed Code Treatment Minutes: 36   Total Treatment Minutes: 37      [] EVAL (LOW) 32079 (typically 20 minutes face-to-face)  [] EVAL (MOD) 87750 (typically 30 minutes face-to-face)  [] EVAL (HIGH) 25946 (typically 45 minutes face-to-face)  [] RE-EVAL     [] VX(86225) x     [] DRY NEEDLE 1 OR 2 MUSCLES  [] NMR (72205) x     [] DRY NEEDLE 3+ MUSCLES  [x] Manual (72555) x  2     [] TA (05151) x     [] Mech Traction (59687)  [] ES(attended) (54923)     [] ES (un) (28291):   [] VASO (99425)  [] Other:    If BW Please Indicate Time In/Out  CPT Code Time in Time out                                   GOALS:  Patient stated goal: improve sleeping  [x] Progressing: [] Met: [] Not Met: [] Adjusted    Therapist goals for Patient: Short Term Goals: To be achieved in: 2 weeks  1. Independent in HEP and progression per patient tolerance, in order to prevent re-injury. [] Progressing: [x] Met: [] Not Met: [] Adjusted  2. Patient will have a decrease in pain to facilitate improvement in movement, function, and ADLs as indicated by Functional Deficits. [x] Progressing: [] Met: [] Not Met: [] Adjusted    Long Term Goals: To be achieved in: 6 weeks  1. Disability index score of 20/80 or better for the LEFS -METand 50% disability or better for KEELY to assist with reaching prior level of function. [x] Progressing: [] Met: [] Not Met: [] Adjusted  2. Patient will demonstrate increased AROM to bilateral hip IR to at least 15 degrees to allow for proper joint functioning as indicated by patients Functional Deficits. [] Progressing: [x] Met: [] Not Met: [] Adjusted  3. Patient will demonstrate an increase in Strength to good proximal hip strength and control, within 5lb HHD in LE to allow for proper functional mobility as indicated by patients Functional Deficits. [x] Progressing: [] Met: [] Not Met: [] Adjusted  4. Patient will return to standing and walking (in home) functional activities without increased symptoms or restriction. [x] Progressing: [] Met: [] Not Met: [] Adjusted  5. Patient will report being able to sleep for at least 4-6 hours without increased symptoms or restriction in back/hips.(patient specific functional goal)    [x] Progressing: [] Met: [] Not Met: [] Adjusted     ASSESSMENT:  Patient with increased tightness in bilateral QL and l/spine today, specifically along iliac crest. Mild tightness in bilateral hips which improved with belt mobilization. Patient with decreased pain at conclusion of session. Held exercises today to further assess if increase in pain was due to exercise changes. Needs continued improvement in glute and core strength which is limiting ability to stand fully erect during ambulation.   Patient will benefit from further skilled PT services to further work on progression of strength, posture and ROM to allow for overall improved pain with mobility. Return to Play: (if applicable)   []  Stage 1: Intro to Strength   []  Stage 2: Return to Run and Strength   []  Stage 3: Return to Jump and Strength   []  Stage 4: Dynamic Strength and Agility   []  Stage 5: Sport Specific Training     []  Ready to Return to Play, Meets All Above Stages   []  Not Ready for Return to Sports   Comments:            Treatment/Activity Tolerance:  [x] Patient tolerated treatment well [] Patient limited by fatique  [] Patient limited by pain  [] Patient limited by other medical complications  [] Other:     Overall Progression Towards Functional goals/ Treatment Progress Update:  [x] Patient is progressing as expected towards functional goals listed. [] Progression is slowed due to complexities/Impairments listed. [] Progression has been slowed due to co-morbidities. [] Plan just implemented, too soon to assess goals progression <30days   [] Goals require adjustment due to lack of progress  [] Patient is not progressing as expected and requires additional follow up with physician  [] Other    Prognosis for POC: [x] Good [] Fair  [] Poor    Patient requires continued skilled intervention: [x] Yes  [] No        PLAN: Continue 2x week  [x] Continue per plan of care [] Alter current plan (see comments)  [] Plan of care initiated [] Hold pending MD visit [] Discharge    Electronically signed by: Edgard Winston PT    Note: If patient does not return for scheduled/recommended follow up visits, this note will serve as a discharge from care along with the most recent update on progress.

## 2022-10-28 ENCOUNTER — HOSPITAL ENCOUNTER (OUTPATIENT)
Dept: PHYSICAL THERAPY | Age: 87
Setting detail: THERAPIES SERIES
Discharge: HOME | End: 2022-10-28
Payer: MEDICARE

## 2022-10-28 PROCEDURE — 97140 MANUAL THERAPY 1/> REGIONS: CPT

## 2022-10-28 PROCEDURE — 97110 THERAPEUTIC EXERCISES: CPT

## 2022-10-28 NOTE — FLOWSHEET NOTE
[place] [address] [address]  Phone: [phone] Fax: [phone]        Physical Therapy Treatment Note/ Progress Report:       Date:  10/28/2022    Patient Name:  Sejal Gamez    :  1935  MRN: [id number]  Restrictions/Precautions:    Medical/Treatment Diagnosis Information:  Diagnosis: lumbar DDD, low back pain, bilateral hip pain  Treatment Diagnosis: lumbar DDD M51.36, low back pain M54.5, bilateral hip pain M25.551, D87.682  Insurance/Certification information:  PT Insurance Information: Medicare/Aetna  Physician Information:  JAYNA Holland  Plan of care signed (Y/N):      Date of Patient follow up with Physician:      Progress Report: [x]  Yes  []  No     Date Range for reporting period:  Beginnin2022  Ending:     Progress report due (10 Rx/or 30 days whichever is less): 1280     Recertification due (POC duration/ or 90 days whichever is less): 10/27/2022     Visit # Insurance Allowable Auth Needed   12 Medicare/Aetna []Yes    []No     Latex Allergy:  [x]NO      []YES  Preferred Language for Healthcare:   [x]English       []other:  Functional Scale: Foto FS L/spine NA KEELY NA disability; Foto Hip 40, LEFS 23.7 Date assessed:10/19/2022    Pain level:  210     SUBJECTIVE:  Patient reports that he felt good leaving after last session. Not having as much discomfort on the L side like he was the other day. States that he was a little sore and used a heating pad and this helped.        OBJECTIVE:   Observation:   Test measurements:        ROM LEFT RIGHT   HIP Flex 110 WNL   HIP Abd       HIP Ext       HIP IR 22 22   HIP ER 48 34       RESTRICTIONS/PRECAUTIONS: n/a    Exercises/Interventions:     Therapeutic Ex (72708)   Min: 15 Sets/sec Reps Notes/CUES   Retro Stepper/BIKE      Alter G      BFR      Sportcord March      SLR + TA activation 1 15 bilat   Hooklying clamshell 0  green   Hooklying glute set +TA activation 10 10    LTR 2 10    Hooklying TA + alt march 1 10    TA activation + small bridge 0     Standing hip ext over table 0     Standing pelvic/trunk ext at table 0     Band pull down for posture 0     TKE      Glute side walks      RDL      Slide Lunge      Slide HS eccentrics      Step ups/ecc step down      Swissball wall rolls- in SLS- hip drive      Quad hip ext/wall-ball rolls                  Manual Intervention (22287)  Min: 28      Bilateral hip belt mobs 1 10    Bilateral SL hip ext mobs 1 6    SL lumbar mobs 1 4 bilat   Posterior hip mobs in supine- bilat 1 0    STM lumbar paraspinals , QL and iliac crest 1 8 bilat         NMR re-education (55368)  Min:   CUES NEEDED   Israeli/Biofeedback 10/10      BFR      G. Med activation      Hip Ext full ROM/ G. Activation      Bosu Bal and Prop- G Med      Single leg stance/Balance/Prop      Bosu Retro G. Med act      Prone Hip froggers- sliders/elevated            Therapeutic Activity (80353)  Min:      Ladders      Plyos      Dynamic Balance                            Therapeutic Exercise and NMR EXR  [x] (43213) Provided verbal/tactile cueing for activities related to strengthening, flexibility, endurance, ROM for improvements in LE, proximal hip, and core control with self care, mobility, lifting, ambulation. [x] (62444) Provided verbal/tactile cueing for activities related to improving balance, coordination, kinesthetic sense, posture, motor skill, proprioception  to assist with LE, proximal hip, and core control in self care, mobility, lifting, ambulation and eccentric single leg control.      NMR and Therapeutic Activities:    [x] (71463 or 95707) Provided verbal/tactile cueing for activities related to improving balance, coordination, kinesthetic sense, posture, motor skill, proprioception and motor activation to allow for proper function of core, proximal hip and LE with self care and ADLs and functional mobility.   [] (72624) Gait Re-education- Provided training and instruction to the patient for proper LE, core and proximal hip recruitment and positioning and eccentric body weight control with ambulation re-education including up and down stairs     Home Exercise Program:    [x] (96706) Reviewed/Progressed HEP activities related to strengthening, flexibility, endurance, ROM of core, proximal hip and LE for functional self-care, mobility, lifting and ambulation/stair navigation   [] (45193)Reviewed/Progressed HEP activities related to improving balance, coordination, kinesthetic sense, posture, motor skill, proprioception of core, proximal hip and LE for self care, mobility, lifting, and ambulation/stair navigation      Manual Treatments:  PROM / STM / Oscillations-Mobs:  G-I, II, III, IV (PA's, Inf., Post.)  [x] (29988) Provided manual therapy to mobilize LE, proximal hip and/or LS spine soft tissue/joints for the purpose of modulating pain, promoting relaxation,  increasing ROM, reducing/eliminating soft tissue swelling/inflammation/restriction, improving soft tissue extensibility and allowing for proper ROM for normal function with self care, mobility, lifting and ambulation. Modalities:     [] GAME READY (VASO)- for significant edema, swelling, pain control.      Charges:  Timed Code Treatment Minutes: 43   Total Treatment Minutes: 44      [] EVAL (LOW) 49457 (typically 20 minutes face-to-face)  [] EVAL (MOD) 82362 (typically 30 minutes face-to-face)  [] EVAL (HIGH) 22518 (typically 45 minutes face-to-face)  [] RE-EVAL     [x] VA(24718) x  1   [] DRY NEEDLE 1 OR 2 MUSCLES  [] NMR (74649) x     [] DRY NEEDLE 3+ MUSCLES  [x] Manual (91629) x  2     [] TA (66353) x     [] Mech Traction (61722)  [] ES(attended) (49546)     [] ES (un) (40116):   [] VASO (62506)  [] Other:    If BW Please Indicate Time In/Out  CPT Code Time in Time out                                   GOALS:  Patient stated goal: improve sleeping  [x] Progressing: [] Met: [] Not Met: [] Adjusted    Therapist goals for Patient:   Short Term Goals: To be achieved in: 2 weeks  1. Independent in HEP and progression per patient tolerance, in order to prevent re-injury. [] Progressing: [x] Met: [] Not Met: [] Adjusted  2. Patient will have a decrease in pain to facilitate improvement in movement, function, and ADLs as indicated by Functional Deficits. [x] Progressing: [] Met: [] Not Met: [] Adjusted    Long Term Goals: To be achieved in: 6 weeks  1. Disability index score of 20/80 or better for the LEFS -METand 50% disability or better for KEELY to assist with reaching prior level of function. [x] Progressing: [] Met: [] Not Met: [] Adjusted  2. Patient will demonstrate increased AROM to bilateral hip IR to at least 15 degrees to allow for proper joint functioning as indicated by patients Functional Deficits. [] Progressing: [x] Met: [] Not Met: [] Adjusted  3. Patient will demonstrate an increase in Strength to good proximal hip strength and control, within 5lb HHD in LE to allow for proper functional mobility as indicated by patients Functional Deficits. [x] Progressing: [] Met: [] Not Met: [] Adjusted  4. Patient will return to standing and walking (in home) functional activities without increased symptoms or restriction. [x] Progressing: [] Met: [] Not Met: [] Adjusted  5. Patient will report being able to sleep for at least 4-6 hours without increased symptoms or restriction in back/hips.(patient specific functional goal)    [x] Progressing: [] Met: [] Not Met: [] Adjusted     ASSESSMENT:  Patient with much improved soft tissue restriction today bilaterally. Good overall tolerance to hip belt mobs and anterior hip stretching. Patient able to do light core activation without issues today. Good overall tolerance and no pain at conclusion with standing and ambulation.       Return to Play: (if applicable)   []  Stage 1: Intro to Strength   []  Stage 2: Return to Run and Strength   []  Stage 3: Return to Jump and Strength   []  Stage 4: Dynamic Strength and Agility   []  Stage 5: Sport Specific Training     []  Ready to Return to Play, Meets All Above Stages   []  Not Ready for Return to Sports   Comments:            Treatment/Activity Tolerance:  [x] Patient tolerated treatment well [] Patient limited by fatique  [] Patient limited by pain  [] Patient limited by other medical complications  [] Other:     Overall Progression Towards Functional goals/ Treatment Progress Update:  [x] Patient is progressing as expected towards functional goals listed. [] Progression is slowed due to complexities/Impairments listed. [] Progression has been slowed due to co-morbidities. [] Plan just implemented, too soon to assess goals progression <30days   [] Goals require adjustment due to lack of progress  [] Patient is not progressing as expected and requires additional follow up with physician  [] Other    Prognosis for POC: [x] Good [] Fair  [] Poor    Patient requires continued skilled intervention: [x] Yes  [] No        PLAN: Continue 2x week  [x] Continue per plan of care [] Alter current plan (see comments)  [] Plan of care initiated [] Hold pending MD visit [] Discharge    Electronically signed by: Lisa Bey PT    Note: If patient does not return for scheduled/recommended follow up visits, this note will serve as a discharge from care along with the most recent update on progress.

## 2022-10-31 ENCOUNTER — OFFICE VISIT (OUTPATIENT)
Dept: INTERNAL MEDICINE CLINIC | Age: 87
End: 2022-10-31
Payer: MEDICARE

## 2022-10-31 ENCOUNTER — TELEPHONE (OUTPATIENT)
Dept: ENDOCRINOLOGY | Age: 87
End: 2022-10-31

## 2022-10-31 VITALS
DIASTOLIC BLOOD PRESSURE: 80 MMHG | SYSTOLIC BLOOD PRESSURE: 128 MMHG | OXYGEN SATURATION: 95 % | BODY MASS INDEX: 30.92 KG/M2 | TEMPERATURE: 97.2 F | HEIGHT: 70 IN | WEIGHT: 216 LBS | HEART RATE: 90 BPM

## 2022-10-31 DIAGNOSIS — M25.552 BILATERAL HIP PAIN: ICD-10-CM

## 2022-10-31 DIAGNOSIS — I26.99 BILATERAL PULMONARY EMBOLISM (HCC): Primary | ICD-10-CM

## 2022-10-31 DIAGNOSIS — M25.551 BILATERAL HIP PAIN: ICD-10-CM

## 2022-10-31 DIAGNOSIS — M51.36 DDD (DEGENERATIVE DISC DISEASE), LUMBAR: ICD-10-CM

## 2022-10-31 DIAGNOSIS — M15.9 PRIMARY OSTEOARTHRITIS INVOLVING MULTIPLE JOINTS: ICD-10-CM

## 2022-10-31 DIAGNOSIS — G62.9 PERIPHERAL POLYNEUROPATHY: ICD-10-CM

## 2022-10-31 DIAGNOSIS — M48.062 LUMBAR STENOSIS WITH NEUROGENIC CLAUDICATION: ICD-10-CM

## 2022-10-31 DIAGNOSIS — Z79.01 ANTICOAGULATED ON COUMADIN: ICD-10-CM

## 2022-10-31 LAB
INTERNATIONAL NORMALIZATION RATIO, POC: 1.8
PROTHROMBIN TIME, POC: NORMAL

## 2022-10-31 PROCEDURE — G8427 DOCREV CUR MEDS BY ELIG CLIN: HCPCS | Performed by: NURSE PRACTITIONER

## 2022-10-31 PROCEDURE — G8484 FLU IMMUNIZE NO ADMIN: HCPCS | Performed by: NURSE PRACTITIONER

## 2022-10-31 PROCEDURE — 3078F DIAST BP <80 MM HG: CPT | Performed by: NURSE PRACTITIONER

## 2022-10-31 PROCEDURE — G8417 CALC BMI ABV UP PARAM F/U: HCPCS | Performed by: NURSE PRACTITIONER

## 2022-10-31 PROCEDURE — 1036F TOBACCO NON-USER: CPT | Performed by: NURSE PRACTITIONER

## 2022-10-31 PROCEDURE — 3074F SYST BP LT 130 MM HG: CPT | Performed by: NURSE PRACTITIONER

## 2022-10-31 PROCEDURE — 99213 OFFICE O/P EST LOW 20 MIN: CPT | Performed by: NURSE PRACTITIONER

## 2022-10-31 PROCEDURE — 85610 PROTHROMBIN TIME: CPT | Performed by: NURSE PRACTITIONER

## 2022-10-31 PROCEDURE — 1123F ACP DISCUSS/DSCN MKR DOCD: CPT | Performed by: NURSE PRACTITIONER

## 2022-10-31 RX ORDER — HYDROCODONE BITARTRATE AND ACETAMINOPHEN 7.5; 325 MG/1; MG/1
1 TABLET ORAL 4 TIMES DAILY PRN
Qty: 120 TABLET | Refills: 0 | Status: SHIPPED | OUTPATIENT
Start: 2022-10-31 | End: 2023-02-28

## 2022-10-31 ASSESSMENT — ENCOUNTER SYMPTOMS
BACK PAIN: 1
RESPIRATORY NEGATIVE: 1
GASTROINTESTINAL NEGATIVE: 1

## 2022-10-31 NOTE — TELEPHONE ENCOUNTER
Fax from [facility] w/ labs from 10/27/22    Pt has no future appt scheduled

## 2022-10-31 NOTE — PROGRESS NOTES
SUBJECTIVE:    Patient ID: Ashley Chew is a 82 y.o. male. CC: Pulmonary embolism, chronic anticoagulation, neuropathy, lumbar radiculopathy, constipation, neck pain    HPI: Patient presents to the office today for follow-up of chronic medical conditions and management of chronic anticoagulation. Patient has a history of pulmonary embolism. He is chronically anticoagulated on warfarin. He is compliant with his medication regimen. His INR is therapeutic at 3.0. He has a history of severe neuropathy and lumbar radiculopathy. Because of this, he has been started on opioid pain medication which has been uptitrated. He takes the medication as directed but this has caused constipation. His wife has been giving him stool softeners. He has cervical pain and stiffness. He is getting physical therapy in his lower back but they will not help him with his neck without an order.       Past Medical History:   Diagnosis Date    Abdominal pain, epigastric     Arthritis     Benign prostatic hypertrophy without urinary obstruction     BPH     Cellulitis and abscess     Chronic rhinitis     Chronic systolic congestive heart failure ([facility] [address] 4/18/2019    COPD (chronic obstructive pulmonary disease) ([facility])     Diabetes mellitus (HCC)     Dysphagia     Erectile dysfunction     GERD (gastroesophageal reflux disease)     Hx of blood clots     Hyperglycemia     Hypertension     Late onset Alzheimer's disease without behavioral disturbance ([facility] [address] 7/23/2020    lumbar radiculopathy     Neuropathy     Nocturia     Osteoarthritis     Other disorders of kidney and ureter in diseases classified elsewhere     Pain, joint, knee     Palpitations     skin cancer     Rt ear, nose, neck, hands/ 2018 lung    SOB (shortness of breath)     Tennis elbow     Tinea pedis     foot        Current Outpatient Medications   Medication Sig Dispense Refill    HYDROcodone-acetaminophen (NORCO) 7.5-325 MG per tablet Take 1 tablet by mouth 4 times daily as needed for Pain for up to 120 days. Intended supply: 30 days 120 tablet 0    lidocaine (LIDODERM) 5 % Place 1 patch onto the skin daily 12 hours on, 12 hours off. 30 patch 0    gabapentin (NEURONTIN) 300 MG capsule TAKE ONE CAPSULE BY MOUTH EVERY MORNING AND TAKE TWO CAPSULES BY MOUTH AT BEDTIME 270 capsule 3    BASAGLAR KWIKPEN 100 UNIT/ML injection pen Inject 30 Units into the skin nightly 5 Adjustable Dose Pre-filled Pen Syringe 3    furosemide (LASIX) 40 MG tablet TAKE ONE TABLET BY MOUTH DAILY 90 tablet 1    hydrALAZINE (APRESOLINE) 50 MG tablet TAKE ONE TABLET BY MOUTH EVERY MORNING AND TAKE ONE TABLET BY MOUTH EVERY NIGHT AT BEDTIME 60 tablet 3    tiZANidine (ZANAFLEX) 2 MG tablet Take 1 tablet by mouth 2 times daily as needed (back pain) 30 tablet 0    lisinopril (PRINIVIL;ZESTRIL) 10 MG tablet TAKE ONE TABLET BY MOUTH DAILY 90 tablet 2    insulin aspart (NOVOLOG FLEXPEN) 100 UNIT/ML injection pen Use sliding scale provided by endocrinology 5 pen 5    famotidine (PEPCID) 20 MG tablet TAKE ONE TABLET BY MOUTH TWICE A DAY 60 tablet 11    DULoxetine (CYMBALTA) 30 MG extended release capsule TAKE ONE CAPSULE BY MOUTH ONCE NIGHTLY 90 capsule 3    ketoconazole (NIZORAL) 2 % cream       warfarin (COUMADIN) 2.5 MG tablet TAKE ONE AND ONE-HALF TABLETS BY MOUTH EVERY THURSDAY, THEN TAKE ONE TABLET BY MOUTH ON ALL OTHER DAYS (Patient taking differently: 2.5 mg daily 2.5mg daily) 90 tablet 3    B-D UF III MINI PEN NEEDLES 31G X 5 MM MISC USE TO INJECT INSULIN FOUR TIMES A  each 2    blood glucose test strips (FREESTYLE LITE) strip USE TO TEST THREE TIMES A  strip 5    fluocinonide (LIDEX) 0.05 % cream Apply topically 2 times daily.  60 g 2    donepezil (ARICEPT) 10 MG tablet Take 5 mg by mouth daily (with breakfast)       finasteride (PROSCAR) 5 MG tablet Take 5 mg by mouth daily      naphazoline-glycerin (CLEAR EYES REDNESS RELIEF) 0.012-0.2 % SOLN ophthalmic solution Place 1 drop into both eyes 2 times daily (Patient taking differently: Place 1 drop into both eyes 2 times daily as needed) 1 Bottle 0    tamsulosin (FLOMAX) 0.4 MG capsule Take 1 capsule by mouth daily (Patient taking differently: Take 0.4 mg by mouth 2 times daily) 90 capsule 1    glucose monitoring kit (FREESTYLE) monitoring kit 1 kit by Does not apply route daily as needed. 1 kit 0    latanoprost (XALATAN) 0.005 % ophthalmic solution Place 1 drop into both eyes nightly. cephALEXin (KEFLEX) 500 MG capsule  (Patient not taking: Reported on 10/31/2022)       No current facility-administered medications for this visit. Review of Systems   Respiratory: Negative. Cardiovascular: Negative. Gastrointestinal: Negative. Genitourinary: Negative. Musculoskeletal:  Positive for back pain and neck pain. Skin: Negative. Neurological:         Neuropathy       OBJECTIVE:  Physical Exam  Vitals reviewed. Constitutional:       General: He is not in acute distress. Appearance: Normal appearance. He is well-developed. He is not diaphoretic. HENT:      Head: Normocephalic and atraumatic. Eyes:      General: No scleral icterus. Extraocular Movements: Extraocular movements intact. Conjunctiva/sclera: Conjunctivae normal.   Neck:      Vascular: No JVD. Cardiovascular:      Rate and Rhythm: Normal rate and regular rhythm. Pulmonary:      Effort: Pulmonary effort is normal. No respiratory distress. Breath sounds: Normal breath sounds. No wheezing or rales. Abdominal:      General: There is no distension. Palpations: Abdomen is soft. Tenderness: There is no abdominal tenderness. There is no guarding. Musculoskeletal:      Comments: Ambulating with a walker due to chronic back pain, peripheral neuropathy and lower extremity weakness   Skin:     General: Skin is warm and dry. Neurological:      General: No focal deficit present. Mental Status: He is alert and oriented to person, place, and time. Psychiatric:         Behavior: Behavior normal.         Thought Content: Thought content normal.         Cognition and Memory: Memory is impaired. /80   Pulse 90   Temp 97.2 °F (36.2 °C)   Ht 5' 10\" (1.778 m)   Wt 216 lb (98 kg)   SpO2 95%   BMI 30.99 kg/m²      PHQ Scores 7/1/2022 11/11/2021 1/8/2021 9/28/2020 2/6/2020 9/18/2019 2/11/2019   PHQ2 Score 0 0 0 0 0 0 0   PHQ9 Score 0 0 0 0 0 0 0     Interpretation of Total Score Depression Severity: 1-4 = Minimal depression, 5-9 = Mild depression, 10-14 = Moderate depression, 15-19 = Moderately severe depression, 20-27 =Severe depression        Assessment/Plan:  Shara River was seen today for follow-up. Diagnoses and all orders for this visit:    Bilateral pulmonary embolism (HCC)  - Continue anticoagulation    Anticoagulated on Coumadin  - INR slightly low today at 1.8, previous high-normal at 3.0   Estra warfarin today only, see flow sheets  -     POCT INR    Lumbar stenosis with neurogenic claudication  - Chronic, remains problematic  - He is getting PT  - He is on multiple treatments  -     HYDROcodone-acetaminophen (NORCO) 7.5-325 MG per tablet; Take 1 tablet by mouth 4 times daily as needed for Pain for up to 120 days. Intended supply: 30 days    DDD (degenerative disc disease), lumbar  - Chronic, remains problematic  - He is getting PT  - He is on multiple treatments  -     HYDROcodone-acetaminophen (NORCO) 7.5-325 MG per tablet; Take 1 tablet by mouth 4 times daily as needed for Pain for up to 120 days. Intended supply: 30 days    Bilateral hip pain  - Chronic, remains problematic  - He is getting PT  - He is on multiple treatments-     HYDROcodone-acetaminophen (NORCO) 7.5-325 MG per tablet; Take 1 tablet by mouth 4 times daily as needed for Pain for up to 120 days.  Intended supply: 30 days    Primary osteoarthritis involving multiple joints  - Chronic, remains problematic  - He is getting PT  - He is on multiple treatments  - HYDROcodone-acetaminophen (NORCO) 7.5-325 MG per tablet; Take 1 tablet by mouth 4 times daily as needed for Pain for up to 120 days. Intended supply: 30 days    Peripheral polyneuropathy  - Chronic, remains problematic  - He is getting PT  - He is on multiple treatments  -     HYDROcodone-acetaminophen (NORCO) 7.5-325 MG per tablet; Take 1 tablet by mouth 4 times daily as needed for Pain for up to 120 days. Intended supply: 30 days      Controlled Substance Monitoring:  Acute and Chronic Pain Monitoring:   RX Monitoring 10/31/2022   Attestation -   Periodic Controlled Substance Monitoring No signs of potential drug abuse or diversion identified. ;Assessed functional status. ;Obtaining appropriate analgesic effect of treatment.          Tabatha Baird, APRN - CNP

## 2022-11-01 ENCOUNTER — HOSPITAL ENCOUNTER (OUTPATIENT)
Dept: PHYSICAL THERAPY | Age: 87
Setting detail: THERAPIES SERIES
Discharge: HOME | End: 2022-11-01
Payer: MEDICARE

## 2022-11-01 PROCEDURE — 97110 THERAPEUTIC EXERCISES: CPT

## 2022-11-01 PROCEDURE — 97140 MANUAL THERAPY 1/> REGIONS: CPT

## 2022-11-01 NOTE — FLOWSHEET NOTE
[facility] [address]  Phone: [phone] Fax: [phone]        Physical Therapy Treatment Note/ Progress Report:       Date:  2022    Patient Name:  Dev Avery    :  1935  MRN: [id number]  Restrictions/Precautions:    Medical/Treatment Diagnosis Information:  Diagnosis: lumbar DDD, low back pain, bilateral hip pain  Treatment Diagnosis: lumbar DDD M51.36, low back pain M54.5, bilateral hip pain M25.551, O42.801  Insurance/Certification information:  PT Insurance Information: Medicare/Aetna  Physician Information:  JAYNA Foster  Plan of care signed (Y/N):      Date of Patient follow up with Physician:      Progress Report: [x]  Yes  []  No     Date Range for reporting period:  Beginnin2022  Ending:     Progress report due (10 Rx/or 30 days whichever is less):      Recertification due (POC duration/ or 90 days whichever is less): 10/27/2022     Visit # Insurance Allowable Auth Needed   12 Medicare/Aetna []Yes    []No     Latex Allergy:  [x]NO      []YES  Preferred Language for Healthcare:   [x]English       []other:  Functional Scale: Foto FS L/spine NA KEELY NA disability; Foto Hip 40, LEFS 23.7 Date assessed:10/19/2022    Pain level:  2/10     SUBJECTIVE:  Patient reports that he felt good leaving after last session. Overall feeling like he is getting better and not having as much tenderness in hips and back.         OBJECTIVE:   Observation:   Test measurements:        ROM LEFT RIGHT   HIP Flex 110 WNL   HIP Abd       HIP Ext       HIP IR 22 22   HIP ER 48 34       RESTRICTIONS/PRECAUTIONS: n/a    Exercises/Interventions:     Therapeutic Ex (27225)   Min: 15 Sets/sec Reps Notes/CUES   Retro Stepper/BIKE      Alter G      BFR      Sportco      SLR + TA activation 1 15 bilat   Hooklying clamshell 0  green   Hooklying glute set +TA activation 10 10    LTR 2 10    Hooklying TA + alt march 1 10    TA activation + small bridge 0     Standing hip ext over table 0     Standing pelvic/trunk ext at table 0     Band pull down for posture 0     TKE      Glute side walks      RDL      Slide Lunge      Slide HS eccentrics      Step ups/ecc step down      Swissball wall rolls- in SLS- hip drive      Quad hip ext/wall-ball rolls                  Manual Intervention (75241)  Min: 28      Bilateral hip belt mobs 1 10    Bilateral SL hip ext mobs 1 6    SL lumbar mobs 1 4 bilat   Posterior hip mobs in supine- bilat 1 0    STM lumbar paraspinals , QL and iliac crest 1 8 bilat         NMR re-education (32179)  Min:   CUES NEEDED   Honduran/Biofeedback 10/10      BFR      G. Med activation      Hip Ext full ROM/ G. Activation      Bosu Bal and Prop- G Med      Single leg stance/Balance/Prop      Bosu Retro G. Med act      Prone Hip froggers- sliders/elevated            Therapeutic Activity (23391)  Min:      Ladders      Plyos      Dynamic Balance                            Therapeutic Exercise and NMR EXR  [x] (46828) Provided verbal/tactile cueing for activities related to strengthening, flexibility, endurance, ROM for improvements in LE, proximal hip, and core control with self care, mobility, lifting, ambulation. [x] (85666) Provided verbal/tactile cueing for activities related to improving balance, coordination, kinesthetic sense, posture, motor skill, proprioception  to assist with LE, proximal hip, and core control in self care, mobility, lifting, ambulation and eccentric single leg control.      NMR and Therapeutic Activities:    [x] (69205 or 09853) Provided verbal/tactile cueing for activities related to improving balance, coordination, kinesthetic sense, posture, motor skill, proprioception and motor activation to allow for proper function of core, proximal hip and LE with self care and ADLs and functional mobility.   [] (68343) Gait Re-education- Provided training and instruction to the patient for proper LE, core and proximal hip recruitment and positioning and eccentric body weight control with ambulation re-education including up and down stairs     Home Exercise Program:    [x] (08342) Reviewed/Progressed HEP activities related to strengthening, flexibility, endurance, ROM of core, proximal hip and LE for functional self-care, mobility, lifting and ambulation/stair navigation   [] (25144)Reviewed/Progressed HEP activities related to improving balance, coordination, kinesthetic sense, posture, motor skill, proprioception of core, proximal hip and LE for self care, mobility, lifting, and ambulation/stair navigation      Manual Treatments:  PROM / STM / Oscillations-Mobs:  G-I, II, III, IV (PA's, Inf., Post.)  [x] (83030) Provided manual therapy to mobilize LE, proximal hip and/or LS spine soft tissue/joints for the purpose of modulating pain, promoting relaxation,  increasing ROM, reducing/eliminating soft tissue swelling/inflammation/restriction, improving soft tissue extensibility and allowing for proper ROM for normal function with self care, mobility, lifting and ambulation. Modalities:     [] GAME READY (VASO)- for significant edema, swelling, pain control.      Charges:  Timed Code Treatment Minutes: 43   Total Treatment Minutes: 44      [] EVAL (LOW) 46199 (typically 20 minutes face-to-face)  [] EVAL (MOD) 75510 (typically 30 minutes face-to-face)  [] EVAL (HIGH) 04675 (typically 45 minutes face-to-face)  [] RE-EVAL     [x] QJ(34427) x  1   [] DRY NEEDLE 1 OR 2 MUSCLES  [] NMR (54126) x     [] DRY NEEDLE 3+ MUSCLES  [x] Manual (05484) x  2     [] TA (01509) x     [] Mech Traction (14251)  [] ES(attended) (94397)     [] ES (un) (20705):   [] VASO (37826)  [] Other:    If BW Please Indicate Time In/Out  CPT Code Time in Time out                                   GOALS:  Patient stated goal: improve sleeping  [x] Progressing: [] Met: [] Not Met: [] Adjusted    Therapist goals for Patient:   Short Term Goals: To be achieved in: 2 weeks  1. Independent in HEP and progression per patient tolerance, in order to prevent re-injury. [] Progressing: [x] Met: [] Not Met: [] Adjusted  2. Patient will have a decrease in pain to facilitate improvement in movement, function, and ADLs as indicated by Functional Deficits. [x] Progressing: [] Met: [] Not Met: [] Adjusted    Long Term Goals: To be achieved in: 6 weeks  1. Disability index score of 20/80 or better for the LEFS -METand 50% disability or better for KEELY to assist with reaching prior level of function. [x] Progressing: [] Met: [] Not Met: [] Adjusted  2. Patient will demonstrate increased AROM to bilateral hip IR to at least 15 degrees to allow for proper joint functioning as indicated by patients Functional Deficits. [] Progressing: [x] Met: [] Not Met: [] Adjusted  3. Patient will demonstrate an increase in Strength to good proximal hip strength and control, within 5lb HHD in LE to allow for proper functional mobility as indicated by patients Functional Deficits. [x] Progressing: [] Met: [] Not Met: [] Adjusted  4. Patient will return to standing and walking (in home) functional activities without increased symptoms or restriction. [x] Progressing: [] Met: [] Not Met: [] Adjusted  5. Patient will report being able to sleep for at least 4-6 hours without increased symptoms or restriction in back/hips.(patient specific functional goal)    [x] Progressing: [] Met: [] Not Met: [] Adjusted     ASSESSMENT:  Patient with much improved soft tissue restriction today bilaterally. Good overall tolerance to hip belt mobs and anterior hip stretching. Slight further improvement in amount of hip extension, at about 0-5 degrees now. Patient did well with all strengthening. Improved upright posture with ambulation at conclusion, compared to when patient arrived.      Return to Play: (if applicable)   []  Stage 1: Intro to Strength   []  Stage 2: Return to Run and Strength   []  Stage 3: Return to Jump and Strength   []  Stage 4: Dynamic Strength and Agility   []  Stage 5: Sport Specific Training     []  Ready to Return to Play, Meets All Above Stages   []  Not Ready for Return to Sports   Comments:            Treatment/Activity Tolerance:  [x] Patient tolerated treatment well [] Patient limited by fatique  [] Patient limited by pain  [] Patient limited by other medical complications  [] Other:     Overall Progression Towards Functional goals/ Treatment Progress Update:  [x] Patient is progressing as expected towards functional goals listed. [] Progression is slowed due to complexities/Impairments listed. [] Progression has been slowed due to co-morbidities. [] Plan just implemented, too soon to assess goals progression <30days   [] Goals require adjustment due to lack of progress  [] Patient is not progressing as expected and requires additional follow up with physician  [] Other    Prognosis for POC: [x] Good [] Fair  [] Poor    Patient requires continued skilled intervention: [x] Yes  [] No        PLAN: Continue 2x week  [x] Continue per plan of care [] Alter current plan (see comments)  [] Plan of care initiated [] Hold pending MD visit [] Discharge    Electronically signed by: Lisa Bey PT    Note: If patient does not return for scheduled/recommended follow up visits, this note will serve as a discharge from care along with the most recent update on progress.

## 2022-11-03 ENCOUNTER — HOSPITAL ENCOUNTER (OUTPATIENT)
Dept: PHYSICAL THERAPY | Age: 87
Setting detail: THERAPIES SERIES
Discharge: HOME | End: 2022-11-03
Payer: MEDICARE

## 2022-11-03 PROCEDURE — 97110 THERAPEUTIC EXERCISES: CPT

## 2022-11-03 PROCEDURE — 97140 MANUAL THERAPY 1/> REGIONS: CPT

## 2022-11-03 NOTE — FLOWSHEET NOTE
[facility] [address] [facility]  Phone: [phone] Fax: [phone]        Physical Therapy Treatment Note/ Progress Report:       Date:  2022    Patient Name:  Jim Thompson    :  1935  MRN: [id number]  Restrictions/Precautions:    Medical/Treatment Diagnosis Information:  Diagnosis: lumbar DDD, low back pain, bilateral hip pain  Treatment Diagnosis: lumbar DDD M51.36, low back pain M54.5, bilateral hip pain M25.551, N81.185  Insurance/Certification information:  PT Insurance Information: Medicare/Aetna  Physician Information:  SHA Carlson*  Plan of care signed (Y/N):      Date of Patient follow up with Physician:      Progress Report: [x]  Yes  []  No     Date Range for reporting period:  Beginnin2022  Ending:     Progress report due (10 Rx/or 30 days whichever is less):      Recertification due (POC duration/ or 90 days whichever is less): 10/27/2022     Visit # Insurance Allowable Auth Needed   14 Medicare/Aetna []Yes    []No     Latex Allergy:  [x]NO      []YES  Preferred Language for Healthcare:   [x]English       []other:  Functional Scale: Foto FS L/spine NA KEELY NA disability; Foto Hip 40, LEFS 23.7 Date assessed:10/19/2022    Pain level:  2/10     SUBJECTIVE:  Patient reports that he felt good leaving after last session. Overall feeling like he is getting better and not having as much tenderness in hips and back.         OBJECTIVE:   Observation:   Test measurements:        ROM LEFT RIGHT   HIP Flex 110 WNL   HIP Abd       HIP Ext       HIP IR 22 22   HIP ER 48 34       RESTRICTIONS/PRECAUTIONS: n/a    Exercises/Interventions:     Therapeutic Ex (75019)   Min: 22 Sets/sec Reps Notes/CUES   Retro Stepper/BIKE      Alter G      BFR      Sportco      SLR + TA activation 2 10 bilat   Hooklying clamshell 2 10 green   Hooklying glute set +TA activation 10 10    LTR 2 10    Hooklying TA + alt march 1 10    TA activation + small bridge 0     Standing hip ext over table 1 15 bilat   Standing pelvic/trunk ext at table 0     Band pull down for posture 0     STS 1 15  cues   Standing marches 1 15 bilat         Slide Lunge      Slide HS eccentrics      Step ups/ecc step down      Swissball wall rolls- in SLS- hip drive      Quad hip ext/wall-ball rolls                  Manual Intervention (99637)  Min: 25      Bilateral hip belt mobs 1 10    Bilateral SL hip ext mobs 1 6    SL lumbar mobs 1 4 bilat   Posterior hip mobs in supine- bilat 1 0    STM lumbar paraspinals , QL and iliac crest 1 8 bilat         NMR re-education (63566)  Min:   CUES NEEDED   [place]/Biofeedback 10/10      BFR      G. Med activation      Hip Ext full ROM/ G. Activation      Bosu Bal and Prop- G Med      Single leg stance/Balance/Prop      Bosu Retro G. Med act      Prone Hip froggers- sliders/elevated            Therapeutic Activity (97037)  Min:      Ladders      Plyos      Dynamic Balance                            Therapeutic Exercise and NMR EXR  [x] (23180) Provided verbal/tactile cueing for activities related to strengthening, flexibility, endurance, ROM for improvements in LE, proximal hip, and core control with self care, mobility, lifting, ambulation. [x] (48473) Provided verbal/tactile cueing for activities related to improving balance, coordination, kinesthetic sense, posture, motor skill, proprioception  to assist with LE, proximal hip, and core control in self care, mobility, lifting, ambulation and eccentric single leg control.      NMR and Therapeutic Activities:    [x] (72725 or 73085) Provided verbal/tactile cueing for activities related to improving balance, coordination, kinesthetic sense, posture, motor skill, proprioception and motor activation to allow for proper function of core, proximal hip and LE with self care and ADLs and functional mobility.   [] (28841) Gait Re-education- Provided training and instruction to the patient for proper LE, core and proximal hip recruitment and positioning and eccentric body weight control with ambulation re-education including up and down stairs     Home Exercise Program:    [x] (18863) Reviewed/Progressed HEP activities related to strengthening, flexibility, endurance, ROM of core, proximal hip and LE for functional self-care, mobility, lifting and ambulation/stair navigation   [] (92223)Reviewed/Progressed HEP activities related to improving balance, coordination, kinesthetic sense, posture, motor skill, proprioception of core, proximal hip and LE for self care, mobility, lifting, and ambulation/stair navigation      Manual Treatments:  PROM / STM / Oscillations-Mobs:  G-I, II, III, IV (PA's, Inf., Post.)  [x] (14830) Provided manual therapy to mobilize LE, proximal hip and/or LS spine soft tissue/joints for the purpose of modulating pain, promoting relaxation,  increasing ROM, reducing/eliminating soft tissue swelling/inflammation/restriction, improving soft tissue extensibility and allowing for proper ROM for normal function with self care, mobility, lifting and ambulation. Modalities:     [] GAME READY (VASO)- for significant edema, swelling, pain control.      Charges:  Timed Code Treatment Minutes: 47   Total Treatment Minutes: 47      [] EVAL (LOW) 06805 (typically 20 minutes face-to-face)  [] EVAL (MOD) 86371 (typically 30 minutes face-to-face)  [] EVAL (HIGH) 54895 (typically 45 minutes face-to-face)  [] RE-EVAL     [x] KO(47953) x  1   [] DRY NEEDLE 1 OR 2 MUSCLES  [] NMR (24816) x     [] DRY NEEDLE 3+ MUSCLES  [x] Manual (31553) x  2     [] TA (55354) x     [] Mech Traction (26371)  [] ES(attended) (38736)     [] ES (un) (51499):   [] VASO (36150)  [] Other:    If BWC Please Indicate Time In/Out  CPT Code Time in Time out                                   GOALS:  Patient stated goal: improve sleeping  [x] Progressing: [] Met: [] Not Met: [] Adjusted    Therapist goals for Patient:   Short Term Goals: To be achieved in: 2 weeks  1. Independent in HEP and progression per patient tolerance, in order to prevent re-injury. [] Progressing: [x] Met: [] Not Met: [] Adjusted  2. Patient will have a decrease in pain to facilitate improvement in movement, function, and ADLs as indicated by Functional Deficits. [x] Progressing: [] Met: [] Not Met: [] Adjusted    Long Term Goals: To be achieved in: 6 weeks  1. Disability index score of 20/80 or better for the LEFS -METand 50% disability or better for KEELY to assist with reaching prior level of function. [x] Progressing: [] Met: [] Not Met: [] Adjusted  2. Patient will demonstrate increased AROM to bilateral hip IR to at least 15 degrees to allow for proper joint functioning as indicated by patients Functional Deficits. [] Progressing: [x] Met: [] Not Met: [] Adjusted  3. Patient will demonstrate an increase in Strength to good proximal hip strength and control, within 5lb HHD in LE to allow for proper functional mobility as indicated by patients Functional Deficits. [x] Progressing: [] Met: [] Not Met: [] Adjusted  4. Patient will return to standing and walking (in home) functional activities without increased symptoms or restriction. [x] Progressing: [] Met: [] Not Met: [] Adjusted  5. Patient will report being able to sleep for at least 4-6 hours without increased symptoms or restriction in back/hips.(patient specific functional goal)    [x] Progressing: [] Met: [] Not Met: [] Adjusted     ASSESSMENT:  Patient continues to demonstrate improvement in all soft tissue restriction today and overall hip mobility. Patient needing less overall manual therapy, but still needing increased strengthening in core. Appropriately fatigued at conclusion.      Return to Play: (if applicable)   []  Stage 1: Intro to Strength   []  Stage 2: Return to Run and Strength   []  Stage 3: Return to Jump and Strength   []  Stage 4: Dynamic Strength and Agility   []  Stage 5: Sport Specific Training     []  Ready to Return to Play, [employer]   []  Not Ready for Return to Sports   Comments:            Treatment/Activity Tolerance:  [x] Patient tolerated treatment well [] Patient limited by fatique  [] Patient limited by pain  [] Patient limited by other medical complications  [] Other:     Overall Progression Towards Functional goals/ Treatment Progress Update:  [x] Patient is progressing as expected towards functional goals listed. [] Progression is slowed due to complexities/Impairments listed. [] Progression has been slowed due to co-morbidities. [] Plan just implemented, too soon to assess goals progression <30days   [] Goals require adjustment due to lack of progress  [] Patient is not progressing as expected and requires additional follow up with physician  [] Other    Prognosis for POC: [x] Good [] Fair  [] Poor    Patient requires continued skilled intervention: [x] Yes  [] No        PLAN: Continue 2x week  [x] Continue per plan of care [] Alter current plan (see comments)  [] Plan of care initiated [] Hold pending MD visit [] Discharge    Electronically signed by: Ilda Hutchison PT    Note: If patient does not return for scheduled/recommended follow up visits, this note will serve as a discharge from care along with the most recent update on progress.

## 2022-11-08 ENCOUNTER — HOSPITAL ENCOUNTER (OUTPATIENT)
Dept: PHYSICAL THERAPY | Age: 87
Setting detail: THERAPIES SERIES
Discharge: HOME | End: 2022-11-08
Payer: MEDICARE

## 2022-11-08 NOTE — PROGRESS NOTES
[address]   Cardiac Follow up    Referring Provider:  SHA Garcia CNP     Chief Complaint   Patient presents with    Hypertension    Congestive Heart Failure    6 Month Follow-Up        History of Present Illness:   Mr. Teressa Pemberton is a 82 y.o. male who has a history of cardiomyopathy, hypertension, and valvular disease. He also has chronic obstructive pulmonary disease. He is a remote cigarette smoker, history of diabetes, history of lung cancer (could not be  treated surgically), was treated with radiation and chemotherapy. History of pulmonary embolus in the past, he takes warfarin. He follows with Dr. Yvonne Lau for renal insufficiency. Today he is here for 6 month follow up. He is with his wife. He is using a wheeled walker. Per wife, Ray's neuropathy is one of his biggest problems. He is in a lot of back pain that makes it difficult to walk and stand. CT abdomen scheduled soon (for regular follow up). He states he would have to think about whether or not he would do dialysis, if that was necessary. Past Medical History:   has a past medical history of Abdominal pain, epigastric, Arthritis, Benign prostatic hypertrophy without urinary obstruction, BPH, Cellulitis and abscess, Chronic rhinitis, Chronic systolic congestive heart failure ([address] COPD (chronic obstructive pulmonary disease) ([address] Diabetes mellitus ([address] Dysphagia, Erectile dysfunction, GERD (gastroesophageal reflux disease), Hx of blood clots, Hyperglycemia, Hypertension, Late onset Alzheimer's disease without behavioral disturbance ([address] lumbar radiculopathy, Neuropathy, Nocturia, Osteoarthritis, Other disorders of kidney and ureter in diseases classified elsewhere, Pain, joint, knee, Palpitations, skin cancer, SOB (shortness of breath), Tennis elbow, and Tinea pedis. Surgical History:   has a past surgical history that includes Knee Prosthesis Removal; Cholecystectomy; Colonoscopy (11/28/2011);  Cataract removal (Bilateral, 09/2014); Parotidectomy (Right, 01/26/2017); skin biopsy; Upper gastrointestinal endoscopy (03/19/2018); Total knee arthroplasty; hip surgery (Right, 09/09/2013); other surgical history; Lung biopsy (Left, 05/09/2018); Tunneled venous port placement (06/22/2018); pr njx aa&/strd tfrml epi lumbar/sacral 1 level (Right, 11/21/2018); eye surgery; Mohs surgery (Bilateral); Skin cancer excision (Left, 08/14/2019); lumbar nerve block (N/A, 08/26/2019); Circumcision (N/A, 11/15/2019); joint replacement (Bilateral); Nerve Surgery (N/A, 12/20/2019); Cystoscopy (N/A, 01/05/2020); Pain management procedure (N/A, 07/20/2020); and Skin cancer excision. Social History:   reports that he quit smoking about 52 years ago. His smoking use included cigarettes. He has a 40.00 pack-year smoking history. He has never used smokeless tobacco. He reports that he does not drink alcohol and does not use drugs. Family History:  family history includes Arthritis in his father; Diabetes in his brother and father. Home Medications:  Prior to Admission medications    Medication Sig Start Date End Date Taking? Authorizing Provider   blood glucose test strips (FREESTYLE LITE) strip USE TO TEST THREE TIMES A DAY 11/15/22  Yes SHA Soto CNP   HYDROcodone-acetaminophen (NORCO) 7.5-325 MG per tablet Take 1 tablet by mouth 4 times daily as needed for Pain for up to 120 days.  Intended supply: 30 days 10/31/22 2/28/23 Yes SHA Soto CNP   lidocaine (LIDODERM) 5 % Place 1 patch onto the skin daily 12 hours on, 12 hours off. 10/24/22 11/23/22 Yes SHA Robertson CNP   gabapentin (NEURONTIN) 300 MG capsule TAKE ONE CAPSULE BY MOUTH EVERY MORNING AND TAKE TWO CAPSULES BY MOUTH AT BEDTIME 10/10/22 10/10/23 Yes SHA Soto CNP   BASAGLAR KWIKPEN 100 UNIT/ML injection pen Inject 30 Units into the skin nightly 9/1/22  Yes Travis Christine APRN - CNP   furosemide (LASIX) 40 MG tablet TAKE ONE TABLET BY MOUTH DAILY 8/31/22  Yes Morgan Miranda MD   hydrALAZINE (APRESOLINE) 50 MG tablet TAKE ONE TABLET BY MOUTH EVERY MORNING AND TAKE ONE TABLET BY MOUTH EVERY NIGHT AT BEDTIME 9/64/33  Yes SHA Luciano CNP   insulin aspart (NOVOLOG FLEXPEN) 100 UNIT/ML injection pen Use sliding scale provided by endocrinology 6/3/22  Yes SHA Rosenberg CNP   famotidine (PEPCID) 20 MG tablet TAKE ONE TABLET BY MOUTH TWICE A DAY 4/4/22  Yes SHA Rosenberg CNP   DULoxetine (CYMBALTA) 30 MG extended release capsule TAKE ONE CAPSULE BY MOUTH ONCE NIGHTLY 2/23/22  Yes SHA Rosenberg CNP   warfarin (COUMADIN) 2.5 MG tablet TAKE ONE AND ONE-HALF TABLETS BY MOUTH EVERY THURSDAY, THEN TAKE ONE TABLET BY MOUTH ON ALL OTHER DAYS  Patient taking differently: 2.5 mg daily 2.5mg daily 1/3/22  Yes SHA Rosenberg CNP   B-D UF III MINI PEN NEEDLES 31G X 5 MM MISC USE TO INJECT INSULIN FOUR TIMES A DAY 11/30/21  Yes Anjali Moore MD   fluocinonide (LIDEX) 0.05 % cream Apply topically 2 times daily. 9/28/20  Yes SHA Rosenberg CNP   donepezil (ARICEPT) 10 MG tablet Take 5 mg by mouth daily (with breakfast)  7/27/20  Yes Historical Provider, MD   finasteride (PROSCAR) 5 MG tablet Take 5 mg by mouth daily   Yes Historical Provider, MD   naphazoline-glycerin (CLEAR EYES REDNESS RELIEF) 0.012-0.2 % SOLN ophthalmic solution Place 1 drop into both eyes 2 times daily  Patient taking differently: Place 1 drop into both eyes 2 times daily as needed 4/7/18  Yes Andree Renee MD   tamsulosin (FLOMAX) 0.4 MG capsule Take 1 capsule by mouth daily  Patient taking differently: Take 0.4 mg by mouth 2 times daily 8/3/17  Yes SHA Rosenberg CNP   glucose monitoring kit (FREESTYLE) monitoring kit 1 kit by Does not apply route daily as needed. 4/7/14  Yes Kanika Núñez MD   latanoprost (XALATAN) 0.005 % ophthalmic solution Place 1 drop into both eyes nightly.  10/27/13  Yes Historical Provider, MD        Allergies:  Celebrex [celecoxib], Elavil [amitriptyline], Naproxen, Percocet [oxycodone-acetaminophen], and Lyrica [pregabalin]     Review of Systems:   Constitutional: there has been no unanticipated weight loss. There's been no change in energy level, sleep pattern, or activity level. Eyes: No visual changes or diplopia. No scleral icterus. ENT: No Headaches, hearing loss or vertigo. No mouth sores or sore throat. Cardiovascular: Reviewed in HPI  Respiratory: No cough or wheezing, no sputum production. No hematemesis. Gastrointestinal: No abdominal pain, appetite loss, blood in stools. No change in bowel or bladder habits. Genitourinary: No dysuria, trouble voiding, or hematuria. Musculoskeletal:  No gait disturbance, weakness or joint complaints. Integumentary: No rash or pruritis. Neurological: No headache, diplopia, change in muscle strength, numbness or tingling. No change in gait, balance, coordination, mood, affect, memory, mentation, behavior. Worsening gait difficulties and memory  Psychiatric: No anxiety, no depression  Endocrine: No malaise, fatigue or temperature intolerance. No excessive thirst, fluid intake, or urination. No tremor. Hematologic/Lymphatic: no bleeding, blood clots or swollen lymph nodes. Allergic/Immunologic: No nasal congestion or hives. Physical Examination:    Vitals:    11/22/22 1137   BP: 132/80   Pulse: 90   SpO2: 97%          Wt Readings from Last 1 Encounters:   11/22/22 215 lb 14.4 oz (97.9 kg)       Constitutional and General Appearance: NAD  Skin:good turgor,intact without lesions  HEENT: EOMI ,normal  Neck:no JVD    Respiratory:  Normal excursion and expansion without use of accessory muscles  Resp Auscultation: Normal breath sounds without dullness  Cardiovascular:   The apical impulses not displaced  Heart tones are crisp and normal  Cervical veins are not engorged  The carotid upstroke is normal in amplitude and contour without delay or bruit  Peripheral pulses are symmetrical and full  There is no clubbing, cyanosis of the extremities. No edema - no change  Femoral Arteries: 2+ and equal  Pedal Pulses: 2+ and equal   Abdomen:  No masses or tenderness  Liver/Spleen: No Abnormalities Noted  Neurological/Psychiatric:  Alert and oriented in all spheres  Moves all extremities well  Gait slow, uses rollator to help with balance   No abnormalities of mood, affect, memory, mentation, or behavior are noted    3/22/19 [facility]  Summary   -Left ventricular cavity size is normal. There is moderate concentric left   ventricular hypertrophy. Overall left ventricular systolic function appears   severely reduced with a estimated ejection fraction of 30-35%. Global   hypokinesis. -Grade I diastolic dysfunction. E/e\"=18.1   -Mild aortic and tricuspid regurgitation.   -Trivial mitral regurgitation.   -Estimated pulmonary artery systolic pressure is mildly elevated at 44 mmHg   assuming a right atrial pressure of 3 mmHg. 3/17/18 ECHO    Normal left ventricle size, wall thickness and systolic function with an   estimated ejection fraction of 55%. No regional wall motion abnormalities. Trivial mitral regurgitation. Mild aortic regurgitation. Mild to moderate tricuspid regurgitation with an RVSP of 59 mmHg. ECG 7/19 normal sinus with PAC  Echo 7/19 reviewed      7/25/19 Echo   Summary   Normal left ventricular size. Concentric left venticular hypertrophy. Globally decreased left ventricular systolic function with an estimated EF   45-50%. E/e'=13. Diastolic filling parameters suggests grade I diastolic   dysfunction. Mild mitral regurgitation. The left atrium is mildly dilated. Mild aortic regurgitation is present. Pressure half-time is calculated at   495 msec. Mildly dilated coronary sinus 4.1-4.2. The ascending aorta is normal in size 3.6-3.8. Mild tricuspid regurgitation. PASP 35mmHg.    There is a trivial pericardial effusion noted.   Frequent ectopy noted during the exam.        Assessment:      CHF   Well compensated by exam   Lasix 40mg daily. Tolerating medications without side effects. Hypertension   stable BP on current treatment  Blood pressure 132/80, pulse 90, height 5' 10\" (1.778 m), weight 215 lb 14.4 oz (97.9 kg), SpO2 97 %. Aortic/Tricuspid Regurgitation   ECHO 7/25/19> Stable findings (mild AI, mild TR, PASP 35 mmHg)   ECHO 11/10/20> stable   ECHO 11/12/21> stable    CRI  4/19/19> creat 3.0  5/8/19> creat 1.8  2/18/20 > 2.1  4/13/20 > 2.0  9/23/20> 2.4   4/25/22 > 2.9  7/15/22 > 2.9  F/w Dr. Nick Dumont    Hx of Pulmonary embolus   Continue anticoagulation with warfarin      Plan:   Cardiac test and lab results personally reviewed by me during this office visit and discussed. Continue risk factor modifications. Call for any change in symptoms, call to report any changes in shortness of breath or development of chest pain with activity. Follow up in 6 mos      I appreciate the opportunity of cooperating in the care of this individual.    Patrice Escoto. Joyce Acharya M.D., [facility]    Patient's problem list, medications, allergies, past medical, surgical, social and family histories were reviewed and updated as appropriate. Scribe's attestation: This note was scribed in the presence of Dr Joyce Acharya by Juanis Christopher RN. The scribe's documentation has been prepared under my direction and personally reviewed by me in its entirety. I confirm that the note above accurately reflects all work, treatment, procedures, and medical decision making performed by me.

## 2022-11-08 NOTE — FLOWSHEET NOTE
[facility]    Physical Therapy  Cancellation/No-show Note  Patient Name:  Rina Caal  :  1935   Date:  2022  Cancelled visits to date: 1  No-shows to date: 0    For today's appointment patient:  [x]  Cancelled  []  Rescheduled appointment  []  No-show     Reason given by patient:  []  Patient ill  []  Conflicting appointment  []  No transportation    []  Conflict with work  [x]  No reason given  []  Other:     Comments:      Electronically signed by:  Natasha Oro PT, DPT

## 2022-11-10 ENCOUNTER — HOSPITAL ENCOUNTER (OUTPATIENT)
Dept: PHYSICAL THERAPY | Age: 87
Setting detail: THERAPIES SERIES
Discharge: HOME | End: 2022-11-10
Payer: MEDICARE

## 2022-11-10 PROCEDURE — 97140 MANUAL THERAPY 1/> REGIONS: CPT

## 2022-11-10 PROCEDURE — 97110 THERAPEUTIC EXERCISES: CPT

## 2022-11-10 NOTE — FLOWSHEET NOTE
[facility] [address] [address]  Phone: [phone] Fax: [phone]        Physical Therapy Treatment Note/ Progress Report:       Date:  11/10/2022    Patient Name:  Jean Almendarez    :  1935  MRN: [id number]  Restrictions/Precautions:    Medical/Treatment Diagnosis Information:  Diagnosis: lumbar DDD, low back pain, bilateral hip pain  Treatment Diagnosis: lumbar DDD M51.36, low back pain M54.5, bilateral hip pain M25.551, U80.384  Insurance/Certification information:  PT Insurance Information: Medicare/Aetna  Physician Information:  SHA Jackson*  Plan of care signed (Y/N):      Date of Patient follow up with Physician:      Progress Report: [x]  Yes  []  No     Date Range for reporting period:  Beginnin2022  Ending:     Progress report due (10 Rx/or 30 days whichever is less):      Recertification due (POC duration/ or 90 days whichever is less): 10/27/2022     Visit # Insurance Allowable Auth Needed   15 Medicare/Aetna []Yes    []No     Latex Allergy:  [x]NO      []YES  Preferred Language for Healthcare:   [x]English       []other:  Functional Scale: Foto FS L/spine NA KEELY NA disability; Foto Hip 40, LEFS 23.7 Date assessed:10/19/2022    Pain level:  2/10     SUBJECTIVE:  Patient reports that he felt good leaving after last session. Overall feeling like he is getting better and not having as much tenderness in hips and back.         OBJECTIVE:   Observation:   Test measurements:        ROM LEFT RIGHT   HIP Flex 110 WNL   HIP Abd       HIP Ext       HIP IR 22 22   HIP ER 48 34       RESTRICTIONS/PRECAUTIONS: n/a    Exercises/Interventions:     Therapeutic Ex (04748)   Min: 20 Sets/sec Reps Notes/CUES   Retro Stepper/BIKE      Alter G      BFR      Sportco      SLR + TA activation 2 10 bilat   Hooklying clamshell 2 10 green   Hooklying glute set +TA activation 10 10    LTR 2 10    Hooklying TA + alt march 1 10    TA activation + small bridge 0     Standing hip ext over table 0  bilat   Standing pelvic/trunk ext at table 0     Band pull down for posture 0     STS 1 15  cues   Standing marches 0  bilat         Slide Lunge      Slide HS eccentrics      Step ups/ecc step down      Swissball wall rolls- in SLS- hip drive      Quad hip ext/wall-ball rolls                  Manual Intervention (52181)  Min: 24      Bilateral hip belt mobs 1 10    Bilateral SL hip ext mobs 1 6    SL lumbar mobs 1 0 bilat   Posterior hip mobs in supine- bilat 1 0    STM lumbar paraspinals , QL and iliac crest 1 8 bilat         NMR re-education (96075)  Min:   CUES NEEDED   Puerto Rican/Biofeedback 10/10      BFR      G. Med activation      Hip Ext full ROM/ G. Activation      Bosu Bal and Prop- G Med      Single leg stance/Balance/Prop      Bosu Retro G. Med act      Prone Hip froggers- sliders/elevated            Therapeutic Activity (78787)  Min:      Ladders      Plyos      Dynamic Balance                            Therapeutic Exercise and NMR EXR  [x] (08313) Provided verbal/tactile cueing for activities related to strengthening, flexibility, endurance, ROM for improvements in LE, proximal hip, and core control with self care, mobility, lifting, ambulation. [x] (57225) Provided verbal/tactile cueing for activities related to improving balance, coordination, kinesthetic sense, posture, motor skill, proprioception  to assist with LE, proximal hip, and core control in self care, mobility, lifting, ambulation and eccentric single leg control.      NMR and Therapeutic Activities:    [x] (59189 or 99499) Provided verbal/tactile cueing for activities related to improving balance, coordination, kinesthetic sense, posture, motor skill, proprioception and motor activation to allow for proper function of core, proximal hip and LE with self care and ADLs and functional mobility.   [] (84825) Gait Re-education- Provided training and instruction to the patient for proper LE, core and proximal hip recruitment and positioning and eccentric body weight control with ambulation re-education including up and down stairs     Home Exercise Program:    [x] (60178) Reviewed/Progressed HEP activities related to strengthening, flexibility, endurance, ROM of core, proximal hip and LE for functional self-care, mobility, lifting and ambulation/stair navigation   [] (92056)Reviewed/Progressed HEP activities related to improving balance, coordination, kinesthetic sense, posture, motor skill, proprioception of core, proximal hip and LE for self care, mobility, lifting, and ambulation/stair navigation      Manual Treatments:  PROM / STM / Oscillations-Mobs:  G-I, II, III, IV (PA's, Inf., Post.)  [x] (78488) Provided manual therapy to mobilize LE, proximal hip and/or LS spine soft tissue/joints for the purpose of modulating pain, promoting relaxation,  increasing ROM, reducing/eliminating soft tissue swelling/inflammation/restriction, improving soft tissue extensibility and allowing for proper ROM for normal function with self care, mobility, lifting and ambulation. Modalities:     [] GAME READY (VASO)- for significant edema, swelling, pain control.      Charges:  Timed Code Treatment Minutes: 44   Total Treatment Minutes: 45      [] EVAL (LOW) 44427 (typically 20 minutes face-to-face)  [] EVAL (MOD) 85794 (typically 30 minutes face-to-face)  [] EVAL (HIGH) 38654 (typically 45 minutes face-to-face)  [] RE-EVAL     [x] UM(10221) x  1   [] DRY NEEDLE 1 OR 2 MUSCLES  [] NMR (75452) x     [] DRY NEEDLE 3+ MUSCLES  [x] Manual (63740) x  2     [] TA (45583) x     [] Mech Traction (10035)  [] ES(attended) (08770)     [] ES (un) (78684):   [] VASO (53934)  [] Other:    If BWC Please Indicate Time In/Out  CPT Code Time in Time out                                   GOALS:  Patient stated goal: improve sleeping  [x] Progressing: [] Met: [] Not Met: [] Adjusted    Therapist goals for Patient:   Short Term Goals: To be achieved in: 2 weeks  1. Independent in HEP and progression per patient tolerance, in order to prevent re-injury. [] Progressing: [x] Met: [] Not Met: [] Adjusted  2. Patient will have a decrease in pain to facilitate improvement in movement, function, and ADLs as indicated by Functional Deficits. [x] Progressing: [] Met: [] Not Met: [] Adjusted    Long Term Goals: To be achieved in: 6 weeks  1. Disability index score of 20/80 or better for the LEFS -METand 50% disability or better for KEELY to assist with reaching prior level of function. [x] Progressing: [] Met: [] Not Met: [] Adjusted  2. Patient will demonstrate increased AROM to bilateral hip IR to at least 15 degrees to allow for proper joint functioning as indicated by patients Functional Deficits. [] Progressing: [x] Met: [] Not Met: [] Adjusted  3. Patient will demonstrate an increase in Strength to good proximal hip strength and control, within 5lb HHD in LE to allow for proper functional mobility as indicated by patients Functional Deficits. [x] Progressing: [] Met: [] Not Met: [] Adjusted  4. Patient will return to standing and walking (in home) functional activities without increased symptoms or restriction. [x] Progressing: [] Met: [] Not Met: [] Adjusted  5. Patient will report being able to sleep for at least 4-6 hours without increased symptoms or restriction in back/hips.(patient specific functional goal)    [x] Progressing: [] Met: [] Not Met: [] Adjusted     ASSESSMENT:  Patient continues to demonstrate improvement in all soft tissue restriction today- no tenderness at all in lumbar paraspinals and QL today. Improving hip extension ROM with anterior mobs (in SL). Improving core activation and tolerance. Overall improved upright posture at conclusion. Patient had increased dizziness with supine to sit today, resolved quickly once seated, but felt a little nauseated which improved as well.  No issues as he was leaving. Return to Play: (if applicable)   []  Stage 1: Intro to Strength   []  Stage 2: Return to Run and Strength   []  Stage 3: Return to Jump and Strength   []  Stage 4: Dynamic Strength and Agility   []  Stage 5: Sport Specific Training     []  Ready to Return to Play, Meets All Above Stages   []  Not Ready for Return to Sports   Comments:            Treatment/Activity Tolerance:  [x] Patient tolerated treatment well [] Patient limited by fatique  [] Patient limited by pain  [] Patient limited by other medical complications  [] Other:     Overall Progression Towards Functional goals/ Treatment Progress Update:  [x] Patient is progressing as expected towards functional goals listed. [] Progression is slowed due to complexities/Impairments listed. [] Progression has been slowed due to co-morbidities. [] Plan just implemented, too soon to assess goals progression <30days   [] Goals require adjustment due to lack of progress  [] Patient is not progressing as expected and requires additional follow up with physician  [] Other    Prognosis for POC: [x] Good [] Fair  [] Poor    Patient requires continued skilled intervention: [x] Yes  [] No        PLAN: Continue 2x week  [x] Continue per plan of care [] Alter current plan (see comments)  [] Plan of care initiated [] Hold pending MD visit [] Discharge    Electronically signed by: Ladan Adler PT    Note: If patient does not return for scheduled/recommended follow up visits, this note will serve as a discharge from care along with the most recent update on progress.

## 2022-11-15 ENCOUNTER — TELEPHONE (OUTPATIENT)
Dept: INTERNAL MEDICINE CLINIC | Age: 87
End: 2022-11-15

## 2022-11-15 DIAGNOSIS — N18.30 TYPE 2 DIABETES MELLITUS WITH STAGE 3 CHRONIC KIDNEY DISEASE, WITHOUT LONG-TERM CURRENT USE OF INSULIN (HCC): ICD-10-CM

## 2022-11-15 DIAGNOSIS — E11.22 TYPE 2 DIABETES MELLITUS WITH STAGE 3 CHRONIC KIDNEY DISEASE, WITHOUT LONG-TERM CURRENT USE OF INSULIN (HCC): ICD-10-CM

## 2022-11-15 RX ORDER — BLOOD-GLUCOSE METER
KIT MISCELLANEOUS
Qty: 200 STRIP | Refills: 5 | Status: SHIPPED | OUTPATIENT
Start: 2022-11-15

## 2022-11-15 NOTE — TELEPHONE ENCOUNTER
Pt calling requesting refill of  Free Style Lite test strips  --tests 3 times a day    Last written  10/18/21 different Dr Mark Stahl  10/31/22  Next OV  11/30/22  Last recommended OV   NA    Please send to   [facility]

## 2022-11-22 ENCOUNTER — OFFICE VISIT (OUTPATIENT)
Dept: CARDIOLOGY CLINIC | Age: 87
End: 2022-11-22
Payer: MEDICARE

## 2022-11-22 VITALS
HEIGHT: 70 IN | DIASTOLIC BLOOD PRESSURE: 80 MMHG | BODY MASS INDEX: 30.91 KG/M2 | OXYGEN SATURATION: 97 % | SYSTOLIC BLOOD PRESSURE: 132 MMHG | WEIGHT: 215.9 LBS | HEART RATE: 90 BPM

## 2022-11-22 DIAGNOSIS — I50.22 CHRONIC SYSTOLIC CONGESTIVE HEART FAILURE (HCC): Primary | ICD-10-CM

## 2022-11-22 DIAGNOSIS — I26.99 BILATERAL PULMONARY EMBOLISM (HCC): ICD-10-CM

## 2022-11-22 DIAGNOSIS — N18.30 STAGE 3 CHRONIC KIDNEY DISEASE, UNSPECIFIED WHETHER STAGE 3A OR 3B CKD (HCC): ICD-10-CM

## 2022-11-22 DIAGNOSIS — I10 ESSENTIAL HYPERTENSION: ICD-10-CM

## 2022-11-22 DIAGNOSIS — I36.1 NON-RHEUMATIC TRICUSPID VALVE INSUFFICIENCY: ICD-10-CM

## 2022-11-22 DIAGNOSIS — I35.9 AORTIC VALVULAR DISEASE: ICD-10-CM

## 2022-11-22 PROCEDURE — G8484 FLU IMMUNIZE NO ADMIN: HCPCS | Performed by: INTERNAL MEDICINE

## 2022-11-22 PROCEDURE — 99214 OFFICE O/P EST MOD 30 MIN: CPT | Performed by: INTERNAL MEDICINE

## 2022-11-22 PROCEDURE — 1036F TOBACCO NON-USER: CPT | Performed by: INTERNAL MEDICINE

## 2022-11-22 PROCEDURE — G8427 DOCREV CUR MEDS BY ELIG CLIN: HCPCS | Performed by: INTERNAL MEDICINE

## 2022-11-22 PROCEDURE — 1123F ACP DISCUSS/DSCN MKR DOCD: CPT | Performed by: INTERNAL MEDICINE

## 2022-11-22 PROCEDURE — 3078F DIAST BP <80 MM HG: CPT | Performed by: INTERNAL MEDICINE

## 2022-11-22 PROCEDURE — 3074F SYST BP LT 130 MM HG: CPT | Performed by: INTERNAL MEDICINE

## 2022-11-22 PROCEDURE — G8417 CALC BMI ABV UP PARAM F/U: HCPCS | Performed by: INTERNAL MEDICINE

## 2022-11-30 ENCOUNTER — OFFICE VISIT (OUTPATIENT)
Dept: INTERNAL MEDICINE CLINIC | Age: 87
End: 2022-11-30
Payer: MEDICARE

## 2022-11-30 VITALS
DIASTOLIC BLOOD PRESSURE: 80 MMHG | SYSTOLIC BLOOD PRESSURE: 130 MMHG | WEIGHT: 218 LBS | OXYGEN SATURATION: 97 % | HEIGHT: 70 IN | TEMPERATURE: 97.3 F | HEART RATE: 80 BPM | BODY MASS INDEX: 31.21 KG/M2

## 2022-11-30 DIAGNOSIS — I26.99 BILATERAL PULMONARY EMBOLISM (HCC): ICD-10-CM

## 2022-11-30 DIAGNOSIS — F02.80 LATE ONSET ALZHEIMER'S DISEASE WITHOUT BEHAVIORAL DISTURBANCE (HCC): ICD-10-CM

## 2022-11-30 DIAGNOSIS — G62.9 PERIPHERAL POLYNEUROPATHY: ICD-10-CM

## 2022-11-30 DIAGNOSIS — E11.22 TYPE 2 DIABETES MELLITUS WITH STAGE 4 CHRONIC KIDNEY DISEASE, WITHOUT LONG-TERM CURRENT USE OF INSULIN (HCC): ICD-10-CM

## 2022-11-30 DIAGNOSIS — Z79.01 ANTICOAGULATED ON COUMADIN: ICD-10-CM

## 2022-11-30 DIAGNOSIS — Z00.00 MEDICARE ANNUAL WELLNESS VISIT, SUBSEQUENT: Primary | ICD-10-CM

## 2022-11-30 DIAGNOSIS — N18.4 TYPE 2 DIABETES MELLITUS WITH STAGE 4 CHRONIC KIDNEY DISEASE, WITHOUT LONG-TERM CURRENT USE OF INSULIN (HCC): ICD-10-CM

## 2022-11-30 DIAGNOSIS — M15.9 PRIMARY OSTEOARTHRITIS INVOLVING MULTIPLE JOINTS: ICD-10-CM

## 2022-11-30 DIAGNOSIS — I10 ESSENTIAL HYPERTENSION: ICD-10-CM

## 2022-11-30 DIAGNOSIS — G30.1 LATE ONSET ALZHEIMER'S DISEASE WITHOUT BEHAVIORAL DISTURBANCE (HCC): ICD-10-CM

## 2022-11-30 DIAGNOSIS — M48.062 LUMBAR STENOSIS WITH NEUROGENIC CLAUDICATION: ICD-10-CM

## 2022-11-30 LAB
INTERNATIONAL NORMALIZATION RATIO, POC: 2.9
PROTHROMBIN TIME, POC: NORMAL

## 2022-11-30 PROCEDURE — 1036F TOBACCO NON-USER: CPT | Performed by: NURSE PRACTITIONER

## 2022-11-30 PROCEDURE — 3052F HG A1C>EQUAL 8.0%<EQUAL 9.0%: CPT | Performed by: NURSE PRACTITIONER

## 2022-11-30 PROCEDURE — 3074F SYST BP LT 130 MM HG: CPT | Performed by: NURSE PRACTITIONER

## 2022-11-30 PROCEDURE — 1123F ACP DISCUSS/DSCN MKR DOCD: CPT | Performed by: NURSE PRACTITIONER

## 2022-11-30 PROCEDURE — 99213 OFFICE O/P EST LOW 20 MIN: CPT | Performed by: NURSE PRACTITIONER

## 2022-11-30 PROCEDURE — G0439 PPPS, SUBSEQ VISIT: HCPCS | Performed by: NURSE PRACTITIONER

## 2022-11-30 PROCEDURE — G8427 DOCREV CUR MEDS BY ELIG CLIN: HCPCS | Performed by: NURSE PRACTITIONER

## 2022-11-30 PROCEDURE — 3078F DIAST BP <80 MM HG: CPT | Performed by: NURSE PRACTITIONER

## 2022-11-30 PROCEDURE — G8484 FLU IMMUNIZE NO ADMIN: HCPCS | Performed by: NURSE PRACTITIONER

## 2022-11-30 PROCEDURE — 85610 PROTHROMBIN TIME: CPT | Performed by: NURSE PRACTITIONER

## 2022-11-30 PROCEDURE — G8417 CALC BMI ABV UP PARAM F/U: HCPCS | Performed by: NURSE PRACTITIONER

## 2022-11-30 ASSESSMENT — PATIENT HEALTH QUESTIONNAIRE - PHQ9
SUM OF ALL RESPONSES TO PHQ QUESTIONS 1-9: 0
2. FEELING DOWN, DEPRESSED OR HOPELESS: 0
SUM OF ALL RESPONSES TO PHQ9 QUESTIONS 1 & 2: 0
SUM OF ALL RESPONSES TO PHQ QUESTIONS 1-9: 0
1. LITTLE INTEREST OR PLEASURE IN DOING THINGS: 0

## 2022-11-30 ASSESSMENT — LIFESTYLE VARIABLES
HOW OFTEN DO YOU HAVE A DRINK CONTAINING ALCOHOL: NEVER
HOW MANY STANDARD DRINKS CONTAINING ALCOHOL DO YOU HAVE ON A TYPICAL DAY: PATIENT DOES NOT DRINK

## 2022-11-30 NOTE — PATIENT INSTRUCTIONS
Personalized Preventive Plan for Mitchell Colon - 11/30/2022  Medicare offers a range of preventive health benefits. Some of the tests and screenings are paid in full while other may be subject to a deductible, co-insurance, and/or copay. Some of these benefits include a comprehensive review of your medical history including lifestyle, illnesses that may run in your family, and various assessments and screenings as appropriate. After reviewing your medical record and screening and assessments performed today your provider may have ordered immunizations, labs, imaging, and/or referrals for you. A list of these orders (if applicable) as well as your Preventive Care list are included within your After Visit Summary for your review. Other Preventive Recommendations:    A preventive eye exam performed by an eye specialist is recommended every 1-2 years to screen for glaucoma; cataracts, macular degeneration, and other eye disorders. A preventive dental visit is recommended every 6 months. Try to get at least 150 minutes of exercise per week or 10,000 steps per day on a pedometer . Order or download the FREE \"Exercise & Physical Activity: Your Everyday Guide\" from [employer]. Call 3-418.270.7000 or search [employer] online. You need 1194-4152 mg of calcium and 0611-8776 IU of vitamin D per day. It is possible to meet your calcium requirement with diet alone, but a vitamin D supplement is usually necessary to meet this goal.  When exposed to the sun, use a sunscreen that protects against both UVA and UVB radiation with an SPF of 30 or greater. Reapply every 2 to 3 hours or after sweating, drying off with a towel, or swimming. Always wear a seat belt when traveling in a car. Always wear a helmet when riding a bicycle or motorcycle.

## 2022-11-30 NOTE — PROGRESS NOTES
Medicare Annual Wellness Visit    Lindsay Aragon is here for Medicare AWV and Follow-up (INR today 2.9)    Assessment & Plan     Medicare annual wellness visit, subsequent    Bilateral pulmonary embolism (HCC)  - No evidence of recurrence  - Continue anticoagulation    Anticoagulated on Coumadin  - INR 2.9  - INR therapeutic, continue current regimen, see flowsheet  -     POCT INR    Type 2 diabetes mellitus with stage 4 chronic kidney disease, without long-term current use of insulin (HCC)  - Chronic, fair control  -Fair diabetes control with most recent A1c 8.2  -Continue current regimen and continue to work on glycemic improvement    Peripheral polyneuropathy  -Chronic, unstable problem  -Remains problematic  -Has tried numerous treatments for polyneuropathy. He is currently opioid dependent for lumbar stenosis and polyneuropathy likely related to lumbar back disease and diabetes    Lumbar stenosis with neurogenic claudication  -Chronic, unstable problem  -Remains problematic  -Has tried numerous treatments for back pain. He is currently opioid dependent for lumbar stenosis and polyneuropathy likely related to lumbar back disease and diabetes    Essential hypertension  - Normotensive  - he has met JNC standards.  - Continue current regimen. Primary osteoarthritis involving multiple joints  - Chronic, stable  - Continue current regimen    Late onset Alzheimer's disease without behavioral disturbance (HCC)  - Chronic, stable  - Continue current regimen        Recommendations for Preventive Services Due: see orders and patient instructions/AVS.  Recommended screening schedule for the next 5-10 years is provided to the patient in written form: see Patient Instructions/AVS.     Return for Medicare Annual Wellness Visit in 1 year. Subjective     Review of Systems   Constitutional:  Positive for fatigue. Negative for chills and fever. Respiratory: Negative. Cardiovascular:  Positive for leg swelling.  Negative for chest pain. Gastrointestinal: Negative. Genitourinary: Negative. Musculoskeletal:  Positive for back pain. Skin: Negative. Neurological:  Positive for numbness. Psychiatric/Behavioral: Negative. Patient's complete Health Risk Assessment and screening values have been reviewed and are found in Flowsheets. The following problems were reviewed today and where indicated follow up appointments were made and/or referrals ordered. Positive Risk Factor Screenings with Interventions:    Fall Risk:  Do you feel unsteady or are you worried about falling? : no  2 or more falls in past year?: (!) yes  Fall with injury in past year?: no   Fall Risk Interventions:    Home safety tips provided    Cognitive: Words recalled: 0 Words Recalled  Clock Drawing Test (CDT): Normal  Total Score Interpretation: Abnormal Mini-Cog  Cognitive Impairment Interventions:  Chronic, known problem           Opioid Risk: (Low risk score <55) Opioid risk score: 18    Patient is low risk for opioid use disorder or overdose. Last PDMP Manning Drown as Reviewed:  Review User Review Instant Review Result   Jacobo Cam 10/31/2022 10:43 AM     Reviewed PDMP [1]     Last Controlled Substance Monitoring Documentation      [address] Office Visit from 10/31/2022 in [facility]   Periodic Controlled Substance Monitoring No signs of potential drug abuse or diversion identified. , Assessed functional status., Obtaining appropriate analgesic effect of treatment.  filed at 10/31/2022 [address] and ACP:  General  In general, how would you say your health is?: Good  In the past 7 days, have you experienced any of the following: New or Increased Pain, New or Increased Fatigue, Loneliness, Social Isolation, Stress or Anger?: No  Do you get the social and emotional support that you need?: Yes  Do you have a Living Will?: (!) No    Advance Directives       Power of [address] [facility] Will ACP-Advance Directive ACP-Power of     Not on File Not on File Not on File Not on File          General Health Risk Interventions:  No Living Will: Living will information provided    Health Habits/Nutrition:  Physical Activity: Sufficiently Active    Days of Exercise per Week: 7 days    Minutes of Exercise per Session: 30 min     Have you lost any weight without trying in the past 3 months?: No  Body mass index: (!) 31.28  Have you seen the dentist within the past year?: N/A - wear dentures  Health Habits/Nutrition Interventions:  Monitor     Safety:  Do you have working smoke detectors?: Yes  Do you have any tripping hazards - loose or unsecured carpets or rugs?: No  Do you have any tripping hazards - clutter in doorways, halls, or stairs?: No  Do you have either shower bars, grab bars, non-slip mats or non-slip surfaces in your shower or bathtub?: (!) No  Do all of your stairways have a railing or banister?: Yes  Do you always fasten your seatbelt when you are in a car?: Yes  Safety Interventions:  Home safety tips provided    ADLs:  In the past 7 days, did you need help from others to perform any of the following everyday activities: Eating, dressing, grooming, bathing, toileting, or walking/balance?: No  In the past 7 days, did you need help from others to take care of any of the following: Laundry, housekeeping, banking/finances, shopping, telephone use, food preparation, transportation, or taking medications?: (!) Yes  Select all that apply: (!) Banking/Finances, Shopping, Transportation, Taking Medications, Food Preparation  ADL Interventions: Wife asssiting          Objective   Vitals:    11/30/22 1123   BP: 130/80   Pulse: 80   Temp: 97.3 °F (36.3 °C)   SpO2: 97%   Weight: 218 lb (98.9 kg)   Height: 5' 10\" (1.778 m)      Body mass index is 31.28 kg/m².         Gen: NAD  Eyes: no icterus, no conjunctival erythema  CV: RRR, no mrgs  Resp: CTAB  Abd: soft, NTTP  Neuro: alert, oriented, answers questions appropriately  MSK: no peripheral edema  Skin: warm, dry  Psych: Normal mood, affect          Allergies   Allergen Reactions    Celebrex [Celecoxib] Other (See Comments)     hallucinations    Elavil [Amitriptyline]      Severe fatigue      Naproxen      Kidney damage    Percocet [Oxycodone-Acetaminophen] Other (See Comments)     confusion    Lyrica [Pregabalin] Palpitations     Fatigue     Prior to Visit Medications    Medication Sig Taking? Authorizing Provider   blood glucose test strips (FREESTYLE LITE) strip USE TO TEST THREE TIMES A DAY Yes SHA Lozada CNP   HYDROcodone-acetaminophen (NORCO) 7.5-325 MG per tablet Take 1 tablet by mouth 4 times daily as needed for Pain for up to 120 days. Intended supply: 30 days Yes SHA Lozada CNP   gabapentin (NEURONTIN) 300 MG capsule TAKE ONE CAPSULE BY MOUTH EVERY MORNING AND TAKE TWO CAPSULES BY MOUTH AT BEDTIME Yes SHA Lozada CNP   BASAGLAR KWIKPEN 100 UNIT/ML injection pen Inject 30 Units into the skin nightly Yes SHA Lozada CNP   furosemide (LASIX) 40 MG tablet TAKE ONE TABLET BY MOUTH DAILY Yes Morgan Miranda MD   hydrALAZINE (APRESOLINE) 50 MG tablet TAKE ONE TABLET BY MOUTH EVERY MORNING AND TAKE ONE TABLET BY MOUTH EVERY NIGHT AT BEDTIME Yes SHA Rodriguez CNP   insulin aspart (NOVOLOG FLEXPEN) 100 UNIT/ML injection pen Use sliding scale provided by endocrinology Yes SHA Lozada CNP   famotidine (PEPCID) 20 MG tablet TAKE ONE TABLET BY MOUTH TWICE A DAY Yes SHA Lozada CNP   DULoxetine (CYMBALTA) 30 MG extended release capsule TAKE ONE CAPSULE BY MOUTH ONCE NIGHTLY Yes SHA Lozada CNP   B-D UF III MINI PEN NEEDLES 31G X 5 MM MISC USE TO INJECT INSULIN FOUR TIMES A DAY Yes Gisela Francisco MD   fluocinonide (LIDEX) 0.05 % cream Apply topically 2 times daily.  Yes SHA Lozada CNP   donepezil (ARICEPT) 10 MG tablet Take 5 mg by mouth daily (with breakfast)  Yes Historical Provider, MD   finasteride (PROSCAR) 5 MG tablet Take 5 mg by mouth daily Yes Historical Provider, MD   naphazoline-glycerin (CLEAR EYES REDNESS RELIEF) 0.012-0.2 % SOLN ophthalmic solution Place 1 drop into both eyes 2 times daily  Patient taking differently: Place 1 drop into both eyes 2 times daily as needed Yes Manny Decker MD   tamsulosin (FLOMAX) 0.4 MG capsule Take 1 capsule by mouth daily  Patient taking differently: Take 0.4 mg by mouth 2 times daily Yes SHA Lala CNP   glucose monitoring kit (FREESTYLE) monitoring kit 1 kit by Does not apply route daily as needed. Yes Mary Germain MD   latanoprost (XALATAN) 0.005 % ophthalmic solution Place 1 drop into both eyes nightly.  Yes Historical Provider, MD   warfarin (COUMADIN) 2.5 MG tablet TAKE ONE AND ONE-HALF TABLETS BY MOUTH EVERY THURSDAY, THEN TAKE ONE TABLET BY MOUTH ON ALL OTHER DAYS  Patient not taking: Reported on 11/30/2022  SHA Lala CNP       CareTeam (Including outside providers/suppliers regularly involved in providing care):   Patient Care Team:  SHA Lala CNP as PCP - General (Nurse Practitioner)  SHA Lala CNP as PCP - [facility] Elena Porras Provider  Estelle Zapata MD as Consulting Physician (Otolaryngology)  Jina Suarez MD as Consulting Physician (Nephrology)     Reviewed and updated this visit:  Allergies  Meds

## 2022-12-01 ENCOUNTER — HOSPITAL ENCOUNTER (OUTPATIENT)
Dept: CT IMAGING | Age: 87
Discharge: HOME | End: 2022-12-01
Payer: MEDICARE

## 2022-12-01 DIAGNOSIS — C34.32 PRIMARY MALIGNANT NEOPLASM OF BRONCHUS OF LEFT LOWER LOBE (HCC): ICD-10-CM

## 2022-12-01 PROCEDURE — 74176 CT ABD & PELVIS W/O CONTRAST: CPT

## 2022-12-05 ASSESSMENT — ENCOUNTER SYMPTOMS
RESPIRATORY NEGATIVE: 1
BACK PAIN: 1
GASTROINTESTINAL NEGATIVE: 1

## 2022-12-21 ENCOUNTER — TELEPHONE (OUTPATIENT)
Dept: INTERNAL MEDICINE CLINIC | Age: 87
End: 2022-12-21

## 2022-12-21 ENCOUNTER — TELEMEDICINE (OUTPATIENT)
Dept: INTERNAL MEDICINE CLINIC | Age: 87
End: 2022-12-21
Payer: MEDICARE

## 2022-12-21 DIAGNOSIS — U07.1 COVID-19: Primary | ICD-10-CM

## 2022-12-21 PROCEDURE — 99442 PR PHYS/QHP TELEPHONE EVALUATION 11-20 MIN: CPT | Performed by: NURSE PRACTITIONER

## 2022-12-21 NOTE — PROGRESS NOTES
Stephanie Gaitan is a 82 y.o. male evaluated via telephone on 12/21/2022 for Positive For Covid-19 (Symptoms started yesterday), Cough, Fever, and Pharyngitis  . Documentation:  I communicated with the patient and/or health care decision maker about       Cough, sore throat. Symptoms for 2 days. Fever 101.1 this AM  Took 2 Tylenol  COVID test this AM was positive. Details of this discussion including any medical advice provided:   Assessment/Plan:  Fuentes LUGO was seen today for positive for covid-19, cough, fever and pharyngitis. Diagnoses and all orders for this visit:    COVID-19  -     molnupiravir 200 MG capsule; Take 4 capsules by mouth in the morning and 4 capsules in the evening. Do all this for 5 days.  -2 days of symptoms.  -Fever which is responding to Tylenol  -COVID test positive  -Given use of Flomax, warfarin, and GFR less than 30, Paxlovid is poor choice. Will proceed with molnupiravir.  -Continue cold and cough remedies. Advised to go to the hospital for severe or rapidly worsening symptoms. Total Time: minutes: 11-20 minutes    Stephanie Gaitan was evaluated through a synchronous (real-time) audio encounter. Patient identification was verified at the start of the visit. He (or guardian if applicable) is aware that this is a billable service, which includes applicable co-pays. This visit was conducted with the patient's (and/or legal guardian's) verbal consent. He has not had a related appointment within my department in the past 7 days or scheduled within the next 24 hours. The patient was located at Home: [address]. The provider was located at [facility] (Appt Dept): [address],  [address].     Note: not billable if this call serves to triage the patient into an appointment for the relevant concern    SHA Galvan - CNP

## 2022-12-21 NOTE — TELEPHONE ENCOUNTER
Patient's wife states patient tested positive for Covid today. Exposure was last Saturday and patient started feeling bad yesterday. Patient c/o cough, fever, and sore throat. No availability in the office today for VV. Please advise.

## 2022-12-27 PROBLEM — I48.91 A-FIB (HCC): Status: ACTIVE | Noted: 2022-01-01

## 2022-12-27 NOTE — ED PROVIDER NOTES
[facility]) Emergency [address] [facility]    Mohsen Barrow MD, am the primary clinician of record. CHIEF COMPLAINT  Chief Complaint   Patient presents with    Shortness of Breath     Covid + x 1 week. Shortness of breath x 1 day. Weakness x 1 week since being covid +. Pt having trouble walking x 1 week. HISTORY OF PRESENT ILLNESS  Hubert Francois is a 82 y.o. male  who presents to the ED complaining of COVID-19 illness diagnosed on a home test about 1 week ago. The patient was on molnupiravir prescribed by PCP instead of Paxil that due to his concomitant use of tamsulosin. The patient has had progressive and worsening generalized malaise and fatigue and generalized weakness since then. History is supplemented by the wife as the patient does have a history of Alzheimer's dementia. He also has a history of COPD. He does not typically wear oxygen and has borderline oxygen saturations here. He has been having worsening cough as well. No fevers though. No chest pains reported by the patient. No belly pain vomiting or diarrhea. His appetite has been poor. He is anticoagulated due to history of blood clots but has no history of atrial fibrillation. He is in rapid atrial fibrillation on initial assessment. No other complaints, modifying factors or associated symptoms. I have reviewed the following from the nursing documentation.     Past Medical History:   Diagnosis Date    Abdominal pain, epigastric     Arthritis     Benign prostatic hypertrophy without urinary obstruction     BPH     Cellulitis and abscess     Chronic rhinitis     Chronic systolic congestive heart failure ([address] 4/18/2019    COPD (chronic obstructive pulmonary disease) (HCC)     Diabetes mellitus (HCC)     Dysphagia     Erectile dysfunction     GERD (gastroesophageal reflux disease)     Hx of blood clots     Hyperglycemia     Hypertension     Late onset Alzheimer's disease without behavioral disturbance ([address] 7/23/2020 lumbar radiculopathy     Neuropathy     Nocturia     Osteoarthritis     Other disorders of kidney and ureter in diseases classified elsewhere     Pain, joint, knee     Palpitations     skin cancer     Rt ear, nose, neck, hands/ 2018 lung    SOB (shortness of breath)     Tennis elbow     Tinea pedis     foot     Past Surgical History:   Procedure Laterality Date    CATARACT REMOVAL Bilateral 09/2014    CHOLECYSTECTOMY      CIRCUMCISION N/A 11/15/2019    DORSAL SLIT performed by Rambo Marquez MD at [address]  11/28/2011    Dr. Elvis Crowder - mild sigmoid diverticulosis    CYSTOSCOPY N/A 01/05/2020    CYSTOSCOPY, EVACUATION OF CLOTS performed by Vangie Rodriguez MD at [address] Right 09/09/2013    Dr. Carli Lloyd - block for pain relief    JOINT REPLACEMENT Bilateral     knees    KNEE PROSTHESIS REMOVAL      LUMBAR NERVE BLOCK N/A 08/26/2019    L4/5 INTERLAMINAR EPIDURAL STEROID INJECTION WITH FLUOROSCOPY performed by Bernarda Lau MD at [facility] Left 05/09/2018    Dr. Estelle Leyden - CT guided    MOHS SURGERY Bilateral     NERVE SURGERY N/A 12/20/2019    L4L5 INTERLAMINAR EPIDURAL STEROID INJECTION WITH FLUOROSCOPY performed by Bernarda Lau MD at [address] [facility]      multiple lumbar ESIs    PAIN MANAGEMENT PROCEDURE N/A 07/20/2020    L4-L5 INTERLAMINAR EPIDURAL STEROID INJECTION WITH FLUOROSCOPY performed by Bernarda Lau MD at [id number] [place] [address] Right 01/26/2017    Dr. Melo Headings - superficial, w/excision of parotid tail & dissection/preservation of facial nerve    MT NJX AA&/STRD TFRML EPI LUMBAR/SACRAL 1 LEVEL Right 11/21/2018    RIGHT L4, L5 LUMBAR TRANSFORAMINAL EPIDURAL STEROID INJECTION WITH FLUOROSCOPY performed by Bernarda Lau MD at [address] [place] [address] Left 08/14/2019    SKIN CANCER EXCISION      TOTAL KNEE ARTHROPLASTY      right x1 and left x2    TUNNELED VENOUS PORT PLACEMENT  06/22/2018 Left [facility] infusaport placement    UPPER GASTROINTESTINAL ENDOSCOPY  2018    Dr. Tracy Barreto - mild non-obstructive ring distal esophagus     Family History   Problem Relation Age of Onset    Arthritis Father     Diabetes Father     Diabetes Brother      Social History     Socioeconomic History    Marital status:      Spouse name: Demetrius Chase    Number of children: Not on file    Years of education: Not on file    Highest education level: Not on file   Occupational History    Not on file   Tobacco Use    Smoking status: Former     Packs/day: 1.00     Years: 40.00     Pack years: 40.00     Types: Cigarettes     Quit date: 8/15/1970     Years since quittin.4    Smokeless tobacco: Never   Vaping Use    Vaping Use: Never used   Substance and Sexual Activity    Alcohol use: No    Drug use: No    Sexual activity: Yes     Partners: Female   Other Topics Concern    Not on file   Social History Narrative    Not on file     Social Determinants of Health     Financial Resource Strain: Low Risk     Difficulty of Paying Living Expenses: Not hard at all   Food Insecurity: No Food Insecurity    Worried About Running Out of Food in the Last Year: Never true    [address] in the Last Year: Never true   Transportation Needs: Not on file   Physical Activity: Sufficiently Active    Days of Exercise per Week: 7 days    Minutes of Exercise per Session: 30 min   Stress: Not on file   Social Connections: Not on file   Intimate Partner Violence: Not on file   Housing Stability: Not on file     Current Facility-Administered Medications   Medication Dose Route Frequency Provider Last Rate Last Admin    dilTIAZem (CARDIZEM) 125 mg in dextrose 5% 125 mL infusion (premix)  2.5-15 mg/hr IntraVENous Continuous Lizy Trotter MD 5 mL/hr at 22 1537 5 mg/hr at 22 1537     Current Outpatient Medications   Medication Sig Dispense Refill    hydrALAZINE (APRESOLINE) 50 MG tablet TAKE ONE TABLET BY MOUTH EVERY MORNING AND TAKE ONE TABLET BY MOUTH DAILY AT BEDTIME 60 tablet 3    blood glucose test strips (FREESTYLE LITE) strip USE TO TEST THREE TIMES A  strip 5    HYDROcodone-acetaminophen (NORCO) 7.5-325 MG per tablet Take 1 tablet by mouth 4 times daily as needed for Pain for up to 120 days. Intended supply: 30 days 120 tablet 0    gabapentin (NEURONTIN) 300 MG capsule TAKE ONE CAPSULE BY MOUTH EVERY MORNING AND TAKE TWO CAPSULES BY MOUTH AT BEDTIME 270 capsule 3    BASAGLAR KWIKPEN 100 UNIT/ML injection pen Inject 30 Units into the skin nightly 5 Adjustable Dose Pre-filled Pen Syringe 3    furosemide (LASIX) 40 MG tablet TAKE ONE TABLET BY MOUTH DAILY 90 tablet 1    insulin aspart (NOVOLOG FLEXPEN) 100 UNIT/ML injection pen Use sliding scale provided by endocrinology 5 pen 5    famotidine (PEPCID) 20 MG tablet TAKE ONE TABLET BY MOUTH TWICE A DAY 60 tablet 11    DULoxetine (CYMBALTA) 30 MG extended release capsule TAKE ONE CAPSULE BY MOUTH ONCE NIGHTLY 90 capsule 3    warfarin (COUMADIN) 2.5 MG tablet TAKE ONE AND ONE-HALF TABLETS BY MOUTH EVERY THURSDAY, THEN TAKE ONE TABLET BY MOUTH ON ALL OTHER DAYS 90 tablet 3    B-D UF III MINI PEN NEEDLES 31G X 5 MM MISC USE TO INJECT INSULIN FOUR TIMES A  each 2    fluocinonide (LIDEX) 0.05 % cream Apply topically 2 times daily. 60 g 2    donepezil (ARICEPT) 10 MG tablet Take 5 mg by mouth daily (with breakfast)       finasteride (PROSCAR) 5 MG tablet Take 5 mg by mouth daily      naphazoline-glycerin (CLEAR EYES REDNESS RELIEF) 0.012-0.2 % SOLN ophthalmic solution Place 1 drop into both eyes 2 times daily (Patient taking differently: Place 1 drop into both eyes 2 times daily as needed) 1 Bottle 0    tamsulosin (FLOMAX) 0.4 MG capsule Take 1 capsule by mouth daily (Patient taking differently: Take 0.4 mg by mouth 2 times daily) 90 capsule 1    glucose monitoring kit (FREESTYLE) monitoring kit 1 kit by Does not apply route daily as needed.  1 kit 0    latanoprost (XALATAN) 0.005 % ophthalmic solution Place 1 drop into both eyes nightly. Allergies   Allergen Reactions    Celebrex [Celecoxib] Other (See Comments)     hallucinations    Elavil [Amitriptyline]      Severe fatigue      Naproxen      Kidney damage    Percocet [Oxycodone-Acetaminophen] Other (See Comments)     confusion    Lyrica [Pregabalin] Palpitations     Fatigue       REVIEW OF SYSTEMS  10 systems reviewed, pertinent positives per HPI otherwise noted to be negative. PHYSICAL EXAM  /75   Pulse (!) 102   Temp 97.8 °F (36.6 °C) (Oral)   Resp 19   Ht 5' 10\" (1.778 m)   Wt 218 lb (98.9 kg)   SpO2 97%   BMI 31.28 kg/m²    GENERAL APPEARANCE: Awake and alert. Cooperative. Mild distress. HENT: Normocephalic. Atraumatic. Mucous membranes are dry. NECK: Supple. EYES: PERRL. EOM's grossly intact. HEART/CHEST: Tachy, irreg irreg rhythm. No murmurs. No chest wall tenderness. LUNGS: Respirations labored with tachypnea and conversational dyspnea, scattered rhonchi, no wheezing  ABDOMEN: No tenderness. Soft. Non-distended. No masses. No organomegaly. No guarding or rebound. Normal bowel sounds throughout. MUSCULOSKELETAL: No extremity edema. Compartments soft. No deformity. No tenderness in the extremities. All extremities neurovascularly intact. SKIN: Warm and dry. No acute rashes. NEUROLOGICAL: Alert and disorientation/more confused than normal per wife. CN's 2-12 intact. No gross facial drooping. Strength 5/5, sensation intact. 2 plus DTR's in knees bilaterally. Gait not assessed. PSYCHIATRIC: Normal mood and affect. LABS  I have personally reviewed all labs for this visit.    Results for orders placed or performed during the hospital encounter of 12/27/22   COVID-19, Rapid    Specimen: Nasopharyngeal Swab   Result Value Ref Range    SARS-CoV-2, NAAT DETECTED (A) Not Detected   Rapid influenza A/B antigens    Specimen: Nasopharyngeal   Result Value Ref Range    Rapid Influenza A Ag Negative Negative Rapid Influenza B Ag Negative Negative   Urinalysis with Reflex to Culture    Specimen: Urine, clean catch   Result Value Ref Range    Color, UA Yellow Straw/Yellow    Clarity, UA CLOUDY (A) Clear    Glucose, Ur 250 (A) Negative mg/dL    Bilirubin Urine Negative Negative    Ketones, Urine Negative Negative mg/dL    Specific Gravity, UA 1.015 1.005 - 1.030    Blood, Urine TRACE (A) Negative    pH, UA 5.0 5.0 - 8.0    Protein,  Negative mg/dL    Urobilinogen, Urine 0.2 <2.0 E.U./dL    Nitrite, Urine Negative Negative    Leukocyte Esterase, Urine Negative Negative    Microscopic Examination YES     Urine Type NotGiven     Urine Reflex to Culture Not Indicated    CBC with Auto Differential   Result Value Ref Range    WBC 12.6 (H) 4.0 - 11.0 K/uL    RBC 3.76 (L) 4.20 - 5.90 M/uL    Hemoglobin 10.6 (L) 13.5 - 17.5 g/dL    Hematocrit 32.4 (L) 40.5 - 52.5 %    MCV 86.3 80.0 - 100.0 fL    MCH 28.2 26.0 - 34.0 pg    MCHC 32.7 31.0 - 36.0 g/dL    RDW 16.7 (H) 12.4 - 15.4 %    Platelets 520 746 - 876 K/uL    MPV 8.6 5.0 - 10.5 fL    Neutrophils % 84.5 %    Lymphocytes % 4.2 %    Monocytes % 11.2 %    Eosinophils % 0.0 %    Basophils % 0.1 %    Neutrophils Absolute 10.7 (H) 1.7 - 7.7 K/uL    Lymphocytes Absolute 0.5 (L) 1.0 - 5.1 K/uL    Monocytes Absolute 1.4 (H) 0.0 - 1.3 K/uL    Eosinophils Absolute 0.0 0.0 - 0.6 K/uL    Basophils Absolute 0.0 0.0 - 0.2 K/uL   Comprehensive Metabolic Panel w/ Reflex to MG   Result Value Ref Range    Sodium 142 136 - 145 mmol/L    Potassium reflex Magnesium 3.8 3.5 - 5.1 mmol/L    Chloride 104 99 - 110 mmol/L    CO2 24 21 - 32 mmol/L    Anion Gap 14 3 - 16    Glucose 170 (H) 70 - 99 mg/dL    BUN 61 (H) 7 - 20 mg/dL    Creatinine 3.2 (H) 0.8 - 1.3 mg/dL    Est, Glom Filt Rate 18 (A) >60    Calcium 7.6 (L) 8.3 - 10.6 mg/dL    Total Protein 6.7 6.4 - 8.2 g/dL    Albumin 2.9 (L) 3.4 - 5.0 g/dL    Albumin/Globulin Ratio 0.8 (L) 1.1 - 2.2    Total Bilirubin 0.5 0.0 - 1.0 mg/dL    Alkaline Phosphatase 80 40 - 129 U/L    ALT 20 10 - 40 U/L    AST 21 15 - 37 U/L   Protime-INR   Result Value Ref Range    Protime 42.5 (H) 11.7 - 14.5 sec    INR 4.41 (H) 0.87 - 1.14   Troponin   Result Value Ref Range    Troponin 0.16 (H) <0.01 ng/mL   Procalcitonin   Result Value Ref Range    Procalcitonin 0.76 (H) 0.00 - 0.15 ng/mL   Brain Natriuretic Peptide   Result Value Ref Range    Pro-BNP 25,295 (H) 0 - 449 pg/mL   Lipase   Result Value Ref Range    Lipase 10.0 (L) 13.0 - 60.0 U/L   Blood gas, venous   Result Value Ref Range    pH, Mark 7.340 (L) 7.350 - 7.450    pCO2, Mark 44.1 40.0 - 50.0 mmHg    pO2, Mark 97.3 (H) 25.0 - 40.0 mmHg    HCO3, Venous 23.8 23.0 - 29.0 mmol/L    Base Excess, Mark -2.0 -3.0 - 3.0 mmol/L    O2 Sat, Mark 98 Not Established %    Carboxyhemoglobin 4.3 (H) 0.0 - 1.5 %    MetHgb, Mark 0.0 <1.5 %    TC02 (Calc), Mark 56 Not Established mmol/L    O2 Content, Mark 15 Not Established VOL %    O2 Therapy Unknown    Lactate, Sepsis   Result Value Ref Range    Lactic Acid, Sepsis 1.1 0.4 - 1.9 mmol/L   Microscopic Urinalysis   Result Value Ref Range    Bacteria, UA None Seen None Seen /HPF    Hyaline Casts, UA 6 0 - 8 /LPF    WBC, UA 8 (H) 0 - 5 /HPF    RBC, UA 5 (H) 0 - 4 /HPF    Epithelial Cells, UA 1 0 - 5 /HPF    Budding Yeast Present (A) None Seen   EKG 12 Lead   Result Value Ref Range    Ventricular Rate 127 BPM    Atrial Rate 125 BPM    QRS Duration 82 ms    Q-T Interval 300 ms    QTc Calculation (Bazett) 436 ms    R Axis 108 degrees    T Axis -28 degrees    Diagnosis       Atrial fibrillation with rapid ventricular response with premature ventricular or aberrantly conducted complexesRightward axisST & T wave abnormality, consider inferior ischemia or digitalis effectAbnormal ECGConfirmed by Vanessa Carbajal ([id number]) on 12/27/2022 2:34:11 PM           EKG performed in ED:  The 12 lead EKG was interpreted by me independent of a cardiologist as follows:  Rate: tachycardia with a rate of 127  Rhythm: atrial fibrillation  Axis: right  Intervals:  narrow QRS normal QTc  ST segments: no ST elevations or depressions  T waves: no abnormal inversions  Non-specific T wave changes: present  Prior EKG comparison: EKG dated 4/25/22 is significantly different due to rapid afib    RADIOLOGY  Any applicable radiology studies including x-ray, CT, MRI, and/or ultrasound, were reviewed independently by me in addition to the radiologist.  I reviewed all radiology images and reports as well from this evaluation. XR CHEST PORTABLE    Result Date: 12/27/2022  Perihilar and multifocal airspace opacities likely represents viral pattern of pneumonia. ED COURSE/MDM  Patient seen and evaluated. Old records reviewed. Labs and imaging reviewed and results discussed with patient. After initial evaluation, differential diagnostic considerations included but not limited to: acute coronary syndrome, pulmonary embolism, COPD/asthma, pneumonia, sepsis, pericardial tamponade, pneumothorax, CHF, thoracic aortic dissection, anxiety    The patient's ED workup was notable for concern for COVID-19 diagnosis with progressive worsening symptomatology. Despite a minimally elevated procalcitonin and leukocytosis, the patient actually has a normal lactate and I do not suspect bacterial infection at this point. X-ray also appears more consistent with a viral pattern. He has a borderline oxygen saturations in the low 90s and as such was placed on nasal cannula oxygen considering his work of breathing. He also has new atrial fibrillation and is tachycardic so started on diltiazem bolus and drip given that he is afebrile. At this time based on his overall presentation I suspect viral etiology and not bacterial and as such we will hold off on antibiotics and after conversation with the hospitalist she is in agreement. Urinalysis is not consistent with infection. Rapid influenza test is negative.   Creatinine baseline is 2.9 and today is 3.2 so about similar compared to previous values. The patient was also given a dose of Cardizem and will be diuresed with Lasix. Patient is already anticoagulated lowering concern for PE. Patient was given IV diltiazem and heart rate has improved. During every aspect of this patient encounter, full droplet plus PPE precautions were used by myself. Is this patient to be included in the SEP-1 Core Measure? No   Exclusion criteria - the patient is NOT to be included for SEP-1 Core Measure due to:  Viral etiology found or highly suspected (including COVID-19) without concomitant bacterial infection      Consults:  None    History obtained from: Patient and Significant other    Pertinent social determinants of health: Mental disability (acquired or congenital)    Chronic conditions potentially affecting care:  history of blood blots on anticoagulation,dementia, recent COVID diagnosis, COPD    Review of other records:  Inpatient notes from previous visit at this facility from earlier this month    Medications administered and reassessment:  See MDM for details of medications given and reassessment      During the patient's ED course, the patient was given:  Medications   dilTIAZem injection 10 mg (10 mg IntraVENous Given 12/27/22 1535)     Followed by   dilTIAZem (CARDIZEM) 125 mg in dextrose 5% 125 mL infusion (premix) (5 mg/hr IntraVENous New Bag 12/27/22 1537)   acetaminophen (TYLENOL) tablet 1,000 mg (1,000 mg Oral Given 12/27/22 1302)   dexamethasone (PF) (DECADRON) injection 6 mg (6 mg IntraVENous Given 12/27/22 1534)   furosemide (LASIX) injection 40 mg (40 mg IntraVENous Given 12/27/22 1533)        CLINICAL IMPRESSION  1. Pneumonia due to COVID-19 virus    2. New onset atrial fibrillation ([address]    3. Anticoagulated    4. Chronic kidney disease, unspecified CKD stage        Blood pressure 122/75, pulse (!) 102, temperature 97.8 °F (36.6 °C), temperature source Oral, resp.  rate 19, height 5' 10\" (1.778 m), weight 218 lb (98.9 kg), SpO2 97 %. Tash Maher was admitted in fair condition. The plan is to admit to the hospital at this time under the hospitalist service. Hospitalist accepted the patient and will take over the patient's care. Critical care time:  The total critical care time I independently spent while evaluating and treating this patient was 40 minutes. This excludes time spent doing separately billable procedures. This includes time at the bedside, data interpretation, medication management, obtaining critical history from collateral sources if the patient is unable to provide it directly, and physician consultation. Specifics of interventions taken and potentially life-threatening diagnostic considerations are listed above in the medical decision making. If this was a shared visit with an ALEXANDER, the time in this attestation is non-concurrent critical care time out of the total shared critical care time provided by the ALEXANDER and myself. DISCLAIMER: This chart was created using Dragon dictation software. Efforts were made by me to ensure accuracy, however some errors may be present due to limitations of this technology and occasionally words are not transcribed correctly.        Erick Pineda MD  12/27/22 [id number]

## 2022-12-27 NOTE — CONSULTS
Clinical Pharmacy Note: Pharmacy to Dose Warfarin    Pharmacy consulted by Dr. Saloni Oates to dose warfarin. Mariann Whitman is a 82 y.o. male  is receiving warfarin for indication: afib. INR Goal Range: 2.0-3.0  Prior to admission warfarin dosing regimen: 2.5 mg daily per patient. INR today:   Lab Results   Component Value Date/Time    INR 4.41 12/27/2022 12:36 PM       Assessment/Plan:  INR is supratherapeutic on prior to admission dosing regimen. Possible concomitant drug-drug interactions include: n/a   Based on today's assessment, hold dose. Daily INR is ordered. Pharmacy will continue to monitor and make adjustments to regimen as necessary.      Thank you for the consult,     Kaila Pappas, [facility]

## 2022-12-27 NOTE — ED NOTES
Report given to Jina Jerez RN at this time, all questions answered during handoff at bedside, VSS for the floor at this time.      Ravindra Guzman RN  12/27/22 [id number]

## 2022-12-28 NOTE — PROGRESS NOTES
[facility] [address]   Phone: [phone]    Occupational Therapy    [x] Initial Evaluation            [] Daily Treatment Note         [] Discharge Summary      Patient: Raquel Alonzo   : 1935   MRN: [id number]   Date of Service:  2022    Admitting Diagnosis:  A-fib [facility])  Current Admission Summary: 82 y.o. 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 Was tested positive for COVID a week ago. Did not require hospitalization. No h/o Afib . Denies chest pain . Does not wear O2 at baseline  He was found to be in Afib with RVR HR ranging 102- 124. Chest xray showed viral pneumonia, He has been started on Cardizem gtt, AC with coumadin INR is therapeutic  Past Medical History:  has a past medical history of Abdominal pain, epigastric, Arthritis, Benign prostatic hypertrophy without urinary obstruction, BPH, Cellulitis and abscess, Chronic rhinitis, Chronic systolic congestive heart failure ([address] COPD (chronic obstructive pulmonary disease) ([address] Diabetes mellitus ([address] Dysphagia, Erectile dysfunction, GERD (gastroesophageal reflux disease), Hx of blood clots, Hyperglycemia, Hypertension, Late onset Alzheimer's disease without behavioral disturbance ([address] lumbar radiculopathy, Neuropathy, Nocturia, Osteoarthritis, Other disorders of kidney and ureter in diseases classified elsewhere, Pain, joint, knee, Palpitations, skin cancer, SOB (shortness of breath), Tennis elbow, and Tinea pedis. Past Surgical History:  has a past surgical history that includes Knee Prosthesis Removal; Cholecystectomy; Colonoscopy (2011); Cataract removal (Bilateral, 2014); Parotidectomy (Right, 2017); skin biopsy; Upper gastrointestinal endoscopy (2018); Total knee arthroplasty; hip surgery (Right, 2013); other surgical history; Lung biopsy (Left, 2018);  Tunneled venous port placement (06/22/2018); pr njx aa&/strd tfrml epi lumbar/sacral 1 level (Right, 11/21/2018); eye surgery; Mohs surgery (Bilateral); Skin cancer excision (Left, 08/14/2019); lumbar nerve block (N/A, 08/26/2019); Circumcision (N/A, 11/15/2019); joint replacement (Bilateral); Nerve Surgery (N/A, 12/20/2019); Cystoscopy (N/A, 01/05/2020); Pain management procedure (N/A, 07/20/2020); and Skin cancer excision. Discharge Recommendations: Rosanne Deluca scored a 14/24 on the AM-PAC ADL Inpatient form. Current research shows that an AM-PAC score of 17 or less is typically not associated with a discharge to the patient's home setting. Based on the patient's AM-PAC score and their current ADL deficits, it is recommended that the patient have 3-5 sessions per week of Occupational Therapy at d/c to increase the patient's independence. Please see assessment section for further patient specific details. If patient discharges prior to next session this note will serve as a discharge summary. Please see below for the latest assessment towards goals. DME Required For Discharge: DME to be determined at next level of care    Precautions/Restrictions: high fall risk, up as tolerated, droplet +  Weight Bearing Restrictions: no restrictions  [] Right Upper Extremity  [] Left Upper Extremity [] Right Lower Extremity  [] Left Lower Extremity     Required Braces/Orthotics: no braces required   [] Right  [] Left  Positional Restrictions:no positional restrictions    Pre-Admission Information   Lives With: spouse    Type of Home: house  Home Layout: one level, laundry in basement, patient does not use  Home Access:  2 step to enter with handrail. Handrails are located on R side.   Bathroom Layout: walk in shower  Bathroom Equipment: shower chair  Toilet Height: elevated height  Home Equipment: rollator - 4 wheeled walker, lift chair  Transfer Assistance: modified independent with use of lift chair, elevated toilet (spouse there to supervise)  Ambulation Assistance:modified independent with use of 4WW  ADL Assistance: independent with all ADL's, Spouse assists as needed (patient bathes, spouse helps intermittently with dressing)  IADL Assistance: requires assistance with all homemaking tasks  Active :        [] Yes  [x] No  Hand Dominance: [] Left  [] Right  Current Employment: retired.   Occupation: [employer]  Hobbies:   Recent Falls: 3 falls in the last 3 weeks (spouse reports falls every 2-3 months, falls posteriorly, able to get up with difficulty)        Examination   Vision:   Vision Gross Assessment: Impaired and Vision Corrective Device: wears glasses for reading  Hearing:   hard of hearing, left hearing aid, right hearing aid  Perception:   Initiation: cues to initiate tasks  Motor Planning: hand over hand to sequence tasks  Observation:   General Observation:  posterior lean in stance and during sit>stand transitions, B hand tremor (spouse reports worse with illness)    Sensation:   denies numbness and tingling (spouse reports neuropathy, patient did not verbalize concerns)  Proprioception:    Unable to formally test secondary to cognition  Tone:   Normotonic  Coordination Testing:   Coordination and Movement Description: (R) UE, (L) UE, gross motor impairments, tremors, resting tremors, decreased speed, decreased accuracy    ROM:   (B) UE AROM WFL  Strength:   (B) UE strength grossly WFL    Therapist Clinical Decision Making (Complexity): medium complexity  Clinical Presentation: unstable      Subjective  General: Patient supine in bed, spouse at bedside (who provides information) and agreeable to evaluation  Pain: 0/10  Pain Interventions: not applicable        Activities of Daily Living  Basic Activities of Daily Living  Grooming: setup assistance  Upper Extremity Bathing: minimal assistance  Lower Extremity Bathing: maximum assistance   Upper Extremity Dressing: minimal assistance  Lower Extremity Dressing: dependent  General Comments: Spouse reports assisting patient with urinal in hospital. Patient completed ADLs at bedside  Instrumental Activities of Daily Living  No IADL completed on this date. Functional Mobility  Bed Mobility  Supine to Sit: moderate assistance  Scooting: moderate assistance  Comments:  Transfers  Sit to stand transfer:2 person assistance with modA of 2   Stand to sit transfer: 2 person assistance with modA of 2   Bed / Chair transfer: 2 person assistance with Tila of 2 . Bed / Chair equipment: rolling walker  Bed / Chair comments: Patient with posterior lean during transitional movement, and upon initial stance  Comments:  Functional Mobility:  Standing Balance: 2 person assistance with Tila of 2 . Standing Balance Comment: at RW (from EOB + from chair)  No functional mobility completed on this date secondary to patient unable to tolerate functional mobility.     Other Therapeutic Interventions    Functional Outcomes  -PAC Inpatient Daily Activity Raw Score: 14    Cognition  Overall Cognitive Status: Impaired  Following Commands: follows one step commands with repetition, follows one step commands with increased time  Memory: decreased recall of recent events, decreased short term memory  Safety Judgement: decreased awareness of need for assistance, decreased awareness of need for safety  Problem Solving: assistance required to generate solutions, assistance required to implement solutions, decreased awareness of errors, assistance required to identify errors made, assistance required to correct errors made  Insights: decreased awareness of deficits  Initiation: requires cues for all  Sequencing: requires cues for all  Orientation:    oriented to person and oriented to place  Command Following:   impaired     Education  Barriers To Learning: cognition and physical  Patient Education: patient educated on goals, OT role and benefits, plan of care, family education, discharge recommendations  Learning Assessment:  patient verbalizes understanding, would benefit from continued reinforcement, patient is not an independent learner    Assessment  Activity Tolerance: Patient tolerated fair (limited by cognition, general weakness)  Impairments Requiring Therapeutic Intervention: decreased functional mobility, decreased ADL status, decreased strength, decreased safety awareness, decreased cognition, decreased endurance, decreased balance, decreased coordination, decreased posture  Prognosis: good and fair  Clinical Assessment: Patient presents with the above deficits, which are below baseline, and benefit from continued skilled OT to address.  Patient and spouse with COVID (progressive decline over \"a few weaks\") Spouse reports falls \"every few months\"  Safety Interventions: patient left in chair, chair alarm in place, call light within reach, patient at risk for falls, nurse notified, and family/caregiver present    Plan  Frequency: 3-5 x/per week  Current Treatment Recommendations: strengthening, balance training, functional mobility training, transfer training, endurance training, patient/caregiver education, ADL/self-care training, and safety education    Goals  Patient Goals: Not stated     Short Term Goals:  Time Frame: Upon discharge  Patient will complete upper body ADL at set up assistance   Patient will complete lower body ADL at moderate assistance   Patient will complete toileting at moderate assistance   Patient will complete grooming at set up assistance   Patient will complete functional transfers at minimal assistance   Patient will complete functional mobility at minimal assistance   Patient will complete bed mobility at minimal assistance     Therapy Session Time     Individual Group Co-treatment   Time In    1041   Time Out    1136   Minutes    55        Timed Code Treatment Minutes:   40  Total Treatment Minutes:  55       Electronically Signed By: ANURAG Tripathi/BIANCA [id number]

## 2022-12-28 NOTE — PROGRESS NOTES
Pharmacy to Dose Warfarin    Pharmacy consulted to dose warfarin for hx DVT/PE and new onset Afib. INR Goal: 2-3    INR today: 4.65    Assessment/Plan:  - continue to hold warfarin until INR starts to down trend  - Possible concomitant drug-drug interactions include: n/a     Pharmacy will continue to follow.     Phil PachecoD, [facility]  [id number]  12/28/2022 12:20 PM

## 2022-12-28 NOTE — CONSULTS
[facility]   Electrophysiology Consultation   Date: 12/28/2022  Reason for Consultation: Atrial fibrillation with RVR   Consult Requesting Physician: Ankit Dennis MD   Chief Complaint   Patient presents with    Shortness of Breath     Covid + x 1 week. Shortness of breath x 1 day. Weakness x 1 week since being covid +. Pt having trouble walking x 1 week. CC: Shortness of breath    HPI: Donny Casanova is a 82 y.o. male who has presented with increasing SOB, generalized weakness, and found to have viral pneumonia and has been admitted for COVID infection. Patient was found to be in atrial fibrillation and cardiology has been consulted. Patient reports no chest pain, but has SOB. Reports no palpitation. No prior history of atrial fibrillation. He has been treated with cardizem IV for atrial fibrillation and RVR. Today he is more alert. Denies having chest pain, palpitation. Not aware of his irregular heart rhythm. He has history of HTN, COPD, DM, lung cancer treated with radiation and chemotherapy, CKD, history of PE on coumadin. He has history of smoking. Assessment:   Atrial fibrillation, new diagnosis  HTN  DM  COPD  History of lung cancer  History of smoking   History of PE on anticoagulation     Plan:   Afib appears asymptomatic. No palpitation. Duration of Afib unknown. Rate controlled on IV cardizem. Change to PO Cardizem. Wean off cardizem IV. On coumadin. Goal INR: 2-3   High [id number] score and high risk. Needs anticoagulation. He is taking coumadin. Discussed treatment options with his wife and patient. Cardioversion can be considered after COVID infection and pneumonia resolved. Troponin is elevated. No chest pain. Likely secondary to pneumonia and COVID. Could be related to myocarditis. Echo is pending. Added statin   Added ASA   Troponin re-check  Needs coronary evaluation after COVID infection resolved. General cardiology / Daisy Rendon to follow up. No further EP recommendation. Will arrange for follow up in office and potential cardioversion if remains in atrial fibrillation. Discussed with nursing staff. Active Hospital Problems    Diagnosis Date Noted    A-fib [facility]) [I48.91] 2022     Priority: Medium       Diagnostic studies:   ECG: Afib with RVR, NSTT changes     Echo: 2021:    Normal left ventricle size and low normal systolic function with an   estimated ejection fraction of 50%. No regional wall motion abnormalities   are seen. There is concentric left ventricular hypertrophy. E/e\"= 8. Diastolic filling parameters suggests grade I diastolic   dysfunction. Mild mitral regurgitation. The left atrium is mildly dilated. Mild aortic regurgitation. Sinus of valsalva is mildly dilated 4.1cm. Mild tricuspid regurgitation. RVSP 29mmHg. Trivial pulmonic regurgitation   IVC not well visualized. CXR: multifocal pneumonia     Cr: 3.6   Pro-calcitonin: 0.76   Pro-BNP: 25,295   Troponin: 0.16   Hgb: 9.9     I independently reviewed the cardiac diagnostic studies, ECG and relevant imaging studies.        Lab Results   Component Value Date    LVEF 50 2021    LVEFMODE Echo 2018     Lab Results   Component Value Date    TSH 2.10 2022       Physical Examination:  Vitals:    22 0815   BP: 130/62   Pulse: 87   Resp: 20   Temp: 97.7 °F (36.5 °C)   SpO2: 95%      In: 240 [P.O.:240]  Out: -    Wt Readings from Last 3 Encounters:   22 215 lb 8 oz (97.8 kg)   22 218 lb (98.9 kg)   22 215 lb 14.4 oz (97.9 kg)     Temp  Av.7 °F (36.5 °C)  Min: 97.2 °F (36.2 °C)  Max: 98.3 °F (36.8 °C)  Pulse  Av.1  Min: 80  Max: 124  BP  Min: 122/75  Max: 180/101  SpO2  Av.3 %  Min: 90 %  Max: 98 %    Intake/Output Summary (Last 24 hours) at 2022 6018  Last data filed at 2022 0035  Gross per 24 hour   Intake 240 ml   Output --   Net 240 ml         I independently reviewed all cardiac tracing from cardiac telemetry. Constitutional: Oriented. No distress. Head: Normocephalic and atraumatic. Mouth/Throat: Oropharynx is clear and moist.   Eyes: Conjunctivae normal. EOM are normal.   Neck: Neck supple. No JVD present. Cardiovascular: Normal rate, Irregular rhythm, S1&S2. Pulmonary/Chest: Coarse respiratory sounds. Abdominal: Soft. No tenderness. Musculoskeletal: No tenderness. Trace edema    Lymphadenopathy: Has no cervical adenopathy. Neurological: Alert and oriented. Follows command, No Gross deficit   Skin: Skin is warm, No rash noted. Psychiatric: Has a normal behavior       Scheduled Meds:   furosemide  40 mg IntraVENous Daily    dexamethasone  4 mg IntraVENous Q12H    donepezil  5 mg Oral Daily with breakfast    finasteride  5 mg Oral Daily    gabapentin  600 mg Oral Nightly    latanoprost  1 drop Both Eyes Nightly    dilTIAZem  120 mg Oral Daily    sodium chloride flush  5-40 mL IntraVENous 2 times per day    insulin lispro  0-8 Units SubCUTAneous TID WC    insulin lispro  0-4 Units SubCUTAneous Nightly    warfarin placeholder: dosing by pharmacy   Other RX Placeholder    gabapentin  300 mg Oral Daily    DULoxetine  30 mg Oral Nightly    tamsulosin  0.4 mg Oral BID    famotidine  10 mg Oral Daily    insulin glargine  30 Units SubCUTAneous Nightly    hydrALAZINE  50 mg Oral BID     Continuous Infusions:   dilTIAZem 5 mg/hr (12/27/22 1537)    dextrose      sodium chloride       PRN Meds:. HYDROcodone-acetaminophen, dextrose bolus **OR** dextrose bolus, glucagon (rDNA), dextrose, sodium chloride flush, sodium chloride, ondansetron **OR** ondansetron, polyethylene glycol, acetaminophen **OR** acetaminophen     Review of System:  [x] Full ROS obtained and negative except as mentioned in HPI    Prior to Admission medications    Medication Sig Start Date End Date Taking?  Authorizing Provider   hydrALAZINE (APRESOLINE) 50 MG tablet TAKE ONE TABLET BY MOUTH EVERY MORNING AND TAKE ONE TABLET BY MOUTH DAILY AT BEDTIME 57/39/29   [facility], APRN - CNP   blood glucose test strips (FREESTYLE LITE) strip USE TO TEST THREE TIMES A DAY 11/15/22   Ana Cristina Osorio, APRN - CNP   HYDROcodone-acetaminophen (NORCO) 7.5-325 MG per tablet Take 1 tablet by mouth 4 times daily as needed for Pain for up to 120 days. Intended supply: 30 days 10/31/22 2/28/23  Ana Cristina Osorio, APRN - CNP   gabapentin (NEURONTIN) 300 MG capsule TAKE ONE CAPSULE BY MOUTH EVERY MORNING AND TAKE TWO CAPSULES BY MOUTH AT BEDTIME 10/10/22 10/10/23  Ana Cristina Most, APRN - CNP   BASAGLAR KWIKPEN 100 UNIT/ML injection pen Inject 30 Units into the skin nightly 9/1/22   Ana Cristina Most, APRN - CNP   furosemide (LASIX) 40 MG tablet TAKE ONE TABLET BY MOUTH DAILY 8/31/22   Christiana Stapleton MD   insulin aspart (NOVOLOG FLEXPEN) 100 UNIT/ML injection pen Use sliding scale provided by endocrinology 6/3/22   Ana Cristina Most, APRN - CNP   famotidine (PEPCID) 20 MG tablet TAKE ONE TABLET BY MOUTH TWICE A DAY 4/4/22   Ana Cristina Most, APRN - CNP   DULoxetine (CYMBALTA) 30 MG extended release capsule TAKE ONE CAPSULE BY MOUTH ONCE NIGHTLY 2/23/22   Ana Cristina Most, APRN - CNP   warfarin (COUMADIN) 2.5 MG tablet TAKE ONE AND ONE-HALF TABLETS BY MOUTH EVERY THURSDAY, THEN TAKE ONE TABLET BY MOUTH ON ALL OTHER DAYS 1/3/22   Ana Cristina , APRN - CNP   B-D UF III MINI PEN NEEDLES 31G X 5 MM MISC USE TO INJECT INSULIN FOUR TIMES A DAY 11/30/21   Jose Xiong MD   fluocinonide (LIDEX) 0.05 % cream Apply topically 2 times daily.  9/28/20   Ana Cristina Osorio, APRN - CNP   donepezil (ARICEPT) 10 MG tablet Take 5 mg by mouth daily (with breakfast)  7/27/20   Historical Provider, MD   finasteride (PROSCAR) 5 MG tablet Take 5 mg by mouth daily    Historical Provider, MD   naphazoline-glycerin (CLEAR EYES REDNESS RELIEF) 0.012-0.2 % SOLN ophthalmic solution Place 1 drop into both eyes 2 times daily  Patient taking differently: Place 1 drop into both eyes 2 times daily as needed 4/7/18   Sebastien Hudson MD   tamsulosin [facility]) 0.4 MG capsule Take 1 capsule by mouth daily  Patient taking differently: Take 0.4 mg by mouth 2 times daily 8/3/17   SHA Leung - CNP   glucose monitoring kit (FREESTYLE) monitoring kit 1 kit by Does not apply route daily as needed. 4/7/14   Chasidy Parmar MD   latanoprost (XALATAN) 0.005 % ophthalmic solution Place 1 drop into both eyes nightly.  10/27/13   Historical Provider, MD       Past Medical History:   Diagnosis Date    Abdominal pain, epigastric     Arthritis     Benign prostatic hypertrophy without urinary obstruction     BPH     Cellulitis and abscess     Chronic rhinitis     Chronic systolic congestive heart failure ([address] 4/18/2019    COPD (chronic obstructive pulmonary disease) (HCC)     Diabetes mellitus (HCC)     Dysphagia     Erectile dysfunction     GERD (gastroesophageal reflux disease)     Hx of blood clots     Hyperglycemia     Hypertension     Late onset Alzheimer's disease without behavioral disturbance [address] 7/23/2020    lumbar radiculopathy     Neuropathy     Nocturia     Osteoarthritis     Other disorders of kidney and ureter in diseases classified elsewhere     Pain, joint, knee     Palpitations     skin cancer     Rt ear, nose, neck, hands/ 2018 lung    SOB (shortness of breath)     Tennis elbow     Tinea pedis     foot        Past Surgical History:   Procedure Laterality Date    CATARACT REMOVAL Bilateral 09/2014    CHOLECYSTECTOMY      CIRCUMCISION N/A 11/15/2019    DORSAL SLIT performed by Sameer Sinclair MD at [address]    COLONOSCOPY  11/28/2011    Dr. Tavares Garay - mild sigmoid diverticulosis    CYSTOSCOPY N/A 01/05/2020    CYSTOSCOPY, EVACUATION OF CLOTS performed by Sagar Ramirez MD at [address] Right 09/09/2013    Dr. Scott Carlos - block for pain relief    JOINT REPLACEMENT Bilateral     knees    KNEE PROSTHESIS REMOVAL      LUMBAR NERVE BLOCK N/A 08/26/2019 L4/5 INTERLAMINAR EPIDURAL STEROID INJECTION WITH FLUOROSCOPY performed by Baljit Sinclair MD at [facility] Left 05/09/2018    Dr. Vyas - CT guided    MOHS SURGERY Bilateral     NERVE SURGERY N/A 12/20/2019    L4L5 INTERLAMINAR EPIDURAL STEROID INJECTION WITH FLUOROSCOPY performed by Baljit Sinclair MD at [address] [facility]      multiple lumbar ESIs    PAIN MANAGEMENT PROCEDURE N/A 07/20/2020    L4-L5 INTERLAMINAR EPIDURAL STEROID INJECTION WITH FLUOROSCOPY performed by Baljit Sinclair MD at [id number] [place] [address] Right 01/26/2017    Dr. Lizbeth Duke - superficial, w/excision of parotid tail & dissection/preservation of facial nerve    HI NJX AA&/STRD TFRML EPI LUMBAR/SACRAL 1 LEVEL Right 11/21/2018    RIGHT L4, L5 LUMBAR TRANSFORAMINAL EPIDURAL STEROID INJECTION WITH FLUOROSCOPY performed by Baljit Sinclair MD at [facility] Left 08/14/2019    SKIN CANCER EXCISION      TOTAL KNEE ARTHROPLASTY      right x1 and left x2    TUNNELED VENOUS PORT PLACEMENT  06/22/2018    Left SCV infusaport placement    UPPER GASTROINTESTINAL ENDOSCOPY  03/19/2018    Dr. Gabi Mason - mild non-obstructive ring distal esophagus       Allergies   Allergen Reactions    Celebrex [Celecoxib] Other (See Comments)     hallucinations    Elavil [Amitriptyline]      Severe fatigue      Naproxen      Kidney damage    Percocet [Oxycodone-Acetaminophen] Other (See Comments)     confusion    Lyrica [Pregabalin] Palpitations     Fatigue       Social History:  Reviewed. reports that he quit smoking about 52 years ago. His smoking use included cigarettes. He has a 40.00 pack-year smoking history. He has never used smokeless tobacco. He reports that he does not drink alcohol and does not use drugs. Family History:  Reviewed. Reviewed. No family history of SCD. Relevant and available labs, and cardiovascular diagnostics reviewed. Reviewed.    Recent Labs     12/27/22  1236 12/28/22  0619    140   K 3.8 3.8    102   CO2 24 23   BUN 61* 76*   CREATININE 3.2* 3.6*     Recent Labs     12/27/22  1236 12/28/22  0619   WBC 12.6* 10.4   HGB 10.6* 9.9*   HCT 32.4* 30.3*   MCV 86.3 86.0    219     Estimated Creatinine Clearance: 18 mL/min (A) (based on SCr of 3.6 mg/dL (H)). No results found for: BNP    I independently reviewed all cardiac tracing from cardiac telemetry. I independently reviewed relevant and available cardiac diagnostic tests ECG, CXR, Echo, Stress test, Device interrogation, Holter, CT scan. Complex medical condition with multiple medical problems affecting prognosis and outcome of EP interventions    All questions and concerns were addressed to the patient/family. Alternatives to my treatment were discussed. I have discussed the above stated plan and the patient verbalized understanding and agreed with the plan. NOTE: This report was transcribed using voice recognition software. Every effort was made to ensure accuracy, however, inadvertent computerized transcription errors may be present.      Collette Katz MD, MPH  [facility]   Office: [phone]  Fax: [phone]

## 2022-12-28 NOTE — CONSULTS
[facility]  Consultation Note    Reason for Consult:  [place] on CKD 4  Requesting Physician:  Dr. Peterson Collier:  SOB    History obtained from records and patient. HISTORY OF PRESENT ILLNESS:                Meseret Sagastume  is 82 y.o. y.o. male with significant past medical history of CKD 4, underlying CKD progression,  baseline crea 2.9, BPH, CHF, EF-50%, COPD, DM, HTN, DVT/PE, Alzheimer's Disease who presents with SOB. Recent COVID Infection. Also having more edema. Has rapid A. Fib on presentation. Follows with us as outpatient. Crea 3.2 yesterday and now 3.6. Not needing home O2 and currently on 2L NC.  BP elevated but now better. On Lasix 40mg daily as outpatient. Past Medical History:     has a past medical history of Abdominal pain, epigastric, Arthritis, Benign prostatic hypertrophy without urinary obstruction, BPH, Cellulitis and abscess, Chronic rhinitis, Chronic systolic congestive heart failure ([address] COPD (chronic obstructive pulmonary disease) ([address] Diabetes mellitus ([address] Dysphagia, Erectile dysfunction, GERD (gastroesophageal reflux disease), Hx of blood clots, Hyperglycemia, Hypertension, Late onset Alzheimer's disease without behavioral disturbance ([address] lumbar radiculopathy, Neuropathy, Nocturia, Osteoarthritis, Other disorders of kidney and ureter in diseases classified elsewhere, Pain, joint, knee, Palpitations, skin cancer, SOB (shortness of breath), Tennis elbow, and Tinea pedis. Past Surgical History:     has a past surgical history that includes Knee Prosthesis Removal; Cholecystectomy; Colonoscopy (11/28/2011); Cataract removal (Bilateral, 09/2014); Parotidectomy (Right, 01/26/2017); skin biopsy; Upper gastrointestinal endoscopy (03/19/2018); Total knee arthroplasty; hip surgery (Right, 09/09/2013); other surgical history; Lung biopsy (Left, 05/09/2018);  Tunneled venous port placement (06/22/2018); pr njx aa&/strd tfrml epi lumbar/sacral 1 level (Right, 11/21/2018); eye surgery; Mohs surgery (Bilateral); Skin cancer excision (Left, 08/14/2019); lumbar nerve block (N/A, 08/26/2019); Circumcision (N/A, 11/15/2019); joint replacement (Bilateral); Nerve Surgery (N/A, 12/20/2019); Cystoscopy (N/A, 01/05/2020); Pain management procedure (N/A, 07/20/2020); and Skin cancer excision.    Current Medications:    Current Facility-Administered Medications: furosemide (LASIX) injection 40 mg, 40 mg, IntraVENous, Daily  dexamethasone (DECADRON) injection 4 mg, 4 mg, IntraVENous, Q12H  aspirin EC tablet 81 mg, 81 mg, Oral, Daily  atorvastatin (LIPITOR) tablet 10 mg, 10 mg, Oral, Nightly  [COMPLETED] dilTIAZem injection 10 mg, 10 mg, IntraVENous, Once **FOLLOWED BY** dilTIAZem (CARDIZEM) 125 mg in dextrose 5% 125 mL infusion (premix), 2.5-15 mg/hr, IntraVENous, Continuous  donepezil (ARICEPT) tablet 5 mg, 5 mg, Oral, Daily with breakfast  finasteride (PROSCAR) tablet 5 mg, 5 mg, Oral, Daily  gabapentin (NEURONTIN) capsule 600 mg, 600 mg, Oral, Nightly  HYDROcodone-acetaminophen (NORCO) 7.5-325 MG per tablet 1 tablet, 1 tablet, Oral, 4x Daily PRN  latanoprost (XALATAN) 0.005 % ophthalmic solution 1 drop, 1 drop, Both Eyes, Nightly  dilTIAZem (CARDIZEM CD) extended release capsule 120 mg, 120 mg, Oral, Daily  dextrose bolus 10% 125 mL, 125 mL, IntraVENous, PRN **OR** dextrose bolus 10% 250 mL, 250 mL, IntraVENous, PRN  glucagon (rDNA) injection 1 mg, 1 mg, SubCUTAneous, PRN  dextrose 10 % infusion, , IntraVENous, Continuous PRN  sodium chloride flush 0.9 % injection 5-40 mL, 5-40 mL, IntraVENous, 2 times per day  sodium chloride flush 0.9 % injection 5-40 mL, 5-40 mL, IntraVENous, PRN  0.9 % sodium chloride infusion, , IntraVENous, PRN  ondansetron (ZOFRAN-ODT) disintegrating tablet 4 mg, 4 mg, Oral, Q8H PRN **OR** ondansetron (ZOFRAN) injection 4 mg, 4 mg, IntraVENous, Q6H PRN  polyethylene glycol (GLYCOLAX) packet 17 g, 17 g, Oral, Daily PRN  acetaminophen (TYLENOL) tablet 650 mg, 650 mg, Oral, Q6H PRN **OR** acetaminophen (TYLENOL) suppository 650 mg, 650 mg, Rectal, Q6H PRN  insulin lispro (HUMALOG) injection vial 0-8 Units, 0-8 Units, SubCUTAneous, TID WC  insulin lispro (HUMALOG) injection vial 0-4 Units, 0-4 Units, SubCUTAneous, Nightly  warfarin placeholder: dosing by pharmacy, , Other, RX Placeholder  gabapentin (NEURONTIN) capsule 300 mg, 300 mg, Oral, Daily  DULoxetine (CYMBALTA) extended release capsule 30 mg, 30 mg, Oral, Nightly  tamsulosin (FLOMAX) capsule 0.4 mg, 0.4 mg, Oral, BID  famotidine (PEPCID) tablet 10 mg, 10 mg, Oral, Daily  insulin glargine (LANTUS) injection vial 30 Units, 30 Units, SubCUTAneous, Nightly  hydrALAZINE (APRESOLINE) tablet 50 mg, 50 mg, Oral, BID  Allergies:    Celebrex [celecoxib], Elavil [amitriptyline], Naproxen, Percocet [oxycodone-acetaminophen], and Lyrica [pregabalin]   Social History:     reports that he quit smoking about 52 years ago. His smoking use included cigarettes. He has a 40.00 pack-year smoking history. He has never used smokeless tobacco. He reports that he does not drink alcohol and does not use drugs. Family History:   family history includes Arthritis in his father; Diabetes in his brother and father.      REVIEW OF SYSTEMS:    System review was done , pertinent positives are mentioned in the HPI    Positive fatigue  No chest pain nor palpitations  Has SOB and coughing but no hemoptysis  No abdominal pain, nausea, vomiting nor diarrhea  Has chills  No leg swelling  No change in urine output nor gross hematuria    PHYSICAL EXAM:    Vitals:  /62   Pulse 87   Temp 97.7 °F (36.5 °C) (Oral)   Resp 20   Ht 5' 10\" (1.778 m)   Wt 215 lb 8 oz (97.8 kg)   SpO2 95%   BMI 30.92 kg/m²   CONSTITUTIONAL:  awake, alert, cooperative, no apparent distress, and appears stated age  EYES:  Lids and lashes normal, pupils equal, round   NECK:  Supple, symmetrical, trachea midline, no adenopathy, thyroid symmetric, not enlarged and no tenderness, skin normal  HEMATOLOGIC/LYMPHATICS:  no cervical lymphadenopathy  LUNGS:  rhonchi b/l  CARDIOVASCULAR:  Normal apical impulse, regular rate and rhythm, normal S1 and S2  ABDOMEN:  No scars, normal bowel sounds, soft, non-distended, non-tender, no masses palpated, no hepatosplenomegaly  MUSCULOSKELETAL:  There is no redness, warmth, or swelling of the joints. No edema  NEUROLOGIC:  Awake, alert, oriented to name, place and time. SKIN:  no bruising or bleeding    DATA:    CBC:   Lab Results   Component Value Date/Time    WBC 10.4 12/28/2022 06:19 AM    RBC 3.52 12/28/2022 06:19 AM    HGB 9.9 12/28/2022 06:19 AM    HCT 30.3 12/28/2022 06:19 AM    MCV 86.0 12/28/2022 06:19 AM    MCH 28.0 12/28/2022 06:19 AM    MCHC 32.5 12/28/2022 06:19 AM    RDW 16.3 12/28/2022 06:19 AM     12/28/2022 06:19 AM    MPV 8.0 12/28/2022 06:19 AM     BMP:   Lab Results   Component Value Date/Time     12/28/2022 06:19 AM    K 3.8 12/28/2022 06:19 AM    K 3.8 12/27/2022 12:36 PM     12/28/2022 06:19 AM    CO2 23 12/28/2022 06:19 AM    BUN 76 12/28/2022 06:19 AM    CREATININE 3.6 12/28/2022 06:19 AM    CALCIUM 7.7 12/28/2022 06:19 AM    GFRAA 25 07/15/2022 10:35 AM    GFRAA 51 05/31/2013 08:52 AM    LABGLOM 16 12/28/2022 06:19 AM    GLUCOSE 261 12/28/2022 06:19 AM   Magnesium:    Lab Results   Component Value Date/Time    MG 1.90 01/07/2020 05:59 AM   Phosphorus:    Lab Results   Component Value Date/Time    PHOS 3.7 04/25/2022 10:41 AM     Pro BNP- 25,295  Trop 0.16    Influenza A and B neg  COVID+    UA trace blood, 300 protein, 250 glucose, LE neg    Cxray  Perihilar and multifocal airspace opacities likely represents viral pattern   of pneumonia. IMPRESSION/RECOMMENDATIONS:     LUISANA on CKD 4-underlying CKD progression. Okay to give IV Lasix x 1 today. Hold off further Lasix at this time. Will give albumin x 2 doses today. Decrease Gabapentin dose.   Follow for RRT need. HTN-better. No need for aggressive control today. Electrolytes acceptable  Anemia- follow Hgb. Transfuse PRN. COVID Infection-needing NC. On Dexamethasone. H/O DM-per Medicine  H/O COPD  H/O Alzheimer's Dementia  A. Fib- EP following. Appreciate input. High risk. Thank you for the consult. We will follow this patient along the hospitalization.     Cristin Cabrera MD

## 2022-12-28 NOTE — PROGRESS NOTES
[facility]    12/28/2022 2:49 PM        Name: Eron Cobb . Admitted: 12/27/2022  Primary Care Provider: SHA Whitmore CNP (Tel: [phone])      Brief History: 81 yo male with hx chronic dCHF, COPD, DM2, HTN, DVT/PE on warfarin, dementia. He recently tested positive for COVID (12/21) after developing fever, sore throat, and cough which started 2 days earlier. He was started on molnupiravir x 5 days by PCP. Presented to ER with worsening shortness of breath and weakness. Found to be in atrial fib with RVR on presentation, HR to 120s and he was started on diltiazem drip. CXR with evidence of viral pneumonia. Subjective:  Resting in bed, son visiting. Reports mild improvement in shortness of breath and cough. Denies chest pain, palpitations, abdominal pain, nausea. Currently on 2 liters O2 with sat mid 90s, he does not use O2 at home.      Reviewed interval ancillary notes    Current Medications  dexamethasone (DECADRON) injection 4 mg, Q12H  aspirin EC tablet 81 mg, Daily  atorvastatin (LIPITOR) tablet 10 mg, Nightly  gabapentin (NEURONTIN) capsule 300 mg, Nightly  dilTIAZem (CARDIZEM) 125 mg in dextrose 5% 125 mL infusion (premix), Continuous  donepezil (ARICEPT) tablet 5 mg, Daily with breakfast  finasteride (PROSCAR) tablet 5 mg, Daily  HYDROcodone-acetaminophen (NORCO) 7.5-325 MG per tablet 1 tablet, 4x Daily PRN  latanoprost (XALATAN) 0.005 % ophthalmic solution 1 drop, Nightly  dilTIAZem (CARDIZEM CD) extended release capsule 120 mg, Daily  dextrose bolus 10% 125 mL, PRN   Or  dextrose bolus 10% 250 mL, PRN  glucagon (rDNA) injection 1 mg, PRN  dextrose 10 % infusion, Continuous PRN  sodium chloride flush 0.9 % injection 5-40 mL, 2 times per day  sodium chloride flush 0.9 % injection 5-40 mL, PRN  0.9 % sodium chloride infusion, PRN  ondansetron (ZOFRAN-ODT) disintegrating tablet 4 mg, Q8H PRN   Or  ondansetron (ZOFRAN) injection 4 mg, Q6H PRN  polyethylene glycol (GLYCOLAX) packet 17 g, Daily PRN  acetaminophen (TYLENOL) tablet 650 mg, Q6H PRN   Or  acetaminophen (TYLENOL) suppository 650 mg, Q6H PRN  insulin lispro (HUMALOG) injection vial 0-8 Units, TID WC  insulin lispro (HUMALOG) injection vial 0-4 Units, Nightly  warfarin placeholder: dosing by pharmacy, RX Placeholder  gabapentin (NEURONTIN) capsule 300 mg, Daily  DULoxetine (CYMBALTA) extended release capsule 30 mg, Nightly  tamsulosin (FLOMAX) capsule 0.4 mg, BID  famotidine (PEPCID) tablet 10 mg, Daily  insulin glargine (LANTUS) injection vial 30 Units, Nightly  hydrALAZINE (APRESOLINE) tablet 50 mg, BID        Objective:  /89   Pulse 94   Temp 99 °F (37.2 °C) (Oral)   Resp 18   Ht 5' 10\" (1.778 m)   Wt 215 lb 8 oz (97.8 kg)   SpO2 94%   BMI 30.92 kg/m²     Intake/Output Summary (Last 24 hours) at 12/28/2022 1449  Last data filed at 12/28/2022 0035  Gross per 24 hour   Intake 240 ml   Output --   Net 240 ml      Wt Readings from Last 3 Encounters:   12/28/22 215 lb 8 oz (97.8 kg)   11/30/22 218 lb (98.9 kg)   11/22/22 215 lb 14.4 oz (97.9 kg)       General appearance:  Appears comfortable  Eyes: Sclera clear. Pupils equal.  ENT: Moist oral mucosa. Cardiovascular: Irregular rhythm, normal S1, S2. No murmur. No edema in lower extremities  Respiratory: Not using accessory muscles. Good inspiratory effort. Clear to auscultation bilaterally, no wheeze or crackles. GI: Abdomen soft, no tenderness, not distended, normal bowel sounds  Musculoskeletal: No cyanosis in digits, neck supple  Neurology: CN 2-12 grossly intact. No speech or motor deficits  Psych: Normal affect.  Alert and oriented in time, place and person  Skin: Warm, dry, normal turgor    Labs and Tests:  CBC:   Recent Labs     12/27/22  1236 12/28/22  0619   WBC 12.6* 10.4   HGB 10.6* 9.9*    219     BMP:    Recent Labs     12/27/22  1236 12/28/22  0619   NA 142 140   K 3.8 3.8    102   CO2 24 23   BUN 61* 76*   CREATININE 3.2* 3.6*   GLUCOSE 170* 261*     Hepatic:   Recent Labs     12/27/22  1236   AST 21   ALT 20   BILITOT 0.5   ALKPHOS 80       CXR 12/27/2022:  Perihilar and multifocal airspace opacities likely represents viral pattern   of pneumonia. Echo 12/28/2022:   Summary   Left ventricular cavity size is dilated with mild concentric left   ventricular hypertrophy. Overall left ventricular systolic function appears normal with an ejection fraction that is visually estimated to be 55-60%. No obvious regional wall motion abnormalities are noted. Indeterminate diastolic function due to atrial fibrillation. The right ventricle is moderately enlarged with normal function. There is mild biatrial enlargement. Mild aortic regurgitation is present. Mild mitral regurgitation is present. Mild tricuspid regurgitation with an RVSP estimated to be 46 mmHg. Problem List  Principal Problem:    A-fib [facility])  Resolved Problems:    * No resolved hospital problems. *       Assessment & Plan:   New onset atrial fibrillation. HR 120s on presentation, started on diltiazem drip. Evaluated by EP and transitioned to po cardizem. Already on warfarin for hx DVT/PE, INR supratherapeutic on admission (4.65), warfarin on hold. Continue telemetry. TSH 0.87. EP has signed off, to arrange for office follow up and potential cardioversion if remains in afib at that time. COVID-19 / viral pneumonia / COPD. Positive home COVID test 12/21, treated with molnupiravir. Rapid influenza A/B negative, rapid COVID positive. CXR consistent with viral pneumonia, procalcitonin 0.76, lactic acid 1.1. Low suspicion for bacterial infection and antibiotics held on admission. Blood culture with NGTD x 2. Currently on O2 at 2 liters for increased work of breathing, O2 sats have been stable. Started on dexamethasone 4 mg BID on admission. Add Mucinexbhavin. Chronic dCHF.  CXR with multifocal airspace opacities likely representing vial pneumonia, proBNP > 25K in setting of LUISANA on CKD. Patient is orthopneic. He received IV Lasix x 1 today. Monitor for response. Echo with EF 55-60%, RVSP 46 mmHg. Elevated troponin (0.16) in setting of CHF and LUISANA/CKD. No acute changes on EKG. Denies chest pain. Has been evaluated by EP, recommends coronary evaluation after COVID infection resolved. Continue asa and statin. LUISANA on CKD stage IV. Creatinine 3.2 on presentation, baseline 2.9. Nephrology evaluated, suspect underlying CKD progression. Follow BMP. Hx DVT/PE. Takes warfarin. INR supratherapeutic on presentation and warfarin on hold. Dosing per pharmacy. HTN. BP elevated on admission, improved. Continue hydralazine. DM2. A1c 7.2. Takes Lantus and sliding scale insulin, continued on admission. Reviewed BG values, elevated 200s. Switch to high dose correction scale. Continue to follow. Disposition: PT/OT consults pending. Diet: ADULT DIET;  Regular; 4 carb choices (60 gm/meal)  Code:Full Code  DVT PPX: warfarin      SHA Amador - CNP   12/28/2022 2:49 PM

## 2022-12-28 NOTE — PLAN OF CARE
Problem: Discharge Planning  Goal: Discharge to home or other facility with appropriate resources  Outcome: Progressing     Problem: Safety - Adult  Goal: Free from fall injury  Outcome: Progressing     Problem: Confusion  Goal: Confusion, delirium, dementia, or psychosis is improved or at baseline  Description: INTERVENTIONS:  1. Assess for possible contributors to thought disturbance, including medications, impaired vision or hearing, underlying metabolic abnormalities, dehydration, psychiatric diagnoses, and notify attending LIP  2. [place] high risk fall precautions, as indicated  3. Provide frequent short contacts to provide reality reorientation, refocusing and direction  4. Decrease environmental stimuli, including noise as appropriate  5. Monitor and intervene to maintain adequate nutrition, hydration, elimination, sleep and activity  6. If unable to ensure safety without constant attention obtain sitter and review sitter guidelines with assigned personnel  7.  Initiate Psychosocial CNS and Spiritual Care consult, as indicated  Outcome: Progressing

## 2022-12-28 NOTE — H&P
HOSPITALISTS HISTORY AND PHYSICAL    12/27    Patient Information:  Kayode Major is a 82 y.o. male [id number]  PCP:  SHA Martinez CNP (Tel: [phone] )    Chief complaint:    Chief Complaint   Patient presents with    Shortness of Breath     Covid + x 1 week. Shortness of breath x 1 day. Weakness x 1 week since being covid +. Pt having trouble walking x 1 week. History of Present Illness:  Anita Quintero is a 82 y.o. 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 Was tested positive for COVID a week ago. Did not require hospitalization. No h/o Afib . Denies chest pain . Does not wear O2 at baseline  He was found to be in Afib with RVR HR ranging 102- 124  Chest xray showed viral pneumonia  He has been started on Cardizem gtt  AC with coumadin INR is therapeutic      REVIEW OF SYSTEMS:   Constitutional: Negative for fever,chills or night sweats  ENT: Negative for rhinorrhea, epistaxis, hoarseness, sore throat. Respiratory: +ve  for shortness of breath,wheezing  Cardiovascular:  for chest pain, palpitations   Gastrointestinal: Negative for nausea, vomiting, diarrhea  Genitourinary: Negative for polyuria, dysuria   Hematologic/Lymphatic: Negative for bleeding tendency, easy bruising  Musculoskeletal: Negative for myalgias and arthralgias  Neurologic: Negative for confusion,dysarthria. Skin: Negative for itching,rash  Psychiatric: Negative for depression,anxiety, agitation. Endocrine: Negative for polydipsia,polyuria,heat /cold intolerance.     Past Medical History:   has a past medical history of Abdominal pain, epigastric, Arthritis, Benign prostatic hypertrophy without urinary obstruction, BPH, Cellulitis and abscess, Chronic rhinitis, Chronic systolic congestive heart failure ([address] COPD (chronic obstructive pulmonary disease) (HCC), Diabetes mellitus (HCC), Dysphagia, Erectile dysfunction, GERD (gastroesophageal reflux disease), Hx of blood clots, Hyperglycemia, Hypertension, Late onset Alzheimer's disease without behavioral disturbance (HCC), lumbar radiculopathy, Neuropathy, Nocturia, Osteoarthritis, Other disorders of kidney and ureter in diseases classified elsewhere, Pain, joint, knee, Palpitations, skin cancer, SOB (shortness of breath), Tennis elbow, and Tinea pedis.     Past Surgical History:   has a past surgical history that includes Knee Prosthesis Removal; Cholecystectomy; Colonoscopy (11/28/2011); Cataract removal (Bilateral, 09/2014); Parotidectomy (Right, 01/26/2017); skin biopsy; Upper gastrointestinal endoscopy (03/19/2018); Total knee arthroplasty; hip surgery (Right, 09/09/2013); other surgical history; Lung biopsy (Left, 05/09/2018); Tunneled venous port placement (06/22/2018); pr njx aa&/strd tfrml epi lumbar/sacral 1 level (Right, 11/21/2018); eye surgery; Mohs surgery (Bilateral); Skin cancer excision (Left, 08/14/2019); lumbar nerve block (N/A, 08/26/2019); Circumcision (N/A, 11/15/2019); joint replacement (Bilateral); Nerve Surgery (N/A, 12/20/2019); Cystoscopy (N/A, 01/05/2020); Pain management procedure (N/A, 07/20/2020); and Skin cancer excision.     Medications:  No current facility-administered medications on file prior to encounter.     Current Outpatient Medications on File Prior to Encounter   Medication Sig Dispense Refill    hydrALAZINE (APRESOLINE) 50 MG tablet TAKE ONE TABLET BY MOUTH EVERY MORNING AND TAKE ONE TABLET BY MOUTH DAILY AT BEDTIME 60 tablet 3    blood glucose test strips (FREESTYLE LITE) strip USE TO TEST THREE TIMES A  strip 5    HYDROcodone-acetaminophen (NORCO) 7.5-325 MG per tablet Take 1 tablet by mouth 4 times daily as needed for Pain for up to 120 days. Intended supply: 30 days 120 tablet 0    gabapentin (NEURONTIN) 300 MG capsule TAKE ONE  CAPSULE BY MOUTH EVERY MORNING AND TAKE TWO CAPSULES BY MOUTH AT BEDTIME 270 capsule 3    BASAGLAR KWIKPEN 100 UNIT/ML injection pen Inject 30 Units into the skin nightly 5 Adjustable Dose Pre-filled Pen Syringe 3    furosemide (LASIX) 40 MG tablet TAKE ONE TABLET BY MOUTH DAILY 90 tablet 1    insulin aspart (NOVOLOG FLEXPEN) 100 UNIT/ML injection pen Use sliding scale provided by endocrinology 5 pen 5    famotidine (PEPCID) 20 MG tablet TAKE ONE TABLET BY MOUTH TWICE A DAY 60 tablet 11    DULoxetine (CYMBALTA) 30 MG extended release capsule TAKE ONE CAPSULE BY MOUTH ONCE NIGHTLY 90 capsule 3    warfarin (COUMADIN) 2.5 MG tablet TAKE ONE AND ONE-HALF TABLETS BY MOUTH EVERY THURSDAY, THEN TAKE ONE TABLET BY MOUTH ON ALL OTHER DAYS 90 tablet 3    B-D UF III MINI PEN NEEDLES 31G X 5 MM MISC USE TO INJECT INSULIN FOUR TIMES A  each 2    fluocinonide (LIDEX) 0.05 % cream Apply topically 2 times daily. 60 g 2    donepezil (ARICEPT) 10 MG tablet Take 5 mg by mouth daily (with breakfast)       finasteride (PROSCAR) 5 MG tablet Take 5 mg by mouth daily      naphazoline-glycerin (CLEAR EYES REDNESS RELIEF) 0.012-0.2 % SOLN ophthalmic solution Place 1 drop into both eyes 2 times daily (Patient taking differently: Place 1 drop into both eyes 2 times daily as needed) 1 Bottle 0    tamsulosin (FLOMAX) 0.4 MG capsule Take 1 capsule by mouth daily (Patient taking differently: Take 0.4 mg by mouth 2 times daily) 90 capsule 1    glucose monitoring kit (FREESTYLE) monitoring kit 1 kit by Does not apply route daily as needed. 1 kit 0    latanoprost (XALATAN) 0.005 % ophthalmic solution Place 1 drop into both eyes nightly. Allergies:   Allergies   Allergen Reactions    Celebrex [Celecoxib] Other (See Comments)     hallucinations    Elavil [Amitriptyline]      Severe fatigue      Naproxen      Kidney damage    Percocet [Oxycodone-Acetaminophen] Other (See Comments)     confusion    Lyrica [Pregabalin] Palpitations Fatigue        Social History:  Patient Lives    reports that he quit smoking about 52 years ago. His smoking use included cigarettes. He has a 40.00 pack-year smoking history. He has never used smokeless tobacco. He reports that he does not drink alcohol and does not use drugs. Family History:  family history includes Arthritis in his father; Diabetes in his brother and father. ,     Physical Exam:  BP (!) 175/74   Pulse 93   Temp 97.2 °F (36.2 °C) (Axillary)   Resp 18   Ht 5' 10\" (1.778 m)   Wt 218 lb (98.9 kg)   SpO2 94%   BMI 31.28 kg/m²     General appearance:  Appears comfortable. Well nourished  Eyes: Sclera clear, pupils equal  ENT: Moist mucus membranes, no thrush. Trachea midline. Cardiovascular: Regular rhythm, normal S1, S2. No murmur, gallop, rub. +Ve edema in lower extremities  Respiratory: Clear to auscultation bilaterally, no wheeze, good inspiratory effort  Gastrointestinal: Abdomen soft, non-tender, not distended, normal bowel sounds  Musculoskeletal: No cyanosis in digits, neck supple  Neurology: Cranial nerves grossly intact. Alert and oriented in time, place and person. No speech or motor deficits  Psychiatry: Appropriate affect.  Not agitated  Skin: Warm, dry, normal turgor, no rash  Brisk capillary refill, peripheral pulses palpable   Labs:  CBC:   Lab Results   Component Value Date/Time    WBC 12.6 12/27/2022 12:36 PM    RBC 3.76 12/27/2022 12:36 PM    HGB 10.6 12/27/2022 12:36 PM    HCT 32.4 12/27/2022 12:36 PM    MCV 86.3 12/27/2022 12:36 PM    MCH 28.2 12/27/2022 12:36 PM    MCHC 32.7 12/27/2022 12:36 PM    RDW 16.7 12/27/2022 12:36 PM     12/27/2022 12:36 PM    MPV 8.6 12/27/2022 12:36 PM     BMP:    Lab Results   Component Value Date/Time     12/27/2022 12:36 PM    K 3.8 12/27/2022 12:36 PM     12/27/2022 12:36 PM    CO2 24 12/27/2022 12:36 PM    BUN 61 12/27/2022 12:36 PM    CREATININE 3.2 12/27/2022 12:36 PM    CALCIUM 7.6 12/27/2022 12:36 PM    GFRAA 25 07/15/2022 10:35 AM    GFRAA 51 05/31/2013 08:52 AM    LABGLOM 18 12/27/2022 12:36 PM    GLUCOSE 170 12/27/2022 12:36 PM     XR CHEST PORTABLE   Final Result   Perihilar and multifocal airspace opacities likely represents viral pattern   of pneumonia. Chest Xray:   EKG:    I visualized CXR images and EKG strips    Discussed case  with     Problem List  Principal Problem:    A-fib [facility])  Resolved Problems:    * No resolved hospital problems. *        Assessment/Plan:   Afib with rvr new onset  On Cardizem gtt  Already on Coumdin   INR is supra therapeutic  TSH pending  Cardiology consulted      d CHF with exacerbation   ECHO ordered   On IV Lasix  Daily weight checks    Acute respiratory failure with hypoxia    d.t COVID pneumonia and CHF  On 2 O2 via NC   Droplet precautions  Cont On Decadron    CKD   Will consult nephrology    H/o DVT/ PE   Cont Coumadin     Dementia   Cont Aricept  DVT prophylaxis   Code status   Diet   IV access   Gold Catheter    Admit as inpatient. I anticipate hospitalization spanning more than two midnights for investigation and treatment of the above medically necessary diagnoses. Please note that some part of this chart was generated using Dragon dictation software. Although every effort was made to ensure the accuracy of this automated transcription, some errors in transcription may have occurred inadvertently. If you may need any clarification, please do not hesitate to contact me through [facility].        Tank Vu MD    12/27/22

## 2022-12-28 NOTE — RT PROTOCOL NOTE
RT Inhaler-Nebulizer Bronchodilator Protocol Note    There is a bronchodilator order in the chart from a provider indicating to follow the RT Bronchodilator Protocol and there is an Initiate RT Inhaler-Nebulizer Bronchodilator Protocol order as well (see protocol at bottom of note). CXR Findings:  XR CHEST PORTABLE    Result Date: 12/27/2022  Perihilar and multifocal airspace opacities likely represents viral pattern of pneumonia. The findings from the last RT Protocol Assessment were as follows:   History Pulmonary Disease: Chronic pulmonary disease  Respiratory Pattern: Dyspnea on exertion or RR 21-25 bpm  Breath Sounds: Inspiratory and expiratory or bilateral wheezing and/or rhonchi  Cough: Strong, spontaneous, non-productive  Indication for Bronchodilator Therapy:    Bronchodilator Assessment Score: 10    Aerosolized bronchodilator medication orders have been revised according to the RT Inhaler-Nebulizer Bronchodilator Protocol below. Respiratory Therapist to perform RT Therapy Protocol Assessment initially then follow the protocol. Repeat RT Therapy Protocol Assessment PRN for score 0-3 or on second treatment, BID, and PRN for scores above 3. No Indications - adjust the frequency to every 6 hours PRN wheezing or bronchospasm, if no treatments needed after 48 hours then discontinue using Per Protocol order mode. If indication present, adjust the RT bronchodilator orders based on the Bronchodilator Assessment Score as indicated below. Use Inhaler orders unless patient has one or more of the following: on home nebulizer, not able to hold breath for 10 seconds, is not alert and oriented, cannot activate and use MDI correctly, or respiratory rate 25 breaths per minute or more, then use the equivalent nebulizer order(s) with same Frequency and PRN reasons based on the score. If a patient is on this medication at home then do not decrease Frequency below that used at home.     0-3 - enter or revise RT bronchodilator order(s) to equivalent RT Bronchodilator order with Frequency of every 4 hours PRN for wheezing or increased work of breathing using Per Protocol order mode. 4-6 - enter or revise RT Bronchodilator order(s) to two equivalent RT bronchodilator orders with one order with BID Frequency and one order with Frequency of every 4 hours PRN wheezing or increased work of breathing using Per Protocol order mode. 7-10 - enter or revise RT Bronchodilator order(s) to two equivalent RT bronchodilator orders with one order with TID Frequency and one order with Frequency of every 4 hours PRN wheezing or increased work of breathing using Per Protocol order mode. 11-13 - enter or revise RT Bronchodilator order(s) to one equivalent RT bronchodilator order with QID Frequency and an Albuterol order with Frequency of every 4 hours PRN wheezing or increased work of breathing using Per Protocol order mode. Greater than 13 - enter or revise RT Bronchodilator order(s) to one equivalent RT bronchodilator order with every 4 hours Frequency and an Albuterol order with Frequency of every 2 hours PRN wheezing or increased work of breathing using Per Protocol order mode. RT to enter RT Home Evaluation for COPD & MDI Assessment order using Per Protocol order mode.     Electronically signed by Laura Olivier RCP on 12/28/2022 at 4:39 PM

## 2022-12-28 NOTE — PROGRESS NOTES
12/28/22 1638   RT Protocol   History Pulmonary Disease 2   Respiratory pattern 2   Breath sounds 6   Cough 0   Bronchodilator Assessment Score 10

## 2022-12-28 NOTE — PROGRESS NOTES
[facility]   Phone: [phone]    Physical Therapy    [x] Initial Evaluation            [] Daily Treatment Note         [] Discharge Summary      Patient: Anita Quintero   : 1935   MRN: [id number]   Date of Service:  2022  Admitting Diagnosis: A-fib [facility])  Current Admission Summary: Anita Quintero is a 82 y.o. male  who presents to the ED complaining of COVID-19 illness diagnosed on a home test about 1 week ago. The patient was on molnupiravir prescribed by PCP instead of Paxil that due to his concomitant use of tamsulosin. The patient has had progressive and worsening generalized malaise and fatigue and generalized weakness since then. History is supplemented by the wife as the patient does have a history of Alzheimer's dementia. He also has a history of COPD. He does not typically wear oxygen and has borderline oxygen saturations here. He has been having worsening cough as well. No fevers though. No chest pains reported by the patient. No belly pain vomiting or diarrhea. His appetite has been poor. He is anticoagulated due to history of blood clots but has no history of atrial fibrillation. He is in rapid atrial fibrillation on initial assessment.   Past Medical History:  has a past medical history of Abdominal pain, epigastric, Arthritis, Benign prostatic hypertrophy without urinary obstruction, BPH, Cellulitis and abscess, Chronic rhinitis, Chronic systolic congestive heart failure ([address] COPD (chronic obstructive pulmonary disease) ([address] Diabetes mellitus ([address] Dysphagia, Erectile dysfunction, GERD (gastroesophageal reflux disease), Hx of blood clots, Hyperglycemia, Hypertension, Late onset Alzheimer's disease without behavioral disturbance ([address] lumbar radiculopathy, Neuropathy, Nocturia, Osteoarthritis, Other disorders of kidney and ureter in diseases classified elsewhere, Pain, joint, knee, Palpitations, skin cancer, SOB (shortness of breath), Tennis elbow, and Tinea pedis. Past Surgical History:  has a past surgical history that includes Knee Prosthesis Removal; Cholecystectomy; Colonoscopy (11/28/2011); Cataract removal (Bilateral, 09/2014); Parotidectomy (Right, 01/26/2017); skin biopsy; Upper gastrointestinal endoscopy (03/19/2018); Total knee arthroplasty; hip surgery (Right, 09/09/2013); other surgical history; Lung biopsy (Left, 05/09/2018); Tunneled venous port placement (06/22/2018); pr njx aa&/strd tfrml epi lumbar/sacral 1 level (Right, 11/21/2018); eye surgery; Mohs surgery (Bilateral); Skin cancer excision (Left, 08/14/2019); lumbar nerve block (N/A, 08/26/2019); Circumcision (N/A, 11/15/2019); joint replacement (Bilateral); Nerve Surgery (N/A, 12/20/2019); Cystoscopy (N/A, 01/05/2020); Pain management procedure (N/A, 07/20/2020); and Skin cancer excision. Discharge Recommendations: Deion Adams scored a 8/24 on the AM-PAC short mobility form. Current research shows that an AM-PAC score of 17 or less is typically not associated with a discharge to the patient's home setting. Based on the patient's AM-PAC score and their current functional mobility deficits, it is recommended that the patient have 3-5 sessions per week of Physical Therapy at d/c to increase the patient's independence. Please see assessment section for further patient specific details. If patient discharges prior to next session this note will serve as a discharge summary. Please see below for the latest assessment towards goals.     DME Required For Discharge: DME to be determined at next level of care  Precautions/Restrictions: high fall risk, contact precautions  Weight Bearing Restrictions: no restrictions     Required Braces/Orthotics: no braces required  Positional Restrictions:no positional restrictions    Pre-Admission Information   Lives With: spouse                  Type of Home: house  Home Layout: one level, laundry in basement, patient does not use  Home Access:  2 step to enter with handrail. Handrails are located on R side. Bathroom Layout: walk in shower  Bathroom Equipment: shower chair  Toilet Height: elevated height  Home Equipment: rollator - 4 wheeled walker, lift chair  Transfer Assistance: modified independent with use of lift chair, elevated toilet (spouse there to supervise)  Ambulation Assistance:modified independent with use of 4WW  ADL Assistance: independent with all ADL's, Spouse assists as needed (patient bathes, spouse helps intermittently with dressing)  IADL Assistance: requires assistance with all homemaking tasks  Active :        [] Yes                 [x] No  Hand Dominance: [] Left                 [] Right  Current Employment: retired. Occupation: paper [employer]  Hobbies:   Recent Falls: 3 falls in the last 3 weeks (spouse reports falls every 2-3 months, falls posteriorly, able to get up with difficulty)    Examination   Vision:   Vision Gross Assessment: Impaired and Vision Corrective Device: wears glasses for reading  Hearing:   hard of hearing, left hearing aid, right hearing aid  Observation:   BUE tremors noted - spouse reports worse with illness    Sensation:   reports numbness and tingling in (B) LE (B feet due to peripheral neuropathy)    ROM:   (B) LE AROM WFL  Strength:   Formal MMT held secondary to decreased 2-step command following  Therapist Clinical Decision Making (Complexity): medium complexity  Clinical Presentation: evolving      Subjective  General: Supine in bed upon arrival with alarm on. Spouse present. Confusion noted.    Pain: 0/10  Pain Interventions: not applicable       Functional Mobility  Bed Mobility  Supine to Sit: moderate assistance  Scooting: moderate assistance  Comments:  Transfers  Sit to stand transfer: 2 person assistance with mod A of 2 from bed and chair   Stand to sit transfer: moderate assistance  Comments:  Ambulation  Surface:level surface  Assistive Device: rolling walker  Assistance: 2 person assistance with min A of 2   Distance: 2-3'  Gait Mechanics: posterior lean, decreased step length and kishan, unsteady  Comments:  2L O2 throughout. SpO2 91-94% throughout session despite SOB noted. Stair Mobility  Stair mobility not completed on this date.   Comments:  Balance  Static Sitting Balance: fair (+): maintains balance at SBA/supervision without use of UE support  Dynamic Sitting Balance: fair (+): maintains balance at SBA/supervision without use of UE support  Static Standing Balance: poor (+): requires min (A) to maintain balance  Dynamic Standing Balance: poor (+): requires min (A) to maintain balance  Comments:    Other Therapeutic Interventions    Functional Outcomes  AM-PAC Inpatient Mobility Raw Score : 8              Cognition  Overall Cognitive Status: Impaired  Following Commands: follows one step commands with repetition, follows one step commands with increased time  Memory: decreased recall of recent events, decreased short term memory  Safety Judgement: decreased awareness of need for assistance, decreased awareness of need for safety  Problem Solving: assistance required to generate solutions, assistance required to implement solutions, decreased awareness of errors, assistance required to identify errors made, assistance required to correct errors made  Insights: decreased awareness of deficits  Initiation: requires cues for all  Sequencing: requires cues for all  Orientation:    oriented to person and oriented to place  Command Following:   impaired    Education  Barriers To Learning: cognition  Patient Education: patient educated on goals, PT role and benefits, plan of care, transfer training, discharge recommendations  Learning Assessment:  patient will require reinforcement due to cognitive deficits, patient is not an independent learner, spouse verbalized understanding    Assessment  Activity Tolerance: Fair, needs frequent rest breaks due to SOB  Impairments Requiring Therapeutic Intervention: decreased functional mobility, decreased strength, decreased safety awareness, decreased endurance, decreased balance  Prognosis: fair  Clinical Assessment: Patient not at baseline function and would benefit from skilled PT to address above deficits and facilitate return to baseline function. Patient needing 2-person assistance for transfers and tolerates minimal activity prior to SOB and fatigue. Presents at significant risk of falls. Safety Interventions: patient left in chair, chair alarm in place, call light within reach, patient at risk for falls, nurse notified, and family/caregiver present -recommend STEDY for OOB activity - discussed with nursing.      Plan  Frequency: 3-5 x/per week  Current Treatment Recommendations: strengthening, ROM, balance training, functional mobility training, transfer training, gait training, endurance training, home exercise program, and safety education    Goals  Patient Goals: did not state   Short Term Goals:  Time Frame: discharge  Patient will complete bed mobility at minimal assistance   Patient will complete transfers at minimal assistance   Patient will ambulate 25 ft with use of rolling walker at minimal assistance    Therapy Session Time      Individual Group Co-treatment   Time In     1041   Time Out     1135   Minutes     54     Timed Code Treatment Minutes:  39 Minutes  Total Treatment Minutes:  54       Electronically Signed By: Ceferino Stone PT        Thanks, Ceferino Stone PT, DPT [id number]

## 2022-12-29 PROBLEM — R77.8 ELEVATED TROPONIN LEVEL: Status: ACTIVE | Noted: 2022-01-01

## 2022-12-29 PROBLEM — R79.89 ELEVATED TROPONIN LEVEL: Status: ACTIVE | Noted: 2022-01-01

## 2022-12-29 PROBLEM — I48.91 NEW ONSET ATRIAL FIBRILLATION (HCC): Status: ACTIVE | Noted: 2022-01-01

## 2022-12-29 NOTE — PLAN OF CARE
Problem: Discharge Planning  Goal: Discharge to home or other facility with appropriate resources  Outcome: Progressing     Problem: Skin/Tissue Integrity  Goal: Absence of new skin breakdown  Description: 1. Monitor for areas of redness and/or skin breakdown  2. Assess vascular access sites hourly  3. Every 4-6 hours minimum:  Change oxygen saturation probe site  4. Every 4-6 hours:  If on nasal continuous positive airway pressure, respiratory therapy assess nares and determine need for appliance change or resting period. Outcome: Progressing     Problem: Safety - Adult  Goal: Free from fall injury  Outcome: Progressing     Problem: ABCDS Injury Assessment  Goal: Absence of physical injury  Outcome: Progressing     Problem: Confusion  Goal: Confusion, delirium, dementia, or psychosis is improved or at baseline  Description: INTERVENTIONS:  1. Assess for possible contributors to thought disturbance, including medications, impaired vision or hearing, underlying metabolic abnormalities, dehydration, psychiatric diagnoses, and notify attending LIP  2. [place] high risk fall precautions, as indicated  3. Provide frequent short contacts to provide reality reorientation, refocusing and direction  4. Decrease environmental stimuli, including noise as appropriate  5. Monitor and intervene to maintain adequate nutrition, hydration, elimination, sleep and activity  6. If unable to ensure safety without constant attention obtain sitter and review sitter guidelines with assigned personnel  7.  Initiate Psychosocial CNS and Spiritual Care consult, as indicated  Outcome: Progressing     Problem: Chronic Conditions and Co-morbidities  Goal: Patient's chronic conditions and co-morbidity symptoms are monitored and maintained or improved  Outcome: Progressing

## 2022-12-29 NOTE — PLAN OF CARE
Received Perfect Serve from nursing concerned that patient is groaning and having involuntary twitching of extremities. Also noted to have short runs NSVT on telemetry. Entered room, patient asleep, awakens easily to stimuli. Offers no complaints. Nursing attempted to have patient remove dentures but he refused. No involuntary movements noted while I was in room and he was awake. O2 sats 94%. Will check stat magnesium, CMP and venous blood gas.

## 2022-12-29 NOTE — PROGRESS NOTES
[facility]    12/29/2022 9:04 AM        Name: Donny Casanova . Admitted: 12/27/2022  Primary Care Provider: SHA Torre CNP (Tel: [phone])      Brief History: 83 yo male with hx chronic dCHF, COPD, DM2, HTN, DVT/PE on warfarin, dementia. He recently tested positive for COVID (12/21) after developing fever, sore throat, and cough which started 2 days prior. He was started on molnupiravir x 5 days by PCP. Presented to ER with worsening shortness of breath and weakness. Found to be in atrial fib with RVR on presentation, HR to 120s and he was started on diltiazem drip. CXR with evidence of viral pneumonia. Subjective:  Resting in bed, wife and son visiting. Patient reports he is doing \"horrible. \" Main complaint is shortness of breath. Currently on 2 liters O2 with sat 94%. He c/o anxiety and requesting something to help with that. Denies chest pain, palpitations, abdominal pain, nausea. It has been several days since last BM.      Reviewed interval ancillary notes    Current Medications  budesonide (PULMICORT) nebulizer suspension 500 mcg, BID  dexamethasone (DECADRON) injection 4 mg, Q12H  aspirin EC tablet 81 mg, Daily  atorvastatin (LIPITOR) tablet 10 mg, Nightly  gabapentin (NEURONTIN) capsule 300 mg, Nightly  guaiFENesin (MUCINEX) extended release tablet 600 mg, BID  benzonatate (TESSALON) capsule 100 mg, TID PRN  ipratropium-albuterol (DUONEB) nebulizer solution 1 ampule, TID  insulin lispro (HUMALOG) injection vial 0-16 Units, TID WC  insulin lispro (HUMALOG) injection vial 0-4 Units, Nightly  dilTIAZem (CARDIZEM) 125 mg in dextrose 5% 125 mL infusion (premix), Continuous  donepezil (ARICEPT) tablet 5 mg, Daily with breakfast  finasteride (PROSCAR) tablet 5 mg, Daily  HYDROcodone-acetaminophen (NORCO) 7.5-325 MG per tablet 1 tablet, 4x Daily PRN  latanoprost (XALATAN) 0.005 % ophthalmic solution 1 drop, Nightly  dilTIAZem (CARDIZEM CD) extended release capsule 120 mg, Daily  dextrose bolus 10% 125 mL, PRN   Or  dextrose bolus 10% 250 mL, PRN  glucagon (rDNA) injection 1 mg, PRN  dextrose 10 % infusion, Continuous PRN  sodium chloride flush 0.9 % injection 5-40 mL, 2 times per day  sodium chloride flush 0.9 % injection 5-40 mL, PRN  0.9 % sodium chloride infusion, PRN  ondansetron (ZOFRAN-ODT) disintegrating tablet 4 mg, Q8H PRN   Or  ondansetron (ZOFRAN) injection 4 mg, Q6H PRN  polyethylene glycol (GLYCOLAX) packet 17 g, Daily PRN  acetaminophen (TYLENOL) tablet 650 mg, Q6H PRN   Or  acetaminophen (TYLENOL) suppository 650 mg, Q6H PRN  warfarin placeholder: dosing by pharmacy, RX Placeholder  gabapentin (NEURONTIN) capsule 300 mg, Daily  DULoxetine (CYMBALTA) extended release capsule 30 mg, Nightly  tamsulosin (FLOMAX) capsule 0.4 mg, BID  famotidine (PEPCID) tablet 10 mg, Daily  insulin glargine (LANTUS) injection vial 30 Units, Nightly  hydrALAZINE (APRESOLINE) tablet 50 mg, BID      Objective:  /77   Pulse 97   Temp 98.1 °F (36.7 °C) (Oral)   Resp 22   Ht 5' 10\" (1.778 m)   Wt 211 lb 14.4 oz (96.1 kg)   SpO2 94%   BMI 30.40 kg/m²     Intake/Output Summary (Last 24 hours) at 12/29/2022 0904  Last data filed at 12/28/2022 2355  Gross per 24 hour   Intake --   Output 600 ml   Net -600 ml      Wt Readings from Last 3 Encounters:   12/29/22 211 lb 14.4 oz (96.1 kg)   11/30/22 218 lb (98.9 kg)   11/22/22 215 lb 14.4 oz (97.9 kg)     General:  Awake, alert, oriented in NAD  Skin:  Warm and dry. No unusual bruising or rash  Neck:  Supple. No JVD or carotid bruit appreciated  Chest:  Normal effort.   Diminished, no wheezes, + rhonchi  Cardiovascular:  RRR, normal S1/S2, no murmur/gallop/rub  Abdomen:  Soft, nontender, +bowel sounds  Extremities:  No edema  Neurological: No focal deficits  Psychological: Normal mood and affect    Labs and Tests:  CBC:   Recent Labs 12/27/22  1236 12/28/22  0619 12/29/22  0435   WBC 12.6* 10.4 13.3*   HGB 10.6* 9.9* 9.5*    219 309     BMP:    Recent Labs     12/27/22  1236 12/28/22  0619 12/29/22  0435    140 136   K 3.8 3.8 4.0    102 101   CO2 24 23 23   BUN 61* 76* 85*   CREATININE 3.2* 3.6* 3.8*   GLUCOSE 170* 261* 285*     Hepatic:   Recent Labs     12/27/22  1236   AST 21   ALT 20   BILITOT 0.5   ALKPHOS 80       CXR 12/27/2022:  Perihilar and multifocal airspace opacities likely represents viral pattern   of pneumonia. Echo 12/28/2022:   Summary   Left ventricular cavity size is dilated with mild concentric left   ventricular hypertrophy. Overall left ventricular systolic function appears normal with an ejection fraction that is visually estimated to be 55-60%. No obvious regional wall motion abnormalities are noted. Indeterminate diastolic function due to atrial fibrillation. The right ventricle is moderately enlarged with normal function. There is mild biatrial enlargement. Mild aortic regurgitation is present. Mild mitral regurgitation is present. Mild tricuspid regurgitation with an RVSP estimated to be 46 mmHg. Problem List  Principal Problem:    A-fib [facility])  Resolved Problems:    * No resolved hospital problems. *       Assessment & Plan:   New onset atrial fibrillation. HR 120s on presentation, started on diltiazem drip. Evaluated by EP and transitioned to po cardizem. Already on warfarin for hx DVT/PE, INR supratherapeutic on admission (4.65), warfarin on hold. Continue telemetry. TSH 0.87. EP has signed off, to arrange for office follow up and potential cardioversion if remains in afib at that time. COVID-19 / viral pneumonia / COPD. Positive home COVID test 12/21, treated with molnupiravir. Rapid influenza A/B negative, rapid COVID positive. CXR consistent with viral pneumonia, procalcitonin 0.76, lactic acid 1.1.  Low suspicion for bacterial infection and antibiotics held on admission. Blood culture with NGTD x 2. Currently on O2 at 2 liters for increased work of breathing, O2 sats have been stable. Started on dexamethasone 4 mg BID on admission. Patient reports no improvement in shortness of breath. Continue Mucinex, duonebs, add Pulmicort nebulizers. Add IS and acapella q 4 hours. Consult pulmonary. Chronic dCHF. CXR with multifocal airspace opacities likely representing vial pneumonia, proBNP > 25K in setting of LUISANA on CKD. Patient is orthopneic. He received IV Lasix yesterday with poor response, creatinine trending higher. Monitor for response. Echo with EF 55-60%, RVSP 46 mmHg. Elevated troponin (0.16) in setting of CHF and LUISANA/CKD. No acute changes on EKG. Denies chest pain. Has been evaluated by EP, recommends coronary evaluation after COVID infection resolved. Continue asa and statin. LUISANA on CKD stage IV. Creatinine 3.2 on presentation, baseline 2.9. Creatinine trending higher, 3.8 today. Nephrology evaluated, suspect underlying CKD progression. Follow BMP. Hx DVT/PE. Takes warfarin. INR supratherapeutic on presentation and warfarin on hold, INR 5.02 today. Dosing per pharmacy. HTN. BP elevated on admission, improved. Continue hydralazine. DM2. A1c 7.2. Takes Lantus and sliding scale insulin, continued on admission. Reviewed BG values, remain elevated secondary to steroids. Continue Lantus, add mealtime insulin, continue high dose correction. Continue to follow. Anxiety. Will add prn Atarax. Constipation. Change Miralax to daily, add BID senokot-S. Disposition: PT/OT evaluated (8/24, 14/24) and recommend SNF on DC. Diet: ADULT DIET;  Regular; 4 carb choices (60 gm/meal)  Code:Full Code  DVT PPX: warfarin on hold, INR 5.02      SHA Velazco CNP   12/29/2022 9:04 AM

## 2022-12-29 NOTE — CONSULTS
PULMONARY AND CRITICAL CARE CONSULTATION NOTE    CONSULTING PHYSICIAN:      REASON FOR CONSULT:   Chief Complaint   Patient presents with    Shortness of Breath     Covid + x 1 week. Shortness of breath x 1 day. Weakness x 1 week since being covid +. Pt having trouble walking x 1 week. DATE OF CONSULT: 12/29/2022    HISTORY OF PRESENT ILLNESS: 82y.o. year old male with past medical history of COPD, diabetes mellitus type 2, hypertension, history of DVT/PE on Coumadin, CKD stage IV who presented to the hospital with complaints of increased shortness of breath along with cough and wheezing and atrial fibrillation with RVR. Patient was diagnosed with COVID on 12/21 and finished a course of molnupiravir for 5 days prescribed by primary care physician. Symptoms did not get better and patient presented with worsening shortness of breath. He was noted to be atrial fibrillation with RVR on admission requiring diltiazem drip which now has been switched to p.o. diltiazem. Patient is requiring oxygen at 2 L/min. He is diffusely wheezing. Elevated proBNP of 20,005.97. I reviewed the chest x-ray from 12/27/2022 and my interpretation is as follows. Bilateral patchy infiltrates noted. REVIEW OF SYSTEMS:   CONSTITUTIONAL SYMPTOMS: The patient denies fever, fatigue, night sweats, weight loss or weight gain. HEENT: No vision changes. No tinnitus, Denies sinus pain. No hoarseness, or dysphagia. NECK: Patient denies swelling in the neck. CARDIOVASCULAR: Denies chest pain, palpitation, syncope. RESPIRATORY: See above. GASTROINTESTINAL: Denies nausea, abdominal pain or change in bowel function. GENITOURINARY: Denies obstructive symptoms. No history of incontinence. BREASTS: No masses or lumps in the breasts. SKIN: No rashes or itching. MUSCULOSKELETAL: Denies weakness or bone pain. NEUROLOGICAL: No headaches or seizures. PSYCHIATRIC: Denies mood swings or depression.    ENDOCRINE: Denies heat or cold intolerance or excessive thirst.  HEMATOLOGIC/LYMPHATIC: Denies easy bruising or lymph node swelling. ALLERGIC/IMMUNOLOGIC: No environmental allergies.     PAST MEDICAL HISTORY:   Past Medical History:   Diagnosis Date    Abdominal pain, epigastric     Arthritis     Benign prostatic hypertrophy without urinary obstruction     BPH     Cellulitis and abscess     Chronic rhinitis     Chronic systolic congestive heart failure ([facility] [address] 4/18/2019    COPD (chronic obstructive pulmonary disease) (HCC)     Diabetes mellitus (HCC)     Dysphagia     Erectile dysfunction     GERD (gastroesophageal reflux disease)     Hx of blood clots     Hyperglycemia     Hypertension     Late onset Alzheimer's disease without behavioral disturbance ([facility] [address] 7/23/2020    lumbar radiculopathy     Neuropathy     Nocturia     Osteoarthritis     Other disorders of kidney and ureter in diseases classified elsewhere     Pain, joint, knee     Palpitations     skin cancer     Rt ear, nose, neck, hands/ 2018 lung    SOB (shortness of breath)     Tennis elbow     Tinea pedis     foot       PAST SURGICAL HISTORY:   Past Surgical History:   Procedure Laterality Date    CATARACT REMOVAL Bilateral 09/2014    CHOLECYSTECTOMY      CIRCUMCISION N/A 11/15/2019    DORSAL SLIT performed by Sayra Aguilar MD at [address] [place] [address]  11/28/2011    Dr. Evelyn Mariscal - mild sigmoid diverticulosis    CYSTOSCOPY N/A 01/05/2020    CYSTOSCOPY, EVACUATION OF CLOTS performed by Juliana Strickland MD at [address] Right 09/09/2013    Dr. Yoshi Eden - block for pain relief    JOINT REPLACEMENT Bilateral     knees    KNEE PROSTHESIS REMOVAL      LUMBAR NERVE BLOCK N/A 08/26/2019    L4/5 INTERLAMINAR EPIDURAL STEROID INJECTION WITH FLUOROSCOPY performed by Nat Song MD at [facility] 05/09/2018    Dr. Vays - CT guided    MOHS SURGERY Bilateral     NERVE SURGERY N/A 12/20/2019    L4L5 INTERLAMINAR EPIDURAL STEROID INJECTION WITH FLUOROSCOPY performed by Trixie Rodriguez MD at [address] [facility]      multiple lumbar ESIs    PAIN MANAGEMENT PROCEDURE N/A 2020    L4-L5 INTERLAMINAR EPIDURAL STEROID INJECTION WITH FLUOROSCOPY performed by Trixie Rodriguez MD at [address] [facility] Right 2017    Dr. Moises James - superficial, w/excision of parotid tail & dissection/preservation of facial nerve    NV NJX AA&/STRD TFRML EPI LUMBAR/SACRAL 1 LEVEL Right 2018    RIGHT L4, L5 LUMBAR TRANSFORAMINAL EPIDURAL STEROID INJECTION WITH FLUOROSCOPY performed by Trixie Rodriguez MD at [address] [facility] Left 2019    SKIN CANCER EXCISION      TOTAL KNEE ARTHROPLASTY      right x1 and left x2    TUNNELED VENOUS PORT PLACEMENT  2018    Left SCV infusaport placement    UPPER GASTROINTESTINAL ENDOSCOPY  2018    Dr. Mejia Bi - mild non-obstructive ring distal esophagus       SOCIAL HISTORY:   Social History     Tobacco Use    Smoking status: Former     Packs/day: 1.00     Years: 40.00     Pack years: 40.00     Types: Cigarettes     Quit date: 8/15/1970     Years since quittin.4    Smokeless tobacco: Never   Vaping Use    Vaping Use: Never used   Substance Use Topics    Alcohol use: No    Drug use: No       FAMILY HISTORY:   Family History   Problem Relation Age of Onset    Arthritis Father     Diabetes Father     Diabetes Brother        MEDICATIONS:     No current facility-administered medications on file prior to encounter.      Current Outpatient Medications on File Prior to Encounter   Medication Sig Dispense Refill    hydrALAZINE (APRESOLINE) 50 MG tablet TAKE ONE TABLET BY MOUTH EVERY MORNING AND TAKE ONE TABLET BY MOUTH DAILY AT BEDTIME 60 tablet 3    blood glucose test strips (FREESTYLE LITE) strip USE TO TEST THREE TIMES A  strip 5    HYDROcodone-acetaminophen (NORCO) 7.5-325 MG per tablet Take 1 tablet by mouth 4 times daily as needed for Pain for up to 120 days. Intended supply: 30 days 120 tablet 0    gabapentin (NEURONTIN) 300 MG capsule TAKE ONE CAPSULE BY MOUTH EVERY MORNING AND TAKE TWO CAPSULES BY MOUTH AT BEDTIME 270 capsule 3    BASAGLAR KWIKPEN 100 UNIT/ML injection pen Inject 30 Units into the skin nightly 5 Adjustable Dose Pre-filled Pen Syringe 3    furosemide (LASIX) 40 MG tablet TAKE ONE TABLET BY MOUTH DAILY 90 tablet 1    insulin aspart (NOVOLOG FLEXPEN) 100 UNIT/ML injection pen Use sliding scale provided by endocrinology 5 pen 5    famotidine (PEPCID) 20 MG tablet TAKE ONE TABLET BY MOUTH TWICE A DAY 60 tablet 11    DULoxetine (CYMBALTA) 30 MG extended release capsule TAKE ONE CAPSULE BY MOUTH ONCE NIGHTLY 90 capsule 3    warfarin (COUMADIN) 2.5 MG tablet TAKE ONE AND ONE-HALF TABLETS BY MOUTH EVERY THURSDAY, THEN TAKE ONE TABLET BY MOUTH ON ALL OTHER DAYS 90 tablet 3    B-D UF III MINI PEN NEEDLES 31G X 5 MM MISC USE TO INJECT INSULIN FOUR TIMES A  each 2    fluocinonide (LIDEX) 0.05 % cream Apply topically 2 times daily. 60 g 2    donepezil (ARICEPT) 10 MG tablet Take 5 mg by mouth daily (with breakfast)       finasteride (PROSCAR) 5 MG tablet Take 5 mg by mouth daily      naphazoline-glycerin (CLEAR EYES REDNESS RELIEF) 0.012-0.2 % SOLN ophthalmic solution Place 1 drop into both eyes 2 times daily (Patient taking differently: Place 1 drop into both eyes 2 times daily as needed) 1 Bottle 0    tamsulosin (FLOMAX) 0.4 MG capsule Take 1 capsule by mouth daily (Patient taking differently: Take 0.4 mg by mouth 2 times daily) 90 capsule 1    glucose monitoring kit (FREESTYLE) monitoring kit 1 kit by Does not apply route daily as needed. 1 kit 0    latanoprost (XALATAN) 0.005 % ophthalmic solution Place 1 drop into both eyes nightly.           budesonide  0.5 mg Nebulization BID    insulin lispro  3 Units SubCUTAneous TID WC    albumin human  25 g IntraVENous Q12H    sennosides-docusate sodium  2 tablet Oral BID    polyethylene glycol  17 g Oral Daily    Arformoterol Tartrate  15 mcg Nebulization BID    methylPREDNISolone  40 mg IntraVENous Q8H    aspirin  81 mg Oral Daily    atorvastatin  10 mg Oral Nightly    gabapentin  300 mg Oral Nightly    guaiFENesin  600 mg Oral BID    ipratropium-albuterol  1 ampule Inhalation TID    insulin lispro  0-16 Units SubCUTAneous TID WC    insulin lispro  0-4 Units SubCUTAneous Nightly    donepezil  5 mg Oral Daily with breakfast    finasteride  5 mg Oral Daily    latanoprost  1 drop Both Eyes Nightly    dilTIAZem  120 mg Oral Daily    sodium chloride flush  5-40 mL IntraVENous 2 times per day    warfarin placeholder: dosing by pharmacy   Other RX Placeholder    gabapentin  300 mg Oral Daily    DULoxetine  30 mg Oral Nightly    tamsulosin  0.4 mg Oral BID    famotidine  10 mg Oral Daily    insulin glargine  30 Units SubCUTAneous Nightly    hydrALAZINE  50 mg Oral BID      dilTIAZem Stopped (12/28/22 1530)    dextrose      sodium chloride       hydrOXYzine HCl, benzonatate, HYDROcodone-acetaminophen, dextrose bolus **OR** dextrose bolus, glucagon (rDNA), dextrose, sodium chloride flush, sodium chloride, ondansetron **OR** ondansetron, acetaminophen **OR** acetaminophen    ALLERGIES:   Allergies as of 12/27/2022 - Fully Reviewed 12/27/2022   Allergen Reaction Noted    Celebrex [celecoxib] Other (See Comments) 05/10/2011    Elavil [amitriptyline]  06/19/2013    Naproxen  01/24/2017    Percocet [oxycodone-acetaminophen] Other (See Comments) 05/10/2011    Lyrica [pregabalin] Palpitations 01/19/2017      OBJECTIVE:   height is 5' 10\" (1.778 m) and weight is 211 lb 14.4 oz (96.1 kg). His oral temperature is 97.4 °F (36.3 °C). His blood pressure is 148/95 (abnormal) and his pulse is 95. His respiration is 22 and oxygen saturation is 94%. No intake/output data recorded. PHYSICAL EXAM:  CONSTITUTIONAL: He is a 82y.o.-year-old who appears his stated age. He is alert and oriented x 3 and in no acute distress.    HEENT: PERRL No scleral icterus. No thrush, atraumatic, normocephalic. NECK: Supple, without cervical or supraclavicular lymphadenopathy:  CARDIOVASCULAR: S1 S2 RRR. Without murmer  RESPIRATORY & CHEST: Bilateral expiratory wheezing, no crackles. Good air movement  GASTROINTESTINAL & ABDOMEN: Soft, nontender, positive bowel sounds in all quadrants, non-distended, without hepatosplenomegaly. GENITOURINARY: Deferred. MUSCULOSKELETAL: No tenderness to palpation of the axial skeleton. There is no clubbing. No cyanosis. No edema of the lower extremities. SKIN OF BODY: No rash or jaundice. PSYCHIATRIC EVALUATION: Normal affect. Patient answers questions appropriately. HEMATOLOGIC/LYMPHATIC/ IMMUNOLOGIC: No palpable lymphadenopathy. NEUROLOGIC: Alert and oriented x 3. Groslly non-focal. Motor strength is 5+/5 in all muscle groups. The patient has a normal sensorium globally.       LABS:  Lab Results   Component Value Date    WBC 13.3 (H) 12/29/2022    HGB 9.5 (L) 12/29/2022    HCT 29.3 (L) 12/29/2022     12/29/2022    CHOL 149 12/13/2021    TRIG 130 12/13/2021    HDL 34 (L) 12/13/2021    ALT 20 12/27/2022    AST 21 12/27/2022     12/29/2022    K 4.0 12/29/2022     12/29/2022    CREATININE 3.8 (H) 12/29/2022    BUN 85 (HH) 12/29/2022    CO2 23 12/29/2022    TSH 2.10 04/12/2022    PSA 3.69 09/17/2014    INR 5.02 (HH) 12/29/2022       Lab Results   Component Value Date    GLUCOSE 285 (H) 12/29/2022    CALCIUM 7.6 (L) 12/29/2022     12/29/2022    K 4.0 12/29/2022    CO2 23 12/29/2022     12/29/2022    BUN 85 (HH) 12/29/2022    CREATININE 3.8 (H) 12/29/2022         IMPRESSION:   Acute hypoxic respiratory failure  Acute exacerbation of COPD  COVID-19 infection  Atrial fibrillation with RVR  History of DVT/PE  Acute on chronic CKD stage IV  Diabetes mellitus type 2  Hypertension  History of non-small cell lung cancer s/p chemo and radiation  Alzheimer's dementia  Severe peripheral neuropathy    RECOMMENDATION:   Patient presents with acute hypoxic respiratory failure. Currently requiring oxygen up to 2 L/min. He is diffusely wheezing. Likely has COPD exacerbation due to underlying COVID. Switch Decadron to Solu-Medrol. Continue Pulmicort and duo nebs. Add Brovana nebs. Titrate FiO2 as tolerated for saturation of 90 to to 94%. S/p molnupiravir for covid infection. Also noted to have elevated proBNP of 20,597. Noted to have new onset atrial fibrillation. Elevated troponin likely due to demand ischemia and A. fib. Echo with preserved EF with diastolic dysfunction and LVH. Moderately enlarged RV with normal RV function. Patient has history of poorly differentiated non-small cell lung cancer, stage IIa/stage III. He is s/p chemotherapy with carboplatinum and radiation. Thank you for your consultation. We will continue to follow the patient. Kyung Darling MD  Pulmonary Critical Care and Sleep Medicine  12/29/2022, 1:17 PM    This note was completed using dragon medical speech recognition software. Grammatical errors, random word insertions, pronoun errors and incomplete sentences are occasional consequences of this technology due to software limitations. If there are questions or concerns about the content of this note of information contained within the body of this dictation they should be addressed with the provider for clarification.

## 2022-12-29 NOTE — PROGRESS NOTES
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    CKD (chronic kidney disease) stage 3, GFR 30-59 ml/min (HCC)    Bilateral pulmonary embolism (HCC)    Overweight    Diverticulosis of large intestine without diverticulitis    Chronic obstructive pulmonary disease (HCC)    Benign prostatic hyperplasia with urinary hesitancy    Gastroesophageal reflux disease without esophagitis    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)    Type 2 diabetes mellitus with stage 4 chronic kidney disease, without long-term current use of insulin (HCC)    Pulmonary nodule    Chronic systolic congestive heart failure (HCC)    Vitreous degeneration, bilateral    Secondary cataract    Primary open-angle glaucoma, bilateral, moderate stage    Posterior vitreous detachment of both eyes    Peripapillary atrophy of both eyes    Nodular corneal degeneration, right eye    Neoplastic malignant related fatigue    Entropion of left lower eyelid    Early stage nonexudative age-related macular degeneration of both eyes    Diabetic peripheral neuropathy (HCC)    Dermatochalasis of right upper eyelid    Bilateral posterior capsular opacification    Aortic valvular disease    Phimosis of penis    Renal insufficiency    Recurrent UTI    Cerebral amyloid angiopathy ([facility] [address]    Late onset Alzheimer's disease without behavioral disturbance (HCC)    Thrombocytopenia, unspecified    Skin ulcer of second toe of right foot, limited to breakdown of skin (HCC)    Chronic kidney disease (CKD) stage G4/A3, severely decreased glomerular filtration rate (GFR) between 15-29 mL/min/1.73 square meter and albuminuria creatinine ratio greater than 300 mg/g (HCC)    Secondary hyperparathyroidism of renal origin    Constipation due to opioid therapy    A-fib [facility])       Past Medical History:   has a past medical history of Abdominal pain, epigastric, Arthritis, Benign prostatic hypertrophy without urinary obstruction, BPH, Cellulitis and abscess, Chronic rhinitis, Chronic systolic congestive heart failure ([facility] [address] COPD (chronic obstructive pulmonary disease) ([facility] [address] Diabetes mellitus ([facility] [address] Dysphagia, Erectile dysfunction, GERD (gastroesophageal reflux disease), Hx of blood clots, Hyperglycemia, Hypertension, Late onset Alzheimer's disease without behavioral disturbance ([facility] [address] lumbar radiculopathy, Neuropathy, Nocturia, Osteoarthritis, Other disorders of kidney and ureter in diseases classified elsewhere, Pain, joint, knee, Palpitations, skin cancer, SOB (shortness of breath), Tennis elbow, and Tinea pedis. Past Social History:   reports that he quit smoking about 52 years ago. His smoking use included cigarettes. He has a 40.00 pack-year smoking history. He has never used smokeless tobacco. He reports that he does not drink alcohol and does not use drugs. Subjective:    Still some SOB    Review of Systems   Constitutional:  Positive for fatigue. Negative for activity change, appetite change, chills, fever and unexpected weight change. HENT:  Negative for congestion and facial swelling. Eyes:  Negative for photophobia, discharge and redness. Respiratory:  Positive for shortness of breath. Negative for cough and chest tightness. Cardiovascular:  Negative for chest pain, palpitations and leg swelling. Gastrointestinal:  Negative for abdominal distention, abdominal pain, blood in stool, constipation, diarrhea, nausea and vomiting. Endocrine: Negative for cold intolerance, heat intolerance and polyuria.    Genitourinary:  Negative for decreased urine volume, difficulty urinating, flank pain and hematuria. Musculoskeletal:  Negative for joint swelling and neck pain. Neurological:  Negative for dizziness, seizures, syncope, speech difficulty, light-headedness and headaches. Hematological:  Does not bruise/bleed easily. Psychiatric/Behavioral:  Negative for agitation, confusion and hallucinations. Objective:   Neg 360ml. Weight decreased to 211lbs from peak of 218lbs. BP (!) 148/95   Pulse 95   Temp 97.4 °F (36.3 °C) (Oral)   Resp 22   Ht 5' 10\" (1.778 m)   Wt 211 lb 14.4 oz (96.1 kg)   SpO2 94%   BMI 30.40 kg/m²     Wt Readings from Last 3 Encounters:   12/29/22 211 lb 14.4 oz (96.1 kg)   11/30/22 218 lb (98.9 kg)   11/22/22 215 lb 14.4 oz (97.9 kg)       BP Readings from Last 3 Encounters:   12/29/22 (!) 148/95   11/30/22 130/80   11/22/22 132/80     Chest- rhonchi b/l  Heart-regular  Abd-soft  Ext- no edema    Labs  Hemoglobin   Date Value Ref Range Status   12/29/2022 9.5 (L) 13.5 - 17.5 g/dL Final     Hematocrit   Date Value Ref Range Status   12/29/2022 29.3 (L) 40.5 - 52.5 % Final     WBC   Date Value Ref Range Status   12/29/2022 13.3 (H) 4.0 - 11.0 K/uL Final     Platelets   Date Value Ref Range Status   12/29/2022 309 135 - 450 K/uL Final     Lab Results   Component Value Date    CREATININE 3.8 (H) 12/29/2022    BUN 85 (HH) 12/29/2022     12/29/2022    K 4.0 12/29/2022     12/29/2022    CO2 23 12/29/2022       Assessment/Plan:   LUISANA on CKD 4-underlying CKD progression. Crea higher today. Hold off further Lasix at this time. Will give albumin x 2 doses today. Decreased Gabapentin dose. Follow for RRT need. He is agreeable to HD if need arises. HTN-better. No need for aggressive control today. Electrolytes acceptable  Anemia- follow Hgb. Transfuse PRN. COVID Infection-needing NC. On Dexamethasone. H/O DM-per Medicine  H/O COPD  H/O Alzheimer's Dementia  A. Fib- EP following. Appreciate input.    Rate controlled.      Adrianne Suarez MD

## 2022-12-29 NOTE — PROGRESS NOTES
Pharmacy to Dose Warfarin    Pharmacy consulted to dose warfarin for hx DVT/PE and new onset Afib. INR Goal: 2-3    INR today: 5.02    Assessment/Plan:  - continue to hold warfarin until INR starts to down trend  - Possible concomitant drug-drug interactions include: n/a     Pharmacy will continue to follow.     Phil OrozcoD, [facility]  [id number]  12/29/2022 1:39 PM

## 2022-12-29 NOTE — CONSULTS
[facility]  [phone]      Chief Complaint   Patient presents with    Shortness of Breath     Covid + x 1 week. Shortness of breath x 1 day. Weakness x 1 week since being covid +. Pt having trouble walking x 1 week. Reason for consult:  Elevated troponin    ASSESSMENT AND PLAN:  Elevated troponin  COVID-19+ last week, unsure if currently has COVID pneumonia versus pulmonary edema from diastolic heart failure  Acute on chronic diastolic heart failure  New onset AF RVR, rate now controlled, high CHADS-vasc score, on coumadin  Acute kidney injury superimposed on chronic kidney disease  Elevated BNP due to combination of CHF and severe kidney disease      Plan    1. Troponin elevation most likely due to demand ischemia from acute medical illness. 2. This presentation is NOT consistent with ACS. 3. Echo reassuring  4. Will repeat troponin x 1 to document trend. Repeat BNP today to document trend. Likely will need more aggressive diuresis. 5. Patient is a poor candidate for invasive coronary evaluation  6. Defer diuretic recommendations to nephrology    Further recs pending trend of repeat troponin    *Addendum* - BNP and troponin down-trending    No further cardiac recommendations    Cardiology will sign-off at this time. Please call us if there are other issues or questions. History of Present Illness:  Rosanne Deluca is a 82 y.o. patient who presented to the hospital with complaints of shortness of breath. I have been asked to provide consultation regarding further management and testing. Patient known to have severe COPD. Tested positive for COVID last week and breathing has been worse since then. Was found to be in AF RVR upon admission and seen by EP. Rate now controlled and he is anticoagualted. He has no chest pain or dizziness.     Past Medical History:   has a past medical history of Abdominal pain, epigastric, Arthritis, Benign prostatic hypertrophy without urinary obstruction, BPH, Cellulitis and abscess, Chronic rhinitis, Chronic systolic congestive heart failure (HCC), COPD (chronic obstructive pulmonary disease) (HCC), Diabetes mellitus (HCC), Dysphagia, Erectile dysfunction, GERD (gastroesophageal reflux disease), Hx of blood clots, Hyperglycemia, Hypertension, Late onset Alzheimer's disease without behavioral disturbance (HCC), lumbar radiculopathy, Neuropathy, Nocturia, Osteoarthritis, Other disorders of kidney and ureter in diseases classified elsewhere, Pain, joint, knee, Palpitations, skin cancer, SOB (shortness of breath), Tennis elbow, and Tinea pedis.    Surgical History:   has a past surgical history that includes Knee Prosthesis Removal; Cholecystectomy; Colonoscopy (11/28/2011); Cataract removal (Bilateral, 09/2014); Parotidectomy (Right, 01/26/2017); skin biopsy; Upper gastrointestinal endoscopy (03/19/2018); Total knee arthroplasty; hip surgery (Right, 09/09/2013); other surgical history; Lung biopsy (Left, 05/09/2018); Tunneled venous port placement (06/22/2018); pr njx aa&/strd tfrml epi lumbar/sacral 1 level (Right, 11/21/2018); eye surgery; Mohs surgery (Bilateral); Skin cancer excision (Left, 08/14/2019); lumbar nerve block (N/A, 08/26/2019); Circumcision (N/A, 11/15/2019); joint replacement (Bilateral); Nerve Surgery (N/A, 12/20/2019); Cystoscopy (N/A, 01/05/2020); Pain management procedure (N/A, 07/20/2020); and Skin cancer excision.     Social History:   reports that he quit smoking about 52 years ago. His smoking use included cigarettes. He has a 40.00 pack-year smoking history. He has never used smokeless tobacco. He reports that he does not drink alcohol and does not use drugs.     Family History:  No family history of premature coronary artery disease, aortic disease, or valve disease.    Home Medications:  Were reviewed and are listed in nursing record. and/or listed below  Prior to Admission medications    Medication Sig Start Date End Date  Taking? Authorizing Provider   hydrALAZINE (APRESOLINE) 50 MG tablet TAKE ONE TABLET BY MOUTH EVERY MORNING AND TAKE ONE TABLET BY MOUTH DAILY AT BEDTIME 90/93/99   SHA Benavides CNP   blood glucose test strips (FREESTYLE LITE) strip USE TO TEST THREE TIMES A DAY 11/15/22   SHA Whitmore CNP   HYDROcodone-acetaminophen (NORCO) 7.5-325 MG per tablet Take 1 tablet by mouth 4 times daily as needed for Pain for up to 120 days. Intended supply: 30 days 10/31/22 2/28/23  SHA Whitmore CNP   gabapentin (NEURONTIN) 300 MG capsule TAKE ONE CAPSULE BY MOUTH EVERY MORNING AND TAKE TWO CAPSULES BY MOUTH AT BEDTIME 10/10/22 10/10/23  SHA Whitmore CNP   BASAGLAR KWIKPEN 100 UNIT/ML injection pen Inject 30 Units into the skin nightly 9/1/22   SHA Whitmore CNP   furosemide (LASIX) 40 MG tablet TAKE ONE TABLET BY MOUTH DAILY 8/31/22   Evelina Peabody, MD   insulin aspart (NOVOLOG FLEXPEN) 100 UNIT/ML injection pen Use sliding scale provided by endocrinology 6/3/22   SHA Whitmore CNP   famotidine (PEPCID) 20 MG tablet TAKE ONE TABLET BY MOUTH TWICE A DAY 4/4/22   SHA Whitmore CNP   DULoxetine (CYMBALTA) 30 MG extended release capsule TAKE ONE CAPSULE BY MOUTH ONCE NIGHTLY 2/23/22   SHA Whitmore CNP   warfarin (COUMADIN) 2.5 MG tablet TAKE ONE AND ONE-HALF TABLETS BY MOUTH EVERY THURSDAY, THEN TAKE ONE TABLET BY MOUTH ON ALL OTHER DAYS 1/3/22   SHA Whitmore CNP   B-D UF III MINI PEN NEEDLES 31G X 5 MM MISC USE TO INJECT INSULIN FOUR TIMES A DAY 11/30/21   Debby Bond MD   fluocinonide (LIDEX) 0.05 % cream Apply topically 2 times daily.  9/28/20   SHA Whitmore CNP   donepezil (ARICEPT) 10 MG tablet Take 5 mg by mouth daily (with breakfast)  7/27/20   Historical Provider, MD   finasteride (PROSCAR) 5 MG tablet Take 5 mg by mouth daily    Historical Provider, MD   naphazoline-glycerin (CLEAR EYES REDNESS RELIEF) 0.012-0.2 % SOLN ophthalmic solution Place 1 drop into both eyes 2 times daily  Patient taking differently: Place 1 drop into both eyes 2 times daily as needed 4/7/18   Chase Tomas MD   tamsulosin (FLOMAX) 0.4 MG capsule Take 1 capsule by mouth daily  Patient taking differently: Take 0.4 mg by mouth 2 times daily 8/3/17   SHA Rosales CNP   glucose monitoring kit (FREESTYLE) monitoring kit 1 kit by Does not apply route daily as needed. 4/7/14   Karen Cuello MD   latanoprost (XALATAN) 0.005 % ophthalmic solution Place 1 drop into both eyes nightly.  10/27/13   Historical Provider, MD        Current Medications:  Current Facility-Administered Medications   Medication Dose Route Frequency Provider Last Rate Last Admin    budesonide (PULMICORT) nebulizer suspension 500 mcg  0.5 mg Nebulization BID SHA Amador CNP        dexamethasone (DECADRON) injection 4 mg  4 mg IntraVENous Q12H Nikki Novak MD   4 mg at 12/28/22 2107    aspirin EC tablet 81 mg  81 mg Oral Daily Lizbeth Landry MD   81 mg at 12/28/22 1100    atorvastatin (LIPITOR) tablet 10 mg  10 mg Oral Nightly Lizbeth Landry MD   10 mg at 12/28/22 2107    gabapentin (NEURONTIN) capsule 300 mg  300 mg Oral Nightly Gopal Santillan MD   300 mg at 12/28/22 2107    guaiFENesin [facility]) extended release tablet 600 mg  600 mg Oral BID SHA Amador CNP   600 mg at 12/28/22 1650    benzonatate (TESSALON) capsule 100 mg  100 mg Oral TID PRN SHA Amador CNP        ipratropium-albuterol (DUONEB) nebulizer solution 1 ampule  1 ampule Inhalation TID Andreina Kearney MD        insulin lispro (HUMALOG) injection vial 0-16 Units  0-16 Units SubCUTAneous TID  SHA Amador CNP        insulin lispro (HUMALOG) injection vial 0-4 Units  0-4 Units SubCUTAneous Nightly SHA Amador CNP        dilTIAZem (CARDIZEM) 125 mg in dextrose 5% 125 mL infusion (premix)  2.5-15 mg/hr IntraVENous Continuous Nikki Novak MD Stopped at 12/28/22 1530    donepezil (ARICEPT) tablet 5 mg  5 mg Oral Daily with breakfast Bailee Alcala MD   5 mg at 12/28/22 0930    finasteride (PROSCAR) tablet 5 mg  5 mg Oral Daily Bailee Alcala MD   5 mg at 12/28/22 0926    HYDROcodone-acetaminophen (NORCO) 7.5-325 MG per tablet 1 tablet  1 tablet Oral 4x Daily PRN Bailee Alcala MD        latanoprost (XALATAN) 0.005 % ophthalmic solution 1 drop  1 drop Both Eyes Nightly Bailee Alcala MD   1 drop at 12/28/22 2106    dilTIAZem (CARDIZEM CD) extended release capsule 120 mg  120 mg Oral Daily Bailee Alcala MD   120 mg at 12/28/22 0926    dextrose bolus 10% 125 mL  125 mL IntraVENous PRN Bailee Alcala MD        Or    dextrose bolus 10% 250 mL  250 mL IntraVENous PRN Bailee Alcala MD        glucagon (rDNA) injection 1 mg  1 mg SubCUTAneous PRN Bailee Alcala MD        dextrose 10 % infusion   IntraVENous Continuous PRN Bailee Alcala MD        sodium chloride flush 0.9 % injection 5-40 mL  5-40 mL IntraVENous 2 times per day Bailee Alcala MD   10 mL at 12/28/22 2108    sodium chloride flush 0.9 % injection 5-40 mL  5-40 mL IntraVENous PRN Bailee Alcala MD        0.9 % sodium chloride infusion   IntraVENous PRN Bailee Alcala MD        ondansetron (ZOFRAN-ODT) disintegrating tablet 4 mg  4 mg Oral Q8H PRN Bailee Alcala MD        Or    ondansetron (ZOFRAN) injection 4 mg  4 mg IntraVENous Q6H PRN Bailee Alcala MD        polyethylene glycol (GLYCOLAX) packet 17 g  17 g Oral Daily PRN Bailee Alcala MD        acetaminophen (TYLENOL) tablet 650 mg  650 mg Oral Q6H PRN Bailee Alcala MD        Or    acetaminophen (TYLENOL) suppository 650 mg  650 mg Rectal Q6H PRN Bailee Alcala MD        warfarin placeholder: dosing by pharmacy   Other Amador Rosales MD        gabapentin (NEURONTIN) capsule 300 mg  300 mg Oral Daily Verona Salinas MD   300 mg at 12/28/22 0926    DULoxetine (CYMBALTA) extended release capsule 30 mg  30 mg Oral Nightly Bailee Alcala MD   30 mg at 12/28/22 2107    tamsulosin (FLOMAX) capsule 0.4 mg  0.4 mg Oral BID Bryant Diallo MD   0.4 mg at 12/28/22 2106    famotidine (PEPCID) tablet 10 mg  10 mg Oral Daily Krishna Jamison PA-C   10 mg at 12/28/22 6322    insulin glargine (LANTUS) injection vial 30 Units  30 Units SubCUTAneous Nightly Krishna Jamison PA-C   30 Units at 12/28/22 2107    hydrALAZINE (APRESOLINE) tablet 50 mg  50 mg Oral BID XIMENA JenkinsC   50 mg at 12/28/22 2106        Allergies:  Celebrex [celecoxib], Elavil [amitriptyline], Naproxen, Percocet [oxycodone-acetaminophen], and Lyrica [pregabalin]     Review of Systems:     All systems reviewed and negative except as stated above. Physical Examination:    Vitals:    12/29/22 0815   BP: 137/77   Pulse: 97   Resp: 22   Temp: 98.1 °F (36.7 °C)   SpO2: 94%    Weight: 211 lb 14.4 oz (96.1 kg)       Body mass index is 30.4 kg/m².     General Appearance:  Alert, cooperative, no distress, appears stated age   Head:  Normocephalic, without obvious abnormality, atraumatic   Eyes:  PERRL, conjunctiva/corneas clear   Nose: Nares normal, no drainage or sinus tenderness   Throat: Lips, mucosa, and tongue normal   Neck: Supple, symmetrical, trachea midline, no adenopathy, thyroid: not enlarged, symmetric, no tenderness/mass/nodules, no carotid bruit or JVD   Lungs:   Diffuse wheezing   Chest Wall:  No tenderness or deformity   Heart:  Regular rate and rhythm, S1 variable, S2 normal, no murmur, rub or gallop   Abdomen:   Soft, non-tender, bowel sounds active all four quadrants,  no masses, no organomegaly   Extremities: Extremities normal, atraumatic, no cyanosis or edema   Pulses: 2+ and symmetric   Skin: Skin color, texture, turgor normal, no rashes or lesions   Psych: Normal mood and affect   Neurologic: CN II-XII grossly intact        Labs  Lab Results   Component Value Date/Time    PROBNP 25,295 12/27/2022 12:36 PM    PROBNP 656 04/19/2019 11:10 AM    PROBNP 4,939 03/23/2019 06:35 AM Lab Results   Component Value Date/Time    CHOL 149 12/13/2021 09:25 AM    TRIG 130 12/13/2021 09:25 AM    HDL 34 12/13/2021 09:25 AM    HDL 29 05/15/2012 10:23 AM    LDLCALC 89 12/13/2021 09:25 AM    LABVLDL 26 12/13/2021 09:25 AM         Troponin   Date/Time Value Ref Range Status   12/27/2022 12:36 PM 0.16 (H) <0.01 ng/mL Final     Comment:     Methodology by Troponin T   03/22/2019 12:27 AM 0.07 (H) <0.01 ng/mL Final     Comment:     Methodology by Troponin T   03/21/2019 08:50 PM 0.08 (H) <0.01 ng/mL Final     Comment:     Methodology by Troponin T           CBC:   Lab Results   Component Value Date/Time    WBC 13.3 12/29/2022 04:35 AM    RBC 3.40 12/29/2022 04:35 AM    HGB 9.5 12/29/2022 04:35 AM    HCT 29.3 12/29/2022 04:35 AM    MCV 86.2 12/29/2022 04:35 AM    RDW 16.5 12/29/2022 04:35 AM     12/29/2022 04:35 AM     CMP:    Lab Results   Component Value Date/Time     12/29/2022 04:35 AM    K 4.0 12/29/2022 04:35 AM    K 3.8 12/27/2022 12:36 PM     12/29/2022 04:35 AM    CO2 23 12/29/2022 04:35 AM    BUN 85 12/29/2022 04:35 AM    CREATININE 3.8 12/29/2022 04:35 AM    GFRAA 25 07/15/2022 10:35 AM    GFRAA 51 05/31/2013 08:52 AM    AGRATIO 0.8 12/27/2022 12:36 PM    LABGLOM 15 12/29/2022 04:35 AM    GLUCOSE 285 12/29/2022 04:35 AM    PROT 6.7 12/27/2022 12:36 PM    PROT 6.3 05/15/2012 10:23 AM    CALCIUM 7.6 12/29/2022 04:35 AM    BILITOT 0.5 12/27/2022 12:36 PM    ALKPHOS 80 12/27/2022 12:36 PM    AST 21 12/27/2022 12:36 PM    ALT 20 12/27/2022 12:36 PM     PT/INR:  No results found for: PTINR  Lab Results   Component Value Date    CKTOTAL 128 12/29/2022    TROPONINI 0.16 (H) 12/27/2022     CXR  12/17  Perihilar and multifocal airspace opacities likely represents viral pattern   of pneumonia.    On my view of images this could easily be pulmonary edema, hard to tell the difference    EKG:  I have reviewed EKG with the following interpretation:  Impression:      12/28/2022  Atrial fibrillation with premature ventricular or aberrantly conducted complexes  Abnormal ECG    Echo:     Summary   Left ventricular cavity size is dilated with mild concentric left   ventricular hypertrophy. Overall left ventricular systolic function appears normal with an ejection fraction that is visually estimated to be 55-60%. No obvious regional wall motion abnormalities are noted. Indeterminate diastolic function due to atrial fibrillation. The right ventricle is moderately enlarged with normal function. There is mild biatrial enlargement. Mild aortic regurgitation is present. Mild mitral regurgitation is present. Mild tricuspid regurgitation with an RVSP estimated to be 46 mmHg. Stress: no prior    Cath: no prior    Old notes reviewed  Telemetry reviewd  Ekg personally reviewed  Chest xray personally reviewed  Echo, stress, cath, and/or other cardiac testing reviewed in detail   Medications and labs reviewed    High complexity/medical decision making due to extensive data review, extensive history review, independent review of data    High risk due to acute illness, evaluation of drug-drug interactions, medication management and diagnostic interventions    I will address the patient's cardiac risk factors and adjusted pharmacologic treatment as needed. In addition, I have reinforced the need for patient directed risk factor modification. Tobacco use was discussed with the patient and educated on the negative effects. I have asked the patient to not utilize these agents. Thank you for allowing to us to participate in the care or Diana Toussaint. Further evaluation will be based upon the patient's clinical course and testing results. All questions and concerns were addressed to the patient/family. Alternatives to my treatment were discussed. The note was completed using EMR.  Every effort was made to ensure accuracy; however, inadvertent computerized transcription errors may be present.       Sheryle Lorenzo, MD, MD 12/29/2022 8:55 AM

## 2022-12-29 NOTE — PROGRESS NOTES
Pt unable to void. Appears to be anxious and agitated. Bladder scan showed 332. Gold placed without complications. 550cc cloudy urine out instantly. Ativan given for anxiety and effective.

## 2022-12-30 PROBLEM — J12.82 PNEUMONIA DUE TO COVID-19 VIRUS: Status: ACTIVE | Noted: 2022-01-01

## 2022-12-30 PROBLEM — U07.1 PNEUMONIA DUE TO COVID-19 VIRUS: Status: ACTIVE | Noted: 2022-01-01

## 2022-12-30 NOTE — CARE COORDINATION
Discharge Planning Note:    CM attempted to call spouse via phone to discuss recommendation for skilled rehab at [place]. No answer, LVM for return call. CM contact information provided.      Electronically signed by Abraham Rowland RN on 12/30/2022 at 12:16 PM

## 2022-12-30 NOTE — PROGRESS NOTES
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    CKD (chronic kidney disease) stage 3, GFR 30-59 ml/min (HCC)    Bilateral pulmonary embolism (HCC)    Overweight    Diverticulosis of large intestine without diverticulitis    Chronic obstructive pulmonary disease (HCC)    Benign prostatic hyperplasia with urinary hesitancy    Gastroesophageal reflux disease without esophagitis    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)    Type 2 diabetes mellitus with stage 4 chronic kidney disease, without long-term current use of insulin (HCC)    Pulmonary nodule    Chronic systolic congestive heart failure (HCC)    Vitreous degeneration, bilateral    Secondary cataract    Primary open-angle glaucoma, bilateral, moderate stage    Posterior vitreous detachment of both eyes    Peripapillary atrophy of both eyes    Nodular corneal degeneration, right eye    Neoplastic malignant related fatigue    Entropion of left lower eyelid    Early stage nonexudative age-related macular degeneration of both eyes    Diabetic peripheral neuropathy (HCC)    Dermatochalasis of right upper eyelid    Bilateral posterior capsular opacification    Aortic valvular disease    Phimosis of penis    Renal insufficiency    Recurrent UTI    Cerebral amyloid angiopathy ([facility] [address]    Late onset Alzheimer's disease without behavioral disturbance (HCC)    Thrombocytopenia, unspecified    Skin ulcer of second toe of right foot, limited to breakdown of skin (HCC)    Chronic kidney disease (CKD) stage G4/A3, severely decreased glomerular filtration rate (GFR) between 15-29 mL/min/1.73 square meter and albuminuria creatinine ratio greater than 300 mg/g (HCC)    Secondary hyperparathyroidism of renal origin    Constipation due to opioid therapy    A-fib (HCC)    Elevated troponin level    New onset atrial fibrillation (HCC)       Past Medical History:   has a past medical history of Abdominal pain, epigastric, Arthritis, Benign prostatic hypertrophy without urinary obstruction, BPH, Cellulitis and abscess, Chronic rhinitis, Chronic systolic congestive heart failure ([address] COPD (chronic obstructive pulmonary disease) ([facility] [address] Diabetes mellitus ([facility] [address] Dysphagia, Erectile dysfunction, GERD (gastroesophageal reflux disease), Hx of blood clots, Hyperglycemia, Hypertension, Late onset Alzheimer's disease without behavioral disturbance ([facility] [address] lumbar radiculopathy, Neuropathy, Nocturia, Osteoarthritis, Other disorders of kidney and ureter in diseases classified elsewhere, Pain, joint, knee, Palpitations, skin cancer, SOB (shortness of breath), Tennis elbow, and Tinea pedis. Past Social History:   reports that he quit smoking about 52 years ago. His smoking use included cigarettes. He has a 40.00 pack-year smoking history. He has never used smokeless tobacco. He reports that he does not drink alcohol and does not use drugs. Subjective:    SOB worse. Mental status appears worse also. Review of Systems   Constitutional:  Positive for fatigue. Negative for activity change, appetite change, chills, fever and unexpected weight change. HENT:  Negative for congestion and facial swelling. Eyes:  Negative for photophobia, discharge and redness. Respiratory:  Positive for shortness of breath. Negative for cough and chest tightness. Cardiovascular:  Negative for chest pain, palpitations and leg swelling. Gastrointestinal:  Negative for abdominal distention, abdominal pain, blood in stool, constipation, diarrhea, nausea and vomiting.    Endocrine: Negative for cold intolerance, heat intolerance and polyuria. Genitourinary:  Negative for decreased urine volume, difficulty urinating, flank pain and hematuria. Musculoskeletal:  Negative for joint swelling and neck pain. Neurological:  Negative for dizziness, seizures, syncope, speech difficulty, light-headedness and headaches. Hematological:  Does not bruise/bleed easily. Psychiatric/Behavioral:  Negative for agitation, confusion and hallucinations. Objective:   Neg 670ml. BP (!) 169/84   Pulse 92   Temp 97.4 °F (36.3 °C) (Axillary)   Resp 20   Ht 5' 10\" (1.778 m)   Wt 213 lb 8 oz (96.8 kg)   SpO2 99%   BMI 30.63 kg/m²     Wt Readings from Last 3 Encounters:   12/30/22 213 lb 8 oz (96.8 kg)   11/30/22 218 lb (98.9 kg)   11/22/22 215 lb 14.4 oz (97.9 kg)       BP Readings from Last 3 Encounters:   12/30/22 (!) 169/84   11/30/22 130/80   11/22/22 132/80     Chest- wheezing b/l  Heart-regular  Abd-soft  Ext- no edema    Labs  Hemoglobin   Date Value Ref Range Status   12/30/2022 9.4 (L) 13.5 - 17.5 g/dL Final     Hematocrit   Date Value Ref Range Status   12/30/2022 28.5 (L) 40.5 - 52.5 % Final     WBC   Date Value Ref Range Status   12/30/2022 14.8 (H) 4.0 - 11.0 K/uL Final     Platelets   Date Value Ref Range Status   12/30/2022 316 135 - 450 K/uL Final     Lab Results   Component Value Date    CREATININE 4.2 (H) 12/30/2022     (HH) 12/30/2022     12/30/2022    K 4.1 12/30/2022     12/30/2022    CO2 23 12/30/2022       Assessment/Plan:   LUISANA on CKD 4-underlying CKD progression. BUN and Crea higher today. Decreased Gabapentin dose. With worsening solute and respiratory status, will start HD trial.  Discussed with patient and wife, understand risks and benefits of HD and agreeable to proceed. HTN-No need for aggressive control today. Electrolytes acceptable  Anemia- follow Hgb. Transfuse PRN. COVID Infection with COPD Exacerbation. Resp. Acidosis+AGMA+Metab. Alkalosis.   On Dexamethasone. H/O DM-per Medicine  H/O COPD  H/O Alzheimer's Dementia  A. Fib- EP following. Appreciate input. Rate controlled. With his age and H/O Dementia, time-limited HD trial may be appropriate option. Discussed with Medicine, ICU MD, wife and patient. Elevated INR-on Coumadin. FFP prior to line placement. Appreciate ICU MD help. High risk.      Bartolo Henson MD

## 2022-12-30 NOTE — PROGRESS NOTES
[facility]    12/30/2022 9:59 AM        Name: Eron Cobb . Admitted: 12/27/2022  Primary Care Provider: SHA Whitmore CNP (Tel: [phone])      Brief History: 81 yo male with hx chronic dCHF, COPD, DM2, HTN, DVT/PE on warfarin, dementia. He recently tested positive for COVID (12/21) after developing fever, sore throat, and cough which started 2 days prior. He was started on molnupiravir x 5 days by PCP. Presented to ER with worsening shortness of breath and weakness. Found to be in atrial fib with RVR on presentation, HR to 120s and he was started on diltiazem drip. CXR with evidence of viral pneumonia. Subjective:  Resting in bed, wife at bedside. Patient awake but somewhat confused, reorients easily. He was placed on Bipap yesterday for hypercapnia. Renal function worsening, nephrology planning to initiate hemodialysis today as he appears volume overloaded. Discussed with wife, wants aggressive measures at this point.      Reviewed interval ancillary notes    Current Medications  0.9 % sodium chloride infusion, PRN  budesonide (PULMICORT) nebulizer suspension 500 mcg, BID  insulin lispro (HUMALOG) injection vial 3 Units, TID WC  sennosides-docusate sodium (SENOKOT-S) 8.6-50 MG tablet 2 tablet, BID  polyethylene glycol (GLYCOLAX) packet 17 g, Daily  Arformoterol Tartrate (BROVANA) nebulizer solution 15 mcg, BID  methylPREDNISolone sodium (SOLU-MEDROL) injection 40 mg, Q8H  LORazepam (ATIVAN) tablet 0.25 mg, Q12H PRN  aspirin EC tablet 81 mg, Daily  atorvastatin (LIPITOR) tablet 10 mg, Nightly  gabapentin (NEURONTIN) capsule 300 mg, Nightly  guaiFENesin (MUCINEX) extended release tablet 600 mg, BID  benzonatate (TESSALON) capsule 100 mg, TID PRN  ipratropium-albuterol (DUONEB) nebulizer solution 1 ampule, TID  insulin lispro (HUMALOG) injection vial 0-16 Units, TID WC  insulin lispro (HUMALOG) injection vial 0-4 Units, Nightly  dilTIAZem (CARDIZEM) 125 mg in dextrose 5% 125 mL infusion (premix), Continuous  donepezil (ARICEPT) tablet 5 mg, Daily with breakfast  finasteride (PROSCAR) tablet 5 mg, Daily  latanoprost (XALATAN) 0.005 % ophthalmic solution 1 drop, Nightly  dilTIAZem (CARDIZEM CD) extended release capsule 120 mg, Daily  dextrose bolus 10% 125 mL, PRN   Or  dextrose bolus 10% 250 mL, PRN  glucagon (rDNA) injection 1 mg, PRN  dextrose 10 % infusion, Continuous PRN  sodium chloride flush 0.9 % injection 5-40 mL, 2 times per day  sodium chloride flush 0.9 % injection 5-40 mL, PRN  0.9 % sodium chloride infusion, PRN  ondansetron (ZOFRAN-ODT) disintegrating tablet 4 mg, Q8H PRN   Or  ondansetron (ZOFRAN) injection 4 mg, Q6H PRN  acetaminophen (TYLENOL) tablet 650 mg, Q6H PRN   Or  acetaminophen (TYLENOL) suppository 650 mg, Q6H PRN  warfarin placeholder: dosing by pharmacy, RX Placeholder  gabapentin (NEURONTIN) capsule 300 mg, Daily  DULoxetine (CYMBALTA) extended release capsule 30 mg, Nightly  tamsulosin (FLOMAX) capsule 0.4 mg, BID  famotidine (PEPCID) tablet 10 mg, Daily  insulin glargine (LANTUS) injection vial 30 Units, Nightly  hydrALAZINE (APRESOLINE) tablet 50 mg, BID      Objective:  BP (!) 169/84   Pulse 97   Temp 97.4 °F (36.3 °C) (Axillary)   Resp 20   Ht 5' 10\" (1.778 m)   Wt 213 lb 8 oz (96.8 kg)   SpO2 93%   BMI 30.63 kg/m²     Intake/Output Summary (Last 24 hours) at 12/30/2022 0959  Last data filed at 12/30/2022 0647  Gross per 24 hour   Intake 240 ml   Output 550 ml   Net -310 ml      Wt Readings from Last 3 Encounters:   12/30/22 213 lb 8 oz (96.8 kg)   11/30/22 218 lb (98.9 kg)   11/22/22 215 lb 14.4 oz (97.9 kg)     General:  Awake but confused in NAD  Skin:  Warm and dry. Neck:  Supple. + JVD   Chest:  Normal effort.   + wheezes, congested cough  Cardiovascular:  RRR, normal S1/S2, no murmur/gallop/rub  Abdomen:  Soft, nontender, +bowel sounds  Extremities:  No edema  Neurological: Moves all extremities     Labs and Tests:  CBC:   Recent Labs     12/28/22  0619 12/29/22  0435 12/30/22  0637   WBC 10.4 13.3* 14.8*   HGB 9.9* 9.5* 9.4*    309 316     BMP:    Recent Labs     12/29/22  0435 12/29/22  1750 12/30/22  0637    139 140   K 4.0 3.7 4.1    100 100   CO2 23 23 23   BUN 85* 102* 111*   CREATININE 3.8* 4.1* 4.2*   GLUCOSE 285* 260* 300*     Hepatic:   Recent Labs     12/27/22  1236 12/29/22  1750   AST 21 12*   ALT 20 14   BILITOT 0.5 0.6   ALKPHOS 80 81       CXR 12/27/2022:  Perihilar and multifocal airspace opacities likely represents viral pattern   of pneumonia. Echo 12/28/2022:   Summary   Left ventricular cavity size is dilated with mild concentric left   ventricular hypertrophy. Overall left ventricular systolic function appears normal with an ejection fraction that is visually estimated to be 55-60%. No obvious regional wall motion abnormalities are noted. Indeterminate diastolic function due to atrial fibrillation. The right ventricle is moderately enlarged with normal function. There is mild biatrial enlargement. Mild aortic regurgitation is present. Mild mitral regurgitation is present. Mild tricuspid regurgitation with an RVSP estimated to be 46 mmHg. Problem List  Principal Problem:    A-fib [facility])  Active Problems:    Elevated troponin level    New onset atrial fibrillation (HCC)  Resolved Problems:    * No resolved hospital problems. *       Assessment & Plan:   New onset atrial fibrillation. HR 120s on presentation, started on diltiazem drip. Evaluated by EP and transitioned to po cardizem. Already on warfarin for hx DVT/PE, INR supratherapeutic on admission (4.65), warfarin on hold. Continue telemetry. TSH 0.87. EP has signed off, to arrange for office follow up and potential cardioversion if remains in afib at that time. COVID-19 / viral pneumonia / COPD.  Positive home COVID test 12/21, treated with molnupiravir. Rapid influenza A/B negative, rapid COVID positive. CXR consistent with viral pneumonia, procalcitonin 0.76, lactic acid 1.1. Low suspicion for bacterial infection and antibiotics held on admission. Blood culture with NGTD x 2. Was on bipap last evening for hypercapnia, improved. Currently on O2 at 3 liters for increased work of breathing, O2 sats have been stable. Started on dexamethasone 4 mg BID on admission, transitioned to solumedrol 12/29 by pulmonary. Continue Brovana, Pulmicort, bhavin. Pulmonary on board.   Volume overload. Hx chronic dCHF. CXR with multifocal airspace opacities likely representing vial pneumonia, proBNP > 25K in setting of LUISANA on CKD. Patient is orthopneic. Volume overload secondary to worsening kidney function not CHF. He received IV Lasix with poor response, creatinine trending higher, nephrology planning to start dialysis. Echo with EF 55-60%, RVSP 46 mmHg.  Elevated troponin (0.16) in setting of CHF and LUISANA/CKD. No acute changes on EKG. Denied chest pain. Has been evaluated by EP, recommends coronary evaluation after COVID infection resolved. Continue asa and statin.  LUISANA on CKD stage IV. Creatinine 3.2 on presentation, baseline 2.9. Creatinine trending higher, 4.2 today. Nephrology evaluated, suspect underlying CKD progression. To start hemodialysis. Follow BMP.  Hx DVT/PE. Takes warfarin. INR supratherapeutic on presentation and warfarin on hold, INR 4.74 today. Discussed with nephrology, to receive 2 units FFP prior to non-tunneled placement.   HTN. BP elevated on admission, improved. Continue hydralazine.   DM2. A1c 7.2. Takes Lantus and sliding scale insulin, continued on admission. Reviewed BG values, remain elevated secondary to steroids. Increase Lantus to 35 units, continue mealtime with high dose correction.    Anxiety. No results with Atarax and switched to lorazepam.   Constipation. Continue Miralax and senokot-S.    I have discussed with  the wife the rationale for blood component transfusion; its benefits in treating or preventing fatigue, organ damage, or death; and its risk which includes mild transfusion reactions, rare risk of blood borne infection, or more serious but rare reactions. I have discussed the alternatives to transfusion, including the risk and consequences of not receiving transfusion. The wife had an opportunity to ask questions and had agreed to proceed with transfusion of blood components. Disposition: PT/OT evaluated (8/24, 14/24) and recommend SNF on DC. Diet: ADULT DIET;  Regular; 4 carb choices (60 gm/meal)  Code:Full Code  DVT PPX: warfarin on hold, INR 4.74      SHA Henderson CNP   12/30/2022 9:59 AM

## 2022-12-30 NOTE — PROGRESS NOTES
Advanced Care Planning Note. Purpose of Encounter: Advanced care planning due to worsening respiratory distress, hypoxic respiratory failure and patient with COPD exacerbation and COVID-19 pulmonary infection  Parties In Attendance: the patient and the patient's wife and sons  Decisional Capacity: No  Subjective: Patient is on continuous BiPAP therapy, lethargic and somnolent  Objective: Suffering COVID-19 pulm infection with worsening respiratory status, worsening renal functions  Goals of Care Determination: To discuss treatment options in case patient's clinical status worsens  Plan:  personally discussed  prognosis, expected outcome with or without ongoing aggressive treatments and the options for de-escalation of care. Assessed patient specific goals and addressed the best way to achieve them. Code Status: DNR/DNI        Time spent on Advanced care Plannin minutes  Advanced Care Planning Documents: Completed advanced directives on chart, wife share the POA.         Hazel Munoz MD  Pulmonary Critical Care and Sleep Medicine  2022, 2:49 PM

## 2022-12-30 NOTE — PROGRESS NOTES
Pt transferred from  for respiratory failure and need for continuous BiPAP. LUISANA on CKD with new need for HD, vascath placed by ICU MD, HD performed at bedside this evening. Pt tolerating BiPAP with no desats, RR ~20/min, mildly increased work of breathing. Tolerating first run of HD with no hypotension or arrhythmias. Spouse and two adult sons at bedside, updated on illness and plan of care by ICU MD, all questions answered, code status changed to DNR/DNI at this time in the setting of critical illness.

## 2022-12-30 NOTE — PROGRESS NOTES
PULMONARY AND CRITICAL CARE MEDICINE PROGRESS NOTE    Subjective: Patient is seen at bedside. He is having diffuse wheezing with some respiratory distress. BiPAP had to be initiated last night for hypercapnia. Repeat ABG on BiPAP showed improvement in hypercapnia. Nephrology is planning to perform hemodialysis today. Patient's INR is 4.7 for which the plan is to give FFP and then place Vas-Cath. Patient is unable to lie flat as of now. REVIEW OF SYSTEMS:   Constitutional symptoms: The patient denies fever, fatigue, night sweats, weight loss or weight gain. HEENT: No vision changes. No tinnitus, Denies sinus pain. No hoarseness, or dysphagia. Neck: Patient denies swelling in the neck. Cardiovascular: Denies chest pain, palpitation, syncope. Respiratory: Positive for shortness of breath or cough. Gastrointestinal: Denies nausea, abdominal pain or change in bowel function. Genitourinary: Denies obstructive symptoms. No history of incontinence. Skin: No rashes or itching. Muskuloskeletal: Denies weakness or bone pain. Neurological: No headaches or seizures. Psychiatric: Denies mood swings or depression.      MEDICATIONS:     Scheduled Meds:   furosemide  40 mg IntraVENous Once    budesonide  0.5 mg Nebulization BID    insulin lispro  3 Units SubCUTAneous TID     sennosides-docusate sodium  2 tablet Oral BID    polyethylene glycol  17 g Oral Daily    Arformoterol Tartrate  15 mcg Nebulization BID    methylPREDNISolone  40 mg IntraVENous Q8H    aspirin  81 mg Oral Daily    atorvastatin  10 mg Oral Nightly    gabapentin  300 mg Oral Nightly    guaiFENesin  600 mg Oral BID    ipratropium-albuterol  1 ampule Inhalation TID    insulin lispro  0-16 Units SubCUTAneous TID     insulin lispro  0-4 Units SubCUTAneous Nightly    donepezil  5 mg Oral Daily with breakfast    finasteride  5 mg Oral Daily    latanoprost  1 drop Both Eyes Nightly    dilTIAZem  120 mg Oral Daily    sodium chloride flush 5-40 mL IntraVENous 2 times per day    warfarin placeholder: dosing by pharmacy   Other RX Placeholder    gabapentin  300 mg Oral Daily    DULoxetine  30 mg Oral Nightly    tamsulosin  0.4 mg Oral BID    famotidine  10 mg Oral Daily    insulin glargine  30 Units SubCUTAneous Nightly    hydrALAZINE  50 mg Oral BID       Current Infusions:    sodium chloride      dilTIAZem Stopped (12/28/22 1530)    dextrose      sodium chloride         PRN meds:  sodium chloride, LORazepam, benzonatate, dextrose bolus **OR** dextrose bolus, glucagon (rDNA), dextrose, sodium chloride flush, sodium chloride, ondansetron **OR** ondansetron, acetaminophen **OR** acetaminophen    PHYSICAL EXAM:  BP (!) 169/84   Pulse 97   Temp 97.4 °F (36.3 °C) (Axillary)   Resp 20   Ht 5' 10\" (1.778 m)   Wt 213 lb 8 oz (96.8 kg)   SpO2 94%   BMI 30.63 kg/m²  I/O last 3 completed shifts: In: 240 [P.O.:240]  Out: 1150 [Urine:1150] No intake/output data recorded. Intake/Output Summary (Last 24 hours) at 12/30/2022 1111  Last data filed at 12/30/2022 0647  Gross per 24 hour   Intake 240 ml   Output 550 ml   Net -310 ml       CONSTITUTIONAL: He is a 82y.o.-year-old who appears his stated age. He is mild respiratory distress. CARDIOVASCULAR: S1 S2 RRR. Without murmer  RESPIRATORY & CHEST: Bilateral expiratory wheezing, no crackles. Good air movement  GASTROINTESTINAL & ABDOMEN: Soft, nontender, positive bowel sounds in all quadrants, non-distended, without hepatosplenomegaly. GENITOURINARY: Deferred. MUSCULOSKELETAL: No tenderness to palpation of the axial skeleton. There is no clubbing. No cyanosis. No edema of the lower extremities. SKIN OF BODY: No rash or jaundice. PSYCHIATRIC EVALUATION: Normal affect. Patient answers questions appropriately. HEMATOLOGIC/LYMPHATIC/ IMMUNOLOGIC: No palpable lymphadenopathy. NEUROLOGIC: Awake and answers questions. Groslly non-focal. Motor strength is 5+/5 in all muscle groups.  The patient has a normal sensorium globally. LABS:    Recent Labs     12/27/22  1236 12/28/22  0619 12/29/22  0435 12/30/22  0637   WBC 12.6* 10.4 13.3* 14.8*   RBC 3.76* 3.52* 3.40* 3.28*   HGB 10.6* 9.9* 9.5* 9.4*   HCT 32.4* 30.3* 29.3* 28.5*   MCH 28.2 28.0 27.9 28.6   MCHC 32.7 32.5 32.4 32.9   RDW 16.7* 16.3* 16.5* 16.4*    219 309 316   MPV 8.6 8.0 8.2 7.8   NEUTOPHILPCT 84.5  --   --   --    MONOPCT 11.2  --   --   --    BASOPCT 0.1  --   --   --      Recent Labs     12/27/22  1236 12/28/22  0619 12/29/22  0435 12/29/22  1750 12/30/22  0637      < > 136 139 140   K 3.8   < > 4.0 3.7 4.1      < > 101 100 100   ANIONGAP 14   < > 12 16 17*   CO2 24   < > 23 23 23   BUN 61*   < > 85* 102* 111*   CREATININE 3.2*   < > 3.8* 4.1* 4.2*   CALCIUM 7.6*   < > 7.6* 7.9* 8.0*   PROT 6.7  --   --  6.6  --    BILITOT 0.5  --   --  0.6  --    ALKPHOS 80  --   --  81  --    ALT 20  --   --  14  --    AST 21  --   --  12*  --    GLUCOSE 170*   < > 285* 260* 300*    < > = values in this interval not displayed. Recent Labs     12/29/22 2026   PHART 7.300*   [id number] 46.1*   PO2ART 235.0*   [id number] 22.6   [id number] 99.8   BEART -3.7*       IMAGING:  I reviewed the chest x-ray from 12/20/2022 and my interpretation is as follows. B/L multifocal infiltrates       IMPRESSION:   Acute hypoxic respiratory failure  Acute exacerbation of COPD  COVID-19 infection  Atrial fibrillation with RVR  History of DVT/PE  Acute on chronic CKD stage IV  Diabetes mellitus type 2  Hypertension  History of non-small cell lung cancer s/p chemo and radiation  Alzheimer's dementia  Severe peripheral neuropathy       RECOMMENDATION:   Patient presents with acute hypoxic and hypercapnic respiratory failure. Currently requiring oxygen up to 3 L/min. He is diffusely wheezing. Was initiated on BiPAP overnight with improvement in hypercapnia on ABG. Likely has COPD exacerbation due to underlying COVID.   Chest x-ray from today continue to show bilateral multifocal infiltrates but did not show any evidence of volume overload or pulmonary edema. Continue Solu-Medrol 40 IV every 8. Continue Pulmicort and Brovana nebs with round-the-clock duo nebs. Continue BiPAP intermittently during the day and continuous at night. Titrate FiO2 as tolerated for saturation of 90 to to 94%. S/p molnupiravir for covid infection. Also noted to have elevated proBNP of 20,597. Noted to have new onset atrial fibrillation. Elevated troponin likely due to demand ischemia and A. fib. Echo with preserved EF with diastolic dysfunction and LVH. Moderately enlarged RV with normal RV function. Acute on chronic CKD stage IV with worsening BUN and creatinine. Nephrology anticipating need for HD for worsening renal function. Plan is to place Vas-Cath catheter however, I doubt that patient can go down to IR to get Vas-Cath placement as he is unable to lie flat due to diffuse wheezing or respiratory distress. I would recommend transferring patient to ICU. Further discussion with nephrology for need for HD today and the timing of Vas-Cath placement as patient's INR is 4.7. Patient has history of poorly differentiated non-small cell lung cancer, stage IIa/stage III. He is s/p chemotherapy with carboplatinum and radiation. I would recommend palliative care consultation for further discussion of goals of care. Patient has Alzheimer's dementia now with worsening renal function and need for HD and respiratory failure. Palliative care would be appropriate. Critical Care Time: 39 Minutes  Total critical care time caring for this patient with life threatening illness, including direct patient contact, management of life support systems, review of data including imaging and labs, discussions with other team members and physicians excluding procedures.       Alba Moran MD  Pulmonary Critical Care and Sleep Medicine  12/30/2022, 11:11 AM    This note was completed using dragon medical speech recognition software. Grammatical errors, random word insertions, pronoun errors and incomplete sentences are occasional consequences of this technology due to software limitations. If there are questions or concerns about the content of this note of information contained within the body of this dictation they should be addressed with the provider for clarification.

## 2022-12-30 NOTE — PROGRESS NOTES
Pt tolerated bipap all night. Removed for pt to eat breakfast.   Currently has labored breathing and grunting on NC. O2 sat >90%. Pt cannot tolerate laying flat even to be pulled up in the bed. Discussed case with Dr. Yahir Perla who placed order for transfer to ICU. Updated wife who remains at bedside. Bedside report given to Guillermina Stephenson RN at the bedside [id number]. Strongly encouraged pts wife to have discussion with their sons regarding goals of care.

## 2022-12-30 NOTE — PROCEDURES
PROCEDURE NOTE     Procedure: Bedside dialysis catheter insertion. Indication: Renal replacement therapy requiring dialysis access. Consent: Informed Consent obtained from wife. Site: right internal jugularvein  Ultrasound guidance used: Yes  Complications: None were apparent. Blood Loss: Less than 5 ml. CXR: Shows appropriate placement of the catheter without any pneumothorax. Description of the procedure: After usual sterile precautions, the site was prepped. Ultrasound guidance was used to visualize the venous anatomy. Seldinger technique was used to insert a needle into the vein and then place a guidewire. A triple lumen dialysis catheter  13  Fr. , 15cm was threaded over the guidewire and placed into the vein. Good flow of blood was appreciated through all the ports. Catheter was sutured to the skin and a dry gauze dressing was placed on it. Patient's hemodynamics and oxygenation was continuously monitored during the procedure and they remained stable throughout.        Reese Doherty MD  Pulmonary Critical Care and Sleep Medicine  12/30/2022, 2:52 PM

## 2022-12-30 NOTE — PROGRESS NOTES
Treatment time: 2 hrs    Net UF: 1000 ml    Pre weight: 96.8 kg  Post weight: 95.8 kg  EDW: TBD    Access used: Rt IJ NT DC  Access function: Well tolerated, 250 BFR    Medications or blood products given: Heparin dwells    Regular outpatient schedule: MWF    Summary of response to treatment: Pt tolerated well. Pt remained stable entire treatment. Copy of dialysis treatment record placed in chart, to be scanned into EMR.

## 2022-12-30 NOTE — PROGRESS NOTES
Pharmacy to Dose Warfarin    Pharmacy consulted to dose warfarin for hx DVT/PE and new onset Afib. INR Goal: 2-3    INR today: 4.74    Assessment/Plan:  - continue to hold warfarin until INR starts to down trend  - FFP given today for vascath placement  - Possible concomitant drug-drug interactions include: n/a     Pharmacy will continue to follow.     Izzy Hurtado, PharmD, BCPS  [id number]  12/30/2022 12:04 PM [General Appearance - Well Developed] : well developed [Normal Appearance] : normal appearance [Well Groomed] : well groomed [General Appearance - Well Nourished] : well nourished [General Appearance - In No Acute Distress] : no acute distress [Normal Conjunctiva] : the conjunctiva exhibited no abnormalities [Eyelids - No Xanthelasma] : the eyelids demonstrated no xanthelasmas [Normal Jugular Venous A Waves Present] : normal jugular venous A waves present [Normal Jugular Venous V Waves Present] : normal jugular venous V waves present [Respiration, Rhythm And Depth] : normal respiratory rhythm and effort [Auscultation Breath Sounds / Voice Sounds] : lungs were clear to auscultation bilaterally [Heart Rate And Rhythm] : heart rate and rhythm were normal [Bowel Sounds] : normal bowel sounds [Abnormal Walk] : normal gait [Nail Clubbing] : no clubbing of the fingernails [Cyanosis, Localized] : no localized cyanosis [Skin Color & Pigmentation] : normal skin color and pigmentation [] : no rash [Oriented To Time, Place, And Person] : oriented to person, place, and time [Impaired Insight] : insight and judgment were intact [Affect] : the affect was normal [Mood] : the mood was normal [FreeTextEntry1] : 2+ pulses in the upper and lower extremities. No edema.

## 2022-12-31 NOTE — PROGRESS NOTES
PULMONARY AND CRITICAL CARE MEDICINE PROGRESS NOTE    Subjective: Patient's overall clinical status slowly improving. Off of BiPAP this morning and currently on 5 L/min of oxygen supplementation saturating in high 90s. Reports feeling slightly better. Undergoing hemodialysis today. Remains afebrile with persistent leukocytosis. REVIEW OF SYSTEMS:   Constitutional symptoms: The patient denies fever, fatigue, night sweats, weight loss or weight gain. HEENT: No vision changes. No tinnitus, Denies sinus pain. No hoarseness, or dysphagia. Neck: Patient denies swelling in the neck. Cardiovascular: Denies chest pain, palpitation, syncope. Respiratory: Positive for shortness of breath or cough. Gastrointestinal: Denies nausea, abdominal pain or change in bowel function. Genitourinary: Denies obstructive symptoms. No history of incontinence. Skin: No rashes or itching. Muskuloskeletal: Denies weakness or bone pain. Neurological: No headaches or seizures. Psychiatric: Denies mood swings or depression.      MEDICATIONS:     Scheduled Meds:   insulin glargine  35 Units SubCUTAneous Nightly    budesonide  0.5 mg Nebulization BID    insulin lispro  3 Units SubCUTAneous TID WC    sennosides-docusate sodium  2 tablet Oral BID    polyethylene glycol  17 g Oral Daily    Arformoterol Tartrate  15 mcg Nebulization BID    methylPREDNISolone  40 mg IntraVENous Q8H    aspirin  81 mg Oral Daily    atorvastatin  10 mg Oral Nightly    gabapentin  300 mg Oral Nightly    guaiFENesin  600 mg Oral BID    ipratropium-albuterol  1 ampule Inhalation TID    insulin lispro  0-16 Units SubCUTAneous TID WC    insulin lispro  0-4 Units SubCUTAneous Nightly    donepezil  5 mg Oral Daily with breakfast    finasteride  5 mg Oral Daily    latanoprost  1 drop Both Eyes Nightly    dilTIAZem  120 mg Oral Daily    sodium chloride flush  5-40 mL IntraVENous 2 times per day    warfarin placeholder: dosing by pharmacy   Other RX Placeholder    gabapentin  300 mg Oral Daily    DULoxetine  30 mg Oral Nightly    tamsulosin  0.4 mg Oral BID    famotidine  10 mg Oral Daily    hydrALAZINE  50 mg Oral BID       Current Infusions:    sodium chloride      dextrose      sodium chloride         PRN meds:  sodium chloride, heparin flush, LORazepam, benzonatate, dextrose bolus **OR** dextrose bolus, glucagon (rDNA), dextrose, sodium chloride flush, sodium chloride, ondansetron **OR** ondansetron, acetaminophen **OR** acetaminophen    PHYSICAL EXAM:  BP (!) 148/78   Pulse 96   Temp 96.8 °F (36 °C) (Temporal)   Resp 14   Ht 5' 11\" (1.803 m)   Wt 195 lb 1.7 oz (88.5 kg)   SpO2 98%   BMI 27.21 kg/m²  I/O last 3 completed shifts: In: 1212 [P. O.:600; Blood:612]  Out: 8587 [HPUZA:0581] I/O this shift: In: 410 [I.V.:10]  Out: 1900     Intake/Output Summary (Last 24 hours) at 12/31/2022 1417  Last data filed at 12/31/2022 1121  Gross per 24 hour   Intake 716 ml   Output 2900 ml   Net -2184 ml       CONSTITUTIONAL: He is a 82y.o.-year-old who appears his stated age. He is in mild respiratory distress. CARDIOVASCULAR: S1 S2 RRR. Without murmer  RESPIRATORY & CHEST: Bilateral scattered crackles heard. No wheezing heard. GASTROINTESTINAL & ABDOMEN: Soft, nontender, positive bowel sounds in all quadrants, non-distended, without hepatosplenomegaly. GENITOURINARY: Deferred. MUSCULOSKELETAL: No tenderness to palpation of the axial skeleton. There is no clubbing. No cyanosis. No edema of the lower extremities. SKIN OF BODY: No rash or jaundice. PSYCHIATRIC EVALUATION: Normal affect. Patient answers questions appropriately. HEMATOLOGIC/LYMPHATIC/ IMMUNOLOGIC: No palpable lymphadenopathy. NEUROLOGIC: Awake and answers questions. Groslly non-focal. Motor strength is 5+/5 in all muscle groups. The patient has a normal sensorium globally.      LABS:    Recent Labs     12/29/22  0435 12/30/22  0637 12/31/22  0500   WBC 13.3* 14.8* 18.8*   RBC 3.40* 3.28* 3.34*   HGB 9.5* 9.4* 9.3*   HCT 29.3* 28.5* 28.6*   MCH 27.9 28.6 27.9   MCHC 32.4 32.9 32.7   RDW 16.5* 16.4* 16.6*    316 349   MPV 8.2 7.8 7.5     Recent Labs     12/29/22  1750 12/30/22  0637 12/31/22  0500    140 138   K 3.7 4.1 3.8    100 101   ANIONGAP 16 17* 11   CO2 23 23 26   * 111* 83*   CREATININE 4.1* 4.2* 3.4*   CALCIUM 7.9* 8.0* 7.9*   PROT 6.6  --   --    BILITOT 0.6  --   --    ALKPHOS 81  --   --    ALT 14  --   --    AST 12*  --   --    GLUCOSE 260* 300* 140*     Recent Labs     12/29/22 2026   PHART 7.300*   [id number] 46.1*   PO2ART 235.0*   [id number] 22.6   [id number] 99.8   BEART -3.7*       IMAGING:  I reviewed the chest x-ray from 12/20/2022 and my interpretation is as follows. B/L multifocal infiltrates       IMPRESSION:   Acute hypoxic respiratory failure  Acute exacerbation of COPD  COVID-19 infection  Atrial fibrillation with RVR  History of DVT/PE  Acute on chronic CKD stage IV  Diabetes mellitus type 2  Hypertension  History of non-small cell lung cancer s/p chemo and radiation  Alzheimer's dementia  Severe peripheral neuropathy       RECOMMENDATION:     Patient's respiratory status is slowly improving. He had initially presented with  acute hypoxic and hypercapnic respiratory failure. Yesterday was initiated on BiPAP therapy with which his hypercapnia has improved. It was thought that he is having an acute COPD exacerbation due to COVID infection. Additionally chest imaging suggested pulmonary edema. He has been on  Solu-Medrol 40 IV every 8. Wheezing has decreased. Leukocytosis most likely due to steroids. Continue Pulmicort and Brovana nebs with round-the-clock duo nebs. Oxygen supplementation to maintain SPO2 between 88 to 92%. S/p molnupiravir for covid infection. Also noted to have elevated proBNP of 20,597. Noted to have new onset atrial fibrillation. Elevated troponin likely due to demand ischemia and A. fib.   Echo with preserved EF with diastolic dysfunction and LVH. Moderately enlarged RV with normal RV function. Acute on chronic CKD stage IV with worsening BUN and creatinine. Has been started on hemodialysis. On Coumadin for A. fib. INR supratherapeutic. Coumadin on hold. Received 2 units of yesterday. Patient has history of poorly differentiated non-small cell lung cancer, stage IIa/stage III. He is s/p chemotherapy with carboplatinum and radiation. Patient has Alzheimer's dementia now with worsening renal function and need for HD and respiratory failure. I had a prolonged discussion with patient's wife and sons yesterday. They have changed his CODE STATUS to DNR/DNI. Total critical care time caring for this patient with life threatening illness, including direct patient contact, management of life support systems, review of data including imaging and labs, discussions with other team members and physicians is at least 32 minutes so far today, excluding procedures. Chidi Payne MD   Pulmonary Critical Care and Sleep Medicine  [address]  12/27/2022, 2:21 PM        This note was completed using dragon medical speech recognition software. Grammatical errors, random word insertions, pronoun errors and incomplete sentences are occasional consequences of this technology due to software limitations. If there are questions or concerns about the content of this note of information contained within the body of this dictation they should be addressed with the provider for clarification.

## 2022-12-31 NOTE — RT PROTOCOL NOTE
RT Nebulizer Bronchodilator Protocol Note    There is a bronchodilator order in the chart from a provider indicating to follow the RT Bronchodilator Protocol and there is an Initiate RT Bronchodilator Protocol order as well (see protocol at bottom of note). CXR Findings:  XR CHEST PORTABLE    Result Date: 12/30/2022  Venous catheters project in normal positions. No pneumothorax. Perihilar opacities, likely pulmonary edema. Additional mild progression in bibasilar atelectasis versus airspace disease, pulmonary edema likely in the setting. XR CHEST PORTABLE    Result Date: 12/30/2022  Bilateral pulmonary opacities could represent multifocal pneumonia       The findings from the last RT Protocol Assessment were as follows:  Smoking: Chronic pulmonary disease  Respiratory Pattern: Regular pattern and RR 12-20 bpm  Breath Sounds: Slightly diminished and/or crackles  Cough: Strong, spontaneous, non-productive  Indication for Bronchodilator Therapy:    Bronchodilator Assessment Score: 4    Aerosolized bronchodilator medication orders have been revised according to the RT Nebulizer Bronchodilator Protocol below. Respiratory Therapist to perform RT Therapy Protocol Assessment initially then follow the protocol. Repeat RT Therapy Protocol Assessment PRN for score 0-3 or on second treatment, BID, and PRN for scores above 3. No Indications - adjust the frequency to every 6 hours PRN wheezing or bronchospasm, if no treatments needed after 48 hours then discontinue using Per Protocol order mode. If indication present, adjust the RT bronchodilator orders based on the Bronchodilator Assessment Score as indicated below. If a patient is on this medication at home then do not decrease Frequency below that used at home.     0-3 - enter or revise RT bronchodilator order(s) to equivalent RT Bronchodilator order with Frequency of every 4 hours PRN for wheezing or increased work of breathing using Per Protocol order mode.       4-6 - enter or revise RT Bronchodilator order(s) to two equivalent RT bronchodilator orders with one order with BID Frequency and one order with Frequency of every 4 hours PRN wheezing or increased work of breathing using Per Protocol order mode. 7-10 - enter or revise RT Bronchodilator order(s) to two equivalent RT bronchodilator orders with one order with TID Frequency and one order with Frequency of every 4 hours PRN wheezing or increased work of breathing using Per Protocol order mode. 11-13 - enter or revise RT Bronchodilator order(s) to one equivalent RT bronchodilator order with QID Frequency and an Albuterol order with Frequency of every 4 hours PRN wheezing or increased work of breathing using Per Protocol order mode. Greater than 13 - enter or revise RT Bronchodilator order(s) to one equivalent RT bronchodilator order with every 4 hours Frequency and an Albuterol order with Frequency of every 2 hours PRN wheezing or increased work of breathing using Per Protocol order mode. RT to enter RT Home Evaluation for COPD & MDI Assessment order using Per Protocol order mode.     Electronically signed by Nino Charles RCP on 12/31/2022 at 2:32 PM

## 2022-12-31 NOTE — PROGRESS NOTES
Assessment completed. See Flow sheets for full details. Patient is alert and oriented x 2 (Name and place, not oriented to time and situation). BP high but other vitals signs are within normal limits. T 97.2 Patient follows commands but is hard of hearing and has delayed responses. Heart sounds are irregular and patient remains on Atrial fibrillation on the monitor. Lung sounds are diminished and fine crackles in the lung bases. Bowel sounds are hypoactive. Patient still complaining of constipation. Stool softeners administered per MAR. Son at bedside. Will continue to monitor.

## 2022-12-31 NOTE — PROGRESS NOTES
Physician Progress Note      Katja Mckeon  CSN #:                  [id number]  :                       1935  ADMIT DATE:       2022 12:10 PM  DISCH DATE:  RESPONDING  PROVIDER #:        Alana Miner CNP          QUERY TEXT:    Pt admitted with SOB. Pt noted to have Covid PNA. If possible, please document   in the progress notes and discharge summary if you are evaluating and /or   treating any of the following: The medical record reflects the following:  Risk Factors: Covid PNA  Clinical Indicators: wbc- 12.6, procal- 0.76, tachycardia, tachypnea  Treatment: Labs, Imaging, IVF, O2  Options provided:  -- Sepsis, present on admission  -- Covid pneumonia without Sepsis  -- Sepsis was ruled out  -- Other - I will add my own diagnosis  -- Disagree - Not applicable / Not valid  -- Disagree - Clinically unable to determine / Unknown  -- Refer to Clinical Documentation Reviewer    PROVIDER RESPONSE TEXT:    After further study, sepsis was ruled out for this patient.     Query created by: Tomas Dickens on 2022 11:44 AM      Electronically signed by:  Reed Miner CNP 2022 8:40 PM

## 2022-12-31 NOTE — PROGRESS NOTES
Pharmacy to Dose Warfarin    Pharmacy consulted to dose warfarin for afib. INR Goal: 2-3    INR today: 3.41     Assessment/Plan:  - INR remains supratherapeutic   - Hold warfarin today    Pharmacy will continue to follow. Eri Constantino, PharmD.   PGY-1 Resident  [id number]  12/31/22 7:22 AM

## 2022-12-31 NOTE — PLAN OF CARE
Problem: Discharge Planning  Goal: Discharge to home or other facility with appropriate resources  Outcome: Progressing     Problem: Skin/Tissue Integrity  Goal: Absence of new skin breakdown  Description: 1. Monitor for areas of redness and/or skin breakdown  2. Assess vascular access sites hourly  3. Every 4-6 hours minimum:  Change oxygen saturation probe site  4. Every 4-6 hours:  If on nasal continuous positive airway pressure, respiratory therapy assess nares and determine need for appliance change or resting period. Outcome: Progressing     Problem: Safety - Adult  Goal: Free from fall injury  Outcome: Progressing     Problem: ABCDS Injury Assessment  Goal: Absence of physical injury  Outcome: Progressing     Problem: Confusion  Goal: Confusion, delirium, dementia, or psychosis is improved or at baseline  Description: INTERVENTIONS:  1. Assess for possible contributors to thought disturbance, including medications, impaired vision or hearing, underlying metabolic abnormalities, dehydration, psychiatric diagnoses, and notify attending LIP  2. [place] high risk fall precautions, as indicated  3. Provide frequent short contacts to provide reality reorientation, refocusing and direction  4. Decrease environmental stimuli, including noise as appropriate  5. Monitor and intervene to maintain adequate nutrition, hydration, elimination, sleep and activity  6. If unable to ensure safety without constant attention obtain sitter and review sitter guidelines with assigned personnel  7.  Initiate Psychosocial CNS and Spiritual Care consult, as indicated  Outcome: Progressing     Problem: Chronic Conditions and Co-morbidities  Goal: Patient's chronic conditions and co-morbidity symptoms are monitored and maintained or improved  Outcome: Progressing

## 2022-12-31 NOTE — PROGRESS NOTES
[facility] PROGRESS NOTE    12/31/2022 1:30 PM        Name: Shabnam Simpson . Admitted: 12/27/2022  Primary Care Provider: SHA Garrido CNP (Tel: [phone])      Brief History: 83 yo male with hx chronic dCHF, COPD, DM2, HTN, DVT/PE on warfarin, dementia. He recently tested positive for COVID (12/21) after developing fever, sore throat, and cough which started 2 days prior. He was started on molnupiravir x 5 days by PCP. Presented to ER with worsening shortness of breath and weakness. Found to be in atrial fib with RVR on presentation, HR to 120s and he was started on diltiazem drip. CXR with evidence of viral pneumonia. Subjective:  Presently up in chair, wife visiting. Patient more alert today, some mild confusion. Has non-tunneled cath placed 12/30 and started on dialysis, had session yesterday and today. Patient says he is tired, otherwise offers no complaints. Denies chest pain, shortness of breath, abdominal apin, nausea.      Reviewed interval ancillary notes    Current Medications  0.9 % sodium chloride infusion, PRN  heparin flush 100 UNIT/ML injection 100 Units, PRN  insulin glargine (LANTUS) injection vial 35 Units, Nightly  budesonide (PULMICORT) nebulizer suspension 500 mcg, BID  insulin lispro (HUMALOG) injection vial 3 Units, TID WC  sennosides-docusate sodium (SENOKOT-S) 8.6-50 MG tablet 2 tablet, BID  polyethylene glycol (GLYCOLAX) packet 17 g, Daily  Arformoterol Tartrate (BROVANA) nebulizer solution 15 mcg, BID  methylPREDNISolone sodium (SOLU-MEDROL) injection 40 mg, Q8H  LORazepam (ATIVAN) tablet 0.25 mg, Q12H PRN  aspirin EC tablet 81 mg, Daily  atorvastatin (LIPITOR) tablet 10 mg, Nightly  gabapentin (NEURONTIN) capsule 300 mg, Nightly  guaiFENesin (MUCINEX) extended release tablet 600 mg, BID  benzonatate (TESSALON) capsule 100 mg, TID PRN  ipratropium-albuterol (DUONEB) nebulizer solution 1 ampule, TID  insulin lispro (HUMALOG) injection vial 0-16 Units, TID WC  insulin lispro (HUMALOG) injection vial 0-4 Units, Nightly  donepezil (ARICEPT) tablet 5 mg, Daily with breakfast  finasteride (PROSCAR) tablet 5 mg, Daily  latanoprost (XALATAN) 0.005 % ophthalmic solution 1 drop, Nightly  dilTIAZem (CARDIZEM CD) extended release capsule 120 mg, Daily  dextrose bolus 10% 125 mL, PRN   Or  dextrose bolus 10% 250 mL, PRN  glucagon (rDNA) injection 1 mg, PRN  dextrose 10 % infusion, Continuous PRN  sodium chloride flush 0.9 % injection 5-40 mL, 2 times per day  sodium chloride flush 0.9 % injection 5-40 mL, PRN  0.9 % sodium chloride infusion, PRN  ondansetron (ZOFRAN-ODT) disintegrating tablet 4 mg, Q8H PRN   Or  ondansetron (ZOFRAN) injection 4 mg, Q6H PRN  acetaminophen (TYLENOL) tablet 650 mg, Q6H PRN   Or  acetaminophen (TYLENOL) suppository 650 mg, Q6H PRN  warfarin placeholder: dosing by pharmacy, RX Placeholder  gabapentin (NEURONTIN) capsule 300 mg, Daily  DULoxetine (CYMBALTA) extended release capsule 30 mg, Nightly  tamsulosin (FLOMAX) capsule 0.4 mg, BID  famotidine (PEPCID) tablet 10 mg, Daily  hydrALAZINE (APRESOLINE) tablet 50 mg, BID      Objective:  BP (!) 148/78   Pulse 96   Temp 96.8 °F (36 °C) (Temporal)   Resp 14   Ht 5' 11\" (1.803 m)   Wt 195 lb 1.7 oz (88.5 kg)   SpO2 98%   BMI 27.21 kg/m²     Intake/Output Summary (Last 24 hours) at 12/31/2022 1330  Last data filed at 12/31/2022 1121  Gross per 24 hour   Intake 716 ml   Output 2900 ml   Net -2184 ml      Wt Readings from Last 3 Encounters:   12/31/22 195 lb 1.7 oz (88.5 kg)   11/30/22 218 lb (98.9 kg)   11/22/22 215 lb 14.4 oz (97.9 kg)     General:  Awake, alert, in NAD  Skin:  Warm and dry. No unusual bruising or rash  Neck:  Supple. Non-tunneled cath RIJ  Chest:  Normal effort.   Bibasilar crackles, no wheezes  Cardiovascular:  RRR, normal S1/S2, no murmur/gallop/rub  Abdomen:  Soft, nontender, +bowel sounds  Extremities:  No edema  Neurological: No focal deficits  Psychological: Normal mood and affect      Labs and Tests:  CBC:   Recent Labs     12/29/22  0435 12/30/22  0637 12/31/22  0500   WBC 13.3* 14.8* 18.8*   HGB 9.5* 9.4* 9.3*    316 349     BMP:    Recent Labs     12/29/22  1750 12/30/22  0637 12/31/22  0500    140 138   K 3.7 4.1 3.8    100 101   CO2 23 23 26   * 111* 83*   CREATININE 4.1* 4.2* 3.4*   GLUCOSE 260* 300* 140*     Hepatic:   Recent Labs     12/29/22  1750   AST 12*   ALT 14   BILITOT 0.6   ALKPHOS 81       CXR 12/27/2022:  Perihilar and multifocal airspace opacities likely represents viral pattern   of pneumonia. Echo 12/28/2022:   Summary   Left ventricular cavity size is dilated with mild concentric left   ventricular hypertrophy. Overall left ventricular systolic function appears normal with an ejection fraction that is visually estimated to be 55-60%. No obvious regional wall motion abnormalities are noted. Indeterminate diastolic function due to atrial fibrillation. The right ventricle is moderately enlarged with normal function. There is mild biatrial enlargement. Mild aortic regurgitation is present. Mild mitral regurgitation is present. Mild tricuspid regurgitation with an RVSP estimated to be 46 mmHg. Problem List  Principal Problem:    A-fib ([address]  Active Problems:    Elevated troponin level    New onset atrial fibrillation (HCC)    Pneumonia due to COVID-19 virus  Resolved Problems:    * No resolved hospital problems. *       Assessment & Plan:   New onset atrial fibrillation. HR 120s on presentation, started on diltiazem drip. Evaluated by EP and transitioned to po cardizem. Already on warfarin for hx DVT/PE, INR supratherapeutic on admission (4.65), warfarin on hold, 3.41 today. Remains atrial fib, adequately controlled rate. TSH 0.87.  EP has signed off, to arrange for office follow up and potential cardioversion if remains in afib at that time. Continue diltiazem. COVID-19 / viral pneumonia / COPD. Positive home COVID test 12/21, treated with molnupiravir. Rapid influenza A/B negative, rapid COVID positive. CXR consistent with viral pneumonia, procalcitonin 0.76, lactic acid 1.1. Low suspicion for bacterial infection and antibiotics held on admission. Blood culture with NGTD x 2. Currently on O2 at 5 liters, O2 sats mid 90s. Continue solumedrol, Brovana, Pulmicort, duonebs. Pulmonary on board. Volume overload. Hx chronic dCHF. CXR with multifocal airspace opacities likely representing vial pneumonia, proBNP > 25K in setting of LUISANA on CKD. Patient is orthopneic. Volume overload secondary to worsening kidney function not CHF. He received IV Lasix with poor response, creatinine trending higher and patient was started on dialysis 12/30. Echo with EF 55-60%, RVSP 46 mmHg. Elevated troponin (0.16) in setting of CHF and LUISANA/CKD. No acute changes on EKG. Denied chest pain. Has been evaluated by EP, recommends coronary evaluation after COVID infection resolved. Continue asa and statin. LUISANA on CKD stage IV. Creatinine 3.2 on presentation, baseline 2.9. nephrology suspects progressive CKD. Creatinine trending higher and was started on dialysis 12/30. Nephrology managing. Follow BMP. Hx DVT/PE. Takes warfarin. INR supratherapeutic on presentation and warfarin on hold. Received 2 units FFP yesterday prior to dialysis cath placement for INR 4.74. INR today 3.41 today, warfarin remains on hold. HTN. BP elevated on admission, improved. Continue hydralazine. DM2. A1c 7.2. Takes Lantus and sliding scale insulin, continued on admission. Reviewed BG values, improved. Continue Lantus, mealtime insulin and high dose correction. Anxiety. Continue prn lorazepam.   Constipation. Continue Miralax and senokot-S. Disposition: PT/OT evaluated (8/24, 14/24) and recommend SNF on DC. Diet: ADULT DIET;  Regular; 4 carb choices (60 gm/meal)  Code:Limited  DVT PPX: warfarin on hold, INR 3.41    SHA Merida - CNP   12/31/2022 1:30 PM

## 2022-12-31 NOTE — PROGRESS NOTES
Patient is complaining of 10/10 sharp shoulder pain. He states it doesnt radiate but also burns. APAP administered and MD notified. Will continue to monitor.

## 2022-12-31 NOTE — FLOWSHEET NOTE
Treatment time: 2.5 hrs     Net UF: 1.5 liters     Pre weight: 89.9kg  Post weight: 88.5kg   EDW: tbd    Access used: R IJ NTDC  Access function: good     Medications or blood products given: Heparin dwelled R IJ    Regular outpatient schedule: LUISANA     Summary of response to treatment: pt tolerated tx well. Post VSS     Copy of dialysis treatment record placed in chart, to be scanned into EMR.      12/31/22 0813 12/31/22 1045   Vital Signs   /70 133/70   Temp  --  97 °F (36.1 °C)   Heart Rate 89 100   Resp 22  --    Weight 198 lb 3.1 oz (89.9 kg) 195 lb 1.7 oz (88.5 kg)   Weight Method Bed scale Bed scale   Treatment Initiation   Dialyze Hours 2.5  --    Treatment  Initiation Universal Precautions maintained;Lines secured to patient; Connections secured;Prime given;Venous Parameters set; Arterial Parameters set; Air foam detector engaged; Hemosafe Device; Dialysate  --    Post-Hemodialysis Assessment   Hemodialysis Intake (ml)  --  400 ml   Hemodialysis Output (ml)  --  1900 ml   NET Removed (ml)  --  1500   Tolerated Treatment  --  Good

## 2022-12-31 NOTE — PROGRESS NOTES
Approximately 1710 patient was moved back to bed and cleaned up. Patient became SOB with labored breathing. SpO2 was 97% on 5L NC. Audible wheezing heard. RRT called for breathing tx. Breathing is more labored, bipap placed by this RN. Patient becomes very agitated, pulling off bipap and yelling. SpO2 remains adequate. Heart rate increased to 120's. Now sounds wet/crackles. Dr. Rachel Berrios notified. 1730: 2mg IV Ativan  1731: 80mg IV Lasix   1732: Precedex gtt started  1742: 5mg Lopressor for high heart rate. Patient calmed down, wore bipap approx 10 minutes before he pulled it off. SpO2 97% on 5LNC. Precedex gtt infusing.

## 2022-12-31 NOTE — PROGRESS NOTES
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    CKD (chronic kidney disease) stage 3, GFR 30-59 ml/min (HCC)    Bilateral pulmonary embolism (HCC)    Overweight    Diverticulosis of large intestine without diverticulitis    Chronic obstructive pulmonary disease (HCC)    Benign prostatic hyperplasia with urinary hesitancy    Gastroesophageal reflux disease without esophagitis    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)    Type 2 diabetes mellitus with stage 4 chronic kidney disease, without long-term current use of insulin (HCC)    Pulmonary nodule    Chronic systolic congestive heart failure (HCC)    Vitreous degeneration, bilateral    Secondary cataract    Primary open-angle glaucoma, bilateral, moderate stage    Posterior vitreous detachment of both eyes    Peripapillary atrophy of both eyes    Nodular corneal degeneration, right eye    Neoplastic malignant related fatigue    Entropion of left lower eyelid    Early stage nonexudative age-related macular degeneration of both eyes    Diabetic peripheral neuropathy (HCC)    Dermatochalasis of right upper eyelid    Bilateral posterior capsular opacification    Aortic valvular disease    Phimosis of penis    Renal insufficiency    Recurrent UTI    Cerebral amyloid angiopathy ([facility] [address]    Late onset Alzheimer's disease without behavioral disturbance (HCC)    Thrombocytopenia, unspecified    Skin ulcer of second toe of right foot, limited to breakdown of skin (HCC)    Chronic kidney disease (CKD) stage G4/A3, severely decreased glomerular filtration rate (GFR) between 15-29 mL/min/1.73 square meter and albuminuria creatinine ratio greater than 300 mg/g (HCC)    Secondary hyperparathyroidism of renal origin    Constipation due to opioid therapy    A-fib (HCC)    Elevated troponin level    New onset atrial fibrillation (HCC)    Pneumonia due to COVID-19 virus       Past Medical History:   has a past medical history of Abdominal pain, epigastric, Arthritis, Benign prostatic hypertrophy without urinary obstruction, BPH, Cellulitis and abscess, Chronic rhinitis, Chronic systolic congestive heart failure ([address] COPD (chronic obstructive pulmonary disease) ([address] Diabetes mellitus ([address] Dysphagia, Erectile dysfunction, GERD (gastroesophageal reflux disease), Hx of blood clots, Hyperglycemia, Hypertension, Late onset Alzheimer's disease without behavioral disturbance ([address] lumbar radiculopathy, Neuropathy, Nocturia, Osteoarthritis, Other disorders of kidney and ureter in diseases classified elsewhere, Pain, joint, knee, Palpitations, Pneumonia due to COVID-19 virus, skin cancer, SOB (shortness of breath), Tennis elbow, and Tinea pedis. Past Social History:   reports that he quit smoking about 52 years ago. His smoking use included cigarettes. He has a 40.00 pack-year smoking history. He has never used smokeless tobacco. He reports that he does not drink alcohol and does not use drugs. Subjective:    Confused. Feels better. Seen on 2nd HD, tolerating well. Review of Systems   Constitutional:  Positive for fatigue. Negative for activity change, appetite change, chills, fever and unexpected weight change. HENT:  Negative for congestion and facial swelling. Eyes:  Negative for photophobia, discharge and redness. Respiratory:  Positive for shortness of breath. Negative for cough and chest tightness. Cardiovascular:  Negative for chest pain, palpitations and leg swelling.    Gastrointestinal:  Negative for abdominal distention, abdominal pain, blood in stool, constipation, diarrhea, nausea and vomiting. Endocrine: Negative for cold intolerance, heat intolerance and polyuria. Genitourinary:  Negative for decreased urine volume, difficulty urinating, flank pain and hematuria. Musculoskeletal:  Negative for joint swelling and neck pain. Neurological:  Negative for dizziness, seizures, syncope, speech difficulty, light-headedness and headaches. Hematological:  Does not bruise/bleed easily. Psychiatric/Behavioral:  Negative for agitation, confusion and hallucinations. Objective:   Neg 700ml. 1L urine output    /70   Pulse 89   Temp 97.1 °F (36.2 °C) (Temporal)   Resp 22   Ht 5' 11\" (1.803 m)   Wt 198 lb 3.1 oz (89.9 kg)   SpO2 98%   BMI 27.64 kg/m²     Wt Readings from Last 3 Encounters:   12/31/22 198 lb 3.1 oz (89.9 kg)   11/30/22 218 lb (98.9 kg)   11/22/22 215 lb 14.4 oz (97.9 kg)       BP Readings from Last 3 Encounters:   12/31/22 135/70   11/30/22 130/80   11/22/22 132/80     Chest-rhonchi b/l  Heart-regular  Abd-soft  Ext- 1+ edema    Labs  Hemoglobin   Date Value Ref Range Status   12/31/2022 9.3 (L) 13.5 - 17.5 g/dL Final     Hematocrit   Date Value Ref Range Status   12/31/2022 28.6 (L) 40.5 - 52.5 % Final     WBC   Date Value Ref Range Status   12/31/2022 18.8 (H) 4.0 - 11.0 K/uL Final     Platelets   Date Value Ref Range Status   12/31/2022 349 135 - 450 K/uL Final     Lab Results   Component Value Date    CREATININE 3.4 (H) 12/31/2022    BUN 83 (HH) 12/31/2022     12/31/2022    K 3.8 12/31/2022     12/31/2022    CO2 26 12/31/2022       Assessment/Plan:   LUISANA on CKD 4-underlying CKD progression. BUN and Crea better today. 2nd HD today, 1.5L fluid removal.  Next HD Monday. HTN-No need for aggressive control today. At goal.   Electrolytes acceptable. Check ionized Calcium. Anemia- follow Hgb. Transfuse PRN. Stable. COVID Infection with COPD Exacerbation. Resp. Acidosis+AGMA+Metab. Alkalosis. On steroids. H/O DM-per Medicine  H/O COPD  H/O Alzheimer's Dementia  A. Fib- EP following. Appreciate input. Rate controlled. With his age and H/O Dementia, time-limited HD trial may be appropriate option. Discussed with Medicine, ICU MD, wife and patient. 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 Appreciate ICU team help.      Kenna Valle MD

## 2023-01-01 VITALS
SYSTOLIC BLOOD PRESSURE: 112 MMHG | OXYGEN SATURATION: 68 % | TEMPERATURE: 98.2 F | WEIGHT: 195.77 LBS | HEART RATE: 100 BPM | HEIGHT: 71 IN | BODY MASS INDEX: 27.41 KG/M2 | DIASTOLIC BLOOD PRESSURE: 69 MMHG | RESPIRATION RATE: 14 BRPM

## 2023-01-01 LAB
ANION GAP SERPL CALCULATED.3IONS-SCNC: 13 MMOL/L (ref 3–16)
BUN BLDV-MCNC: 78 MG/DL (ref 7–20)
CALCIUM SERPL-MCNC: 8.3 MG/DL (ref 8.3–10.6)
CHLORIDE BLD-SCNC: 100 MMOL/L (ref 99–110)
CO2: 25 MMOL/L (ref 21–32)
CREAT SERPL-MCNC: 3 MG/DL (ref 0.8–1.3)
GFR SERPL CREATININE-BSD FRML MDRD: 20 ML/MIN/{1.73_M2}
GLUCOSE BLD-MCNC: 176 MG/DL (ref 70–99)
GLUCOSE BLD-MCNC: 244 MG/DL (ref 70–99)
GLUCOSE BLD-MCNC: 257 MG/DL (ref 70–99)
GLUCOSE BLD-MCNC: 270 MG/DL (ref 70–99)
HCT VFR BLD CALC: 33.2 % (ref 40.5–52.5)
HEMOGLOBIN: 10.4 G/DL (ref 13.5–17.5)
INR BLD: 3.21 (ref 0.87–1.14)
MCH RBC QN AUTO: 26.9 PG (ref 26–34)
MCHC RBC AUTO-ENTMCNC: 31.4 G/DL (ref 31–36)
MCV RBC AUTO: 85.6 FL (ref 80–100)
PDW BLD-RTO: 16.5 % (ref 12.4–15.4)
PERFORMED ON: ABNORMAL
PLATELET # BLD: 436 K/UL (ref 135–450)
PMV BLD AUTO: 7.3 FL (ref 5–10.5)
POTASSIUM SERPL-SCNC: 4.8 MMOL/L (ref 3.5–5.1)
PROTHROMBIN TIME: 33 SEC (ref 11.7–14.5)
RBC # BLD: 3.88 M/UL (ref 4.2–5.9)
SODIUM BLD-SCNC: 138 MMOL/L (ref 136–145)
WBC # BLD: 26.3 K/UL (ref 4–11)

## 2023-01-01 PROCEDURE — 2580000003 HC RX 258: Performed by: INTERNAL MEDICINE

## 2023-01-01 PROCEDURE — 6360000002 HC RX W HCPCS: Performed by: NURSE PRACTITIONER

## 2023-01-01 PROCEDURE — 6360000002 HC RX W HCPCS: Performed by: INTERNAL MEDICINE

## 2023-01-01 PROCEDURE — 2500000003 HC RX 250 WO HCPCS: Performed by: INTERNAL MEDICINE

## 2023-01-01 PROCEDURE — 94640 AIRWAY INHALATION TREATMENT: CPT

## 2023-01-01 PROCEDURE — 6370000000 HC RX 637 (ALT 250 FOR IP): Performed by: NURSE PRACTITIONER

## 2023-01-01 PROCEDURE — 94660 CPAP INITIATION&MGMT: CPT

## 2023-01-01 PROCEDURE — 85610 PROTHROMBIN TIME: CPT

## 2023-01-01 PROCEDURE — 36415 COLL VENOUS BLD VENIPUNCTURE: CPT

## 2023-01-01 PROCEDURE — 99291 CRITICAL CARE FIRST HOUR: CPT | Performed by: INTERNAL MEDICINE

## 2023-01-01 PROCEDURE — 6370000000 HC RX 637 (ALT 250 FOR IP): Performed by: INTERNAL MEDICINE

## 2023-01-01 PROCEDURE — 94761 N-INVAS EAR/PLS OXIMETRY MLT: CPT

## 2023-01-01 PROCEDURE — 80048 BASIC METABOLIC PNL TOTAL CA: CPT

## 2023-01-01 PROCEDURE — C9113 INJ PANTOPRAZOLE SODIUM, VIA: HCPCS | Performed by: NURSE PRACTITIONER

## 2023-01-01 PROCEDURE — 36591 DRAW BLOOD OFF VENOUS DEVICE: CPT

## 2023-01-01 PROCEDURE — 2700000000 HC OXYGEN THERAPY PER DAY

## 2023-01-01 PROCEDURE — 2000000000 HC ICU R&B

## 2023-01-01 PROCEDURE — 85027 COMPLETE CBC AUTOMATED: CPT

## 2023-01-01 RX ORDER — GLYCOPYRROLATE 0.2 MG/ML
0.2 INJECTION INTRAMUSCULAR; INTRAVENOUS EVERY 4 HOURS PRN
Status: DISCONTINUED | OUTPATIENT
Start: 2023-01-01 | End: 2023-01-02 | Stop reason: HOSPADM

## 2023-01-01 RX ORDER — LORAZEPAM 2 MG/ML
2 INJECTION INTRAMUSCULAR
Status: DISCONTINUED | OUTPATIENT
Start: 2023-01-01 | End: 2023-01-02 | Stop reason: HOSPADM

## 2023-01-01 RX ORDER — HYDRALAZINE HYDROCHLORIDE 20 MG/ML
5 INJECTION INTRAMUSCULAR; INTRAVENOUS 2 TIMES DAILY
Status: DISCONTINUED | OUTPATIENT
Start: 2023-01-01 | End: 2023-01-01

## 2023-01-01 RX ORDER — HYDROMORPHONE HCL 110MG/55ML
2 PATIENT CONTROLLED ANALGESIA SYRINGE INTRAVENOUS ONCE
Status: COMPLETED | OUTPATIENT
Start: 2023-01-01 | End: 2023-01-01

## 2023-01-01 RX ORDER — PANTOPRAZOLE SODIUM 40 MG/10ML
20 INJECTION, POWDER, LYOPHILIZED, FOR SOLUTION INTRAVENOUS DAILY
Status: DISCONTINUED | OUTPATIENT
Start: 2023-01-01 | End: 2023-01-01

## 2023-01-01 RX ORDER — HYDRALAZINE HYDROCHLORIDE 20 MG/ML
10 INJECTION INTRAMUSCULAR; INTRAVENOUS EVERY 6 HOURS PRN
Status: DISCONTINUED | OUTPATIENT
Start: 2023-01-01 | End: 2023-01-02 | Stop reason: HOSPADM

## 2023-01-01 RX ADMIN — MORPHINE SULFATE 1 MG/HR: 10 INJECTION INTRAVENOUS at 16:50

## 2023-01-01 RX ADMIN — IPRATROPIUM BROMIDE AND ALBUTEROL SULFATE 1 AMPULE: .5; 3 SOLUTION RESPIRATORY (INHALATION) at 09:14

## 2023-01-01 RX ADMIN — METHYLPREDNISOLONE SODIUM SUCCINATE 40 MG: 40 INJECTION, POWDER, FOR SOLUTION INTRAMUSCULAR; INTRAVENOUS at 14:20

## 2023-01-01 RX ADMIN — DEXMEDETOMIDINE HYDROCHLORIDE 0.9 MCG/KG/HR: 4 INJECTION, SOLUTION INTRAVENOUS at 04:44

## 2023-01-01 RX ADMIN — INSULIN LISPRO 8 UNITS: 100 INJECTION, SOLUTION INTRAVENOUS; SUBCUTANEOUS at 12:48

## 2023-01-01 RX ADMIN — INSULIN LISPRO 8 UNITS: 100 INJECTION, SOLUTION INTRAVENOUS; SUBCUTANEOUS at 09:49

## 2023-01-01 RX ADMIN — HYDRALAZINE HYDROCHLORIDE 10 MG: 20 INJECTION INTRAMUSCULAR; INTRAVENOUS at 04:22

## 2023-01-01 RX ADMIN — BUDESONIDE 500 MCG: 0.5 SUSPENSION RESPIRATORY (INHALATION) at 09:14

## 2023-01-01 RX ADMIN — DEXMEDETOMIDINE HYDROCHLORIDE 1 MCG/KG/HR: 4 INJECTION, SOLUTION INTRAVENOUS at 17:25

## 2023-01-01 RX ADMIN — ARFORMOTEROL TARTRATE 15 MCG: 15 SOLUTION RESPIRATORY (INHALATION) at 09:14

## 2023-01-01 RX ADMIN — DEXMEDETOMIDINE HYDROCHLORIDE 1 MCG/KG/HR: 4 INJECTION, SOLUTION INTRAVENOUS at 09:40

## 2023-01-01 RX ADMIN — LORAZEPAM 2 MG: 2 INJECTION INTRAMUSCULAR; INTRAVENOUS at 20:11

## 2023-01-01 RX ADMIN — METHYLPREDNISOLONE SODIUM SUCCINATE 40 MG: 40 INJECTION, POWDER, FOR SOLUTION INTRAMUSCULAR; INTRAVENOUS at 04:22

## 2023-01-01 RX ADMIN — PANTOPRAZOLE SODIUM 20 MG: 40 INJECTION, POWDER, FOR SOLUTION INTRAVENOUS at 18:09

## 2023-01-01 RX ADMIN — HYDROMORPHONE HYDROCHLORIDE 2 MG: 2 INJECTION, SOLUTION INTRAMUSCULAR; INTRAVENOUS; SUBCUTANEOUS at 18:38

## 2023-01-01 RX ADMIN — MORPHINE SULFATE 2.5 MG/HR: 10 INJECTION, SOLUTION INTRAMUSCULAR; INTRAVENOUS at 18:42

## 2023-01-01 ASSESSMENT — ENCOUNTER SYMPTOMS
NAUSEA: 0
EYE REDNESS: 0
CHEST TIGHTNESS: 0
EYE DISCHARGE: 0
SHORTNESS OF BREATH: 1
PHOTOPHOBIA: 0
DIARRHEA: 0
CONSTIPATION: 0
COUGH: 0
FACIAL SWELLING: 0
BLOOD IN STOOL: 0
VOMITING: 0
ABDOMINAL PAIN: 0
ABDOMINAL DISTENTION: 0

## 2023-01-01 ASSESSMENT — PAIN SCALES - PAIN ASSESSMENT IN ADVANCED DEMENTIA (PAINAD)
BODYLANGUAGE: 0
TOTALSCORE: 0
BREATHING: 0
NEGVOCALIZATION: 2
CONSOLABILITY: 0
CONSOLABILITY: 0
BREATHING: 0
TOTALSCORE: 0
BODYLANGUAGE: 0
FACIALEXPRESSION: 0
BREATHING: 0
NEGVOCALIZATION: 0
TOTALSCORE: 4
TOTALSCORE: 0
FACIALEXPRESSION: 0
CONSOLABILITY: 0
TOTALSCORE: 0
NEGVOCALIZATION: 2
CONSOLABILITY: 0
NEGVOCALIZATION: 0
CONSOLABILITY: 0
TOTALSCORE: 4
FACIALEXPRESSION: 0
BREATHING: 0
BODYLANGUAGE: 0
NEGVOCALIZATION: 0
FACIALEXPRESSION: 0
BODYLANGUAGE: 1
BREATHING: 0
TOTALSCORE: 0
CONSOLABILITY: 1
BODYLANGUAGE: 0
BREATHING: 0
NEGVOCALIZATION: 0
FACIALEXPRESSION: 0
BODYLANGUAGE: 0
BODYLANGUAGE: 1
NEGVOCALIZATION: 0
FACIALEXPRESSION: 0
BREATHING: 0
CONSOLABILITY: 1
FACIALEXPRESSION: 0

## 2023-01-01 NOTE — PLAN OF CARE
Problem: Discharge Planning  Goal: Discharge to home or other facility with appropriate resources  Outcome: Progressing     Problem: Skin/Tissue Integrity  Goal: Absence of new skin breakdown  Description: 1. Monitor for areas of redness and/or skin breakdown  2. Assess vascular access sites hourly  3. Every 4-6 hours minimum:  Change oxygen saturation probe site  4. Every 4-6 hours:  If on nasal continuous positive airway pressure, respiratory therapy assess nares and determine need for appliance change or resting period. Outcome: Progressing     Problem: Safety - Adult  Goal: Free from fall injury  Outcome: Progressing     Problem: ABCDS Injury Assessment  Goal: Absence of physical injury  Outcome: Progressing     Problem: Confusion  Goal: Confusion, delirium, dementia, or psychosis is improved or at baseline  Description: INTERVENTIONS:  1. Assess for possible contributors to thought disturbance, including medications, impaired vision or hearing, underlying metabolic abnormalities, dehydration, psychiatric diagnoses, and notify attending LIP  2. [place] high risk fall precautions, as indicated  3. Provide frequent short contacts to provide reality reorientation, refocusing and direction  4. Decrease environmental stimuli, including noise as appropriate  5. Monitor and intervene to maintain adequate nutrition, hydration, elimination, sleep and activity  6. If unable to ensure safety without constant attention obtain sitter and review sitter guidelines with assigned personnel  7.  Initiate Psychosocial CNS and Spiritual Care consult, as indicated  Outcome: Progressing     Problem: Chronic Conditions and Co-morbidities  Goal: Patient's chronic conditions and co-morbidity symptoms are monitored and maintained or improved  Outcome: Progressing     Problem: Pain  Goal: Verbalizes/displays adequate comfort level or baseline comfort level  Outcome: Progressing     Problem: Infection - Adult  Goal: Absence of infection at discharge  Outcome: Progressing     Problem: Metabolic/Fluid and Electrolytes - Adult  Goal: Electrolytes maintained within normal limits  Outcome: Progressing  Goal: Glucose maintained within prescribed range  Outcome: Progressing     Problem: Respiratory - Adult  Goal: Achieves optimal ventilation and oxygenation  Outcome: Progressing     Problem: Cardiovascular - Adult  Goal: Absence of cardiac dysrhythmias or at baseline  Outcome: Progressing     Problem: Skin/Tissue Integrity - Adult  Goal: Skin integrity remains intact  Outcome: Progressing     Problem: Genitourinary - Adult  Goal: Urinary catheter remains patent  Outcome: Progressing

## 2023-01-01 NOTE — PROGRESS NOTES
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    CKD (chronic kidney disease) stage 3, GFR 30-59 ml/min (HCC)    Bilateral pulmonary embolism (HCC)    Overweight    Diverticulosis of large intestine without diverticulitis    Chronic obstructive pulmonary disease (HCC)    Benign prostatic hyperplasia with urinary hesitancy    Gastroesophageal reflux disease without esophagitis    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)    Type 2 diabetes mellitus with stage 4 chronic kidney disease, without long-term current use of insulin (HCC)    Pulmonary nodule    Chronic systolic congestive heart failure (HCC)    Vitreous degeneration, bilateral    Secondary cataract    Primary open-angle glaucoma, bilateral, moderate stage    Posterior vitreous detachment of both eyes    Peripapillary atrophy of both eyes    Nodular corneal degeneration, right eye    Neoplastic malignant related fatigue    Entropion of left lower eyelid    Early stage nonexudative age-related macular degeneration of both eyes    Diabetic peripheral neuropathy (HCC)    Dermatochalasis of right upper eyelid    Bilateral posterior capsular opacification    Aortic valvular disease    Phimosis of penis    Renal insufficiency    Recurrent UTI    Cerebral amyloid angiopathy ([address]    Late onset Alzheimer's disease without behavioral disturbance (HCC)    Thrombocytopenia, unspecified    Skin ulcer of second toe of right foot, limited to breakdown of skin (HCC)    Chronic kidney disease (CKD) stage G4/A3, severely decreased glomerular filtration rate (GFR) between 15-29 mL/min/1.73 square meter and albuminuria creatinine ratio greater than 300 mg/g (HCC)    Secondary hyperparathyroidism of renal origin    Constipation due to opioid therapy    A-fib (HCC)    Elevated troponin level    New onset atrial fibrillation (HCC)    Pneumonia due to COVID-19 virus       Past Medical History:   has a past medical history of Abdominal pain, epigastric, Arthritis, Benign prostatic hypertrophy without urinary obstruction, BPH, Cellulitis and abscess, Chronic rhinitis, Chronic systolic congestive heart failure ([address] COPD (chronic obstructive pulmonary disease) ([address] Diabetes mellitus ([address] Dysphagia, Erectile dysfunction, GERD (gastroesophageal reflux disease), Hx of blood clots, Hyperglycemia, Hypertension, Late onset Alzheimer's disease without behavioral disturbance ([address] lumbar radiculopathy, Neuropathy, Nocturia, Osteoarthritis, Other disorders of kidney and ureter in diseases classified elsewhere, Pain, joint, knee, Palpitations, Pneumonia due to COVID-19 virus, skin cancer, SOB (shortness of breath), Tennis elbow, and Tinea pedis. Past Social History:   reports that he quit smoking about 52 years ago. His smoking use included cigarettes. He has a 40.00 pack-year smoking history. He has never used smokeless tobacco. He reports that he does not drink alcohol and does not use drugs. Subjective:    Confused. Wife at bedside. Tolerated HD well yesterday. Review of Systems   Constitutional:  Positive for fatigue. Negative for activity change, appetite change, chills, fever and unexpected weight change. HENT:  Negative for congestion and facial swelling. Eyes:  Negative for photophobia, discharge and redness. Respiratory:  Positive for shortness of breath. Negative for cough and chest tightness. Cardiovascular:  Negative for chest pain, palpitations and leg swelling.    Gastrointestinal:  Negative for abdominal distention, abdominal pain, blood in stool, constipation, diarrhea, nausea and vomiting. Endocrine: Negative for cold intolerance, heat intolerance and polyuria. Genitourinary:  Negative for decreased urine volume, difficulty urinating, flank pain and hematuria. Musculoskeletal:  Negative for joint swelling and neck pain. Neurological:  Negative for dizziness, seizures, syncope, speech difficulty, light-headedness and headaches. Hematological:  Does not bruise/bleed easily. Psychiatric/Behavioral:  Negative for agitation, confusion and hallucinations. Objective:   Neg 2400ml. 550ml urine output    /63   Pulse 92   Temp 97.7 °F (36.5 °C) (Temporal)   Resp 15   Ht 5' 11\" (1.803 m)   Wt 195 lb 12.3 oz (88.8 kg)   SpO2 97%   BMI 27.30 kg/m²     Wt Readings from Last 3 Encounters:   01/01/23 195 lb 12.3 oz (88.8 kg)   11/30/22 218 lb (98.9 kg)   11/22/22 215 lb 14.4 oz (97.9 kg)       BP Readings from Last 3 Encounters:   01/01/23 117/63   11/30/22 130/80   11/22/22 132/80     Chest-rhonchi b/l  Heart-regular  Abd-soft  Ext- 1+ edema    Labs  Hemoglobin   Date Value Ref Range Status   01/01/2023 10.4 (L) 13.5 - 17.5 g/dL Final     Hematocrit   Date Value Ref Range Status   01/01/2023 33.2 (L) 40.5 - 52.5 % Final     WBC   Date Value Ref Range Status   01/01/2023 26.3 (H) 4.0 - 11.0 K/uL Final     Platelets   Date Value Ref Range Status   01/01/2023 436 135 - 450 K/uL Final     Lab Results   Component Value Date    CREATININE 3.0 (H) 01/01/2023    BUN 78 (H) 01/01/2023     01/01/2023    K 4.8 01/01/2023     01/01/2023    CO2 25 01/01/2023       Assessment/Plan:   LUISANA on CKD 4-underlying CKD progression. BUN and Crea better today. 3rd HD tomorrow, 1.5L fluid removal.  Decrease Gabapentin dose to 300mg daily. HTN-No need for aggressive control today. At goal.   Hypocalcemia-better  Anemia- follow Hgb. Transfuse PRN. Stable. COVID Infection with COPD Exacerbation. Resp. Acidosis+AGMA+Metab. Alkalosis.   On steroids. H/O DM-per Medicine  H/O COPD  H/O Alzheimer's Dementia  A. Fib- EP following. Appreciate input. Rate controlled. With his age and H/O Dementia, time-limited HD trial may be appropriate option. Discussed with Medicine, ICU MD, wife and patient. 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 Appreciate ICU team help. Updated wife at bedside.      Costa Jensen MD

## 2023-01-01 NOTE — PROGRESS NOTES
Patient became a DNR CC today, family decided to focus on the patient being comfortable. Morphine gtt started, titratable. Precedex still infusing. Family and  at bedside. Patient is now on room air. Also 2 mg of dilaudid given to make patient more comfortable because he was visibly irritated. After medications were given, patient looks very peaceful and relieved of his pain. Support provided to patient's family along with education on the medications that were given and the comfort measures that were provided.

## 2023-01-01 NOTE — SIGNIFICANT EVENT
Discussed with patients wife who is HPOA    Wants patient to be comfort care   Understand what it entails  No medical management  Will give 2 mg dilaudid   Titrate drip morphine for comfort  Prn ativan  All meds reviewed and stopped as appropriate     Dnr cc  Dw with nurse

## 2023-01-01 NOTE — PROGRESS NOTES
Shift assessment completed. Pt lethargic and sedation in place (Precedex). Pt arouses to voice and physical stimulation but no verbal response/only mumbles and unable to follow commands. 4L NC in place, Afib to monitor. Unable to safely administer PO meds. Son at bedside. Call light in reach and Bed alarm engaged. Maria Del Carmen consulted at 21  for 157/100 as PO hydralazine not given (2100). PRN IV hydralazine added. See eMAR for precedex titrations, pt becoming agitated. When called by name pt responds \"Yeah\" but still unable to follow commands. 0030 Pt placed on CPAP with RT, tolerating well. 0330: pt removed CPAP replaced on 4L NC. Still responds to name. Labs drawn, bath given and repositioned. PRN hydralazine given, see eMAR. Began to become inconsolable and calling out, precedex titrated. 0600: pt resting with eyes closed, no further titrations required for precedex. Son remains at bedside, discussed morning labs and answered questions about plan of care. Denies further needs for both parties.

## 2023-01-01 NOTE — PROGRESS NOTES
[facility]    1/1/2023 12:32 PM        Name: James Preston . Admitted: 12/27/2022  Primary Care Provider: SHA Shannon CNP (Tel: [phone])      Brief History: 83 yo male with hx chronic dCHF, COPD, DM2, HTN, DVT/PE on warfarin, dementia. He recently tested positive for COVID (12/21) after developing fever, sore throat, and cough which started 2 days prior. He was started on molnupiravir x 5 days by PCP. Presented to ER with worsening shortness of breath and weakness. Found to be in atrial fib with RVR on presentation, HR to 120s and he was started on diltiazem drip. CXR with evidence of viral pneumonia. Subjective:  Presently sedated, wife at bedside. Patient with worsening shortness of breath with agitation last evening. Received IV lorazepam and Lasix, started on Precedex. Appears comfortable, he does arouse to stimuli, speech incomprehensible.       Reviewed interval ancillary notes    Current Medications  hydrALAZINE (APRESOLINE) injection 10 mg, Q6H PRN  ipratropium-albuterol (DUONEB) nebulizer solution 1 ampule, BID  dexmedetomidine (PRECEDEX) 400 mcg in sodium chloride 0.9 % 100 mL infusion, Continuous  ipratropium-albuterol (DUONEB) nebulizer solution 1 ampule, Q4H PRN  metoprolol (LOPRESSOR) injection 5 mg, Q6H PRN  0.9 % sodium chloride infusion, PRN  heparin flush 100 UNIT/ML injection 100 Units, PRN  insulin glargine (LANTUS) injection vial 35 Units, Nightly  budesonide (PULMICORT) nebulizer suspension 500 mcg, BID  insulin lispro (HUMALOG) injection vial 3 Units, TID WC  sennosides-docusate sodium (SENOKOT-S) 8.6-50 MG tablet 2 tablet, BID  polyethylene glycol (GLYCOLAX) packet 17 g, Daily  Arformoterol Tartrate (BROVANA) nebulizer solution 15 mcg, BID  methylPREDNISolone sodium (SOLU-MEDROL) injection 40 mg, Q8H  LORazepam (ATIVAN) tablet 0.25 mg, Q12H PRN  aspirin EC tablet 81 mg, Daily  atorvastatin (LIPITOR) tablet 10 mg, Nightly  gabapentin (NEURONTIN) capsule 300 mg, Nightly  guaiFENesin (MUCINEX) extended release tablet 600 mg, BID  benzonatate (TESSALON) capsule 100 mg, TID PRN  insulin lispro (HUMALOG) injection vial 0-16 Units, TID WC  insulin lispro (HUMALOG) injection vial 0-4 Units, Nightly  donepezil (ARICEPT) tablet 5 mg, Daily with breakfast  finasteride (PROSCAR) tablet 5 mg, Daily  latanoprost (XALATAN) 0.005 % ophthalmic solution 1 drop, Nightly  dilTIAZem (CARDIZEM CD) extended release capsule 120 mg, Daily  dextrose bolus 10% 125 mL, PRN   Or  dextrose bolus 10% 250 mL, PRN  glucagon (rDNA) injection 1 mg, PRN  dextrose 10 % infusion, Continuous PRN  sodium chloride flush 0.9 % injection 5-40 mL, 2 times per day  sodium chloride flush 0.9 % injection 5-40 mL, PRN  0.9 % sodium chloride infusion, PRN  ondansetron (ZOFRAN-ODT) disintegrating tablet 4 mg, Q8H PRN   Or  ondansetron (ZOFRAN) injection 4 mg, Q6H PRN  acetaminophen (TYLENOL) tablet 650 mg, Q6H PRN   Or  acetaminophen (TYLENOL) suppository 650 mg, Q6H PRN  warfarin placeholder: dosing by pharmacy, RX Placeholder  gabapentin (NEURONTIN) capsule 300 mg, Daily  DULoxetine (CYMBALTA) extended release capsule 30 mg, Nightly  tamsulosin (FLOMAX) capsule 0.4 mg, BID  famotidine (PEPCID) tablet 10 mg, Daily  hydrALAZINE (APRESOLINE) tablet 50 mg, BID      Objective:  /76   Pulse 92   Temp 97.7 °F (36.5 °C) (Temporal)   Resp 29   Ht 5' 11\" (1.803 m)   Wt 195 lb 12.3 oz (88.8 kg)   SpO2 93%   BMI 27.30 kg/m²     Intake/Output Summary (Last 24 hours) at 1/1/2023 1232  Last data filed at 1/1/2023 0600  Gross per 24 hour   Intake 315.59 ml   Output 550 ml   Net -234.41 ml      Wt Readings from Last 3 Encounters:   01/01/23 195 lb 12.3 oz (88.8 kg)   11/30/22 218 lb (98.9 kg)   11/22/22 215 lb 14.4 oz (97.9 kg)     General:  Sedated  Skin:  Warm and dry.  Neck:  Non-tunneled cath RIJ    Chest:  +  crackles  Cardiovascular:  Irregular, normal S1/S2, no murmur/gallop/rub  Abdomen:  Soft, +bowel sounds  Extremities:  1+ BLE edema      Labs and Tests:  CBC:   Recent Labs     12/30/22  0637 12/31/22  0500 01/01/23  0404   WBC 14.8* 18.8* 26.3*   HGB 9.4* 9.3* 10.4*    349 436     BMP:    Recent Labs     12/30/22  0637 12/31/22  0500 01/01/23  0404    138 138   K 4.1 3.8 4.8    101 100   CO2 23 26 25   * 83* 78*   CREATININE 4.2* 3.4* 3.0*   GLUCOSE 300* 140* 244*     Hepatic:   Recent Labs     12/29/22  1750   AST 12*   ALT 14   BILITOT 0.6   ALKPHOS 81       CXR 12/27/2022:  Perihilar and multifocal airspace opacities likely represents viral pattern   of pneumonia. CXR 12/30/2022:  Bilateral pulmonary opacities could represent multifocal pneumonia    CXR 12/30/2022:  Venous catheters project in normal positions. No pneumothorax. Perihilar opacities, likely pulmonary edema. Additional mild progression in bibasilar atelectasis versus airspace disease,   pulmonary edema likely in the setting. Echo 12/28/2022:   Summary   Left ventricular cavity size is dilated with mild concentric left   ventricular hypertrophy. Overall left ventricular systolic function appears normal with an ejection fraction that is visually estimated to be 55-60%. No obvious regional wall motion abnormalities are noted. Indeterminate diastolic function due to atrial fibrillation. The right ventricle is moderately enlarged with normal function. There is mild biatrial enlargement. Mild aortic regurgitation is present. Mild mitral regurgitation is present. Mild tricuspid regurgitation with an RVSP estimated to be 46 mmHg. Problem List  Principal Problem:    A-fib ([address]  Active Problems:    Elevated troponin level    New onset atrial fibrillation (HCC)    Pneumonia due to COVID-19 virus  Resolved Problems:    * No resolved hospital problems.  *       Assessment & Plan:   New onset atrial fibrillation. HR 120s on presentation, started on diltiazem drip. Evaluated by EP and transitioned to po cardizem. Already on warfarin for hx DVT/PE, INR supratherapeutic on admission (4.65), warfarin on hold, 3.21 today. Remains atrial fib, adequately controlled rate. TSH 0.87. EP has signed off, to arrange for office follow up and potential cardioversion if remains in afib at that time. Continue diltiazem, transition to IV drip while on Precedex. Alis Bloodgood COVID-19 / viral pneumonia / COPD. Positive home COVID test 12/21, treated with molnupiravir. Rapid influenza A/B negative, rapid COVID positive. CXR consistent with viral pneumonia, procalcitonin 0.76, lactic acid 1.1. Low suspicion for bacterial infection and antibiotics held on admission. Blood culture with NGTD x 2. Currently on O2 at 4 liters, O2 sats high 90s. Continue solumedrol, Brovana, Pulmicort, duonebs. Pulmonary on board. Volume overload. Hx chronic dCHF. CXR with multifocal airspace opacities likely representing vial pneumonia, proBNP > 25K in setting of LUISANA on CKD. Volume overload secondary to worsening kidney function not CHF. He received IV Lasix with poor response, started on dialysis 12/30. Has had 2 sessions, not uch improvement in respiratory status. Echo with EF 55-60%, RVSP 46 mmHg. Elevated troponin (0.16) in setting of CHF and LUISANA/CKD. No acute changes on EKG. Evaluated by EP, recommends coronary evaluation after COVID infection resolved. Continue asa and statin. LUISANA on CKD stage IV. Creatinine 3.2 on presentation, baseline 2.9. nephrology suspects progressive CKD. Creatinine trending higher and was started on dialysis 12/30. Nephrology managing. Follow BMP. Hx DVT/PE. Takes warfarin. INR supratherapeutic on presentation and warfarin on hold. Received 2 units FFP prior to dialysis cath placement. INR today 3.21 today, warfarin remains on hold. HTN. BP elevated on admission, improved.  Continue hydralazine, transition to IV while on Precedex. DM2. A1c 7.2. Takes Lantus and sliding scale insulin, continued on admission. BG values elevated secondary steroids. Continue Lantus and sliding scale. Monitor closely. Anxiety. Now on Precedex drip. Patient with worsening clinical status. Patient now [facility]. Disposition: PT/OT evaluated (8/24, 14/24) and recommend SNF on DC. Diet: ADULT DIET;  Regular; 4 carb choices (60 gm/meal)  Code:Limited  DVT PPX: warfarin on hold, INR 3.41    SHA Banks CNP   1/1/2023 12:32 PM

## 2023-01-01 NOTE — PROGRESS NOTES
Pharmacy to Dose Warfarin    Pharmacy consulted to dose warfarin for afib. INR Goal: 2-3    INR today: 3.21    Assessment/Plan:  - INR supratherapeutic   - Hold again tonight     Pharmacy will continue to follow. Joseline Hastings, PharmD.   PGY-1 Resident  [id number]  01/01/23 12:09 PM

## 2023-01-01 NOTE — PROGRESS NOTES
PULMONARY AND CRITICAL CARE MEDICINE PROGRESS NOTE    Subjective: Patient remains critically ill. Has failed to respond to ongoing therapy. Remains in respiratory distress. Refusing BiPAP therapy. Underwent hemodialysis x2. Mentation also remains poor. Remains afebrile with worsening leukocytosis. REVIEW OF SYSTEMS:   Constitutional symptoms: The patient denies fever, fatigue, night sweats, weight loss or weight gain. HEENT: No vision changes. No tinnitus, Denies sinus pain. No hoarseness, or dysphagia. Neck: Patient denies swelling in the neck. Cardiovascular: Denies chest pain, palpitation, syncope. Respiratory: Positive for shortness of breath or cough. Gastrointestinal: Denies nausea, abdominal pain or change in bowel function. Genitourinary: Denies obstructive symptoms. No history of incontinence. Skin: No rashes or itching. Muskuloskeletal: Denies weakness or bone pain. Neurological: No headaches or seizures. Psychiatric: Denies mood swings or depression.      MEDICATIONS:     Scheduled Meds:   ipratropium-albuterol  1 ampule Inhalation BID    insulin glargine  35 Units SubCUTAneous Nightly    budesonide  0.5 mg Nebulization BID    insulin lispro  3 Units SubCUTAneous TID     sennosides-docusate sodium  2 tablet Oral BID    polyethylene glycol  17 g Oral Daily    Arformoterol Tartrate  15 mcg Nebulization BID    methylPREDNISolone  40 mg IntraVENous Q8H    aspirin  81 mg Oral Daily    atorvastatin  10 mg Oral Nightly    guaiFENesin  600 mg Oral BID    insulin lispro  0-16 Units SubCUTAneous TID     insulin lispro  0-4 Units SubCUTAneous Nightly    donepezil  5 mg Oral Daily with breakfast    finasteride  5 mg Oral Daily    latanoprost  1 drop Both Eyes Nightly    dilTIAZem  120 mg Oral Daily    sodium chloride flush  5-40 mL IntraVENous 2 times per day    warfarin placeholder: dosing by pharmacy   Other RX Placeholder    gabapentin  300 mg Oral Daily    DULoxetine  30 mg Oral Nightly    tamsulosin  0.4 mg Oral BID    famotidine  10 mg Oral Daily    hydrALAZINE  50 mg Oral BID       Current Infusions:    morphine      dexmedetomidine 1.1 mcg/kg/hr (01/01/23 1422)    sodium chloride      dextrose      sodium chloride         PRN meds:  hydrALAZINE, glycopyrrolate, ipratropium-albuterol, metoprolol, sodium chloride, heparin flush, LORazepam, benzonatate, dextrose bolus **OR** dextrose bolus, glucagon (rDNA), dextrose, sodium chloride flush, sodium chloride, ondansetron **OR** ondansetron, acetaminophen **OR** acetaminophen    PHYSICAL EXAM:  /63   Pulse 92   Temp 97.7 °F (36.5 °C) (Temporal)   Resp 15   Ht 5' 11\" (1.803 m)   Wt 195 lb 12.3 oz (88.8 kg)   SpO2 97%   BMI 27.30 kg/m²  I/O last 3 completed shifts: In: 725.6 [P.O.:120; I.V.:205.6]  Out: 2950 [Urine:1050] No intake/output data recorded. Intake/Output Summary (Last 24 hours) at 1/1/2023 1613  Last data filed at 1/1/2023 1442  Gross per 24 hour   Intake 195.59 ml   Output 350 ml   Net -154.41 ml       CONSTITUTIONAL: He is a 82y.o.-year-old who appears his stated age. He is in mild to moderate respiratory distress. Only responsive to noxious stimuli. CARDIOVASCULAR: S1 S2 RRR. Without murmer  RESPIRATORY & CHEST: Bilateral scattered crackles heard. No wheezing heard. GASTROINTESTINAL & ABDOMEN: Soft, nontender, positive bowel sounds in all quadrants, non-distended, without hepatosplenomegaly. GENITOURINARY: Deferred. MUSCULOSKELETAL: No tenderness to palpation of the axial skeleton. There is no clubbing. No cyanosis. No edema of the lower extremities. SKIN OF BODY: No rash or jaundice. PSYCHIATRIC EVALUATION: Unable to assess. HEMATOLOGIC/LYMPHATIC/ IMMUNOLOGIC: No palpable lymphadenopathy. NEUROLOGIC: Poor mentation and only responsive to noxious stimuli.     LABS:    Recent Labs     12/30/22  0637 12/31/22  0500 01/01/23  0404   WBC 14.8* 18.8* 26.3*   RBC 3.28* 3.34* 3.88*   HGB 9.4* 9.3* 10.4* HCT 28.5* 28.6* 33.2*   MCH 28.6 27.9 26.9   MCHC 32.9 32.7 31.4   RDW 16.4* 16.6* 16.5*    349 436   MPV 7.8 7.5 7.3     Recent Labs     12/29/22  1750 12/30/22  0637 12/31/22  0500 01/01/23  0404    140 138 138   K 3.7 4.1 3.8 4.8    100 101 100   ANIONGAP 16 17* 11 13   CO2 23 23 26 25   * 111* 83* 78*   CREATININE 4.1* 4.2* 3.4* 3.0*   CALCIUM 7.9* 8.0* 7.9* 8.3   PROT 6.6  --   --   --    BILITOT 0.6  --   --   --    ALKPHOS 81  --   --   --    ALT 14  --   --   --    AST 12*  --   --   --    GLUCOSE 260* 300* 140* 244*     Recent Labs     12/29/22 2026   PHART 7.300*   AQI4GDU 46.1*   PO2ART 235.0*   VXG5LEQ 22.6   B0NLBBQC 99.8   BEART -3.7*       IMAGING:  I reviewed the chest x-ray from 12/20/2022 and my interpretation is as follows. B/L multifocal infiltrates       IMPRESSION:   Acute hypoxic respiratory failure  Acute exacerbation of COPD  COVID-19 infection  Atrial fibrillation with RVR  History of DVT/PE  Acute on chronic CKD stage IV  Diabetes mellitus type 2  Hypertension  History of non-small cell lung cancer s/p chemo and radiation  Alzheimer's dementia  Severe peripheral neuropathy       RECOMMENDATION:     Patient's respiratory status remains poor. He had initially presented with  acute hypoxic and hypercapnic respiratory failure. Yesterday was initiated on BiPAP therapy with which his hypercapnia has improved. Now refusing BiPAP therapy. Patient's family has chosen not to intubate and mechanically ventilate the patient. It was thought that he is having an acute COPD exacerbation due to COVID infection. Additionally chest imaging suggested pulmonary edema. He has been on  Solu-Medrol 40 IV every 8. Wheezing has decreased. Leukocytosis most likely due to steroids. Continue Pulmicort and Brovana nebs with round-the-clock duo nebs. Oxygen supplementation to maintain SPO2 between 88 to 92%. S/p molnupiravir for covid infection.    Also noted to have elevated proBNP of 20,597. Noted to have new onset atrial fibrillation. Elevated troponin likely due to demand ischemia and A. fib. Echo with preserved EF with diastolic dysfunction and LVH. Moderately enlarged RV with normal RV function. Acute on chronic CKD stage IV with worsening BUN and creatinine. Has undergone hemodialysis x2. This has failed to make any big difference in his respiratory status. On Coumadin for A. fib. INR supratherapeutic. Coumadin on hold. Received 2 units of yesterday. Patient has history of poorly differentiated non-small cell lung cancer, stage IIa/stage III. He is s/p chemotherapy with carboplatinum and radiation. Patient has Alzheimer's dementia now with worsening renal function and need for HD and respiratory failure. I had a prolonged discussion with patient's wife today regarding his failure to respond to current therapy and worsening clinical status. After discussing with her family, she has chosen to withdraw care at this time and provide patient with comfort measures. CODE STATUS has been changed to DNR-cc. Total critical care time caring for this patient with life threatening illness, including direct patient contact, management of life support systems, review of data including imaging and labs, discussions with other team members and physicians is at least 32 minutes so far today, excluding procedures. Lynda Swan MD   Pulmonary Critical Care and Sleep Medicine  [address] [facility],[address]   [address]  12/27/2022, 4:13 PM        This note was completed using dragon medical speech recognition software. Grammatical errors, random word insertions, pronoun errors and incomplete sentences are occasional consequences of this technology due to software limitations.  If there are questions or concerns about the content of this note of information contained within the body of this dictation they should be addressed with the provider for clarification.

## 2023-01-02 NOTE — SIGNIFICANT EVENT
Called to pronounce patient  No cardiopulmonary activity  Hello breath sounds  No heart sounds  Pupils fixed and dilated  Pronounced dead  Time of death 2109hrs 1/1/2023

## 2023-01-11 NOTE — DISCHARGE SUMMARY
[facility]    Patient Demographics    Patient. David Granados  Date of Birth. 1935  MRN. [id number]     Primary care provider. SHA Tomas CNP  (Tel: [phone])    Admit date: 2022    Discharge date (blank if same as Note Date): 2023 patient . Note Date: 1/10/2023     Reason for Hospitalization. Chief Complaint   Patient presents with    Shortness of Breath     Covid + x 1 week. Shortness of breath x 1 day. Weakness x 1 week since being covid +. Pt having trouble walking x 1 week. Significant Findings. Principal Problem:    A-fib ([address]  Active Problems:    Elevated troponin level    New onset atrial fibrillation (HCC)    Pneumonia due to COVID-19 virus  Resolved Problems:    * No resolved hospital problems. *       Significant test results and incidental findings. CXR 2022:  Perihilar and multifocal airspace opacities likely represents viral pattern   of pneumonia. CXR 2022:  Bilateral pulmonary opacities could represent multifocal pneumonia     CXR 2022:  Venous catheters project in normal positions. No pneumothorax. Perihilar opacities, likely pulmonary edema. Additional mild progression in bibasilar atelectasis versus airspace disease,   pulmonary edema likely in the setting. Echo 2022:   Summary   Left ventricular cavity size is dilated with mild concentric left   ventricular hypertrophy. Overall left ventricular systolic function appears normal with an ejection fraction that is visually estimated to be 55-60%. No obvious regional wall motion abnormalities are noted. Indeterminate diastolic function due to atrial fibrillation. The right ventricle is moderately enlarged with normal function. There is mild biatrial enlargement. Mild aortic regurgitation is present.    Mild mitral regurgitation is present. Mild tricuspid regurgitation with an RVSP estimated to be 46 mmHg. Invasive procedures and treatments. Non-tunneled dialysis catheter placed 12/30. [facility] Course. 83 yo male with hx chronic dCHF, COPD, DM2, HTN, DVT/PE on warfarin, dementia. He recently tested positive for COVID (12/21) after developing fever, sore throat, and cough which started 2 days prior. He was started on molnupiravir x 5 days by PCP. Presented to ER with worsening shortness of breath and weakness. Found to be in atrial fib with RVR on presentation, HR to 120s and he was started on diltiazem drip. CXR with evidence of viral pneumonia. New onset atrial fibrillation. HR 120s on presentation, started on diltiazem drip. Evaluated by EP and transitioned to po cardizem. Already on warfarin for hx DVT/PE, INR supratherapeutic on admission (4.65) and warfarin held. Remains atrial fib, adequately controlled rate. TSH 0.87. COVID-19 / viral pneumonia / COPD. Positive home COVID test 12/21, treated with molnupiravir. Rapid influenza A/B negative, rapid COVID positive. CXR consistent with viral pneumonia, procalcitonin 0.76, lactic acid 1.1. Low suspicion for bacterial infection and antibiotics held on admission. Blood culture with NGTD x 2. Suspect volume overload most likely cause of findings on CXR. Volume overload. Hx chronic dCHF. CXR with multifocal airspace opacities likely representing vial pneumonia, proBNP > 25K in setting of LUISANA on CKD. Volume overload secondary to worsening kidney function not CHF. He received IV Lasix with poor response, started on dialysis 12/30. Has had 2 sessions, not much improvement in respiratory status. Echo with EF 55-60%, RVSP 46 mmHg. Family decided to withdraw care. Elevated troponin (0.16) in setting of CHF and LUISANA/CKD. No acute changes on EKG. LUISANA on CKD stage IV.  Creatinine 3.2 on presentation, baseline 2.9. nephrology suspects progressive CKD. Creatinine trending higher and was started on dialysis 12/30. Worsening status despite initiation of dialysis and family withdrew care. Hx DVT/PE. Takes warfarin. INR supratherapeutic on presentation and warfarin held. Received 2 units FFP prior to dialysis cath placement. HTN. BP elevated on admission, improved. DM2. A1c 7.2. Takes Lantus and sliding scale insulin, continued on admission. Despite initiation of dialysis, patient continued to worsen. Family opted for comfort care. Was placed on morphine drip for comfort 1/1/2023 @ 6:25 PM. Pronounced dead 1/1/2023 @10:22 PM.     Consults.   IP CONSULT TO HOSPITALIST  IP CONSULT TO PHARMACY  IP CONSULT TO CARDIOLOGY  IP CONSULT TO NEPHROLOGY  IP CONSULT TO PULMONOLOGY  IP CONSULT TO PALLIATIVE CARE      Signed:  SHA Howe CNP     1/10/2023 10:27 PM

## (undated) DEVICE — STERILE POLYISOPRENE POWDER-FREE SURGICAL GLOVES: Brand: PROTEXIS

## (undated) DEVICE — PEN: MARKING STD 100/CS: Brand: MEDICAL ACTION INDUSTRIES

## (undated) DEVICE — DISPOSABLE OR TOWEL: Brand: CARDINAL HEALTH

## (undated) DEVICE — TRAY PREP DRY W/ PREM GLV 2 APPL 6 SPNG 2 UNDPD 1 OVERWRAP

## (undated) DEVICE — CHLORAPREP 26ML ORANGE

## (undated) DEVICE — Device: Brand: JELCO

## (undated) DEVICE — UNIVERSAL BLOCK TRAY: Brand: AVANOS*

## (undated) DEVICE — CYSTO PACK: Brand: MEDLINE INDUSTRIES, INC.

## (undated) DEVICE — MERCY FAIRFIELD TURNOVER KIT: Brand: MEDLINE INDUSTRIES, INC.

## (undated) DEVICE — NEEDLE SPNL 22GA L3.5IN BLK HUB S STL REG WALL FIT STYL W/

## (undated) DEVICE — SYRINGE MED 10ML SLIP TIP BLNT FILL AND LUERLOCK DISP

## (undated) DEVICE — SYRINGE MED 10ML TRNSLUC BRL PLUNG BLK MRK POLYPR CTRL

## (undated) DEVICE — Device: Brand: MEDEX

## (undated) DEVICE — ELECTRODE PT RET AD L9FT HI MOIST COND ADH HYDRGEL CORDED

## (undated) DEVICE — CYSTO/BLADDER IRRIGATION SET, REGULATING CLAMP

## (undated) DEVICE — NEEDLE EPI 22GA L2IN CLR HUB N WNG PERIFIX TUOHY

## (undated) DEVICE — PENCIL ES L3M ROCK SWCH S STL HEX LOK BLDE ELECTRD HOLSTER

## (undated) DEVICE — SUTURE CHROMIC GUT SZ 3-0 L27IN ABSRB BRN L26MM SH 1/2 CIR G122H

## (undated) DEVICE — Z DUP USE 2522782 SOLUTION IRRIG 1000ML STRL H2O PLAS CONTAINER UROMATIC

## (undated) DEVICE — NEEDLE EPI L3.5IN OD20GA CLR POLYCARB HUB WNG N DEHP TUOHY

## (undated) DEVICE — SOLUTION IV IRRIG WATER 1000ML POUR BRL 2F7114

## (undated) DEVICE — CURITY STRETCH BANDAGE: Brand: CURITY

## (undated) DEVICE — MEDIA CONTRAST RX ISOVUE-300 61% 30ML VIALS

## (undated) DEVICE — STANDARD HYPODERMIC NEEDLE,POLYPROPYLENE HUB: Brand: MONOJECT

## (undated) DEVICE — SYRINGE MED 3ML CLR PLAS STD N CTRL LUERLOCK TIP DISP

## (undated) DEVICE — PORT VLV 2 W NDL FREE SMRTSITE

## (undated) DEVICE — GLOVE ORANGE PI 8   MSG9080

## (undated) DEVICE — KIT OR ROOM TURNOVER W/STRAP

## (undated) DEVICE — SET EXTN L7IN PRIMING VOL 0.5ML PRSS RATE STD BOR 1 REM

## (undated) DEVICE — 3M™ COBAN™ NL STERILE NON-LATEX SELF-ADHERENT WRAP, 2084S, 4 IN X 5 YD (10 CM X 4,5 M), 18 ROLLS/CASE: Brand: 3M™ COBAN™

## (undated) DEVICE — GLOVE ORANGE PI 7   MSG9070

## (undated) DEVICE — BAG DRNGE L6FT 20L PREFIL ABSRB POLYMER EXP TBNG DISP FOR